# Patient Record
Sex: MALE | Race: WHITE | HISPANIC OR LATINO | Employment: OTHER | URBAN - METROPOLITAN AREA
[De-identification: names, ages, dates, MRNs, and addresses within clinical notes are randomized per-mention and may not be internally consistent; named-entity substitution may affect disease eponyms.]

---

## 2018-10-14 ENCOUNTER — HOSPITAL ENCOUNTER (EMERGENCY)
Facility: HOSPITAL | Age: 53
Discharge: HOME | End: 2018-10-14
Attending: EMERGENCY MEDICINE | Admitting: EMERGENCY MEDICINE

## 2018-10-14 ENCOUNTER — APPOINTMENT (EMERGENCY)
Dept: RADIOLOGY | Facility: HOSPITAL | Age: 53
End: 2018-10-14

## 2018-10-14 VITALS
DIASTOLIC BLOOD PRESSURE: 76 MMHG | OXYGEN SATURATION: 98 % | HEART RATE: 85 BPM | TEMPERATURE: 97.7 F | WEIGHT: 216 LBS | RESPIRATION RATE: 18 BRPM | SYSTOLIC BLOOD PRESSURE: 171 MMHG

## 2018-10-14 DIAGNOSIS — M25.562 LEFT KNEE PAIN: ICD-10-CM

## 2018-10-14 DIAGNOSIS — M25.532 LEFT WRIST PAIN: Primary | ICD-10-CM

## 2018-10-14 PROCEDURE — 99283 EMERGENCY DEPT VISIT LOW MDM: CPT

## 2018-10-14 PROCEDURE — 73110 X-RAY EXAM OF WRIST: CPT

## 2018-10-14 PROCEDURE — 73560 X-RAY EXAM OF KNEE 1 OR 2: CPT

## 2018-10-14 RX ORDER — IBUPROFEN 400 MG/1
400 TABLET ORAL ONCE
Status: COMPLETED | OUTPATIENT
Start: 2018-10-14 | End: 2018-10-14

## 2018-10-14 RX ADMIN — IBUPROFEN 400 MG: 400 TABLET ORAL at 22:59
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
## 2018-10-15 NOTE — ED NOTES
Attending at the bedside speaking with the pt regarding pain medications      Nallely Villalpando RN  10/14/18 6672
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
## 2018-10-19 ENCOUNTER — OFFICE VISIT (OUTPATIENT)
Dept: OBGYN CLINIC | Facility: HOSPITAL | Age: 53
End: 2018-10-19

## 2018-10-19 VITALS
HEART RATE: 65 BPM | DIASTOLIC BLOOD PRESSURE: 85 MMHG | WEIGHT: 225.8 LBS | BODY MASS INDEX: 36.29 KG/M2 | SYSTOLIC BLOOD PRESSURE: 141 MMHG | HEIGHT: 66 IN

## 2018-10-19 DIAGNOSIS — S60.222A CONTUSION OF LEFT HAND, INITIAL ENCOUNTER: Primary | ICD-10-CM

## 2018-10-19 PROCEDURE — 99203 OFFICE O/P NEW LOW 30 MIN: CPT | Performed by: ORTHOPAEDIC SURGERY

## 2018-10-19 RX ORDER — MIRTAZAPINE 15 MG/1
15 TABLET, ORALLY DISINTEGRATING ORAL
COMMUNITY
Start: 2018-10-09 | End: 2018-12-16

## 2018-10-19 RX ORDER — LITHIUM CARBONATE 300 MG/1
300 CAPSULE ORAL
COMMUNITY
Start: 2018-10-09 | End: 2018-12-16

## 2018-10-19 RX ORDER — IBUPROFEN 600 MG/1
600 TABLET ORAL EVERY 8 HOURS PRN
Qty: 40 TABLET | Refills: 1 | Status: SHIPPED | OUTPATIENT
Start: 2018-10-19 | End: 2018-12-17
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
## 2018-10-26 ENCOUNTER — HOSPITAL ENCOUNTER (OUTPATIENT)
Dept: RADIOLOGY | Age: 53
Discharge: HOME | End: 2018-10-26

## 2018-10-26 DIAGNOSIS — S60.222A CONTUSION OF LEFT HAND, INITIAL ENCOUNTER: ICD-10-CM

## 2018-10-26 PROCEDURE — 73221 MRI JOINT UPR EXTREM W/O DYE: CPT

## 2018-12-01 ENCOUNTER — HOSPITAL ENCOUNTER (EMERGENCY)
Facility: HOSPITAL | Age: 53
Discharge: HOME | End: 2018-12-01
Attending: EMERGENCY MEDICINE
Payer: COMMERCIAL

## 2018-12-01 ENCOUNTER — APPOINTMENT (EMERGENCY)
Dept: RADIOLOGY | Facility: HOSPITAL | Age: 53
End: 2018-12-01
Payer: COMMERCIAL

## 2018-12-01 VITALS
HEIGHT: 66 IN | BODY MASS INDEX: 36.96 KG/M2 | RESPIRATION RATE: 16 BRPM | TEMPERATURE: 97 F | OXYGEN SATURATION: 96 % | SYSTOLIC BLOOD PRESSURE: 124 MMHG | HEART RATE: 91 BPM | WEIGHT: 230 LBS | DIASTOLIC BLOOD PRESSURE: 74 MMHG

## 2018-12-01 DIAGNOSIS — F19.10 POLYSUBSTANCE ABUSE (HCC): ICD-10-CM

## 2018-12-01 DIAGNOSIS — F10.10 ALCOHOL ABUSE: ICD-10-CM

## 2018-12-01 DIAGNOSIS — R07.9 CHEST PAIN: Primary | ICD-10-CM

## 2018-12-01 LAB
ALBUMIN SERPL BCP-MCNC: 3.8 G/DL (ref 3.5–5)
ALP SERPL-CCNC: 96 U/L (ref 46–116)
ALT SERPL W P-5'-P-CCNC: 111 U/L (ref 12–78)
AMPHETAMINES SERPL QL SCN: NEGATIVE
ANION GAP SERPL CALCULATED.3IONS-SCNC: 7 MMOL/L (ref 4–13)
AST SERPL W P-5'-P-CCNC: 53 U/L (ref 5–45)
ATRIAL RATE: 93 BPM
BARBITURATES UR QL: NEGATIVE
BASOPHILS # BLD AUTO: 0.03 THOUSANDS/ΜL (ref 0–0.1)
BASOPHILS NFR BLD AUTO: 1 % (ref 0–1)
BENZODIAZ UR QL: NEGATIVE
BILIRUB SERPL-MCNC: 0.49 MG/DL (ref 0.2–1)
BUN SERPL-MCNC: 11 MG/DL (ref 5–25)
CALCIUM SERPL-MCNC: 8.6 MG/DL (ref 8.3–10.1)
CHLORIDE SERPL-SCNC: 103 MMOL/L (ref 100–108)
CO2 SERPL-SCNC: 28 MMOL/L (ref 21–32)
COCAINE UR QL: POSITIVE
CREAT SERPL-MCNC: 0.97 MG/DL (ref 0.6–1.3)
EOSINOPHIL # BLD AUTO: 0.04 THOUSAND/ΜL (ref 0–0.61)
EOSINOPHIL NFR BLD AUTO: 1 % (ref 0–6)
ERYTHROCYTE [DISTWIDTH] IN BLOOD BY AUTOMATED COUNT: 12.6 % (ref 11.6–15.1)
ETHANOL EXG-MCNC: 0.06 MG/DL
GFR SERPL CREATININE-BSD FRML MDRD: 90 ML/MIN/1.73SQ M
GLUCOSE SERPL-MCNC: 111 MG/DL (ref 65–140)
HCT VFR BLD AUTO: 41.5 % (ref 36.5–49.3)
HGB BLD-MCNC: 14.2 G/DL (ref 12–17)
IMM GRANULOCYTES # BLD AUTO: 0.03 THOUSAND/UL (ref 0–0.2)
IMM GRANULOCYTES NFR BLD AUTO: 1 % (ref 0–2)
LYMPHOCYTES # BLD AUTO: 3.16 THOUSANDS/ΜL (ref 0.6–4.47)
LYMPHOCYTES NFR BLD AUTO: 48 % (ref 14–44)
MCH RBC QN AUTO: 30.7 PG (ref 26.8–34.3)
MCHC RBC AUTO-ENTMCNC: 34.2 G/DL (ref 31.4–37.4)
MCV RBC AUTO: 90 FL (ref 82–98)
METHADONE UR QL: NEGATIVE
MONOCYTES # BLD AUTO: 0.35 THOUSAND/ΜL (ref 0.17–1.22)
MONOCYTES NFR BLD AUTO: 5 % (ref 4–12)
NEUTROPHILS # BLD AUTO: 2.83 THOUSANDS/ΜL (ref 1.85–7.62)
NEUTS SEG NFR BLD AUTO: 44 % (ref 43–75)
NRBC BLD AUTO-RTO: 0 /100 WBCS
OPIATES UR QL SCN: NEGATIVE
P AXIS: 22 DEGREES
PCP UR QL: NEGATIVE
PLATELET # BLD AUTO: 205 THOUSANDS/UL (ref 149–390)
PMV BLD AUTO: 10.7 FL (ref 8.9–12.7)
POTASSIUM SERPL-SCNC: 4 MMOL/L (ref 3.5–5.3)
PR INTERVAL: 158 MS
PROT SERPL-MCNC: 8 G/DL (ref 6.4–8.2)
QRS AXIS: 20 DEGREES
QRSD INTERVAL: 84 MS
QT INTERVAL: 348 MS
QTC INTERVAL: 432 MS
RBC # BLD AUTO: 4.62 MILLION/UL (ref 3.88–5.62)
SODIUM SERPL-SCNC: 138 MMOL/L (ref 136–145)
T WAVE AXIS: 32 DEGREES
THC UR QL: POSITIVE
TROPONIN I SERPL-MCNC: <0.02 NG/ML
VENTRICULAR RATE: 93 BPM
WBC # BLD AUTO: 6.44 THOUSAND/UL (ref 4.31–10.16)

## 2018-12-01 PROCEDURE — 93010 ELECTROCARDIOGRAM REPORT: CPT | Performed by: INTERNAL MEDICINE

## 2018-12-01 PROCEDURE — 82075 ASSAY OF BREATH ETHANOL: CPT | Performed by: EMERGENCY MEDICINE

## 2018-12-01 PROCEDURE — 85025 COMPLETE CBC W/AUTO DIFF WBC: CPT | Performed by: EMERGENCY MEDICINE

## 2018-12-01 PROCEDURE — 36415 COLL VENOUS BLD VENIPUNCTURE: CPT | Performed by: EMERGENCY MEDICINE

## 2018-12-01 PROCEDURE — 84484 ASSAY OF TROPONIN QUANT: CPT | Performed by: EMERGENCY MEDICINE

## 2018-12-01 PROCEDURE — 96374 THER/PROPH/DIAG INJ IV PUSH: CPT

## 2018-12-01 PROCEDURE — 71046 X-RAY EXAM CHEST 2 VIEWS: CPT

## 2018-12-01 PROCEDURE — 80053 COMPREHEN METABOLIC PANEL: CPT | Performed by: EMERGENCY MEDICINE

## 2018-12-01 PROCEDURE — 96372 THER/PROPH/DIAG INJ SC/IM: CPT

## 2018-12-01 PROCEDURE — 99284 EMERGENCY DEPT VISIT MOD MDM: CPT

## 2018-12-01 PROCEDURE — 80307 DRUG TEST PRSMV CHEM ANLYZR: CPT | Performed by: EMERGENCY MEDICINE

## 2018-12-01 PROCEDURE — 93005 ELECTROCARDIOGRAM TRACING: CPT

## 2018-12-01 RX ORDER — BACITRACIN, NEOMYCIN, POLYMYXIN B 400; 3.5; 5 [USP'U]/G; MG/G; [USP'U]/G
1 OINTMENT TOPICAL ONCE
Status: DISCONTINUED | OUTPATIENT
Start: 2018-12-01 | End: 2018-12-01

## 2018-12-01 RX ORDER — LORAZEPAM 2 MG/ML
1 INJECTION INTRAMUSCULAR ONCE
Status: DISCONTINUED | OUTPATIENT
Start: 2018-12-01 | End: 2018-12-01

## 2018-12-01 RX ORDER — LORAZEPAM 0.5 MG/1
1 TABLET ORAL ONCE
Status: DISCONTINUED | OUTPATIENT
Start: 2018-12-01 | End: 2018-12-01

## 2018-12-01 RX ORDER — LORAZEPAM 2 MG/ML
2 INJECTION INTRAMUSCULAR ONCE
Status: COMPLETED | OUTPATIENT
Start: 2018-12-01 | End: 2018-12-01

## 2018-12-01 RX ADMIN — LORAZEPAM 2 MG: 2 INJECTION INTRAMUSCULAR; INTRAVENOUS at 01:22

## 2018-12-01 RX ADMIN — LORAZEPAM 2 MG: 2 INJECTION INTRAMUSCULAR; INTRAVENOUS at 07:35

## 2018-12-01 NOTE — ED NOTES
PC placed to [facility], [place], and [facility] who stated they do not have beds available at this time  All other facilities that take [facility] are not accepting night time referals  Bed search exhausted at this time       PC placed to [facility] where a voicemail was left for them to follow up with pt in the AM

## 2018-12-01 NOTE — ED NOTES
CW called and told Ephraim Marcano that pt was being discharged and continues to be undecided about his treatment  She stated that CW to not call [facility] as bed only held for one hour and then it is assumed gone
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
## 2018-12-01 NOTE — ED ATTENDING ATTESTATION
Aubrie Bustamante MD, saw and evaluated the patient  All available labs and X-rays were ordered by me or the resident and have been reviewed by myself  I discussed the patient with the resident / non-physician and agree with the resident's / non-physician practitioner's findings and plan as documented in the resident's / non-physician practicitioner's note, except where noted  At this point, I agree with the current assessment done in the ED  Chief Complaint   Patient presents with    Drug Problem     Pt came in due to addiction to cocaine and alcohol  States he is embarassed to talk about it and he feels unstable to to the drug use  This is a 48year old male presenting for chest pressure as well as alcohol/cocaine abuse  For the past 2-3 days he has been abusing cocaine by smoking it  He has noted he has some chest pressure sensation as well during this time that is fairly continuous  Denies f/ch/ns/n/v  Denies SOB  Denies falls, trauma  He feels embaressed by the alcohol + cocaine abuse and wants help  PMH:  - Anxiety  - Bipolar, PTSD  PSH:  - colostomy  - Shoulder surgery  - Wrist surgery  - hernia repair  No smoking  +alcohol: he had 3 six packs today; he doesn't get withdrawal syndromes  +drugs  PE:  Vitals:    12/01/18 0038 12/01/18 0930   BP: 165/100 124/74   BP Location:  Left arm   Pulse: 95 91   Resp: 18 16   Temp: (!) 97 °F (36 1 °C)    TempSrc: Tympanic    SpO2: 96% 96%   Weight: 104 kg (230 lb)    Height: 5' 6" (1 676 m)    General: VSS, NAD, awake, alert  Well-nourished, well-developed  Appears stated age  Speaking normally in full sentences  Head: Normocephalic, atraumatic, nontender  Eyes: PERRL, EOM-I  No diplopia  No hyphema  No subconjunctival hemorrhages  Symmetrical lids  ENT: Atraumatic external nose and ears  MMM  No malocclusion  No stridor  Normal phonation  No drooling  Normal swallowing  Neck: Symmetric, trachea midline  No JVD    CV: RRR  +S1/S2  No murmurs or gallops  Peripheral pulses +2 throughout  No chest wall tenderness  Lungs:   Unlabored No retractions  CTAB, lungs sounds equal bilateral    No tachypnea  Abd: +BS, soft, NT/ND    MSK:   FROM   Back:   No rashes  Skin: Dry, intact  Neuro: AAOx3, GCS 15, CN II-XII grossly intact  Motor grossly intact  Psychiatric/Behavioral: Appropriate mood and affect   Exam: deferred  A:  - chest pain/pressure after cocaine  - poly-substance abuse  P:  - Labs: cardiac r/o  Single trop given duration of symptoms    - CRISIS  - 13 point ROS was performed and all are normal unless stated in the history above  - Nursing note reviewed  Vitals reviewed  - Orders placed by myself and/or advanced practitioner / resident     - Previous chart was reviewed  - No language barrier    - History obtained from patient  - There are no limitations to the history obtained  - Critical care time: Not applicable for this patient  Final Diagnosis:  1  Chest pain    2  Alcohol abuse    3  Polysubstance abuse (HCC)         Medications   LORazepam (ATIVAN) 2 mg/mL injection 2 mg (2 mg Intramuscular Given 12/1/18 0122)   LORazepam (ATIVAN) 2 mg/mL injection 2 mg (2 mg Intravenous Given 12/1/18 0735)     XR chest 2 views   ED Interpretation   No consolidation appreciated      Final Result      No acute cardiopulmonary disease              Workstation performed: [id number]           Orders Placed This Encounter   Procedures    ED ECG Documentation Only    XR chest 2 views    CBC and differential    Comprehensive metabolic panel    Troponin I    Rapid drug screen, urine    POCT alcohol breath test    ECG 12 lead     Labs Reviewed   CBC AND DIFFERENTIAL - Abnormal        Result Value Ref Range Status    WBC 6 44  4 31 - 10 16 Thousand/uL Final    RBC 4 62  3 88 - 5 62 Million/uL Final    Hemoglobin 14 2  12 0 - 17 0 g/dL Final    Hematocrit 41 5  36 5 - 49 3 % Final    MCV 90  82 - 98 fL Final    MCH 30 7  26 8 - 34 3 pg Final    MCHC 34 2  31 4 - 37 4 g/dL Final    RDW 12 6  11 6 - 15 1 % Final    MPV 10 7  8 9 - 12 7 fL Final    Platelets 027  044 - 390 Thousands/uL Final    nRBC 0  /100 WBCs Final    Neutrophils Relative 44  43 - 75 % Final    Immat GRANS % 1  0 - 2 % Final    Lymphocytes Relative 48 (*) 14 - 44 % Final    Monocytes Relative 5  4 - 12 % Final    Eosinophils Relative 1  0 - 6 % Final    Basophils Relative 1  0 - 1 % Final    Neutrophils Absolute 2 83  1 85 - 7 62 Thousands/µL Final    Immature Grans Absolute 0 03  0 00 - 0 20 Thousand/uL Final    Lymphocytes Absolute 3 16  0 60 - 4 47 Thousands/µL Final    Monocytes Absolute 0 35  0 17 - 1 22 Thousand/µL Final    Eosinophils Absolute 0 04  0 00 - 0 61 Thousand/µL Final    Basophils Absolute 0 03  0 00 - 0 10 Thousands/µL Final   COMPREHENSIVE METABOLIC PANEL - Abnormal     Sodium 138  136 - 145 mmol/L Final    Potassium 4 0  3 5 - 5 3 mmol/L Final    Chloride 103  100 - 108 mmol/L Final    CO2 28  21 - 32 mmol/L Final    ANION GAP 7  4 - 13 mmol/L Final    BUN 11  5 - 25 mg/dL Final    Creatinine 0 97  0 60 - 1 30 mg/dL Final    Comment: Standardized to IDMS reference method    Glucose 111  65 - 140 mg/dL Final    Comment:   If the patient is fasting, the ADA then defines impaired fasting glucose as > 100 mg/dL and diabetes as > or equal to 123 mg/dL  Specimen collection should occur prior to Sulfasalazine administration due to the potential for falsely depressed results  Specimen collection should occur prior to Sulfapyridine administration due to the potential for falsely elevated results  Calcium 8 6  8 3 - 10 1 mg/dL Final    AST 53 (*) 5 - 45 U/L Final    Comment:   Specimen collection should occur prior to Sulfasalazine administration due to the potential for falsely depressed results        (*) 12 - 78 U/L Final    Comment:   Specimen collection should occur prior to Sulfasalazine and/or Sulfapyridine administration due to the potential for falsely depressed results  Alkaline Phosphatase 96  46 - 116 U/L Final    Total Protein 8 0  6 4 - 8 2 g/dL Final    Albumin 3 8  3 5 - 5 0 g/dL Final    Total Bilirubin 0 49  0 20 - 1 00 mg/dL Final    eGFR 90  ml/min/1 73sq m Final    Narrative:     National Kidney Disease Education Program recommendations are as follows:  GFR calculation is accurate only with a steady state creatinine  Chronic Kidney disease less than 60 ml/min/1 73 sq  meters  Kidney failure less than 15 ml/min/1 73 sq  meters  RAPID DRUG SCREEN, URINE - Abnormal     Amph/Meth UR Negative  Negative Final    Barbiturate Ur Negative  Negative Final    Benzodiazepine Urine Negative  Negative Final    Cocaine Urine Positive (*) Negative Final    Methadone Urine Negative  Negative Final    Opiate Urine Negative  Negative Final    PCP Ur Negative  Negative Final    THC Urine Positive (*) Negative Final    Narrative:     Presumptive report  If requested, specimen will be sent to reference lab for confirmation  FOR MEDICAL PURPOSES ONLY  IF CONFIRMATION NEEDED PLEASE CONTACT THE LAB WITHIN 5 DAYS  Drug Screen Cutoff Levels:  AMPHETAMINE/METHAMPHETAMINES  1000 ng/mL  BARBITURATES     200 ng/mL  BENZODIAZEPINES     200 ng/mL  COCAINE      300 ng/mL  METHADONE      300 ng/mL  OPIATES      300 ng/mL  PHENCYCLIDINE     25 ng/mL  THC       50 ng/mL   TROPONIN I - Normal    Troponin I <0 02  <=0 04 ng/mL Final    Comment:   [employer] Chemistry analyzer 99% cutoff is > 0 04 ng/mL in network labs     o cTnI 99% cutoff is useful only when applied to patients in the clinical setting of myocardial ischemia   o cTnI 99% cutoff should be interpreted in the context of clinical history, ECG findings and possibly cardiac imaging to establish correct diagnosis  o cTnI 99% cutoff may be suggestive but clearly not indicative of a coronary event without the clinical setting of myocardial ischemia       POCT ALCOHOL BREATH TEST - Normal    EXTBreath Alcohol 0 061 Final     Time reflects when diagnosis was documented in both MDM as applicable and the Disposition within this note     Time User Action Codes Description Comment    12/1/2018 10:29 AM Cortney Nissa Add [R07 9] Chest pain     12/1/2018 10:29 AM Cortney Willetta Add [F10 10] Alcohol abuse     12/2/2018  2:31 AM Hernando Jeanmarie Add [F19 10] Polysubstance abuse [facility])       ED Disposition     ED Disposition Condition Comment    Discharge  Sade Arambula discharge to home/self care  Condition at discharge: Good        MD Documentation      Most Recent Value   Sending MD  [address]      Follow-up Information     Follow up With Specialties Details Why Contact Info Additional Information    Laury Wayne MD MPH Internal Medicine In 1 week  [place] [address] [address] [facility]  [phone]       [address] [place] [address] Emergency Department Emergency Medicine  As needed [address] [place] [address]  [phone]  ED, [address] [place] [address], [phone]        Discharge Medication List as of 12/1/2018 10:33 AM      CONTINUE these medications which have NOT CHANGED    Details   DOXEPIN HCL PO Take by mouth, Historical Med      ibuprofen (MOTRIN) 600 mg tablet Take 1 tablet (600 mg total) by mouth every 8 (eight) hours as needed for mild pain, Starting Fri 10/19/2018, Normal      lithium carbonate 300 mg capsule Take 300 mg by mouth, Starting Tue 10/9/2018, Until Thu 11/8/2018, Historical Med      mirtazapine (REMERON SOL-TAB) 15 mg disintegrating tablet Take 15 mg by mouth, Starting Tue 10/9/2018, Until Thu 11/8/2018, Historical Med           No discharge procedures on file  Prior to Admission Medications   Prescriptions Last Dose Informant Patient Reported? Taking?    DOXEPIN HCL PO  Self Yes No   Sig: Take by mouth   ibuprofen (MOTRIN) 600 mg tablet   No No   Sig: Take 1 tablet (600 mg total) by mouth every 8 (eight) hours as needed for mild pain   lithium carbonate 300 mg capsule  Self Yes No   Sig: Take 300 mg by mouth   mirtazapine (REMERON SOL-TAB) 15 mg disintegrating tablet  Self Yes No   Sig: Take 15 mg by mouth      Facility-Administered Medications: None       Portions of the record may have been created with voice recognition software  Occasional wrong word or "sound a like" substitutions may have occurred due to the inherent limitations of voice recognition software  Read the chart carefully and recognize, using context, where substitutions have occurred      Electronically signed by:  Kelsey Zaragoza

## 2018-12-01 NOTE — ED NOTES
Tammy from \[facility]\"" explained to Pt and CW that he has a bed on hold from [facility], phone number [phone] for the next hour as Pt stated that he is undecided and needs to make phone calls first   Abdullahi Lowery told Pt he will check with him in an hour to see his decision

## 2018-12-01 NOTE — ED NOTES
Crisis Worker ([id number]) introduced patient (Pt) to Alfa Earl of the [facility] and explained she will be doing the assessment and see how she can help him

## 2018-12-01 NOTE — ED NOTES
CW asked pt is he waned to go to [facility] and Pt stated that he was undecided at this time  CW told Pt that he will be discharged home and to follow up with [facility] when he decides if he would like detox treatment

## 2018-12-01 NOTE — ED PROVIDER NOTES
History  Chief Complaint   Patient presents with    Drug Problem     Pt came in due to addiction to cocaine and alcohol  States he is embarassed to talk about it and he feels unstable to to the drug use  HPI    53-year-old man comes in after a 2 day alcohol and cocaine [place]  Last drug use was cocaine 1 hour prior to arrival   He has been  Drinking both beer and hard liquor for last 2 days  Patient has been smoking crack cocaine  States that he feels chest tightness, feels very jittery  Denies any presyncope syncope  Patient feels very embarrassed and would like to get information drug rehabilitation  Denies any SI or HI at this time  No other symptoms at this time    Prior to Admission Medications   Prescriptions Last Dose Informant Patient Reported? Taking? DOXEPIN HCL PO  Self Yes No   Sig: Take by mouth   ibuprofen (MOTRIN) 600 mg tablet   No No   Sig: Take 1 tablet (600 mg total) by mouth every 8 (eight) hours as needed for mild pain   lithium carbonate 300 mg capsule  Self Yes No   Sig: Take 300 mg by mouth   mirtazapine (REMERON SOL-TAB) 15 mg disintegrating tablet  Self Yes No   Sig: Take 15 mg by mouth      Facility-Administered Medications: None       Past Medical History:   Diagnosis Date    Anxiety     Bipolar disorder ([facility] [address] )     PTSD (post-traumatic stress disorder)        Past Surgical History:   Procedure Laterality Date    COLOSTOMY      HERNIA REPAIR      SHOULDER SURGERY Bilateral     WRIST SURGERY Right 2012       Family History   Problem Relation Age of Onset    Breast cancer Mother     No Known Problems Father      I have reviewed and agree with the history as documented  Social History   Substance Use Topics    Smoking status: Never Smoker    Smokeless tobacco: Never Used    Alcohol use Yes        Review of Systems   Constitutional: Negative  HENT: Negative  Eyes: Negative  Respiratory: Positive for chest tightness      Cardiovascular: Positive for chest pain and palpitations  Gastrointestinal: Negative  Endocrine: Negative  Genitourinary: Negative  Musculoskeletal: Negative  Skin: Negative  Allergic/Immunologic: Negative  Neurological: Negative  Hematological: Negative  Psychiatric/Behavioral: Negative  Physical Exam  ED Triage Vitals   Temperature Pulse Respirations Blood Pressure SpO2   12/01/18 0038 12/01/18 0038 12/01/18 0038 12/01/18 0038 12/01/18 0038   (!) 97 °F (36 1 °C) 95 18 165/100 96 %      Temp Source Heart Rate Source Patient Position - Orthostatic VS BP Location FiO2 (%)   12/01/18 0038 12/01/18 0930 12/01/18 0930 12/01/18 0930 --   Tympanic Monitor Lying Left arm       Pain Score       12/01/18 0038       6           Orthostatic Vital Signs  Vitals:    12/01/18 0038 12/01/18 0930   BP: 165/100 124/74   Pulse: 95 91   Patient Position - Orthostatic VS:  Lying       Physical Exam   Constitutional: He is oriented to person, place, and time  He appears well-developed and well-nourished  No distress  HENT:   Head: Normocephalic and atraumatic  Right Ear: External ear normal    Left Ear: External ear normal    Mouth/Throat: Oropharynx is clear and moist    Eyes: Pupils are equal, round, and reactive to light  Conjunctivae and EOM are normal  Right eye exhibits no discharge  Left eye exhibits no discharge  No scleral icterus  Neck: Normal range of motion  Neck supple  No tracheal deviation present  No thyromegaly present  Cardiovascular: Normal rate, regular rhythm and intact distal pulses  Exam reveals no gallop and no friction rub  No murmur heard  Pulmonary/Chest: Effort normal and breath sounds normal  No stridor  No respiratory distress  He has no wheezes  He has no rales  Abdominal: Soft  Bowel sounds are normal  He exhibits no distension  There is no tenderness  There is no rebound and no guarding  Musculoskeletal: Normal range of motion  He exhibits no edema or deformity     Neurological: He is alert and oriented to person, place, and time  No cranial nerve deficit  Skin: Skin is warm and dry  No rash noted  He is not diaphoretic  No erythema  Psychiatric: He has a normal mood and affect  His behavior is normal  Thought content normal    Nursing note and vitals reviewed  ED Medications  Medications   LORazepam (ATIVAN) 2 mg/mL injection 2 mg (2 mg Intramuscular Given 12/1/18 0122)   LORazepam (ATIVAN) 2 mg/mL injection 2 mg (2 mg Intravenous Given 12/1/18 7235)       Diagnostic Studies  Results Reviewed     Procedure Component Value Units Date/Time    Rapid drug screen, urine [[id number]]  (Abnormal) Collected:  12/01/18 0200    Lab Status:  Final result Specimen:  Urine from Urine, Clean Catch Updated:  12/01/18 0304     Amph/Meth UR Negative     Barbiturate Ur Negative     Benzodiazepine Urine Negative     Cocaine Urine Positive (A)     Methadone Urine Negative     Opiate Urine Negative     PCP Ur Negative     THC Urine Positive (A)    Narrative:         Presumptive report  If requested, specimen will be sent to reference lab for confirmation  FOR MEDICAL PURPOSES ONLY  IF CONFIRMATION NEEDED PLEASE CONTACT THE LAB WITHIN 5 DAYS      Drug Screen Cutoff Levels:  AMPHETAMINE/METHAMPHETAMINES  1000 ng/mL  BARBITURATES     200 ng/mL  BENZODIAZEPINES     200 ng/mL  COCAINE      300 ng/mL  METHADONE      300 ng/mL  OPIATES      300 ng/mL  PHENCYCLIDINE     25 ng/mL  THC       50 ng/mL    Troponin I [[id number]]  (Normal) Collected:  12/01/18 0139    Lab Status:  Final result Specimen:  Blood from Arm, Right Updated:  12/01/18 0220     Troponin I <0 02 ng/mL     Comprehensive metabolic panel [[id number]]  (Abnormal) Collected:  12/01/18 0139    Lab Status:  Final result Specimen:  Blood from Arm, Right Updated:  12/01/18 6684     Sodium 138 mmol/L      Potassium 4 0 mmol/L      Chloride 103 mmol/L      CO2 28 mmol/L      ANION GAP 7 mmol/L      BUN 11 mg/dL      Creatinine 0 97 mg/dL      Glucose 111 mg/dL Calcium 8 6 mg/dL      AST 53 (H) U/L       (H) U/L      Alkaline Phosphatase 96 U/L      Total Protein 8 0 g/dL      Albumin 3 8 g/dL      Total Bilirubin 0 49 mg/dL      eGFR 90 ml/min/1 73sq m     Narrative:         National Kidney Disease Education Program recommendations are as follows:  GFR calculation is accurate only with a steady state creatinine  Chronic Kidney disease less than 60 ml/min/1 73 sq  meters  Kidney failure less than 15 ml/min/1 73 sq  meters  POCT alcohol breath test [[id number]]  (Normal) Resulted:  12/01/18 0154    Lab Status:  Final result Updated:  12/01/18 0154     EXTBreath Alcohol 0 061    CBC and differential [[id number]]  (Abnormal) Collected:  12/01/18 0139    Lab Status:  Final result Specimen:  Blood from Arm, Right Updated:  12/01/18 0154     WBC 6 44 Thousand/uL      RBC 4 62 Million/uL      Hemoglobin 14 2 g/dL      Hematocrit 41 5 %      MCV 90 fL      MCH 30 7 pg      MCHC 34 2 g/dL      RDW 12 6 %      MPV 10 7 fL      Platelets 345 Thousands/uL      nRBC 0 /100 WBCs      Neutrophils Relative 44 %      Immat GRANS % 1 %      Lymphocytes Relative 48 (H) %      Monocytes Relative 5 %      Eosinophils Relative 1 %      Basophils Relative 1 %      Neutrophils Absolute 2 83 Thousands/µL      Immature Grans Absolute 0 03 Thousand/uL      Lymphocytes Absolute 3 16 Thousands/µL      Monocytes Absolute 0 35 Thousand/µL      Eosinophils Absolute 0 04 Thousand/µL      Basophils Absolute 0 03 Thousands/µL                  XR chest 2 views   ED Interpretation by Marjorie Peña MD (12/01 1050)   No consolidation appreciated      Final Result by Pedro Belcher MD (12/01 3222)      No acute cardiopulmonary disease              Workstation performed: [id number]               Procedures  ECG 12 Lead Documentation  Date/Time: 12/1/2018 12:57 AM  Performed by: Juice Jiang  Authorized by: Juice Jiang     Indications / Diagnosis:   CHEST TIGHTNESS  ECG reviewed by me, the ED Provider: yes    Patient location:  ED  Previous ECG:     Previous ECG:  Compared to current    Similarity:  No change  Interpretation:     Interpretation: normal    Rate:     ECG rate:  93    ECG rate assessment: tachycardic    Rhythm:     Rhythm: sinus rhythm    Ectopy:     Ectopy: none    QRS:     QRS axis:  Normal  Conduction:     Conduction: normal    ST segments:     ST segments:  Normal  T waves:     T waves: normal            Phone Consults  ED Phone Contact    ED Course                               MDM  Number of Diagnoses or Management Options  Diagnosis management comments:   53-year-old man presents after cocaine alcohol intoxication  Will do a cardiac workup  Will have him speak with crisis regarding options for rehabilitation  [facility] Time    Disposition  Final diagnoses:   Chest pain   Alcohol abuse     Time reflects when diagnosis was documented in both MDM as applicable and the Disposition within this note     Time User Action Codes Description Comment    12/1/2018 10:29 AM Giovani Whitley [R07 9] Chest pain     12/1/2018 10:29 AM Giovani Whitley [F10 10] Alcohol abuse       ED Disposition     ED Disposition Condition Comment    Discharge  Bhakti High discharge to home/self care      Condition at discharge: Good        MD Documentation      Most Recent Value   Sending MD [facility]      Follow-up Information     Follow up With Specialties Details Why Contact Info Additional Information    Francisco Camacho MD MPH Internal Medicine In 1 week  [facility] [address] [facility]  [phone]       [address] [place] [address] Emergency Department Emergency Medicine  As needed [address] [place] [address], [address] [place] [address], [address]          Discharge Medication List as of 12/1/2018 10:33 AM      CONTINUE these medications which have NOT CHANGED    Details   DOXEPIN HCL PO Take by mouth, Historical Med      ibuprofen (MOTRIN) 600 mg tablet Take 1 tablet (600 mg total) by mouth every 8 (eight) hours as needed for mild pain, Starting Fri 10/19/2018, Normal      lithium carbonate 300 mg capsule Take 300 mg by mouth, Starting Tue 10/9/2018, Until Thu 11/8/2018, Historical Med      mirtazapine (REMERON SOL-TAB) 15 mg disintegrating tablet Take 15 mg by mouth, Starting Tue 10/9/2018, Until Thu 11/8/2018, Historical Med           No discharge procedures on file  ED Provider  Attending physically available and evaluated Lisa Terry I managed the patient along with the ED Attending      Electronically Signed by         Leandra Owens MD  12/01/18 8521

## 2018-12-01 NOTE — ED NOTES
Patient is refusing to change and will not hand over phone nor his clothing   Nurse notified     Natalee Life  12/01/18 2497

## 2018-12-01 NOTE — DISCHARGE INSTRUCTIONS
You were worked up for chest pain and based on your history, our examination, lab results, and EKG there is no acute medical cause requiring emergent intervention  Additionally, you were seen by our crisis worker and the host service  Your offered placement at [facility] mid facility, however as he did not desire to go to a detox facility at this time we are discharging you to self care  Abuse of Alcohol   WHAT YOU SHOULD KNOW:   Alcohol abuse is when you drink large amounts of alcohol often to change your mood or behavior  CARE AGREEMENT:   You have the right to help plan your care  Learn about your health condition and how it may be treated  Discuss treatment options with your caregivers to decide what care you want to receive  You always have the right to refuse treatment  RISKS:   Medicines to treat alcohol abuse may cause vomiting, stress, anxiety, headaches, or dizziness  Alcohol abuse puts you at risk for disease and injury  Alcohol can damage your brain, heart, kidneys, lungs, and liver  The risk of stroke is greater if you have 5 or more drinks each day  You may act out violently when you abuse alcohol  You may harm yourself and others  Risky sexual behavior could lead to sexually transmitted infections  If you are pregnant and drink alcohol, you and your baby are at risk for serious health problems  Alcohol abuse may put you in a coma and may be life-threatening  WHILE YOU ARE HERE:   Informed consent  is a legal document that explains the tests, treatments, or procedures that you may need  Informed consent means you understand what will be done and can make decisions about what you want  You give your permission when you sign the consent form  You can have someone sign this form for you if you are not able to sign it  You have the right to understand your medical care in words you know  Before you sign the consent form, understand the risks and benefits of what will be done   Make sure all your questions are answered  Psychiatric assessment:  Caregivers will ask if you have a history of psychological trauma, such as physical, sexual, or mental abuse  They will ask if you were given the care that you needed  Caregivers will ask you if you have been a victim of a crime or natural disaster, or if you have a serious injury or disease  They will ask you if you have seen other people being harmed, such as in combat  You will be asked if you drink alcohol or use drugs at present or in the past  Caregivers will ask you if you want to hurt or kill yourself or others  How you answer these questions can help caregivers decide on treatment  To help during treatment, caregivers will ask you about such things as how you feel about it and your hobbies and goals  Caregivers will also ask you about the people in your life who support you  Pulse oximeter: A pulse oximeter is a device that measures the amount of oxygen in your blood  A cord with a clip or sticky strip is placed on your finger, ear, or toe  The other end of the cord is hooked to a machine  Never turn the pulse oximeter or alarm off  An alarm will sound if your oxygen level is low or cannot be read  Vital signs:  Caregivers will check your blood pressure, heart rate, breathing rate, and temperature  They will also ask about your pain  These vital signs give caregivers information about your current health  Intake and Output:  Healthcare providers will keep track of the amount of liquid you are getting  They may also need to know how much you are urinating  Ask your healthcare provider how much liquid you should have each day  You may need to increase or decrease the amount of liquid you have each day  Ask healthcare providers if they need to measure or collect your urine before you dispose of it  An IV  is a small tube placed in your vein that is used to give you medicine or liquids  Medicines:   · Sedative:   This medicine is given to help you stay calm and relaxed  · Anticonvulsant medicine: This medicine is given to control seizures  Take this medicine exactly as directed  · Antinausea medicine: This medicine may be given to calm your stomach and prevent vomiting  · Glucose: This medicine may be given to increase the amount of sugar in your blood  · Vitamin supplement:  Alcohol can make it hard for your body to absorb enough vitamin B1  You may be given vitamin B1 if you have low levels  It is also given to prevent alcohol related brain damage  You may also need other vitamin supplements  Tests:   · Blood and urine tests:  Samples of your blood or urine are tested for alcohol  Tests can also show signs of liver, kidney, or heart damage caused by alcohol  You may need to have these tests more than once  · Neurologic exam:  This is also called neuro signs, neuro checks, or neuro status  A neurologic exam can show caregivers how well your brain works after an injury or illness  Caregivers will check how your pupils (black dots in the center of each eye) react to light  They may check your memory and how easily you wake up  Your hand grasp and balance may also be tested  · EKG: This test records the electrical activity of your heart  It will be used to check for damage or problems caused by alcohol  · Chest x-ray: This is a picture of your lungs and heart  Healthcare providers may use the x-ray to look for heart damage, injuries, or signs of infection, such as pneumonia  · CT scan: This test is also called a CAT scan  An x-ray machine uses a computer to take pictures of your brain  The pictures may show damage caused by alcohol abuse  You may be given a dye before the pictures are taken to help healthcare providers see the pictures better  Tell the healthcare provider if you have ever had an allergic reaction to contrast dye    Treatment:   · Brief intervention therapy:  A healthcare provider meets with you to discuss ways to control your risky behaviors, such as drinking and driving  This therapy also helps you set goals to decrease the amount of alcohol you drink  · Breathing support:      ¨ You may need extra oxygen  if your blood oxygen level is lower than it should be  You may get oxygen through a mask placed over your nose and mouth or through small tubes placed in your nostrils  Ask your healthcare provider before you take off the mask or oxygen tubing  ¨ A ventilator  is a machine that gives you oxygen and breathes for you when you cannot breathe well on your own  An endotracheal (ET) tube is put into your mouth or nose and attached to the ventilator  You may need a trach if an ET tube cannot be placed  A trach is a tube put through an incision and into your windpipe  © 2014 [employer] is for End User's use only and may not be sold, redistributed or otherwise used for commercial purposes  All illustrations and images included in CareNotes® are the copyrighted property of [employer]  or [employer] Frag[employer]  The above information is an  only  It is not intended as medical advice for individual conditions or treatments  Talk to your doctor, nurse or pharmacist before following any medical regimen to see if it is safe and effective for you

## 2018-12-01 NOTE — ED CARE HANDOFF
Emergency Department Sign Out Note        Sign out and transfer of care from Dr Lyle Griffith  See Separate Emergency Department note  The patient, Marleen Oconnor, was evaluated by the previous provider for chest pain, drug, alcohol abuse  Workup Completed:  Cardiac workup including CBC, CMP, troponin, chest x-ray, EKG checked and negative  ED Course / Workup Pending (followup): Patient seen and evaluated by crisis worker, at this time pending evaluation by Host service for drug and alcohol abuse  Following this evaluation, patient will be discharged  Patient offered placement in [facility] facility for detox but declined, discharged to self care  Procedures  [facility] Time      Disposition  Final diagnoses:   None     ED Disposition     None      MD Documentation      Most Recent Value   Sending MD SALDAÑA      Follow-up Information    None       Patient's Medications   Discharge Prescriptions    No medications on file     No discharge procedures on file         ED Provider  Electronically Signed by     Cj Diaz MD  12/01/18 7366

## 2018-12-10 ENCOUNTER — HOSPITAL ENCOUNTER (EMERGENCY)
Facility: HOSPITAL | Age: 53
End: 2018-12-10
Attending: EMERGENCY MEDICINE | Admitting: EMERGENCY MEDICINE
Payer: COMMERCIAL

## 2018-12-10 VITALS
OXYGEN SATURATION: 96 % | RESPIRATION RATE: 18 BRPM | SYSTOLIC BLOOD PRESSURE: 175 MMHG | BODY MASS INDEX: 35.51 KG/M2 | WEIGHT: 220 LBS | HEART RATE: 80 BPM | TEMPERATURE: 97.5 F | DIASTOLIC BLOOD PRESSURE: 97 MMHG

## 2018-12-10 DIAGNOSIS — Z71.51 ENCOUNTER FOR DRUG REHABILITATION: Primary | ICD-10-CM

## 2018-12-10 LAB
ALBUMIN SERPL BCP-MCNC: 4.7 G/DL (ref 3.5–5)
ALP SERPL-CCNC: 87 U/L (ref 46–116)
ALT SERPL W P-5'-P-CCNC: 86 U/L (ref 12–78)
AMPHETAMINES SERPL QL SCN: NEGATIVE
ANION GAP SERPL CALCULATED.3IONS-SCNC: 8 MMOL/L (ref 4–13)
AST SERPL W P-5'-P-CCNC: 43 U/L (ref 5–45)
ATRIAL RATE: 76 BPM
BARBITURATES UR QL: NEGATIVE
BASOPHILS # BLD AUTO: 0.04 THOUSANDS/ΜL (ref 0–0.1)
BASOPHILS NFR BLD AUTO: 1 % (ref 0–1)
BENZODIAZ UR QL: NEGATIVE
BILIRUB SERPL-MCNC: 0.63 MG/DL (ref 0.2–1)
BILIRUB UR QL STRIP: NEGATIVE
BUN SERPL-MCNC: 11 MG/DL (ref 5–25)
CALCIUM SERPL-MCNC: 9 MG/DL (ref 8.3–10.1)
CHLORIDE SERPL-SCNC: 105 MMOL/L (ref 100–108)
CLARITY UR: CLEAR
CO2 SERPL-SCNC: 25 MMOL/L (ref 21–32)
COCAINE UR QL: POSITIVE
COLOR UR: YELLOW
CREAT SERPL-MCNC: 0.99 MG/DL (ref 0.6–1.3)
EOSINOPHIL # BLD AUTO: 0.05 THOUSAND/ΜL (ref 0–0.61)
EOSINOPHIL NFR BLD AUTO: 1 % (ref 0–6)
ERYTHROCYTE [DISTWIDTH] IN BLOOD BY AUTOMATED COUNT: 13 % (ref 11.6–15.1)
ETHANOL EXG-MCNC: 0 MG/DL
GFR SERPL CREATININE-BSD FRML MDRD: 88 ML/MIN/1.73SQ M
GLUCOSE SERPL-MCNC: 97 MG/DL (ref 65–140)
GLUCOSE UR STRIP-MCNC: NEGATIVE MG/DL
HCT VFR BLD AUTO: 44 % (ref 36.5–49.3)
HGB BLD-MCNC: 14.7 G/DL (ref 12–17)
HGB UR QL STRIP.AUTO: NEGATIVE
IMM GRANULOCYTES # BLD AUTO: 0.03 THOUSAND/UL (ref 0–0.2)
IMM GRANULOCYTES NFR BLD AUTO: 0 % (ref 0–2)
KETONES UR STRIP-MCNC: NEGATIVE MG/DL
LEUKOCYTE ESTERASE UR QL STRIP: NEGATIVE
LITHIUM SERPL-SCNC: <0.2 MMOL/L (ref 0.5–1)
LYMPHOCYTES # BLD AUTO: 2.42 THOUSANDS/ΜL (ref 0.6–4.47)
LYMPHOCYTES NFR BLD AUTO: 32 % (ref 14–44)
MCH RBC QN AUTO: 30.2 PG (ref 26.8–34.3)
MCHC RBC AUTO-ENTMCNC: 33.4 G/DL (ref 31.4–37.4)
MCV RBC AUTO: 90 FL (ref 82–98)
METHADONE UR QL: NEGATIVE
MONOCYTES # BLD AUTO: 0.57 THOUSAND/ΜL (ref 0.17–1.22)
MONOCYTES NFR BLD AUTO: 8 % (ref 4–12)
NEUTROPHILS # BLD AUTO: 4.52 THOUSANDS/ΜL (ref 1.85–7.62)
NEUTS SEG NFR BLD AUTO: 58 % (ref 43–75)
NITRITE UR QL STRIP: NEGATIVE
NRBC BLD AUTO-RTO: 0 /100 WBCS
OPIATES UR QL SCN: NEGATIVE
P AXIS: 52 DEGREES
PCP UR QL: POSITIVE
PH UR STRIP.AUTO: 5.5 [PH] (ref 4.5–8)
PLATELET # BLD AUTO: 199 THOUSANDS/UL (ref 149–390)
PMV BLD AUTO: 10.6 FL (ref 8.9–12.7)
POTASSIUM SERPL-SCNC: 4 MMOL/L (ref 3.5–5.3)
PR INTERVAL: 154 MS
PROT SERPL-MCNC: 8.2 G/DL (ref 6.4–8.2)
PROT UR STRIP-MCNC: NEGATIVE MG/DL
QRS AXIS: 37 DEGREES
QRSD INTERVAL: 82 MS
QT INTERVAL: 382 MS
QTC INTERVAL: 429 MS
RBC # BLD AUTO: 4.87 MILLION/UL (ref 3.88–5.62)
SODIUM SERPL-SCNC: 138 MMOL/L (ref 136–145)
SP GR UR STRIP.AUTO: 1.02 (ref 1–1.03)
T WAVE AXIS: 58 DEGREES
THC UR QL: POSITIVE
TROPONIN I SERPL-MCNC: 0.03 NG/ML
UROBILINOGEN UR QL STRIP.AUTO: 0.2 E.U./DL
VENTRICULAR RATE: 76 BPM
WBC # BLD AUTO: 7.63 THOUSAND/UL (ref 4.31–10.16)

## 2018-12-10 PROCEDURE — 93010 ELECTROCARDIOGRAM REPORT: CPT | Performed by: INTERNAL MEDICINE

## 2018-12-10 PROCEDURE — 96375 TX/PRO/DX INJ NEW DRUG ADDON: CPT

## 2018-12-10 PROCEDURE — 80178 ASSAY OF LITHIUM: CPT | Performed by: EMERGENCY MEDICINE

## 2018-12-10 PROCEDURE — 93005 ELECTROCARDIOGRAM TRACING: CPT

## 2018-12-10 PROCEDURE — 82075 ASSAY OF BREATH ETHANOL: CPT | Performed by: EMERGENCY MEDICINE

## 2018-12-10 PROCEDURE — 81003 URINALYSIS AUTO W/O SCOPE: CPT

## 2018-12-10 PROCEDURE — 80307 DRUG TEST PRSMV CHEM ANLYZR: CPT

## 2018-12-10 PROCEDURE — 36415 COLL VENOUS BLD VENIPUNCTURE: CPT | Performed by: EMERGENCY MEDICINE

## 2018-12-10 PROCEDURE — 85025 COMPLETE CBC W/AUTO DIFF WBC: CPT | Performed by: EMERGENCY MEDICINE

## 2018-12-10 PROCEDURE — 99284 EMERGENCY DEPT VISIT MOD MDM: CPT

## 2018-12-10 PROCEDURE — 84484 ASSAY OF TROPONIN QUANT: CPT | Performed by: EMERGENCY MEDICINE

## 2018-12-10 PROCEDURE — 96374 THER/PROPH/DIAG INJ IV PUSH: CPT

## 2018-12-10 PROCEDURE — 80053 COMPREHEN METABOLIC PANEL: CPT | Performed by: EMERGENCY MEDICINE

## 2018-12-10 RX ORDER — LORAZEPAM 2 MG/ML
2 INJECTION INTRAMUSCULAR ONCE
Status: COMPLETED | OUTPATIENT
Start: 2018-12-10 | End: 2018-12-10

## 2018-12-10 RX ORDER — HYDROXYZINE HYDROCHLORIDE 25 MG/1
25 TABLET, FILM COATED ORAL ONCE
Status: COMPLETED | OUTPATIENT
Start: 2018-12-10 | End: 2018-12-10

## 2018-12-10 RX ORDER — KETOROLAC TROMETHAMINE 30 MG/ML
15 INJECTION, SOLUTION INTRAMUSCULAR; INTRAVENOUS ONCE
Status: COMPLETED | OUTPATIENT
Start: 2018-12-10 | End: 2018-12-10

## 2018-12-10 RX ADMIN — KETOROLAC TROMETHAMINE 15 MG: 30 INJECTION, SOLUTION INTRAMUSCULAR at 11:59

## 2018-12-10 RX ADMIN — HYDROXYZINE HYDROCHLORIDE 25 MG: 25 TABLET ORAL at 12:00

## 2018-12-10 RX ADMIN — LORAZEPAM 2 MG: 2 INJECTION INTRAMUSCULAR; INTRAVENOUS at 13:05

## 2018-12-10 NOTE — ED NOTES
Pt moaning very loudly, states he has head pain  Pt states "the doctor called my meds into the nurse and I haven't gotten them yet, I'm getting really inpatient " Pt made aware that RN is in another pt's room at this time and will be over as soon as possible  Pt understanding at this time        Saranya Thomas  12/10/18 4819

## 2018-12-10 NOTE — ED NOTES
Patient is accepted at [facility]  Patient is accepted by Dr Jeremy Tellez per Moe Calix       Transportation is arranged with [employer] is scheduled for 2115

## 2018-12-10 NOTE — ED NOTES
Pt provided with cell phone at this time, ok per Dr Root Quarjuan  Order in chart at this time        Sophia Mccauley  12/10/18 4646

## 2018-12-10 NOTE — ED NOTES
Met with patient and completed the crisis intake assessment  Patient presents as depressed and anxious with flat affect  Patient eye contact is fair and speech is low in tone  Patient admits to passive SI no plan  Patient reports disturbances with sleep and appetite as well as lack of concentration and motivation  '" I recently relapsed, yesterday on crack and I feel like a failure  Im all alone for the holidays "     Patient signed a voluntary admission  Bed search and insurance in progress

## 2018-12-10 NOTE — ED ATTENDING ATTESTATION
Ana King MD, saw and evaluated the patient  I have discussed the patient with the resident/non-physician practitioner and agree with the resident's/non-physician practitioner's findings, Plan of Care, and MDM as documented in the resident's/non-physician practitioner's note, except where noted  All available labs and Radiology studies were reviewed  At this point I agree with the current assessment done in the Emergency Department  I have conducted an independent evaluation of this patient a history and physical is as follows:    Here for detox, last cocaine use this morning  No somatic complaints  No SI, HI, or hallucinations    Agree with documentation  Critical Care Time  CritCare Time    Procedures

## 2018-12-10 NOTE — ED NOTES
Pt belongings gathered and placed in [facility] room  Jackets, long sleeve, socks, sneakers, socks, jeans, cell phone, white t shirt        Sophia Mccauley  12/10/18 0017

## 2018-12-10 NOTE — ED NOTES
Pt continually asking for his cell phone stating, "I have to call my son at [address] in school and hes going to worry about me " Explained to pt it is hospital policy he cannot have his cell phone  Pt stating, "I need to text him I need to text him " Explained to pt again it is hospital policy to take the cell phones  Dr Phoenix Mercado made aware        Issa Perales  12/10/18 2171

## 2018-12-10 NOTE — LETTER
[address]  [address] [facility] [address]  [place] [facility] [place]    NAME Aracelis Chapman                                         1965                              MRN [id number]    I have been informed of my rights regarding examination, treatment, and transfer   by Dr Esau Sanabria MD    Benefits: Specialized equipment and/or services available at the receiving facility (Include comment)________________________    Risks: Potential for delay in receiving treatment      Transfer Request   I acknowledge that my medical condition has been evaluated and explained to me by the emergency department physician or other qualified medical person and/or my attending physician who has recommended and offered to me further medical examination and treatment  I understand the Hospital's obligation with respect to the treatment and stabilization of my emergency medical condition  I nevertheless request to be transferred  I release the Hospital, the doctor, and any other persons caring for me from all responsibility or liability for any injury or ill effects that may result from my transfer and agree to accept all responsibility for the consequences of my choice to transfer, rather than receive stabilizing treatment at the Hospital  I understand that because the transfer is my request, my insurance may not provide reimbursement for the services  The Hospital will assist and direct me and my family in how to make arrangements for transfer, but the hospital is not liable for any fees charged by the transport service  In spite of this understanding, I refuse to consent to further medical examination and treatment which has been offered to me, and request transfer to  [address] Name, [facility] & State : [facility]    I authorize the performance of emergency medical procedures and treatments upon me in both transit and upon arrival at the receiving facility  Additionally, I authorize the release of any and all medical records to the receiving facility and request they be transported with me, if possible  I authorize the performance of emergency medical procedures and treatments upon me in both transit and upon arrival at the receiving facility  Additionally, I authorize the release of any and all medical records to the receiving facility and request they be transported with me, if possible  I understand that the safest mode of transportation during a medical emergency is an ambulance and that the Hospital advocates the use of this mode of transport  Risks of traveling to the receiving facility by car, including absence of medical control, life sustaining equipment, such as oxygen, and medical personnel has been explained to me and I fully understand them  (JEFF CORRECT BOX BELOW)  [  ]  I consent to the stated transfer and to be transported by ambulance/helicopter  [  ]  I consent to the stated transfer, but refuse transportation by ambulance and accept full responsibility for my transportation by car  I understand the risks of non-ambulance transfers and I exonerate the Hospital and its staff from any deterioration in my condition that results from this refusal     X___________________________________________    DATE  12/10/18  TIME________  Signature of patient or legally responsible individual signing on patient behalf           RELATIONSHIP TO PATIENT_________________________          Provider Certification    NAME Joel Benton                                         1965                              MRN [id number]    A medical screening exam was performed on the above named patient  Based on the examination:    Condition Necessitating Transfer There were no encounter diagnoses      Patient Condition: The patient has been stabilized such that within reasonable medical probability, no material deterioration of the patient condition or the condition of the unborn child(ignacia) is likely to result from the transfer    Reason for Transfer: Level of Care needed not available at this facility    Transfer Requirements: [address]    · [facility] available and qualified personnel available for treatment as acknowledged by Tracey Olvera MA  · Agreed to accept transfer and to provide appropriate medical treatment as acknowledged by       Dr Ben Bynum  · Appropriate medical records of the examination and treatment of the patient are provided at the time of transfer   [address] _______  · Transfer will be performed by qualified personnel from [facility]  and appropriate transfer equipment as required, including the use of necessary and appropriate life support measures  Provider Certification: I have examined the patient and explained the following risks and benefits of being transferred/refusing transfer to the patient/family:  General risk, such as traffic hazards, adverse weather conditions, rough terrain or turbulence, possible failure of equipment (including vehicle or aircraft), or consequences of actions of persons outside the control of the transport personnel      Based on these reasonable risks and benefits to the patient and/or the unborn child(ignacia), and based upon the information available at the time of the patients examination, I certify that the medical benefits reasonably to be expected from the provision of appropriate medical treatments at another medical facility outweigh the increasing risks, if any, to the individuals medical condition, and in the case of labor to the unborn child, from effecting the transfer      X____________________________________________ DATE 12/10/18        TIME_______      ORIGINAL - SEND TO MEDICAL RECORDS   COPY - SEND WITH PATIENT DURING TRANSFER

## 2018-12-10 NOTE — ED PROVIDER NOTES
History  Chief Complaint   Patient presents with    Psychiatric Evaluation     Patient reports he just relapsed last night with crack cocaine  Patient previously had 10 days sober prior to last night  Patient states he would like to check in for pysch evaluation  Patient denies SI, denies SI  Denies auditory/visual hallucinations   Detox Evaluation     48year old male presents for evaluation of detox of crack cocaine and alcohol abuse  Patient reports using crack cocaine this morning and drinking alcohol last night  He reports today because he is seeking detox  He was here previously for similar but states he opted for outpatient but that hasnt been working  He currently denies suicidal ideations but states he has had thoughts of hurting himself the past   He is currently on psychiatric medications including lithium, Remeron  He currently reports right-sided jaw pain and has a history of TMJ  Denies chest pain, shortness of breath, nausea, vomiting, diaphoresis  Patient is asking for Ativan as he feels anxious  Prior to Admission Medications   Prescriptions Last Dose Informant Patient Reported? Taking?    DOXEPIN HCL PO  Self Yes No   Sig: Take by mouth   ibuprofen (MOTRIN) 600 mg tablet   No No   Sig: Take 1 tablet (600 mg total) by mouth every 8 (eight) hours as needed for mild pain   lithium carbonate 300 mg capsule  Self Yes No   Sig: Take 300 mg by mouth   mirtazapine (REMERON SOL-TAB) 15 mg disintegrating tablet  Self Yes No   Sig: Take 15 mg by mouth      Facility-Administered Medications: None       Past Medical History:   Diagnosis Date    Anxiety     Bipolar disorder ([facility] [address] )     PTSD (post-traumatic stress disorder)        Past Surgical History:   Procedure Laterality Date    COLOSTOMY      HERNIA REPAIR      SHOULDER SURGERY Bilateral     WRIST SURGERY Right 2012       Family History   Problem Relation Age of Onset    Breast cancer Mother     No Known Problems Father      I have reviewed and agree with the history as documented  Social History   Substance Use Topics    Smoking status: Never Smoker    Smokeless tobacco: Never Used    Alcohol use Yes        Review of Systems   Constitutional: Negative for chills, diaphoresis and fever  HENT: Negative for congestion and rhinorrhea  Right jaw pain   Eyes: Negative for pain and visual disturbance  Respiratory: Negative for cough, shortness of breath and wheezing  Cardiovascular: Negative for chest pain and leg swelling  Gastrointestinal: Negative for abdominal pain, diarrhea, nausea and vomiting  Genitourinary: Negative for difficulty urinating, dysuria, frequency and urgency  Musculoskeletal: Negative for back pain and neck pain  Skin: Negative for color change and rash  Neurological: Negative for syncope, numbness and headaches  Psychiatric/Behavioral: Positive for dysphoric mood and suicidal ideas  Negative for self-injury  The patient is nervous/anxious  All other systems reviewed and are negative  Physical Exam  ED Triage Vitals [12/10/18 1024]   Temperature Pulse Respirations Blood Pressure SpO2   97 5 °F (36 4 °C) 80 18 (!) 175/97 96 %      Temp Source Heart Rate Source Patient Position - Orthostatic VS BP Location FiO2 (%)   Oral Monitor Sitting Left arm --      Pain Score       6           Orthostatic Vital Signs  Vitals:    12/10/18 1024   BP: (!) 175/97   Pulse: 80   Patient Position - Orthostatic VS: Sitting       Physical Exam   Constitutional: He is oriented to person, place, and time  He appears well-developed and well-nourished  No distress  HENT:   Head: Normocephalic and atraumatic  Tender to palpation on right jaw   Eyes: Conjunctivae and EOM are normal    Neck: Normal range of motion  Neck supple  Cardiovascular: Normal rate and regular rhythm  Pulmonary/Chest: Effort normal and breath sounds normal  No respiratory distress  He has no wheezes  He has no rales     Abdominal: Soft  Bowel sounds are normal  There is no tenderness  There is no guarding  Musculoskeletal: Normal range of motion  He exhibits no edema or tenderness  Neurological: He is alert and oriented to person, place, and time  No cranial nerve deficit  Skin: Skin is warm  He is not diaphoretic  No erythema  Psychiatric:   Anxious appearing   Nursing note and vitals reviewed  ED Medications  Medications   hydrOXYzine HCL (ATARAX) tablet 25 mg (25 mg Oral Given 12/10/18 1200)   ketorolac (TORADOL) injection 15 mg (15 mg Intravenous Given 12/10/18 1159)   LORazepam (ATIVAN) 2 mg/mL injection 2 mg (2 mg Intravenous Given 12/10/18 1305)       Diagnostic Studies  Results Reviewed     Procedure Component Value Units Date/Time    Rapid drug screen, urine [[id number]]  (Abnormal) Collected:  12/10/18 1632    Lab Status:  Final result Specimen:  Urine from Urine, Clean Catch Updated:  12/10/18 1713     Amph/Meth UR Negative     Barbiturate Ur Negative     Benzodiazepine Urine Negative     Cocaine Urine Positive (A)     Methadone Urine Negative     Opiate Urine Negative     PCP Ur Positive (A)     THC Urine Positive (A)    Narrative:         Presumptive report  If requested, specimen will be sent to reference lab for confirmation  FOR MEDICAL PURPOSES ONLY  IF CONFIRMATION NEEDED PLEASE CONTACT THE LAB WITHIN 5 DAYS      Drug Screen Cutoff Levels:  AMPHETAMINE/METHAMPHETAMINES  1000 ng/mL  BARBITURATES     200 ng/mL  BENZODIAZEPINES     200 ng/mL  COCAINE      300 ng/mL  METHADONE      300 ng/mL  OPIATES      300 ng/mL  PHENCYCLIDINE     25 ng/mL  THC       50 ng/mL    Lithium level [[id number]]  (Abnormal) Collected:  12/10/18 1158    Lab Status:  Final result Specimen:  Blood from Arm, Right Updated:  12/10/18 1306     Lithium Lvl <0 2 (L) mmol/L     Comprehensive metabolic panel [[id number]]  (Abnormal) Collected:  12/10/18 1158    Lab Status:  Final result Specimen:  Blood from Arm, Right Updated:  12/10/18 1236 Sodium 138 mmol/L      Potassium 4 0 mmol/L      Chloride 105 mmol/L      CO2 25 mmol/L      ANION GAP 8 mmol/L      BUN 11 mg/dL      Creatinine 0 99 mg/dL      Glucose 97 mg/dL      Calcium 9 0 mg/dL      AST 43 U/L      ALT 86 (H) U/L      Alkaline Phosphatase 87 U/L      Total Protein 8 2 g/dL      Albumin 4 7 g/dL      Total Bilirubin 0 63 mg/dL      eGFR 88 ml/min/1 73sq m     Narrative:         National Kidney Disease Education Program recommendations are as follows:  GFR calculation is accurate only with a steady state creatinine  Chronic Kidney disease less than 60 ml/min/1 73 sq  meters  Kidney failure less than 15 ml/min/1 73 sq  meters      Troponin I [[id number]]  (Normal) Collected:  12/10/18 1158    Lab Status:  Final result Specimen:  Blood from Arm, Right Updated:  12/10/18 1235     Troponin I 0 03 ng/mL     CBC and differential [[id number]] Collected:  12/10/18 1158    Lab Status:  Final result Specimen:  Blood from Arm, Right Updated:  12/10/18 1218     WBC 7 63 Thousand/uL      RBC 4 87 Million/uL      Hemoglobin 14 7 g/dL      Hematocrit 44 0 %      MCV 90 fL      MCH 30 2 pg      MCHC 33 4 g/dL      RDW 13 0 %      MPV 10 6 fL      Platelets 824 Thousands/uL      nRBC 0 /100 WBCs      Neutrophils Relative 58 %      Immat GRANS % 0 %      Lymphocytes Relative 32 %      Monocytes Relative 8 %      Eosinophils Relative 1 %      Basophils Relative 1 %      Neutrophils Absolute 4 52 Thousands/µL      Immature Grans Absolute 0 03 Thousand/uL      Lymphocytes Absolute 2 42 Thousands/µL      Monocytes Absolute 0 57 Thousand/µL      Eosinophils Absolute 0 05 Thousand/µL      Basophils Absolute 0 04 Thousands/µL     ED Urine Macroscopic [[id number]] Collected:  12/10/18 1148    Lab Status:  Final result Specimen:  Urine Updated:  12/10/18 1148     Color, UA Yellow     Clarity, UA Clear     pH, UA 5 5     Leukocytes, UA Negative     Nitrite, UA Negative     Protein, UA Negative mg/dl      Glucose, UA Negative mg/dl      Ketones, UA Negative mg/dl      Urobilinogen, UA 0 2 E U /dl      Bilirubin, UA Negative     Blood, UA Negative     Specific Gravity, UA 1 025    Narrative:       CLINITEK RESULT    POCT alcohol breath test [[id number]]  (Normal) Resulted:  12/10/18 1139    Lab Status:  Final result Updated:  12/10/18 1139     EXTBreath Alcohol 0 000                 No orders to display         Procedures  Procedures      Phone Consults  ED Phone Contact    ED Course  ED Course as of Dec 11 1123   Mon Dec 10, 2018   1124 Procedure Note: EKG  Date/Time: 12/10/18 11:24 AM   Performed by: Alicia Nix by: Jessica Gold  ECG interpreted by me, the ED Provider: yes   The EKG demonstrates:  Rate 76  Rhythm NSR  QTc 429  No ST elevations/depressions      1256 Signed [address] [facility] Patient continues to ask for Ativan  He is stating that he will not go to dual diagnosis rehab and as he was only seeking rehab for drug abuse  He is adamant stating that he is not suicidal   Patient has never had suicidal ideations while he has been ear  However, he told our crisis team that he has passive thoughts of it over his entire life  I do not believe patient is an immediate threat to himself or others  Patient is stable for discharge  MDM  Number of Diagnoses or Management Options  Diagnosis management comments: 32-year-old male seeking detox for drug and alcohol abuse  Intermittent suicidal ideations  Consult crisis  Will obtain cardiac evaluation including EKG, labs  Administer 1 dose of Ativan for anxiety      CritCare Time    Disposition  Final diagnoses:   Encounter for drug rehabilitation     Time reflects when diagnosis was documented in both MDM as applicable and the Disposition within this note     Time User Action Codes Description Comment    12/10/2018  7:22 PM Jessica Gold Add [Z71 51] Encounter for drug rehabilitation       ED Disposition     ED Disposition Condition Comment Discharge  Joel Benton discharge to home/self care      Condition at discharge: Stable        MD Documentation      Most Recent Value   Patient Condition  The patient has been stabilized such that within reasonable medical probability, no material deterioration of the patient condition or the condition of the unborn child(ignacia) is likely to result from the transfer   Reason for Transfer  Level of Care needed not available at this facility   Benefits of Transfer  Specialized equipment and/or services available at the receiving facility (Include comment)________________________   Risks of Transfer  Potential for delay in receiving treatment   Accepting Physician  Dr Ganga Lackey NameMike     (Name & Tel number)  Azalea Caldera MA   Transported by (Company and Unit #)  [facility]   Sending MD  Dr Pal العلي   Provider Certification  General risk, such as traffic hazards, adverse weather conditions, rough terrain or turbulence, possible failure of equipment (including vehicle or aircraft), or consequences of actions of persons outside the control of the transport personnel      RN Documentation      [address] [place], Mike Brown     (Name & Tel number)  Azalea Caldera MA   Transport Mode  Ambulance   Transported by Rosa Wiley and Unit #)  [facility]   Level of Care  Basic life support      Follow-up Information     Follow up With Specialties Details Why Contact Info Additional Information    Amy Walker MD MPH Internal Medicine  As needed [address] [facility]  [phone]       [address] [place] [address] Emergency Department Emergency Medicine  If symptoms worsen [address] [place] [address], [address], [address]          Discharge Medication List as of 12/10/2018  7:22 PM      CONTINUE these medications which have NOT CHANGED Details   DOXEPIN HCL PO Take by mouth, Historical Med      ibuprofen (MOTRIN) 600 mg tablet Take 1 tablet (600 mg total) by mouth every 8 (eight) hours as needed for mild pain, Starting Fri 10/19/2018, Normal      lithium carbonate 300 mg capsule Take 300 mg by mouth, Starting Tue 10/9/2018, Until 2018, Historical Med      mirtazapine (REMERON SOL-TAB) 15 mg disintegrating tablet Take 15 mg by mouth, Starting Tue 10/9/2018, Until 2018, Historical Med           No discharge procedures on file  ED Provider  Attending physically available and evaluated Cici Dayanara TORRES managed the patient along with the ED Attending      Electronically Signed by         Tacho Crawford DO  18 1127

## 2018-12-11 NOTE — DISCHARGE INSTRUCTIONS
Polysubstance Abuse   WHAT YOU NEED TO KNOW:   Polysubstance abuse is the abuse of 2 or more drugs that cause impairment or distress  Examples include alcohol, nicotine, marijuana, cocaine, heroin, methamphetamine, hallucinogens such as mushrooms, or inhalants such as paint thinner  Prescribed medicines, such as opioids for pain or benzodiazepines for anxiety, are also commonly abused  DISCHARGE INSTRUCTIONS:   Call 911 for any of the following:   · You feel you might harm yourself or others  Return to the emergency department if:   · You have a seizure  · You have chest pain and your heart is beating faster than usual      · You have new shortness of breath  · You are dizzy and lightheaded  Contact your healthcare provider or therapist if:   · You are using drugs and think you are pregnant  · You have withdrawal symptoms and want to start using drugs again  · You have questions or concerns about your condition or care  Risks of polysubstance abuse:   · Drug dependence  is when you continue to use drugs, even when you know the risks  Polysubstance abuse can damage your heart, brain, lungs, liver, and gastrointestinal tract  You continue even when it causes problems with work, school, or relationships  You may have difficulty finding or keeping a job because of your drug dependence  · Drug tolerance  is when you need to use more drugs, or use them more often, to get the effects you want  You may not be able to stop using the drugs  When you try to stop, you may have withdrawal symptoms and strong cravings for the drugs  · Drug overdose  can occur when you take more drugs than your body can handle  This may be a small amount or a large amount  You can lose consciousness or have a seizure or stroke  Your heart can stop beating, or you can stop breathing  You may die from a drug overdose  Medicines:   · Withdrawal medicines  may be given according to the types of drugs you are abusing  Withdrawal from drugs can cause serious, life-threatening side effects  Certain medicines can help decrease your withdrawal symptoms and your desire for the drug  Ask for more information about the withdrawal medicines you may need  · Mood stabilizers  may be given to help prevent or treat depression or anxiety caused by drug abuse and withdrawal      · Take your medicine as directed  Contact your healthcare provider if you think your medicine is not helping or if you have side effects  Tell him or her if you are allergic to any medicine  Keep a list of the medicines, vitamins, and herbs you take  Include the amounts, and when and why you take them  Bring the list or the pill bottles to follow-up visits  Carry your medicine list with you in case of an emergency  Follow up with your healthcare provider as directed: You may be referred to a specialist to treat health conditions caused by your drug use  This includes mental health, heart, or lung specialists  Write down your questions so you remember to ask them during your visits  Therapy:  You may need therapy and support to stop using drugs:  · Cognitive and behavioral therapy  helps you change your thinking and behavior  It can help you develop plans to avoid the situations that make you want to use drugs  It also helps you cope with the feelings of wanting to use drugs  You may have individual or group therapy  · Contingency management  helps you set drug-free goals with a therapist  Charles Rhoades will decide ways to celebrate your success when you reach a goal      · Family therapy and support groups  allow you and your family members to talk to and be encouraged by other people affected by drug abuse  You and your family members may attend together or separately  Ask your healthcare provider for information about programs in your area    How polysubstance abuse affects unborn or  babies:   · If you are pregnant or get pregnant while using drugs, you may have a miscarriage or give birth early  Your baby may be born addicted to the drugs  · Do not breastfeed your baby if you use drugs  Drugs pass from your bloodstream into your breast milk and affect your baby's health  Talk with your healthcare provider if you are using drugs and breastfeeding  For support and more information:   · Alcoholics Anonymous  Web Address: http://[employer]/  · NILAM Jolly on Drug and Alcohol Information  Phone: 0- 954 - 0336149  Web Address: [employer]  · [employer] on Alcoholism and Drug Dependence  [place] [address] [address]  Phone: 2- 357 - 697-2395  Phone: 8- 205 - 312-3325  Web Address: [employer] br  org  © 2017 [address] Information is for End User's use only and may not be sold, redistributed or otherwise used for commercial purposes  All illustrations and images included in CareNotes® are the copyrighted property of [employer] , [employer]  or Hayes Fragoso  The above information is an  only  It is not intended as medical advice for individual conditions or treatments  Talk to your doctor, nurse or pharmacist before following any medical regimen to see if it is safe and effective for you

## 2018-12-11 NOTE — ED NOTES
Pt requesting ativan and sandwich at this time  Provided pt with sandwich and something to drink  Pt still demanding ativan and clothing at this time       Lakisha Ferguson RN  12/10/18 2690

## 2018-12-16 ENCOUNTER — HOSPITAL ENCOUNTER (EMERGENCY)
Facility: HOSPITAL | Age: 53
Discharge: HOME | End: 2018-12-16
Attending: EMERGENCY MEDICINE | Admitting: EMERGENCY MEDICINE
Payer: COMMERCIAL

## 2018-12-16 ENCOUNTER — APPOINTMENT (EMERGENCY)
Dept: RADIOLOGY | Facility: HOSPITAL | Age: 53
End: 2018-12-16
Payer: COMMERCIAL

## 2018-12-16 VITALS
HEIGHT: 66 IN | TEMPERATURE: 97 F | HEART RATE: 74 BPM | OXYGEN SATURATION: 96 % | RESPIRATION RATE: 14 BRPM | SYSTOLIC BLOOD PRESSURE: 99 MMHG | DIASTOLIC BLOOD PRESSURE: 54 MMHG | WEIGHT: 220.02 LBS | BODY MASS INDEX: 35.36 KG/M2

## 2018-12-16 DIAGNOSIS — F99 PSYCHIATRIC DISORDER: ICD-10-CM

## 2018-12-16 DIAGNOSIS — F19.10 POLYSUBSTANCE ABUSE (HCC): Primary | ICD-10-CM

## 2018-12-16 LAB
ALBUMIN SERPL BCP-MCNC: 4.1 G/DL (ref 3.5–5)
ALP SERPL-CCNC: 97 U/L (ref 46–116)
ALT SERPL W P-5'-P-CCNC: 88 U/L (ref 12–78)
ANION GAP SERPL CALCULATED.3IONS-SCNC: 7 MMOL/L (ref 4–13)
APAP SERPL-MCNC: <2 UG/ML (ref 10–30)
AST SERPL W P-5'-P-CCNC: 44 U/L (ref 5–45)
ATRIAL RATE: 79 BPM
ATRIAL RATE: 88 BPM
BASOPHILS # BLD AUTO: 0.02 THOUSANDS/ΜL (ref 0–0.1)
BASOPHILS NFR BLD AUTO: 0 % (ref 0–1)
BILIRUB SERPL-MCNC: 1.09 MG/DL (ref 0.2–1)
BUN SERPL-MCNC: 14 MG/DL (ref 5–25)
CALCIUM SERPL-MCNC: 8.7 MG/DL (ref 8.3–10.1)
CHLORIDE SERPL-SCNC: 97 MMOL/L (ref 100–108)
CO2 SERPL-SCNC: 27 MMOL/L (ref 21–32)
CREAT SERPL-MCNC: 1.18 MG/DL (ref 0.6–1.3)
EOSINOPHIL # BLD AUTO: 0.03 THOUSAND/ΜL (ref 0–0.61)
EOSINOPHIL NFR BLD AUTO: 0 % (ref 0–6)
ERYTHROCYTE [DISTWIDTH] IN BLOOD BY AUTOMATED COUNT: 12.6 % (ref 11.6–15.1)
ETHANOL SERPL-MCNC: <3 MG/DL (ref 0–3)
GFR SERPL CREATININE-BSD FRML MDRD: 71 ML/MIN/1.73SQ M
GLUCOSE SERPL-MCNC: 116 MG/DL (ref 65–140)
HCT VFR BLD AUTO: 41.2 % (ref 36.5–49.3)
HGB BLD-MCNC: 14.2 G/DL (ref 12–17)
IMM GRANULOCYTES # BLD AUTO: 0.02 THOUSAND/UL (ref 0–0.2)
IMM GRANULOCYTES NFR BLD AUTO: 0 % (ref 0–2)
LITHIUM SERPL-SCNC: <0.2 MMOL/L (ref 0.5–1)
LYMPHOCYTES # BLD AUTO: 1.49 THOUSANDS/ΜL (ref 0.6–4.47)
LYMPHOCYTES NFR BLD AUTO: 22 % (ref 14–44)
MCH RBC QN AUTO: 30.5 PG (ref 26.8–34.3)
MCHC RBC AUTO-ENTMCNC: 34.5 G/DL (ref 31.4–37.4)
MCV RBC AUTO: 88 FL (ref 82–98)
MONOCYTES # BLD AUTO: 0.55 THOUSAND/ΜL (ref 0.17–1.22)
MONOCYTES NFR BLD AUTO: 8 % (ref 4–12)
NEUTROPHILS # BLD AUTO: 4.75 THOUSANDS/ΜL (ref 1.85–7.62)
NEUTS SEG NFR BLD AUTO: 70 % (ref 43–75)
NRBC BLD AUTO-RTO: 0 /100 WBCS
P AXIS: 61 DEGREES
P AXIS: 67 DEGREES
PLATELET # BLD AUTO: 171 THOUSANDS/UL (ref 149–390)
PMV BLD AUTO: 10.5 FL (ref 8.9–12.7)
POTASSIUM SERPL-SCNC: 3.9 MMOL/L (ref 3.5–5.3)
PR INTERVAL: 156 MS
PR INTERVAL: 160 MS
PROT SERPL-MCNC: 8.1 G/DL (ref 6.4–8.2)
QRS AXIS: 53 DEGREES
QRS AXIS: 60 DEGREES
QRSD INTERVAL: 84 MS
QRSD INTERVAL: 88 MS
QT INTERVAL: 350 MS
QT INTERVAL: 378 MS
QTC INTERVAL: 423 MS
QTC INTERVAL: 433 MS
RBC # BLD AUTO: 4.66 MILLION/UL (ref 3.88–5.62)
RPR SER QL: NORMAL
SALICYLATES SERPL-MCNC: <3 MG/DL (ref 3–20)
SODIUM SERPL-SCNC: 131 MMOL/L (ref 136–145)
T WAVE AXIS: 45 DEGREES
T WAVE AXIS: 59 DEGREES
TROPONIN I SERPL-MCNC: 0.02 NG/ML
TROPONIN I SERPL-MCNC: <0.02 NG/ML
TSH SERPL DL<=0.05 MIU/L-ACNC: 0.58 UIU/ML (ref 0.36–3.74)
VENTRICULAR RATE: 79 BPM
VENTRICULAR RATE: 88 BPM
WBC # BLD AUTO: 6.86 THOUSAND/UL (ref 4.31–10.16)

## 2018-12-16 PROCEDURE — 84484 ASSAY OF TROPONIN QUANT: CPT | Performed by: STUDENT IN AN ORGANIZED HEALTH CARE EDUCATION/TRAINING PROGRAM

## 2018-12-16 PROCEDURE — 85025 COMPLETE CBC W/AUTO DIFF WBC: CPT | Performed by: STUDENT IN AN ORGANIZED HEALTH CARE EDUCATION/TRAINING PROGRAM

## 2018-12-16 PROCEDURE — 36415 COLL VENOUS BLD VENIPUNCTURE: CPT | Performed by: STUDENT IN AN ORGANIZED HEALTH CARE EDUCATION/TRAINING PROGRAM

## 2018-12-16 PROCEDURE — 84443 ASSAY THYROID STIM HORMONE: CPT | Performed by: STUDENT IN AN ORGANIZED HEALTH CARE EDUCATION/TRAINING PROGRAM

## 2018-12-16 PROCEDURE — 93005 ELECTROCARDIOGRAM TRACING: CPT

## 2018-12-16 PROCEDURE — 96374 THER/PROPH/DIAG INJ IV PUSH: CPT

## 2018-12-16 PROCEDURE — 86592 SYPHILIS TEST NON-TREP QUAL: CPT | Performed by: STUDENT IN AN ORGANIZED HEALTH CARE EDUCATION/TRAINING PROGRAM

## 2018-12-16 PROCEDURE — 80329 ANALGESICS NON-OPIOID 1 OR 2: CPT | Performed by: STUDENT IN AN ORGANIZED HEALTH CARE EDUCATION/TRAINING PROGRAM

## 2018-12-16 PROCEDURE — 80178 ASSAY OF LITHIUM: CPT | Performed by: STUDENT IN AN ORGANIZED HEALTH CARE EDUCATION/TRAINING PROGRAM

## 2018-12-16 PROCEDURE — 93010 ELECTROCARDIOGRAM REPORT: CPT | Performed by: INTERNAL MEDICINE

## 2018-12-16 PROCEDURE — 96372 THER/PROPH/DIAG INJ SC/IM: CPT

## 2018-12-16 PROCEDURE — 99285 EMERGENCY DEPT VISIT HI MDM: CPT

## 2018-12-16 PROCEDURE — 80053 COMPREHEN METABOLIC PANEL: CPT | Performed by: STUDENT IN AN ORGANIZED HEALTH CARE EDUCATION/TRAINING PROGRAM

## 2018-12-16 PROCEDURE — 80320 DRUG SCREEN QUANTALCOHOLS: CPT | Performed by: STUDENT IN AN ORGANIZED HEALTH CARE EDUCATION/TRAINING PROGRAM

## 2018-12-16 PROCEDURE — 70450 CT HEAD/BRAIN W/O DYE: CPT

## 2018-12-16 RX ORDER — HALOPERIDOL 5 MG/ML
5 INJECTION INTRAMUSCULAR ONCE
Status: COMPLETED | OUTPATIENT
Start: 2018-12-16 | End: 2018-12-16

## 2018-12-16 RX ORDER — MIDAZOLAM HYDROCHLORIDE 1 MG/ML
1 INJECTION INTRAMUSCULAR; INTRAVENOUS ONCE
Status: DISCONTINUED | OUTPATIENT
Start: 2018-12-16 | End: 2018-12-16

## 2018-12-16 RX ORDER — LORAZEPAM 2 MG/ML
1 INJECTION INTRAMUSCULAR ONCE
Status: COMPLETED | OUTPATIENT
Start: 2018-12-16 | End: 2018-12-16

## 2018-12-16 RX ADMIN — LORAZEPAM 1 MG: 2 INJECTION INTRAMUSCULAR; INTRAVENOUS at 02:01

## 2018-12-16 RX ADMIN — HALOPERIDOL LACTATE 5 MG: 5 INJECTION, SOLUTION INTRAMUSCULAR at 02:00

## 2018-12-16 NOTE — ED CARE HANDOFF
Emergency Department Sign Out Note        Sign out and transfer of care from Dr Tala Sanchez  See Separate Emergency Department note  The patient, Devika Monique, was evaluated by the previous provider psychosis    Patient was seen evaluated by crisis  Patient does not meet any 201 or 302 criteria  Patient has appropriate insight on my evaluation  Patient demonstrates understanding  Patient agrees to follow up with outpatient crisis resources  Patient states he would like to be discharged  Patient displays understanding  Alternatives benefits discussed  Patient agrees with alternative plan for outpatient follow-up  Patient does not display any HI SI auditory visual hallucinations at this current time  Patient does not offer complaints at this time  ED Course as of Dec 16 1656   Sun Dec 16, 2018   5578 Crisis evaluated patient, no SI HI  Patient has appropriate insight  Procedures  MDM  CritCare Time      Disposition  Final diagnoses:   Polysubstance abuse ([facility] [address] )   Psychiatric disorder     Time reflects when diagnosis was documented in both MDM as applicable and the Disposition within this note     Time User Action Codes Description Comment    12/16/2018  8:18 AM Tonya Aus Add [F19 10] Polysubstance abuse ([facility] [address] )     12/16/2018  8:18 AM Tonya Aus Add [F29] Psychosis ([address] )     12/16/2018  8:19 AM Mundo Zamudio [F29] Psychosis ([facility] [address] )     12/16/2018  8:19 AM Juan Carlos Zamudio Add [F99] Psychiatric disorder       ED Disposition     ED Disposition Condition Comment    Discharge  Devika Monique discharge to home/self care  Condition at discharge: Good    Return precautions were discussed with patient  Patient understands when to return to  Emergency department  Patient agrees to discharge plan and follow up care             MD Documentation      Most Recent Value   Sending MD  Dr Ketan Noble up With Specialties Details Why Contact Info Additional Information    Katie Douglas MD MPH Internal Medicine Go in 1 day  [address] [facility]  [address] [facility] Emergency Department Emergency Medicine Go to As needed, If symptoms worsen [address] [place] [address]  [phone]  ED, [address] [place], [place], [address]        Crisis resources         Discharge Medication List as of 12/16/2018  8:19 AM      CONTINUE these medications which have NOT CHANGED    Details   DOXEPIN HCL PO Take by mouth, Historical Med      ibuprofen (MOTRIN) 600 mg tablet Take 1 tablet (600 mg total) by mouth every 8 (eight) hours as needed for mild pain, Starting Fri 10/19/2018, Normal           No discharge procedures on file         ED Provider  Electronically Signed by     Juancarlos Arnold DO  12/16/18 0377
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
## 2018-12-16 NOTE — ED NOTES
CW and ED Dr spoke with Pt who stated that he does not want inpatient treatment at this time and that he will be discharged

## 2018-12-16 NOTE — DISCHARGE INSTRUCTIONS
Psychotic Disorder   WHAT YOU NEED TO KNOW:   A psychotic disorder is a medical condition that causes hallucinations and delusions  Hallucinations are seeing, hearing, tasting, or feeling things that are not real  Delusions are beliefs that something is real, true, or right when it is not  These false beliefs do not go away even if there is proof that they are not true  You may believe someone is spying on you, after you, or controlling your mind  You may also believe there is something wrong with how your body works  Schizophrenia and schizoaffective disorder are examples of psychotic disorders  DISCHARGE INSTRUCTIONS:   Call 911 if:   · You feel like you could harm yourself or someone else  Contact your healthcare provider if:   · Your symptoms do not improve  · You cannot make it to your next appointment  · You have new symptoms  · You have questions or concerns about your condition or care  Medicines  may be given to decrease your symptoms  You may need 1 or more medicines  You may need to take your medicine for several weeks before you begin to feel better  Tell your healthcare provider about any side effects or problems you have with your medicines  The type or amount of medicine may need to be changed  Get support: It may be difficult to cope with your illness  You may feel lonely, anxious, or depressed  It may help to join a support group  A support group lets you talk with others who have a mental illness  For information and more support visit:  · [facility]  [address] [facility]  , [address] [place] [address] , [address] [place] [address]  Phone: [phone]  Phone: [phone]  Web Address: http://[employer]/  [employer]  Self-care:   · Do not drink alcohol or use illegal drugs  Alcohol and illegal drugs can make your symptoms worse  Ask your healthcare provider for information if you currently drink alcohol or use illegal drugs and need help to quit  · Do not smoke    Nicotine and other chemicals in cigarettes and cigars can cause lung damage  They can also decrease how well your medicine works  Ask your healthcare provider for information if you currently smoke and need help to quit  E-cigarettes or smokeless tobacco still contain nicotine  Talk to your healthcare provider before you use these products  · Exercise regularly  Exercise can help improve your mood and decrease symptoms  Ask about the best exercise plan for you  · Manage your stress  Stress can make your condition worse  Ask your healthcare provider for more information about practicing mindfulness and deep breathing exercises to help decrease your stress  You may learn other ways to manage stress during therapy  Follow up with your healthcare provider as directed:  Write down your questions so you remember to ask them during your visits  © 2017 [address] [facility] Information is for End User's use only and may not be sold, redistributed or otherwise used for commercial purposes  All illustrations and images included in CareNotes® are the copyrighted property of [employer]  or [employer]  The above information is an  only  It is not intended as medical advice for individual conditions or treatments  Talk to your doctor, nurse or pharmacist before following any medical regimen to see if it is safe and effective for you
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
## 2018-12-17 ENCOUNTER — HOSPITAL ENCOUNTER (EMERGENCY)
Facility: HOSPITAL | Age: 53
Discharge: HOME | End: 2018-12-17
Attending: EMERGENCY MEDICINE | Admitting: EMERGENCY MEDICINE
Payer: COMMERCIAL

## 2018-12-17 VITALS
OXYGEN SATURATION: 95 % | TEMPERATURE: 97.9 F | SYSTOLIC BLOOD PRESSURE: 166 MMHG | RESPIRATION RATE: 18 BRPM | DIASTOLIC BLOOD PRESSURE: 106 MMHG | HEART RATE: 86 BPM

## 2018-12-17 DIAGNOSIS — R20.2 NUMBNESS AND TINGLING: ICD-10-CM

## 2018-12-17 DIAGNOSIS — J02.9 SORE THROAT: ICD-10-CM

## 2018-12-17 DIAGNOSIS — R20.0 NUMBNESS AND TINGLING: ICD-10-CM

## 2018-12-17 DIAGNOSIS — L03.011 CELLULITIS OF RIGHT RING FINGER: Primary | ICD-10-CM

## 2018-12-17 PROCEDURE — 99283 EMERGENCY DEPT VISIT LOW MDM: CPT

## 2018-12-17 RX ORDER — DOXYCYCLINE HYCLATE 100 MG/1
100 CAPSULE ORAL ONCE
Status: COMPLETED | OUTPATIENT
Start: 2018-12-17 | End: 2018-12-17

## 2018-12-17 RX ORDER — DOXYCYCLINE HYCLATE 100 MG/1
100 CAPSULE ORAL 2 TIMES DAILY
Qty: 14 CAPSULE | Refills: 0 | Status: SHIPPED | OUTPATIENT
Start: 2018-12-17 | End: 2018-12-27 | Stop reason: HOSPADM

## 2018-12-17 RX ADMIN — DOXYCYCLINE 100 MG: 100 CAPSULE ORAL at 23:07

## 2018-12-18 NOTE — DISCHARGE INSTRUCTIONS
Cellulitis   WHAT YOU NEED TO KNOW:   Cellulitis is a skin infection caused by bacteria  Cellulitis may go away on its own or you may need treatment  Your healthcare provider may draw a [place] around the outside edges of your cellulitis  If your cellulitis spreads, your healthcare provider will see it outside of the [place]  DISCHARGE INSTRUCTIONS:   Call 911 if:   · You have sudden trouble breathing or chest pain  Return to the emergency department if:   · Your wound gets larger and more painful  · You feel a crackling under your skin when you touch it  · You have purple dots or bumps on your skin, or you see bleeding under your skin  · You have new swelling and pain in your legs  · The red, warm, swollen area gets larger  · You see red streaks coming from the infected area  Contact your healthcare provider if:   · You have a fever  · Your fever or pain does not go away or gets worse  · The area does not get smaller after 2 days of antibiotics  · Your skin is flaking or peeling off  · You have questions or concerns about your condition or care  Medicines:   · Antibiotics  help treat the bacterial infection  · NSAIDs , such as ibuprofen, help decrease swelling, pain, and fever  NSAIDs can cause stomach bleeding or kidney problems in certain people  If you take blood thinner medicine, always ask if NSAIDs are safe for you  Always read the medicine label and follow directions  Do not give these medicines to children under 10months of age without direction from your child's healthcare provider  · Acetaminophen  decreases pain and fever  It is available without a doctor's order  Ask how much to take and how often to take it  Follow directions  Read the labels of all other medicines you are using to see if they also contain acetaminophen, or ask your doctor or pharmacist  Acetaminophen can cause liver damage if not taken correctly   Do not use more than 4 grams (4,000 milligrams) total of acetaminophen in one day  · Take your medicine as directed  Contact your healthcare provider if you think your medicine is not helping or if you have side effects  Tell him or her if you are allergic to any medicine  Keep a list of the medicines, vitamins, and herbs you take  Include the amounts, and when and why you take them  Bring the list or the pill bottles to follow-up visits  Carry your medicine list with you in case of an emergency  Self-care:   · Elevate the area above the level of your heart  as often as you can  This will help decrease swelling and pain  Prop the area on pillows or blankets to keep it elevated comfortably  · Clean the area daily until the wound scabs over  Gently wash the area with soap and water  Pat dry  Use dressings as directed  · Place cool or warm, wet cloths on the area as directed  Use clean cloths and clean water  Leave it on the area until the cloth is room temperature  Pat the area dry with a clean, dry cloth  The cloths may help decrease pain  Prevent cellulitis:   · Do not scratch bug bites or areas of injury  You increase your risk for cellulitis by scratching these areas  · Do not share personal items, such as towels, clothing, and razors  · Clean exercise equipment  with germ-killing  before and after you use it  · Wash your hands often  Use soap and water  Wash your hands after you use the bathroom, change a child's diapers, or sneeze  Wash your hands before you prepare or eat food  Use lotion to prevent dry, cracked skin  · Wear pressure stockings as directed  You may be told to wear the stockings if you have peripheral edema  The stockings improve blood flow and decrease swelling  · Treat athlete's foot  This can help prevent the spread of a bacterial skin infection  Follow up with your healthcare provider within 3 days, or as directed:   Your healthcare provider will check if your cellulitis is getting better  You may need different medicine  Write down your questions so you remember to ask them during your visits  © 2017 [address] [employer] Information is for End User's use only and may not be sold, redistributed or otherwise used for commercial purposes  All illustrations and images included in CareNotes® are the copyrighted property of [employer] Inc  or [employer]  The above information is an  only  It is not intended as medical advice for individual conditions or treatments  Talk to your doctor, nurse or pharmacist before following any medical regimen to see if it is safe and effective for you
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
## 2018-12-18 NOTE — ED PROVIDER NOTES
History  Chief Complaint   Patient presents with    Finger Injury     Pt  c/o of redness of and pain on R hand     HPI    54yo male pmhx bipolar, substance abuse presents for evaluation of finger pain  Patient says he noticed mild pain today of his right finger pain which has gradually worsened  Patient says he noticed redness under this nail as well extending throughout this finger  Pain begins at nail and extends throughout  Denies any trauma  Admits to numbness at finger tip  Denies discharge from finger, weakness or change in range of motion  Patient notes he has had pain coming from both his nostrils, worst in right  Patient notes he normally smokes cocaine on a daily basis but has stayed clean for 2 days  Denies IVDA      None       Past Medical History:   Diagnosis Date    Anxiety     [facility]     PTSD (post-traumatic stress disorder)     Substance abuse ([facility] )        Past Surgical History:   Procedure Laterality Date    COLOSTOMY      HERNIA REPAIR      SHOULDER SURGERY Bilateral     WRIST SURGERY Right 2012       Family History   Problem Relation Age of Onset    Breast cancer Mother     No Known Problems Father      I have reviewed and agree with the history as documented  Social History   Substance Use Topics    Smoking status: Never Smoker    Smokeless tobacco: Never Used    Alcohol use Yes        Review of Systems   Constitutional: Negative for chills, diaphoresis, fatigue and fever  HENT: Negative for congestion, rhinorrhea and sore throat  Eyes: Negative for photophobia and visual disturbance  Respiratory: Negative for cough, chest tightness and shortness of breath  Cardiovascular: Negative for chest pain and palpitations  Gastrointestinal: Negative for abdominal pain, blood in stool, constipation, diarrhea, nausea and vomiting  Genitourinary: Negative for dysuria, frequency and hematuria     Musculoskeletal: Negative for back pain, gait problem, myalgias, neck pain and neck stiffness  Skin: Positive for color change  Negative for pallor and rash  Neurological: Positive for numbness  Negative for weakness, light-headedness and headaches  Hematological: Negative for adenopathy  Does not bruise/bleed easily  All other systems reviewed and are negative  Physical Exam  ED Triage Vitals [12/17/18 2230]   Temperature Pulse Respirations Blood Pressure SpO2   97 9 °F (36 6 °C) 86 18 (!) 166/106 95 %      Temp Source Heart Rate Source Patient Position - Orthostatic VS BP Location FiO2 (%)   Oral Monitor Sitting Right arm --      Pain Score       --           Orthostatic Vital Signs  Vitals:    12/17/18 2230   BP: (!) 166/106   Pulse: 86   Patient Position - Orthostatic VS: Sitting       Physical Exam   Constitutional: He is oriented to person, place, and time  No distress  Patient alert and oriented, appears well and non-toxic, in no acute distress    HENT:   Head: Normocephalic and atraumatic  Right Ear: External ear normal    Left Ear: External ear normal    Mouth/Throat: Oropharynx is clear and moist  No oropharyngeal exudate  Mild discharge and erythema within both nostrils, no obvious lesion or hematoma noted   Eyes: Pupils are equal, round, and reactive to light  Conjunctivae and EOM are normal    Neck: Normal range of motion  Neck supple  Cardiovascular: Normal rate, regular rhythm, normal heart sounds and intact distal pulses  Pulmonary/Chest: Effort normal and breath sounds normal  No respiratory distress  Abdominal: Soft  Bowel sounds are normal  He exhibits no distension  There is no tenderness  Musculoskeletal: Normal range of motion  Lymphadenopathy:     He has no cervical adenopathy  Neurological: He is alert and oriented to person, place, and time     No facial asymmetry noted, CN 2-12 intact, full ROM of upper and lower extremities, muscle strength 5/5 throughout, DTRs normal, sensation intact throughout   Skin: Skin is warm and dry  Capillary refill takes less than 2 seconds  No rash noted  He is not diaphoretic  There is erythema  No pallor  No track marks noted on any extremity   Right ring finger appears to be mildly erythematous around nail with mild tenderness, no obvious skin opening, abscess, or edema noted, full ROM and strength of digit and hand, wrist, sensation intact   Psychiatric: His behavior is normal  Judgment and thought content normal  His mood appears anxious  Nursing note and vitals reviewed  ED Medications  Medications   doxycycline hyclate (VIBRAMYCIN) capsule 100 mg (100 mg Oral Given 12/17/18 4017)       Diagnostic Studies  Results Reviewed     None                 No orders to display         Procedures  Procedures      Phone Consults  ED Phone Contact    ED Course                               MDM  Number of Diagnoses or Management Options  Cellulitis of right ring finger:   Numbness and tingling:   Sore throat:   Diagnosis management comments: Impression: 56yo male presents for evaluation of right ring finger erythema and pain  Ddx: cellulitis  Plan: doxy     CritCare Time    Disposition  Final diagnoses:   Cellulitis of right ring finger   Numbness and tingling   Sore throat     Time reflects when diagnosis was documented in both MDM as applicable and the Disposition within this note     Time User Action Codes Description Comment    12/17/2018 11:09 PM Johnathon Whitley [A24 [id number]] Cellulitis of right ring finger     12/17/2018 11:10 PM Aleah Rea [R20 0,  R20 2] Numbness and tingling     12/17/2018 11:10 PM Johnathon Whitley [J02 9] Sore throat       ED Disposition     ED Disposition Condition Comment    Discharge  Enriqueta Trotter discharge to home/self care      Condition at discharge: Good        Follow-up Information     Follow up With Specialties Details Why Contact Info Additional Information    Delmi Whiting MD MPH Internal Medicine Go in 3 days As needed, If symptoms worsen [address] [place] [address] [facility] [address]  [address]  [phone]       [facility] [address] Emergency Department Emergency Medicine Go to As needed, If symptoms worsen [address]  [phone]  ED, [address] [place] [address], [address]          Discharge Medication List as of 12/17/2018 11:11 PM      START taking these medications    Details   doxycycline hyclate (VIBRAMYCIN) 100 mg capsule Take 1 capsule (100 mg total) by mouth 2 (two) times a day for 7 days, Starting Mon 12/17/2018, Until Mon 12/24/2018, Normal           No discharge procedures on file  ED Provider  Attending physically available and evaluated Anige Erickson I managed the patient along with the ED Attending      Electronically Signed by         Patricia Bowers MD  12/18/18 100 [facility] Federica Cope MD  12/18/18 5297

## 2018-12-20 ENCOUNTER — HOSPITAL ENCOUNTER (INPATIENT)
Facility: HOSPITAL | Age: 53
LOS: 7 days | Discharge: HOME | DRG: 885 | End: 2018-12-27
Attending: PSYCHIATRY & NEUROLOGY | Admitting: PSYCHIATRY & NEUROLOGY
Payer: MEDICARE

## 2018-12-20 ENCOUNTER — HOSPITAL ENCOUNTER (EMERGENCY)
Facility: HOSPITAL | Age: 53
End: 2018-12-20
Attending: EMERGENCY MEDICINE | Admitting: EMERGENCY MEDICINE
Payer: COMMERCIAL

## 2018-12-20 VITALS
OXYGEN SATURATION: 95 % | HEART RATE: 80 BPM | TEMPERATURE: 97.5 F | DIASTOLIC BLOOD PRESSURE: 77 MMHG | RESPIRATION RATE: 20 BRPM | SYSTOLIC BLOOD PRESSURE: 132 MMHG

## 2018-12-20 DIAGNOSIS — F29 PSYCHOSIS (HCC): Primary | ICD-10-CM

## 2018-12-20 DIAGNOSIS — F31.64 SEVERE BIPOLAR I DISORDER, MOST RECENT EPISODE MIXED, WITH PSYCHOTIC FEATURES (HCC): ICD-10-CM

## 2018-12-20 DIAGNOSIS — R93.89 ABNORMAL CT OF THE CHEST: ICD-10-CM

## 2018-12-20 DIAGNOSIS — F25.0 SCHIZOAFFECTIVE DISORDER, BIPOLAR TYPE (HCC): Primary | Chronic | ICD-10-CM

## 2018-12-20 DIAGNOSIS — F43.12 POST-TRAUMATIC STRESS DISORDER, CHRONIC: Chronic | ICD-10-CM

## 2018-12-20 DIAGNOSIS — G47.00 INSOMNIA: ICD-10-CM

## 2018-12-20 DIAGNOSIS — F19.10 SUBSTANCE ABUSE (HCC): ICD-10-CM

## 2018-12-20 DIAGNOSIS — G25.81 RLS (RESTLESS LEGS SYNDROME): ICD-10-CM

## 2018-12-20 PROBLEM — F31.62 BIPOLAR MIXED AFFECTIVE DISORDER, MODERATE (HCC): Status: ACTIVE | Noted: 2018-10-05

## 2018-12-20 PROBLEM — F43.10 PTSD (POST-TRAUMATIC STRESS DISORDER): Status: ACTIVE | Noted: 2018-12-20

## 2018-12-20 LAB
ALBUMIN SERPL BCP-MCNC: 4.1 G/DL (ref 3.5–5)
ALP SERPL-CCNC: 103 U/L (ref 46–116)
ALT SERPL W P-5'-P-CCNC: 97 U/L (ref 12–78)
AMPHETAMINES SERPL QL SCN: NEGATIVE
ANION GAP SERPL CALCULATED.3IONS-SCNC: 6 MMOL/L (ref 4–13)
APAP SERPL-MCNC: 18 UG/ML (ref 10–30)
AST SERPL W P-5'-P-CCNC: 58 U/L (ref 5–45)
ATRIAL RATE: 102 BPM
BACTERIA UR QL AUTO: ABNORMAL /HPF
BARBITURATES UR QL: NEGATIVE
BASOPHILS # BLD AUTO: 0.03 THOUSANDS/ΜL (ref 0–0.1)
BASOPHILS NFR BLD AUTO: 0 % (ref 0–1)
BENZODIAZ UR QL: NEGATIVE
BILIRUB SERPL-MCNC: 0.58 MG/DL (ref 0.2–1)
BILIRUB UR QL STRIP: NEGATIVE
BUN SERPL-MCNC: 7 MG/DL (ref 5–25)
CALCIUM SERPL-MCNC: 8.5 MG/DL (ref 8.3–10.1)
CHLORIDE SERPL-SCNC: 104 MMOL/L (ref 100–108)
CLARITY UR: CLEAR
CO2 SERPL-SCNC: 23 MMOL/L (ref 21–32)
COCAINE UR QL: POSITIVE
COLOR UR: YELLOW
CREAT SERPL-MCNC: 1.12 MG/DL (ref 0.6–1.3)
EOSINOPHIL # BLD AUTO: 0.06 THOUSAND/ΜL (ref 0–0.61)
EOSINOPHIL NFR BLD AUTO: 1 % (ref 0–6)
ERYTHROCYTE [DISTWIDTH] IN BLOOD BY AUTOMATED COUNT: 12.7 % (ref 11.6–15.1)
ETHANOL EXG-MCNC: 0 MG/DL
ETHANOL SERPL-MCNC: <3 MG/DL (ref 0–3)
GFR SERPL CREATININE-BSD FRML MDRD: 76 ML/MIN/1.73SQ M
GLUCOSE SERPL-MCNC: 108 MG/DL (ref 65–140)
GLUCOSE UR STRIP-MCNC: NEGATIVE MG/DL
HCT VFR BLD AUTO: 42.8 % (ref 36.5–49.3)
HGB BLD-MCNC: 14.8 G/DL (ref 12–17)
HGB UR QL STRIP.AUTO: NEGATIVE
HYALINE CASTS #/AREA URNS LPF: ABNORMAL /LPF
IMM GRANULOCYTES # BLD AUTO: 0.02 THOUSAND/UL (ref 0–0.2)
IMM GRANULOCYTES NFR BLD AUTO: 0 % (ref 0–2)
KETONES UR STRIP-MCNC: NEGATIVE MG/DL
LEUKOCYTE ESTERASE UR QL STRIP: NEGATIVE
LYMPHOCYTES # BLD AUTO: 0.94 THOUSANDS/ΜL (ref 0.6–4.47)
LYMPHOCYTES NFR BLD AUTO: 14 % (ref 14–44)
MCH RBC QN AUTO: 31 PG (ref 26.8–34.3)
MCHC RBC AUTO-ENTMCNC: 34.6 G/DL (ref 31.4–37.4)
MCV RBC AUTO: 90 FL (ref 82–98)
METHADONE UR QL: NEGATIVE
MONOCYTES # BLD AUTO: 0.37 THOUSAND/ΜL (ref 0.17–1.22)
MONOCYTES NFR BLD AUTO: 5 % (ref 4–12)
NEUTROPHILS # BLD AUTO: 5.5 THOUSANDS/ΜL (ref 1.85–7.62)
NEUTS SEG NFR BLD AUTO: 80 % (ref 43–75)
NITRITE UR QL STRIP: NEGATIVE
NON-SQ EPI CELLS URNS QL MICRO: ABNORMAL /HPF
NRBC BLD AUTO-RTO: 0 /100 WBCS
OPIATES UR QL SCN: NEGATIVE
P AXIS: 45 DEGREES
PCP UR QL: POSITIVE
PH UR STRIP.AUTO: 5.5 [PH] (ref 4.5–8)
PLATELET # BLD AUTO: 170 THOUSANDS/UL (ref 149–390)
PMV BLD AUTO: 10.9 FL (ref 8.9–12.7)
POTASSIUM SERPL-SCNC: 5.2 MMOL/L (ref 3.5–5.3)
PR INTERVAL: 160 MS
PROT SERPL-MCNC: 8 G/DL (ref 6.4–8.2)
PROT UR STRIP-MCNC: ABNORMAL MG/DL
QRS AXIS: 25 DEGREES
QRSD INTERVAL: 88 MS
QT INTERVAL: 336 MS
QTC INTERVAL: 437 MS
RBC # BLD AUTO: 4.77 MILLION/UL (ref 3.88–5.62)
RBC #/AREA URNS AUTO: ABNORMAL /HPF
SALICYLATES SERPL-MCNC: <3 MG/DL (ref 3–20)
SODIUM SERPL-SCNC: 133 MMOL/L (ref 136–145)
SP GR UR STRIP.AUTO: 1.03 (ref 1–1.03)
T WAVE AXIS: 50 DEGREES
THC UR QL: POSITIVE
TROPONIN I SERPL-MCNC: <0.02 NG/ML
TSH SERPL DL<=0.05 MIU/L-ACNC: 0.61 UIU/ML (ref 0.36–3.74)
UROBILINOGEN UR QL STRIP.AUTO: 0.2 E.U./DL
VENTRICULAR RATE: 102 BPM
WBC # BLD AUTO: 6.92 THOUSAND/UL (ref 4.31–10.16)
WBC #/AREA URNS AUTO: ABNORMAL /HPF

## 2018-12-20 PROCEDURE — 96361 HYDRATE IV INFUSION ADD-ON: CPT

## 2018-12-20 PROCEDURE — 36415 COLL VENOUS BLD VENIPUNCTURE: CPT | Performed by: EMERGENCY MEDICINE

## 2018-12-20 PROCEDURE — HZ2ZZZZ DETOXIFICATION SERVICES FOR SUBSTANCE ABUSE TREATMENT: ICD-10-PCS | Performed by: PSYCHIATRY & NEUROLOGY

## 2018-12-20 PROCEDURE — 96372 THER/PROPH/DIAG INJ SC/IM: CPT

## 2018-12-20 PROCEDURE — 99285 EMERGENCY DEPT VISIT HI MDM: CPT

## 2018-12-20 PROCEDURE — 85025 COMPLETE CBC W/AUTO DIFF WBC: CPT | Performed by: EMERGENCY MEDICINE

## 2018-12-20 PROCEDURE — 96376 TX/PRO/DX INJ SAME DRUG ADON: CPT

## 2018-12-20 PROCEDURE — 80053 COMPREHEN METABOLIC PANEL: CPT | Performed by: EMERGENCY MEDICINE

## 2018-12-20 PROCEDURE — 96374 THER/PROPH/DIAG INJ IV PUSH: CPT

## 2018-12-20 PROCEDURE — 84484 ASSAY OF TROPONIN QUANT: CPT | Performed by: EMERGENCY MEDICINE

## 2018-12-20 PROCEDURE — 80320 DRUG SCREEN QUANTALCOHOLS: CPT | Performed by: EMERGENCY MEDICINE

## 2018-12-20 PROCEDURE — 80329 ANALGESICS NON-OPIOID 1 OR 2: CPT | Performed by: EMERGENCY MEDICINE

## 2018-12-20 PROCEDURE — 80307 DRUG TEST PRSMV CHEM ANLYZR: CPT | Performed by: EMERGENCY MEDICINE

## 2018-12-20 PROCEDURE — 82075 ASSAY OF BREATH ETHANOL: CPT | Performed by: EMERGENCY MEDICINE

## 2018-12-20 PROCEDURE — 93005 ELECTROCARDIOGRAM TRACING: CPT

## 2018-12-20 PROCEDURE — 81001 URINALYSIS AUTO W/SCOPE: CPT | Performed by: EMERGENCY MEDICINE

## 2018-12-20 PROCEDURE — 99253 IP/OBS CNSLTJ NEW/EST LOW 45: CPT | Performed by: HOSPITALIST

## 2018-12-20 PROCEDURE — 84443 ASSAY THYROID STIM HORMONE: CPT | Performed by: EMERGENCY MEDICINE

## 2018-12-20 RX ORDER — RISPERIDONE 1 MG/1
1 TABLET, ORALLY DISINTEGRATING ORAL
Status: CANCELLED | OUTPATIENT
Start: 2018-12-20

## 2018-12-20 RX ORDER — BENZTROPINE MESYLATE 1 MG/ML
1 INJECTION INTRAMUSCULAR; INTRAVENOUS EVERY 6 HOURS PRN
Status: CANCELLED | OUTPATIENT
Start: 2018-12-20

## 2018-12-20 RX ORDER — LORAZEPAM 2 MG/ML
2 INJECTION INTRAMUSCULAR ONCE
Status: COMPLETED | OUTPATIENT
Start: 2018-12-20 | End: 2018-12-20

## 2018-12-20 RX ORDER — IBUPROFEN 600 MG/1
600 TABLET ORAL EVERY 8 HOURS PRN
Status: DISCONTINUED | OUTPATIENT
Start: 2018-12-20 | End: 2018-12-21

## 2018-12-20 RX ORDER — LORAZEPAM 2 MG/ML
1 INJECTION INTRAMUSCULAR EVERY 6 HOURS PRN
Status: DISCONTINUED | OUTPATIENT
Start: 2018-12-20 | End: 2018-12-27 | Stop reason: HOSPADM

## 2018-12-20 RX ORDER — ACETAMINOPHEN 325 MG/1
650 TABLET ORAL EVERY 6 HOURS PRN
Status: DISCONTINUED | OUTPATIENT
Start: 2018-12-20 | End: 2018-12-27 | Stop reason: HOSPADM

## 2018-12-20 RX ORDER — LORAZEPAM 0.5 MG/1
2 TABLET ORAL ONCE
Status: DISCONTINUED | OUTPATIENT
Start: 2018-12-20 | End: 2018-12-20

## 2018-12-20 RX ORDER — HYDROXYZINE HYDROCHLORIDE 25 MG/1
25 TABLET, FILM COATED ORAL EVERY 6 HOURS PRN
Status: CANCELLED | OUTPATIENT
Start: 2018-12-20

## 2018-12-20 RX ORDER — LORAZEPAM 2 MG/ML
1 INJECTION INTRAMUSCULAR EVERY 6 HOURS PRN
Status: CANCELLED | OUTPATIENT
Start: 2018-12-20

## 2018-12-20 RX ORDER — ACETAMINOPHEN 325 MG/1
975 TABLET ORAL EVERY 6 HOURS PRN
Status: DISCONTINUED | OUTPATIENT
Start: 2018-12-20 | End: 2018-12-27 | Stop reason: HOSPADM

## 2018-12-20 RX ORDER — ZIPRASIDONE MESYLATE 20 MG/ML
20 INJECTION, POWDER, LYOPHILIZED, FOR SOLUTION INTRAMUSCULAR EVERY 4 HOURS PRN
Status: DISCONTINUED | OUTPATIENT
Start: 2018-12-20 | End: 2018-12-27 | Stop reason: HOSPADM

## 2018-12-20 RX ORDER — HALOPERIDOL 5 MG/ML
5 INJECTION INTRAMUSCULAR EVERY 6 HOURS PRN
Status: CANCELLED | OUTPATIENT
Start: 2018-12-20

## 2018-12-20 RX ORDER — BENZTROPINE MESYLATE 1 MG/ML
1 INJECTION INTRAMUSCULAR; INTRAVENOUS EVERY 6 HOURS PRN
Status: DISCONTINUED | OUTPATIENT
Start: 2018-12-20 | End: 2018-12-27 | Stop reason: HOSPADM

## 2018-12-20 RX ORDER — IBUPROFEN 600 MG/1
600 TABLET ORAL EVERY 8 HOURS PRN
Status: CANCELLED | OUTPATIENT
Start: 2018-12-20

## 2018-12-20 RX ORDER — LORAZEPAM 1 MG/1
1 TABLET ORAL EVERY 6 HOURS PRN
Status: DISCONTINUED | OUTPATIENT
Start: 2018-12-20 | End: 2018-12-21

## 2018-12-20 RX ORDER — ZIPRASIDONE MESYLATE 20 MG/ML
20 INJECTION, POWDER, LYOPHILIZED, FOR SOLUTION INTRAMUSCULAR EVERY 4 HOURS PRN
Status: CANCELLED | OUTPATIENT
Start: 2018-12-20

## 2018-12-20 RX ORDER — TRAZODONE HYDROCHLORIDE 50 MG/1
50 TABLET ORAL
Status: DISCONTINUED | OUTPATIENT
Start: 2018-12-20 | End: 2018-12-27

## 2018-12-20 RX ORDER — RISPERIDONE 1 MG/1
1 TABLET, FILM COATED ORAL
Status: DISCONTINUED | OUTPATIENT
Start: 2018-12-20 | End: 2018-12-27 | Stop reason: HOSPADM

## 2018-12-20 RX ORDER — HALOPERIDOL 5 MG
5 TABLET ORAL EVERY 6 HOURS PRN
Status: DISCONTINUED | OUTPATIENT
Start: 2018-12-20 | End: 2018-12-27 | Stop reason: HOSPADM

## 2018-12-20 RX ORDER — DOXYCYCLINE HYCLATE 100 MG/1
100 CAPSULE ORAL 2 TIMES DAILY
Status: COMPLETED | OUTPATIENT
Start: 2018-12-20 | End: 2018-12-24

## 2018-12-20 RX ORDER — ACETAMINOPHEN 325 MG/1
650 TABLET ORAL EVERY 6 HOURS PRN
Status: CANCELLED | OUTPATIENT
Start: 2018-12-20

## 2018-12-20 RX ORDER — HALOPERIDOL 5 MG/ML
5 INJECTION INTRAMUSCULAR EVERY 6 HOURS PRN
Status: DISCONTINUED | OUTPATIENT
Start: 2018-12-20 | End: 2018-12-27 | Stop reason: HOSPADM

## 2018-12-20 RX ORDER — BENZTROPINE MESYLATE 1 MG/1
1 TABLET ORAL EVERY 6 HOURS PRN
Status: DISCONTINUED | OUTPATIENT
Start: 2018-12-20 | End: 2018-12-27 | Stop reason: HOSPADM

## 2018-12-20 RX ORDER — HYDROXYZINE HYDROCHLORIDE 25 MG/1
25 TABLET, FILM COATED ORAL EVERY 6 HOURS PRN
Status: DISCONTINUED | OUTPATIENT
Start: 2018-12-20 | End: 2018-12-21

## 2018-12-20 RX ORDER — TRAZODONE HYDROCHLORIDE 50 MG/1
50 TABLET ORAL
Status: CANCELLED | OUTPATIENT
Start: 2018-12-20

## 2018-12-20 RX ORDER — MAGNESIUM HYDROXIDE/ALUMINUM HYDROXICE/SIMETHICONE 120; 1200; 1200 MG/30ML; MG/30ML; MG/30ML
30 SUSPENSION ORAL EVERY 4 HOURS PRN
Status: DISCONTINUED | OUTPATIENT
Start: 2018-12-20 | End: 2018-12-27 | Stop reason: HOSPADM

## 2018-12-20 RX ORDER — MAGNESIUM HYDROXIDE/ALUMINUM HYDROXICE/SIMETHICONE 120; 1200; 1200 MG/30ML; MG/30ML; MG/30ML
30 SUSPENSION ORAL EVERY 4 HOURS PRN
Status: CANCELLED | OUTPATIENT
Start: 2018-12-20

## 2018-12-20 RX ORDER — LORAZEPAM 0.5 MG/1
1 TABLET ORAL ONCE
Status: COMPLETED | OUTPATIENT
Start: 2018-12-20 | End: 2018-12-20

## 2018-12-20 RX ORDER — BENZTROPINE MESYLATE 1 MG/1
1 TABLET ORAL EVERY 6 HOURS PRN
Status: CANCELLED | OUTPATIENT
Start: 2018-12-20

## 2018-12-20 RX ORDER — LORAZEPAM 0.5 MG/1
1 TABLET ORAL EVERY 6 HOURS PRN
Status: CANCELLED | OUTPATIENT
Start: 2018-12-20

## 2018-12-20 RX ORDER — LORAZEPAM 2 MG/ML
1 INJECTION INTRAMUSCULAR ONCE
Status: COMPLETED | OUTPATIENT
Start: 2018-12-20 | End: 2018-12-20

## 2018-12-20 RX ORDER — ACETAMINOPHEN 325 MG/1
975 TABLET ORAL EVERY 6 HOURS PRN
Status: CANCELLED | OUTPATIENT
Start: 2018-12-20

## 2018-12-20 RX ORDER — HALOPERIDOL 5 MG
5 TABLET ORAL EVERY 6 HOURS PRN
Status: CANCELLED | OUTPATIENT
Start: 2018-12-20

## 2018-12-20 RX ADMIN — BENZTROPINE MESYLATE 1 MG: 1 TABLET ORAL at 20:10

## 2018-12-20 RX ADMIN — LORAZEPAM 1 MG: 2 INJECTION, SOLUTION INTRAMUSCULAR; INTRAVENOUS at 09:46

## 2018-12-20 RX ADMIN — SODIUM CHLORIDE 1000 ML: 0.9 INJECTION, SOLUTION INTRAVENOUS at 02:41

## 2018-12-20 RX ADMIN — DOXYCYCLINE 100 MG: 100 CAPSULE ORAL at 22:05

## 2018-12-20 RX ADMIN — LORAZEPAM 1 MG: 0.5 TABLET ORAL at 16:01

## 2018-12-20 RX ADMIN — LORAZEPAM 2 MG: 2 INJECTION, SOLUTION INTRAMUSCULAR; INTRAVENOUS at 15:15

## 2018-12-20 RX ADMIN — LORAZEPAM 2 MG: 2 INJECTION, SOLUTION INTRAMUSCULAR; INTRAVENOUS at 01:57

## 2018-12-20 RX ADMIN — IBUPROFEN 600 MG: 600 TABLET ORAL at 22:04

## 2018-12-20 RX ADMIN — LORAZEPAM 1 MG: 1 TABLET ORAL at 20:10

## 2018-12-20 RX ADMIN — HALOPERIDOL 5 MG: 5 TABLET ORAL at 20:10

## 2018-12-20 RX ADMIN — LORAZEPAM 2 MG: 2 INJECTION, SOLUTION INTRAMUSCULAR; INTRAVENOUS at 02:55

## 2018-12-20 RX ADMIN — ACETAMINOPHEN 975 MG: 325 TABLET ORAL at 21:03

## 2018-12-20 RX ADMIN — TRAZODONE HYDROCHLORIDE 50 MG: 50 TABLET ORAL at 22:04

## 2018-12-20 NOTE — ED NOTES
Pt requesting a "shot of Morphine" for R shoulder pain  Pt able to move shoulder  No swelling/erythema noted  Ice offered, pt declined  Dr Jonny Dietrich aware       Mariah Tran RN  12/20/18 2024

## 2018-12-20 NOTE — ED NOTES
Pt appears guarded at this time  While signing the 12, pt kept looking over his shoulder and at this writer as if he was worried someone was going to do something to him  Pt is requesting ativan at this time   Pt keeps asking if someone is going to kill him
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
## 2018-12-20 NOTE — ED NOTES
Patient is accepted at [place] [address]  Patient is accepted by [facility] per [address] is arranged with [facility] [place] is scheduled for [address]     Nurse report is to be called to [phone] prior to patient transfer

## 2018-12-20 NOTE — ED NOTES
Pt medically cleared as per Dr Devyn Vaughn - pending to be seen by day shift crisis worker     Henrique Cheung RN  12/20/18 9986

## 2018-12-20 NOTE — ED NOTES
Called pts sister Shiv Barfield [phone]   She was in a grocery store and asked for a call back in 10 minutes

## 2018-12-20 NOTE — ED NOTES
Pt changed into paper scrubs and room cleared out per [facility] protocol at this time  Dr Martinez  at bedside        Magali Spicer  12/20/18 6345

## 2018-12-20 NOTE — ED NOTES
Report called to [facility] RN on 3B       Bela Betancourt, RN  12/20/18 Martin Lopez
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
## 2018-12-20 NOTE — ED PROVIDER NOTES
History  Chief Complaint   Patient presents with    Psychiatric Evaluation     Pt states he was at home when he saw a commercial that said "you sold out" and states it was people that were trying to kill him  Pt states "they're going to bump me off "  When asked if he did drugs today, pt states he is 4 days clean  This is a 82-year-old male with previous medical history of crack cocaine usage, ethanol abuse, bipolar  Patient presents emergency department with complaints of someone being out to get him  Patient admits to receiving special messages through the television that are telling him that somebody is going to kill him  Patient asked me to call the FBI so the patient could get protection from the people trying to kill him  Patient does not feel safe  Patient admits smoking crack cocaine approximately 4 days ago last time  Patient also admits to drinking approximately 18 beers a day  Patient is unable to answer most of my questions directly as he often has flight of ideas back to delusions of special messages saying that he is being followed  Patient denies homicidal ideation, suicidal ideation, audio hallucinations, visual hallucinations  Psychiatric Evaluation   Presenting symptoms: agitation and delusional    Presenting symptoms: no suicidal thoughts, no suicidal threats and no suicide attempt    Degree of incapacity (severity):  Severe  Onset quality:  Sudden  Timing:  Constant  Progression:  Worsening  Chronicity:  Recurrent  Context: alcohol use and drug abuse    Context: not recent medication change    Treatment compliance:  Untreated  Relieved by:  Nothing  Worsened by:  Drugs  Associated symptoms: anxiety        Prior to Admission Medications   Prescriptions Last Dose Informant Patient Reported?  Taking?   doxycycline hyclate (VIBRAMYCIN) 100 mg capsule   No No   Sig: Take 1 capsule (100 mg total) by mouth 2 (two) times a day for 7 days      Facility-Administered Medications: None Past Medical History:   Diagnosis Date    [facility] drug [facility] )     Alcohol abuse     Anxiety     Bipolar disorder (HCC)     Chronic pain disorder     PTSD (post-traumatic stress disorder)     [facility]        Past Surgical History:   Procedure Laterality Date    COLOSTOMY      HERNIA REPAIR      SHOULDER SURGERY Bilateral     WRIST SURGERY Right 2012       Family History   Problem Relation Age of Onset    Breast cancer Mother     No Known Problems Father      I have reviewed and agree with the history as documented  Social History   Substance Use Topics    Smoking status: Never Smoker    Smokeless tobacco: Never Used    Alcohol use 10 8 oz/week     18 Cans of beer per week        Review of Systems   Psychiatric/Behavioral: Positive for agitation and sleep disturbance  Negative for suicidal ideas  The patient is nervous/anxious  All other systems reviewed and are negative  Physical Exam  ED Triage Vitals   Temperature Pulse Respirations Blood Pressure SpO2   12/20/18 0114 12/20/18 0113 12/20/18 0113 12/20/18 0113 12/20/18 0113   97 5 °F (36 4 °C) (!) 126 20 (!) 226/131 100 %      Temp src Heart Rate Source Patient Position - Orthostatic VS BP Location FiO2 (%)   -- 12/20/18 0308 12/20/18 0308 12/20/18 0308 --    Monitor Lying Right arm       Pain Score       --                  Orthostatic Vital Signs  Vitals:    12/20/18 0113 12/20/18 0308 12/20/18 0408 12/20/18 0610   BP: (!) 226/131 (!) 200/127 151/79 132/77   Pulse: (!) 126 88 98 80   Patient Position - Orthostatic VS:  Lying Lying Lying       Physical Exam   Constitutional: He appears well-developed and well-nourished  He appears distressed  Patient is diaphoretic, pressured, with flight of ideas and agitated on examination  HENT:   Head: Normocephalic and atraumatic  Right Ear: External ear normal    Left Ear: External ear normal    Eyes: EOM are normal  Right eye exhibits no discharge   Left eye exhibits no discharge  Conjunctiva injected   Neck: Normal range of motion  No tracheal deviation present  Cardiovascular: Intact distal pulses  Exam reveals no friction rub  No murmur heard  Tachycardia   Pulmonary/Chest: Effort normal and breath sounds normal  No respiratory distress  He has no wheezes  Abdominal: Soft  He exhibits no distension  There is no tenderness  Genitourinary:   Genitourinary Comments: Deferred   Musculoskeletal: He exhibits no edema or deformity  Neurological: He exhibits normal muscle tone  Coordination normal    Skin: Skin is warm  Capillary refill takes less than 2 seconds  He is diaphoretic  Psychiatric:   Patient's speech is pressured with flight of ideas, has paranoid delusions of being watched and somebody being out to kill him  Nursing note and vitals reviewed        ED Medications  Medications   LORazepam (ATIVAN) 2 mg/mL injection 2 mg (2 mg Intramuscular Given 12/20/18 0157)   sodium chloride 0 9 % bolus 1,000 mL (0 mL Intravenous Stopped 12/20/18 0543)   LORazepam (ATIVAN) 2 mg/mL injection 2 mg (2 mg Intravenous Given 12/20/18 0255)   LORazepam (ATIVAN) 2 mg/mL injection 1 mg (1 mg Intravenous Given 12/20/18 0946)   LORazepam (ATIVAN) 2 mg/mL injection 2 mg (2 mg Intramuscular Given 12/20/18 1515)   LORazepam (ATIVAN) tablet 1 mg (1 mg Oral Given 12/20/18 1601)       Diagnostic Studies  Results Reviewed     Procedure Component Value Units Date/Time    Urine Microscopic [[id number]]  (Abnormal) Collected:  12/20/18 0258    Lab Status:  Final result Specimen:  Urine from Urine, Clean Catch Updated:  12/20/18 0852     RBC, UA None Seen /hpf      WBC, UA None Seen /hpf      Epithelial Cells None Seen /hpf      Bacteria, UA None Seen /hpf      Hyaline Casts, UA 10-25 (A) /lpf     UA w Reflex to Microscopic w Reflex to Culture [[id number]]  (Abnormal) Collected:  12/20/18 0258    Lab Status:  Final result Specimen:  Urine from Urine, Clean Catch Updated:  12/20/18 2265 Color, UA Yellow     Clarity, UA Clear     Specific Gravity, UA 1 029     pH, UA 5 5     Leukocytes, UA Negative     Nitrite, UA Negative     Protein, UA Trace (A) mg/dl      Glucose, UA Negative mg/dl      Ketones, UA Negative mg/dl      Urobilinogen, UA 0 2 E U /dl      Bilirubin, UA Negative     Blood, UA Negative    TSH [[id number]]  (Normal) Collected:  12/20/18 0236    Lab Status:  Final result Specimen:  Blood from Arm, Left Updated:  12/20/18 0317     TSH 3RD GENERATON 0 614 uIU/mL     Narrative:         Patients undergoing fluorescein dye angiography may retain small amounts of fluorescein in the body for 48-72 hours post procedure  Samples containing fluorescein can produce falsely depressed TSH values  If the patient had this procedure,a specimen should be resubmitted post fluorescein clearance  Comprehensive metabolic panel [[id number]]  (Abnormal) Collected:  12/20/18 0236    Lab Status:  Final result Specimen:  Blood from Arm, Left Updated:  12/20/18 1518     Sodium 133 (L) mmol/L      Potassium 5 2 mmol/L      Chloride 104 mmol/L      CO2 23 mmol/L      ANION GAP 6 mmol/L      BUN 7 mg/dL      Creatinine 1 12 mg/dL      Glucose 108 mg/dL      Calcium 8 5 mg/dL      AST 58 (H) U/L      ALT 97 (H) U/L      Alkaline Phosphatase 103 U/L      Total Protein 8 0 g/dL      Albumin 4 1 g/dL      Total Bilirubin 0 58 mg/dL      eGFR 76 ml/min/1 73sq m     Narrative:         National Kidney Disease Education Program recommendations are as follows:  GFR calculation is accurate only with a steady state creatinine  Chronic Kidney disease less than 60 ml/min/1 73 sq  meters  Kidney failure less than 15 ml/min/1 73 sq  meters      Salicylate level [[id number]]  (Abnormal) Collected:  12/20/18 0236    Lab Status:  Final result Specimen:  Blood from Arm, Left Updated:  77/97/82 4212     Salicylate Lvl <3 (L) mg/dL     Acetaminophen level [[id number]]  (Normal) Collected:  12/20/18 0236    Lab Status:  Final result Specimen:  Blood from Arm, Left Updated:  12/20/18 0317     Acetaminophen Level 18 ug/mL     Troponin I [[id number]]  (Normal) Collected:  12/20/18 0236    Lab Status:  Final result Specimen:  Blood from Arm, Left Updated:  12/20/18 0310     Troponin I <0 02 ng/mL     Ethanol [[id number]]  (Normal) Collected:  12/20/18 0236    Lab Status:  Final result Specimen:  Blood from Arm, Left Updated:  12/20/18 0305     Ethanol Lvl <3 mg/dL     CBC and differential [[id number]]  (Abnormal) Collected:  12/20/18 0236    Lab Status:  Final result Specimen:  Blood from Arm, Left Updated:  12/20/18 0246     WBC 6 92 Thousand/uL      RBC 4 77 Million/uL      Hemoglobin 14 8 g/dL      Hematocrit 42 8 %      MCV 90 fL      MCH 31 0 pg      MCHC 34 6 g/dL      RDW 12 7 %      MPV 10 9 fL      Platelets 415 Thousands/uL      nRBC 0 /100 WBCs      Neutrophils Relative 80 (H) %      Immat GRANS % 0 %      Lymphocytes Relative 14 %      Monocytes Relative 5 %      Eosinophils Relative 1 %      Basophils Relative 0 %      Neutrophils Absolute 5 50 Thousands/µL      Immature Grans Absolute 0 02 Thousand/uL      Lymphocytes Absolute 0 94 Thousands/µL      Monocytes Absolute 0 37 Thousand/µL      Eosinophils Absolute 0 06 Thousand/µL      Basophils Absolute 0 03 Thousands/µL     Rapid drug screen, urine [[id number]]  (Abnormal) Collected:  12/20/18 0157    Lab Status:  Final result Specimen:  Urine from Urine, Clean Catch Updated:  12/20/18 0223     Amph/Meth UR Negative     Barbiturate Ur Negative     Benzodiazepine Urine Negative     Cocaine Urine Positive (A)     Methadone Urine Negative     Opiate Urine Negative     PCP Ur Positive (A)     THC Urine Positive (A)    Narrative:         Presumptive report  If requested, specimen will be sent to reference lab for confirmation  FOR MEDICAL PURPOSES ONLY  IF CONFIRMATION NEEDED PLEASE CONTACT THE LAB WITHIN 5 DAYS      Drug Screen Cutoff Levels:  AMPHETAMINE/METHAMPHETAMINES  1000 ng/mL  BARBITURATES     200 ng/mL  BENZODIAZEPINES     200 ng/mL  COCAINE      300 ng/mL  METHADONE      300 ng/mL  OPIATES      300 ng/mL  PHENCYCLIDINE     25 ng/mL  THC       50 ng/mL    POCT alcohol breath test [[id number]]  (Normal) Resulted:  12/20/18 0153    Lab Status:  Final result Updated:  12/20/18 0153     EXTBreath Alcohol 0 000                 No orders to display         Procedures  ECG 12 Lead Documentation  Date/Time: 12/20/2018 7:19 AM  Performed by: Harpreet Sarah by: Afshan LUDWIG     Indications / Diagnosis:  Tachy/ HTN  ECG reviewed by me, the ED Provider: yes    Patient location:  ED  Previous ECG:     Previous ECG:  Compared to current    Comparison ECG info:  16-dec-2018    Similarity:  No change    Comparison to cardiac monitor: No    Interpretation:     Interpretation: abnormal    Rate:     ECG rate:  102    ECG rate assessment: tachycardic    Rhythm:     Rhythm: sinus tachycardia    Ectopy:     Ectopy: none    QRS:     QRS axis:  Normal    QRS intervals:  Normal  Conduction:     Conduction: normal    ST segments:     ST segments:  Normal  T waves:     T waves: non-specific            Phone Consults  ED Phone Contact    ED Course                               MDM  Number of Diagnoses or Management Options  Psychosis ([facility] [address] ): new and requires workup  Diagnosis management comments: This  52-year-old male in the midst of a psychotic break with delusions  Patient be worked up with a TSH, CBC, CMP, coma panel, urinalysis, urine tox  Patient is an it is initially hypertensive to over 193 systolic and over 928 diastolic  Patient is tachycardic  Patient appears to be on some sort of sympathomimetic  Patient is positive for PCP, cocaine, marijuana  After 4 mg of Ativan patient is no longer tachycardic and is normotensive  Patient is medically cleared to be seen by crisis  Daytime crisis team will see the patient      Crisis evaluated the patient and felt that he would benefit from inpatient psychiatric treatment  Patient transferred to [facility] for inpatient psych treatment  Amount and/or Complexity of Data Reviewed  Clinical lab tests: ordered and reviewed  Decide to obtain previous medical records or to obtain history from someone other than the patient: yes  Review and summarize past medical records: yes    Risk of Complications, Morbidity, and/or Mortality  Presenting problems: moderate  Diagnostic procedures: moderate  Management options: moderate      CritCare Time    Disposition  Final diagnoses:   Psychosis ([place] [address] )     Time reflects when diagnosis was documented in both MDM as applicable and the Disposition within this note     Time User Action Codes Description Comment    12/20/2018  9:02 AM Clive Wilson Add [F22] Delusions ([facility] )     12/20/2018  9:02 AM Clive Wilson Remove [F22] Delusions ([facility] )     12/20/2018  1:26 PM Jaylen Mcclellan Add [F31 64] Severe bipolar I disorder, most recent episode mixed, with psychotic features ([facility] )     12/20/2018  1:26 PM Jaylen Mcclellan Modify [F31 64] Severe bipolar I disorder, most recent episode mixed, with psychotic features ([facility]     12/20/2018  1:26 PM Jaylen Mcclellan Modify [F31 64] Severe bipolar I disorder, most recent episode mixed, with psychotic features ([facility] )     12/20/2018  1:26 PM Jaylen Mcclellan Add [F19 10] Substance abuse ([facility] )     12/20/2018  1:26 PM Gael Dietrich Modify [F19 10] Substance abuse ([facility] )     12/20/2018  2:47 PM Lucy Neigh Add [F29] Psychosis ([facility] )     12/20/2018  2:48 PM Lucy Neigh Modify [F31 64] Severe bipolar I disorder, most recent episode mixed, with psychotic features ([facility] )     12/20/2018  2:48 PM Lucy Neigh Modify [F29] Psychosis [facility])       ED Disposition     ED Disposition Condition Comment    Transfer to Another [address] should be transferred out to [facility], Dr Dany Qiu MD Documentation      Most Recent Value Patient Condition  The patient has been stabilized such that within reasonable medical probability, no material deterioration of the patient condition or the condition of the unborn child(ignacia) is likely to result from the transfer   Reason for Transfer  Level of Care needed not available at this facility   Benefits of Transfer  Specialized equipment and/or services available at the receiving facility (Include comment)________________________ [psychiatric admission]   Risks of Transfer  Potential for delay in receiving treatment, Potential deterioration of medical condition, Increased discomfort during transfer   Accepting Physician  Dr Saumya De Name, [address] [employer] and [address])  [facility]   Sending MD Socorro Tom MD   Provider Certification  General risk, such as traffic hazards, adverse weather conditions, rough terrain or turbulence, possible failure of equipment (including vehicle or aircraft), or consequences of actions of persons outside the control of the transport personnel, Unanticipated needs of medical equipment and personnel during transport, Risk of worsening condition, The possibility of a transport vehicle being unavailable      RN Documentation      Most [facility] Name, [address] [employer] and [address])  [facility]      Follow-up Information    None         Discharge Medication List as of 12/20/2018  7:19 PM      CONTINUE these medications which have NOT CHANGED    Details   doxycycline hyclate (VIBRAMYCIN) 100 mg capsule Take 1 capsule (100 mg total) by mouth 2 (two) times a day for 7 days, Starting Mon 12/17/2018, Until Mon 12/24/2018, Normal           No discharge procedures on file  ED Provider  Attending physically available and evaluated Kinsey Cunningham I managed the patient along with the ED Attending      Electronically Signed by         Elvia Jamil [id number] [facility], DO  12/20/18 6628

## 2018-12-20 NOTE — ED NOTES
As per EVS, pt is not on file   Pt does not have a card in media and states he is unsure if he has insurance coverage

## 2018-12-20 NOTE — EMTALA/ACUTE CARE TRANSFER
[address]  [address] [facility] [address]  Dept: [address] [facility] TRANSFER CONSENT    NAME Angie Erickson                                         1965                              MRN [id number]    I have been informed of my rights regarding examination, treatment, and transfer   by Dr Sarah Beth Hunter MD    Benefits: Specialized equipment and/or services available at the receiving facility (Include comment)________________________ (psychiatric admission)    Risks: Potential for delay in receiving treatment, Potential deterioration of medical condition, Increased discomfort during transfer      Transfer Request   I acknowledge that my medical condition has been evaluated and explained to me by the emergency department physician or other qualified medical person and/or my attending physician who has recommended and offered to me further medical examination and treatment  I understand the Hospital's obligation with respect to the treatment and stabilization of my emergency medical condition  I nevertheless request to be transferred  I release the Hospital, the doctor, and any other persons caring for me from all responsibility or liability for any injury or ill effects that may result from my transfer and agree to accept all responsibility for the consequences of my choice to transfer, rather than receive stabilizing treatment at the Hospital  I understand that because the transfer is my request, my insurance may not provide reimbursement for the services  The Hospital will assist and direct me and my family in how to make arrangements for transfer, but the hospital is not liable for any fees charged by the transport service  In spite of this understanding, I refuse to consent to further medical examination and treatment which has been offered to me, and request transfer to  Joslyn Alfredo Name, [address] : [address]   I authorize the performance of emergency medical procedures and treatments upon me in both transit and upon arrival at the receiving facility  Additionally, I authorize the release of any and all medical records to the receiving facility and request they be transported with me, if possible  I authorize the performance of emergency medical procedures and treatments upon me in both transit and upon arrival at the receiving facility  Additionally, I authorize the release of any and all medical records to the receiving facility and request they be transported with me, if possible  I understand that the safest mode of transportation during a medical emergency is an ambulance and that the Hospital advocates the use of this mode of transport  Risks of traveling to the receiving facility by car, including absence of medical control, life sustaining equipment, such as oxygen, and medical personnel has been explained to me and I fully understand them  (JEFF CORRECT BOX BELOW)  [  ]  I consent to the stated transfer and to be transported by ambulance/helicopter  [  ]  I consent to the stated transfer, but refuse transportation by ambulance and accept full responsibility for my transportation by car  I understand the risks of non-ambulance transfers and I exonerate the Hospital and its staff from any deterioration in my condition that results from this refusal     X___________________________________________    DATE  18  TIME________  Signature of patient or legally responsible individual signing on patient behalf           RELATIONSHIP TO PATIENT_________________________          Provider Certification    NAME Angie Erickson                                         1965                              MRN [id number]    A medical screening exam was performed on the above named patient  Based on the examination:    Condition Necessitating Transfer The primary encounter diagnosis was Psychosis ([facility] )   Diagnoses of Severe bipolar I disorder, most recent episode mixed, with psychotic features ([facility] ) and Substance abuse ([facility] ) were also pertinent to this visit  Patient Condition: The patient has been stabilized such that within reasonable medical probability, no material deterioration of the patient condition or the condition of the unborn child(ignacia) is likely to result from the transfer    Reason for Transfer: Level of Care needed not available at this facility    Transfer Requirements: [address]   · Space available and qualified personnel available for treatment as acknowledged by    · Agreed to accept transfer and to provide appropriate medical treatment as acknowledged by       Dr Vivien Lundborg  · Appropriate medical records of the examination and treatment of the patient are provided at the time of transfer   [facility], [address] _______  · Transfer will be performed by qualified personnel from [facility]  and appropriate transfer equipment as required, including the use of necessary and appropriate life support measures      Provider Certification: I have examined the patient and explained the following risks and benefits of being transferred/refusing transfer to the patient/family:  General risk, such as traffic hazards, adverse weather conditions, rough terrain or turbulence, possible failure of equipment (including vehicle or aircraft), or consequences of actions of persons outside the control of the transport personnel, Unanticipated needs of medical equipment and personnel during transport, Risk of worsening condition, The possibility of a transport vehicle being unavailable      Based on these reasonable risks and benefits to the patient and/or the unborn child(ignacia), and based upon the information available at the time of the patients examination, I certify that the medical benefits reasonably to be expected from the provision of appropriate medical treatments at another medical facility outweigh the increasing risks, if any, to the individuals medical condition, and in the case of labor to the unborn child, from effecting the transfer      X____________________________________________ DATE 12/20/18        TIME_______      ORIGINAL - SEND TO MEDICAL RECORDS   COPY - SEND WITH PATIENT DURING TRANSFER

## 2018-12-21 PROBLEM — F12.10 CANNABIS ABUSE, EPISODIC: Chronic | Status: ACTIVE | Noted: 2018-12-21

## 2018-12-21 PROBLEM — F25.0 SCHIZOAFFECTIVE DISORDER, BIPOLAR TYPE (HCC): Chronic | Status: ACTIVE | Noted: 2018-10-05

## 2018-12-21 PROBLEM — F10.10 ALCOHOL ABUSE, CONTINUOUS: Chronic | Status: ACTIVE | Noted: 2018-12-21

## 2018-12-21 PROBLEM — F14.20 COCAINE DEPENDENCE, CONTINUOUS (HCC): Chronic | Status: ACTIVE | Noted: 2018-12-21
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
## 2018-12-21 PROBLEM — F43.12 POST-TRAUMATIC STRESS DISORDER, CHRONIC: Chronic | Status: ACTIVE | Noted: 2018-12-20

## 2018-12-21 PROBLEM — F31.64 SEVERE BIPOLAR I DISORDER, MOST RECENT EPISODE MIXED, WITH PSYCHOTIC FEATURES (HCC): Chronic | Status: ACTIVE | Noted: 2018-10-05

## 2018-12-21 PROBLEM — F81.9 LEARNING DISABILITY: Chronic | Status: ACTIVE | Noted: 2018-12-21

## 2018-12-21 LAB
ALBUMIN SERPL BCP-MCNC: 3.7 G/DL (ref 3–5.2)
ALP SERPL-CCNC: 79 U/L (ref 43–122)
ALT SERPL W P-5'-P-CCNC: 77 U/L (ref 9–52)
ANION GAP SERPL CALCULATED.3IONS-SCNC: 6 MMOL/L (ref 5–14)
AST SERPL W P-5'-P-CCNC: 40 U/L (ref 17–59)
BASOPHILS # BLD AUTO: 0 THOUSANDS/ΜL (ref 0–0.1)
BASOPHILS NFR BLD AUTO: 0 % (ref 0–1)
BILIRUB DIRECT SERPL-MCNC: 0.1 MG/DL
BILIRUB SERPL-MCNC: 0.3 MG/DL
BUN SERPL-MCNC: 7 MG/DL (ref 5–25)
CALCIUM SERPL-MCNC: 8.9 MG/DL (ref 8.4–10.2)
CHLORIDE SERPL-SCNC: 105 MMOL/L (ref 97–108)
CHOLEST SERPL-MCNC: 169 MG/DL
CO2 SERPL-SCNC: 27 MMOL/L (ref 22–30)
CREAT SERPL-MCNC: 0.89 MG/DL (ref 0.7–1.5)
EOSINOPHIL # BLD AUTO: 0.1 THOUSAND/ΜL (ref 0–0.4)
EOSINOPHIL NFR BLD AUTO: 2 % (ref 0–6)
ERYTHROCYTE [DISTWIDTH] IN BLOOD BY AUTOMATED COUNT: 13.8 %
EST. AVERAGE GLUCOSE BLD GHB EST-MCNC: 123 MG/DL
GFR SERPL CREATININE-BSD FRML MDRD: 99 ML/MIN/1.73SQ M
GLUCOSE P FAST SERPL-MCNC: 99 MG/DL (ref 70–99)
GLUCOSE SERPL-MCNC: 99 MG/DL (ref 70–99)
HBA1C MFR BLD: 5.9 % (ref 4.2–6.3)
HCT VFR BLD AUTO: 42.6 % (ref 41–53)
HDLC SERPL-MCNC: 28 MG/DL (ref 40–59)
HGB BLD-MCNC: 13.7 G/DL (ref 13.5–17.5)
LDLC SERPL CALC-MCNC: 118 MG/DL
LYMPHOCYTES # BLD AUTO: 1.9 THOUSANDS/ΜL (ref 0.5–4)
LYMPHOCYTES NFR BLD AUTO: 32 % (ref 25–45)
MCH RBC QN AUTO: 29.9 PG (ref 26–34)
MCHC RBC AUTO-ENTMCNC: 32.1 G/DL (ref 31–36)
MCV RBC AUTO: 93 FL (ref 80–100)
MONOCYTES # BLD AUTO: 0.5 THOUSAND/ΜL (ref 0.2–0.9)
MONOCYTES NFR BLD AUTO: 8 % (ref 1–10)
NEUTROPHILS # BLD AUTO: 3.3 THOUSANDS/ΜL (ref 1.8–7.8)
NEUTS SEG NFR BLD AUTO: 58 % (ref 45–65)
NONHDLC SERPL-MCNC: 141 MG/DL
PLATELET # BLD AUTO: 158 THOUSANDS/UL (ref 150–450)
PMV BLD AUTO: 9.2 FL (ref 8.9–12.7)
POTASSIUM SERPL-SCNC: 4.1 MMOL/L (ref 3.6–5)
PROT SERPL-MCNC: 7 G/DL (ref 5.9–8.4)
RBC # BLD AUTO: 4.57 MILLION/UL (ref 4.5–5.9)
SODIUM SERPL-SCNC: 138 MMOL/L (ref 137–147)
TRIGL SERPL-MCNC: 114 MG/DL
WBC # BLD AUTO: 5.7 THOUSAND/UL (ref 4.5–11)

## 2018-12-21 PROCEDURE — 80061 LIPID PANEL: CPT | Performed by: NURSE PRACTITIONER

## 2018-12-21 PROCEDURE — 82248 BILIRUBIN DIRECT: CPT | Performed by: NURSE PRACTITIONER

## 2018-12-21 PROCEDURE — 83036 HEMOGLOBIN GLYCOSYLATED A1C: CPT | Performed by: NURSE PRACTITIONER

## 2018-12-21 PROCEDURE — 80053 COMPREHEN METABOLIC PANEL: CPT | Performed by: NURSE PRACTITIONER

## 2018-12-21 PROCEDURE — 99223 1ST HOSP IP/OBS HIGH 75: CPT | Performed by: PSYCHIATRY & NEUROLOGY

## 2018-12-21 PROCEDURE — 85025 COMPLETE CBC W/AUTO DIFF WBC: CPT | Performed by: NURSE PRACTITIONER

## 2018-12-21 RX ORDER — LORAZEPAM 1 MG/1
1 TABLET ORAL 3 TIMES DAILY
Status: COMPLETED | OUTPATIENT
Start: 2018-12-21 | End: 2018-12-22

## 2018-12-21 RX ORDER — LANOLIN ALCOHOL/MO/W.PET/CERES
3 CREAM (GRAM) TOPICAL
Status: DISCONTINUED | OUTPATIENT
Start: 2018-12-21 | End: 2018-12-26

## 2018-12-21 RX ORDER — TRIAZOLAM 0.12 MG/1
0.12 TABLET ORAL
COMMUNITY
End: 2018-12-27 | Stop reason: HOSPADM

## 2018-12-21 RX ORDER — DOXEPIN HYDROCHLORIDE 150 MG/1
150 CAPSULE ORAL
COMMUNITY
End: 2018-12-27 | Stop reason: HOSPADM

## 2018-12-21 RX ORDER — HYDROXYZINE 50 MG/1
50 TABLET, FILM COATED ORAL EVERY 6 HOURS PRN
Status: DISCONTINUED | OUTPATIENT
Start: 2018-12-21 | End: 2018-12-27 | Stop reason: HOSPADM

## 2018-12-21 RX ORDER — ACETAMINOPHEN 325 MG/1
650 TABLET ORAL EVERY 4 HOURS PRN
Status: DISCONTINUED | OUTPATIENT
Start: 2018-12-21 | End: 2018-12-27 | Stop reason: HOSPADM

## 2018-12-21 RX ORDER — DORZOLAMIDE HYDROCHLORIDE AND TIMOLOL MALEATE 20; 5 MG/ML; MG/ML
1 SOLUTION/ DROPS OPHTHALMIC DAILY
COMMUNITY
End: 2019-08-12 | Stop reason: SDUPTHER

## 2018-12-21 RX ORDER — FOLIC ACID 1 MG/1
1 TABLET ORAL DAILY
Status: DISCONTINUED | OUTPATIENT
Start: 2018-12-21 | End: 2018-12-27 | Stop reason: HOSPADM

## 2018-12-21 RX ORDER — RISPERIDONE 1 MG/1
1 TABLET, FILM COATED ORAL 2 TIMES DAILY
Status: DISCONTINUED | OUTPATIENT
Start: 2018-12-21 | End: 2018-12-24

## 2018-12-21 RX ORDER — DORZOLAMIDE HYDROCHLORIDE AND TIMOLOL MALEATE 20; 5 MG/ML; MG/ML
1 SOLUTION/ DROPS OPHTHALMIC 2 TIMES DAILY
Status: DISCONTINUED | OUTPATIENT
Start: 2018-12-21 | End: 2018-12-21

## 2018-12-21 RX ORDER — LATANOPROST 50 UG/ML
1 SOLUTION/ DROPS OPHTHALMIC DAILY
COMMUNITY
End: 2019-08-12 | Stop reason: SDUPTHER

## 2018-12-21 RX ORDER — ROPINIROLE 2 MG/1
2 TABLET, FILM COATED ORAL
COMMUNITY
End: 2018-12-27 | Stop reason: HOSPADM

## 2018-12-21 RX ORDER — LITHIUM CARBONATE 300 MG/1
300 CAPSULE ORAL 2 TIMES DAILY
Status: DISCONTINUED | OUTPATIENT
Start: 2018-12-21 | End: 2018-12-24

## 2018-12-21 RX ORDER — TIMOLOL MALEATE 5 MG/ML
1 SOLUTION/ DROPS OPHTHALMIC DAILY
Status: DISCONTINUED | OUTPATIENT
Start: 2018-12-22 | End: 2018-12-27 | Stop reason: HOSPADM

## 2018-12-21 RX ORDER — THIAMINE MONONITRATE (VIT B1) 100 MG
100 TABLET ORAL DAILY
Status: DISCONTINUED | OUTPATIENT
Start: 2018-12-21 | End: 2018-12-27 | Stop reason: HOSPADM

## 2018-12-21 RX ORDER — LORAZEPAM 1 MG/1
1 TABLET ORAL 2 TIMES DAILY
Status: COMPLETED | OUTPATIENT
Start: 2018-12-23 | End: 2018-12-25

## 2018-12-21 RX ORDER — TRAZODONE HYDROCHLORIDE 100 MG/1
100 TABLET ORAL
COMMUNITY
End: 2018-12-27 | Stop reason: HOSPADM

## 2018-12-21 RX ORDER — LATANOPROST 50 UG/ML
1 SOLUTION/ DROPS OPHTHALMIC
Status: DISCONTINUED | OUTPATIENT
Start: 2018-12-21 | End: 2018-12-27 | Stop reason: HOSPADM

## 2018-12-21 RX ORDER — ROPINIROLE 1 MG/1
2 TABLET, FILM COATED ORAL
Status: DISCONTINUED | OUTPATIENT
Start: 2018-12-21 | End: 2018-12-27 | Stop reason: HOSPADM

## 2018-12-21 RX ORDER — DORZOLAMIDE HCL 20 MG/ML
1 SOLUTION/ DROPS OPHTHALMIC DAILY
Status: DISCONTINUED | OUTPATIENT
Start: 2018-12-22 | End: 2018-12-27 | Stop reason: HOSPADM

## 2018-12-21 RX ADMIN — ACETAMINOPHEN 975 MG: 325 TABLET ORAL at 09:41

## 2018-12-21 RX ADMIN — THIAMINE HCL TAB 100 MG 100 MG: 100 TAB at 13:25

## 2018-12-21 RX ADMIN — ALUMINUM HYDROXIDE, MAGNESIUM HYDROXIDE, AND SIMETHICONE 30 ML: 200; 200; 20 SUSPENSION ORAL at 09:41

## 2018-12-21 RX ADMIN — LORAZEPAM 1 MG: 1 TABLET ORAL at 09:40

## 2018-12-21 RX ADMIN — BENZTROPINE MESYLATE 1 MG: 1 TABLET ORAL at 09:41

## 2018-12-21 RX ADMIN — LORAZEPAM 1 MG: 1 TABLET ORAL at 13:24

## 2018-12-21 RX ADMIN — FOLIC ACID 1 MG: 1 TABLET ORAL at 13:24

## 2018-12-21 RX ADMIN — HALOPERIDOL 5 MG: 5 TABLET ORAL at 09:41

## 2018-12-21 RX ADMIN — LORAZEPAM 1 MG: 1 TABLET ORAL at 21:32

## 2018-12-21 RX ADMIN — LITHIUM CARBONATE 300 MG: 300 CAPSULE, GELATIN COATED ORAL at 13:25

## 2018-12-21 RX ADMIN — LORAZEPAM 1 MG: 1 TABLET ORAL at 02:30

## 2018-12-21 RX ADMIN — LATANOPROST 1 DROP: 50 SOLUTION/ DROPS OPHTHALMIC at 21:32

## 2018-12-21 RX ADMIN — DOXYCYCLINE 100 MG: 100 CAPSULE ORAL at 08:30

## 2018-12-21 RX ADMIN — ROPINIROLE HYDROCHLORIDE 2 MG: 1 TABLET, FILM COATED ORAL at 21:32

## 2018-12-21 RX ADMIN — RISPERIDONE 1 MG: 1 TABLET ORAL at 17:03

## 2018-12-21 RX ADMIN — RISPERIDONE 1 MG: 1 TABLET ORAL at 13:24

## 2018-12-21 RX ADMIN — BENZTROPINE MESYLATE 1 MG: 1 TABLET ORAL at 22:50

## 2018-12-21 RX ADMIN — RISPERIDONE 1 MG: 1 TABLET ORAL at 02:30

## 2018-12-21 RX ADMIN — HALOPERIDOL 5 MG: 5 TABLET ORAL at 22:50

## 2018-12-21 RX ADMIN — ACETAMINOPHEN 975 MG: 325 TABLET ORAL at 22:19

## 2018-12-21 RX ADMIN — TRAZODONE HYDROCHLORIDE 50 MG: 50 TABLET ORAL at 21:47

## 2018-12-21 RX ADMIN — MELATONIN TAB 3 MG 3 MG: 3 TAB at 21:31

## 2018-12-21 RX ADMIN — LORAZEPAM 1 MG: 1 TABLET ORAL at 16:32

## 2018-12-21 RX ADMIN — LITHIUM CARBONATE 300 MG: 300 CAPSULE, GELATIN COATED ORAL at 17:03

## 2018-12-21 RX ADMIN — DOXYCYCLINE 100 MG: 100 CAPSULE ORAL at 17:03
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
## 2018-12-21 NOTE — PROGRESS NOTES
This writer spoke to ALIZA Constantino, about how pt was stating he couldn't breath early and hi c/o paimn  Pt is currently not c/o pain or SOB  If pain or SOB would re-occur, after vitals obtained, lungs ausculted, call CRNP and give update

## 2018-12-21 NOTE — ED ATTENDING ATTESTATION
I, [address], DO, saw and evaluated the patient  I have discussed the patient with the resident/non-physician practitioner and agree with the resident's/non-physician practitioner's findings, Plan of Care, and MDM as documented in the resident's/non-physician practitioner's note, except where noted  All available labs and Radiology studies were reviewed  At this point I agree with the current assessment done in the Emergency Department  I have conducted an independent evaluation of this patient a history and physical is as follows:    70-year-old male presents for psychiatric evaluation  Patient has history of using crack cocaine, ethanol and also history of being bipolar  Patient paranoid that there is some another trying to get him  Reports that he receives special messages the television telling him it is always going to kill him  Requesting us to call the FBI, does not feel safe  Patient denies suicidal or homicidal ideations  Patient very poor historian  On exam-no acute distress, agitated, heart tachycardic, no respiratory distress, no sign of trauma    Plan-Medicaid and observe until more common sober, check labs once medically clear will need crisis evaluation    Critical Care Time  CritCare Time    Procedures

## 2018-12-21 NOTE — CONSULTS
VTE Prophylaxis: Reason for no pharmacologic prophylaxis Low risk  / reason for no mechanical VTE prophylaxis Psychiatry floor     Recommendations for Discharge:  · Follow up with PCP and Psychiatry    Counseling / Coordination of Care Time: 45 minutes  Greater than 50% of total time spent on patient counseling and coordination of care  Collaboration of Care: Were Recommendations Directly Discussed with Primary Treatment Team? - Yes     History of Present Illness:    Devika Monqiue is a 48 y o  male who is originally admitted to the psychiatry service due to paranoid  We are consulted for medical clearance  Patient already has been cleared by ER physician for psych admission  Upon my assessment patient reports being too tight and want to get rest as  he did not sleep since admission  Labs and vital signs reviewed    Review of Systems:    Review of Systems   Constitutional: Positive for activity change         Past Medical and Surgical History:     Past Medical History:   Diagnosis Date    Anxiety     Bipolar disorder ([facility] [address] )     PTSD (post-traumatic stress disorder)     Substance abuse ([facility] )        Past Surgical History:   Procedure Laterality Date    COLOSTOMY      HERNIA REPAIR      SHOULDER SURGERY Bilateral     WRIST SURGERY Right 2012       Meds/Allergies:    all medications and allergies reviewed    Allergies: No Known Allergies    Social History:     Marital Status: Single    Substance Use History:   History   Alcohol Use    Yes     History   Smoking Status    Never Smoker   Smokeless Tobacco    Never Used     History   Drug Use    Types: Cocaine, Marijuana     Comment: crack       Family History:    non-contributory    Physical Exam:     Vitals:   Blood Pressure: (!) 177/97 (12/20/18 1954)  Pulse: 92 (12/20/18 1954)  Temperature: 98 2 °F (36 8 °C) (12/20/18 1954)  Temp Source: Temporal (12/20/18 1954)  Respirations: 18 (12/20/18 1954)  Height: 5' 6" (167 6 cm) (12/20/18 1954)  Weight - Scale: 98 5 kg (217 lb 3 2 oz) (12/20/18 1954)  SpO2: 96 % (12/20/18 1954)    Physical Exam   Constitutional: No distress  HENT:   Head: Normocephalic  Musculoskeletal: He exhibits no edema  Neurological: He is alert  Skin: Skin is warm  Psychiatric: He has a normal mood and affect  Physical exam limited as patient is too sleepy and refused        Additional Data:     Lab Results: I have personally reviewed pertinent reports  Results from last 7 days  Lab Units 12/20/18  0236   WBC Thousand/uL 6 92   HEMOGLOBIN g/dL 14 8   HEMATOCRIT % 42 8   PLATELETS Thousands/uL 170   NEUTROS PCT % 80*   LYMPHS PCT % 14   MONOS PCT % 5   EOS PCT % 1       Results from last 7 days  Lab Units 12/20/18  0236   SODIUM mmol/L 133*   POTASSIUM mmol/L 5 2   CHLORIDE mmol/L 104   CO2 mmol/L 23   BUN mg/dL 7   CREATININE mg/dL 1 12   ANION GAP mmol/L 6   CALCIUM mg/dL 8 5   ALBUMIN g/dL 4 1   TOTAL BILIRUBIN mg/dL 0 58   ALK PHOS U/L 103   ALT U/L 97*   AST U/L 58*   GLUCOSE RANDOM mg/dL 108           Results from last 7 days  Lab Units 12/20/18  0236 12/16/18  0515 12/16/18  0202   TROPONIN I ng/mL <0 02 0 02 <0 02     No results found for: HGBA1C            Imaging: I have personally reviewed pertinent reports  No orders to display     ·     ** Please Note: This note has been constructed using a voice recognition system   **

## 2018-12-21 NOTE — PROGRESS NOTES
Clinical Pharmacy Note: Medication Education Group     Intervention:  Open Forum    Length of Group: 45 min     Methods/resources used: verbal discussion     Topics Discussed: Medication adherence, side effect management, nonpharmacologic strategies, medication effectiveness and indications      Time spent in group: Left and returned      Patient Specific concerns: Yuliet Murrieta presented to group today dressed in street clothing and appeared well-groomed and alert and oriented to person, place and time  Yuliet Murrieta seemed dysphoric  Patient's affect was mainly pleasant and quiet and he actively participated in group today  Patient was respectful of others throughout and interacted appropriately with peers  Yuliet Murrieta did have specific concerns  When writer first announced the topic of group 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

## 2018-12-21 NOTE — PLAN OF CARE
Problem: Alteration in Thoughts and Perception  Goal: Treatment Goal: Gain control of psychotic behaviors/thinking, reduce/eliminate presenting symptoms and demonstrate improved reality functioning upon discharge  Outcome: Not Progressing      Problem: SUBSTANCE USE/ABUSE  Goal: By discharge, will develop insight into their chemical dependency and sustain motivation to continue in recovery  INTERVENTIONS:  - Attends all daily group sessions and scheduled AA groups  - Actively practices coping skills through participation in the therapeutic community and adherence to program rules  - Reviews and completes assignments from individual treatment plan  - Assist patient development of understanding of their personal cycle of addiction and relapse triggers   Outcome: Not Progressing

## 2018-12-21 NOTE — PROGRESS NOTES
Pt received PRN trazadone for complaints of sleeplessness, pt now snoring loudly  Medicatoin was effective

## 2018-12-21 NOTE — PROGRESS NOTES
Patient came  To the nurses station c/o pain and SOB  Patient was requesting tylenol with codeine  Gave patient scheduled medication and patients complaints subsided within 5 minutes  Patient is sitting watching TV in no apparent distress
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
## 2018-12-21 NOTE — PLAN OF CARE
Problem: DISCHARGE PLANNING  Goal: Discharge to home or other facility with appropriate resources  INTERVENTIONS:  - Identify barriers to discharge w/patient and caregiver  - Arrange for needed discharge resources and transportation as appropriate  - Identify discharge learning needs (meds, wound care, etc )  - Arrange for interpretive services to assist at discharge as needed  - Refer to Case Management Department for coordinating discharge planning if the patient needs post-hospital services based on physician/advanced practitioner order or complex needs related to functional status, cognitive ability, or social support system   Outcome: Not Progressing  CM met with pt to discuss case management services and complete  Intake and CM Assessment  Pt was cooperative and expressive  Pt demonstrated some paranoid delusions during conversation  Pt expressed he wants help but when offered suggestions for ongoing treatment and referrals, pt declined  Pt declined D&A referrals  Pt does not want [facility] services  When educated on options due to having only Medicare Part A pt was still resistant  CM will continue to monitor for opportunities for case management services as pt's status improves

## 2018-12-21 NOTE — PROGRESS NOTES
Pt received Haldol, ativan, cogentin for increasing anxiety and agitation    Medication appears to be effective, pt is calm, no signs of agitation

## 2018-12-21 NOTE — PROGRESS NOTES
Clinical Pharmacy Note: Medications Prior to Admission    Pharmacy: [id number] [address]    Phone: [phone]    Michelle Vincent a 48year old male was taking the following list of medications, confirmed by his pharmacy and PDMP  Doxycycline hyclate 100 mg BID for 7 days -filled 12/17/18  Dorzolamide-Timolol 2/0 5% 1 drop in each eye daily  Latanoprost 0 005% 1 drop in each eye daily at bedtime  Doxepin 150 mg daily at bedtime  Trazodone 100 mg daily at bedtime  Ropinirole 2 mg 1 hour before bedtime   *Triazolam 0 125 mg daily at bedtime, QTY 14, [facility] Supply 14, filled 12/3/18  *Triazolam 0 25 mg daily at bedtime, QTY 30, [facility] 30, filled 11/15/2018    *Confirmed by PDMP, Prescribed by Thao Sanders MD paid by common insurance     Please contact pharmacist/pharmacy with questions/clarifications  Thank you        Nichole Rogers, Pharmacist
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
## 2018-12-21 NOTE — CASE MANAGEMENT
CM received a call back from lAexsander Foote (sister) [phone]  She reports pt's paranoia has been chronic; she states it is exacerbated when he uses crack cocaine, but believes he has clinical issues still without drug use  She states she has gotten him 3 apartments recently because of the paranoia and refuses to help any more with housing  She refuses to allow him to return to her home  She states she cannot handle his behaviors  She reports he is very labile  She states "he knows how to play the system very well "  She states he gets two paychecks a month, and when he knows the next paycheck is coming, he checks himself out of the hospital, and blows all of his money on drugs  She states he is frequently in and out of the hospital until his next check comes  She does not want to be manipulated any further  Kishor Dennison reports she can be reached in the future for any further questions

## 2018-12-21 NOTE — PLAN OF CARE
Problem: SUBSTANCE USE/ABUSE  Goal: By discharge, patient will have ongoing treatment plan addressing chemical dependency  INTERVENTIONS:  - Assist patient with resources and/or appointments for ongoing recovery based living  Outcome: Progressing

## 2018-12-21 NOTE — PROGRESS NOTES
Pt is more relaxed and anxiety is less  Pt states that the pain has decreased-see pain charting  No more c/o indigestion

## 2018-12-21 NOTE — PROGRESS NOTES
Pt was sleeping at the start of 11-7 shift  He awoke and complained of anxiety and agitation and was given Risperdal and Ativan  He is now back asleep

## 2018-12-21 NOTE — PROGRESS NOTES
Originally called by nurse for c/o generalized pain and anxiety  Pt with hx of drug abuse and refusing tylenol  Asking for tylenol with codeine  Pain reportedly has multiple somatic complaints, but RN states he does not appear to be in any physical distress  Area of pain is generalized  RN states his issue is more anxiety  Will not order any further pain medications at this time, as pt's complaints are vague and he appears drug seeking  RN told to call with any persistent medical problems, but per RN it does not appear this pt needs to be seen urgently
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
## 2018-12-22 ENCOUNTER — APPOINTMENT (INPATIENT)
Dept: CT IMAGING | Facility: HOSPITAL | Age: 53
DRG: 885 | End: 2018-12-22
Payer: MEDICARE

## 2018-12-22 LAB
ALBUMIN SERPL BCP-MCNC: 4.3 G/DL (ref 3–5.2)
ALP SERPL-CCNC: 84 U/L (ref 43–122)
ALT SERPL W P-5'-P-CCNC: 79 U/L (ref 9–52)
AMYLASE SERPL-CCNC: 72 IU/L (ref 30–110)
ANION GAP SERPL CALCULATED.3IONS-SCNC: 10 MMOL/L (ref 5–14)
AST SERPL W P-5'-P-CCNC: 40 U/L (ref 17–59)
BASOPHILS # BLD AUTO: 0 THOUSANDS/ΜL (ref 0–0.1)
BASOPHILS NFR BLD AUTO: 0 % (ref 0–1)
BILIRUB SERPL-MCNC: 0.3 MG/DL
BUN SERPL-MCNC: 10 MG/DL (ref 5–25)
CALCIUM SERPL-MCNC: 10 MG/DL (ref 8.4–10.2)
CHLORIDE SERPL-SCNC: 102 MMOL/L (ref 97–108)
CO2 SERPL-SCNC: 26 MMOL/L (ref 22–30)
CREAT SERPL-MCNC: 0.97 MG/DL (ref 0.7–1.5)
EOSINOPHIL # BLD AUTO: 0.1 THOUSAND/ΜL (ref 0–0.4)
EOSINOPHIL NFR BLD AUTO: 2 % (ref 0–6)
ERYTHROCYTE [DISTWIDTH] IN BLOOD BY AUTOMATED COUNT: 13.8 %
GFR SERPL CREATININE-BSD FRML MDRD: 90 ML/MIN/1.73SQ M
GLUCOSE SERPL-MCNC: 149 MG/DL (ref 70–99)
HCT VFR BLD AUTO: 42.4 % (ref 41–53)
HGB BLD-MCNC: 14.2 G/DL (ref 13.5–17.5)
LIPASE SERPL-CCNC: 99 U/L (ref 23–300)
LYMPHOCYTES # BLD AUTO: 1.6 THOUSANDS/ΜL (ref 0.5–4)
LYMPHOCYTES NFR BLD AUTO: 25 % (ref 25–45)
MCH RBC QN AUTO: 30.8 PG (ref 26–34)
MCHC RBC AUTO-ENTMCNC: 33.5 G/DL (ref 31–36)
MCV RBC AUTO: 92 FL (ref 80–100)
MONOCYTES # BLD AUTO: 0.5 THOUSAND/ΜL (ref 0.2–0.9)
MONOCYTES NFR BLD AUTO: 8 % (ref 1–10)
NEUTROPHILS # BLD AUTO: 4.2 THOUSANDS/ΜL (ref 1.8–7.8)
NEUTS SEG NFR BLD AUTO: 65 % (ref 45–65)
PLATELET # BLD AUTO: 183 THOUSANDS/UL (ref 150–450)
PMV BLD AUTO: 8.8 FL (ref 8.9–12.7)
POTASSIUM SERPL-SCNC: 4.4 MMOL/L (ref 3.6–5)
PROT SERPL-MCNC: 8 G/DL (ref 5.9–8.4)
RBC # BLD AUTO: 4.61 MILLION/UL (ref 4.5–5.9)
SODIUM SERPL-SCNC: 138 MMOL/L (ref 137–147)
TROPONIN I SERPL-MCNC: <0.01 NG/ML (ref 0–0.03)
TROPONIN I SERPL-MCNC: <0.01 NG/ML (ref 0–0.03)
WBC # BLD AUTO: 6.5 THOUSAND/UL (ref 4.5–11)

## 2018-12-22 PROCEDURE — 99231 SBSQ HOSP IP/OBS SF/LOW 25: CPT | Performed by: PSYCHIATRY & NEUROLOGY

## 2018-12-22 PROCEDURE — 82150 ASSAY OF AMYLASE: CPT | Performed by: INTERNAL MEDICINE

## 2018-12-22 PROCEDURE — 71250 CT THORAX DX C-: CPT

## 2018-12-22 PROCEDURE — 84484 ASSAY OF TROPONIN QUANT: CPT | Performed by: INTERNAL MEDICINE

## 2018-12-22 PROCEDURE — 83690 ASSAY OF LIPASE: CPT | Performed by: INTERNAL MEDICINE

## 2018-12-22 PROCEDURE — 85025 COMPLETE CBC W/AUTO DIFF WBC: CPT | Performed by: INTERNAL MEDICINE

## 2018-12-22 PROCEDURE — 93005 ELECTROCARDIOGRAM TRACING: CPT

## 2018-12-22 PROCEDURE — 80053 COMPREHEN METABOLIC PANEL: CPT | Performed by: INTERNAL MEDICINE

## 2018-12-22 RX ORDER — OXYCODONE HYDROCHLORIDE AND ACETAMINOPHEN 5; 325 MG/1; MG/1
1 TABLET ORAL ONCE
Status: COMPLETED | OUTPATIENT
Start: 2018-12-22 | End: 2018-12-22

## 2018-12-22 RX ADMIN — HYDROXYZINE HYDROCHLORIDE 50 MG: 50 TABLET, FILM COATED ORAL at 17:10

## 2018-12-22 RX ADMIN — THIAMINE HCL TAB 100 MG 100 MG: 100 TAB at 08:56

## 2018-12-22 RX ADMIN — TIMOLOL MALEATE 1 DROP: 5 SOLUTION/ DROPS OPHTHALMIC at 10:20

## 2018-12-22 RX ADMIN — HYDROXYZINE HYDROCHLORIDE 50 MG: 50 TABLET, FILM COATED ORAL at 23:32

## 2018-12-22 RX ADMIN — DORZOLAMIDE HYDROCHLORIDE 1 DROP: 20 SOLUTION OPHTHALMIC at 10:20

## 2018-12-22 RX ADMIN — RISPERIDONE 1 MG: 1 TABLET ORAL at 17:10

## 2018-12-22 RX ADMIN — ACETAMINOPHEN 975 MG: 325 TABLET ORAL at 14:40

## 2018-12-22 RX ADMIN — LATANOPROST 1 DROP: 50 SOLUTION/ DROPS OPHTHALMIC at 22:03

## 2018-12-22 RX ADMIN — DOXYCYCLINE 100 MG: 100 CAPSULE ORAL at 08:57

## 2018-12-22 RX ADMIN — TRAZODONE HYDROCHLORIDE 50 MG: 50 TABLET ORAL at 23:32

## 2018-12-22 RX ADMIN — LORAZEPAM 1 MG: 1 TABLET ORAL at 23:31

## 2018-12-22 RX ADMIN — DOXYCYCLINE 100 MG: 100 CAPSULE ORAL at 18:13

## 2018-12-22 RX ADMIN — HYDROXYZINE HYDROCHLORIDE 50 MG: 50 TABLET, FILM COATED ORAL at 01:02

## 2018-12-22 RX ADMIN — ALUMINUM HYDROXIDE, MAGNESIUM HYDROXIDE, AND SIMETHICONE 30 ML: 200; 200; 20 SUSPENSION ORAL at 17:10

## 2018-12-22 RX ADMIN — OXYCODONE HYDROCHLORIDE AND ACETAMINOPHEN 1 TABLET: 5; 325 TABLET ORAL at 18:18

## 2018-12-22 RX ADMIN — LORAZEPAM 1 MG: 1 TABLET ORAL at 08:56

## 2018-12-22 RX ADMIN — ROPINIROLE HYDROCHLORIDE 2 MG: 1 TABLET, FILM COATED ORAL at 23:31

## 2018-12-22 RX ADMIN — RISPERIDONE 1 MG: 1 TABLET ORAL at 08:56

## 2018-12-22 RX ADMIN — LITHIUM CARBONATE 300 MG: 300 CAPSULE, GELATIN COATED ORAL at 08:56

## 2018-12-22 RX ADMIN — LORAZEPAM 1 MG: 1 TABLET ORAL at 15:22

## 2018-12-22 RX ADMIN — FOLIC ACID 1 MG: 1 TABLET ORAL at 08:56

## 2018-12-22 RX ADMIN — MELATONIN TAB 3 MG 3 MG: 3 TAB at 23:32

## 2018-12-22 RX ADMIN — LITHIUM CARBONATE 300 MG: 300 CAPSULE, GELATIN COATED ORAL at 17:10

## 2018-12-22 RX ADMIN — MAGNESIUM HYDROXIDE 30 ML: 400 SUSPENSION ORAL at 01:04

## 2018-12-22 NOTE — PROGRESS NOTES
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

## 2018-12-22 NOTE — PROGRESS NOTES
Pt has been in and out of his room several times, becoming more agitated related to the pain he is having  Pt attempting to get narcotic pain medications, specifically asking for them  Pt given PRN Haldol and Cogentin at 2250  At 2350 pt, passed the nurses station stating, "I'm going to lay down, if I have to come out of my room again, there is going to be trouble  I want to get out of here if I don't get some good pain medicine " Pt is currently in his room, will continue to monitor

## 2018-12-22 NOTE — PROGRESS NOTES
Pt is up and at the nurses station again  Pt states, "I just can't sleep, I need some other type of medication  Everything you've given me hasn't worked and it's upsetting me " Pt is currently calm at this time  Pt did sign a 72 hour notice, he states, "I just don't feel comfortable here  I can't sleep, I need to go home where I can relax " Will continue to monitor

## 2018-12-22 NOTE — TREATMENT PLAN
TREATMENT PLAN REVIEW - [place] Connie 48 y o  1965 male MRN: [id number]    [address] [address] Room / Bed: [address] [place] [address]/[facility] 341-01 Encounter: [id number]        Admit Date/Time:  12/20/2018  7:47 PM    Treatment Team: Attending Provider: Katina Plummer MD; Consulting Physician: Krystina Bernal MD; : Jean Paul Cordova; Respiratory Therapist: Max Ramirez; Patient Care Technician: Jomar Calix; Registered Nurse: Teddy Loyola, RN; Patient Care Technician: Germania Vasquez;  Respiratory Therapist: Rock Dangelo RT; Registered Nurse: Esperanza Nielsen, ZARINA; Patient Care Assistant: Lilian Esposito    Diagnosis: Principal Problem:    Schizoaffective disorder, bipolar type ([facility] )  Active Problems:    Post-traumatic stress disorder, chronic    Cocaine dependence, continuous ([facility] )    Alcohol abuse, continuous    Cannabis abuse, episodic    Learning disability    Patient Strengths/Assets: negotiates basic needs, patient is on a voluntary commitment, supportive family      Patient Barriers/Limitations: immature, poor past treatment response, poor reasoning ability, poor self-care, substance abuse    Short Term Goals: decrease in manic symptoms, decrease in psychotic symptoms, control of withdrawal symptoms, mood stabilization    Long Term Goals: resolution of manic symptoms, stabilization of mood, free of homicidal thoughts, resolution of psychotic symptoms, improved reality testing, improved reasoning ability    Progress Towards Goals: restarting psychiatric medications as prescribed, starting alcohol detoxification protocol    Recommended Treatment: medication management, patient medication education, group therapy, milieu therapy, continued Behavioral Health psychiatric evaluation/assessment process     Treatment Frequency: daily medication monitoring, group and milieu therapy daily, monitoring through interdisciplinary rounds, monitoring through weekly patient care conferences    Expected Discharge Date: 10 days - 12/31/2018    Discharge Plan: placement in alternative living arrangement, referral for outpatient medication management with a psychiatrist, referral for outpatient psychotherapy    Treatment Plan Created/Updated By: Alex Saldana MD

## 2018-12-22 NOTE — PROGRESS NOTES
Pt approached the nurses station, this time pt is much more calm and pleasant  Pt states, "the pain is a lot better, it's not gone, but it's a lot better  Now I just can't go to sleep  I'm just laying there with my eyes wide open  Pt given PRN atarax for anxiety then pt states, "do you have any benadryl or milk of magnesia?" Pt was informed that he would have to talk to a doctor to have benadryl ordered but he could have [address] [place] [address] if he is constipated  Pt states he is constipated and was given the Milk of Mag at 0100  Will continue to monitor

## 2018-12-22 NOTE — PROGRESS NOTES
Progress Note - Behavioral Health     Angie Erickson 48 y o  male MRN: [id number]   Unit/Bed#: [facility] 341-01 Encounter: [id number]    Behavior over the last 24 hours: bebeto Norwood appears still easily distracted and has manic symptoms, is cooperative with milieu, cooperative with staff, hypertalkative, preoccupied and preoccupied with getting discharged, remains feeling better, more organized since being back on medications and off of drugs today  He states he wishes to get discharged soon as his son is coming in for [place] and wants to be out of here by then  Signed 72 hour notice today at 2am  Participates appropriately in milieu  Participates appropriately in group therapy  Attends groups  Socializes with peers      Sleep: normal  Appetite: normal  Medication side effects: No   ROS: no complaints    Mental Status Evaluation:    Appearance:  age appropriate, casually dressed, dressed appropriately   Behavior:  partially agitated   Speech:  normal volume, increased rate, pressured   Mood:  euthymic   Affect:  increased in intensity   Thought Process:  increased rate of thoughts, racing of thoughts, flight of ideas   Associations: circumstantial associations   Thought Content:  normal, no overt delusions   Perceptual Disturbances: no auditory hallucinations   Risk Potential: Suicidal ideation - None  Homicidal ideation - None  Potential for aggression - No   Sensorium:  oriented to person, place, time/date and situation   Memory:  recent and remote memory grossly intact   Consciousness:  alert and awake   Attention: attention span and concentration appear shorter than expected for age   Insight:  impaired   Judgment: impaired   Gait/Station: normal gait/station and normal balance   Motor Activity: no abnormal movements     Vital signs in last 24 hours:    Temp:  [97 5 °F (36 4 °C)-98 °F (36 7 °C)] 98 °F (36 7 °C)  HR:  [77-88] 77  Resp:  [16] 16  BP: (114-153)/(76-88) 153/88    Laboratory results:  I have personally reviewed all pertinent laboratory/tests results  Progress Toward Goals: improving gradually    Assessment/Plan   Principal Problem:    Schizoaffective disorder, bipolar type (HCC)  Active Problems:    Post-traumatic stress disorder, chronic    Cocaine dependence, continuous (HCC)    Alcohol abuse, continuous    Cannabis abuse, episodic    Learning disability    Recommended Treatment:     Planned medication and treatment changes: All current active medications have been reviewed  Encourage group therapy, milieu therapy and occupational therapy  Behavioral Health checks every 7 minutes  Continue current psychiatric medications as stated below   Requesting Sinequan again, told will not be re-started    Current Facility-Administered Medications:  acetaminophen 650 mg Oral Q6H PRN Ruthine English, CRNP   acetaminophen 650 mg Oral Q4H PRN Debbie Calix MD   acetaminophen 975 mg Oral Q6H PRN Ruthine English, CRNP   aluminum-magnesium hydroxide-simethicone 30 mL Oral Q4H PRN Ruthine English, CRNP   benztropine 1 mg Intramuscular Q6H PRN Ruthine [place], CRNP   benztropine 1 mg Oral Q6H PRN Ruthine [place], CRNP   dorzolamide 1 drop Both Eyes Daily Debbie Calix MD   And       timolol 1 drop Both Eyes Daily Debbie Calix MD   doxycycline hyclate 100 mg Oral BID Nereyda Modi MD   folic acid 1 mg Oral Daily Debbie Calix MD   haloperidol 5 mg Oral Q6H PRN Ruthine English, CRNP   haloperidol lactate 5 mg Intramuscular Q6H PRN Ruthine [place], CRNP   hydrOXYzine HCL 50 mg Oral Q6H PRN Debbie Calix MD   latanoprost 1 drop Both Eyes HS Debbie Calix MD   lithium carbonate 300 mg Oral BID Debbie Calix MD   LORazepam 1 mg Intramuscular Q6H PRN Ruthine English, CRNP   LORazepam 1 mg Oral TID MD Gaby Simon ON 12/23/2018] LORazepam 1 mg Oral BID Debbie Calix MD   magnesium hydroxide 30 mL Oral Daily PRN Ruthine English, CRNP   melatonin 3 mg Oral HS Janeth English MD   risperiDONE 1 mg Oral Q3H PRN Chica Collar, CRNP   risperiDONE 1 mg Oral BID Janeth English MD   rOPINIRole 2 mg Oral HS Janeth English MD   thiamine 100 mg Oral Daily Janeth English MD   traZODone 50 mg Oral HS PRN Chica Collar, CRNP   ziprasidone 20 mg Intramuscular Q4H PRN Chica Collar, CRNP       Risks / Benefits of Treatment:    Risks, benefits, and possible side effects of medications explained to patient and patient verbalizes understanding and agreement for treatment  Counseling / Coordination of Care:    Patient's progress reviewed with nursing staff  Medications, treatment progress and treatment plan reviewed with patient      Brandy Gayle MD 12/22/18

## 2018-12-22 NOTE — PROGRESS NOTES
Pt out all of the late evening watching TV  Pt denying all S/S and appears comfortable  He was given PRN trazodone for sleep with his HS medications  Pt just approached the nurses station and stated, "I was laying in bed and all of a sudden my right side started to hurt, from my neck all the way down my back to my stomach " Pt keeps requesting tylenol with codeine  Pt given Tylenol 975 for 10/10 pain  Will continue to monitor

## 2018-12-22 NOTE — TREATMENT PLAN
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

## 2018-12-22 NOTE — PLAN OF CARE
Alteration in Thoughts and Perception     Treatment Goal: Gain control of psychotic behaviors/thinking, reduce/eliminate presenting symptoms and demonstrate improved reality functioning upon discharge Not Progressing        Anxiety     Anxiety is at manageable level Not Progressing        DISCHARGE PLANNING     Discharge to home or other facility with appropriate resources Not Progressing        SUBSTANCE USE/ABUSE     By discharge, will develop insight into their chemical dependency and sustain motivation to continue in recovery Not Progressing          Ineffective Coping     Participates in unit activities Progressing        SUBSTANCE USE/ABUSE     By discharge, patient will have ongoing treatment plan addressing chemical dependency Progressing

## 2018-12-22 NOTE — PROGRESS NOTES
Patient out on unit and social with peers  Patient is fixated on a "scab in my nose  Iv had it for 7 months "  Patient has been medication compliant and cooperative  Patient denies SI/HI

## 2018-12-23 LAB
ATRIAL RATE: 78 BPM
P AXIS: 38 DEGREES
PR INTERVAL: 156 MS
QRS AXIS: 49 DEGREES
QRSD INTERVAL: 82 MS
QT INTERVAL: 358 MS
QTC INTERVAL: 408 MS
T WAVE AXIS: 41 DEGREES
VENTRICULAR RATE: 78 BPM

## 2018-12-23 PROCEDURE — 93010 ELECTROCARDIOGRAM REPORT: CPT | Performed by: INTERNAL MEDICINE

## 2018-12-23 PROCEDURE — 99231 SBSQ HOSP IP/OBS SF/LOW 25: CPT | Performed by: PSYCHIATRY & NEUROLOGY

## 2018-12-23 RX ORDER — TRAZODONE HYDROCHLORIDE 100 MG/1
100 TABLET ORAL
Status: CANCELLED | OUTPATIENT
Start: 2018-12-23

## 2018-12-23 RX ORDER — TRAZODONE HYDROCHLORIDE 100 MG/1
100 TABLET ORAL
Status: DISCONTINUED | OUTPATIENT
Start: 2018-12-23 | End: 2018-12-27 | Stop reason: HOSPADM

## 2018-12-23 RX ADMIN — LORAZEPAM 1 MG: 1 TABLET ORAL at 08:49

## 2018-12-23 RX ADMIN — ACETAMINOPHEN 975 MG: 325 TABLET ORAL at 02:29

## 2018-12-23 RX ADMIN — LITHIUM CARBONATE 300 MG: 300 CAPSULE, GELATIN COATED ORAL at 17:30

## 2018-12-23 RX ADMIN — ALUMINUM HYDROXIDE, MAGNESIUM HYDROXIDE, AND SIMETHICONE 30 ML: 200; 200; 20 SUSPENSION ORAL at 04:55

## 2018-12-23 RX ADMIN — LORAZEPAM 1 MG: 1 TABLET ORAL at 17:30

## 2018-12-23 RX ADMIN — DOXYCYCLINE 100 MG: 100 CAPSULE ORAL at 17:31

## 2018-12-23 RX ADMIN — THIAMINE HCL TAB 100 MG 100 MG: 100 TAB at 08:25

## 2018-12-23 RX ADMIN — DORZOLAMIDE HYDROCHLORIDE 1 DROP: 20 SOLUTION OPHTHALMIC at 08:28

## 2018-12-23 RX ADMIN — DOXYCYCLINE 100 MG: 100 CAPSULE ORAL at 08:27

## 2018-12-23 RX ADMIN — ROPINIROLE HYDROCHLORIDE 2 MG: 1 TABLET, FILM COATED ORAL at 21:00

## 2018-12-23 RX ADMIN — RISPERIDONE 1 MG: 1 TABLET ORAL at 17:30

## 2018-12-23 RX ADMIN — TRAZODONE HYDROCHLORIDE 100 MG: 100 TABLET ORAL at 01:42

## 2018-12-23 RX ADMIN — TIMOLOL MALEATE 1 DROP: 5 SOLUTION/ DROPS OPHTHALMIC at 08:47

## 2018-12-23 RX ADMIN — LITHIUM CARBONATE 300 MG: 300 CAPSULE, GELATIN COATED ORAL at 08:25

## 2018-12-23 RX ADMIN — BENZTROPINE MESYLATE 1 MG: 1 TABLET ORAL at 01:29

## 2018-12-23 RX ADMIN — RISPERIDONE 1 MG: 1 TABLET ORAL at 08:25

## 2018-12-23 RX ADMIN — ACETAMINOPHEN 975 MG: 325 TABLET ORAL at 21:00

## 2018-12-23 RX ADMIN — FOLIC ACID 1 MG: 1 TABLET ORAL at 08:25

## 2018-12-23 RX ADMIN — MELATONIN TAB 3 MG 3 MG: 3 TAB at 21:00

## 2018-12-23 RX ADMIN — RISPERIDONE 1 MG: 1 TABLET ORAL at 01:30

## 2018-12-23 NOTE — PROGRESS NOTES
Pt has been out in the milieu during the late evening  Pt was taken for CT scan around 2015, results still pending  Pt is comfortable and has had no complaints of pain  Pt continues to be very med seeking asking for more percocet or "something strong to make me sleep " Will continue to monitor

## 2018-12-23 NOTE — PROGRESS NOTES
Pt is awake again for the second night in a row  Pt was given PRN's of atarax and trazodone with HS medications, which is ineffective  Pt is at the nurses station very upset that he cannot sleep  Pt is requesting doxepin which the psychiatrist has told him she won't be starting  Pt started to become loud, expressing how upset he is that he is not getting medicine that had been prescribed to him previous to coming to the hospital  Pt states, "I messed up my brain doing all the drugs that I've done, I can't sleep without certain medications  Just write a note in my chart that I'm not sleeping  I want the doctor to know I can't sleep " Pt was assured that staff charts when a patient is not sleeping  Pt has gone back to his room at this time, will continue to monitor

## 2018-12-23 NOTE — PROGRESS NOTES
[facility] until he was allowed to go and sit in the day room since he was unable to lay down in his room  At that time he was noted to push chairs together to try and sleep in the day room  At one point in time he was seen laying on his stomach with his head on one of the chairs and his body stretched across two ottomans  He then came back to the nursing station complaining of pain stating that earlier he got a percocet and that he should get another one of those to help with his pain  He was then informed that the percocet was a one time does, and that he had received a PRN of Tylenol at 0229 and nursing had to wait 6 hrs prior to giving him another does of pain medication  He then started complaining about being hungry and was reminded that he was given an extra snack at 0130 because he stated that the medication he was given was hurting his stomach  He then started to complain about having gas problems and stated that his kidneys, stomach, appendix, leg, chest, and hip hurt all while ambulating in front of the nursing station stating, "What am I going to do about all this pain, the doc needs to get me something so I am able to deal with all of this pain "  Patient then noticed that it was almost 5 am and stated that soon he will be able to go in and watch tv since the day room opens at 5 am   He was informed that yes, at 5 am he will be allowed to watch tv  He then asked for Mylanta due to his stomach hurting, and received that PRN at 0455  Shortly after that he began to ask for something else to eat and was informed that he should let the Mylanta work prior to trying to eating something else  He agreed with that, and stated that he would give the Mylanta a chance to work  Then, as soon as the clock hit 5 am he asked for the remote to watch tv    He was given the remote and went to watch tv, and was joking, smiling and laughing with another staff member about getting him something to eat  During the interactions during and after receiving the Mylanta there was no complaint of pain  The current time is 0511, and nursing will note if there is any further complaint of pain

## 2018-12-23 NOTE — PROGRESS NOTES
Pt was given his PRN of Risperidone and cogentin around 0130, and as of 0320 PRN is ineffective  Pt is still complaining of not being able to sleep, and is asking for percocets to help him sleep  Pt has laid in bed, but has sat in bed and talked to himself stating how upset he is for not getting the medication he wants  Currently (1942) patient is sitting in front of the nursing station complaining that he is unable to sleep and that he needs a pain medication when he was given a PRN of Tylenol at 0229, and the only sign of the patient being in pain is him stating that he is in pain  Patient is able to ambulate without issue and appears to be half a sleep while sitting in front of the nursing station

## 2018-12-23 NOTE — PROGRESS NOTES
Pt continued to come to the nurses station getting more agitated because he cannot sleep  Pt given PRN risperdal and cogentin at 0130  Then [facility] paged for a higher dose of trazodone, which pt is requesting  Dr Trey Vergara of [facility] ordered an additional 100mg of Trazodone which pt received at [address]  Pt has returned to bed and is attempting to sleep

## 2018-12-23 NOTE — PROGRESS NOTES
Pt had two troponin's drawn both WNL at <0 01  Per Aubrie MCCOY from [facility], the third troponin is D/C'd due to the previous two being WNL

## 2018-12-23 NOTE — PROGRESS NOTES
Progress Note - Behavioral Health     Taz Morris 48 y o  male MRN: [id number]   Unit/Bed#: [facility] 341-01 Encounter: [id number]    Behavior over the last 24 hours: unchanged  Kerri Parisi appears more agitated at times, remains demanding, states he feels  he wants to go home tomorrow today  Per staff patient was very med seeking yesterday,  Received p r n  Of Risperdal, trazodone, Atarax, Ativan and made multiple somatic complaints regarding pain in his abdomen and chest   Medical workup by hospitalist on call  Demonstrated no   Urgent medical issues, CT scan and troponins were negative  Throughout the night complained of not being able to sleep because he was not getting the meds he needed for sleep which include triazolam and Sinequan, both of which were not continued by the primary psychiatric team  Today patient is seen sleeping in his bed this morning  He states that I did not get to sleep last night so I am sleeping now  He states he wants to go home tomorrow and continues to refuse the possibility of rescinding his 72 hr notice  He is adamant that he wishes to go home for [place]  He denies any SI or HI, does not appear depressed and does not endorse any depressed mood at this time  Seclusive to the room  Limited participation in milieu      Sleep: insomnia  Appetite: increased  Medication side effects: No   ROS: reports   Poor sleep    Mental Status Evaluation:    Appearance:  casually dressed, dressed appropriately   Behavior:  cooperative, calm   Speech:  normal rate, normal volume, normal pitch   Mood:  euthymic   Affect:  labile   Thought Process:  organized, coherent, goal directed   Associations: intact associations   Thought Content:  no overt delusions   Perceptual Disturbances: no auditory hallucinations, no visual hallucinations   Risk Potential: Suicidal ideation - None  Homicidal ideation - None  Potential for aggression - No   Sensorium:  oriented to person, place, time/date and situation Memory:  recent and remote memory: unable to assess due to lack of cooperation   Consciousness:   sleepy   Attention: attention span and concentration appear shorter than expected for age   Insight:   limited due to manic symptoms   Judgment: limited   Gait/Station: normal gait/station and normal balance   Motor Activity: no abnormal movements     Vital signs in last 24 hours:    Temp:  [97 3 °F (36 3 °C)-97 5 °F (36 4 °C)] 97 5 °F (36 4 °C)  HR:  [74-89] 74  Resp:  [16] 16  BP: (129-154)/(59-88) 132/59    Laboratory results:  I have personally reviewed all pertinent laboratory/tests results  Progress Toward Goals: progressing    Assessment/Plan   Principal Problem:    Schizoaffective disorder, bipolar type (HCC)  Active Problems:    Post-traumatic stress disorder, chronic    Cocaine dependence, continuous (HCC)    Alcohol abuse, continuous    Cannabis abuse, episodic    Learning disability    Recommended Treatment:     Planned medication and treatment changes: All current active medications have been reviewed    Encourage group therapy, milieu therapy and occupational therapy  Behavioral Health checks every 7 minutes  Continue  Continue current psychiatric medications as noted below    Current Facility-Administered Medications:  acetaminophen 650 mg Oral Q6H PRN Alejandra Kale, CRNP   acetaminophen 650 mg Oral Q4H PRN Zach Villalobos MD   acetaminophen 975 mg Oral Q6H PRN Alejandra Kale, CRNP   aluminum-magnesium hydroxide-simethicone 30 mL Oral Q4H PRN Alejandra Kale, CRNP   benztropine 1 mg Intramuscular Q6H PRN Alejandra Kale, CRNP   benztropine 1 mg Oral Q6H PRN Alejandra Kale, CRNP   dorzolamide 1 drop Both Eyes Daily Zach Villalobos MD   And       timolol 1 drop Both Eyes Daily Zach Villalobos MD   doxycycline hyclate 100 mg Oral BID Elly Monson MD   folic acid 1 mg Oral Daily Zach Villalobos MD   haloperidol 5 mg Oral Q6H PRN Alejandra Kale, JACQUI   haloperidol lactate 5 mg Intramuscular Q6H PRN Kathe Helton, JACQUI   hydrOXYzine HCL 50 mg Oral Q6H PRN Katina Plummer MD   latanoprost 1 drop Both Eyes HS Katina Plummer MD   lithium carbonate 300 mg Oral BID Katina Plummer MD   LORazepam 1 mg Intramuscular Q6H PRN Kathe Helton, JACQUI   LORazepam 1 mg Oral BID Katina Plummer MD   magnesium hydroxide 30 mL Oral Daily PRN Kathe Helton, JACQUI   melatonin 3 mg Oral HS Katina Plummer MD   risperiDONE 1 mg Oral Q3H PRN Kathe Helton, CHIPNP   risperiDONE 1 mg Oral BID Katina Plummer MD   rOPINIRole 2 mg Oral HS Katina Plummer MD   thiamine 100 mg Oral Daily Katina Plummer MD   traZODone 100 mg Oral HS PRN Cecilia Perez MD   traZODone 50 mg Oral HS PRN Kathe Helton, JACQUI   ziprasidone 20 mg Intramuscular Q4H PRN Kathe Helton, JACQUI       Risks / Benefits of Treatment:    Risks, benefits, and possible side effects of medications explained to patient and patient verbalizes understanding and agreement for treatment  Counseling / Coordination of Care: Total floor / unit time spent today 15 minutes  Greater than 50% of total time was spent with the patient and / or family counseling and / or coordination of care  A description of counseling / coordination of care:  Patient's progress reviewed with nursing staff  Medications, treatment progress and treatment plan reviewed with patient      Stuart Velasquez MD 12/23/18

## 2018-12-23 NOTE — PROGRESS NOTES
Patient has been in bed majority of shift  Patient remains irritable  Patient denies all signs and symptoms  Patient has not complained about pain in chest, kidneys, liver, back, or SOB

## 2018-12-24 LAB
ANION GAP SERPL CALCULATED.3IONS-SCNC: 8 MMOL/L (ref 5–14)
BUN SERPL-MCNC: 14 MG/DL (ref 5–25)
CALCIUM SERPL-MCNC: 9.9 MG/DL (ref 8.4–10.2)
CHLORIDE SERPL-SCNC: 101 MMOL/L (ref 97–108)
CO2 SERPL-SCNC: 29 MMOL/L (ref 22–30)
CREAT SERPL-MCNC: 1.08 MG/DL (ref 0.7–1.5)
GFR SERPL CREATININE-BSD FRML MDRD: 79 ML/MIN/1.73SQ M
GLUCOSE P FAST SERPL-MCNC: 98 MG/DL (ref 70–99)
GLUCOSE SERPL-MCNC: 98 MG/DL (ref 70–99)
LITHIUM SERPL-SCNC: 0.4 MMOL/L (ref 0.6–1.2)
POTASSIUM SERPL-SCNC: 4.6 MMOL/L (ref 3.6–5)
SODIUM SERPL-SCNC: 138 MMOL/L (ref 137–147)

## 2018-12-24 PROCEDURE — 80048 BASIC METABOLIC PNL TOTAL CA: CPT | Performed by: PSYCHIATRY & NEUROLOGY

## 2018-12-24 PROCEDURE — 99233 SBSQ HOSP IP/OBS HIGH 50: CPT | Performed by: INTERNAL MEDICINE

## 2018-12-24 PROCEDURE — 99232 SBSQ HOSP IP/OBS MODERATE 35: CPT | Performed by: PSYCHIATRY & NEUROLOGY

## 2018-12-24 PROCEDURE — 80178 ASSAY OF LITHIUM: CPT | Performed by: PSYCHIATRY & NEUROLOGY

## 2018-12-24 RX ORDER — RISPERIDONE 2 MG/1
2 TABLET, FILM COATED ORAL EVERY EVENING
Status: DISCONTINUED | OUTPATIENT
Start: 2018-12-24 | End: 2018-12-27 | Stop reason: HOSPADM

## 2018-12-24 RX ORDER — RISPERIDONE 1 MG/1
1 TABLET, FILM COATED ORAL DAILY
Status: DISCONTINUED | OUTPATIENT
Start: 2018-12-25 | End: 2018-12-26

## 2018-12-24 RX ORDER — LITHIUM CARBONATE 300 MG/1
300 CAPSULE ORAL
Status: DISCONTINUED | OUTPATIENT
Start: 2018-12-24 | End: 2018-12-27 | Stop reason: HOSPADM

## 2018-12-24 RX ADMIN — RISPERIDONE 2 MG: 2 TABLET ORAL at 17:15

## 2018-12-24 RX ADMIN — MELATONIN TAB 3 MG 3 MG: 3 TAB at 22:18

## 2018-12-24 RX ADMIN — TIMOLOL MALEATE 1 DROP: 5 SOLUTION/ DROPS OPHTHALMIC at 10:17

## 2018-12-24 RX ADMIN — DORZOLAMIDE HYDROCHLORIDE 1 DROP: 20 SOLUTION OPHTHALMIC at 10:18

## 2018-12-24 RX ADMIN — DOXYCYCLINE 100 MG: 100 CAPSULE ORAL at 08:40

## 2018-12-24 RX ADMIN — ACETAMINOPHEN 975 MG: 325 TABLET ORAL at 03:17

## 2018-12-24 RX ADMIN — ROPINIROLE HYDROCHLORIDE 2 MG: 1 TABLET, FILM COATED ORAL at 22:18

## 2018-12-24 RX ADMIN — THIAMINE HCL TAB 100 MG 100 MG: 100 TAB at 08:41

## 2018-12-24 RX ADMIN — LITHIUM CARBONATE 300 MG: 300 CAPSULE, GELATIN COATED ORAL at 08:41

## 2018-12-24 RX ADMIN — ACETAMINOPHEN 975 MG: 325 TABLET ORAL at 11:07

## 2018-12-24 RX ADMIN — LATANOPROST 1 DROP: 50 SOLUTION/ DROPS OPHTHALMIC at 22:12

## 2018-12-24 RX ADMIN — RISPERIDONE 1 MG: 1 TABLET ORAL at 08:40

## 2018-12-24 RX ADMIN — FOLIC ACID 1 MG: 1 TABLET ORAL at 08:41

## 2018-12-24 RX ADMIN — LITHIUM CARBONATE 300 MG: 300 CAPSULE, GELATIN COATED ORAL at 17:15

## 2018-12-24 RX ADMIN — LORAZEPAM 1 MG: 1 TABLET ORAL at 08:51

## 2018-12-24 RX ADMIN — TRAZODONE HYDROCHLORIDE 50 MG: 50 TABLET ORAL at 22:19

## 2018-12-24 RX ADMIN — TRAZODONE HYDROCHLORIDE 100 MG: 100 TABLET ORAL at 22:18

## 2018-12-24 RX ADMIN — LORAZEPAM 1 MG: 1 TABLET ORAL at 17:15

## 2018-12-24 RX ADMIN — ACETAMINOPHEN 975 MG: 325 TABLET ORAL at 17:16

## 2018-12-24 RX ADMIN — LITHIUM CARBONATE 300 MG: 300 CAPSULE, GELATIN COATED ORAL at 12:12

## 2018-12-24 NOTE — PROGRESS NOTES
Progress Note - Behavioral Health     Sade Arambula 48 y o  male MRN: [id number]   Unit/Bed#: [facility] 341-01 Encounter: [id number]    Behavior over the last 24 hours: unchanged  Jessica Mckeon remains disorganized, irritable and labile  He still has pressured speech with flight of ideas  He still has auditory hallucinations with derogatory comments and persecutory delusions - continues to believe that people in his apartment building were trying to harm him and were after him  Signed 72 hour notice over the weekend - rescinded notice today when informed that he would not be allowed to leave due to his ongoing symptoms  Limited participation in milieu  Has been taking medications      Sleep: insomnia  Appetite: normal  Medication side effects: No   ROS: reports nasal congestion, denies any shortness of breath or chest pain    Mental Status Evaluation:    Appearance:  disheveled   Behavior:  partially cooperative, psychomotor agitation   Speech:  pressured, tangential   Mood:  labile, irritable   Affect:  labile   Thought Process:  disorganized, illogical   Associations: tangential associations, flight of ideas   Thought Content:  persecutory delusions   Perceptual Disturbances: denies auditory or visual hallucinations when asked, but appears responding to internal stimuli   Risk Potential: Suicidal ideation - None  Homicidal ideation - None at present  Potential for aggression - Yes, due to poor impulse control   Sensorium:  oriented to person, place and time/date   Memory:  recent and remote memory grossly intact   Consciousness:  alert and awake   Attention: poor concentration and poor attention span   Insight:  poor   Judgment: poor   Gait/Station: normal gait/station and normal balance   Motor Activity: no abnormal movements     Vital signs in last 24 hours:    HR:  [73-74] 73  Resp:  [16] 16  BP: (132-141)/(59-70) 141/70    Laboratory results:  I have personally reviewed all pertinent laboratory/tests results  Lithium:   Lab Results   Component Value Date    LITHIUM 0 4 (L) 12/24/2018       Recent Imaging Studies:     Procedure: Ct Chest Wo Contrast; Result Date: 12/23/2018; Narrative: CT CHEST WITHOUT IV CONTRAST INDICATION:   Chest wall pain  COMPARISON:  None  Impression: A 4 1 x 2 3 cm pleuroparenchymal density at the right lung base may represent infiltrate or May be neoplastic  Management options include a short interval follow-up CT in 4 weeks after appropriate treatment for infective consolidation  Alternatively a PET scan can be considered A small punctate 2 mm nodule right upper lobe in image 49 can also be evaluated with follow-up CT  I personally discussed this study with Matty Fields on 12/23/2018 at 2:26 PM       Progress Toward Goals: no significant progress, still disorganized, still irritable, remains labile, poor insight, still has psychotic symptoms    Assessment/Plan   Principal Problem:    Schizoaffective disorder, bipolar type (HCC)  Active Problems:    Post-traumatic stress disorder, chronic    Cocaine dependence, continuous (HCC)    Alcohol abuse, continuous    Cannabis abuse, episodic    Learning disability    Recommended Treatment:     Planned medication and treatment changes: All current active medications have been reviewed  Encourage group therapy, milieu therapy and occupational therapy  Behavioral Health checks every 7 minutes   Reconsult medical service to evaluate abnormal CT Chest  Increase Risperdal to 1 mg daily and 2 mg in the evening  Taper off scheduled Ativan  Increase Lithium to 300 mg tid for mood stabilization    Recheck Lithium level in 3 days     Continue all other medications    Current Facility-Administered Medications:  acetaminophen 650 mg Oral Q6H PRN Chica Collar, CRNP   acetaminophen 650 mg Oral Q4H PRN Janeth English MD   acetaminophen 975 mg Oral Q6H PRN Chica Collar, CRNP   aluminum-magnesium hydroxide-simethicone 30 mL Oral Q4H PRN Laurelydanny Fast LORY Mcclellan, JACQUI   benztropine 1 mg Intramuscular Q6H PRN Ruthine , CRNP   benztropine 1 mg Oral Q6H PRN Ruthine , CRNP   dorzolamide 1 drop Both Eyes Daily Debbie aClix MD   And       timolol 1 drop Both Eyes Daily Debbie Calix MD   folic acid 1 mg Oral Daily Debbie Calix MD   haloperidol 5 mg Oral Q6H PRN Ruthinicole Ely, CRAPURVA   haloperidol lactate 5 mg Intramuscular Q6H PRN Ruthine , CRNP   hydrOXYzine HCL 50 mg Oral Q6H PRN Debbie Calix MD   latanoprost 1 drop Both Eyes HS Debbie Calix MD   lithium carbonate 300 mg Oral TID With Meals Debbie Calix MD   LORazepam 1 mg Intramuscular Q6H PRN Bryn Ely, JACQUI   LORazepam 1 mg Oral BID Debbie Calix MD   magnesium hydroxide 30 mL Oral Daily PRN Bryn Ely, JACQUI   melatonin 3 mg Oral HS Debbie Calix MD   risperiDONE 1 mg Oral Q3H PRN JACQUI Álvarez   [START ON 12/25/2018] risperiDONE 1 mg Oral Daily Debbie Calix MD   risperiDONE 2 mg Oral QPM Debbie Calix MD   rOPINIRole 2 mg Oral HS Debbie Calix MD   thiamine 100 mg Oral Daily Debbie Calix MD   traZODone 100 mg Oral HS PRN Joao Fay MD   traZODone 50 mg Oral HS PRN JACQUI Álvarez   ziprasidone 20 mg Intramuscular Q4H PRN JACQUI Álvarez       Risks / Benefits of Treatment:    Risks, benefits, and possible side effects of medications explained to patient  Patient has limited understanding of risks and benefits of treatment at this time, but agrees to take medications as prescribed  Counseling / Coordination of Care:    Patient's progress reviewed with nursing staff  Medications, treatment progress and treatment plan reviewed with patient  Medication changes discussed with patient      Akiko Menjivar MD 12/24/18

## 2018-12-24 NOTE — PROGRESS NOTES
Patient was loud and visibly upset after speaking with the doctor this morning and learning he would not be discharged today  Patient calmed down after speaking with the RN and rescinded his 72-hour notice  Sonja Bush he was here because his landlord was sending him messages through his cable line and that he's "not crazy " Denied ever having SI, HI, AH, or VH  Admitted recent relapse on crack cocaine but said he had been clean prior to that and attending NA  Patient has been cooperative with medications and preoccupied with wanting stronger pain medication--specifically Tylenol with Codeine--for shoulder and back pain  Spoke with hospitalist about pt's CT results  He said that he recommends outpatient follow-up and that he would write a note to that effect

## 2018-12-24 NOTE — PROGRESS NOTES
Patient said he had a prescription for Tylenol with Codeine from [facility] in [place], [place], but the pharmacy reported that he's never received that medication from them

## 2018-12-24 NOTE — PROGRESS NOTES
Pt OOB briefly and used phone  Declined snack and meal stating too much pain  Returned to bed  Awakened from sound sleep for med admin including PRN tylenol  Pt asking for codeine stating 9/10 pain in right side  Moaning and dramatic  Will monitor

## 2018-12-24 NOTE — PROGRESS NOTES
[facility] Internal [facility] Progress Note  Patient: Devika Monique 48 y o  male   MRN: [id number]  PCP: Jon Patterson MD MPH  Unit/Bed#: [facility] 341-01 Encounter: [id number]  Date Of Visit: 12/24/18    Assessment:    Principal Problem:    Schizoaffective disorder, bipolar type ([facility] )  Active Problems:    Post-traumatic stress disorder, chronic    Cocaine dependence, continuous ([facility] )    Alcohol abuse, continuous    Cannabis abuse, episodic    Learning disability      Plan:  Bipolar disorder/schizoaffective disorder  Continue all current management  2  Alcohol abuse  Last alcohol drink at least 2 weeks ago  No signs symptoms of withdrawal noted  Appropriate consultation provided to the patient  3  Abnormal CT chest  4 cm nodule noted  No previous records  Patient is relatively asymptomatic at this time  He is extremely noncompliant in reports  misses appointments all the time  For that reason will obtain pulmonology consultation inpatient  For further evaluation treatment whether that would be PET scan on outpatient basis or  A biopsy versus bronchoscopy while inpatient  VTE Pharmacologic Prophylaxis:   Pharmacologic: None needed  Mechanical VTE Prophylaxis in Place: No    Patient Centered Rounds: I have performed bedside rounds with nursing staff today  Discussions with Specialists or Other Care Team Provider:  Yes    Education and Discussions with Family / Patient:  No    Time Spent for Care: 20 minutes  More than 50% of total time spent on counseling and coordination of care as described above      Current Length of Stay: 4 day(s)    Current Patient Status: Inpatient Psych   Certification Statement: The patient will continue to require additional inpatient hospital stay due to Inpatient psychiatry treatment    Discharge Plan / Estimated Discharge Date:  As per primary team    Code Status: Level 1 - Full Code      Subjective:   No complaints today    Objective:     Vitals:   Temp (24hrs), Av 7 °F (35 9 °C), Min:96 7 °F (35 9 °C), Max:96 7 °F (35 9 °C)    Temp:  [96 7 °F (35 9 °C)] 96 7 °F (35 9 °C)  HR:  [73-75] 75  Resp:  [16] 16  BP: (138-141)/(70-95) 138/95  Body mass index is 35 06 kg/m²  Input and Output Summary (last 24 hours):     No intake or output data in the 24 hours ending 18 1529    Physical Exam:     Physical Exam    Constitutional:  Well developed, well nourished, no acute distress, non-toxic appearance   Eyes:  PERRL, conjunctiva normal   HENT:  Atraumatic, external ears normal, nose normal, oropharynx moist, no pharyngeal exudates  Neck- normal range of motion, no tenderness, supple   Respiratory:  No respiratory distress, normal breath sounds, no rales, no wheezing   Cardiovascular:  Normal rate, normal rhythm, no murmurs, no gallops, no rubs   GI:  Soft, nondistended, normal bowel sounds, nontender, no organomegaly, no mass, no rebound, no guarding   :  No costovertebral angle tenderness   Musculoskeletal:  No edema, no tenderness, no deformities  Back- no tenderness  Integument:  Well hydrated, no rash   Lymphatic:  No lymphadenopathy noted   Neurologic:  Alert & oriented x 3, CN 2-12 normal, normal motor function, normal sensory function, no focal deficits noted   Psychiatric:  Speech and behavior appropriate           Additional Data:     Labs:      Results from last 7 days  Lab Units 18  1759   WBC Thousand/uL 6 50   HEMOGLOBIN g/dL 14 2   HEMATOCRIT % 42 4   PLATELETS Thousands/uL 183   NEUTROS PCT % 65   LYMPHS PCT % 25   MONOS PCT % 8   EOS PCT % 2       Results from last 7 days  Lab Units 18  0632 18  1759   POTASSIUM mmol/L 4 6 4 4   CHLORIDE mmol/L 101 102   CO2 mmol/L 29 26   BUN mg/dL 14 10   CREATININE mg/dL 1 08 0 97   CALCIUM mg/dL 9 9 10 0   ALK PHOS U/L  --  84   ALT U/L  --  79*   AST U/L  --  40           * I Have Reviewed All Lab Data Listed Above  * Additional Pertinent Lab Tests Reviewed:  Shruth[facility] Admission Reviewed    Imaging:    Imaging Reports Reviewed Today Include:  S  Imaging Personally Reviewed by Myself Includes:  S    Recent Cultures (last 7 days):           Last 24 Hours Medication List:     Current Facility-Administered Medications:  acetaminophen 650 mg Oral Q6H PRN Alexandra Schleicher, CRNP   acetaminophen 650 mg Oral Q4H PRN Maryam Wood MD   acetaminophen 975 mg Oral Q6H PRN Alexandra Schleicher, CRNP   aluminum-magnesium hydroxide-simethicone 30 mL Oral Q4H PRN Alexandra Schleicher, CRNP   benztropine 1 mg Intramuscular Q6H PRN Alexandra Schleicher, CRNP   benztropine 1 mg Oral Q6H PRN Alexandra Schleicher, CRNP   dorzolamide 1 drop Both Eyes Daily Maryam Wood MD   And       timolol 1 drop Both Eyes Daily Maryam Wood MD   folic acid 1 mg Oral Daily Maryam Wood MD   haloperidol 5 mg Oral Q6H PRN Alexandra Schleicher, CRNP   haloperidol lactate 5 mg Intramuscular Q6H PRN Alexandra Schleicher, CRNP   hydrOXYzine HCL 50 mg Oral Q6H PRN Maryam Wood MD   latanoprost 1 drop Both Eyes HS Maryam Wood MD   lithium carbonate 300 mg Oral TID With Meals Maryam Wood MD   LORazepam 1 mg Intramuscular Q6H PRN Alexandra Schleicher, CRNP   LORazepam 1 mg Oral BID Maryam Wood MD   magnesium hydroxide 30 mL Oral Daily PRN Alexandra Schleicher, CRNP   melatonin 3 mg Oral HS Maryam Wood MD   risperiDONE 1 mg Oral Q3H PRN Alexandra Schleicher, CRNP   [START ON 12/25/2018] risperiDONE 1 mg Oral Daily Maryam Wood MD   risperiDONE 2 mg Oral QPM Mrayam Wood MD   rOPINIRole 2 mg Oral HS Maryam Wood MD   thiamine 100 mg Oral Daily Maryam Wood MD   traZODone 100 mg Oral HS PRN Ari Myrick MD   traZODone 50 mg Oral HS PRN Alexandra Schleicher, CRNP   ziprasidone 20 mg Intramuscular Q4H PRN Alexandra Schleicher, CRNP        Today, Patient Was Seen By: Zeke Young    ** Please Note: This note has been constructed using a voice recognition system   **

## 2018-12-24 NOTE — CASE MANAGEMENT
met briefly with patient and reviewed the treatment plan, Lamonte Richter did have an irritable edge but did sign the treatment plan

## 2018-12-24 NOTE — PLAN OF CARE
Alteration in Thoughts and Perception     Treatment Goal: Gain control of psychotic behaviors/thinking, reduce/eliminate presenting symptoms and demonstrate improved reality functioning upon discharge Not Progressing          Anxiety     Anxiety is at manageable level Progressing        SUBSTANCE USE/ABUSE     By discharge, will develop insight into their chemical dependency and sustain motivation to continue in recovery Progressing

## 2018-12-24 NOTE — PROGRESS NOTES
Pt awoke around 0220 and requested a PRN of pain medication because of the right side of his back hurting  He was informed that he was not currently due for pain medication until 0300  Patient stated that he would go to his room and return when he was able to get pain medication  Pt returned to the nursing station around 0300 and requested his pain medication  He was given his PRN of Tylenol around 0317 to assist with his back pain  As of 0400 PRN appears to be effective for patient is lying in bed and appears to be sleeping

## 2018-12-25 PROCEDURE — 99232 SBSQ HOSP IP/OBS MODERATE 35: CPT | Performed by: STUDENT IN AN ORGANIZED HEALTH CARE EDUCATION/TRAINING PROGRAM

## 2018-12-25 RX ORDER — HYDROCODONE BITARTRATE AND ACETAMINOPHEN 5; 325 MG/1; MG/1
1 TABLET ORAL EVERY 6 HOURS PRN
Status: COMPLETED | OUTPATIENT
Start: 2018-12-25 | End: 2018-12-26

## 2018-12-25 RX ADMIN — RISPERIDONE 2 MG: 2 TABLET ORAL at 17:17

## 2018-12-25 RX ADMIN — RISPERIDONE 1 MG: 1 TABLET ORAL at 09:11

## 2018-12-25 RX ADMIN — TRAZODONE HYDROCHLORIDE 50 MG: 50 TABLET ORAL at 21:37

## 2018-12-25 RX ADMIN — MELATONIN TAB 3 MG 3 MG: 3 TAB at 21:11

## 2018-12-25 RX ADMIN — ROPINIROLE HYDROCHLORIDE 2 MG: 1 TABLET, FILM COATED ORAL at 21:11

## 2018-12-25 RX ADMIN — LITHIUM CARBONATE 300 MG: 300 CAPSULE, GELATIN COATED ORAL at 17:17

## 2018-12-25 RX ADMIN — LITHIUM CARBONATE 300 MG: 300 CAPSULE, GELATIN COATED ORAL at 07:58

## 2018-12-25 RX ADMIN — ACETAMINOPHEN 975 MG: 325 TABLET ORAL at 07:58

## 2018-12-25 RX ADMIN — FOLIC ACID 1 MG: 1 TABLET ORAL at 08:04

## 2018-12-25 RX ADMIN — TRAZODONE HYDROCHLORIDE 100 MG: 100 TABLET ORAL at 21:11

## 2018-12-25 RX ADMIN — THIAMINE HCL TAB 100 MG 100 MG: 100 TAB at 09:11

## 2018-12-25 RX ADMIN — HYDROCODONE BITARTRATE AND ACETAMINOPHEN 1 TABLET: 5; 325 TABLET ORAL at 22:49

## 2018-12-25 RX ADMIN — LITHIUM CARBONATE 300 MG: 300 CAPSULE, GELATIN COATED ORAL at 13:42

## 2018-12-25 RX ADMIN — LORAZEPAM 1 MG: 1 TABLET ORAL at 08:04

## 2018-12-25 RX ADMIN — DORZOLAMIDE HYDROCHLORIDE 1 DROP: 20 SOLUTION OPHTHALMIC at 09:11

## 2018-12-25 NOTE — PLAN OF CARE
Alteration in Thoughts and Perception     Treatment Goal: Gain control of psychotic behaviors/thinking, reduce/eliminate presenting symptoms and demonstrate improved reality functioning upon discharge Not Progressing        SUBSTANCE USE/ABUSE     By discharge, will develop insight into their chemical dependency and sustain motivation to continue in recovery Not Progressing          Anxiety     Anxiety is at manageable level Progressing        DISCHARGE PLANNING     Discharge to home or other facility with appropriate resources Progressing        Ineffective Coping     Participates in unit activities Progressing        SUBSTANCE USE/ABUSE     By discharge, patient will have ongoing treatment plan addressing chemical dependency Progressing

## 2018-12-25 NOTE — PROGRESS NOTES
Pt has been out in the milieu all evening watching tv  Pt is visibly upset, he appears very depressed  Pt has no insight into why he is here, pt states, "I messed up  I'm here and have no one to blame but myself  I should have never come to the emergency room, I'd be home right now for [place] " Pt continues to believe that messages were being sent through his TV set but denies all symptoms currently  Pt has been calm and has asked only once for something "stronger for pain " Pt accepting when told he can be given only tylenol but states, "that stuff doesn't work, but I'll take it before bed, hopefully it will help a little, man I've been in pain my whole life " Will continue to monitor

## 2018-12-25 NOTE — PROGRESS NOTES
Patient has been in bed majority of the morning  Patient complains of headache and has been resting

## 2018-12-25 NOTE — PROGRESS NOTES
Progress Note - Behavioral Health   Latanya Hood 48 y o  male MRN: [id number]  Unit/Bed#: [facility] 341-01 Encounter: [id number]    The patient was seen for continuing care and reviewed with treatment team  Patient is focused on getting Codeine and guarded, evasive, and irritable during evaluation  Patient reports good sleep and good appetite  Patient is focused on discharge  Patient is paranoid, stated that people at his apartment were shooting messages to him from the TV  Patient denies auditory or visual hallucination  Patient denies side effects of medications  Mental Status Evaluation:  Appearance:  Adequate hygiene and grooming   Behavior:  evasive and guarded   Mood:  euthymic   Thought Content:  Paranoid and mistrustful   Perceptual Disturbances: Appears to be responding to internal stimuli   Risk Potential: No suicidal or homicidal ideation   Orientation:   Oriented to place and person     Vitals:    12/25/18 0751   BP: 124/81   Pulse: 81   Resp: 18   Temp: (!) 97 °F (36 1 °C)   SpO2:        Assessment/Plan    Principal Problem:    Schizoaffective disorder, bipolar type ([facility])  Active Problems:    Post-traumatic stress disorder, chronic    Cocaine dependence, continuous ([facility])    Alcohol abuse, continuous    Cannabis abuse, episodic    Learning disability      Recommended Treatment: Continue present medications  Continue with pharmacotherapy, group therapy, milieu therapy and occupational therapy    The patient will be maintained on the following medications:    Current Facility-Administered Medications:  acetaminophen 650 mg Oral Q6H PRN Cheng Farmerak, CRNP   acetaminophen 650 mg Oral Q4H PRN Tracy Mike MD   acetaminophen 975 mg Oral Q6H PRN Cheng Farmerak, CRNP   aluminum-magnesium hydroxide-simethicone 30 mL Oral Q4H PRN Cheng Croak, CRNP   benztropine 1 mg Intramuscular Q6H PRN Jordanenejeremy Croak, CRNP   benztropine 1 mg Oral Q6H PRN Cheng Croak, CRNP   dorzolamide 1 drop Both Eyes Daily Demetrice Stacy MD   And       timolol 1 drop Both Eyes Daily Demetrice Stacy, MD   folic acid 1 mg Oral Daily Demetrice Stacy, MD   haloperidol 5 mg Oral Q6H PRN JACQUI Simpson   haloperidol lactate 5 mg Intramuscular Q6H PRN JACQUI Simpson   hydrOXYzine HCL 50 mg Oral Q6H PRN Demetrice Stacy, MD   latanoprost 1 drop Both Eyes HS Demetrice Stacy, MD   lithium carbonate 300 mg Oral TID With Meals Demetrice Stacy, MD   LORazepam 1 mg Intramuscular Q6H PRN JACQUI Simpson   magnesium hydroxide 30 mL Oral Daily PRN JACQUI Simpson   melatonin 3 mg Oral HS Demetrice Stacy, MD   risperiDONE 1 mg Oral Q3H PRN JACQUI Simpson   risperiDONE 1 mg Oral Daily Demetrice Stacy, MD   risperiDONE 2 mg Oral QPM Demetrice Stacy, MD   rOPINIRole 2 mg Oral HS Demetrice Stacy, MD   thiamine 100 mg Oral Daily Demetrice Stacy, MD   traZODone 100 mg Oral HS PRN Rafaela Deshpande MD   traZODone 50 mg Oral HS PRN JACQUI Simpson   ziprasidone 20 mg Intramuscular Q4H PRN JACQUI Simpson

## 2018-12-25 NOTE — PROGRESS NOTES
Patient feels the doctor is "olding me hostage "  Patient also states "I should have been home with my kid "  Patient is upset and depressed because he was unable to be with his family on [place]

## 2018-12-26 ENCOUNTER — APPOINTMENT (INPATIENT)
Dept: NON INVASIVE DIAGNOSTICS | Facility: HOSPITAL | Age: 53
DRG: 885 | End: 2018-12-26
Payer: MEDICARE

## 2018-12-26 ENCOUNTER — APPOINTMENT (INPATIENT)
Dept: RADIOLOGY | Facility: HOSPITAL | Age: 53
DRG: 885 | End: 2018-12-26
Payer: MEDICARE

## 2018-12-26 PROCEDURE — A9539 TC99M PENTETATE: HCPCS

## 2018-12-26 PROCEDURE — 99233 SBSQ HOSP IP/OBS HIGH 50: CPT | Performed by: PSYCHIATRY & NEUROLOGY

## 2018-12-26 PROCEDURE — 99255 IP/OBS CONSLTJ NEW/EST HI 80: CPT | Performed by: INTERNAL MEDICINE

## 2018-12-26 PROCEDURE — A9540 TC99M MAA: HCPCS

## 2018-12-26 PROCEDURE — 78582 LUNG VENTILAT&PERFUS IMAGING: CPT

## 2018-12-26 PROCEDURE — 93970 EXTREMITY STUDY: CPT

## 2018-12-26 PROCEDURE — 71046 X-RAY EXAM CHEST 2 VIEWS: CPT

## 2018-12-26 RX ORDER — RISPERIDONE 1 MG/1
1 TABLET, FILM COATED ORAL ONCE
Status: COMPLETED | OUTPATIENT
Start: 2018-12-26 | End: 2018-12-26

## 2018-12-26 RX ORDER — LANOLIN ALCOHOL/MO/W.PET/CERES
6 CREAM (GRAM) TOPICAL
Status: DISCONTINUED | OUTPATIENT
Start: 2018-12-26 | End: 2018-12-27

## 2018-12-26 RX ORDER — RISPERIDONE 2 MG/1
2 TABLET, FILM COATED ORAL DAILY
Status: DISCONTINUED | OUTPATIENT
Start: 2018-12-27 | End: 2018-12-27 | Stop reason: HOSPADM

## 2018-12-26 RX ADMIN — RISPERIDONE 1 MG: 1 TABLET ORAL at 08:29

## 2018-12-26 RX ADMIN — DORZOLAMIDE HYDROCHLORIDE 1 DROP: 20 SOLUTION OPHTHALMIC at 08:29

## 2018-12-26 RX ADMIN — TRAZODONE HYDROCHLORIDE 50 MG: 50 TABLET ORAL at 21:30

## 2018-12-26 RX ADMIN — ACETAMINOPHEN 975 MG: 325 TABLET ORAL at 12:16

## 2018-12-26 RX ADMIN — LITHIUM CARBONATE 300 MG: 300 CAPSULE, GELATIN COATED ORAL at 17:24

## 2018-12-26 RX ADMIN — FOLIC ACID 1 MG: 1 TABLET ORAL at 08:29

## 2018-12-26 RX ADMIN — HYDROXYZINE HYDROCHLORIDE 50 MG: 50 TABLET, FILM COATED ORAL at 21:55

## 2018-12-26 RX ADMIN — LATANOPROST 1 DROP: 50 SOLUTION/ DROPS OPHTHALMIC at 21:31

## 2018-12-26 RX ADMIN — LITHIUM CARBONATE 300 MG: 300 CAPSULE, GELATIN COATED ORAL at 08:29

## 2018-12-26 RX ADMIN — THIAMINE HCL TAB 100 MG 100 MG: 100 TAB at 08:29

## 2018-12-26 RX ADMIN — RISPERIDONE 1 MG: 1 TABLET ORAL at 12:11

## 2018-12-26 RX ADMIN — RISPERIDONE 2 MG: 2 TABLET ORAL at 17:24

## 2018-12-26 RX ADMIN — MELATONIN TAB 3 MG 6 MG: 3 TAB at 21:30

## 2018-12-26 RX ADMIN — TIMOLOL MALEATE 1 DROP: 5 SOLUTION/ DROPS OPHTHALMIC at 08:29

## 2018-12-26 RX ADMIN — TRAZODONE HYDROCHLORIDE 100 MG: 100 TABLET ORAL at 21:30

## 2018-12-26 RX ADMIN — HYDROCODONE BITARTRATE AND ACETAMINOPHEN 1 TABLET: 5; 325 TABLET ORAL at 08:34

## 2018-12-26 RX ADMIN — LITHIUM CARBONATE 300 MG: 300 CAPSULE, GELATIN COATED ORAL at 12:11

## 2018-12-26 RX ADMIN — ROPINIROLE HYDROCHLORIDE 2 MG: 1 TABLET, FILM COATED ORAL at 21:30

## 2018-12-26 RX ADMIN — ACETAMINOPHEN 975 MG: 325 TABLET ORAL at 18:46

## 2018-12-26 NOTE — PROGRESS NOTES
Pt appears depressed, rates depression 6/10, states his anxiety is 1/4 and "under control "  He denies all other S/S

## 2018-12-26 NOTE — PROGRESS NOTES
Pt reported that he slept most of evening shift and was not sleepy tonight  He was awake frequently

## 2018-12-26 NOTE — PROGRESS NOTES
Pt denies all symptoms  He states he feels less depressed  He has an irritable edge but has been calm  Pt is cooperative with medications  He tends to be somatic at times but has been in behavioral control  Pt complained of coughing up "something red"; not observed by RN  Pulmonology to be made aware

## 2018-12-26 NOTE — PROGRESS NOTES
Progress Note - Behavioral Health     Destiny Reyes 48 y o  male MRN: [id number]   Unit/Bed#: [facility] 341-01 Encounter: [id number]    Behavior over the last 24 hours: slowly improving  Ying Sanket is doing slightly better today  He feels Lithium is helping with his mood and is less irritable and less labile "I feel more calm, I have more clarity"  Glad that pulmonologist spent some time with him explaining the need for additional work-up for pulmonary lesion "It was a wake-up call"  He seems slightly less paranoid as well today and now believes he "overreacted" when he thought that he was monitored on TV by cameras in his apartment  Compliant with medications  Attends group therapy      Sleep: decreased, slept off and on  Appetite: normal  Medication side effects: No   ROS: no complaints, denies any headache, shortness of breath or chest pain    Mental Status Evaluation:    Appearance:  casually dressed   Behavior:  cooperative, no longer agitated   Speech:  less disorganized , less pressured   Mood:  less labile, less irritable   Affect:  less labile   Thought Process:  more logical, less disorganized   Associations: concrete associations   Thought Content:  less paranoid   Perceptual Disturbances: no auditory hallucinations, no visual hallucinations   Risk Potential: Suicidal ideation - None at present  Homicidal ideation - None at present  Potential for aggression - Not at present   Sensorium:  oriented to person, place and time/date   Memory:  recent and remote memory grossly intact   Consciousness:  alert and awake   Attention: decreased concentration and decreased attention span   Insight:  improving and partial   Judgment: improving and partial   Gait/Station: normal gait/station and normal balance   Motor Activity: no abnormal movements     Vital signs in last 24 hours:    Temp:  [97 6 °F (36 4 °C)] 97 6 °F (36 4 °C)  HR:  [64-79] 64  Resp:  [16] 16  BP: (126-142)/(78-87) 126/78    Laboratory results:  I have personally reviewed all pertinent laboratory/tests results  Progress Toward Goals: progressing slowly, mood is stabilizing, no longer agitated, less labile, less paranoid, working on coping skills    Assessment/Plan   Principal Problem:    Schizoaffective disorder, bipolar type ([facility])  Active Problems:    Post-traumatic stress disorder, chronic    Cocaine dependence, continuous ([facility])    Alcohol abuse, continuous    Cannabis abuse, episodic    Learning disability    Recommended Treatment:     Planned medication and treatment changes: All current active medications have been reviewed    Encourage group therapy, milieu therapy and occupational therapy  [facility] checks every 7 minutes  Consider [facility] referral fro additional support after discharge  Increase Melatonin to 6 mg at bedtime to help with sleep  Increase Risperdal to 2 mg bid to help with mood and psychotic symptoms  Recheck Lithium level and BMP in the morning     Continue all other medications:    Current Facility-Administered Medications:  acetaminophen 650 mg Oral Q6H PRN CHIP EstradaNP   acetaminophen 650 mg Oral Q4H PRN Snati Maradiaga MD   acetaminophen 975 mg Oral Q6H PRN JACQUI Estrada   aluminum-magnesium hydroxide-simethicone 30 mL Oral Q4H PRN JACQUI Estrada   benztropine 1 mg Intramuscular Q6H PRN Danny Montes, JACQUI   benztropine 1 mg Oral Q6H PRN JACQUI Estrada   dorzolamide 1 drop Both Eyes Daily Santi Maradiaga MD   And       timolol 1 drop Both Eyes Daily Santi Maradiaga MD   folic acid 1 mg Oral Daily Santi Maradiaga MD   haloperidol 5 mg Oral Q6H PRN Danny Montes, JACQUI   haloperidol lactate 5 mg Intramuscular Q6H PRN JACQUI Estrada   hydrOXYzine HCL 50 mg Oral Q6H PRN Santi Maradiaga MD   latanoprost 1 drop Both Eyes HS Santi Maradiaga MD   lithium carbonate 300 mg Oral TID With Meals Santi Maradiaga MD   LORazepam 1 mg Intramuscular Q6H PRN JACQUI Estrada magnesium hydroxide 30 mL Oral Daily PRN JACQUI Fields   melatonin 6 mg Oral HS Katina Plummer MD   risperiDONE 1 mg Oral Q3H PRN JACQUI Fields   risperiDONE 1 mg Oral Once Katina Plummer MD   risperiDONE 2 mg Oral QPM Katina Plummer MD   [START ON 12/27/2018] risperiDONE 2 mg Oral Daily Katina Plummer MD   rOPINIRole 2 mg Oral HS Katina Plummer MD   thiamine 100 mg Oral Daily Katina Plummer MD   traZODone 100 mg Oral HS PRN Cecilia Perez MD   traZODone 50 mg Oral HS PRN JACQUI Fields   ziprasidone 20 mg Intramuscular Q4H PRN JACQUI Fields       Risks / Benefits of Treatment:    Risks, benefits, and possible side effects of medications explained to patient and patient verbalizes understanding and agreement for treatment  Counseling / Coordination of Care: Total floor / unit time spent today 35 minutes  Greater than 50% of total time was spent with the patient and / or family counseling and / or coordination of care  A description of counseling / coordination of care:    Patient's progress discussed with staff in treatment team meeting  Medications, treatment progress and treatment plan reviewed with patient  Medication changes discussed with patient  Medication education provided to patient  Coping with chronic mental illness reviewed with patient  Coping strategies including acquiring relapse prevention skills, compliance with medications and reaching to other people for help if needed reviewed with patient  Importance of medication and treatment compliance reviewed with patient  Discussed with patient acceptance of mental illness diagnosis and need for ongoing treatment after discharge  Reassurance and supportive therapy provided      Mikayla Hoyos MD 12/26/18

## 2018-12-26 NOTE — PROGRESS NOTES
Pt states he has been "spitting up blood "   Pt showed writer sputum, appears absent of blood  Writer contacted [facility], Dr Pearle Primrose states to monitor for increased cough

## 2018-12-26 NOTE — PROGRESS NOTES
Pt c/o worsening pain and SOB upon laying down  Rates pain 9/10 right rib cage and shoulder  O2 sat 98%  [facility] called and orders received and PRN given  Pt appreciative  Aware he has pulmonology consult tomorrow
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
## 2018-12-26 NOTE — PLAN OF CARE
Problem: Alteration in Thoughts and Perception  Goal: Treatment Goal: Gain control of psychotic behaviors/thinking, reduce/eliminate presenting symptoms and demonstrate improved reality functioning upon discharge  Outcome: Progressing      Problem: Anxiety  Goal: Anxiety is at manageable level  Interventions:  - Assess and monitor patient's anxiety level  - Monitor for signs and symptoms of anxiety both physical and emotional (heart palpitations, chest pain, shortness of breath, headaches, nausea, feeling jumpy, restlessness, irritable, apprehensive)  - Collaborate with interdisciplinary team and initiate plan and interventions as ordered    - [place] patient to unit/surroundings  - Explain treatment plan  - Encourage participation in care  - Encourage verbalization of concerns/fears  - Identify coping mechanisms  - Assist in developing anxiety-reducing skills  - Administer/offer alternative therapies  - Limit or eliminate stimulants   Outcome: Progressing      Problem: SUBSTANCE USE/ABUSE  Goal: By discharge, will develop insight into their chemical dependency and sustain motivation to continue in recovery  INTERVENTIONS:  - Attends all daily group sessions and scheduled AA groups  - Actively practices coping skills through participation in the therapeutic community and adherence to program rules  - Reviews and completes assignments from individual treatment plan  - Assist patient development of understanding of their personal cycle of addiction and relapse triggers   Outcome: Progressing      Problem: DISCHARGE PLANNING  Goal: Discharge to home or other facility with appropriate resources  INTERVENTIONS:  - Identify barriers to discharge w/patient and caregiver  - Arrange for needed discharge resources and transportation as appropriate  - Identify discharge learning needs (meds, wound care, etc )  - Arrange for interpretive services to assist at discharge as needed  - Refer to Case Management Department for coordinating discharge planning if the patient needs post-hospital services based on physician/advanced practitioner order or complex needs related to functional status, cognitive ability, or social support system   Outcome: Progressing      Problem: SUBSTANCE USE/ABUSE  Goal: By discharge, patient will have ongoing treatment plan addressing chemical dependency  INTERVENTIONS:  - Assist patient with resources and/or appointments for ongoing recovery based living   Outcome: Progressing      Problem: Ineffective Coping  Goal: Participates in unit activities  Interventions:  - Provide therapeutic environment   - Provide required programming   - Redirect inappropriate behaviors    Outcome: Progressing

## 2018-12-27 VITALS
BODY MASS INDEX: 34.91 KG/M2 | HEART RATE: 88 BPM | WEIGHT: 217.2 LBS | RESPIRATION RATE: 16 BRPM | DIASTOLIC BLOOD PRESSURE: 76 MMHG | TEMPERATURE: 97.6 F | HEIGHT: 66 IN | OXYGEN SATURATION: 98 % | SYSTOLIC BLOOD PRESSURE: 131 MMHG

## 2018-12-27 LAB
ANION GAP SERPL CALCULATED.3IONS-SCNC: 10 MMOL/L (ref 5–14)
BUN SERPL-MCNC: 12 MG/DL (ref 5–25)
CALCIUM SERPL-MCNC: 9.5 MG/DL (ref 8.4–10.2)
CHLORIDE SERPL-SCNC: 103 MMOL/L (ref 97–108)
CO2 SERPL-SCNC: 27 MMOL/L (ref 22–30)
CREAT SERPL-MCNC: 0.91 MG/DL (ref 0.7–1.5)
GFR SERPL CREATININE-BSD FRML MDRD: 97 ML/MIN/1.73SQ M
GLUCOSE P FAST SERPL-MCNC: 92 MG/DL (ref 70–99)
GLUCOSE SERPL-MCNC: 92 MG/DL (ref 70–99)
LITHIUM SERPL-SCNC: 0.6 MMOL/L (ref 0.6–1.2)
POTASSIUM SERPL-SCNC: 4.7 MMOL/L (ref 3.6–5)
SODIUM SERPL-SCNC: 140 MMOL/L (ref 137–147)

## 2018-12-27 PROCEDURE — 99239 HOSP IP/OBS DSCHRG MGMT >30: CPT | Performed by: PSYCHIATRY & NEUROLOGY

## 2018-12-27 PROCEDURE — 93970 EXTREMITY STUDY: CPT | Performed by: SURGERY

## 2018-12-27 PROCEDURE — 80048 BASIC METABOLIC PNL TOTAL CA: CPT | Performed by: PSYCHIATRY & NEUROLOGY

## 2018-12-27 PROCEDURE — 99232 SBSQ HOSP IP/OBS MODERATE 35: CPT | Performed by: INTERNAL MEDICINE

## 2018-12-27 PROCEDURE — 80178 ASSAY OF LITHIUM: CPT | Performed by: PSYCHIATRY & NEUROLOGY

## 2018-12-27 RX ORDER — ROPINIROLE 2 MG/1
2 TABLET, FILM COATED ORAL
Qty: 30 TABLET | Refills: 0 | Status: SHIPPED | OUTPATIENT
Start: 2018-12-27 | End: 2019-08-12

## 2018-12-27 RX ORDER — TRAZODONE HYDROCHLORIDE 100 MG/1
100 TABLET ORAL
Qty: 30 TABLET | Refills: 1 | Status: SHIPPED | OUTPATIENT
Start: 2018-12-27 | End: 2019-08-12

## 2018-12-27 RX ORDER — LANOLIN ALCOHOL/MO/W.PET/CERES
6 CREAM (GRAM) TOPICAL
Status: DISCONTINUED | OUTPATIENT
Start: 2018-12-27 | End: 2018-12-27 | Stop reason: HOSPADM

## 2018-12-27 RX ORDER — LANOLIN ALCOHOL/MO/W.PET/CERES
9 CREAM (GRAM) TOPICAL
Status: DISCONTINUED | OUTPATIENT
Start: 2018-12-27 | End: 2018-12-27

## 2018-12-27 RX ORDER — RISPERIDONE 2 MG/1
2 TABLET, FILM COATED ORAL 2 TIMES DAILY
Qty: 60 TABLET | Refills: 1 | Status: SHIPPED | OUTPATIENT
Start: 2018-12-27 | End: 2019-08-12

## 2018-12-27 RX ORDER — LANOLIN ALCOHOL/MO/W.PET/CERES
6 CREAM (GRAM) TOPICAL
Qty: 60 TABLET | Refills: 1 | Status: SHIPPED | OUTPATIENT
Start: 2018-12-27 | End: 2019-02-25

## 2018-12-27 RX ORDER — LITHIUM CARBONATE 300 MG/1
300 CAPSULE ORAL
Qty: 90 CAPSULE | Refills: 1 | Status: SHIPPED | OUTPATIENT
Start: 2018-12-27 | End: 2019-08-12

## 2018-12-27 RX ORDER — HYDROXYZINE 50 MG/1
50 TABLET, FILM COATED ORAL 2 TIMES DAILY PRN
Qty: 60 TABLET | Refills: 1 | Status: SHIPPED | OUTPATIENT
Start: 2018-12-27 | End: 2019-08-12

## 2018-12-27 RX ADMIN — HALOPERIDOL 5 MG: 5 TABLET ORAL at 10:50

## 2018-12-27 RX ADMIN — ACETAMINOPHEN 975 MG: 325 TABLET ORAL at 11:01

## 2018-12-27 RX ADMIN — TIMOLOL MALEATE 1 DROP: 5 SOLUTION/ DROPS OPHTHALMIC at 08:41

## 2018-12-27 RX ADMIN — THIAMINE HCL TAB 100 MG 100 MG: 100 TAB at 08:40

## 2018-12-27 RX ADMIN — BENZTROPINE MESYLATE 1 MG: 1 TABLET ORAL at 10:50

## 2018-12-27 RX ADMIN — RISPERIDONE 2 MG: 2 TABLET ORAL at 08:40

## 2018-12-27 RX ADMIN — HYDROXYZINE HYDROCHLORIDE 50 MG: 50 TABLET, FILM COATED ORAL at 10:50

## 2018-12-27 RX ADMIN — FOLIC ACID 1 MG: 1 TABLET ORAL at 08:40

## 2018-12-27 RX ADMIN — LITHIUM CARBONATE 300 MG: 300 CAPSULE, GELATIN COATED ORAL at 11:59

## 2018-12-27 RX ADMIN — LITHIUM CARBONATE 300 MG: 300 CAPSULE, GELATIN COATED ORAL at 08:40

## 2018-12-27 RX ADMIN — DORZOLAMIDE HYDROCHLORIDE 1 DROP: 20 SOLUTION OPHTHALMIC at 08:41

## 2018-12-27 NOTE — DISCHARGE INSTR - APPOINTMENTS
HOW TO GET SUBSTANCE ABUSE HELP:  If you or someone you know has a drug or alcohol problem, there is help:  [place] [address]: [address]: [phone]  An assessment is the first step  In addition to those listed there are other programs available in the area but assessment is best to determine an appropriate level of care  If you DO NOT have Medical Assistance ([place]) or [employer], an assessment can be scheduled at one of these providers:  [facility] [address], [address]  [phone]   101 [facility] [address] , [employer], [address]  3314 [facility] [address]  [facility] 7[address] [facility] [employer], [address]   Step by 8012 [facility] [address] , [employer], 98 [facility]  [phone]   Treatment Trends  [place]  13[address] [facility] , [employer], 98 [facility]  2000 [facility],[address] , [address], [employer], [address] Segundo [place] [phone]     If you [address], an assessment can be scheduled at one of these providers:  [place] [facility]  [address] , [employer], 98 [facility]  [address]  Krist[address], [address]  [address]  D&A Intake Unit  620 [facility] [address] , 1st Floor, [place], [address]  [address], 300 [facility],6Th Floor, [facility] [place], 4420 [facility] [place] [address] [facility] [address] , [employer], [address]  3314 [facility] [address]  [place], 122 [facility]  57 [facility], [place], [address] 75 [facility] 721 [facility] [employer], [address]   Step by 8012 [facility] [address] , [employer], [address] [place] [address] Segundo Naik [phone]   Treatment Trends  [place]  1320 [facility] , [place], 98 [facility]  2000 [facility],[address] [facility] , [address], [place], [address] [facility] [phone]     If you [facility], an assessment can be scheduled at one of these providers  Please contact these Providers to determine if they are in your network plan:  [facility] D&A Intake Unit  620 [facility] [address] , 1st Floor, [place], [address]  [facility] [address] , [place], [address]  [address]  [place], 122 [facility]  [address], [place], [address] 1316 [facility] [address] , [address], [place], [address]  67  [address] [place] [phone]  Financial Help Provided: SNAP, [address], Welfare Office, [facility]  Please visit this office to complete your application for [place] [employer]
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
## 2018-12-27 NOTE — PLAN OF CARE
Problem: Alteration in Thoughts and Perception  Goal: Treatment Goal: Gain control of psychotic behaviors/thinking, reduce/eliminate presenting symptoms and demonstrate improved reality functioning upon discharge  Outcome: Progressing      Problem: Anxiety  Goal: Anxiety is at manageable level  Interventions:  - Assess and monitor patient's anxiety level  - Monitor for signs and symptoms of anxiety both physical and emotional (heart palpitations, chest pain, shortness of breath, headaches, nausea, feeling jumpy, restlessness, irritable, apprehensive)  - Collaborate with interdisciplinary team and initiate plan and interventions as ordered    - [place] patient to unit/surroundings  - Explain treatment plan  - Encourage participation in care  - Encourage verbalization of concerns/fears  - Identify coping mechanisms  - Assist in developing anxiety-reducing skills  - Administer/offer alternative therapies  - Limit or eliminate stimulants   Outcome: Progressing      Problem: SUBSTANCE USE/ABUSE  Goal: By discharge, will develop insight into their chemical dependency and sustain motivation to continue in recovery  INTERVENTIONS:  - Attends all daily group sessions and scheduled AA groups  - Actively practices coping skills through participation in the therapeutic community and adherence to program rules  - Reviews and completes assignments from individual treatment plan  - Assist patient development of understanding of their personal cycle of addiction and relapse triggers   Outcome: Progressing      Problem: DISCHARGE PLANNING  Goal: Discharge to home or other facility with appropriate resources  INTERVENTIONS:  - Identify barriers to discharge w/patient and caregiver  - Arrange for needed discharge resources and transportation as appropriate  - Identify discharge learning needs (meds, wound care, etc )  - Arrange for interpretive services to assist at discharge as needed  - Refer to Case Management Department for coordinating discharge planning if the patient needs post-hospital services based on physician/advanced practitioner order or complex needs related to functional status, cognitive ability, or social support system   Outcome: Progressing      Problem: SUBSTANCE USE/ABUSE  Goal: By discharge, patient will have ongoing treatment plan addressing chemical dependency  INTERVENTIONS:  - Assist patient with resources and/or appointments for ongoing recovery based living   Outcome: Progressing      Problem: Ineffective Coping  Goal: Participates in unit activities  Interventions:  - Provide therapeutic environment   - Provide required programming   - Redirect inappropriate behaviors    Outcome: Progressing

## 2018-12-27 NOTE — PLAN OF CARE

## 2018-12-27 NOTE — PROGRESS NOTES
PRN Atarax/Haldol/Cogentin effective for patient's anxiety and agitation  Patient has been calmer and has been  from other patient and resting calmly in his room  Will continue to monitor

## 2018-12-27 NOTE — PROGRESS NOTES
Pt remains focused on pain medications, asking for vicodin  Pt made aware it was discontinued, writer offered Tylenol  Pt accepted but states "it doesn't work because when I raise my arm like this it hurts" while flexing and abducting right arm

## 2018-12-27 NOTE — DISCHARGE INSTRUCTIONS
Abuse of Alcohol   WHAT YOU NEED TO KNOW:   · Alcohol abuse is unhealthy drinking behavior  You may drink too much at one time once a week, or continue to drink too much daily  You continue to drink even though it causes problems  The problems can be alcohol related legal problems, or problems with work or relationships with family  · If you drink too much at one time, you are binge drinking  Binge drinking is when you have a large amount of alcohol in a short time  Your blood alcohol concentrations (ALISHA) goes above 0 08 g/dLlevel during binge drinking  For men, this usually happens with more than 4 drinks in 2 hours  For women, it is more than 3 drinks in 2 hours  A drink is 12 ounces of beer, 4 ounces of wine, or 1½ ounces of liquor  DISCHARGE INSTRUCTIONS:   Call 911 for the following:   · You have sudden chest pain or trouble breathing  · You have a seizure or have shaking or trembling  · You were in an accident because of alcohol  Seek care immediately if:   · You want to harm yourself or others  · You have hallucinations (you see or hear things that are not real)  · You cannot stop vomiting or you vomit blood  Contact your healthcare provider if:   · You need help to stop drinking alcohol  · You have questions or concerns about your condition or care  Medicines:   · Vitamin supplements  may be given to treat low vitamin levels  Alcohol can make it hard for your body to absorb enough vitamins such as B1  Vitamin supplements may also be given to prevent alcohol related brain damage  · Take your medicine as directed  Contact your healthcare provider if you think your medicine is not helping or if you have side effects  Tell him or her if you are allergic to any medicine  Keep a list of the medicines, vitamins, and herbs you take  Include the amounts, and when and why you take them  Bring the list or the pill bottles to follow-up visits   Carry your medicine list with you in case of an emergency  Treatments or therapies you may need:   · Detoxification (detox) and withdrawal  is a program that helps you to safely get alcohol out of your body  Detox can also help get rid of the physical need to drink  Healthcare providers monitor the physical symptoms of withdrawal  They may give you medicines to help decrease nausea, dehydration, and seizures  Healthcare providers will also monitor your blood pressure, heart and breathing rates, and your temperature  Symptoms of anxiety, depression, and suicidal thoughts are also monitored and managed during detox  Healthcare providers may give you medicines for these symptoms and therapy sessions will be available to you  Detox is usually done at a detox center or in a hospital  Healthcare providers do not recommend that you try to detox at home or by yourself  Withdrawal symptoms may become life-threatening  The center can help you find 12 step programs or an individual therapist to help with emotional support after detox  · Inpatient and outpatient treatment  focus on your personal needs to help you stop drinking  Treatment helps you understand the reasons you abuse alcohol  Counselors and therapists provide you with support and help you find ways to cope instead of drinking  You may need inpatient treatment to provide a controlled environment  You may need outpatient treatment after your inpatient treatment is complete  · Alcohol aversion therapy  takes away the desire to drink by causing a negative reaction when you drink  Healthcare providers may give you medicines that cause nausea and vomiting when you drink alcohol  They may instead give you a medicine that decreases your urge to drink alcohol  These medicines are used to help you stop drinking or reduce the amount you drink  They can also help you avoid relapse  Follow up with your healthcare provider as directed:  Write down your questions so you remember to ask them during your visits    Avoid alcohol:  You should stop drinking entirely  Alcohol can damage your brain, heart, and liver  It also increases your risk for injury, high blood pressure, and certain types of cancer  Alcohol is dangerous when you combine it with certain medicines  Do not drive if you drink alcohol:  Make sure someone who has not been drinking can help you get home  Get support:  Most people need support to stop drinking alcohol  Mental health providers, support groups, rehabilitation centers, and your healthcare provider can provide support  For more information:   · Alcoholics Anonymous  Web Address: http://[employer]/  · Substance Abuse and [address] [place]  Web Address: https://[employer]/  © 2017 2600   Information is for End User's use only and may not be sold, redistributed or otherwise used for commercial purposes  All illustrations and images included in CareNotes® are the copyrighted property of A D A M , Inc  or Hayes Fragoso  The above information is an  only  It is not intended as medical advice for individual conditions or treatments  Talk to your doctor, nurse or pharmacist before following any medical regimen to see if it is safe and effective for you      Scheduled Meds:  Current Facility-Administered Medications:  acetaminophen 650 mg Oral Q6H PRN Shaniqua , CRNP   acetaminophen 650 mg Oral Q4H PRN Darin Goldberg MD   acetaminophen 975 mg Oral Q6H PRN Shaniqua , CRNP   aluminum-magnesium hydroxide-simethicone 30 mL Oral Q4H PRN Shaniqua , CRNP   benztropine 1 mg Intramuscular Q6H PRN Shaniqua , CRNP   benztropine 1 mg Oral Q6H PRN Shaniqua , CRNP   dorzolamide 1 drop Both Eyes Daily Darin Goldberg MD   And       timolol 1 drop Both Eyes Daily Darin Goldberg MD   folic acid 1 mg Oral Daily Darin Goldberg MD   haloperidol 5 mg Oral Q6H PRN Shaniqua , CRNP   haloperidol lactate 5 mg Intramuscular Q6H PRN JACQUI Fields   hydrOXYzine HCL 50 mg Oral Q6H PRN Katina Plummer MD   latanoprost 1 drop Both Eyes HS Katina Plummer MD   lithium carbonate 300 mg Oral TID With Meals Katina Plummer MD   LORazepam 1 mg Intramuscular Q6H PRN JACQUI Fields   LORazepam 1 mg Oral BID Katina Plummer MD   magnesium hydroxide 30 mL Oral Daily PRN JACQUI Fields   melatonin 3 mg Oral HS Katina Plummer MD   risperiDONE 1 mg Oral Q3H PRN JACQUI Fields   [START ON 12/25/2018] risperiDONE 1 mg Oral Daily Katina Plummer MD   risperiDONE 2 mg Oral QPM Katina Plummer MD   rOPINIRole 2 mg Oral HS Katina Plummer MD   thiamine 100 mg Oral Daily Katina Plummer MD   traZODone 100 mg Oral HS PRN Cecilia Perez MD   traZODone 50 mg Oral HS PRN JACQUI Fields   ziprasidone 20 mg Intramuscular Q4H PRN JACQUI Fields     Continuous Infusions:   PRN Meds:  acetaminophen    acetaminophen    acetaminophen    aluminum-magnesium hydroxide-simethicone    benztropine    benztropine    haloperidol    haloperidol lactate    hydrOXYzine HCL    LORazepam    magnesium hydroxide    risperiDONE    traZODone    traZODone    ziprasidone
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
## 2018-12-27 NOTE — PROGRESS NOTES
Pulmonology paged to make them aware patient is being discharged; case management made aware of patient's need for follow up for CT scan of chest and PCP appointments

## 2018-12-27 NOTE — PROGRESS NOTES
Pt c/o anxiety, received atarax PRN  Pt asleep, no further complaints  Medication appears to be effective
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
## 2018-12-28 DIAGNOSIS — R91.8 RIGHT LOWER LOBE PULMONARY INFILTRATE: Primary | ICD-10-CM

## 2019-01-06 ENCOUNTER — HOSPITAL ENCOUNTER (EMERGENCY)
Facility: HOSPITAL | Age: 54
Discharge: HOME | End: 2019-01-06
Attending: EMERGENCY MEDICINE
Payer: COMMERCIAL

## 2019-01-06 VITALS
SYSTOLIC BLOOD PRESSURE: 200 MMHG | HEART RATE: 89 BPM | TEMPERATURE: 97 F | OXYGEN SATURATION: 95 % | HEIGHT: 66 IN | RESPIRATION RATE: 18 BRPM | DIASTOLIC BLOOD PRESSURE: 89 MMHG | WEIGHT: 225 LBS | BODY MASS INDEX: 36.16 KG/M2

## 2019-01-06 DIAGNOSIS — F14.90 CRACK COCAINE USE: Primary | ICD-10-CM

## 2019-01-06 LAB
AMPHETAMINES SERPL QL SCN: NEGATIVE
BARBITURATES UR QL: NEGATIVE
BENZODIAZ UR QL: NEGATIVE
COCAINE UR QL: POSITIVE
ETHANOL EXG-MCNC: 0 MG/DL
METHADONE UR QL: NEGATIVE
OPIATES UR QL SCN: NEGATIVE
PCP UR QL: NEGATIVE
THC UR QL: NEGATIVE

## 2019-01-06 PROCEDURE — 82075 ASSAY OF BREATH ETHANOL: CPT | Performed by: EMERGENCY MEDICINE

## 2019-01-06 PROCEDURE — 80307 DRUG TEST PRSMV CHEM ANLYZR: CPT | Performed by: EMERGENCY MEDICINE

## 2019-01-06 PROCEDURE — 99283 EMERGENCY DEPT VISIT LOW MDM: CPT

## 2019-01-06 RX ORDER — LORAZEPAM 0.5 MG/1
2 TABLET ORAL ONCE
Status: COMPLETED | OUTPATIENT
Start: 2019-01-06 | End: 2019-01-06

## 2019-01-06 RX ADMIN — MAGNESIUM HYDROXIDE 30 ML: 400 SUSPENSION ORAL at 03:28

## 2019-01-06 RX ADMIN — LORAZEPAM 2 MG: 0.5 TABLET ORAL at 03:22
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
## 2019-01-06 NOTE — DISCHARGE INSTRUCTIONS
Cocaine Abuse, Ambulatory Care   GENERAL INFORMATION:   Cocaine abuse  develops when you need more cocaine to get the same feelings of happiness you got from lower amounts  You may have cocaine withdrawal if you have used cocaine for a long time and you suddenly use less or stop using it  Common symptoms include the following:   · Use of more cocaine than you first wanted to use     · No ability to decrease or control your use of cocaine    · Spending much of your time using cocaine, or dealing with a hangover after you use cocaine    · Less time spent around others, at work, or doing activities that you enjoy    · Continued cocaine use, even when it causes physical or mental problems  Signs and symptoms of cocaine withdrawal:   · Seizure    · Sweating, shaking, or a fast heartbeat    · Seeing, hearing, or feeling things that are not really there    · Unpleasant dreams that seem real    · Severe sadness or fatigue    · Restlessness, nervousness, or anxiety    · Trouble sleeping or difficulty waking up    · Nausea or vomiting  Seek immediate care for the following symptoms:   · Chest pain, sweating, or shortness of breath    · Weakness on one side of your body    · Seizure    · Severe headache, confusion, or feeling very nervous    · Fever over 101°F (38 3°C) after you use cocaine    · Coughing or spitting up blood    · Feeling like hurting yourself or someone else    · Severe abdominal pain  Follow up with your healthcare provider as directed:  Write down your questions so you remember to ask them during your visits  CARE AGREEMENT:   You have the right to help plan your care  Learn about your health condition and how it may be treated  Discuss treatment options with your caregivers to decide what care you want to receive  You always have the right to refuse treatment  The above information is an  only  It is not intended as medical advice for individual conditions or treatments   Talk to your doctor, nurse or pharmacist before following any medical regimen to see if it is safe and effective for you  © 2014 [address] is for End User's use only and may not be sold, redistributed or otherwise used for commercial purposes  All illustrations and images included in CareNotes® are the copyrighted property of A D A M , [employer]  or [employer]
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
## 2019-01-06 NOTE — ED NOTES
Pt upset with staff because he got PO ativan and not IM ativan  Pt states he wants to leave and smoke cigarette          Parminder Simpson RN  01/06/19 0784

## 2019-01-15 ENCOUNTER — APPOINTMENT (EMERGENCY)
Dept: RADIOLOGY | Facility: HOSPITAL | Age: 54
End: 2019-01-15
Payer: COMMERCIAL

## 2019-01-15 ENCOUNTER — HOSPITAL ENCOUNTER (EMERGENCY)
Facility: HOSPITAL | Age: 54
Discharge: HOME | End: 2019-01-15
Attending: EMERGENCY MEDICINE
Payer: COMMERCIAL

## 2019-01-15 VITALS
WEIGHT: 230 LBS | DIASTOLIC BLOOD PRESSURE: 68 MMHG | BODY MASS INDEX: 36.96 KG/M2 | RESPIRATION RATE: 18 BRPM | OXYGEN SATURATION: 98 % | SYSTOLIC BLOOD PRESSURE: 124 MMHG | TEMPERATURE: 98 F | HEART RATE: 72 BPM | HEIGHT: 66 IN

## 2019-01-15 DIAGNOSIS — F14.929 COCAINE INTOXICATION (HCC): Primary | ICD-10-CM

## 2019-01-15 DIAGNOSIS — F22 PARANOIA (HCC): ICD-10-CM

## 2019-01-15 LAB
ALBUMIN SERPL BCP-MCNC: 3.9 G/DL (ref 3.5–5)
ALP SERPL-CCNC: 93 U/L (ref 46–116)
ALT SERPL W P-5'-P-CCNC: 78 U/L (ref 12–78)
AMPHETAMINES SERPL QL SCN: NEGATIVE
ANION GAP SERPL CALCULATED.3IONS-SCNC: 9 MMOL/L (ref 4–13)
AST SERPL W P-5'-P-CCNC: 45 U/L (ref 5–45)
BARBITURATES UR QL: NEGATIVE
BASOPHILS # BLD AUTO: 0.02 THOUSANDS/ΜL (ref 0–0.1)
BASOPHILS NFR BLD AUTO: 0 % (ref 0–1)
BENZODIAZ UR QL: NEGATIVE
BILIRUB SERPL-MCNC: 0.64 MG/DL (ref 0.2–1)
BUN SERPL-MCNC: 11 MG/DL (ref 5–25)
CALCIUM SERPL-MCNC: 9 MG/DL (ref 8.3–10.1)
CHLORIDE SERPL-SCNC: 100 MMOL/L (ref 100–108)
CO2 SERPL-SCNC: 22 MMOL/L (ref 21–32)
COCAINE UR QL: POSITIVE
CREAT SERPL-MCNC: 1.1 MG/DL (ref 0.6–1.3)
EOSINOPHIL # BLD AUTO: 0.05 THOUSAND/ΜL (ref 0–0.61)
EOSINOPHIL NFR BLD AUTO: 1 % (ref 0–6)
ERYTHROCYTE [DISTWIDTH] IN BLOOD BY AUTOMATED COUNT: 12.7 % (ref 11.6–15.1)
ETHANOL EXG-MCNC: 0 MG/DL
GFR SERPL CREATININE-BSD FRML MDRD: 77 ML/MIN/1.73SQ M
GLUCOSE SERPL-MCNC: 90 MG/DL (ref 65–140)
HCT VFR BLD AUTO: 38.8 % (ref 36.5–49.3)
HGB BLD-MCNC: 13.4 G/DL (ref 12–17)
IMM GRANULOCYTES # BLD AUTO: 0.02 THOUSAND/UL (ref 0–0.2)
IMM GRANULOCYTES NFR BLD AUTO: 0 % (ref 0–2)
LYMPHOCYTES # BLD AUTO: 2.62 THOUSANDS/ΜL (ref 0.6–4.47)
LYMPHOCYTES NFR BLD AUTO: 42 % (ref 14–44)
MCH RBC QN AUTO: 30.5 PG (ref 26.8–34.3)
MCHC RBC AUTO-ENTMCNC: 34.5 G/DL (ref 31.4–37.4)
MCV RBC AUTO: 88 FL (ref 82–98)
METHADONE UR QL: NEGATIVE
MONOCYTES # BLD AUTO: 0.45 THOUSAND/ΜL (ref 0.17–1.22)
MONOCYTES NFR BLD AUTO: 7 % (ref 4–12)
NEUTROPHILS # BLD AUTO: 3.04 THOUSANDS/ΜL (ref 1.85–7.62)
NEUTS SEG NFR BLD AUTO: 50 % (ref 43–75)
NRBC BLD AUTO-RTO: 0 /100 WBCS
OPIATES UR QL SCN: NEGATIVE
PCP UR QL: NEGATIVE
PLATELET # BLD AUTO: 178 THOUSANDS/UL (ref 149–390)
PMV BLD AUTO: 10.6 FL (ref 8.9–12.7)
POTASSIUM SERPL-SCNC: 3.7 MMOL/L (ref 3.5–5.3)
PROT SERPL-MCNC: 8 G/DL (ref 6.4–8.2)
RBC # BLD AUTO: 4.39 MILLION/UL (ref 3.88–5.62)
SODIUM SERPL-SCNC: 131 MMOL/L (ref 136–145)
THC UR QL: NEGATIVE
TROPONIN I SERPL-MCNC: <0.02 NG/ML
TROPONIN I SERPL-MCNC: <0.02 NG/ML
WBC # BLD AUTO: 6.2 THOUSAND/UL (ref 4.31–10.16)

## 2019-01-15 PROCEDURE — 80307 DRUG TEST PRSMV CHEM ANLYZR: CPT | Performed by: EMERGENCY MEDICINE

## 2019-01-15 PROCEDURE — 36415 COLL VENOUS BLD VENIPUNCTURE: CPT | Performed by: EMERGENCY MEDICINE

## 2019-01-15 PROCEDURE — 96376 TX/PRO/DX INJ SAME DRUG ADON: CPT

## 2019-01-15 PROCEDURE — 96374 THER/PROPH/DIAG INJ IV PUSH: CPT

## 2019-01-15 PROCEDURE — 71046 X-RAY EXAM CHEST 2 VIEWS: CPT

## 2019-01-15 PROCEDURE — 99285 EMERGENCY DEPT VISIT HI MDM: CPT

## 2019-01-15 PROCEDURE — 93005 ELECTROCARDIOGRAM TRACING: CPT

## 2019-01-15 PROCEDURE — 84484 ASSAY OF TROPONIN QUANT: CPT | Performed by: EMERGENCY MEDICINE

## 2019-01-15 PROCEDURE — 82075 ASSAY OF BREATH ETHANOL: CPT | Performed by: EMERGENCY MEDICINE

## 2019-01-15 PROCEDURE — 80053 COMPREHEN METABOLIC PANEL: CPT | Performed by: EMERGENCY MEDICINE

## 2019-01-15 PROCEDURE — 85025 COMPLETE CBC W/AUTO DIFF WBC: CPT | Performed by: EMERGENCY MEDICINE

## 2019-01-15 PROCEDURE — 96375 TX/PRO/DX INJ NEW DRUG ADDON: CPT

## 2019-01-15 RX ORDER — ASPIRIN 81 MG/1
324 TABLET, CHEWABLE ORAL ONCE
Status: COMPLETED | OUTPATIENT
Start: 2019-01-15 | End: 2019-01-15

## 2019-01-15 RX ORDER — LORAZEPAM 2 MG/ML
1 INJECTION INTRAMUSCULAR ONCE
Status: COMPLETED | OUTPATIENT
Start: 2019-01-15 | End: 2019-01-15

## 2019-01-15 RX ORDER — ONDANSETRON 2 MG/ML
4 INJECTION INTRAMUSCULAR; INTRAVENOUS ONCE
Status: COMPLETED | OUTPATIENT
Start: 2019-01-15 | End: 2019-01-15

## 2019-01-15 RX ADMIN — LORAZEPAM 1 MG: 2 INJECTION, SOLUTION INTRAMUSCULAR; INTRAVENOUS at 10:21

## 2019-01-15 RX ADMIN — LORAZEPAM 1 MG: 2 INJECTION, SOLUTION INTRAMUSCULAR; INTRAVENOUS at 11:19

## 2019-01-15 RX ADMIN — ASPIRIN 81 MG 324 MG: 81 TABLET ORAL at 11:18

## 2019-01-15 RX ADMIN — ONDANSETRON 4 MG: 2 INJECTION INTRAMUSCULAR; INTRAVENOUS at 11:19

## 2019-01-15 RX ADMIN — LORAZEPAM 1 MG: 2 INJECTION, SOLUTION INTRAMUSCULAR; INTRAVENOUS at 16:29

## 2019-01-15 NOTE — DISCHARGE INSTRUCTIONS
Cocaine Abuse, Ambulatory Care   GENERAL INFORMATION:   Cocaine abuse  develops when you need more cocaine to get the same feelings of happiness you got from lower amounts  You may have cocaine withdrawal if you have used cocaine for a long time and you suddenly use less or stop using it  Common symptoms include the following:   · Use of more cocaine than you first wanted to use     · No ability to decrease or control your use of cocaine    · Spending much of your time using cocaine, or dealing with a hangover after you use cocaine    · Less time spent around others, at work, or doing activities that you enjoy    · Continued cocaine use, even when it causes physical or mental problems  Signs and symptoms of cocaine withdrawal:   · Seizure    · Sweating, shaking, or a fast heartbeat    · Seeing, hearing, or feeling things that are not really there    · Unpleasant dreams that seem real    · Severe sadness or fatigue    · Restlessness, nervousness, or anxiety    · Trouble sleeping or difficulty waking up    · Nausea or vomiting  Seek immediate care for the following symptoms:   · Chest pain, sweating, or shortness of breath    · Weakness on one side of your body    · Seizure    · Severe headache, confusion, or feeling very nervous    · Fever over 101°F (38 3°C) after you use cocaine    · Coughing or spitting up blood    · Feeling like hurting yourself or someone else    · Severe abdominal pain  Follow up with your healthcare provider as directed:  Write down your questions so you remember to ask them during your visits  CARE AGREEMENT:   You have the right to help plan your care  Learn about your health condition and how it may be treated  Discuss treatment options with your caregivers to decide what care you want to receive  You always have the right to refuse treatment  The above information is an  only  It is not intended as medical advice for individual conditions or treatments   Talk to your doctor, nurse or pharmacist before following any medical regimen to see if it is safe and effective for you  © 2014 [address] is for End User's use only and may not be sold, redistributed or otherwise used for commercial purposes  All illustrations and images included in CareNotes® are the copyrighted property of A D A M , Inc  or [employer]

## 2019-01-15 NOTE — ED NOTES
Pt expressing that he has fear of going home and feels very axious at times     Geraldo Welch, ZARINA  01/15/19 4436
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
## 2019-01-15 NOTE — ED ATTENDING ATTESTATION
Dalila Ramsay MD, saw and evaluated the patient  All available labs and X-rays were ordered by me or the resident and have been reviewed by myself  I discussed the patient with the resident / non-physician and agree with the resident's / non-physician practitioner's findings and plan as documented in the resident's / non-physician practicitioner's note, except where noted  At this point, I agree with the current assessment done in the ED  Chief Complaint   Patient presents with    Psychiatric Evaluation     "I was 9 days clean from smoking crack and I used again 2 hours ago, my chest hurts, my head is pounding, and my lungs hurt  I dont feel safe at home and I feel like hurting myself"  Pt states he drank a 6 pack and a pint of vodka in addition to smoking crack  This is a 61-year-old male well known to the emergency room presenting for evaluation of crack cocaine abuse, anxiety  The patient states that he was using crack until maybe 9 days ago and stopped using until 2 hours ago  Since using it he feels his chest hurts, he feels his heart pounding  He feels like he is going to hurt himself  He also drank a lot of alcohol before coming in  He denies any fevers chills, feels nauseous without vomiting  Positive chest pain as discussed, feeling no shortness of breath with it though  He just feels that his heart is pounding when he is taking a deep breath  Denies any falls or injuries  He is not compliant with all of his medications  PMH:  - PTSD / Bipolar / Substance abuse / Chronic pain  - Glaucoma  PSH:  - colostomy  - shoulder surgery  - wrist surgery  - knee surgery  - ankle surgery  No smoking  +alcoholic  +drug use as per chart     PE:  Vitals:    01/15/19 1111 01/15/19 1300 01/15/19 1530 01/15/19 1720   BP: 134/64 130/70 122/78 124/68   BP Location:       Pulse: 87 80 78 72   Resp: 20 18 16 18   Temp:       TempSrc:       SpO2: 97% 97% 98% 98%   Weight:       Height:       General: VSS, NAD, awake, alert  Well-nourished, well-developed  Appears stated age  Morbidly obese  Not diaphoretic  Speaking normally in full sentences  Head: Normocephalic, atraumatic, nontender  Eyes: PERRL, EOM-I  No diplopia  No hyphema  No subconjunctival hemorrhages  Symmetrical lids  ENT: Atraumatic external nose and ears  MMM  No malocclusion  No stridor  Normal phonation  No drooling  Normal swallowing  Neck: Symmetric, trachea midline  No JVD  CV: RRR  +S1/S2  No murmurs or gallops  Peripheral pulses +2 throughout  No chest wall tenderness  Lungs:   Unlabored No retractions  CTAB, lungs sounds equal bilateral    No tachypnea  Abd: +BS, soft, NT/ND    MSK:   FROM   Back:   No rashes  Skin: Dry, intact  Neuro: AAOx3, GCS 15, CN II-XII grossly intact  Motor grossly intact  Psychiatric/Behavioral: Appropriate mood and affect   Exam: deferred  A:  - CP  - Crack abuse  P:  - cardiac workup  Will do Ativan for his borderline tachycardia, chest pain  Will re-evaluate  Labetalol if actually needed for blood pressure  Will do stroke, delta troponin  Monitor times 4 hours verses admit for observation  Will need crisis evaluation or Psychiatry evaluation depending on admission status  - 13 point ROS was performed and all are normal unless stated in the history above  - Nursing note reviewed  Vitals reviewed  - Orders placed by myself and/or advanced practitioner / resident     - Previous chart was reviewed  - No language barrier    - History obtained from patient  - There are no limitations to the history obtained  - Critical care time: Not applicable for this patient  Final Diagnosis:  1  Cocaine intoxication ([facility] [address] )    2  [facility])        ED Course as of Jan 16 0822   Tue Mark 15, 2019   0959 ST depression on EKG  Will treat with ativan first for cocaine  Will do delta + trop in a few hours before medically cleared          Medications   LORazepam (ATIVAN) 2 mg/mL injection 1 mg (1 mg Intravenous Given 1/15/19 1021)   ondansetron (ZOFRAN) injection 4 mg (4 mg Intravenous Given 1/15/19 1119)   aspirin chewable tablet 324 mg (324 mg Oral Given 1/15/19 1118)   LORazepam (ATIVAN) 2 mg/mL injection 1 mg (1 mg Intravenous Given 1/15/19 1119)   LORazepam (ATIVAN) 2 mg/mL injection 1 mg (1 mg Intravenous Given 1/15/19 1629)     XR chest 2 views   ED Interpretation   No pneumomediastinum      Final Result   No acute cardiopulmonary disease  Workstation performed: [id number]           Orders Placed This Encounter   Procedures    ED ECG Documentation Only    ED ECG Documentation Only    XR chest 2 views    Rapid drug screen, urine    CBC and differential    Comprehensive metabolic panel    Troponin I    Troponin I    POCT alcohol breath test    ECG 12 lead    ECG 12 lead    ECG 12 lead     Labs Reviewed   RAPID DRUG SCREEN, URINE - Abnormal        Result Value Ref Range Status    Amph/Meth UR Negative  Negative Final    Barbiturate Ur Negative  Negative Final    Benzodiazepine Urine Negative  Negative Final    Cocaine Urine Positive (*) Negative Final    Methadone Urine Negative  Negative Final    Opiate Urine Negative  Negative Final    PCP Ur Negative  Negative Final    THC Urine Negative  Negative Final    Narrative:     Presumptive report  If requested, specimen will be sent to reference lab for confirmation  FOR MEDICAL PURPOSES ONLY  IF CONFIRMATION NEEDED PLEASE CONTACT THE LAB WITHIN 5 DAYS      Drug Screen Cutoff Levels:  AMPHETAMINE/METHAMPHETAMINES  1000 ng/mL  BARBITURATES     200 ng/mL  BENZODIAZEPINES     200 ng/mL  COCAINE      300 ng/mL  METHADONE      300 ng/mL  OPIATES      300 ng/mL  PHENCYCLIDINE     25 ng/mL  THC       50 ng/mL   COMPREHENSIVE METABOLIC PANEL - Abnormal     Sodium 131 (*) 136 - 145 mmol/L Final    Potassium 3 7  3 5 - 5 3 mmol/L Final    Chloride 100  100 - 108 mmol/L Final    CO2 22  21 - 32 mmol/L Final    ANION GAP 9  4 - 13 mmol/L Final    BUN 11  5 - 25 mg/dL Final    Creatinine 1 10  0 60 - 1 30 mg/dL Final    Comment: Standardized to IDMS reference method    Glucose 90  65 - 140 mg/dL Final    Comment:   If the patient is fasting, the ADA then defines impaired fasting glucose as > 100 mg/dL and diabetes as > or equal to 123 mg/dL  Specimen collection should occur prior to Sulfasalazine administration due to the potential for falsely depressed results  Specimen collection should occur prior to Sulfapyridine administration due to the potential for falsely elevated results  Calcium 9 0  8 3 - 10 1 mg/dL Final    AST 45  5 - 45 U/L Final    Comment: Slightly Hemolyzed; Results May be Affected&XA&Slightly Hemolyzed; Results May be Affected&XA&Slightly Hemolyzed; Results May be Affected&XA&Slightly Hemolyzed; Results May be Affected&XA&Slightly Hemolyzed; Results May be Affected  Specimen collection should occur prior to Sulfasalazine administration due to the potential for falsely depressed results  ALT 78  12 - 78 U/L Final    Comment:   Specimen collection should occur prior to Sulfasalazine and/or Sulfapyridine administration due to the potential for falsely depressed results  Alkaline Phosphatase 93  46 - 116 U/L Final    Total Protein 8 0  6 4 - 8 2 g/dL Final    Albumin 3 9  3 5 - 5 0 g/dL Final    Total Bilirubin 0 64  0 20 - 1 00 mg/dL Final    eGFR 77  ml/min/1 73sq m Final    Narrative:     National Kidney Disease Education Program recommendations are as follows:  GFR calculation is accurate only with a steady state creatinine  Chronic Kidney disease less than 60 ml/min/1 73 sq  meters  Kidney failure less than 15 ml/min/1 73 sq  meters     TROPONIN I - Normal    Troponin I <0 02  <=0 04 ng/mL Final    Comment:   [employer] Chemistry analyzer 99% cutoff is > 0 04 ng/mL in network labs     o cTnI 99% cutoff is useful only when applied to patients in the clinical setting of myocardial ischemia   o cTnI 99% cutoff should be interpreted in the context of clinical history, ECG findings and possibly cardiac imaging to establish correct diagnosis  o cTnI 99% cutoff may be suggestive but clearly not indicative of a coronary event without the clinical setting of myocardial ischemia  TROPONIN I - Normal    Troponin I <0 02  <=0 04 ng/mL Final    Comment:   [employer] Chemistry analyzer 99% cutoff is > 0 04 ng/mL in network labs     o cTnI 99% cutoff is useful only when applied to patients in the clinical setting of myocardial ischemia   o cTnI 99% cutoff should be interpreted in the context of clinical history, ECG findings and possibly cardiac imaging to establish correct diagnosis  o cTnI 99% cutoff may be suggestive but clearly not indicative of a coronary event without the clinical setting of myocardial ischemia  POCT ALCOHOL BREATH TEST - Normal    EXTBreath Alcohol 0 000   Final   CBC AND DIFFERENTIAL    WBC 6 20  4  31 - 10 16 Thousand/uL Final    RBC 4 39  3 88 - 5 62 Million/uL Final    Hemoglobin 13 4  12 0 - 17 0 g/dL Final    Hematocrit 38 8  36 5 - 49 3 % Final    MCV 88  82 - 98 fL Final    MCH 30 5  26 8 - 34 3 pg Final    MCHC 34 5  31 4 - 37 4 g/dL Final    RDW 12 7  11 6 - 15 1 % Final    MPV 10 6  8 9 - 12 7 fL Final    Platelets 220  042 - 390 Thousands/uL Final    nRBC 0  /100 WBCs Final    Neutrophils Relative 50  43 - 75 % Final    Immat GRANS % 0  0 - 2 % Final    Lymphocytes Relative 42  14 - 44 % Final    Monocytes Relative 7  4 - 12 % Final    Eosinophils Relative 1  0 - 6 % Final    Basophils Relative 0  0 - 1 % Final    Neutrophils Absolute 3 04  1 85 - 7 62 Thousands/µL Final    Immature Grans Absolute 0 02  0 00 - 0 20 Thousand/uL Final    Lymphocytes Absolute 2 62  0 60 - 4 47 Thousands/µL Final    Monocytes Absolute 0 45  0 17 - 1 22 Thousand/µL Final    Eosinophils Absolute 0 05  0 00 - 0 61 Thousand/µL Final    Basophils Absolute 0 02  0 00 - 0 10 Thousands/µL Final     Time reflects when diagnosis was documented in both MDM as applicable and the Disposition within this note     Time User Action Codes Description Comment    1/15/2019  4:59 PM Giselle Hernandez Add [H15 409] Cocaine intoxication ([address] )     1/15/2019  5:00 PM Giselle Hernandez Add [F22] [facility])       ED Disposition     ED Disposition Condition Comment    Discharge  Nirmala Simms discharge to home/self care      Condition at discharge: Stable        Follow-up Information     Follow up With Specialties Details Why [address] [facility] Emergency Department Emergency Medicine Go to As needed, If symptoms worsen [address] [place] [address], [address], [address], MD MPH Internal Medicine Schedule an appointment as soon as possible for a visit in 3 days If symptoms worsen [facility] [address] [facility]  [phone]           Discharge Medication List as of 1/15/2019  5:19 PM      CONTINUE these medications which have NOT CHANGED    Details   dorzolamide-timolol (COSOPT) 22 3-6 8 MG/ML ophthalmic solution Administer 1 drop to both eyes daily  , Historical Med      hydrOXYzine HCL (ATARAX) 50 mg tablet Take 1 tablet (50 mg total) by mouth 2 (two) times a day as needed (mild anxiety) for up to 60 days, Starting Thu 12/27/2018, Until Mon 2/25/2019, Print      latanoprost (XALATAN) 0 005 % ophthalmic solution Administer 1 drop to both eyes daily, Historical Med      lithium carbonate 300 mg capsule Take 1 capsule (300 mg total) by mouth 3 (three) times a day with meals for 60 days, Starting Thu 12/27/2018, Until Mon 2/25/2019, Print      melatonin 3 mg Take 2 tablets (6 mg total) by mouth daily at bedtime for 60 days, Starting Thu 12/27/2018, Until Mon 2/25/2019, Print      risperiDONE (RisperDAL) 2 mg tablet Take 1 tablet (2 mg total) by mouth 2 (two) times a day for 60 days, Starting Thu 12/27/2018, Until Mon 2/25/2019, Print      rOPINIRole (REQUIP) 2 mg tablet Take 1 tablet (2 mg total) by mouth daily at bedtime for 30 days, Starting u 12/27/2018, Until Sat 1/26/2019, Print      traZODone (DESYREL) 100 mg tablet Take 1 tablet (100 mg total) by mouth daily at bedtime as needed for sleep for up to 60 days, Starting u 12/27/2018, Until Mon 2/25/2019, Print           No discharge procedures on file  Prior to Admission Medications   Prescriptions Last Dose Informant Patient Reported? Taking?   dorzolamide-timolol (COSOPT) 22 3-6 8 MG/ML ophthalmic solution Past Month at Unknown time Pharmacy (Specify) Yes Yes   Sig: Administer 1 drop to both eyes daily     hydrOXYzine HCL (ATARAX) 50 mg tablet Past Month at Unknown time  No Yes   Sig: Take 1 tablet (50 mg total) by mouth 2 (two) times a day as needed (mild anxiety) for up to 60 days   latanoprost (XALATAN) 0 005 % ophthalmic solution Past Month at Unknown time Pharmacy (Specify) Yes Yes   Sig: Administer 1 drop to both eyes daily   lithium carbonate 300 mg capsule Past Month at Unknown time  No Yes   Sig: Take 1 capsule (300 mg total) by mouth 3 (three) times a day with meals for 60 days   melatonin 3 mg Past Month at Unknown time  No Yes   Sig: Take 2 tablets (6 mg total) by mouth daily at bedtime for 60 days   rOPINIRole (REQUIP) 2 mg tablet Past Month at Unknown time  No Yes   Sig: Take 1 tablet (2 mg total) by mouth daily at bedtime for 30 days   risperiDONE (RisperDAL) 2 mg tablet Past Month at Unknown time  No Yes   Sig: Take 1 tablet (2 mg total) by mouth 2 (two) times a day for 60 days   traZODone (DESYREL) 100 mg tablet Past Month at Unknown time  No Yes   Sig: Take 1 tablet (100 mg total) by mouth daily at bedtime as needed for sleep for up to 60 days      Facility-Administered Medications: None       Portions of the record may have been created with voice recognition software   Occasional wrong word or "sound a like" substitutions may have occurred due to the inherent limitations of voice recognition software  Read the chart carefully and recognize, using context, where substitutions have occurred      Electronically signed by:  Edith Moran

## 2019-01-16 LAB
ATRIAL RATE: 74 BPM
ATRIAL RATE: 94 BPM
P AXIS: 41 DEGREES
P AXIS: 43 DEGREES
PR INTERVAL: 164 MS
PR INTERVAL: 168 MS
QRS AXIS: 45 DEGREES
QRS AXIS: 68 DEGREES
QRSD INTERVAL: 86 MS
QRSD INTERVAL: 88 MS
QT INTERVAL: 336 MS
QT INTERVAL: 386 MS
QTC INTERVAL: 420 MS
QTC INTERVAL: 428 MS
T WAVE AXIS: 10 DEGREES
T WAVE AXIS: 4 DEGREES
VENTRICULAR RATE: 74 BPM
VENTRICULAR RATE: 94 BPM

## 2019-01-16 PROCEDURE — 93010 ELECTROCARDIOGRAM REPORT: CPT | Performed by: INTERNAL MEDICINE

## 2019-02-11 ENCOUNTER — HOSPITAL ENCOUNTER (EMERGENCY)
Facility: HOSPITAL | Age: 54
Discharge: HOME | End: 2019-02-11
Attending: EMERGENCY MEDICINE | Admitting: EMERGENCY MEDICINE
Payer: COMMERCIAL

## 2019-02-11 ENCOUNTER — TRANSCRIBE ORDERS (OUTPATIENT)
Dept: RADIOLOGY | Facility: HOSPITAL | Age: 54
End: 2019-02-11

## 2019-02-11 ENCOUNTER — APPOINTMENT (EMERGENCY)
Dept: RADIOLOGY | Facility: HOSPITAL | Age: 54
End: 2019-02-11
Payer: COMMERCIAL

## 2019-02-11 ENCOUNTER — HOSPITAL ENCOUNTER (OUTPATIENT)
Dept: RADIOLOGY | Facility: HOSPITAL | Age: 54
Discharge: HOME | End: 2019-02-11
Attending: INTERNAL MEDICINE
Payer: COMMERCIAL

## 2019-02-11 VITALS
WEIGHT: 220 LBS | HEART RATE: 88 BPM | BODY MASS INDEX: 35.36 KG/M2 | TEMPERATURE: 98.5 F | DIASTOLIC BLOOD PRESSURE: 95 MMHG | RESPIRATION RATE: 16 BRPM | OXYGEN SATURATION: 97 % | HEIGHT: 66 IN | SYSTOLIC BLOOD PRESSURE: 151 MMHG

## 2019-02-11 DIAGNOSIS — R07.9 CHEST PAIN: Primary | ICD-10-CM

## 2019-02-11 DIAGNOSIS — F14.90 CRACK COCAINE USE: ICD-10-CM

## 2019-02-11 DIAGNOSIS — R91.8 RIGHT LOWER LOBE PULMONARY INFILTRATE: ICD-10-CM

## 2019-02-11 LAB
ALBUMIN SERPL BCP-MCNC: 4 G/DL (ref 3.5–5)
ALP SERPL-CCNC: 96 U/L (ref 46–116)
ALT SERPL W P-5'-P-CCNC: 70 U/L (ref 12–78)
ANION GAP SERPL CALCULATED.3IONS-SCNC: 7 MMOL/L (ref 4–13)
AST SERPL W P-5'-P-CCNC: 26 U/L (ref 5–45)
ATRIAL RATE: 75 BPM
BASOPHILS # BLD AUTO: 0.03 THOUSANDS/ΜL (ref 0–0.1)
BASOPHILS NFR BLD AUTO: 0 % (ref 0–1)
BILIRUB SERPL-MCNC: 0.34 MG/DL (ref 0.2–1)
BUN SERPL-MCNC: 16 MG/DL (ref 5–25)
CALCIUM SERPL-MCNC: 9.1 MG/DL (ref 8.3–10.1)
CHLORIDE SERPL-SCNC: 103 MMOL/L (ref 100–108)
CO2 SERPL-SCNC: 27 MMOL/L (ref 21–32)
CREAT SERPL-MCNC: 1.21 MG/DL (ref 0.6–1.3)
EOSINOPHIL # BLD AUTO: 0.12 THOUSAND/ΜL (ref 0–0.61)
EOSINOPHIL NFR BLD AUTO: 2 % (ref 0–6)
ERYTHROCYTE [DISTWIDTH] IN BLOOD BY AUTOMATED COUNT: 13.2 % (ref 11.6–15.1)
GFR SERPL CREATININE-BSD FRML MDRD: 69 ML/MIN/1.73SQ M
GLUCOSE SERPL-MCNC: 115 MG/DL (ref 65–140)
HCT VFR BLD AUTO: 44.5 % (ref 36.5–49.3)
HGB BLD-MCNC: 14.9 G/DL (ref 12–17)
IMM GRANULOCYTES # BLD AUTO: 0.02 THOUSAND/UL (ref 0–0.2)
IMM GRANULOCYTES NFR BLD AUTO: 0 % (ref 0–2)
LITHIUM SERPL-SCNC: <0.2 MMOL/L (ref 0.5–1)
LYMPHOCYTES # BLD AUTO: 2.87 THOUSANDS/ΜL (ref 0.6–4.47)
LYMPHOCYTES NFR BLD AUTO: 40 % (ref 14–44)
MCH RBC QN AUTO: 30.5 PG (ref 26.8–34.3)
MCHC RBC AUTO-ENTMCNC: 33.5 G/DL (ref 31.4–37.4)
MCV RBC AUTO: 91 FL (ref 82–98)
MONOCYTES # BLD AUTO: 0.46 THOUSAND/ΜL (ref 0.17–1.22)
MONOCYTES NFR BLD AUTO: 6 % (ref 4–12)
NEUTROPHILS # BLD AUTO: 3.67 THOUSANDS/ΜL (ref 1.85–7.62)
NEUTS SEG NFR BLD AUTO: 52 % (ref 43–75)
NRBC BLD AUTO-RTO: 0 /100 WBCS
P AXIS: 65 DEGREES
PLATELET # BLD AUTO: 182 THOUSANDS/UL (ref 149–390)
PMV BLD AUTO: 11.1 FL (ref 8.9–12.7)
POTASSIUM SERPL-SCNC: 4 MMOL/L (ref 3.5–5.3)
PR INTERVAL: 154 MS
PROT SERPL-MCNC: 7.9 G/DL (ref 6.4–8.2)
QRS AXIS: 42 DEGREES
QRSD INTERVAL: 82 MS
QT INTERVAL: 368 MS
QTC INTERVAL: 405 MS
RBC # BLD AUTO: 4.89 MILLION/UL (ref 3.88–5.62)
SODIUM SERPL-SCNC: 137 MMOL/L (ref 136–145)
T WAVE AXIS: -6 DEGREES
TROPONIN I SERPL-MCNC: <0.02 NG/ML
VENTRICULAR RATE: 73 BPM
WBC # BLD AUTO: 7.17 THOUSAND/UL (ref 4.31–10.16)

## 2019-02-11 PROCEDURE — 36415 COLL VENOUS BLD VENIPUNCTURE: CPT | Performed by: EMERGENCY MEDICINE

## 2019-02-11 PROCEDURE — 84484 ASSAY OF TROPONIN QUANT: CPT | Performed by: EMERGENCY MEDICINE

## 2019-02-11 PROCEDURE — 99284 EMERGENCY DEPT VISIT MOD MDM: CPT

## 2019-02-11 PROCEDURE — 93010 ELECTROCARDIOGRAM REPORT: CPT | Performed by: INTERNAL MEDICINE

## 2019-02-11 PROCEDURE — 85025 COMPLETE CBC W/AUTO DIFF WBC: CPT | Performed by: EMERGENCY MEDICINE

## 2019-02-11 PROCEDURE — 80053 COMPREHEN METABOLIC PANEL: CPT | Performed by: EMERGENCY MEDICINE

## 2019-02-11 PROCEDURE — 71250 CT THORAX DX C-: CPT

## 2019-02-11 PROCEDURE — 80178 ASSAY OF LITHIUM: CPT | Performed by: EMERGENCY MEDICINE

## 2019-02-11 PROCEDURE — 71046 X-RAY EXAM CHEST 2 VIEWS: CPT

## 2019-02-11 PROCEDURE — 93005 ELECTROCARDIOGRAM TRACING: CPT

## 2019-02-11 RX ORDER — LORAZEPAM 0.5 MG/1
1 TABLET ORAL ONCE
Status: COMPLETED | OUTPATIENT
Start: 2019-02-11 | End: 2019-02-11

## 2019-02-11 RX ORDER — ROPINIROLE 2 MG/1
2 TABLET, FILM COATED ORAL ONCE
Status: COMPLETED | OUTPATIENT
Start: 2019-02-11 | End: 2019-02-11

## 2019-02-11 RX ADMIN — LORAZEPAM 1 MG: 0.5 TABLET ORAL at 02:51

## 2019-02-11 RX ADMIN — ROPINIROLE 2 MG: 2 TABLET, FILM COATED ORAL at 03:23
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
## 2019-02-11 NOTE — ED ATTENDING ATTESTATION
Jessica Pastor MD, saw and evaluated the patient  I have discussed the patient with the resident/non-physician practitioner and agree with the resident's/non-physician practitioner's findings, Plan of Care, and MDM as documented in the resident's/non-physician practitioner's note, except where noted  All available labs and Radiology studies were reviewed  At this point I agree with the current assessment done in the Emergency Department  I have conducted an independent evaluation of this patient a history and physical is as follows:    77-year-old man with history of cocaine abuse presents stating he is feeling restless after recent cocaine use, also complaining of some dyspnea while he was walking to the hospital which has since resolved  No chest pain or back pain  Patient states his main concern is that he has not had several of his medications over the past week and that he needs a dose of ropinirole to help with his restless legs  On exam patient is restless but alert and oriented  Nonfocal neuro  Heart regular rate and rhythm, no murmurs rubs or gallops  Lungs clear to auscultation bilaterally  EKG, chest x-ray, labs with no significant acute findings  Patient had improvement of symptoms after treatment        Critical Care Time  Procedures

## 2019-02-11 NOTE — ED NOTES
ECG completed at 0254  Viewed and signed by Dr Nu Coates, copy on chart       Surinder Delgadillo  02/11/19 0308

## 2019-02-13 ENCOUNTER — TELEPHONE (OUTPATIENT)
Dept: PULMONOLOGY | Facility: CLINIC | Age: 54
End: 2019-02-13

## 2019-02-14 ENCOUNTER — TELEPHONE (OUTPATIENT)
Dept: PULMONOLOGY | Facility: CLINIC | Age: 54
End: 2019-02-14

## 2019-02-14 NOTE — TELEPHONE ENCOUNTER
I called patient and went over the results of his CT  He was made aware that he is to have a repeat CT in 6 months  We will schedule this at his follow up appointment which is scheduled for tomorrow 2/15/19

## 2019-02-15 ENCOUNTER — OFFICE VISIT (OUTPATIENT)
Dept: PULMONOLOGY | Facility: CLINIC | Age: 54
End: 2019-02-15
Payer: COMMERCIAL

## 2019-02-15 VITALS
HEART RATE: 80 BPM | RESPIRATION RATE: 16 BRPM | WEIGHT: 226 LBS | TEMPERATURE: 98 F | BODY MASS INDEX: 36.48 KG/M2 | OXYGEN SATURATION: 94 %

## 2019-02-15 DIAGNOSIS — R93.89 ABNORMAL CT OF THE CHEST: Primary | ICD-10-CM

## 2019-02-15 PROCEDURE — 99214 OFFICE O/P EST MOD 30 MIN: CPT | Performed by: PHYSICIAN ASSISTANT

## 2019-02-15 NOTE — PROGRESS NOTES
Assessment:    1  Abnormal CT of the chest             02/11/2019 CT chest reviewed  IMPRESSION:     Scarring is noted at the area of prior Right base opacity  Favor benign process, however recommend additional six-month follow-up for stability  Plan:    Mr Brooke Matias will have repeat CT of chest in 6 months to hopefully demonstrate stability or improvement of right basilar opacity/scarring  02/11/2019 repeat CT chest showed improvement of right basilar opacity with  probable scarring  Patient states he will continue to stop smoking cannabis and crack cocaine  Smoking cessation discussed and encouraged  Patient has no pulmonary complaints at this time  Patient scheduled to follow-up with us in 6 months to review CT scan results  He was instructed to call us with any questions/concerns  Subjective:     Patient ID: Lisa Terry is a 48 y o  male  Chief Complaint:  HPI    61-year-old male here for hospital follow-up visit  We are following patient for right lower lobe infiltrate/opacity  Patient with history of drug abuse and smoking marijuana and crack cocaine  Patient also had pleuritic chest pain at that time which has resolved  Patient denies any shortness of breath, chest pain, cough or pulmonary symptoms at this time  See full ROS below    Review of Systems   Constitutional: Negative for activity change, appetite change, chills, diaphoresis, fatigue and fever  HENT: Negative  Negative for congestion, postnasal drip, rhinorrhea, sinus pressure, sinus pain, sneezing and sore throat  Respiratory: Negative for apnea, cough, choking, chest tightness, shortness of breath, wheezing and stridor  Cardiovascular: Negative  Negative for chest pain, palpitations and leg swelling  Gastrointestinal: Negative  Negative for abdominal pain  Genitourinary: Negative  Musculoskeletal: Negative for myalgias, neck pain and neck stiffness  Skin: Negative for rash     Neurological: Negative for dizziness and headaches  Psychiatric/Behavioral: Negative for agitation and suicidal ideas  The patient is hyperactive  Objective:  Pulse 80   Temp 98 °F (36 7 °C)   Resp 16   Wt 103 kg (226 lb)   SpO2 94%   BMI 36 48 kg/m²    room-air  Physical Exam   Constitutional: He appears well-developed and well-nourished  No distress  HENT:   Head: Normocephalic and atraumatic  Nose: Nose normal    Mouth/Throat: Oropharynx is clear and moist    Eyes: Pupils are equal, round, and reactive to light  Conjunctivae are normal  No scleral icterus  Neck: Normal range of motion  Neck supple  Cardiovascular: Normal rate, regular rhythm and intact distal pulses  Exam reveals no gallop and no friction rub  No murmur heard  Pulmonary/Chest: Effort normal and breath sounds normal  No respiratory distress  He has no wheezes  He has no rales  He exhibits no tenderness  Abdominal: Soft  Bowel sounds are normal  There is no tenderness  Musculoskeletal: He exhibits no edema or tenderness  Lymphadenopathy:     He has no cervical adenopathy  Neurological: He is alert  Skin: No rash noted  He is not diaphoretic  Psychiatric:    Rapid speech with flight of ideas  Nursing note and vitals reviewed

## 2019-03-10 ENCOUNTER — APPOINTMENT (EMERGENCY)
Dept: RADIOLOGY | Facility: HOSPITAL | Age: 54
End: 2019-03-10
Payer: COMMERCIAL

## 2019-03-10 ENCOUNTER — HOSPITAL ENCOUNTER (EMERGENCY)
Facility: HOSPITAL | Age: 54
Discharge: HOME | End: 2019-03-10
Attending: EMERGENCY MEDICINE
Payer: COMMERCIAL

## 2019-03-10 VITALS
OXYGEN SATURATION: 96 % | TEMPERATURE: 97.8 F | BODY MASS INDEX: 35.51 KG/M2 | SYSTOLIC BLOOD PRESSURE: 151 MMHG | HEART RATE: 90 BPM | DIASTOLIC BLOOD PRESSURE: 98 MMHG | WEIGHT: 220 LBS | RESPIRATION RATE: 16 BRPM

## 2019-03-10 DIAGNOSIS — R53.1 WEAKNESS: Primary | ICD-10-CM

## 2019-03-10 LAB
ALBUMIN SERPL BCP-MCNC: 3.6 G/DL (ref 3.5–5)
ALP SERPL-CCNC: 79 U/L (ref 46–116)
ALT SERPL W P-5'-P-CCNC: 63 U/L (ref 12–78)
ANION GAP SERPL CALCULATED.3IONS-SCNC: 6 MMOL/L (ref 4–13)
AST SERPL W P-5'-P-CCNC: 47 U/L (ref 5–45)
ATRIAL RATE: 92 BPM
BASOPHILS # BLD AUTO: 0.03 THOUSANDS/ΜL (ref 0–0.1)
BASOPHILS NFR BLD AUTO: 1 % (ref 0–1)
BILIRUB SERPL-MCNC: 0.31 MG/DL (ref 0.2–1)
BUN SERPL-MCNC: 14 MG/DL (ref 5–25)
CALCIUM SERPL-MCNC: 8.3 MG/DL (ref 8.3–10.1)
CHLORIDE SERPL-SCNC: 107 MMOL/L (ref 100–108)
CO2 SERPL-SCNC: 27 MMOL/L (ref 21–32)
CREAT SERPL-MCNC: 1.54 MG/DL (ref 0.6–1.3)
EOSINOPHIL # BLD AUTO: 0.08 THOUSAND/ΜL (ref 0–0.61)
EOSINOPHIL NFR BLD AUTO: 2 % (ref 0–6)
ERYTHROCYTE [DISTWIDTH] IN BLOOD BY AUTOMATED COUNT: 12.8 % (ref 11.6–15.1)
GFR SERPL CREATININE-BSD FRML MDRD: 51 ML/MIN/1.73SQ M
GLUCOSE SERPL-MCNC: 132 MG/DL (ref 65–140)
HCT VFR BLD AUTO: 39.9 % (ref 36.5–49.3)
HGB BLD-MCNC: 13.3 G/DL (ref 12–17)
IMM GRANULOCYTES # BLD AUTO: 0.05 THOUSAND/UL (ref 0–0.2)
IMM GRANULOCYTES NFR BLD AUTO: 1 % (ref 0–2)
LYMPHOCYTES # BLD AUTO: 2.42 THOUSANDS/ΜL (ref 0.6–4.47)
LYMPHOCYTES NFR BLD AUTO: 44 % (ref 14–44)
MCH RBC QN AUTO: 30.4 PG (ref 26.8–34.3)
MCHC RBC AUTO-ENTMCNC: 33.3 G/DL (ref 31.4–37.4)
MCV RBC AUTO: 91 FL (ref 82–98)
MONOCYTES # BLD AUTO: 0.43 THOUSAND/ΜL (ref 0.17–1.22)
MONOCYTES NFR BLD AUTO: 8 % (ref 4–12)
NEUTROPHILS # BLD AUTO: 2.32 THOUSANDS/ΜL (ref 1.85–7.62)
NEUTS SEG NFR BLD AUTO: 44 % (ref 43–75)
NRBC BLD AUTO-RTO: 0 /100 WBCS
P AXIS: 66 DEGREES
PLATELET # BLD AUTO: 153 THOUSANDS/UL (ref 149–390)
PMV BLD AUTO: 10.9 FL (ref 8.9–12.7)
POTASSIUM SERPL-SCNC: 4.7 MMOL/L (ref 3.5–5.3)
PR INTERVAL: 166 MS
PROT SERPL-MCNC: 7.2 G/DL (ref 6.4–8.2)
QRS AXIS: 59 DEGREES
QRSD INTERVAL: 84 MS
QT INTERVAL: 350 MS
QTC INTERVAL: 432 MS
RBC # BLD AUTO: 4.38 MILLION/UL (ref 3.88–5.62)
SODIUM SERPL-SCNC: 140 MMOL/L (ref 136–145)
T WAVE AXIS: 7 DEGREES
TROPONIN I SERPL-MCNC: <0.02 NG/ML
VENTRICULAR RATE: 92 BPM
WBC # BLD AUTO: 5.33 THOUSAND/UL (ref 4.31–10.16)

## 2019-03-10 PROCEDURE — 93010 ELECTROCARDIOGRAM REPORT: CPT | Performed by: INTERNAL MEDICINE

## 2019-03-10 PROCEDURE — 99285 EMERGENCY DEPT VISIT HI MDM: CPT

## 2019-03-10 PROCEDURE — 93005 ELECTROCARDIOGRAM TRACING: CPT

## 2019-03-10 PROCEDURE — 71046 X-RAY EXAM CHEST 2 VIEWS: CPT

## 2019-03-10 PROCEDURE — 36415 COLL VENOUS BLD VENIPUNCTURE: CPT | Performed by: EMERGENCY MEDICINE

## 2019-03-10 PROCEDURE — 80053 COMPREHEN METABOLIC PANEL: CPT | Performed by: EMERGENCY MEDICINE

## 2019-03-10 PROCEDURE — 85025 COMPLETE CBC W/AUTO DIFF WBC: CPT | Performed by: EMERGENCY MEDICINE

## 2019-03-10 PROCEDURE — 84484 ASSAY OF TROPONIN QUANT: CPT | Performed by: EMERGENCY MEDICINE

## 2019-03-10 NOTE — ED ATTENDING ATTESTATION
Sabino Gomez MD, saw and evaluated the patient  All available labs and X-rays were ordered by me or the resident and have been reviewed by myself  I discussed the patient with the resident / non-physician and agree with the resident's / non-physician practitioner's findings and plan as documented in the resident's / non-physician practicitioner's note, except where noted  At this point, I agree with the current assessment done in the ED  I was present during key portions of all procedures performed unless otherwise stated  Chief Complaint   Patient presents with    Weakness - Generalized     Pt states he feels weak and tired  Is taking a medication prescribed by psychiatry  Also states he has not smoked crack in 32 days     This is a 47 y/o M presenting for weakness / tiredness  For the past 32 days he has been clean, only using quualoudes + doxypin (TCA) + requip (ropinirole)  He takes them together and did so tonight  He hasn't used anythign else  He just feels very tired and sleepy  Hard time doing things  Denies f/ch/n/v/cp/sob  No diaphoresis  No falls, trauma  Denies any urinary tract infection symptoms (burning, itching, pain, blood, frequency)  Denies any upper respiratory tract infection symptoms (cough, congestion, rhinorrhea, sore throat)  His speech is difficult to understand mostly because he is slurring speech which he said is because of how tired he feels due to the medications  PMH:  - PTSD / Bipolar / Substance abuse / Chronic pain  - Glaucoma  PSH:  - colostomy  - shoulder surgery  - wrist surgery  - knee surgery  - ankle surgery  No smoking  +alcoholic  +drug use as per chart  PE:  Vitals:    03/10/19 0018 03/10/19 0055   BP: 151/98    Pulse: 90    Resp: 16    Temp: (!) 96 5 °F (35 8 °C) 97 8 °F (36 6 °C)   TempSrc:  Oral   SpO2: 96%    Weight: 99 8 kg (220 lb)    General: VSS, NAD, awake, alert  Well-nourished, well-developed  Appears stated age     Speaking normally in full sentences  Head: Normocephalic, atraumatic, nontender  Eyes: PERRL, EOM-I  No diplopia  No hyphema  No subconjunctival hemorrhages  Symmetrical lids  ENT: Atraumatic external nose and ears  Dry MM  No malocclusion  No stridor  Slurred phonation  No drooling  Normal swallowing  Neck: Symmetric, trachea midline  No JVD  CV: RRR  +S1/S2  No murmurs or gallops  Peripheral pulses +2 throughout  No chest wall tenderness  Lungs:   Unlabored No retractions  CTAB, lungs sounds equal bilateral    No tachypnea  Abd: +BS, soft, NT/ND    MSK:   FROM   Back:   No rashes  Skin: Dry, intact  Neuro: AAOx3, GCS 15, CN II-XII grossly intact  Motor grossly intact  No facial droop  No focal deficits  Sensation intact throughoug  No hyperrelfexia  No clonus  No diaphoresis  Psychiatric/Behavioral: Appropriate mood and affect   Exam: deferred  A:  - Weakness  P:  - Weakness workup  - Likely DC  - 13 point ROS was performed and all are normal unless stated in the history above  - Nursing note reviewed  Vitals reviewed  - Orders placed by myself and/or advanced practitioner / resident     - Previous chart was reviewed  - No language barrier    - History obtained from patient  - There are no limitations to the history obtained  - Critical care time: Not applicable for this patient  Final Diagnosis:  1  Weakness         Medications - No data to display  XR chest 2 views   ED Interpretation   No active pulmonary disease      Final Result      No acute cardiopulmonary disease              Workstation performed: [id number]           Orders Placed This Encounter   Procedures    XR chest 2 views    CBC and differential    Comprehensive metabolic panel    Troponin I    EKG RESULTS    ECG 12 lead    ECG 12 lead     Labs Reviewed   COMPREHENSIVE METABOLIC PANEL - Abnormal       Result Value Ref Range Status    Sodium 140  136 - 145 mmol/L Final    Potassium 4 7  3 5 - 5 3 mmol/L Final    Comment: Slightly Hemolyzed; Results May be Affected    Chloride 107  100 - 108 mmol/L Final    CO2 27  21 - 32 mmol/L Final    ANION GAP 6  4 - 13 mmol/L Final    BUN 14  5 - 25 mg/dL Final    Creatinine 1 54 (*) 0 60 - 1 30 mg/dL Final    Comment: Standardized to IDMS reference method    Glucose 132  65 - 140 mg/dL Final    Comment:   If the patient is fasting, the ADA then defines impaired fasting glucose as > 100 mg/dL and diabetes as > or equal to 123 mg/dL  Specimen collection should occur prior to Sulfasalazine administration due to the potential for falsely depressed results  Specimen collection should occur prior to Sulfapyridine administration due to the potential for falsely elevated results  Calcium 8 3  8 3 - 10 1 mg/dL Final    AST 47 (*) 5 - 45 U/L Final    Comment: Slightly Hemolyzed; Results May be Affected  Specimen collection should occur prior to Sulfasalazine administration due to the potential for falsely depressed results  ALT 63  12 - 78 U/L Final    Comment:   Specimen collection should occur prior to Sulfasalazine and/or Sulfapyridine administration due to the potential for falsely depressed results  Alkaline Phosphatase 79  46 - 116 U/L Final    Total Protein 7 2  6 4 - 8 2 g/dL Final    Albumin 3 6  3 5 - 5 0 g/dL Final    Total Bilirubin 0 31  0 20 - 1 00 mg/dL Final    eGFR 51  ml/min/1 73sq m Final    Narrative:     National Kidney Disease Education Program recommendations are as follows:  GFR calculation is accurate only with a steady state creatinine  Chronic Kidney disease less than 60 ml/min/1 73 sq  meters  Kidney failure less than 15 ml/min/1 73 sq  meters     TROPONIN I - Normal    Troponin I <0 02  <=0 04 ng/mL Final    Comment:   [employer] Chemistry analyzer 99% cutoff is > 0 04 ng/mL in network labs     o cTnI 99% cutoff is useful only when applied to patients in the clinical setting of myocardial ischemia   o cTnI 99% cutoff should be interpreted in the context of clinical history, ECG findings and possibly cardiac imaging to establish correct diagnosis  o cTnI 99% cutoff may be suggestive but clearly not indicative of a coronary event without the clinical setting of myocardial ischemia  CBC AND DIFFERENTIAL    WBC 5 33  4 31 - 10 16 Thousand/uL Final    RBC 4 38  3 88 - 5 62 Million/uL Final    Hemoglobin 13 3  12 0 - 17 0 g/dL Final    Hematocrit 39 9  36 5 - 49 3 % Final    MCV 91  82 - 98 fL Final    MCH 30 4  26 8 - 34 3 pg Final    MCHC 33 3  31 4 - 37 4 g/dL Final    RDW 12 8  11 6 - 15 1 % Final    MPV 10 9  8 9 - 12 7 fL Final    Platelets 361  686 - 390 Thousands/uL Final    nRBC 0  /100 WBCs Final    Neutrophils Relative 44  43 - 75 % Final    Immat GRANS % 1  0 - 2 % Final    Lymphocytes Relative 44  14 - 44 % Final    Monocytes Relative 8  4 - 12 % Final    Eosinophils Relative 2  0 - 6 % Final    Basophils Relative 1  0 - 1 % Final    Neutrophils Absolute 2 32  1 85 - 7 62 Thousands/µL Final    Immature Grans Absolute 0 05  0 00 - 0 20 Thousand/uL Final    Lymphocytes Absolute 2 42  0 60 - 4 47 Thousands/µL Final    Monocytes Absolute 0 43  0 17 - 1 22 Thousand/µL Final    Eosinophils Absolute 0 08  0 00 - 0 61 Thousand/µL Final    Basophils Absolute 0 03  0 00 - 0 10 Thousands/µL Final     Time reflects when diagnosis was documented in both MDM as applicable and the Disposition within this note     Time User Action Codes Description Comment    3/10/2019  1:31 AM Tien Garner Add [R53 1] Weakness       ED Disposition     ED Disposition Condition Date/Time Comment    Discharge Stable Sun Mar 10, 2019  1:31 AM Marleen Oconnor discharge to home/self care              Follow-up Information     Follow up With Specialties Details Why Contact Info    Lucila Muniz MD MPH Internal Medicine   [place] [address] [address] [facility]  [phone]          Discharge Medication List as of 3/10/2019  1:31 AM      CONTINUE these medications which have NOT CHANGED    Details dorzolamide-timolol (COSOPT) 22 3-6 8 MG/ML ophthalmic solution Administer 1 drop to both eyes daily  , Historical Med      hydrOXYzine HCL (ATARAX) 50 mg tablet Take 1 tablet (50 mg total) by mouth 2 (two) times a day as needed (mild anxiety) for up to 60 days, Starting Thu 12/27/2018, Until Mon 2/25/2019, Print      latanoprost (XALATAN) 0 005 % ophthalmic solution Administer 1 drop to both eyes daily, Historical Med      lithium carbonate 300 mg capsule Take 1 capsule (300 mg total) by mouth 3 (three) times a day with meals for 60 days, Starting Thu 12/27/2018, Until Mon 2/25/2019, Print      risperiDONE (RisperDAL) 2 mg tablet Take 1 tablet (2 mg total) by mouth 2 (two) times a day for 60 days, Starting Thu 12/27/2018, Until Mon 2/25/2019, Print      rOPINIRole (REQUIP) 2 mg tablet Take 1 tablet (2 mg total) by mouth daily at bedtime for 30 days, Starting Thu 12/27/2018, Until Sat 1/26/2019, Print      traZODone (DESYREL) 100 mg tablet Take 1 tablet (100 mg total) by mouth daily at bedtime as needed for sleep for up to 60 days, Starting Thu 12/27/2018, Until Mon 2/25/2019, Print           No discharge procedures on file  Prior to Admission Medications   Prescriptions Last Dose Informant Patient Reported?  Taking?   dorzolamide-timolol (COSOPT) 22 3-6 8 MG/ML ophthalmic solution  Pharmacy (Specify) Yes No   Sig: Administer 1 drop to both eyes daily     hydrOXYzine HCL (ATARAX) 50 mg tablet   No No   Sig: Take 1 tablet (50 mg total) by mouth 2 (two) times a day as needed (mild anxiety) for up to 60 days   latanoprost (XALATAN) 0 005 % ophthalmic solution  Pharmacy (Specify) Yes No   Sig: Administer 1 drop to both eyes daily   lithium carbonate 300 mg capsule   No No   Sig: Take 1 capsule (300 mg total) by mouth 3 (three) times a day with meals for 60 days   rOPINIRole (REQUIP) 2 mg tablet   No No   Sig: Take 1 tablet (2 mg total) by mouth daily at bedtime for 30 days   risperiDONE (RisperDAL) 2 mg tablet   No No Sig: Take 1 tablet (2 mg total) by mouth 2 (two) times a day for 60 days   traZODone (DESYREL) 100 mg tablet   No No   Sig: Take 1 tablet (100 mg total) by mouth daily at bedtime as needed for sleep for up to 60 days      Facility-Administered Medications: None       Portions of the record may have been created with voice recognition software  Occasional wrong word or "sound a like" substitutions may have occurred due to the inherent limitations of voice recognition software  Read the chart carefully and recognize, using context, where substitutions have occurred      Electronically signed by:  Max Olivares

## 2019-03-10 NOTE — ED NOTES
Patient screaming demanding medications prior to discharge  MD explained to patient medical reason why medication is not indicated a this time  Pt demanding a quiet place to sleep and stating "you dont know whats going on inside my body"  IV d/c by RN  Security at bedside at this time to enforce patient to get dressed and leave the ED at this time because pt is discharged   Will monitor patient until he has left the ED     Adenike Crawford RN  03/10/19 0140
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
## 2019-04-04 DIAGNOSIS — Z71.89 COMPLEX CARE COORDINATION: Primary | ICD-10-CM

## 2019-04-08 ENCOUNTER — PATIENT OUTREACH (OUTPATIENT)
Dept: INTERNAL MEDICINE CLINIC | Facility: CLINIC | Age: 54
End: 2019-04-08

## 2019-04-10 ENCOUNTER — TELEPHONE (OUTPATIENT)
Dept: INTERNAL MEDICINE CLINIC | Facility: CLINIC | Age: 54
End: 2019-04-10

## 2019-05-06 ENCOUNTER — APPOINTMENT (EMERGENCY)
Dept: RADIOLOGY | Facility: HOSPITAL | Age: 54
End: 2019-05-06
Payer: COMMERCIAL

## 2019-05-06 ENCOUNTER — HOSPITAL ENCOUNTER (EMERGENCY)
Facility: HOSPITAL | Age: 54
Discharge: HOME | End: 2019-05-07
Attending: EMERGENCY MEDICINE | Admitting: EMERGENCY MEDICINE
Payer: COMMERCIAL

## 2019-05-06 VITALS
HEART RATE: 82 BPM | BODY MASS INDEX: 37.77 KG/M2 | SYSTOLIC BLOOD PRESSURE: 182 MMHG | TEMPERATURE: 97.9 F | WEIGHT: 235.01 LBS | HEIGHT: 66 IN | OXYGEN SATURATION: 96 % | RESPIRATION RATE: 18 BRPM | DIASTOLIC BLOOD PRESSURE: 116 MMHG

## 2019-05-06 DIAGNOSIS — M25.511 RIGHT SHOULDER PAIN: ICD-10-CM

## 2019-05-06 DIAGNOSIS — M54.9 BACK PAIN: Primary | ICD-10-CM

## 2019-05-06 PROCEDURE — 99284 EMERGENCY DEPT VISIT MOD MDM: CPT | Performed by: EMERGENCY MEDICINE

## 2019-05-06 PROCEDURE — 99283 EMERGENCY DEPT VISIT LOW MDM: CPT

## 2019-05-06 PROCEDURE — 72100 X-RAY EXAM L-S SPINE 2/3 VWS: CPT

## 2019-05-06 PROCEDURE — 73030 X-RAY EXAM OF SHOULDER: CPT

## 2019-05-06 PROCEDURE — 96372 THER/PROPH/DIAG INJ SC/IM: CPT

## 2019-05-06 RX ORDER — KETOROLAC TROMETHAMINE 30 MG/ML
15 INJECTION, SOLUTION INTRAMUSCULAR; INTRAVENOUS ONCE
Status: COMPLETED | OUTPATIENT
Start: 2019-05-06 | End: 2019-05-06

## 2019-05-06 RX ORDER — ACETAMINOPHEN 325 MG/1
975 TABLET ORAL ONCE
Status: COMPLETED | OUTPATIENT
Start: 2019-05-07 | End: 2019-05-07

## 2019-05-06 RX ORDER — LIDOCAINE 50 MG/G
1 PATCH TOPICAL ONCE
Status: DISCONTINUED | OUTPATIENT
Start: 2019-05-07 | End: 2019-05-07 | Stop reason: HOSPADM

## 2019-05-06 RX ADMIN — KETOROLAC TROMETHAMINE 15 MG: 30 INJECTION, SOLUTION INTRAMUSCULAR at 23:01

## 2019-05-07 RX ORDER — MAGNESIUM HYDROXIDE/ALUMINUM HYDROXICE/SIMETHICONE 120; 1200; 1200 MG/30ML; MG/30ML; MG/30ML
30 SUSPENSION ORAL ONCE
Status: COMPLETED | OUTPATIENT
Start: 2019-05-07 | End: 2019-05-07

## 2019-05-07 RX ADMIN — LIDOCAINE 1 PATCH: 50 PATCH TOPICAL at 00:06

## 2019-05-07 RX ADMIN — ACETAMINOPHEN 975 MG: 325 TABLET, FILM COATED ORAL at 00:05

## 2019-05-07 RX ADMIN — ALUMINUM HYDROXIDE, MAGNESIUM HYDROXIDE, AND SIMETHICONE 30 ML: 200; 200; 20 SUSPENSION ORAL at 00:35

## 2019-08-08 ENCOUNTER — APPOINTMENT (EMERGENCY)
Dept: RADIOLOGY | Facility: HOSPITAL | Age: 54
End: 2019-08-08
Payer: MEDICARE

## 2019-08-08 ENCOUNTER — HOSPITAL ENCOUNTER (EMERGENCY)
Facility: HOSPITAL | Age: 54
Discharge: HOME | End: 2019-08-09
Attending: EMERGENCY MEDICINE
Payer: MEDICARE

## 2019-08-08 VITALS
HEART RATE: 86 BPM | DIASTOLIC BLOOD PRESSURE: 108 MMHG | OXYGEN SATURATION: 98 % | SYSTOLIC BLOOD PRESSURE: 208 MMHG | RESPIRATION RATE: 18 BRPM | WEIGHT: 235.89 LBS | BODY MASS INDEX: 38.07 KG/M2 | TEMPERATURE: 98.2 F

## 2019-08-08 DIAGNOSIS — M54.2 NECK PAIN: ICD-10-CM

## 2019-08-08 DIAGNOSIS — T81.30XA ABDOMINAL WOUND DEHISCENCE: ICD-10-CM

## 2019-08-08 DIAGNOSIS — R51.9 HEADACHE: ICD-10-CM

## 2019-08-08 DIAGNOSIS — S09.90XA INJURY OF HEAD, INITIAL ENCOUNTER: Primary | ICD-10-CM

## 2019-08-08 PROCEDURE — 96372 THER/PROPH/DIAG INJ SC/IM: CPT

## 2019-08-08 PROCEDURE — 99284 EMERGENCY DEPT VISIT MOD MDM: CPT

## 2019-08-08 PROCEDURE — 99284 EMERGENCY DEPT VISIT MOD MDM: CPT | Performed by: EMERGENCY MEDICINE

## 2019-08-08 PROCEDURE — 70450 CT HEAD/BRAIN W/O DYE: CPT

## 2019-08-08 RX ORDER — ACETAMINOPHEN 325 MG/1
975 TABLET ORAL ONCE
Status: DISCONTINUED | OUTPATIENT
Start: 2019-08-08 | End: 2019-08-09 | Stop reason: HOSPADM

## 2019-08-08 RX ORDER — DIAZEPAM 5 MG/ML
5 INJECTION, SOLUTION INTRAMUSCULAR; INTRAVENOUS ONCE
Status: COMPLETED | OUTPATIENT
Start: 2019-08-09 | End: 2019-08-08

## 2019-08-08 RX ADMIN — DIAZEPAM 5 MG: 10 INJECTION, SOLUTION INTRAMUSCULAR; INTRAVENOUS at 23:59
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
## 2019-08-09 NOTE — ED ATTENDING ATTESTATION
Hans Saha MD, saw and evaluated the patient  All available labs and X-rays were ordered by me or the resident and have been reviewed by myself  I discussed the patient with the resident / non-physician and agree with the resident's / non-physician practitioner's findings and plan as documented in the resident's / non-physician practicitioner's note, except where noted  At this point, I agree with the current assessment done in the ED  I was present during key portions of all procedures performed unless otherwise stated  Chief Complaint   Patient presents with    Head Injury     Pt states a piece of sheet rock fell from his ceiling and hit his head  Pt denies LOC  Pt denies thinners  this is a 77-year-old male presenting for evaluation of head injury  The patient states that lately he has been switching out crack for express so  Today he was at home and it look like a piece of sheet rock from the roof fell and hit him on top of his head  No loss of consciousness, remembers the events  He did tried a Eletha Karly out of the way but 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 so he took the impact on the top of his head  No vomiting  He does have a persistent headache and wanted that evaluated  PE:  Vitals:    08/08/19 2106 08/08/19 2107   BP: (!) 208/108    Pulse: 86    Resp: 18    Temp:  98 2 °F (36 8 °C)   SpO2: 98%    Weight: 107 kg (235 lb 14 3 oz)    General: VSS, NAD, awake, alert  Well-nourished, well-developed  Appears stated age  Speaking normally in full sentences  Head: Normocephalic, atraumatic, nontender  Eyes: PERRL, EOM-I  No diplopia  No hyphema  No subconjunctival hemorrhages  Symmetrical lids  ENT: Atraumatic external nose and ears  MMM  No malocclusion  No stridor  Normal phonation  No drooling  Normal swallowing  Neck: Symmetric, trachea midline  No JVD  CV: RRR  +S1/S2  No murmurs or gallops  Peripheral pulses +2 throughout  No chest wall tenderness     Lungs: Unlabored No retractions  CTAB, lungs sounds equal bilateral    No tachypnea  Abd: +BS, soft, NT/ND    MSK:   FROM   Back:   No rashes  Skin: Dry, intact  Neuro: AAOx3, GCS 15, CN II-XII grossly intact  Motor grossly intact  Psychiatric/Behavioral: Appropriate mood and affect   Exam: deferred  A:  - head trauma  P:  - Fails [facility] due to persistent headache  Imaging  - Likely DC  - The patient has none of the followin  Focal neurologic deficit  2  Midline spinal tenderness  3  AMS  4  Intoxication  5  Distracting injury  The C-Spine can be cleared clinically by these criteria; imaging is not required  - 13 point ROS was performed and all are normal unless stated in the history above  - Nursing note reviewed  Vitals reviewed  - Orders placed by myself and/or advanced practitioner / resident     - Previous chart was reviewed  - No language barrier    - History obtained from patient  - There are no limitations to the history obtained  - Critical care time: Not applicable for this patient  Final Diagnosis:  1  Injury of head, initial encounter    2  Abdominal wound dehiscence    3  Headache    4  Neck pain           Medications   diazepam (VALIUM) injection 5 mg (5 mg Intramuscular Given 19 6766)     CT head without contrast   Final Result      No intracranial hemorrhage or calvarial fracture                    Workstation performed: [id number]           Orders Placed This Encounter   Procedures    CT head without contrast     Labs Reviewed - No data to display  Time reflects when diagnosis was documented in both MDM as applicable and the Disposition within this note     Time User Action Codes Description Comment    2019 12:49 AM Shanti Asher Add [S09 90XA] Injury of head, initial encounter     2019 12:52 AM Christiano Asher Add [T81 30XA] Abdominal wound dehiscence     2019 12:52 AM Christiano Asher Modify [T81 30XA] Abdominal wound dehiscence dehiscience of old surgical scar    8/9/2019 12:52 AM Christiano Asher Add [R51] Headache     8/9/2019 12:52 AM Ishmael Asher Add [M54 2] Neck pain       ED Disposition     ED Disposition Condition Date/Time Comment    Discharge Stable Fri Aug 9, 2019 12:49 AM Alessandro Ferguson discharge to home/self care              Follow-up Information     Follow up With Specialties Details Why Contact Info Additional Information    Infolink   As needed - call to make appointment with primary care provider [phone]       [facility] General Surgery Schedule an appointment as soon as possible for a visit  For chronic abdominal wound [facility] [id number] [place] [address] [phone]  [facility], [address] [place] [address], [phone]        Discharge Medication List as of 8/9/2019 12:53 AM      CONTINUE these medications which have NOT CHANGED    Details   dorzolamide-timolol (COSOPT) 22 3-6 8 MG/ML ophthalmic solution Administer 1 drop to both eyes daily  , Historical Med      hydrOXYzine HCL (ATARAX) 50 mg tablet Take 1 tablet (50 mg total) by mouth 2 (two) times a day as needed (mild anxiety) for up to 60 days, Starting Thu 12/27/2018, Until Mon 2/25/2019, Print      latanoprost (XALATAN) 0 005 % ophthalmic solution Administer 1 drop to both eyes daily, Historical Med      lithium carbonate 300 mg capsule Take 1 capsule (300 mg total) by mouth 3 (three) times a day with meals for 60 days, Starting Thu 12/27/2018, Until Mon 2/25/2019, Print      risperiDONE (RisperDAL) 2 mg tablet Take 1 tablet (2 mg total) by mouth 2 (two) times a day for 60 days, Starting Thu 12/27/2018, Until Mon 2/25/2019, Print      rOPINIRole (REQUIP) 2 mg tablet Take 1 tablet (2 mg total) by mouth daily at bedtime for 30 days, Starting Thu 12/27/2018, Until Sat 1/26/2019, Print      traZODone (DESYREL) 100 mg tablet Take 1 tablet (100 mg total) by mouth daily at bedtime as needed for sleep for up to 60 days, Starting Thu 12/27/2018, Until Mon 2/25/2019, Print           No discharge procedures on file  Prior to Admission Medications   Prescriptions Last Dose Informant Patient Reported? Taking?   dorzolamide-timolol (COSOPT) 22 3-6 8 MG/ML ophthalmic solution  Pharmacy (Specify) Yes No   Sig: Administer 1 drop to both eyes daily     hydrOXYzine HCL (ATARAX) 50 mg tablet   No No   Sig: Take 1 tablet (50 mg total) by mouth 2 (two) times a day as needed (mild anxiety) for up to 60 days   latanoprost (XALATAN) 0 005 % ophthalmic solution  Pharmacy (Specify) Yes No   Sig: Administer 1 drop to both eyes daily   lithium carbonate 300 mg capsule   No No   Sig: Take 1 capsule (300 mg total) by mouth 3 (three) times a day with meals for 60 days   rOPINIRole (REQUIP) 2 mg tablet   No No   Sig: Take 1 tablet (2 mg total) by mouth daily at bedtime for 30 days   risperiDONE (RisperDAL) 2 mg tablet   No No   Sig: Take 1 tablet (2 mg total) by mouth 2 (two) times a day for 60 days   traZODone (DESYREL) 100 mg tablet   No No   Sig: Take 1 tablet (100 mg total) by mouth daily at bedtime as needed for sleep for up to 60 days      Facility-Administered Medications: None       Portions of the record may have been created with voice recognition software  Occasional wrong word or "sound a like" substitutions may have occurred due to the inherent limitations of voice recognition software  Read the chart carefully and recognize, using context, where substitutions have occurred      Electronically signed by:  Jaylyn Martin

## 2019-08-12 ENCOUNTER — OFFICE VISIT (OUTPATIENT)
Dept: INTERNAL MEDICINE CLINIC | Facility: CLINIC | Age: 54
End: 2019-08-12
Payer: MEDICARE

## 2019-08-12 VITALS
RESPIRATION RATE: 16 BRPM | BODY MASS INDEX: 37.61 KG/M2 | HEART RATE: 77 BPM | DIASTOLIC BLOOD PRESSURE: 86 MMHG | WEIGHT: 234 LBS | SYSTOLIC BLOOD PRESSURE: 140 MMHG | HEIGHT: 66 IN | OXYGEN SATURATION: 96 %

## 2019-08-12 DIAGNOSIS — R97.20 ELEVATED PROSTATE SPECIFIC ANTIGEN (PSA): ICD-10-CM

## 2019-08-12 DIAGNOSIS — Z12.11 SCREENING FOR COLON CANCER: ICD-10-CM

## 2019-08-12 DIAGNOSIS — E07.89 OTHER SPECIFIED DISORDERS OF THYROID: ICD-10-CM

## 2019-08-12 DIAGNOSIS — Z13.220 SCREENING FOR HYPERLIPIDEMIA: ICD-10-CM

## 2019-08-12 DIAGNOSIS — N18.9 CHRONIC KIDNEY DISEASE, UNSPECIFIED CKD STAGE: ICD-10-CM

## 2019-08-12 DIAGNOSIS — Z12.5 SCREENING FOR PROSTATE CANCER: ICD-10-CM

## 2019-08-12 DIAGNOSIS — M75.101 ROTATOR CUFF TEAR ARTHROPATHY OF RIGHT SHOULDER: ICD-10-CM

## 2019-08-12 DIAGNOSIS — S19.9XXD NECK INJURY, SUBSEQUENT ENCOUNTER: Primary | ICD-10-CM

## 2019-08-12 DIAGNOSIS — F25.0 SCHIZOAFFECTIVE DISORDER, BIPOLAR TYPE (HCC): ICD-10-CM

## 2019-08-12 DIAGNOSIS — M12.811 ROTATOR CUFF TEAR ARTHROPATHY OF RIGHT SHOULDER: ICD-10-CM

## 2019-08-12 DIAGNOSIS — Z13.29 SCREENING FOR THYROID DISORDER: ICD-10-CM

## 2019-08-12 DIAGNOSIS — H40.9 GLAUCOMA OF BOTH EYES, UNSPECIFIED GLAUCOMA TYPE: ICD-10-CM

## 2019-08-12 DIAGNOSIS — E78.01 FAMILIAL HYPERCHOLESTEROLEMIA: ICD-10-CM

## 2019-08-12 DIAGNOSIS — R79.9 ABNORMAL FINDING OF BLOOD CHEMISTRY: ICD-10-CM

## 2019-08-12 DIAGNOSIS — Z13.1 SCREENING FOR DIABETES MELLITUS: ICD-10-CM

## 2019-08-12 DIAGNOSIS — N18.30 STAGE 3 CHRONIC KIDNEY DISEASE (HCC): ICD-10-CM

## 2019-08-12 DIAGNOSIS — M54.6 ACUTE MIDLINE THORACIC BACK PAIN: ICD-10-CM

## 2019-08-12 PROCEDURE — 99203 OFFICE O/P NEW LOW 30 MIN: CPT | Performed by: INTERNAL MEDICINE

## 2019-08-12 RX ORDER — LATANOPROST 50 UG/ML
1 SOLUTION/ DROPS OPHTHALMIC DAILY
Qty: 7.5 ML | Refills: 3 | Status: SHIPPED | OUTPATIENT
Start: 2019-08-12 | End: 2019-11-18 | Stop reason: SDUPTHER

## 2019-08-12 RX ORDER — ACETAMINOPHEN 500 MG
500 TABLET ORAL EVERY 6 HOURS PRN
Qty: 90 TABLET | Refills: 2 | Status: SHIPPED | OUTPATIENT
Start: 2019-08-12 | End: 2020-02-19

## 2019-08-12 RX ORDER — DORZOLAMIDE HYDROCHLORIDE AND TIMOLOL MALEATE 20; 5 MG/ML; MG/ML
1 SOLUTION/ DROPS OPHTHALMIC DAILY
Qty: 10 ML | Refills: 3 | Status: SHIPPED | OUTPATIENT
Start: 2019-08-12 | End: 2019-11-18 | Stop reason: SDUPTHER

## 2019-08-12 RX ORDER — DOXEPIN HYDROCHLORIDE 150 MG/1
150 CAPSULE ORAL
COMMUNITY

## 2019-08-12 RX ORDER — METHYLPREDNISOLONE 4 MG/1
TABLET ORAL
Qty: 21 EACH | Refills: 0 | Status: SHIPPED | OUTPATIENT
Start: 2019-08-12 | End: 2019-11-18
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
## 2019-08-15 ENCOUNTER — TRANSCRIBE ORDERS (OUTPATIENT)
Dept: RADIOLOGY | Facility: HOSPITAL | Age: 54
End: 2019-08-15

## 2019-08-15 ENCOUNTER — APPOINTMENT (OUTPATIENT)
Dept: LAB | Facility: HOSPITAL | Age: 54
End: 2019-08-15
Payer: MEDICARE

## 2019-08-15 ENCOUNTER — HOSPITAL ENCOUNTER (OUTPATIENT)
Dept: RADIOLOGY | Facility: HOSPITAL | Age: 54
Discharge: HOME | End: 2019-08-15
Attending: INTERNAL MEDICINE
Payer: MEDICARE

## 2019-08-15 DIAGNOSIS — Z12.11 SCREENING FOR COLON CANCER: ICD-10-CM

## 2019-08-15 DIAGNOSIS — Z13.1 SCREENING FOR DIABETES MELLITUS: ICD-10-CM

## 2019-08-15 DIAGNOSIS — Z13.29 SCREENING FOR THYROID DISORDER: ICD-10-CM

## 2019-08-15 DIAGNOSIS — M75.101 ROTATOR CUFF TEAR ARTHROPATHY OF RIGHT SHOULDER: ICD-10-CM

## 2019-08-15 DIAGNOSIS — Z12.5 SCREENING FOR PROSTATE CANCER: ICD-10-CM

## 2019-08-15 DIAGNOSIS — N18.9 CHRONIC KIDNEY DISEASE, UNSPECIFIED CKD STAGE: ICD-10-CM

## 2019-08-15 DIAGNOSIS — F25.0 SCHIZOAFFECTIVE DISORDER, BIPOLAR TYPE (HCC): ICD-10-CM

## 2019-08-15 DIAGNOSIS — R97.20 ELEVATED PROSTATE SPECIFIC ANTIGEN (PSA): ICD-10-CM

## 2019-08-15 DIAGNOSIS — E07.89 OTHER SPECIFIED DISORDERS OF THYROID: ICD-10-CM

## 2019-08-15 DIAGNOSIS — R79.9 ABNORMAL FINDING OF BLOOD CHEMISTRY: ICD-10-CM

## 2019-08-15 DIAGNOSIS — S19.9XXD NECK INJURY, SUBSEQUENT ENCOUNTER: ICD-10-CM

## 2019-08-15 DIAGNOSIS — M54.6 ACUTE MIDLINE THORACIC BACK PAIN: ICD-10-CM

## 2019-08-15 DIAGNOSIS — M12.811 ROTATOR CUFF TEAR ARTHROPATHY OF RIGHT SHOULDER: ICD-10-CM

## 2019-08-15 DIAGNOSIS — R93.89 ABNORMAL CT OF THE CHEST: ICD-10-CM

## 2019-08-15 DIAGNOSIS — Z13.220 SCREENING FOR HYPERLIPIDEMIA: ICD-10-CM

## 2019-08-15 LAB
ALBUMIN SERPL BCP-MCNC: 4.1 G/DL (ref 3.5–5)
ALP SERPL-CCNC: 97 U/L (ref 46–116)
ALT SERPL W P-5'-P-CCNC: 74 U/L (ref 12–78)
ANION GAP SERPL CALCULATED.3IONS-SCNC: 6 MMOL/L (ref 4–13)
AST SERPL W P-5'-P-CCNC: 33 U/L (ref 5–45)
BASOPHILS # BLD AUTO: 0.02 THOUSANDS/ΜL (ref 0–0.1)
BASOPHILS NFR BLD AUTO: 0 % (ref 0–1)
BILIRUB SERPL-MCNC: 0.71 MG/DL (ref 0.2–1)
BUN SERPL-MCNC: 13 MG/DL (ref 5–25)
CALCIUM SERPL-MCNC: 9.1 MG/DL (ref 8.3–10.1)
CHLORIDE SERPL-SCNC: 105 MMOL/L (ref 100–108)
CHOLEST SERPL-MCNC: 204 MG/DL (ref 50–200)
CO2 SERPL-SCNC: 27 MMOL/L (ref 21–32)
CREAT SERPL-MCNC: 1.12 MG/DL (ref 0.6–1.3)
EOSINOPHIL # BLD AUTO: 0.06 THOUSAND/ΜL (ref 0–0.61)
EOSINOPHIL NFR BLD AUTO: 1 % (ref 0–6)
ERYTHROCYTE [DISTWIDTH] IN BLOOD BY AUTOMATED COUNT: 12.8 % (ref 11.6–15.1)
EST. AVERAGE GLUCOSE BLD GHB EST-MCNC: 111 MG/DL
GFR SERPL CREATININE-BSD FRML MDRD: 75 ML/MIN/1.73SQ M
GLUCOSE P FAST SERPL-MCNC: 98 MG/DL (ref 65–99)
HBA1C MFR BLD: 5.5 % (ref 4.2–6.3)
HCT VFR BLD AUTO: 44.9 % (ref 36.5–49.3)
HDLC SERPL-MCNC: 27 MG/DL (ref 40–60)
HGB BLD-MCNC: 14.7 G/DL (ref 12–17)
IMM GRANULOCYTES # BLD AUTO: 0.01 THOUSAND/UL (ref 0–0.2)
IMM GRANULOCYTES NFR BLD AUTO: 0 % (ref 0–2)
LDLC SERPL CALC-MCNC: 148 MG/DL (ref 0–100)
LYMPHOCYTES # BLD AUTO: 2.1 THOUSANDS/ΜL (ref 0.6–4.47)
LYMPHOCYTES NFR BLD AUTO: 47 % (ref 14–44)
MCH RBC QN AUTO: 29.6 PG (ref 26.8–34.3)
MCHC RBC AUTO-ENTMCNC: 32.7 G/DL (ref 31.4–37.4)
MCV RBC AUTO: 90 FL (ref 82–98)
MONOCYTES # BLD AUTO: 0.3 THOUSAND/ΜL (ref 0.17–1.22)
MONOCYTES NFR BLD AUTO: 7 % (ref 4–12)
NEUTROPHILS # BLD AUTO: 1.99 THOUSANDS/ΜL (ref 1.85–7.62)
NEUTS SEG NFR BLD AUTO: 45 % (ref 43–75)
NRBC BLD AUTO-RTO: 0 /100 WBCS
PLATELET # BLD AUTO: 159 THOUSANDS/UL (ref 149–390)
PMV BLD AUTO: 11.1 FL (ref 8.9–12.7)
POTASSIUM SERPL-SCNC: 4.2 MMOL/L (ref 3.5–5.3)
PROT SERPL-MCNC: 8.1 G/DL (ref 6.4–8.2)
RBC # BLD AUTO: 4.97 MILLION/UL (ref 3.88–5.62)
SODIUM SERPL-SCNC: 138 MMOL/L (ref 136–145)
TRIGL SERPL-MCNC: 147 MG/DL
TSH SERPL DL<=0.05 MIU/L-ACNC: 1.54 UIU/ML (ref 0.36–3.74)
WBC # BLD AUTO: 4.48 THOUSAND/UL (ref 4.31–10.16)

## 2019-08-15 PROCEDURE — 36415 COLL VENOUS BLD VENIPUNCTURE: CPT

## 2019-08-15 PROCEDURE — 71250 CT THORAX DX C-: CPT

## 2019-08-15 PROCEDURE — 83036 HEMOGLOBIN GLYCOSYLATED A1C: CPT

## 2019-08-15 PROCEDURE — 84154 ASSAY OF PSA FREE: CPT

## 2019-08-15 PROCEDURE — 80053 COMPREHEN METABOLIC PANEL: CPT

## 2019-08-15 PROCEDURE — 85025 COMPLETE CBC W/AUTO DIFF WBC: CPT

## 2019-08-15 PROCEDURE — 84153 ASSAY OF PSA TOTAL: CPT

## 2019-08-15 PROCEDURE — 84443 ASSAY THYROID STIM HORMONE: CPT

## 2019-08-15 PROCEDURE — 80061 LIPID PANEL: CPT

## 2019-08-16 LAB
PSA FREE MFR SERPL: 25 %
PSA FREE SERPL-MCNC: 0.1 NG/ML
PSA SERPL-MCNC: 0.4 NG/ML (ref 0–4)

## 2019-08-16 RX ORDER — ROSUVASTATIN CALCIUM 10 MG/1
10 TABLET, COATED ORAL DAILY
Qty: 90 TABLET | Refills: 3 | Status: SHIPPED | OUTPATIENT
Start: 2019-08-16 | End: 2019-11-18 | Stop reason: SDUPTHER

## 2019-08-22 ENCOUNTER — HOSPITAL ENCOUNTER (OUTPATIENT)
Dept: RADIOLOGY | Facility: HOSPITAL | Age: 54
Discharge: HOME | End: 2019-08-22
Payer: MEDICARE

## 2019-08-22 DIAGNOSIS — M12.811 ROTATOR CUFF TEAR ARTHROPATHY OF RIGHT SHOULDER: ICD-10-CM

## 2019-08-22 DIAGNOSIS — M75.101 ROTATOR CUFF TEAR ARTHROPATHY OF RIGHT SHOULDER: ICD-10-CM

## 2019-08-22 PROCEDURE — 73221 MRI JOINT UPR EXTREM W/O DYE: CPT

## 2019-08-26 ENCOUNTER — HOSPITAL ENCOUNTER (OUTPATIENT)
Dept: RADIOLOGY | Facility: HOSPITAL | Age: 54
Discharge: HOME | End: 2019-08-26
Payer: MEDICARE

## 2019-08-26 ENCOUNTER — TELEPHONE (OUTPATIENT)
Dept: CCU | Facility: HOSPITAL | Age: 54
End: 2019-08-26

## 2019-08-26 ENCOUNTER — TELEPHONE (OUTPATIENT)
Dept: OTHER | Facility: OTHER | Age: 54
End: 2019-08-26

## 2019-08-26 DIAGNOSIS — M54.6 ACUTE MIDLINE THORACIC BACK PAIN: ICD-10-CM

## 2019-08-26 DIAGNOSIS — S19.9XXD NECK INJURY, SUBSEQUENT ENCOUNTER: ICD-10-CM

## 2019-08-26 PROCEDURE — 72141 MRI NECK SPINE W/O DYE: CPT

## 2019-08-26 PROCEDURE — 72146 MRI CHEST SPINE W/O DYE: CPT

## 2019-08-26 NOTE — TELEPHONE ENCOUNTER
Left message with results of 08/21/2019 CT chest results  Stable scarring and stable nodule  Patient instructed to call office with any questions

## 2019-08-26 NOTE — TELEPHONE ENCOUNTER
Pt wanting directions for colonscopy prep    Instructed pt to contact colorectal surgeon office at [phone]

## 2019-08-28 ENCOUNTER — TELEPHONE (OUTPATIENT)
Dept: FAMILY MEDICINE CLINIC | Facility: CLINIC | Age: 54
End: 2019-08-28

## 2019-08-28 PROBLEM — K59.00 CONSTIPATION: Status: ACTIVE | Noted: 2019-08-28

## 2019-08-28 PROBLEM — K27.9 PEPTIC ULCER: Status: ACTIVE | Noted: 2019-08-28

## 2019-08-28 NOTE — TELEPHONE ENCOUNTER
----- Message from Butch Turk DO sent at 8/28/2019  8:47 AM EDT -----  Please let Johnnanette Zoie know that his cervical MRI shows arthritis in his neck and his thoracic MRI shows a small disc herniation at T7-8 and T12-L1  If he is still having pain, please refer him to pain management

## 2019-08-28 NOTE — TELEPHONE ENCOUNTER
Called patient , gave him results    He wants the ref , and he prefers someone near [facility] for appts       Please advise

## 2019-09-05 ENCOUNTER — TRANSCRIBE ORDERS (OUTPATIENT)
Dept: RADIOLOGY | Facility: HOSPITAL | Age: 54
End: 2019-09-05

## 2019-09-05 ENCOUNTER — HOSPITAL ENCOUNTER (OUTPATIENT)
Dept: RADIOLOGY | Facility: HOSPITAL | Age: 54
Discharge: HOME | End: 2019-09-05
Attending: ANESTHESIOLOGY
Payer: MEDICARE

## 2019-09-05 ENCOUNTER — CLINICAL SUPPORT (OUTPATIENT)
Dept: PAIN MEDICINE | Facility: CLINIC | Age: 54
End: 2019-09-05
Payer: MEDICARE

## 2019-09-05 VITALS
BODY MASS INDEX: 36.96 KG/M2 | HEART RATE: 77 BPM | SYSTOLIC BLOOD PRESSURE: 122 MMHG | DIASTOLIC BLOOD PRESSURE: 88 MMHG | HEIGHT: 66 IN | WEIGHT: 230 LBS

## 2019-09-05 DIAGNOSIS — M54.16 LUMBAR RADICULOPATHY: Primary | ICD-10-CM

## 2019-09-05 DIAGNOSIS — M54.40 LOW BACK PAIN WITH SCIATICA, SCIATICA LATERALITY UNSPECIFIED, UNSPECIFIED BACK PAIN LATERALITY, UNSPECIFIED CHRONICITY: ICD-10-CM

## 2019-09-05 DIAGNOSIS — M25.511 CHRONIC PAIN OF BOTH SHOULDERS: ICD-10-CM

## 2019-09-05 DIAGNOSIS — M25.512 CHRONIC PAIN OF BOTH SHOULDERS: ICD-10-CM

## 2019-09-05 DIAGNOSIS — M79.18 MYOFASCIAL PAIN: ICD-10-CM

## 2019-09-05 DIAGNOSIS — M50.30 DDD (DEGENERATIVE DISC DISEASE), CERVICAL: ICD-10-CM

## 2019-09-05 DIAGNOSIS — G89.29 CHRONIC PAIN OF BOTH SHOULDERS: ICD-10-CM

## 2019-09-05 DIAGNOSIS — M54.6 ACUTE MIDLINE THORACIC BACK PAIN: ICD-10-CM

## 2019-09-05 DIAGNOSIS — S19.9XXD NECK INJURY, SUBSEQUENT ENCOUNTER: ICD-10-CM

## 2019-09-05 DIAGNOSIS — M51.34 DDD (DEGENERATIVE DISC DISEASE), THORACIC: ICD-10-CM

## 2019-09-05 PROBLEM — M54.50 CHRONIC LOW BACK PAIN WITHOUT SCIATICA: Status: ACTIVE | Noted: 2019-09-05

## 2019-09-05 PROCEDURE — 72100 X-RAY EXAM L-S SPINE 2/3 VWS: CPT

## 2019-09-05 PROCEDURE — 99204 OFFICE O/P NEW MOD 45 MIN: CPT | Performed by: ANESTHESIOLOGY

## 2019-09-05 RX ORDER — TIZANIDINE 2 MG/1
2 TABLET ORAL EVERY 8 HOURS PRN
Qty: 90 TABLET | Refills: 2 | Status: SHIPPED | OUTPATIENT
Start: 2019-09-05 | End: 2019-11-18 | Stop reason: SDUPTHER
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
## 2019-09-09 ENCOUNTER — CONSULT (OUTPATIENT)
Dept: OBGYN CLINIC | Facility: HOSPITAL | Age: 54
End: 2019-09-09
Payer: MEDICARE

## 2019-09-09 VITALS
DIASTOLIC BLOOD PRESSURE: 88 MMHG | HEART RATE: 80 BPM | SYSTOLIC BLOOD PRESSURE: 132 MMHG | BODY MASS INDEX: 37.61 KG/M2 | WEIGHT: 234 LBS | HEIGHT: 66 IN

## 2019-09-09 DIAGNOSIS — M19.011 PRIMARY OSTEOARTHRITIS OF RIGHT SHOULDER: Primary | ICD-10-CM

## 2019-09-09 PROCEDURE — 20610 DRAIN/INJ JOINT/BURSA W/O US: CPT | Performed by: ORTHOPAEDIC SURGERY

## 2019-09-09 PROCEDURE — 99214 OFFICE O/P EST MOD 30 MIN: CPT | Performed by: ORTHOPAEDIC SURGERY

## 2019-09-09 RX ORDER — BETAMETHASONE SODIUM PHOSPHATE AND BETAMETHASONE ACETATE 3; 3 MG/ML; MG/ML
6 INJECTION, SUSPENSION INTRA-ARTICULAR; INTRALESIONAL; INTRAMUSCULAR; SOFT TISSUE
Status: COMPLETED | OUTPATIENT
Start: 2019-09-09 | End: 2019-09-09

## 2019-09-09 RX ORDER — BUPIVACAINE HYDROCHLORIDE 2.5 MG/ML
2 INJECTION, SOLUTION INFILTRATION; PERINEURAL
Status: COMPLETED | OUTPATIENT
Start: 2019-09-09 | End: 2019-09-09

## 2019-09-09 RX ADMIN — BUPIVACAINE HYDROCHLORIDE 2 ML: 2.5 INJECTION, SOLUTION INFILTRATION; PERINEURAL at 10:23

## 2019-09-09 RX ADMIN — BETAMETHASONE SODIUM PHOSPHATE AND BETAMETHASONE ACETATE 6 MG: 3; 3 INJECTION, SUSPENSION INTRA-ARTICULAR; INTRALESIONAL; INTRAMUSCULAR; SOFT TISSUE at 10:23

## 2019-09-09 NOTE — PROGRESS NOTES
Assessment  Diagnoses and all orders for this visit:    Primary osteoarthritis of right shoulder        Discussion and Plan:    · Explained to the patient that his symptoms and diagnostic studies today are consistent with a flare up of his severe osteoarthritic changes about his right glenohumeral joint  He was offered and received a cortisone injection today into his right glenohumeral joint, this procedure is documented appropriately below  He was instructed to ice injection site tonight  · Also briefly discussed with the patient that the ultimate treatment for osteoarthritis is a form of shoulder replacement surgery but this is a last resort option and he must try and fail all conservative treatments before this is considered  He understood and all questions were answered to the best my ability  · May perform activities as tolerated  What painful maneuvers  · May take OTC anti-inflammatories p r n  for pain relief  · He will follow up on an as-needed basis  Subjective:   Patient ID: Marychuy Lovett is a 48 y o  male      The patient presents today for an Orthopedic consultation visit from his PCP (Dr Zia Leal) on 8/12/19 with a chief complaint of right shoulder pain  The pain began 1 month(s) ago and is associated with an acute injury  Patient states that about 1 month ago a piece of drywall fell on him and he got scared and ran and subsequently fell onto his right shoulder  The patient describes the pain as aching and sharp in intensity,  intermittent in timing, and localizes the pain to the  right glenohumeral joint  The pain is worse with overhead work, overuse and raising arm over head and relieved by rest, ice, avoiding the painful activities  The pain is not associated with numbness and tingling  The pain is not associated with constitutional symptoms  The patient is not awoken at night by the pain      The patient has had previous treatment of prior rotator cuff repair, several years ago in [facility]              The following portions of the patient's history were reviewed and updated as appropriate: allergies, current medications, past family history, past medical history, past social history, past surgical history and problem list     Review of Systems   Constitutional: Negative for chills, fatigue, fever and unexpected weight change  HENT: Negative for hearing loss, nosebleeds and sore throat  Eyes: Negative for pain, redness and visual disturbance  Respiratory: Negative for cough, shortness of breath and wheezing  Cardiovascular: Negative for chest pain, palpitations and leg swelling  Gastrointestinal: Negative for abdominal pain, nausea and vomiting  Endocrine: Negative for polydipsia and polyuria  Genitourinary: Negative for frequency and urgency  Skin: Negative for color change, rash and wound  Neurological: Negative for dizziness, weakness, numbness and headaches  Psychiatric/Behavioral: Negative for behavioral problems, self-injury and suicidal ideas  Objective:  Right Shoulder Exam     Range of Motion   External rotation: 60   Forward flexion: 150   Internal rotation 0 degrees: Lumbar     Muscle Strength   Abduction: 4/5   External rotation: 4/5     Tests   Bobo test: negative  Drop arm: negative    Other   Erythema: absent  Sensation: normal  Pulse: present              Physical Exam   Constitutional: He is oriented to person, place, and time  He appears well-developed and well-nourished  No distress  Eyes: Pupils are equal, round, and reactive to light  Conjunctivae are normal    Neck: Normal range of motion  Neck supple  Cardiovascular: Normal rate, regular rhythm and intact distal pulses  Pulmonary/Chest: Effort normal and breath sounds normal    Abdominal: Soft  Bowel sounds are normal    Neurological: He is alert and oriented to person, place, and time  He has normal reflexes  Skin: Skin is warm and dry  No rash noted  No erythema     Psychiatric: He has a normal mood and affect  His behavior is normal      Large joint arthrocentesis: R glenohumeral  Date/Time: 9/9/2019 10:23 AM  Consent given by: patient  Site marked: site marked  Timeout: Immediately prior to procedure a time out was called to verify the correct patient, procedure, equipment, support staff and site/side marked as required   Supporting Documentation  Indications: pain and diagnostic evaluation   Procedure Details  Location: shoulder - R glenohumeral  Preparation: Patient was prepped and draped in the usual sterile fashion  Needle size: 22 G  Ultrasound guidance: no  Approach: posterior  Medications administered: 2 mL bupivacaine 0 25 %; 6 mg betamethasone acetate-betamethasone sodium phosphate 6 (3-3) mg/mL    Patient tolerance: patient tolerated the procedure well with no immediate complications  Dressing:  Sterile dressing applied          I have personally reviewed pertinent films in PACS and my interpretation is as follows  MRI right shoulder performed on 08/22/2019:  Advanced glenohumeral joint osteoarthritis  No gross evidence of a rotator cuff retear but insertions are largely obscured by old anchor screw/artifact  Moderate acromioclavicular arthrosis without subacromial narrowing      Scribe Attestation    I,:   Beata Rodriguez am acting as a scribe while in the presence of the attending physician :        I,:   Hina Barahona MD personally performed the services described in this documentation    as scribed in my presence :

## 2019-09-09 NOTE — PATIENT INSTRUCTIONS

## 2019-09-12 ENCOUNTER — EVALUATION (OUTPATIENT)
Dept: PHYSICAL THERAPY | Facility: REHABILITATION | Age: 54
End: 2019-09-12
Payer: MEDICARE

## 2019-09-12 DIAGNOSIS — G89.29 CHRONIC LEFT SHOULDER PAIN: ICD-10-CM

## 2019-09-12 DIAGNOSIS — M25.512 CHRONIC LEFT SHOULDER PAIN: ICD-10-CM

## 2019-09-12 DIAGNOSIS — M25.511 ACUTE PAIN OF RIGHT SHOULDER: Primary | ICD-10-CM

## 2019-09-12 PROCEDURE — 97161 PT EVAL LOW COMPLEX 20 MIN: CPT | Performed by: PHYSICAL THERAPIST

## 2019-09-12 PROCEDURE — 97110 THERAPEUTIC EXERCISES: CPT | Performed by: PHYSICAL THERAPIST

## 2019-09-12 NOTE — PROGRESS NOTES
PT Evaluation     Today's date: 2019  Patient name: Rajiv Clarke  : 1965  MRN: [id number]  Referring provider: No ref  provider found  Dx:   Encounter Diagnosis     ICD-10-CM    1  Acute pain of right shoulder M25 511    2  Chronic left shoulder pain M25 512     G89 29                   Assessment  Assessment details: Patient is a 48 y o  male referred to PT by Dr Ivanna Christian with a dx of R > L shoulder pain  Patient has a long standing history of B shoulder pain, however, he had a fall on 2019 that caused an acute flare up of his R shoulder pain  He did receive an injection that helped slightly  The pain complaints are aggravated by shoulder elevation and lifting  Clinical examination is consistent with B shoulder hypomobility likely related to both OA and GH joint restriction  No other referral appears necessary at this time  Impairments: abnormal or restricted ROM, activity intolerance, impaired physical strength, lacks appropriate home exercise program, pain with function, scapular dyskinesis and poor posture   Functional limitations: IADLs; Recreational activities  Symptom irritability: lowUnderstanding of Dx/Px/POC: good   Prognosis: good    Goals  Short Term goals - 4 weeks  1  Patient will be independent and compliant with a HEP  2   Patient will report a 50% decrease in pain complaints  Long Term goals - 8 weeks  1  Patient will report a decrease in pain complaints to a 2/10 level  2   Patient will return to all IADLs without restriction  3   Patient will return to all recreational activities with minimal restriction        Plan  Patient would benefit from: skilled physical therapy  Planned modality interventions: cryotherapy  Planned therapy interventions: joint mobilization, manual therapy, patient education, postural training, neuromuscular re-education, therapeutic exercise and home exercise program  Frequency: 2x week  Duration in visits: 8  Duration in weeks: 4  Treatment plan discussed with: patient        Subjective Evaluation    History of Present Illness  Date of onset: 2019  Mechanism of injury: trauma  Mechanism of injury: Patient reports he dove to avoid falling drywall from ceiling and injured R shoulder - reports B shoulder pain  Recurrent probem    Quality of life: good    Pain  Current pain ratin  At best pain ratin  At worst pain ratin  Location: Diffuse B shlds  Quality: discomfort, dull ache and sharp  Relieving factors: rest  Aggravating factors: overhead activity and lifting  Progression: improved      Diagnostic Tests  X-ray: abnormal  Treatments  Previous treatment: injection treatment  Current treatment: physical therapy  Patient Goals  Patient goals for therapy: increased strength, decreased pain, increased motion, return to sport/leisure activities and independence with ADLs/IADLs          Objective     Active Range of Motion   Left Shoulder   Flexion: 125 degrees   Abduction: 138 degrees   Internal rotation BTB: sacrum     Right Shoulder   Flexion: 110 degrees   Abduction: 120 degrees   Internal rotation BTB: L5     Passive Range of Motion   Left Shoulder   Flexion: 142 degrees with pain  Abduction: 128 degrees with pain  External rotation 45°: 40 degrees with pain  Internal rotation 90°: 25 degrees with pain    Right Shoulder   Flexion: 138 degrees with pain  Abduction: 135 degrees with pain  External rotation 45°: 55 degrees with pain  Internal rotation 90°: 30 degrees     Joint Play   Left Shoulder  Hypomobile in the posterior capsule and inferior capsule  Right Shoulder  Hypomobile in the posterior capsule       Strength/Myotome Testing     Left Shoulder     Planes of Motion   Flexion: 4-   Abduction: 3+   External rotation at 0°: 3+   Internal rotation at 0°: 4+     Right Shoulder     Planes of Motion   Flexion: 4-   Abduction: 3+   External rotation at 0°: 3+   Internal rotation at 0°: 4+       Flowsheet Rows Most Recent Value   PT/OT G-Codes   Current Score  45   Projected Score  60             Precautions: None      Manual              B shoulder PROM             Posterior jnt mobs                                                        Exercise Diary              UBE             Supine cane flexion             t-band rows/ext             B Shld ER             Supine serratus             S/L flexion/abd                                                                                                                                                                                                       Modalities

## 2019-09-17 ENCOUNTER — APPOINTMENT (OUTPATIENT)
Dept: PHYSICAL THERAPY | Facility: REHABILITATION | Age: 54
End: 2019-09-17
Payer: MEDICARE

## 2019-09-18 ENCOUNTER — HOSPITAL ENCOUNTER (OUTPATIENT)
Dept: RADIOLOGY | Facility: HOSPITAL | Age: 54
Discharge: HOME | End: 2019-09-18
Attending: ANESTHESIOLOGY
Payer: MEDICARE

## 2019-09-18 DIAGNOSIS — M54.16 LUMBAR RADICULOPATHY: ICD-10-CM

## 2019-09-18 PROCEDURE — 72148 MRI LUMBAR SPINE W/O DYE: CPT

## 2019-09-20 ENCOUNTER — APPOINTMENT (OUTPATIENT)
Dept: PHYSICAL THERAPY | Facility: REHABILITATION | Age: 54
End: 2019-09-20
Payer: MEDICARE

## 2019-09-24 ENCOUNTER — APPOINTMENT (OUTPATIENT)
Dept: PHYSICAL THERAPY | Facility: REHABILITATION | Age: 54
End: 2019-09-24
Payer: MEDICARE

## 2019-09-27 ENCOUNTER — APPOINTMENT (OUTPATIENT)
Dept: PHYSICAL THERAPY | Facility: REHABILITATION | Age: 54
End: 2019-09-27
Payer: MEDICARE

## 2019-10-10 ENCOUNTER — TELEPHONE (OUTPATIENT)
Dept: INTERNAL MEDICINE CLINIC | Facility: CLINIC | Age: 54
End: 2019-10-10

## 2019-10-10 ENCOUNTER — HOSPITAL ENCOUNTER (EMERGENCY)
Facility: HOSPITAL | Age: 54
Discharge: HOME | End: 2019-10-10
Attending: EMERGENCY MEDICINE | Admitting: EMERGENCY MEDICINE
Payer: MEDICARE

## 2019-10-10 VITALS
OXYGEN SATURATION: 98 % | HEART RATE: 97 BPM | TEMPERATURE: 98 F | SYSTOLIC BLOOD PRESSURE: 160 MMHG | HEIGHT: 66 IN | RESPIRATION RATE: 18 BRPM | DIASTOLIC BLOOD PRESSURE: 94 MMHG | WEIGHT: 220 LBS | BODY MASS INDEX: 35.36 KG/M2

## 2019-10-10 DIAGNOSIS — F19.10 DRUG ABUSE (HCC): Primary | ICD-10-CM

## 2019-10-10 LAB
ALBUMIN SERPL BCP-MCNC: 4.3 G/DL (ref 3.5–5)
ALP SERPL-CCNC: 86 U/L (ref 46–116)
ALT SERPL W P-5'-P-CCNC: 90 U/L (ref 12–78)
ANION GAP SERPL CALCULATED.3IONS-SCNC: 9 MMOL/L (ref 4–13)
AST SERPL W P-5'-P-CCNC: 40 U/L (ref 5–45)
ATRIAL RATE: 81 BPM
BASOPHILS # BLD AUTO: 0.02 THOUSANDS/ΜL (ref 0–0.1)
BASOPHILS NFR BLD AUTO: 0 % (ref 0–1)
BILIRUB SERPL-MCNC: 0.49 MG/DL (ref 0.2–1)
BUN SERPL-MCNC: 10 MG/DL (ref 5–25)
CALCIUM SERPL-MCNC: 9.3 MG/DL (ref 8.3–10.1)
CHLORIDE SERPL-SCNC: 103 MMOL/L (ref 100–108)
CO2 SERPL-SCNC: 23 MMOL/L (ref 21–32)
CREAT SERPL-MCNC: 1.02 MG/DL (ref 0.6–1.3)
EOSINOPHIL # BLD AUTO: 0.06 THOUSAND/ΜL (ref 0–0.61)
EOSINOPHIL NFR BLD AUTO: 1 % (ref 0–6)
ERYTHROCYTE [DISTWIDTH] IN BLOOD BY AUTOMATED COUNT: 13 % (ref 11.6–15.1)
GFR SERPL CREATININE-BSD FRML MDRD: 84 ML/MIN/1.73SQ M
GLUCOSE SERPL-MCNC: 93 MG/DL (ref 65–140)
HCT VFR BLD AUTO: 44 % (ref 36.5–49.3)
HGB BLD-MCNC: 15.1 G/DL (ref 12–17)
IMM GRANULOCYTES # BLD AUTO: 0.01 THOUSAND/UL (ref 0–0.2)
IMM GRANULOCYTES NFR BLD AUTO: 0 % (ref 0–2)
LIPASE SERPL-CCNC: 129 U/L (ref 73–393)
LYMPHOCYTES # BLD AUTO: 2.56 THOUSANDS/ΜL (ref 0.6–4.47)
LYMPHOCYTES NFR BLD AUTO: 45 % (ref 14–44)
MCH RBC QN AUTO: 30.3 PG (ref 26.8–34.3)
MCHC RBC AUTO-ENTMCNC: 34.3 G/DL (ref 31.4–37.4)
MCV RBC AUTO: 88 FL (ref 82–98)
MONOCYTES # BLD AUTO: 0.38 THOUSAND/ΜL (ref 0.17–1.22)
MONOCYTES NFR BLD AUTO: 7 % (ref 4–12)
NEUTROPHILS # BLD AUTO: 2.7 THOUSANDS/ΜL (ref 1.85–7.62)
NEUTS SEG NFR BLD AUTO: 47 % (ref 43–75)
NRBC BLD AUTO-RTO: 0 /100 WBCS
P AXIS: 57 DEGREES
PLATELET # BLD AUTO: 192 THOUSANDS/UL (ref 149–390)
PMV BLD AUTO: 10.6 FL (ref 8.9–12.7)
POTASSIUM SERPL-SCNC: 3.5 MMOL/L (ref 3.5–5.3)
PR INTERVAL: 152 MS
PROT SERPL-MCNC: 8 G/DL (ref 6.4–8.2)
QRS AXIS: 43 DEGREES
QRSD INTERVAL: 80 MS
QT INTERVAL: 364 MS
QTC INTERVAL: 422 MS
RBC # BLD AUTO: 4.98 MILLION/UL (ref 3.88–5.62)
SODIUM SERPL-SCNC: 135 MMOL/L (ref 136–145)
T WAVE AXIS: -15 DEGREES
VENTRICULAR RATE: 81 BPM
WBC # BLD AUTO: 5.73 THOUSAND/UL (ref 4.31–10.16)

## 2019-10-10 PROCEDURE — 83690 ASSAY OF LIPASE: CPT | Performed by: EMERGENCY MEDICINE

## 2019-10-10 PROCEDURE — 85025 COMPLETE CBC W/AUTO DIFF WBC: CPT | Performed by: EMERGENCY MEDICINE

## 2019-10-10 PROCEDURE — 99285 EMERGENCY DEPT VISIT HI MDM: CPT

## 2019-10-10 PROCEDURE — 80053 COMPREHEN METABOLIC PANEL: CPT | Performed by: EMERGENCY MEDICINE

## 2019-10-10 PROCEDURE — 93005 ELECTROCARDIOGRAM TRACING: CPT

## 2019-10-10 PROCEDURE — 93010 ELECTROCARDIOGRAM REPORT: CPT | Performed by: INTERNAL MEDICINE

## 2019-10-10 PROCEDURE — 99283 EMERGENCY DEPT VISIT LOW MDM: CPT | Performed by: EMERGENCY MEDICINE

## 2019-10-10 PROCEDURE — 36415 COLL VENOUS BLD VENIPUNCTURE: CPT | Performed by: EMERGENCY MEDICINE

## 2019-10-10 RX ORDER — LORAZEPAM 0.5 MG/1
1 TABLET ORAL ONCE
Status: COMPLETED | OUTPATIENT
Start: 2019-10-10 | End: 2019-10-10

## 2019-10-10 RX ORDER — LORAZEPAM 0.5 MG/1
0.5 TABLET ORAL ONCE
Status: DISCONTINUED | OUTPATIENT
Start: 2019-10-10 | End: 2019-10-10

## 2019-10-10 RX ADMIN — LORAZEPAM 1 MG: 0.5 TABLET ORAL at 03:45
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
## 2019-10-14 NOTE — TELEPHONE ENCOUNTER
SPOKE WITH PT, HE STATED HE DOESN'T NEED TO BE SEEN RIGHT NOW BUT WILL CALL BACK TO SCHEDULE A F/U APPT

## 2019-10-15 RX ORDER — SODIUM CHLORIDE 9 MG/ML
100 INJECTION, SOLUTION INTRAVENOUS CONTINUOUS
Status: CANCELLED | OUTPATIENT
Start: 2019-10-15

## 2019-10-16 ENCOUNTER — HOSPITAL ENCOUNTER (OUTPATIENT)
Dept: GASTROENTEROLOGY | Facility: HOSPITAL | Age: 54
Setting detail: OUTPATIENT SURGERY
Discharge: HOME | End: 2019-10-16
Attending: COLON & RECTAL SURGERY
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
## 2019-11-06 ENCOUNTER — HOSPITAL ENCOUNTER (EMERGENCY)
Facility: HOSPITAL | Age: 54
Discharge: HOME | End: 2019-11-06
Attending: EMERGENCY MEDICINE
Payer: MEDICARE

## 2019-11-06 VITALS
OXYGEN SATURATION: 97 % | HEART RATE: 84 BPM | RESPIRATION RATE: 16 BRPM | SYSTOLIC BLOOD PRESSURE: 165 MMHG | TEMPERATURE: 98.7 F | DIASTOLIC BLOOD PRESSURE: 112 MMHG

## 2019-11-06 DIAGNOSIS — F19.10 DRUG ABUSE (HCC): Primary | ICD-10-CM

## 2019-11-06 PROCEDURE — NC001 PR NO CHARGE: Performed by: EMERGENCY MEDICINE

## 2019-11-06 PROCEDURE — 99283 EMERGENCY DEPT VISIT LOW MDM: CPT

## 2019-11-06 PROCEDURE — 99284 EMERGENCY DEPT VISIT MOD MDM: CPT | Performed by: EMERGENCY MEDICINE

## 2019-11-06 NOTE — ED NOTES
Pt at nurses station stating "I can't stay you guys aren't giving me anything  I have to go"  RN advised pt it is in his best interest to remain in ED at this time  Pt eloped from ED at this time  Dr Mely Flaherty and Dr Olga Shearer advised        Libertad Serrato, RN  11/06/19 2606

## 2019-11-06 NOTE — ED ATTENDING ATTESTATION
11/6/2019  I, Darrian Cardenas MD, saw and evaluated the patient  I have discussed the patient with the resident/non-physician practitioner and agree with the resident's/non-physician practitioner's findings, Plan of Care, and MDM as documented in the resident's/non-physician practitioner's note, except where noted  All available labs and Radiology studies were reviewed  I was present for key portions of any procedure(s) performed by the resident/non-physician practitioner and I was immediately available to provide assistance  At this point I agree with the current assessment done in the Emergency Department  I have conducted an independent evaluation of this patient a history and physical is as follows:    ED Course         Critical Care Time  Procedures    47 yo male admits to using crack today and requesting anti anxiety medicine  Pt started using crack again 3 weeks ago  Pt also c/o left knee pain after spraining knee  No cp, no sob, no abdominal pain, no n/v/d  Pt left before my exam from er

## 2019-11-06 NOTE — ED NOTES
Pt agitated demanding ativan stating "I need something to calm me down  All I want is 1 mg of ativan " RN advised pt medications are per MD orders, per Dr Milda Bumpers no ativan at this time        Akbar Favorite, RN  11/06/19 7730
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
## 2019-11-07 ENCOUNTER — HOSPITAL ENCOUNTER (EMERGENCY)
Facility: HOSPITAL | Age: 54
Discharge: HOME | End: 2019-11-07
Attending: EMERGENCY MEDICINE | Admitting: EMERGENCY MEDICINE
Payer: MEDICARE

## 2019-11-07 ENCOUNTER — HOSPITAL ENCOUNTER (EMERGENCY)
Facility: HOSPITAL | Age: 54
Discharge: HOME | End: 2019-11-07
Attending: EMERGENCY MEDICINE
Payer: MEDICARE

## 2019-11-07 VITALS
HEART RATE: 107 BPM | DIASTOLIC BLOOD PRESSURE: 93 MMHG | OXYGEN SATURATION: 97 % | SYSTOLIC BLOOD PRESSURE: 170 MMHG | BODY MASS INDEX: 35.23 KG/M2 | WEIGHT: 218.26 LBS | TEMPERATURE: 98.4 F | RESPIRATION RATE: 16 BRPM

## 2019-11-07 VITALS
HEIGHT: 66 IN | DIASTOLIC BLOOD PRESSURE: 54 MMHG | TEMPERATURE: 97.9 F | SYSTOLIC BLOOD PRESSURE: 132 MMHG | OXYGEN SATURATION: 97 % | RESPIRATION RATE: 16 BRPM | WEIGHT: 218.26 LBS | BODY MASS INDEX: 35.08 KG/M2 | HEART RATE: 63 BPM

## 2019-11-07 DIAGNOSIS — F19.982 DRUG-INDUCED INSOMNIA (HCC): Primary | ICD-10-CM

## 2019-11-07 DIAGNOSIS — M25.562 LEFT KNEE PAIN, UNSPECIFIED CHRONICITY: ICD-10-CM

## 2019-11-07 DIAGNOSIS — G25.81 RESTLESS LEGS: Primary | ICD-10-CM

## 2019-11-07 PROCEDURE — NC001 PR NO CHARGE: Performed by: EMERGENCY MEDICINE

## 2019-11-07 PROCEDURE — 99283 EMERGENCY DEPT VISIT LOW MDM: CPT

## 2019-11-07 RX ORDER — ZOLPIDEM TARTRATE 5 MG/1
10 TABLET ORAL
Status: DISCONTINUED | OUTPATIENT
Start: 2019-11-07 | End: 2019-11-07 | Stop reason: HOSPADM

## 2019-11-07 RX ORDER — IBUPROFEN 400 MG/1
800 TABLET ORAL ONCE
Status: COMPLETED | OUTPATIENT
Start: 2019-11-07 | End: 2019-11-07

## 2019-11-07 RX ORDER — DIPHENHYDRAMINE HCL 25 MG
25 TABLET ORAL ONCE
Status: COMPLETED | OUTPATIENT
Start: 2019-11-07 | End: 2019-11-07

## 2019-11-07 RX ADMIN — ZOLPIDEM TARTRATE 10 MG: 5 TABLET, FILM COATED ORAL at 02:43

## 2019-11-07 RX ADMIN — DIPHENHYDRAMINE HCL 25 MG: 25 TABLET, COATED ORAL at 02:43

## 2019-11-07 RX ADMIN — IBUPROFEN 800 MG: 400 TABLET ORAL at 02:53
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
## 2019-11-07 NOTE — DISCHARGE INSTRUCTIONS
You may take benadryl and melatonin for your insomnia (lack of sleep)  Please make sure you eat right and get plenty of exercise  Follow up with your PCP for your medications and you may call orthopedics for your knee pain  Please return for any or otherwise concerning symptoms

## 2019-11-07 NOTE — ED ATTENDING ATTESTATION
11/7/2019  I, Megan Dempsey MD, saw and evaluated the patient  I have discussed the patient with the resident/non-physician practitioner and agree with the resident's/non-physician practitioner's findings, Plan of Care, and MDM as documented in the resident's/non-physician practitioner's note, except where noted  All available labs and Radiology studies were reviewed  I was present for key portions of any procedure(s) performed by the resident/non-physician practitioner and I was immediately available to provide assistance  At this point I agree with the current assessment done in the Emergency Department  I have conducted an independent evaluation of this patient a history and physical is as follows:    ED Course         Critical Care Time  Procedures    47 yo male here for 2nd night in a row, seen in ed yesterday for anxiety and left before my exam, here today for trouble sleeping and hunger  Pt noncompliant with meds and uses money to buy crack  Pt with no physical complaints  No cp, no sob, no abdominal pain  No n/v/d  No fever  Vss, afebrile, lungs cta, rrr, abdomen soft nontender, no neuro deficits  Benadryl, melatonin
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
## 2019-11-08 NOTE — ED ATTENDING ATTESTATION
11/7/2019  IMarycruz DO, saw and evaluated the patient  I have discussed the patient with the resident/non-physician practitioner and agree with the resident's/non-physician practitioner's findings, Plan of Care, and MDM as documented in the resident's/non-physician practitioner's note, except where noted  All available labs and Radiology studies were reviewed  I was present for key portions of any procedure(s) performed by the resident/non-physician practitioner and I was immediately available to provide assistance  At this point I agree with the current assessment done in the Emergency Department  I have conducted an independent evaluation of this patient a history and physical is as follows:    Patient is a 61-year-old male patient with a history, per his report of restless leg syndrome and insomnia  He says he has been having a difficult time sleeping over the past several days, especially after smoking cocaine 2 days ago  Also complains some occasional knee pain  He was seen last evening in the emergency department, given a dose of Ambien and Benadryl and recommended to follow up with primary care physician and Orthopedics  He says he called his psychiatrist to prescribe some of his medications and that person was reportedly away at a conference, did not attempt to contact anyone that he had been recommended to follow up with  He presents tonight because he feels like his restless leg syndrome is bothering him is having a hard time sleeping  Denies chest pain, denies palpitations, denies numbness or tingling, denies trauma      General:  Patient is well-appearing  Head:  Atraumatic  Eyes:  Conjunctiva pink  ENT:  Mucous membranes are moist  Neck:  Supple  Cardiac:  S1-S2, without murmurs  Lungs:  Clear to auscultation bilaterally  Abdomen:  Soft, nontender, normal bowel sounds, no CVA tenderness, no tympany, no rigidity, no guarding  Extremities:  Normal range of motion, left leg is atraumatic, nontender, no ligamentous laxity, no warmth, no redness or effusion  Neurologic:  Awake, fluent speech, normal comprehension  AAOx3  No deficit on finger to nose testing, no pronator drift, cranial nerves II through XII are intact, no facial droop, no slurred speech, normal sensation, strength 5/5 in b/l upper & lower extremities  Skin:  Pink warm and dry  Psychiatric:  Alert, agitated that he is in the emergency department does not feel like he is able to get help            ED Course     I attempted to explain to the patient that we do not prescribe medications for restless leg syndrome or insomnia in the emergency department and that he needed to follow up with the providers that he had been referred to earlier  He indicated he was unhappy with that and walked out of the room and emergency department at that point  I did observe that when he was walking, there was no gait abnormalities      Critical Care Time  Procedures

## 2019-11-08 NOTE — ED NOTES
Upon completion of pt's triage he asked for water, he was informed that the Dr  Must see him first, in case something is seriously wrong  Pt stated "there's nothing seriously wrong with me   I need a drink"     Marlee Thompson, ZARINA  11/07/19 4970

## 2019-11-11 ENCOUNTER — TRANSITIONAL CARE MANAGEMENT (OUTPATIENT)
Dept: INTERNAL MEDICINE CLINIC | Facility: CLINIC | Age: 54
End: 2019-11-11

## 2019-11-18 ENCOUNTER — OFFICE VISIT (OUTPATIENT)
Dept: INTERNAL MEDICINE CLINIC | Facility: CLINIC | Age: 54
End: 2019-11-18
Payer: MEDICARE

## 2019-11-18 ENCOUNTER — TELEPHONE (OUTPATIENT)
Dept: INTERNAL MEDICINE CLINIC | Facility: CLINIC | Age: 54
End: 2019-11-18

## 2019-11-18 VITALS
OXYGEN SATURATION: 96 % | HEIGHT: 66 IN | RESPIRATION RATE: 15 BRPM | BODY MASS INDEX: 36.45 KG/M2 | HEART RATE: 70 BPM | DIASTOLIC BLOOD PRESSURE: 80 MMHG | WEIGHT: 226.8 LBS | SYSTOLIC BLOOD PRESSURE: 124 MMHG

## 2019-11-18 DIAGNOSIS — S83.512D RUPTURE OF ANTERIOR CRUCIATE LIGAMENT OF LEFT KNEE, SUBSEQUENT ENCOUNTER: ICD-10-CM

## 2019-11-18 DIAGNOSIS — F31.9 BIPOLAR 1 DISORDER (HCC): ICD-10-CM

## 2019-11-18 DIAGNOSIS — E78.01 FAMILIAL HYPERCHOLESTEROLEMIA: ICD-10-CM

## 2019-11-18 DIAGNOSIS — F14.10 COCAINE ABUSE (HCC): ICD-10-CM

## 2019-11-18 DIAGNOSIS — H40.9 GLAUCOMA OF BOTH EYES, UNSPECIFIED GLAUCOMA TYPE: ICD-10-CM

## 2019-11-18 DIAGNOSIS — M70.52 POPLITEAL BURSITIS OF LEFT KNEE: Primary | ICD-10-CM

## 2019-11-18 DIAGNOSIS — M79.18 MYOFASCIAL PAIN: ICD-10-CM

## 2019-11-18 PROCEDURE — 99496 TRANSJ CARE MGMT HIGH F2F 7D: CPT | Performed by: INTERNAL MEDICINE

## 2019-11-18 RX ORDER — TIZANIDINE 2 MG/1
2 TABLET ORAL EVERY 8 HOURS PRN
Qty: 90 TABLET | Refills: 2 | Status: SHIPPED | OUTPATIENT
Start: 2019-11-18 | End: 2020-06-29

## 2019-11-18 RX ORDER — ROSUVASTATIN CALCIUM 10 MG/1
10 TABLET, COATED ORAL DAILY
Qty: 90 TABLET | Refills: 3 | Status: SHIPPED | OUTPATIENT
Start: 2019-11-18 | End: 2021-02-17 | Stop reason: SDUPTHER

## 2019-11-18 RX ORDER — DORZOLAMIDE HYDROCHLORIDE AND TIMOLOL MALEATE 20; 5 MG/ML; MG/ML
1 SOLUTION/ DROPS OPHTHALMIC DAILY
Qty: 10 ML | Refills: 3 | Status: SHIPPED | OUTPATIENT
Start: 2019-11-18 | End: 2019-11-18

## 2019-11-18 RX ORDER — LATANOPROST 50 UG/ML
1 SOLUTION/ DROPS OPHTHALMIC DAILY
Qty: 7.5 ML | Refills: 3 | Status: SHIPPED | OUTPATIENT
Start: 2019-11-18 | End: 2020-06-29 | Stop reason: SDUPTHER

## 2019-11-18 RX ORDER — LATANOPROST 50 UG/ML
1 SOLUTION/ DROPS OPHTHALMIC DAILY
Qty: 7.5 ML | Refills: 3 | Status: SHIPPED | OUTPATIENT
Start: 2019-11-18 | End: 2019-11-18

## 2019-11-18 RX ORDER — DORZOLAMIDE HYDROCHLORIDE AND TIMOLOL MALEATE 20; 5 MG/ML; MG/ML
1 SOLUTION/ DROPS OPHTHALMIC DAILY
Qty: 10 ML | Refills: 3 | Status: SHIPPED | OUTPATIENT
Start: 2019-11-18 | End: 2020-06-29 | Stop reason: SDUPTHER
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
## 2019-12-15 ENCOUNTER — HOSPITAL ENCOUNTER (OUTPATIENT)
Dept: RADIOLOGY | Facility: HOSPITAL | Age: 54
Discharge: HOME | End: 2019-12-15
Payer: MEDICARE

## 2019-12-15 DIAGNOSIS — M70.52 POPLITEAL BURSITIS OF LEFT KNEE: ICD-10-CM

## 2019-12-15 PROCEDURE — 73721 MRI JNT OF LWR EXTRE W/O DYE: CPT

## 2019-12-16 ENCOUNTER — TELEPHONE (OUTPATIENT)
Dept: INTERNAL MEDICINE CLINIC | Facility: CLINIC | Age: 54
End: 2019-12-16

## 2019-12-16 NOTE — TELEPHONE ENCOUNTER
LEFT MESSAGE FOR PT, MADE APPT FOR ORTHO TOMMOROW WITH DR REED @ 3:15PM @ [address] [facility] [address]

## 2019-12-17 ENCOUNTER — HOSPITAL ENCOUNTER (OUTPATIENT)
Dept: RADIOLOGY | Facility: HOSPITAL | Age: 54
Discharge: HOME | End: 2019-12-17
Attending: ORTHOPAEDIC SURGERY
Payer: MEDICARE

## 2019-12-17 ENCOUNTER — OFFICE VISIT (OUTPATIENT)
Dept: OBGYN CLINIC | Facility: HOSPITAL | Age: 54
End: 2019-12-17
Payer: MEDICARE

## 2019-12-17 VITALS
HEART RATE: 80 BPM | HEIGHT: 66 IN | WEIGHT: 226 LBS | BODY MASS INDEX: 36.32 KG/M2 | DIASTOLIC BLOOD PRESSURE: 89 MMHG | SYSTOLIC BLOOD PRESSURE: 139 MMHG

## 2019-12-17 DIAGNOSIS — M25.562 LEFT KNEE PAIN, UNSPECIFIED CHRONICITY: ICD-10-CM

## 2019-12-17 DIAGNOSIS — S83.512D RUPTURE OF ANTERIOR CRUCIATE LIGAMENT OF LEFT KNEE, SUBSEQUENT ENCOUNTER: ICD-10-CM

## 2019-12-17 DIAGNOSIS — M25.562 LEFT KNEE PAIN, UNSPECIFIED CHRONICITY: Primary | ICD-10-CM

## 2019-12-17 DIAGNOSIS — S83.242A ACUTE MEDIAL MENISCAL TEAR, LEFT, INITIAL ENCOUNTER: ICD-10-CM

## 2019-12-17 DIAGNOSIS — M17.12 PRIMARY OSTEOARTHRITIS OF LEFT KNEE: ICD-10-CM

## 2019-12-17 PROCEDURE — 20610 DRAIN/INJ JOINT/BURSA W/O US: CPT | Performed by: ORTHOPAEDIC SURGERY

## 2019-12-17 PROCEDURE — 73562 X-RAY EXAM OF KNEE 3: CPT

## 2019-12-17 PROCEDURE — 99213 OFFICE O/P EST LOW 20 MIN: CPT | Performed by: ORTHOPAEDIC SURGERY

## 2019-12-17 RX ORDER — TRIAZOLAM 0.12 MG/1
0.12 TABLET ORAL
Refills: 1 | COMMUNITY
Start: 2019-11-15 | End: 2021-05-13

## 2019-12-17 RX ORDER — LIDOCAINE HYDROCHLORIDE 10 MG/ML
2 INJECTION, SOLUTION INFILTRATION; PERINEURAL
Status: COMPLETED | OUTPATIENT
Start: 2019-12-17 | End: 2019-12-17

## 2019-12-17 RX ORDER — BETAMETHASONE SODIUM PHOSPHATE AND BETAMETHASONE ACETATE 3; 3 MG/ML; MG/ML
12 INJECTION, SUSPENSION INTRA-ARTICULAR; INTRALESIONAL; INTRAMUSCULAR; SOFT TISSUE
Status: COMPLETED | OUTPATIENT
Start: 2019-12-17 | End: 2019-12-17

## 2019-12-17 RX ORDER — BUPIVACAINE HYDROCHLORIDE 2.5 MG/ML
2 INJECTION, SOLUTION EPIDURAL; INFILTRATION; INTRACAUDAL
Status: COMPLETED | OUTPATIENT
Start: 2019-12-17 | End: 2019-12-17

## 2019-12-17 RX ADMIN — BETAMETHASONE SODIUM PHOSPHATE AND BETAMETHASONE ACETATE 12 MG: 3; 3 INJECTION, SUSPENSION INTRA-ARTICULAR; INTRALESIONAL; INTRAMUSCULAR; SOFT TISSUE at 16:16

## 2019-12-17 RX ADMIN — LIDOCAINE HYDROCHLORIDE 2 ML: 10 INJECTION, SOLUTION INFILTRATION; PERINEURAL at 16:16

## 2019-12-17 RX ADMIN — BUPIVACAINE HYDROCHLORIDE 2 ML: 2.5 INJECTION, SOLUTION EPIDURAL; INFILTRATION; INTRACAUDAL at 16:16
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
## 2020-01-27 ENCOUNTER — TELEPHONE (OUTPATIENT)
Dept: PSYCHIATRY | Facility: CLINIC | Age: 55
End: 2020-01-27

## 2020-02-05 ENCOUNTER — TELEPHONE (OUTPATIENT)
Dept: PSYCHIATRY | Facility: CLINIC | Age: 55
End: 2020-02-05

## 2020-02-06 ENCOUNTER — OFFICE VISIT (OUTPATIENT)
Dept: OBGYN CLINIC | Facility: HOSPITAL | Age: 55
End: 2020-02-06
Payer: MEDICARE

## 2020-02-06 VITALS
WEIGHT: 235.89 LBS | SYSTOLIC BLOOD PRESSURE: 145 MMHG | DIASTOLIC BLOOD PRESSURE: 90 MMHG | BODY MASS INDEX: 37.91 KG/M2 | HEIGHT: 66 IN | HEART RATE: 88 BPM

## 2020-02-06 DIAGNOSIS — F43.12 POST-TRAUMATIC STRESS DISORDER, CHRONIC: Chronic | ICD-10-CM

## 2020-02-06 DIAGNOSIS — F14.20 COCAINE DEPENDENCE, CONTINUOUS (HCC): Chronic | ICD-10-CM

## 2020-02-06 DIAGNOSIS — M79.18 MYOFASCIAL PAIN: ICD-10-CM

## 2020-02-06 DIAGNOSIS — F12.10 CANNABIS ABUSE, EPISODIC: Chronic | ICD-10-CM

## 2020-02-06 DIAGNOSIS — F10.10 ALCOHOL ABUSE, CONTINUOUS: Chronic | ICD-10-CM

## 2020-02-06 DIAGNOSIS — F25.0 SCHIZOAFFECTIVE DISORDER, BIPOLAR TYPE (HCC): Chronic | ICD-10-CM

## 2020-02-06 DIAGNOSIS — S83.242D ACUTE MEDIAL MENISCUS TEAR OF LEFT KNEE, SUBSEQUENT ENCOUNTER: ICD-10-CM

## 2020-02-06 DIAGNOSIS — M25.562 LEFT KNEE PAIN, UNSPECIFIED CHRONICITY: Primary | ICD-10-CM

## 2020-02-06 PROBLEM — S83.242A ACUTE MEDIAL MENISCUS TEAR OF LEFT KNEE: Status: ACTIVE | Noted: 2020-02-06

## 2020-02-06 PROCEDURE — 3008F BODY MASS INDEX DOCD: CPT | Performed by: ORTHOPAEDIC SURGERY

## 2020-02-06 PROCEDURE — 1036F TOBACCO NON-USER: CPT | Performed by: ORTHOPAEDIC SURGERY

## 2020-02-06 PROCEDURE — 99214 OFFICE O/P EST MOD 30 MIN: CPT | Performed by: ORTHOPAEDIC SURGERY

## 2020-02-06 RX ORDER — RISPERIDONE 2 MG/1
4 TABLET, FILM COATED ORAL
COMMUNITY
Start: 2020-01-24
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
## 2020-02-18 ENCOUNTER — ANESTHESIA EVENT (OUTPATIENT)
Dept: PERIOP | Facility: HOSPITAL | Age: 55
End: 2020-02-18
Payer: MEDICARE

## 2020-02-18 NOTE — ANESTHESIA PREPROCEDURE EVALUATION
Review of Systems/Medical History  Patient summary reviewed  Chart reviewed  No history of anesthetic complications     Cardiovascular   Pulmonary  Negative pulmonary ROS Sleep apnea ,        GI/Hepatic    PUD,        Negative  ROS        Endo/Other  Negative endo/other ROS      GYN       Hematology  Negative hematology ROS      Musculoskeletal    Arthritis     Neurology  Negative neurology ROS      Psychology   Psychiatric history, Anxiety, Depression ,     Comment: Polysubstance abuse    Schizoaffective disorder         Physical Exam    Airway    Mallampati score: II  TM Distance: >3 FB  Neck ROM: full     Dental       Cardiovascular      Pulmonary      Other Findings        Anesthesia Plan  ASA Score- 3     Anesthesia Type- general with ASA Monitors  Additional Monitors:   Airway Plan: LMA  Comment: Polypharmacy abuse: ETOH (18 drinks beer/week), crack/cocaine, opioids, PCP  Uncertain when he last took recreational drugs  Referred to psychiatry for anxiety, hx of bipolar dz on meds  Outpatient procedure    Plan Factors-    Induction- intravenous  Postoperative Plan-     Informed Consent- Anesthetic plan and risks discussed with patient  I personally reviewed this patient with the CRNA  Discussed and agreed on the Anesthesia Plan with the CRNA  Momo Apple

## 2020-02-19 NOTE — PRE-PROCEDURE INSTRUCTIONS
Pre-Surgery Instructions:   Medication Instructions    dorzolamide-timolol (COSOPT) 22 3-6 8 MG/ML ophthalmic solution Instructed patient per Anesthesia Guidelines   doxepin (SINEquan) 150 MG capsule Instructed patient per Anesthesia Guidelines   latanoprost (XALATAN) 0 005 % ophthalmic solution Instructed patient per Anesthesia Guidelines  Review with patient via phone medications and showering instructions  He says he is not taking some of the medications on the list because they ran out and he doesn't know if he needs more  Advice don't take NSAID'S week prior day of surgery  Advice ASC call and he doesn't want me to give [facility] phone number  Alpha-2 adrenergic agonist Med Class   Continue to take this medication on your normal schedule  If this is an oral medication and you take it in the morning, then you may take this medicine with a sip of water  Antidepressant Med Class   Continue to take this medication on your normal schedule  If this is an oral medication and you take it in the morning, then you may take this medicine with a sip of water  Antipsychotic Med Class   Continue to take this medication on your normal schedule  If this is an oral medication and you take it in the morning, then you may take this medicine with a sip of water  Benzodiazepine antagonist Med Class   If this medication is needed please continue to take on your normal schedule  If you take it in the morning, then you may take this medicine with a sip of water  Statin Med Class   Continue to take this medication on your normal schedule    If this is an oral medication and you take it in the morning, then you may take this medicine with a sip of water

## 2020-02-24 ENCOUNTER — TELEPHONE (OUTPATIENT)
Dept: INTERNAL MEDICINE CLINIC | Facility: CLINIC | Age: 55
End: 2020-02-24

## 2020-02-24 ENCOUNTER — OFFICE VISIT (OUTPATIENT)
Dept: INTERNAL MEDICINE CLINIC | Facility: CLINIC | Age: 55
End: 2020-02-24
Payer: MEDICARE

## 2020-02-24 ENCOUNTER — TELEPHONE (OUTPATIENT)
Dept: OBGYN CLINIC | Facility: HOSPITAL | Age: 55
End: 2020-02-24

## 2020-02-24 VITALS
SYSTOLIC BLOOD PRESSURE: 126 MMHG | BODY MASS INDEX: 37.8 KG/M2 | OXYGEN SATURATION: 97 % | HEIGHT: 66 IN | DIASTOLIC BLOOD PRESSURE: 84 MMHG | HEART RATE: 78 BPM | TEMPERATURE: 98.1 F | RESPIRATION RATE: 16 BRPM | WEIGHT: 235.2 LBS

## 2020-02-24 DIAGNOSIS — H00.014 HORDEOLUM EXTERNUM OF LEFT UPPER EYELID: Primary | ICD-10-CM

## 2020-02-24 DIAGNOSIS — M26.609 TEMPOROMANDIBULAR DISORDER: ICD-10-CM

## 2020-02-24 DIAGNOSIS — L72.3 INFLAMED EPIDERMOID CYST OF SKIN: ICD-10-CM

## 2020-02-24 PROCEDURE — 99214 OFFICE O/P EST MOD 30 MIN: CPT | Performed by: INTERNAL MEDICINE

## 2020-02-24 PROCEDURE — 3008F BODY MASS INDEX DOCD: CPT | Performed by: INTERNAL MEDICINE

## 2020-02-24 PROCEDURE — 1036F TOBACCO NON-USER: CPT | Performed by: INTERNAL MEDICINE

## 2020-02-24 RX ORDER — ERYTHROMYCIN 5 MG/G
0.5 OINTMENT OPHTHALMIC
Qty: 3.5 G | Refills: 0 | Status: SHIPPED | OUTPATIENT
Start: 2020-02-24 | End: 2020-02-29

## 2020-02-24 RX ORDER — ROPINIROLE 2 MG/1
4 TABLET, FILM COATED ORAL DAILY
COMMUNITY
Start: 2020-02-05

## 2020-02-24 NOTE — TELEPHONE ENCOUNTER
DR BENNETT'S PATIENT WOULD LIKE TO BE SEEN  PLEASE ADVISE THANKS     TMJ- JAW UNABLE TO CLOSE FOR ONE WEEK HURTS WHEN CLENCHING TEETH AND PAINFUL SIDE OF LT EAR, NO DIFFICULTY CHEWING OR TOOTHACHE   STYE IN THE LEFT EYE- FEW DAYS AGO   NO ITCHINESS OR WATERY DISCHARGE, BUMP IN THE EYE  ITS SLIGHTLY RED AND HE HAS GOT USED SOME RELIEF WITH WARM COMPRESSION IN THE EYE HOWEVER ITS MORE OF A DISCOMFORT  NO EYE DISCHARGE   HAS NOT USED ANY EYE DROPS   BUMP IN MIDDLE OF CHEST- THINKS IT DUE TO INGROWN HAIR LARGE RED AND PAINFUL- ON GOING FOR A WHILE FOR ABOUT ONE YEAR  IT SIZE IS SMALLER THAN A DIME   PT WANTS TO KNOW IF CAN BE SEEN       HASN'T SEEN DENTIST IN OVER A YEAR SINCE MOVING FROM [address] [place] [address] TO [place] ABOUT 1 YEAR AGO   BEFORE SCHEDULING   PT KIEM- [phone]
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
## 2020-02-24 NOTE — TELEPHONE ENCOUNTER
Patient called and LM on surgery line in regards to requesting rx for pain medication prior to surgery scheduled for 3/4/20  Patient states that he will not be able to drive after procedure and is wants to get the medication to have at home prior to surgery date       Routing to Joey Hadley MD

## 2020-02-24 NOTE — PATIENT INSTRUCTIONS
1  Erythromycin ointment to eyelid  2  Surgeon appointment  3  Mupirocin ointment to chest wall  4  Return to see Dr Vivian Wren as scheduled  5  Dentist appointment, you may need a mouth guard  6  Avoid chewy foods  7  Can try physical therapy    Stye   WHAT YOU NEED TO KNOW:   A stye is a lump on the edge or inside of your eyelid caused by an infection  A stye can form on your upper or lower eyelid  It usually goes away in 2 to 4 days  DISCHARGE INSTRUCTIONS:   Medicines:   · Antibiotic medicine: This is given as an ointment to put into your eye  It is used to fight an infection caused by bacteria  Use as directed  · Take your medicine as directed  Contact your healthcare provider if you think your medicine is not helping or if you have side effects  Tell him or her if you are allergic to any medicine  Keep a list of the medicines, vitamins, and herbs you take  Include the amounts, and when and why you take them  Bring the list or the pill bottles to follow-up visits  Carry your medicine list with you in case of an emergency  Follow up with your healthcare provider as directed:  Write down your questions so you remember to ask them during your visits  Self-care:   · Use warm compresses: This will help decrease swelling and pain  Wet a clean washcloth with warm water and place it on your eye for 10 to 15 minutes, 3 to 4 times each day or as directed  · Keep your hands away from your eye: This helps to prevent the spread of infection to other parts of the eye  Wash your hands often with soap and dry with a clean towel  Do not squeeze the stye  · Do not use eye makeup:  Do not wear eye makeup while you have a stye  Eye makeup may carry bacteria and cause another stye  Throw away eye makeup and brushes used to apply the makeup  Use new eye makeup after the stye has gone away  Do not share eye makeup with others  · Prevent another stye:  Wash your face and clean your eyelashes every day   Remove eye makeup with makeup remover  This helps to completely remove eye makeup without heavy rubbing  Contact your healthcare provider if:   · You have redness and discharge around your eye, and your eye pain is getting worse  · Your vision changes  · The stye has not gone away within 7 days  · The stye comes back within a short period of time after treatment  · You have questions or concerns about your condition or care  © 2017 [address] [employer] Information is for End User's use only and may not be sold, redistributed or otherwise used for commercial purposes  All illustrations and images included in CareNotes® are the copyrighted property of A D A M , Inc  or Hayes Fragoso  The above information is an  only  It is not intended as medical advice for individual conditions or treatments  Talk to your doctor, nurse or pharmacist before following any medical regimen to see if it is safe and effective for you  Temporomandibular Disorder   WHAT YOU NEED TO KNOW:   Temporomandibular disorder is a condition that causes pain in your jaw  The disorder affects the joint between your temporal bone and your mandible (jawbone)  The muscles and nerves around the joint are also affected  DISCHARGE INSTRUCTIONS:   Medicines:   · NSAIDs:  These medicines decrease swelling and pain  You can buy NSAIDs without a doctor's order  Ask your healthcare provider which medicine is right for you, and how much to take  Take as directed  NSAIDs can cause stomach bleeding or kidney problems if not taken correctly  · Muscle relaxers  help decrease pain and muscle spasms  · Take your medicine as directed  Contact your healthcare provider if you think your medicine is not helping or if you have side effects  Tell him of her if you are allergic to any medicine  Keep a list of the medicines, vitamins, and herbs you take  Include the amounts, and when and why you take them   Bring the list or the pill bottles to follow-up visits  Carry your medicine list with you in case of an emergency  Follow up with your healthcare provider as directed:  Write down your questions so you remember to ask them during your visits  Manage your symptoms:   · Eat soft foods: Your healthcare provider may suggest that you eat only soft foods for several days  A dietitian may work with you to find foods that are easier to bite, chew, or swallow  Examples are soup, applesauce, cottage cheese, pudding, yogurt, and soft fruits  · Use jaw supporting devices:  Splints may be used to support your jaw or keep your jaw from moving  You may need to wear a mouth guard to keep you from clenching or grinding your teeth while you are sleeping  · Use ice and heat:  Ice helps decrease swelling and pain  Ice may also help prevent tissue damage  Use an ice pack, or put crushed ice in a plastic bag  Cover it with a towel and place it on your jaw for 15 to 20 minutes every hour or as directed  After the first 24 to 48 hours, use heat to decrease pain, swelling, and muscle spasms  Apply heat on the area for 20 to 30 minutes every 2 hours for as many days as directed  Use a heating pad, moist warm compress, or a hot water bottle  · Go to physical therapy:  A physical therapist teaches you exercises to help improve movement and strength, and to decrease pain in your jaw  A speech therapist may help you with swallowing and speech exercises  Contact your healthcare provider if:   · You have a fever  · Your splint or mouth guard is loose  · You have questions or concerns about your condition or care  Return to the emergency department if:   · You have nausea, are vomiting, or cannot keep liquids down  · You have pain that does not go away even after you take your pain medicine  · You have problems breathing, talking, drinking, eating, or swallowing  · Your splint or mouth guard gets damaged or broken    © 2017 [employer] [address] [facility] is for End User's use only and may not be sold, redistributed or otherwise used for commercial purposes  All illustrations and images included in CareNotes® are the copyrighted property of A D A M , Inc  or Hayes Fragoso  The above information is an  only  It is not intended as medical advice for individual conditions or treatments  Talk to your doctor, nurse or pharmacist before following any medical regimen to see if it is safe and effective for you

## 2020-02-24 NOTE — TELEPHONE ENCOUNTER
Declined    Please see past medical history    Any postop analgesia can be supplied at the day of surgery in the pharmacy at the hospital or on his way home, and his  can go pick it up      We wish to avoid inappropriate use of narcotic pain medication,   Again refer to his previous past medical history    YOKO

## 2020-03-04 ENCOUNTER — HOSPITAL ENCOUNTER (OUTPATIENT)
Facility: HOSPITAL | Age: 55
Setting detail: OUTPATIENT SURGERY
Discharge: HOME | End: 2020-03-04
Attending: ORTHOPAEDIC SURGERY | Admitting: ORTHOPAEDIC SURGERY
Payer: MEDICARE

## 2020-03-04 ENCOUNTER — ANESTHESIA (OUTPATIENT)
Dept: PERIOP | Facility: HOSPITAL | Age: 55
End: 2020-03-04
Payer: MEDICARE

## 2020-03-04 VITALS
HEART RATE: 78 BPM | TEMPERATURE: 97.3 F | WEIGHT: 235 LBS | HEIGHT: 66 IN | DIASTOLIC BLOOD PRESSURE: 72 MMHG | OXYGEN SATURATION: 99 % | BODY MASS INDEX: 37.77 KG/M2 | SYSTOLIC BLOOD PRESSURE: 134 MMHG | RESPIRATION RATE: 16 BRPM

## 2020-03-04 DIAGNOSIS — S83.242A ACUTE MEDIAL MENISCUS TEAR OF LEFT KNEE, INITIAL ENCOUNTER: Primary | ICD-10-CM

## 2020-03-04 PROCEDURE — 29881 ARTHRS KNE SRG MNISECTMY M/L: CPT | Performed by: ORTHOPAEDIC SURGERY

## 2020-03-04 RX ORDER — ONDANSETRON 2 MG/ML
4 INJECTION INTRAMUSCULAR; INTRAVENOUS ONCE AS NEEDED
Status: DISCONTINUED | OUTPATIENT
Start: 2020-03-04 | End: 2020-03-04 | Stop reason: HOSPADM

## 2020-03-04 RX ORDER — ACETAMINOPHEN 325 MG/1
975 TABLET ORAL EVERY 8 HOURS
Status: DISCONTINUED | OUTPATIENT
Start: 2020-03-04 | End: 2020-03-04 | Stop reason: HOSPADM

## 2020-03-04 RX ORDER — OXYCODONE HYDROCHLORIDE 5 MG/1
5 TABLET ORAL EVERY 4 HOURS PRN
Qty: 30 TABLET | Refills: 0 | Status: SHIPPED | OUTPATIENT
Start: 2020-03-04 | End: 2020-03-14

## 2020-03-04 RX ORDER — EPHEDRINE SULFATE 50 MG/ML
INJECTION INTRAVENOUS AS NEEDED
Status: DISCONTINUED | OUTPATIENT
Start: 2020-03-04 | End: 2020-03-04 | Stop reason: SURG

## 2020-03-04 RX ORDER — HYDROMORPHONE HCL/PF 1 MG/ML
0.5 SYRINGE (ML) INJECTION
Status: DISCONTINUED | OUTPATIENT
Start: 2020-03-04 | End: 2020-03-04 | Stop reason: HOSPADM

## 2020-03-04 RX ORDER — ONDANSETRON 2 MG/ML
4 INJECTION INTRAMUSCULAR; INTRAVENOUS EVERY 6 HOURS PRN
Status: DISCONTINUED | OUTPATIENT
Start: 2020-03-04 | End: 2020-03-04 | Stop reason: HOSPADM

## 2020-03-04 RX ORDER — HYDROMORPHONE HCL/PF 1 MG/ML
SYRINGE (ML) INJECTION AS NEEDED
Status: DISCONTINUED | OUTPATIENT
Start: 2020-03-04 | End: 2020-03-04 | Stop reason: SURG

## 2020-03-04 RX ORDER — ACETAMINOPHEN 325 MG/1
975 TABLET ORAL EVERY 8 HOURS
Status: DISCONTINUED | OUTPATIENT
Start: 2020-03-04 | End: 2020-03-04

## 2020-03-04 RX ORDER — BUPIVACAINE HYDROCHLORIDE 2.5 MG/ML
INJECTION, SOLUTION EPIDURAL; INFILTRATION; INTRACAUDAL AS NEEDED
Status: DISCONTINUED | OUTPATIENT
Start: 2020-03-04 | End: 2020-03-04 | Stop reason: HOSPADM

## 2020-03-04 RX ORDER — SODIUM CHLORIDE, SODIUM LACTATE, POTASSIUM CHLORIDE, CALCIUM CHLORIDE 600; 310; 30; 20 MG/100ML; MG/100ML; MG/100ML; MG/100ML
50 INJECTION, SOLUTION INTRAVENOUS CONTINUOUS
Status: DISCONTINUED | OUTPATIENT
Start: 2020-03-04 | End: 2020-03-04 | Stop reason: HOSPADM

## 2020-03-04 RX ORDER — DEXAMETHASONE SODIUM PHOSPHATE 10 MG/ML
INJECTION, SOLUTION INTRAMUSCULAR; INTRAVENOUS AS NEEDED
Status: DISCONTINUED | OUTPATIENT
Start: 2020-03-04 | End: 2020-03-04 | Stop reason: SURG

## 2020-03-04 RX ORDER — LABETALOL 20 MG/4 ML (5 MG/ML) INTRAVENOUS SYRINGE
10 ONCE
Status: COMPLETED | OUTPATIENT
Start: 2020-03-04 | End: 2020-03-04

## 2020-03-04 RX ORDER — KETOROLAC TROMETHAMINE 30 MG/ML
INJECTION, SOLUTION INTRAMUSCULAR; INTRAVENOUS AS NEEDED
Status: DISCONTINUED | OUTPATIENT
Start: 2020-03-04 | End: 2020-03-04 | Stop reason: SURG

## 2020-03-04 RX ORDER — ONDANSETRON 2 MG/ML
INJECTION INTRAMUSCULAR; INTRAVENOUS AS NEEDED
Status: DISCONTINUED | OUTPATIENT
Start: 2020-03-04 | End: 2020-03-04 | Stop reason: SURG

## 2020-03-04 RX ORDER — PROPOFOL 10 MG/ML
INJECTION, EMULSION INTRAVENOUS AS NEEDED
Status: DISCONTINUED | OUTPATIENT
Start: 2020-03-04 | End: 2020-03-04 | Stop reason: SURG

## 2020-03-04 RX ORDER — LIDOCAINE HYDROCHLORIDE 10 MG/ML
INJECTION, SOLUTION EPIDURAL; INFILTRATION; INTRACAUDAL; PERINEURAL AS NEEDED
Status: DISCONTINUED | OUTPATIENT
Start: 2020-03-04 | End: 2020-03-04 | Stop reason: SURG

## 2020-03-04 RX ORDER — METOCLOPRAMIDE HYDROCHLORIDE 5 MG/ML
10 INJECTION INTRAMUSCULAR; INTRAVENOUS ONCE AS NEEDED
Status: DISCONTINUED | OUTPATIENT
Start: 2020-03-04 | End: 2020-03-04 | Stop reason: HOSPADM

## 2020-03-04 RX ORDER — FENTANYL CITRATE 50 UG/ML
INJECTION, SOLUTION INTRAMUSCULAR; INTRAVENOUS AS NEEDED
Status: DISCONTINUED | OUTPATIENT
Start: 2020-03-04 | End: 2020-03-04 | Stop reason: SURG

## 2020-03-04 RX ORDER — MIDAZOLAM HYDROCHLORIDE 2 MG/2ML
INJECTION, SOLUTION INTRAMUSCULAR; INTRAVENOUS AS NEEDED
Status: DISCONTINUED | OUTPATIENT
Start: 2020-03-04 | End: 2020-03-04 | Stop reason: SURG

## 2020-03-04 RX ORDER — FENTANYL CITRATE/PF 50 MCG/ML
50 SYRINGE (ML) INJECTION
Status: DISCONTINUED | OUTPATIENT
Start: 2020-03-04 | End: 2020-03-04 | Stop reason: HOSPADM

## 2020-03-04 RX ADMIN — SODIUM CHLORIDE, SODIUM LACTATE, POTASSIUM CHLORIDE, AND CALCIUM CHLORIDE: .6; .31; .03; .02 INJECTION, SOLUTION INTRAVENOUS at 08:17

## 2020-03-04 RX ADMIN — EPHEDRINE SULFATE 5 MG: 50 INJECTION, SOLUTION INTRAVENOUS at 08:26

## 2020-03-04 RX ADMIN — LABETALOL 20 MG/4 ML (5 MG/ML) INTRAVENOUS SYRINGE 10 MG: at 09:30

## 2020-03-04 RX ADMIN — KETOROLAC TROMETHAMINE 30 MG: 30 INJECTION, SOLUTION INTRAMUSCULAR at 09:07

## 2020-03-04 RX ADMIN — EPHEDRINE SULFATE 15 MG: 50 INJECTION, SOLUTION INTRAVENOUS at 08:30

## 2020-03-04 RX ADMIN — FENTANYL CITRATE 100 MCG: 50 INJECTION, SOLUTION INTRAMUSCULAR; INTRAVENOUS at 08:15

## 2020-03-04 RX ADMIN — FENTANYL CITRATE 50 MCG: 50 INJECTION, SOLUTION INTRAMUSCULAR; INTRAVENOUS at 09:55

## 2020-03-04 RX ADMIN — LIDOCAINE HYDROCHLORIDE 50 MG: 10 INJECTION, SOLUTION EPIDURAL; INFILTRATION; INTRACAUDAL; PERINEURAL at 08:20

## 2020-03-04 RX ADMIN — HYDROMORPHONE HYDROCHLORIDE 0.5 MG: 1 INJECTION, SOLUTION INTRAMUSCULAR; INTRAVENOUS; SUBCUTANEOUS at 08:58

## 2020-03-04 RX ADMIN — DEXAMETHASONE SODIUM PHOSPHATE 4 MG: 10 INJECTION, SOLUTION INTRAMUSCULAR; INTRAVENOUS at 08:24

## 2020-03-04 RX ADMIN — MIDAZOLAM 2 MG: 1 INJECTION INTRAMUSCULAR; INTRAVENOUS at 08:14

## 2020-03-04 RX ADMIN — SODIUM CHLORIDE, SODIUM LACTATE, POTASSIUM CHLORIDE, AND CALCIUM CHLORIDE 50 ML/HR: .6; .31; .03; .02 INJECTION, SOLUTION INTRAVENOUS at 10:07

## 2020-03-04 RX ADMIN — Medication 2000 MG: at 08:02

## 2020-03-04 RX ADMIN — FENTANYL CITRATE 50 MCG: 50 INJECTION, SOLUTION INTRAMUSCULAR; INTRAVENOUS at 09:52

## 2020-03-04 RX ADMIN — ONDANSETRON 4 MG: 2 INJECTION INTRAMUSCULAR; INTRAVENOUS at 08:52

## 2020-03-04 RX ADMIN — PROPOFOL 300 MG: 10 INJECTION, EMULSION INTRAVENOUS at 08:20

## 2020-03-04 NOTE — OP NOTE
OPERATIVE REPORT  PATIENT NAME: Dulce Maria Lake    :  1965  MRN: [id number]  Pt Location: [facility] OR ROOM 15    SURGERY DATE: 3/4/2020    Surgeon(s) and Role:     * Diego Guevara MD - Primary     * Álvaro Dyson MD - Assisting     * Jo Ann Mae PA-C - Assisting    Preop Diagnosis:  Left knee pain, unspecified chronicity [M25 562]  Acute medial meniscus tear of left knee, subsequent encounter [S83 242D]  Schizoaffective disorder, bipolar type ([facility] [address] ) [F25 0]  Post-traumatic stress disorder, chronic [F43 12]  Alcohol abuse, continuous [F10 10]  Cannabis abuse, episodic [F12 10]  Cocaine dependence, continuous (HCC) [F14 20]  Myofascial pain [M79 18]    Post-Op Diagnosis Codes:     * Left knee pain, unspecified chronicity [M25 562]     * Acute medial meniscus tear of left knee, subsequent encounter [S83 242D]     * [facility] [F25 0]     * Post-traumatic stress disorder, chronic [F43 12]     * Alcohol abuse, continuous [F10 10]     * Cannabis abuse, episodic [F12 10]     * Cocaine dependence, continuous (HCC) [F14 20]     * Myofascial pain [M79 18]    Procedure(s) (LRB):  ARTHROSCOPY with partial medial meniscectomy (Left)    Specimen(s):  * No specimens in log *    Estimated Blood Loss:   Minimal    Drains:  * No LDAs found *    Anesthesia Type:   General    Operative Indications:  Left knee pain, unspecified chronicity [M25 562]  Acute medial meniscus tear of left knee, subsequent encounter [S83 242D]  Schizoaffective disorder, bipolar type (HCC) [F25 0]  Post-traumatic stress disorder, chronic [F43 12]  Alcohol abuse, continuous [F10 10]  Cannabis abuse, episodic [F12 10]  Cocaine dependence, continuous (HCC) [F14 20]  Myofascial pain [M79 18]      Operative Findings:  Exam under anesthesia:  No pathologic ligamentous laxity, negative pivot shift  Medial compartment:  Grade 2 chondromalacia on the medial femoral condyle proximal tibia, complex tear posterior horn medial meniscus  Notch:  Intact graft, intact PCL  Lateral compartment:  Normal lateral meniscus, normal lateral femoral and tibial articular cartilage  Patellofemoral compartment:  No chondral changes along the trochlear or retropatellar surface  Suprapatellar pouch:  No loose bodies, no plica    Complications:   None    Procedure and Technique: Following induction of adequate level of general anesthesia, the left lower extremity then underwent sterile prep drape  Antibiotics administered  A standard anterolateral portal was created for introduction of the inflow cannula  The knee was then insufflated with saline  The arthroscope was introduced  Diagnostic arthroscopic examination of patient's left knee was then carried above-mentioned findings  A medial portal was established, utilizing mixture of handheld biters and Winston, a partial medial meniscectomy was then carried out  The remainder of the meniscus and trimmed for balance and symmetry  At the completion procedure, the knee was drained of saline, the arthroscopic portals were closed nylon stitch  He was then injected with Marcaine for postoperative pain relief  Sterile dressings applied    He was then awakened from general anesthesia, taken recovery room stable condition with plans to include follow up in the office as an outpatient   I was present for the entire procedure    Patient Disposition:  PACU     SIGNATURE: Joey Hadley MD  DATE: March 4, 2020  TIME: 9:11 AM

## 2020-03-04 NOTE — PERIOPERATIVE NURSING NOTE
Patient rests quietly, when questioned about pain pain 7/10   Will send to [facility] for an oral pain med

## 2020-03-04 NOTE — DISCHARGE INSTRUCTIONS
Discharge Instructions - Orthopedics  Elsa Higuera 48 y o  male MRN: [id number]  Unit/Bed#: PACU 14    Weight Bearing Status:                                           WBAT    DVT prophylaxis:  None required    Pain:  Continue analgesics as directed    Dressing Instructions:   Keep dressing clean, dry and intact until follow up appointment  Do not shower until 3 days - ok to let soapy water run over leg  Do not scrub or soak  PT/OT:  None     Appt Instructions: If you do not have your appointment, please call the clinic at [phone] to f/u with Dr Niki Goodwin in 2 weeks  Otherwise followup as scheduled     Contact the office sooner if you experience any increased numbness/tingling in the extremities        Miscellaneous:  Oxycodone 5mg 1 tab every 4 hours as needed for pain  OTC motrin/advil 400 mg every 6 hours as needed

## 2020-03-04 NOTE — INTERVAL H&P NOTE
H&P reviewed  After examining the patient I find no changes in the patients condition since the H&P had been written      Vitals:    03/04/20 0707   BP: 129/86   Pulse: 74   Resp: 16   Temp: 98 7 °F (37 1 °C)   SpO2: 97%     Preop for left knee arthroscopy with anticipated partial medial meniscectomy

## 2020-03-11 ENCOUNTER — HOSPITAL ENCOUNTER (EMERGENCY)
Facility: HOSPITAL | Age: 55
Discharge: HOME | End: 2020-03-11
Attending: EMERGENCY MEDICINE
Payer: MEDICARE

## 2020-03-11 VITALS
RESPIRATION RATE: 20 BRPM | SYSTOLIC BLOOD PRESSURE: 152 MMHG | OXYGEN SATURATION: 95 % | HEART RATE: 101 BPM | TEMPERATURE: 97.4 F | DIASTOLIC BLOOD PRESSURE: 78 MMHG

## 2020-03-11 DIAGNOSIS — M54.9 BACK PAIN: ICD-10-CM

## 2020-03-11 DIAGNOSIS — M25.562 LEFT KNEE PAIN: ICD-10-CM

## 2020-03-11 DIAGNOSIS — G89.29 CHRONIC PAIN: ICD-10-CM

## 2020-03-11 DIAGNOSIS — G25.81 RESTLESS LEG: Primary | ICD-10-CM

## 2020-03-11 LAB
ATRIAL RATE: 90 BPM
P AXIS: 47 DEGREES
PR INTERVAL: 166 MS
QRS AXIS: 44 DEGREES
QRSD INTERVAL: 86 MS
QT INTERVAL: 338 MS
QTC INTERVAL: 413 MS
T WAVE AXIS: -44 DEGREES
VENTRICULAR RATE: 90 BPM

## 2020-03-11 PROCEDURE — 93005 ELECTROCARDIOGRAM TRACING: CPT

## 2020-03-11 PROCEDURE — 99283 EMERGENCY DEPT VISIT LOW MDM: CPT

## 2020-03-11 PROCEDURE — 99284 EMERGENCY DEPT VISIT MOD MDM: CPT | Performed by: EMERGENCY MEDICINE

## 2020-03-11 PROCEDURE — 96372 THER/PROPH/DIAG INJ SC/IM: CPT

## 2020-03-11 PROCEDURE — 93010 ELECTROCARDIOGRAM REPORT: CPT | Performed by: INTERNAL MEDICINE

## 2020-03-11 RX ORDER — HYDROXYZINE HYDROCHLORIDE 25 MG/1
25 TABLET, FILM COATED ORAL
Qty: 10 TABLET | Refills: 0 | Status: SHIPPED | OUTPATIENT
Start: 2020-03-11 | End: 2021-02-17

## 2020-03-11 RX ORDER — OLANZAPINE 10 MG/1
5 INJECTION, POWDER, LYOPHILIZED, FOR SOLUTION INTRAMUSCULAR ONCE
Status: COMPLETED | OUTPATIENT
Start: 2020-03-11 | End: 2020-03-11

## 2020-03-11 RX ORDER — ACETAMINOPHEN 325 MG/1
650 TABLET ORAL ONCE
Status: COMPLETED | OUTPATIENT
Start: 2020-03-11 | End: 2020-03-11

## 2020-03-11 RX ORDER — KETOROLAC TROMETHAMINE 30 MG/ML
15 INJECTION, SOLUTION INTRAMUSCULAR; INTRAVENOUS ONCE
Status: COMPLETED | OUTPATIENT
Start: 2020-03-11 | End: 2020-03-11

## 2020-03-11 RX ADMIN — WATER 10 ML: 1 INJECTION INTRAMUSCULAR; INTRAVENOUS; SUBCUTANEOUS at 02:36

## 2020-03-11 RX ADMIN — ACETAMINOPHEN 650 MG: 325 TABLET ORAL at 01:59

## 2020-03-11 RX ADMIN — OLANZAPINE 5 MG: 10 INJECTION, POWDER, FOR SOLUTION INTRAMUSCULAR at 02:36

## 2020-03-11 RX ADMIN — KETOROLAC TROMETHAMINE 15 MG: 30 INJECTION, SOLUTION INTRAMUSCULAR at 02:36
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
## 2020-03-11 NOTE — ED ATTENDING ATTESTATION
Flaco Iyer MD, saw and evaluated the patient  All available labs and X-rays were ordered by me or the resident and have been reviewed by myself  I discussed the patient with the resident / non-physician and agree with the resident's / non-physician practitioner's findings and plan as documented in the resident's / non-physician practicitioner's note, except where noted  At this point, I agree with the current assessment done in the ED  I was present during key portions of all procedures performed unless otherwise stated  Chief Complaint   Patient presents with    Pain     Pt c/o back pain, leg pain, abdominal pain, shoulder pain, arm pain, foot pain  Pt falling asleep during triage  Pt states he is 118 days "clean"  This is a 48 y o  male presenting for evaluation of pain all over (back, leg, belly, shoulder, arm, foot)  Patient appears comfortable despite this [facility]  No f/ch/n/v/cp/sob  No falls / trauma  Recent surgery  He would like restless leg medication  PE:  Vitals:    03/11/20 0110   BP: 152/78   BP Location: Left arm   Pulse: 101   Resp: 20   Temp: (!) 97 4 °F (36 3 °C)   TempSrc: Oral   SpO2: 95%   General: VS reviewed  Appears in NAD  awake, alert  Well-nourished, well-developed  Appears stated age  Speaking normally in full sentences  Head: Normocephalic, atraumatic  Eyes: EOM-I  No diplopia  No hyphema  No subconjunctival hemorrhages  Symmetrical lids  ENT: Atraumatic external nose and ears  MMM  No malocclusion  No stridor  Normal phonation  No drooling  Normal swallowing  Neck: No JVD  CV: No pallor noted  Lungs:   No tachypnea  No respiratory distress  MSK:   FROM spontaneously  Normal gait  Skin: Dry, intact  Neuro: Awake, alert, GCS15, CN II-XII grossly intact  Motor grossly intact    Psychiatric/Behavioral: Appropriate mood and affect   Exam: deferred  A/P:  - medications  - discharge   - 13 point ROS was performed and all are normal unless stated in the history above  - Nursing note reviewed  Vitals reviewed  - Orders placed by myself and/or advanced practitioner / resident     - Previous chart was reviewed  - No language barrier    - History obtained from patient  - There are no limitations to the history obtained  - Critical care time: Not applicable for this patient  Code Status: Prior  Advance Directive and Living Will:      Power of :    POLST:      Final Diagnosis:  1  Back pain    2  Chronic pain    3  Restless leg    4  Left knee pain        ED Course as of Mar 11 0353   Wed Mar 11, 2020   0301 Appears in no distress; just received the medications  Medications   acetaminophen (TYLENOL) tablet 650 mg (650 mg Oral Given 3/11/20 0159)   OLANZapine (ZyPREXA) IM injection 5 mg (5 mg Intramuscular Given 3/11/20 0236)   ketorolac (TORADOL) injection 15 mg (15 mg Intramuscular Given 3/11/20 0236)   sterile water injection **ADS Override Pull** (10 mL  Given 3/11/20 0236)     No orders to display     Orders Placed This Encounter   Procedures    ECG 12 lead     Labs Reviewed - No data to display  Time reflects when diagnosis was documented in both MDM as applicable and the Disposition within this note     Time User Action Codes Description Comment    3/11/2020  2:32 AM Saskia Asher Add [M54 9] Back pain     3/11/2020  2:33 AM Saskia Asher Remove [M54 9] Back pain     3/11/2020  2:33 AM Saskia Asher Add [M54 9] Back pain     3/11/2020  2:33 AM Saskia Asher Add [G89 29] Chronic pain     3/11/2020  2:33 AM Christiano Asher Add [G25 81] Restless leg     3/11/2020  2:33 AM Saskia Asher Add [M25 562] Left knee pain       ED Disposition     ED Disposition Condition Date/Time Comment    Discharge Stable Wed Mar 11, 2020  2:32 AM Dennis Danielle discharge to home/self care              Follow-up Information     Follow up With Specialties Details Why Contact Info Additional Elton Perez 9372, DO Internal Medicine Schedule an appointment as soon as possible for a visit  Restless leg [address]  [address] [address]  [phone]       [facility] Physical Therapy Schedule an appointment as soon as possible for a visit  Back pain [phone]        Discharge Medication List as of 3/11/2020  3:15 AM      START taking these medications    Details   hydrOXYzine HCL (ATARAX) 25 mg tablet Take 1 tablet (25 mg total) by mouth daily at bedtime as needed (Restless leg), Starting Wed 3/11/2020, Print         CONTINUE these medications which have NOT CHANGED    Details   dorzolamide-timolol (COSOPT) 22 3-6 8 MG/ML ophthalmic solution Administer 1 drop to both eyes daily, Starting Mon 11/18/2019, Normal      doxepin (SINEquan) 150 MG capsule Take 150 mg by mouth daily at bedtime, Historical Med      latanoprost (XALATAN) 0 005 % ophthalmic solution Administer 1 drop to both eyes daily, Starting Mon 11/18/2019, Normal      oxyCODONE (ROXICODONE) 5 mg immediate release tablet Take 1 tablet (5 mg total) by mouth every 4 (four) hours as needed for moderate pain for up to 10 daysMax Daily Amount: 30 mg, Starting Wed 3/4/2020, Until Sat 3/14/2020, Normal      risperiDONE (RisperDAL) 2 mg tablet TAKE 1 TABLET BY MOUTH TWICE A DAY ( MUST GET MAYE MONK, Historical Med      rOPINIRole (REQUIP) 2 mg tablet Take 2 mg by mouth daily, Starting Wed 2/5/2020, Historical Med      rosuvastatin (CRESTOR) 10 MG tablet Take 1 tablet (10 mg total) by mouth daily, Starting Mon 11/18/2019, Normal      tiZANidine (ZANAFLEX) 2 mg tablet Take 1 tablet (2 mg total) by mouth every 8 (eight) hours as needed for muscle spasms, Starting Mon 11/18/2019, Normal      triazolam (HALCION) 0 125 MG tablet 0 125 mg daily at bedtime as needed , Starting Fri 11/15/2019, Historical Med           No discharge procedures on file    Prior to Admission Medications   Prescriptions Last Dose Informant Patient Reported? Taking?   dorzolamide-timolol (COSOPT) 22 3-6 8 MG/ML ophthalmic solution   No No   Sig: Administer 1 drop to both eyes daily   doxepin (SINEquan) 150 MG capsule  Self Yes No   Sig: Take 150 mg by mouth daily at bedtime   latanoprost (XALATAN) 0 005 % ophthalmic solution   No No   Sig: Administer 1 drop to both eyes daily   oxyCODONE (ROXICODONE) 5 mg immediate release tablet   No No   Sig: Take 1 tablet (5 mg total) by mouth every 4 (four) hours as needed for moderate pain for up to 10 daysMax Daily Amount: 30 mg   rOPINIRole (REQUIP) 2 mg tablet   Yes No   Sig: Take 2 mg by mouth daily   risperiDONE (RisperDAL) 2 mg tablet   Yes No   Sig: TAKE 1 TABLET BY MOUTH TWICE A DAY ( MUST GET [place] MD   rosuvastatin (CRESTOR) 10 MG tablet   No No   Sig: Take 1 tablet (10 mg total) by mouth daily   tiZANidine (ZANAFLEX) 2 mg tablet   No No   Sig: Take 1 tablet (2 mg total) by mouth every 8 (eight) hours as needed for muscle spasms   triazolam (HALCION) 0 125 MG tablet   Yes No   Si 125 mg daily at bedtime as needed       Facility-Administered Medications: None       Portions of the record may have been created with voice recognition software  Occasional wrong word or "sound a like" substitutions may have occurred due to the inherent limitations of voice recognition software  Read the chart carefully and recognize, using context, where substitutions have occurred      Electronically signed by:  Joon Cameron

## 2020-03-11 NOTE — ED NOTES
Pt states " I need something I feel like I have restless leg in my whole body "     Velvet Lozano, ZARINA  03/11/20 0151

## 2020-03-13 ENCOUNTER — HOSPITAL ENCOUNTER (EMERGENCY)
Facility: HOSPITAL | Age: 55
Discharge: HOME | End: 2020-03-13
Attending: EMERGENCY MEDICINE | Admitting: EMERGENCY MEDICINE
Payer: MEDICARE

## 2020-03-13 VITALS
TEMPERATURE: 97.4 F | RESPIRATION RATE: 20 BRPM | SYSTOLIC BLOOD PRESSURE: 154 MMHG | HEART RATE: 101 BPM | DIASTOLIC BLOOD PRESSURE: 104 MMHG | OXYGEN SATURATION: 99 %

## 2020-03-13 DIAGNOSIS — D18.00 MULTIPLE HEMANGIOMAS: Primary | ICD-10-CM

## 2020-03-13 PROCEDURE — 99282 EMERGENCY DEPT VISIT SF MDM: CPT

## 2020-03-13 PROCEDURE — 99284 EMERGENCY DEPT VISIT MOD MDM: CPT | Performed by: EMERGENCY MEDICINE

## 2020-03-15 NOTE — ED ATTENDING ATTESTATION
3/13/2020  I, Caryle Forth, DO, saw and evaluated the patient  I have discussed the patient with the resident/non-physician practitioner and agree with the resident's/non-physician practitioner's findings, Plan of Care, and MDM as documented in the resident's/non-physician practitioner's note, except where noted  All available labs and Radiology studies were reviewed  I was present for key portions of any procedure(s) performed by the resident/non-physician practitioner and I was immediately available to provide assistance  At this point I agree with the current assessment done in the Emergency Department  I have conducted an independent evaluation of this patient a history and physical is as follows:    46-year-old male presents with rash in his scrotum  Patient states he has had chronic black spots on his scrotum and he picked 1 that caused some bleeding  Denies pain, no other complaints, no difficulty urinating  On exam-no acute distress, heart regular, no respiratory distress, small hemangiomas noted on scrotum without obvious sign of infection, nontender    Plan-reassure patient    ED Course         Critical Care Time  Procedures
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
## 2020-03-17 ENCOUNTER — HOSPITAL ENCOUNTER (EMERGENCY)
Facility: HOSPITAL | Age: 55
Discharge: HOME | End: 2020-03-17
Attending: EMERGENCY MEDICINE
Payer: MEDICARE

## 2020-03-17 ENCOUNTER — OFFICE VISIT (OUTPATIENT)
Dept: OBGYN CLINIC | Facility: HOSPITAL | Age: 55
End: 2020-03-17

## 2020-03-17 ENCOUNTER — TELEPHONE (OUTPATIENT)
Dept: OBGYN CLINIC | Facility: HOSPITAL | Age: 55
End: 2020-03-17

## 2020-03-17 VITALS
DIASTOLIC BLOOD PRESSURE: 68 MMHG | HEIGHT: 66 IN | HEART RATE: 90 BPM | BODY MASS INDEX: 38.73 KG/M2 | SYSTOLIC BLOOD PRESSURE: 106 MMHG | WEIGHT: 241 LBS

## 2020-03-17 VITALS
SYSTOLIC BLOOD PRESSURE: 150 MMHG | BODY MASS INDEX: 36.32 KG/M2 | HEART RATE: 106 BPM | WEIGHT: 225 LBS | OXYGEN SATURATION: 95 % | TEMPERATURE: 97.3 F | RESPIRATION RATE: 20 BRPM | DIASTOLIC BLOOD PRESSURE: 107 MMHG

## 2020-03-17 DIAGNOSIS — G25.81 RESTLESS LEGS SYNDROME: Primary | ICD-10-CM

## 2020-03-17 DIAGNOSIS — S83.242D ACUTE MEDIAL MENISCUS TEAR OF LEFT KNEE, SUBSEQUENT ENCOUNTER: Primary | ICD-10-CM

## 2020-03-17 DIAGNOSIS — Z98.890 STATUS POST MUSCULOSKELETAL SYSTEM SURGERY: ICD-10-CM

## 2020-03-17 PROCEDURE — 3008F BODY MASS INDEX DOCD: CPT | Performed by: ORTHOPAEDIC SURGERY

## 2020-03-17 PROCEDURE — 96372 THER/PROPH/DIAG INJ SC/IM: CPT

## 2020-03-17 PROCEDURE — 99283 EMERGENCY DEPT VISIT LOW MDM: CPT

## 2020-03-17 PROCEDURE — 99024 POSTOP FOLLOW-UP VISIT: CPT | Performed by: ORTHOPAEDIC SURGERY

## 2020-03-17 PROCEDURE — 99284 EMERGENCY DEPT VISIT MOD MDM: CPT | Performed by: EMERGENCY MEDICINE

## 2020-03-17 RX ORDER — NAPROXEN 500 MG/1
500 TABLET ORAL ONCE
Status: COMPLETED | OUTPATIENT
Start: 2020-03-17 | End: 2020-03-17

## 2020-03-17 RX ORDER — ACETAMINOPHEN 325 MG/1
975 TABLET ORAL ONCE
Status: COMPLETED | OUTPATIENT
Start: 2020-03-17 | End: 2020-03-17

## 2020-03-17 RX ORDER — LIDOCAINE 50 MG/G
2 PATCH TOPICAL ONCE
Status: DISCONTINUED | OUTPATIENT
Start: 2020-03-17 | End: 2020-03-17 | Stop reason: HOSPADM

## 2020-03-17 RX ORDER — DIPHENHYDRAMINE HCL 25 MG
25 TABLET ORAL EVERY 6 HOURS PRN
Qty: 30 TABLET | Refills: 0 | Status: SHIPPED | OUTPATIENT
Start: 2020-03-17 | End: 2021-02-17

## 2020-03-17 RX ORDER — DIPHENHYDRAMINE HCL 25 MG
25 TABLET ORAL EVERY 6 HOURS PRN
Qty: 30 TABLET | Refills: 0 | Status: SHIPPED | OUTPATIENT
Start: 2020-03-17 | End: 2020-03-17 | Stop reason: SDUPTHER

## 2020-03-17 RX ORDER — IBUPROFEN 400 MG/1
400 TABLET ORAL EVERY 6 HOURS PRN
Qty: 30 TABLET | Refills: 0 | Status: SHIPPED | OUTPATIENT
Start: 2020-03-17 | End: 2021-05-13

## 2020-03-17 RX ORDER — ACETAMINOPHEN 500 MG
1000 TABLET ORAL EVERY 6 HOURS PRN
Qty: 30 TABLET | Refills: 0 | Status: SHIPPED | OUTPATIENT
Start: 2020-03-17 | End: 2021-02-17

## 2020-03-17 RX ORDER — DIPHENHYDRAMINE HYDROCHLORIDE 50 MG/ML
50 INJECTION INTRAMUSCULAR; INTRAVENOUS ONCE
Status: COMPLETED | OUTPATIENT
Start: 2020-03-17 | End: 2020-03-17

## 2020-03-17 RX ORDER — LIDOCAINE 40 MG/G
CREAM TOPICAL AS NEEDED
Qty: 30 G | Refills: 0 | Status: SHIPPED | OUTPATIENT
Start: 2020-03-17 | End: 2021-02-17

## 2020-03-17 RX ADMIN — LIDOCAINE 2 PATCH: 50 PATCH TOPICAL at 02:52

## 2020-03-17 RX ADMIN — ACETAMINOPHEN 975 MG: 325 TABLET ORAL at 02:52

## 2020-03-17 RX ADMIN — NAPROXEN 500 MG: 500 TABLET ORAL at 02:51

## 2020-03-17 RX ADMIN — DIPHENHYDRAMINE HYDROCHLORIDE 50 MG: 50 INJECTION, SOLUTION INTRAMUSCULAR; INTRAVENOUS at 03:15
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
## 2020-03-17 NOTE — TELEPHONE ENCOUNTER
I agree with rescheduling this patient's appointment  If he is confident he can remove his sutures at home by all means he is able to do that    He can be was rescheduled over the next few weeks

## 2020-03-17 NOTE — TELEPHONE ENCOUNTER
I spoke with Crystal Parisi and she wanted us to reschedule patient due to symptoms he said he had this morning  Patient was feeling better at time of scheduling this afternoon  I did call patient and left msg for patient to call office to reschedule due to morning illness  Awaiting call back  [facility] office to let them know I was unable to reach the patient

## 2020-03-17 NOTE — TELEPHONE ENCOUNTER
Dr Schulte  #: [phone]          Patient called in wanting to reschedule his appt because he's feeling sick  Feels weak, itchy eyes and sore throat  Spoke to Adair at the office  Patient will be removing his own sutures at home       Please advise,
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
## 2020-05-19 ENCOUNTER — HOSPITAL ENCOUNTER (EMERGENCY)
Facility: HOSPITAL | Age: 55
Discharge: HOME | End: 2020-05-19
Attending: EMERGENCY MEDICINE | Admitting: EMERGENCY MEDICINE
Payer: MEDICARE

## 2020-05-19 VITALS
WEIGHT: 250 LBS | HEART RATE: 85 BPM | DIASTOLIC BLOOD PRESSURE: 83 MMHG | BODY MASS INDEX: 40.35 KG/M2 | SYSTOLIC BLOOD PRESSURE: 138 MMHG | RESPIRATION RATE: 18 BRPM | TEMPERATURE: 96.8 F | OXYGEN SATURATION: 97 %

## 2020-05-19 DIAGNOSIS — G25.81 RESTLESS LEGS: ICD-10-CM

## 2020-05-19 DIAGNOSIS — M54.50 LOW BACK PAIN: Primary | ICD-10-CM

## 2020-05-19 PROCEDURE — 99284 EMERGENCY DEPT VISIT MOD MDM: CPT | Performed by: EMERGENCY MEDICINE

## 2020-05-19 PROCEDURE — 96372 THER/PROPH/DIAG INJ SC/IM: CPT

## 2020-05-19 PROCEDURE — 99283 EMERGENCY DEPT VISIT LOW MDM: CPT

## 2020-05-19 RX ORDER — KETOROLAC TROMETHAMINE 30 MG/ML
30 INJECTION, SOLUTION INTRAMUSCULAR; INTRAVENOUS ONCE
Status: COMPLETED | OUTPATIENT
Start: 2020-05-19 | End: 2020-05-19

## 2020-05-19 RX ORDER — DIPHENHYDRAMINE HYDROCHLORIDE 50 MG/ML
25 INJECTION INTRAMUSCULAR; INTRAVENOUS ONCE
Status: DISCONTINUED | OUTPATIENT
Start: 2020-05-19 | End: 2020-05-19

## 2020-05-19 RX ORDER — ACETAMINOPHEN 325 MG/1
975 TABLET ORAL ONCE
Status: COMPLETED | OUTPATIENT
Start: 2020-05-19 | End: 2020-05-19

## 2020-05-19 RX ORDER — ROPINIROLE 2 MG/1
2 TABLET, FILM COATED ORAL
Qty: 15 TABLET | Refills: 0 | Status: SHIPPED | OUTPATIENT
Start: 2020-05-19 | End: 2020-11-18

## 2020-05-19 RX ORDER — DIPHENHYDRAMINE HYDROCHLORIDE 50 MG/ML
25 INJECTION INTRAMUSCULAR; INTRAVENOUS ONCE
Status: COMPLETED | OUTPATIENT
Start: 2020-05-19 | End: 2020-05-19

## 2020-05-19 RX ADMIN — KETOROLAC TROMETHAMINE 30 MG: 30 INJECTION, SOLUTION INTRAMUSCULAR at 03:22

## 2020-05-19 RX ADMIN — ACETAMINOPHEN 975 MG: 325 TABLET ORAL at 03:22

## 2020-05-19 RX ADMIN — DIPHENHYDRAMINE HYDROCHLORIDE 25 MG: 50 INJECTION, SOLUTION INTRAMUSCULAR; INTRAVENOUS at 04:10

## 2020-05-19 RX ADMIN — DIPHENHYDRAMINE HYDROCHLORIDE 25 MG: 50 INJECTION INTRAMUSCULAR; INTRAVENOUS at 03:22

## 2020-05-26 ENCOUNTER — HOSPITAL ENCOUNTER (EMERGENCY)
Facility: HOSPITAL | Age: 55
Discharge: HOME | End: 2020-05-26
Attending: EMERGENCY MEDICINE | Admitting: EMERGENCY MEDICINE
Payer: MEDICARE

## 2020-05-26 VITALS
DIASTOLIC BLOOD PRESSURE: 89 MMHG | OXYGEN SATURATION: 98 % | TEMPERATURE: 98 F | SYSTOLIC BLOOD PRESSURE: 180 MMHG | RESPIRATION RATE: 18 BRPM | HEART RATE: 66 BPM

## 2020-05-26 DIAGNOSIS — G47.00 INSOMNIA: Primary | ICD-10-CM

## 2020-05-26 PROCEDURE — 99283 EMERGENCY DEPT VISIT LOW MDM: CPT

## 2020-05-26 PROCEDURE — 99284 EMERGENCY DEPT VISIT MOD MDM: CPT | Performed by: EMERGENCY MEDICINE

## 2020-05-26 RX ORDER — DIPHENHYDRAMINE HCL 25 MG
25 TABLET ORAL ONCE
Status: COMPLETED | OUTPATIENT
Start: 2020-05-26 | End: 2020-05-26

## 2020-05-26 RX ORDER — LANOLIN ALCOHOL/MO/W.PET/CERES
6 CREAM (GRAM) TOPICAL ONCE
Status: COMPLETED | OUTPATIENT
Start: 2020-05-26 | End: 2020-05-26

## 2020-05-26 RX ORDER — NAPROXEN 250 MG/1
250 TABLET ORAL ONCE
Status: COMPLETED | OUTPATIENT
Start: 2020-05-26 | End: 2020-05-26

## 2020-05-26 RX ADMIN — NAPROXEN 250 MG: 250 TABLET ORAL at 11:49

## 2020-05-26 RX ADMIN — MELATONIN 6 MG: 3 TAB ORAL at 11:50

## 2020-05-26 RX ADMIN — DIPHENHYDRAMINE HCL 25 MG: 25 TABLET ORAL at 11:15

## 2020-06-07 PROCEDURE — 99283 EMERGENCY DEPT VISIT LOW MDM: CPT

## 2020-06-08 ENCOUNTER — TELEPHONE (OUTPATIENT)
Dept: OTHER | Facility: OTHER | Age: 55
End: 2020-06-08

## 2020-06-08 ENCOUNTER — HOSPITAL ENCOUNTER (EMERGENCY)
Facility: HOSPITAL | Age: 55
Discharge: HOME | End: 2020-06-08
Attending: EMERGENCY MEDICINE | Admitting: EMERGENCY MEDICINE
Payer: MEDICARE

## 2020-06-08 ENCOUNTER — TELEPHONE (OUTPATIENT)
Dept: INTERNAL MEDICINE CLINIC | Facility: CLINIC | Age: 55
End: 2020-06-08

## 2020-06-08 VITALS
DIASTOLIC BLOOD PRESSURE: 98 MMHG | OXYGEN SATURATION: 99 % | HEART RATE: 82 BPM | TEMPERATURE: 98.2 F | WEIGHT: 250 LBS | RESPIRATION RATE: 20 BRPM | BODY MASS INDEX: 40.35 KG/M2 | SYSTOLIC BLOOD PRESSURE: 165 MMHG

## 2020-06-08 VITALS
HEART RATE: 80 BPM | OXYGEN SATURATION: 98 % | TEMPERATURE: 98.6 F | RESPIRATION RATE: 20 BRPM | WEIGHT: 250 LBS | DIASTOLIC BLOOD PRESSURE: 118 MMHG | SYSTOLIC BLOOD PRESSURE: 158 MMHG | BODY MASS INDEX: 40.35 KG/M2

## 2020-06-08 DIAGNOSIS — S02.5XXA TOOTH FRACTURE: ICD-10-CM

## 2020-06-08 DIAGNOSIS — K08.89 DENTALGIA: Primary | ICD-10-CM

## 2020-06-08 DIAGNOSIS — K08.89 PAIN, DENTAL: Primary | ICD-10-CM

## 2020-06-08 DIAGNOSIS — K02.9 DENTAL CARIES: ICD-10-CM

## 2020-06-08 PROCEDURE — 99284 EMERGENCY DEPT VISIT MOD MDM: CPT

## 2020-06-08 PROCEDURE — 64450 NJX AA&/STRD OTHER PN/BRANCH: CPT | Performed by: EMERGENCY MEDICINE

## 2020-06-08 PROCEDURE — 99285 EMERGENCY DEPT VISIT HI MDM: CPT | Performed by: EMERGENCY MEDICINE

## 2020-06-08 PROCEDURE — 99284 EMERGENCY DEPT VISIT MOD MDM: CPT | Performed by: EMERGENCY MEDICINE

## 2020-06-08 RX ORDER — TRAMADOL HYDROCHLORIDE 50 MG/1
50 TABLET ORAL EVERY 8 HOURS PRN
Qty: 10 TABLET | Refills: 0 | Status: SHIPPED | OUTPATIENT
Start: 2020-06-08 | End: 2020-06-29

## 2020-06-08 RX ORDER — MORPHINE SULFATE 15 MG/1
7.5 TABLET ORAL ONCE
Status: COMPLETED | OUTPATIENT
Start: 2020-06-08 | End: 2020-06-08

## 2020-06-08 RX ORDER — AMOXICILLIN 500 MG/1
500 TABLET, FILM COATED ORAL 2 TIMES DAILY
Qty: 10 TABLET | Refills: 0 | Status: SHIPPED | OUTPATIENT
Start: 2020-06-08 | End: 2020-06-13

## 2020-06-08 RX ORDER — BUPIVACAINE HYDROCHLORIDE 5 MG/ML
10 INJECTION, SOLUTION EPIDURAL; INTRACAUDAL ONCE
Status: COMPLETED | OUTPATIENT
Start: 2020-06-08 | End: 2020-06-08

## 2020-06-08 RX ORDER — IBUPROFEN 600 MG/1
600 TABLET ORAL ONCE
Status: COMPLETED | OUTPATIENT
Start: 2020-06-08 | End: 2020-06-08

## 2020-06-08 RX ORDER — IBUPROFEN 400 MG/1
400 TABLET ORAL ONCE
Status: COMPLETED | OUTPATIENT
Start: 2020-06-08 | End: 2020-06-08

## 2020-06-08 RX ORDER — OXYCODONE HYDROCHLORIDE AND ACETAMINOPHEN 5; 325 MG/1; MG/1
1 TABLET ORAL ONCE
Status: COMPLETED | OUTPATIENT
Start: 2020-06-08 | End: 2020-06-08

## 2020-06-08 RX ORDER — BUPIVACAINE HYDROCHLORIDE 5 MG/ML
5 INJECTION, SOLUTION EPIDURAL; INTRACAUDAL ONCE
Status: COMPLETED | OUTPATIENT
Start: 2020-06-08 | End: 2020-06-08

## 2020-06-08 RX ORDER — IBUPROFEN 400 MG/1
400 TABLET ORAL ONCE
Status: DISCONTINUED | OUTPATIENT
Start: 2020-06-08 | End: 2020-06-08

## 2020-06-08 RX ADMIN — OXYCODONE AND ACETAMINOPHEN 1 TABLET: 5; 325 TABLET ORAL at 22:25

## 2020-06-08 RX ADMIN — BUPIVACAINE HYDROCHLORIDE 10 ML: 5 INJECTION, SOLUTION EPIDURAL; INTRACAUDAL at 00:34

## 2020-06-08 RX ADMIN — IBUPROFEN 600 MG: 600 TABLET ORAL at 22:25

## 2020-06-08 RX ADMIN — BUPIVACAINE HYDROCHLORIDE 5 ML: 5 INJECTION, SOLUTION EPIDURAL; INTRACAUDAL at 20:52

## 2020-06-08 RX ADMIN — MORPHINE SULFATE 7.5 MG: 15 TABLET ORAL at 01:04

## 2020-06-08 RX ADMIN — IBUPROFEN 400 MG: 400 TABLET ORAL at 01:09

## 2020-06-09 ENCOUNTER — HOSPITAL ENCOUNTER (EMERGENCY)
Facility: HOSPITAL | Age: 55
Discharge: HOME | End: 2020-06-09
Attending: EMERGENCY MEDICINE | Admitting: EMERGENCY MEDICINE
Payer: MEDICARE

## 2020-06-09 VITALS — TEMPERATURE: 98 F | RESPIRATION RATE: 18 BRPM | HEART RATE: 78 BPM | OXYGEN SATURATION: 97 %

## 2020-06-09 DIAGNOSIS — K08.89 DENTALGIA: Primary | ICD-10-CM

## 2020-06-09 DIAGNOSIS — Z76.5 DRUG-SEEKING BEHAVIOR: ICD-10-CM

## 2020-06-09 PROCEDURE — 99283 EMERGENCY DEPT VISIT LOW MDM: CPT

## 2020-06-09 PROCEDURE — 96372 THER/PROPH/DIAG INJ SC/IM: CPT

## 2020-06-09 PROCEDURE — 64450 NJX AA&/STRD OTHER PN/BRANCH: CPT | Performed by: EMERGENCY MEDICINE

## 2020-06-09 PROCEDURE — 99284 EMERGENCY DEPT VISIT MOD MDM: CPT | Performed by: EMERGENCY MEDICINE

## 2020-06-09 RX ORDER — NAPROXEN 500 MG/1
500 TABLET ORAL 2 TIMES DAILY WITH MEALS
Qty: 10 TABLET | Refills: 0 | Status: SHIPPED | OUTPATIENT
Start: 2020-06-09 | End: 2021-02-17

## 2020-06-09 RX ORDER — KETOROLAC TROMETHAMINE 30 MG/ML
15 INJECTION, SOLUTION INTRAMUSCULAR; INTRAVENOUS ONCE
Status: COMPLETED | OUTPATIENT
Start: 2020-06-09 | End: 2020-06-09

## 2020-06-09 RX ORDER — CHLORHEXIDINE GLUCONATE 0.12 MG/ML
15 RINSE ORAL ONCE
Status: DISCONTINUED | OUTPATIENT
Start: 2020-06-09 | End: 2020-06-09 | Stop reason: HOSPADM

## 2020-06-09 RX ORDER — BUPIVACAINE HYDROCHLORIDE 5 MG/ML
5 INJECTION, SOLUTION EPIDURAL; INTRACAUDAL ONCE
Status: COMPLETED | OUTPATIENT
Start: 2020-06-09 | End: 2020-06-09

## 2020-06-09 RX ORDER — ACETAMINOPHEN 325 MG/1
975 TABLET ORAL ONCE
Status: COMPLETED | OUTPATIENT
Start: 2020-06-09 | End: 2020-06-09

## 2020-06-09 RX ORDER — CHLORHEXIDINE GLUCONATE 0.12 MG/ML
15 RINSE ORAL 2 TIMES DAILY
Qty: 120 ML | Refills: 0 | Status: SHIPPED | OUTPATIENT
Start: 2020-06-09 | End: 2021-02-17

## 2020-06-09 RX ADMIN — BUPIVACAINE HYDROCHLORIDE 5 ML: 5 INJECTION, SOLUTION EPIDURAL; INTRACAUDAL at 07:23

## 2020-06-09 RX ADMIN — ACETAMINOPHEN 975 MG: 325 TABLET ORAL at 07:15

## 2020-06-09 RX ADMIN — KETOROLAC TROMETHAMINE 15 MG: 30 INJECTION, SOLUTION INTRAMUSCULAR at 07:15

## 2020-06-10 ENCOUNTER — TELEPHONE (OUTPATIENT)
Dept: INTERNAL MEDICINE CLINIC | Facility: CLINIC | Age: 55
End: 2020-06-10

## 2020-06-12 ENCOUNTER — HOSPITAL ENCOUNTER (EMERGENCY)
Facility: HOSPITAL | Age: 55
Discharge: HOME | End: 2020-06-12
Attending: EMERGENCY MEDICINE | Admitting: EMERGENCY MEDICINE
Payer: MEDICARE

## 2020-06-12 VITALS
TEMPERATURE: 97.9 F | RESPIRATION RATE: 18 BRPM | OXYGEN SATURATION: 98 % | BODY MASS INDEX: 40.04 KG/M2 | HEIGHT: 66 IN | DIASTOLIC BLOOD PRESSURE: 109 MMHG | HEART RATE: 86 BPM | SYSTOLIC BLOOD PRESSURE: 168 MMHG | WEIGHT: 249.12 LBS

## 2020-06-12 DIAGNOSIS — K08.89 PAIN, DENTAL: Primary | ICD-10-CM

## 2020-06-12 PROCEDURE — 99282 EMERGENCY DEPT VISIT SF MDM: CPT

## 2020-06-12 PROCEDURE — 99284 EMERGENCY DEPT VISIT MOD MDM: CPT | Performed by: EMERGENCY MEDICINE

## 2020-06-12 PROCEDURE — 64450 NJX AA&/STRD OTHER PN/BRANCH: CPT | Performed by: EMERGENCY MEDICINE

## 2020-06-12 RX ORDER — KETOROLAC TROMETHAMINE 30 MG/ML
15 INJECTION, SOLUTION INTRAMUSCULAR; INTRAVENOUS ONCE
Status: COMPLETED | OUTPATIENT
Start: 2020-06-12 | End: 2020-06-12

## 2020-06-12 RX ORDER — ACETAMINOPHEN 325 MG/1
975 TABLET ORAL ONCE
Status: COMPLETED | OUTPATIENT
Start: 2020-06-12 | End: 2020-06-12

## 2020-06-12 RX ORDER — ACETAMINOPHEN 500 MG
1000 TABLET ORAL EVERY 6 HOURS PRN
Qty: 30 TABLET | Refills: 0 | Status: SHIPPED | OUTPATIENT
Start: 2020-06-12 | End: 2021-02-17

## 2020-06-12 RX ORDER — BUPIVACAINE HYDROCHLORIDE 5 MG/ML
10 INJECTION, SOLUTION EPIDURAL; INTRACAUDAL ONCE
Status: COMPLETED | OUTPATIENT
Start: 2020-06-12 | End: 2020-06-12

## 2020-06-12 RX ORDER — IBUPROFEN 400 MG/1
400 TABLET ORAL EVERY 6 HOURS PRN
Qty: 30 TABLET | Refills: 0 | Status: SHIPPED | OUTPATIENT
Start: 2020-06-12 | End: 2020-11-18

## 2020-06-12 RX ADMIN — BUPIVACAINE HYDROCHLORIDE 10 ML: 5 INJECTION, SOLUTION EPIDURAL; INTRACAUDAL at 02:33

## 2020-06-12 RX ADMIN — KETOROLAC TROMETHAMINE 15 MG: 30 INJECTION, SOLUTION INTRAMUSCULAR at 02:33

## 2020-06-12 RX ADMIN — ACETAMINOPHEN 975 MG: 325 TABLET ORAL at 02:32

## 2020-06-17 ENCOUNTER — TRANSCRIBE ORDERS (OUTPATIENT)
Dept: LAB | Facility: HOSPITAL | Age: 55
End: 2020-06-17

## 2020-06-17 ENCOUNTER — APPOINTMENT (OUTPATIENT)
Dept: LAB | Facility: HOSPITAL | Age: 55
End: 2020-06-17
Payer: MEDICARE

## 2020-06-17 DIAGNOSIS — E78.01 FAMILIAL HYPERCHOLESTEROLEMIA: ICD-10-CM

## 2020-06-17 DIAGNOSIS — Z79.899 ENCOUNTER FOR LONG-TERM (CURRENT) USE OF OTHER MEDICATIONS: ICD-10-CM

## 2020-06-17 DIAGNOSIS — Z79.899 ENCOUNTER FOR LONG-TERM (CURRENT) USE OF OTHER MEDICATIONS: Primary | ICD-10-CM

## 2020-06-17 LAB
ALBUMIN SERPL BCP-MCNC: 3.8 G/DL (ref 3.5–5)
ALP SERPL-CCNC: 81 U/L (ref 46–116)
ALT SERPL W P-5'-P-CCNC: 61 U/L (ref 12–78)
AMMONIA PLAS-SCNC: 17 UMOL/L (ref 11–35)
ANION GAP SERPL CALCULATED.3IONS-SCNC: 4 MMOL/L (ref 4–13)
AST SERPL W P-5'-P-CCNC: 24 U/L (ref 5–45)
BASOPHILS # BLD AUTO: 0.02 THOUSANDS/ΜL (ref 0–0.1)
BASOPHILS NFR BLD AUTO: 0 % (ref 0–1)
BILIRUB SERPL-MCNC: 0.36 MG/DL (ref 0.2–1)
BUN SERPL-MCNC: 23 MG/DL (ref 5–25)
CALCIUM SERPL-MCNC: 8.8 MG/DL (ref 8.3–10.1)
CHLORIDE SERPL-SCNC: 107 MMOL/L (ref 100–108)
CHOLEST SERPL-MCNC: 184 MG/DL (ref 50–200)
CO2 SERPL-SCNC: 26 MMOL/L (ref 21–32)
CREAT SERPL-MCNC: 1.1 MG/DL (ref 0.6–1.3)
EOSINOPHIL # BLD AUTO: 0.08 THOUSAND/ΜL (ref 0–0.61)
EOSINOPHIL NFR BLD AUTO: 2 % (ref 0–6)
ERYTHROCYTE [DISTWIDTH] IN BLOOD BY AUTOMATED COUNT: 13 % (ref 11.6–15.1)
GFR SERPL CREATININE-BSD FRML MDRD: 77 ML/MIN/1.73SQ M
GLUCOSE P FAST SERPL-MCNC: 91 MG/DL (ref 65–99)
HCT VFR BLD AUTO: 43 % (ref 36.5–49.3)
HDLC SERPL-MCNC: 24 MG/DL
HGB BLD-MCNC: 14.3 G/DL (ref 12–17)
IMM GRANULOCYTES # BLD AUTO: 0 THOUSAND/UL (ref 0–0.2)
IMM GRANULOCYTES NFR BLD AUTO: 0 % (ref 0–2)
LDLC SERPL CALC-MCNC: 120 MG/DL (ref 0–100)
LYMPHOCYTES # BLD AUTO: 2.29 THOUSANDS/ΜL (ref 0.6–4.47)
LYMPHOCYTES NFR BLD AUTO: 51 % (ref 14–44)
MCH RBC QN AUTO: 30.5 PG (ref 26.8–34.3)
MCHC RBC AUTO-ENTMCNC: 33.3 G/DL (ref 31.4–37.4)
MCV RBC AUTO: 92 FL (ref 82–98)
MONOCYTES # BLD AUTO: 0.36 THOUSAND/ΜL (ref 0.17–1.22)
MONOCYTES NFR BLD AUTO: 8 % (ref 4–12)
NEUTROPHILS # BLD AUTO: 1.78 THOUSANDS/ΜL (ref 1.85–7.62)
NEUTS SEG NFR BLD AUTO: 39 % (ref 43–75)
NRBC BLD AUTO-RTO: 0 /100 WBCS
PLATELET # BLD AUTO: 175 THOUSANDS/UL (ref 149–390)
PMV BLD AUTO: 11.4 FL (ref 8.9–12.7)
POTASSIUM SERPL-SCNC: 4.3 MMOL/L (ref 3.5–5.3)
PROLACTIN SERPL-MCNC: 5.6 NG/ML (ref 2.5–17.4)
PROT SERPL-MCNC: 7.6 G/DL (ref 6.4–8.2)
RBC # BLD AUTO: 4.69 MILLION/UL (ref 3.88–5.62)
SODIUM SERPL-SCNC: 137 MMOL/L (ref 136–145)
T4 FREE SERPL-MCNC: 0.91 NG/DL (ref 0.76–1.46)
TRIGL SERPL-MCNC: 202 MG/DL
TSH SERPL DL<=0.05 MIU/L-ACNC: 2.78 UIU/ML (ref 0.36–3.74)
VALPROATE SERPL-MCNC: <3 UG/ML (ref 50–100)
WBC # BLD AUTO: 4.53 THOUSAND/UL (ref 4.31–10.16)

## 2020-06-17 PROCEDURE — 80307 DRUG TEST PRSMV CHEM ANLYZR: CPT

## 2020-06-17 PROCEDURE — 82140 ASSAY OF AMMONIA: CPT

## 2020-06-17 PROCEDURE — 85025 COMPLETE CBC W/AUTO DIFF WBC: CPT

## 2020-06-17 PROCEDURE — 84439 ASSAY OF FREE THYROXINE: CPT

## 2020-06-17 PROCEDURE — 84443 ASSAY THYROID STIM HORMONE: CPT

## 2020-06-17 PROCEDURE — 36415 COLL VENOUS BLD VENIPUNCTURE: CPT

## 2020-06-17 PROCEDURE — 84146 ASSAY OF PROLACTIN: CPT

## 2020-06-17 PROCEDURE — 80164 ASSAY DIPROPYLACETIC ACD TOT: CPT

## 2020-06-17 PROCEDURE — 80061 LIPID PANEL: CPT

## 2020-06-17 PROCEDURE — 80053 COMPREHEN METABOLIC PANEL: CPT

## 2020-06-19 LAB
AMPHETAMINES UR QL SCN: NEGATIVE NG/ML
BARBITURATES UR QL SCN: NEGATIVE NG/ML
BENZODIAZ UR QL: NEGATIVE NG/ML
BZE UR QL: NEGATIVE NG/ML
CANNABINOIDS UR QL SCN: NEGATIVE NG/ML
METHADONE UR QL SCN: NEGATIVE NG/ML
OPIATES UR QL: NEGATIVE NG/ML
PCP UR QL: NEGATIVE NG/ML
PROPOXYPH UR QL SCN: NEGATIVE NG/ML

## 2020-06-22 ENCOUNTER — TELEPHONE (OUTPATIENT)
Dept: INTERNAL MEDICINE CLINIC | Facility: CLINIC | Age: 55
End: 2020-06-22

## 2020-06-28 PROCEDURE — 99283 EMERGENCY DEPT VISIT LOW MDM: CPT

## 2020-06-29 ENCOUNTER — HOSPITAL ENCOUNTER (EMERGENCY)
Facility: HOSPITAL | Age: 55
Discharge: HOME | End: 2020-06-29
Attending: EMERGENCY MEDICINE | Admitting: EMERGENCY MEDICINE
Payer: MEDICARE

## 2020-06-29 ENCOUNTER — OFFICE VISIT (OUTPATIENT)
Dept: INTERNAL MEDICINE CLINIC | Facility: CLINIC | Age: 55
End: 2020-06-29
Payer: MEDICARE

## 2020-06-29 VITALS
HEIGHT: 66 IN | BODY MASS INDEX: 39.05 KG/M2 | HEART RATE: 93 BPM | SYSTOLIC BLOOD PRESSURE: 131 MMHG | WEIGHT: 243 LBS | TEMPERATURE: 97.5 F | RESPIRATION RATE: 20 BRPM | OXYGEN SATURATION: 98 % | DIASTOLIC BLOOD PRESSURE: 76 MMHG

## 2020-06-29 VITALS
DIASTOLIC BLOOD PRESSURE: 88 MMHG | OXYGEN SATURATION: 98 % | BODY MASS INDEX: 37.28 KG/M2 | HEART RATE: 104 BPM | HEIGHT: 66 IN | WEIGHT: 232 LBS | SYSTOLIC BLOOD PRESSURE: 132 MMHG | RESPIRATION RATE: 17 BRPM

## 2020-06-29 DIAGNOSIS — L20.84 INTRINSIC ECZEMA: ICD-10-CM

## 2020-06-29 DIAGNOSIS — E78.01 FAMILIAL HYPERCHOLESTEROLEMIA: ICD-10-CM

## 2020-06-29 DIAGNOSIS — Z00.00 MEDICARE ANNUAL WELLNESS VISIT, INITIAL: ICD-10-CM

## 2020-06-29 DIAGNOSIS — R45.1 RESTLESSNESS: Primary | ICD-10-CM

## 2020-06-29 DIAGNOSIS — K08.89 PAIN, DENTAL: Primary | ICD-10-CM

## 2020-06-29 DIAGNOSIS — H60.543 ECZEMA OF EXTERNAL EAR, BILATERAL: ICD-10-CM

## 2020-06-29 DIAGNOSIS — Z12.11 SCREENING FOR COLON CANCER: ICD-10-CM

## 2020-06-29 DIAGNOSIS — Z12.5 SCREENING FOR PROSTATE CANCER: ICD-10-CM

## 2020-06-29 DIAGNOSIS — T50.905A ADVERSE EFFECT OF DRUG, INITIAL ENCOUNTER: ICD-10-CM

## 2020-06-29 DIAGNOSIS — H40.9 GLAUCOMA OF BOTH EYES, UNSPECIFIED GLAUCOMA TYPE: ICD-10-CM

## 2020-06-29 DIAGNOSIS — F31.9 BIPOLAR 1 DISORDER (HCC): ICD-10-CM

## 2020-06-29 DIAGNOSIS — Z13.29 SCREENING FOR THYROID DISORDER: ICD-10-CM

## 2020-06-29 DIAGNOSIS — N40.0 BENIGN PROSTATIC HYPERPLASIA WITHOUT LOWER URINARY TRACT SYMPTOMS: ICD-10-CM

## 2020-06-29 PROCEDURE — G0438 PPPS, INITIAL VISIT: HCPCS | Performed by: INTERNAL MEDICINE

## 2020-06-29 PROCEDURE — 99214 OFFICE O/P EST MOD 30 MIN: CPT | Performed by: INTERNAL MEDICINE

## 2020-06-29 PROCEDURE — 96372 THER/PROPH/DIAG INJ SC/IM: CPT

## 2020-06-29 PROCEDURE — 3008F BODY MASS INDEX DOCD: CPT | Performed by: INTERNAL MEDICINE

## 2020-06-29 PROCEDURE — 1036F TOBACCO NON-USER: CPT | Performed by: INTERNAL MEDICINE

## 2020-06-29 PROCEDURE — 99285 EMERGENCY DEPT VISIT HI MDM: CPT | Performed by: EMERGENCY MEDICINE

## 2020-06-29 RX ORDER — HYDROCORTISONE AND ACETIC ACID 20.75; 10.375 MG/ML; MG/ML
3 SOLUTION AURICULAR (OTIC) EVERY 6 HOURS SCHEDULED
Qty: 10 ML | Refills: 0 | Status: SHIPPED | OUTPATIENT
Start: 2020-06-29 | End: 2021-02-17

## 2020-06-29 RX ORDER — BETAMETHASONE DIPROPIONATE 0.5 MG/G
CREAM TOPICAL 2 TIMES DAILY PRN
Qty: 45 G | Refills: 0 | Status: SHIPPED | OUTPATIENT
Start: 2020-06-29 | End: 2021-02-17

## 2020-06-29 RX ORDER — DORZOLAMIDE HYDROCHLORIDE AND TIMOLOL MALEATE 20; 5 MG/ML; MG/ML
1 SOLUTION/ DROPS OPHTHALMIC DAILY
Qty: 10 ML | Refills: 3 | Status: SHIPPED | OUTPATIENT
Start: 2020-06-29 | End: 2020-08-04 | Stop reason: SDUPTHER

## 2020-06-29 RX ORDER — LATANOPROST 50 UG/ML
1 SOLUTION/ DROPS OPHTHALMIC DAILY
Qty: 7.5 ML | Refills: 3 | Status: SHIPPED | OUTPATIENT
Start: 2020-06-29 | End: 2020-12-09 | Stop reason: SDUPTHER

## 2020-06-29 RX ORDER — DIAZEPAM 5 MG/1
5 TABLET ORAL ONCE
Status: COMPLETED | OUTPATIENT
Start: 2020-06-29 | End: 2020-06-29

## 2020-06-29 RX ORDER — DIPHENHYDRAMINE HYDROCHLORIDE 50 MG/ML
50 INJECTION INTRAMUSCULAR; INTRAVENOUS ONCE
Status: COMPLETED | OUTPATIENT
Start: 2020-06-29 | End: 2020-06-29

## 2020-06-29 RX ADMIN — DIPHENHYDRAMINE HYDROCHLORIDE 50 MG: 50 INJECTION INTRAMUSCULAR; INTRAVENOUS at 01:04

## 2020-06-29 RX ADMIN — DIAZEPAM 5 MG: 5 TABLET ORAL at 02:29

## 2020-06-29 NOTE — DISCHARGE INSTRUCTIONS
You came to the ED with restlessness  Likely response to taking all of your medications at the same time  Please take some of your medications in the morning and some at night like you were doing previously  Please follow-up with primary care provider to discuss medication dosing changes  Please return for significantly worsening symptoms, chest pain, shortness of breath, fainting, severe palpitations, or any other concerning signs or symptoms  Please refer to the following documents for additional instructions and return precautions
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
## 2020-06-29 NOTE — ED NOTES
PT just woke up after sleeping very soundly for about an hour and a half or so and exclaimed "[address] [place]! I actually slept! I'm so happy!  Now I can go home and relax - I don't know why I thought everything was so terrible before"     [employer]  06/29/20 05

## 2020-06-29 NOTE — ED ATTENDING ATTESTATION
6/28/2020  IKelly MD, saw and evaluated the patient  I have discussed the patient with the resident/non-physician practitioner and agree with the resident's/non-physician practitioner's findings, Plan of Care, and MDM as documented in the resident's/non-physician practitioner's note, except where noted  All available labs and Radiology studies were reviewed  I was present for key portions of any procedure(s) performed by the resident/non-physician practitioner and I was immediately available to provide assistance  At this point I agree with the current assessment done in the Emergency Department  I have conducted an independent evaluation of this patient a history and physical is as follows:    ED Course     Patient presents for evaluation after developing a restless feeling tonight  Patient states that he had been out of his valproate and his Risperdal due to money issues  Patient additionally takes Requip and doxepin at bedtime  Tonight, he was able to refill his medications and took them at 8 and then his other 2 medications at 10  Symptoms started shortly afterwards  Patient states that previously he would take his antipsychotics in the morning and his sleep medications at night, but his doctor told him to take them all at night  No additional complaints  Exam: AAOx3, appears restless, RRR, CTA, S/NT/ND  A/P:  Active seizure  Will treat with Benadryl and have patient call psychiatrist in the morning to adjust his medications      Critical Care Time  Procedures

## 2020-06-30 ENCOUNTER — HOSPITAL ENCOUNTER (EMERGENCY)
Facility: HOSPITAL | Age: 55
Discharge: HOME | End: 2020-06-30
Attending: EMERGENCY MEDICINE | Admitting: EMERGENCY MEDICINE
Payer: MEDICARE

## 2020-06-30 VITALS
DIASTOLIC BLOOD PRESSURE: 90 MMHG | RESPIRATION RATE: 24 BRPM | OXYGEN SATURATION: 98 % | HEART RATE: 82 BPM | TEMPERATURE: 97.7 F | SYSTOLIC BLOOD PRESSURE: 133 MMHG | BODY MASS INDEX: 37.28 KG/M2 | WEIGHT: 231 LBS

## 2020-06-30 DIAGNOSIS — T88.7XXA MEDICATION SIDE EFFECT: Primary | ICD-10-CM

## 2020-06-30 LAB
ANION GAP SERPL CALCULATED.3IONS-SCNC: 6 MMOL/L (ref 4–13)
BUN SERPL-MCNC: 23 MG/DL (ref 5–25)
CALCIUM SERPL-MCNC: 8.7 MG/DL (ref 8.3–10.1)
CHLORIDE SERPL-SCNC: 106 MMOL/L (ref 100–108)
CO2 SERPL-SCNC: 26 MMOL/L (ref 21–32)
CREAT SERPL-MCNC: 1.41 MG/DL (ref 0.6–1.3)
GFR SERPL CREATININE-BSD FRML MDRD: 57 ML/MIN/1.73SQ M
GLUCOSE SERPL-MCNC: 113 MG/DL (ref 65–140)
POTASSIUM SERPL-SCNC: 3.5 MMOL/L (ref 3.5–5.3)
SODIUM SERPL-SCNC: 138 MMOL/L (ref 136–145)
TSH SERPL DL<=0.05 MIU/L-ACNC: 1.46 UIU/ML (ref 0.36–3.74)

## 2020-06-30 PROCEDURE — 80048 BASIC METABOLIC PNL TOTAL CA: CPT | Performed by: EMERGENCY MEDICINE

## 2020-06-30 PROCEDURE — 99283 EMERGENCY DEPT VISIT LOW MDM: CPT

## 2020-06-30 PROCEDURE — 99284 EMERGENCY DEPT VISIT MOD MDM: CPT | Performed by: EMERGENCY MEDICINE

## 2020-06-30 PROCEDURE — 36415 COLL VENOUS BLD VENIPUNCTURE: CPT | Performed by: EMERGENCY MEDICINE

## 2020-06-30 PROCEDURE — 84443 ASSAY THYROID STIM HORMONE: CPT | Performed by: EMERGENCY MEDICINE

## 2020-06-30 RX ORDER — DIAZEPAM 5 MG/1
5 TABLET ORAL ONCE
Status: COMPLETED | OUTPATIENT
Start: 2020-06-30 | End: 2020-06-30

## 2020-06-30 RX ADMIN — DIAZEPAM 5 MG: 5 TABLET ORAL at 01:33

## 2020-06-30 NOTE — ED NOTES
Dr Jennifer Cool at bedside for pt evaluation       [address] [place] [address], RN  06/30/20 1313

## 2020-06-30 NOTE — ED ATTENDING ATTESTATION
6/30/2020  I, Author DO El, saw and evaluated the patient  I have discussed the patient with the resident/non-physician practitioner and agree with the resident's/non-physician practitioner's findings, Plan of Care, and MDM as documented in the resident's/non-physician practitioner's note, except where noted  All available labs and Radiology studies were reviewed  I was present for key portions of any procedure(s) performed by the resident/non-physician practitioner and I was immediately available to provide assistance  At this point I agree with the current assessment done in the Emergency Department  I have conducted an independent evaluation of this patient a history and physical is as follows:    49yo male presents with feeling restless  Was seen here last night for same  Was recently started on new meds that he thinks may be causing these symptoms  Planning to f/u with psych this week  On exam - nad, heart reg, no resp distress    Plan - valium and reassess    ED Course         Critical Care Time  Procedures

## 2020-07-01 ENCOUNTER — HOSPITAL ENCOUNTER (EMERGENCY)
Facility: HOSPITAL | Age: 55
Discharge: HOME | End: 2020-07-01
Attending: EMERGENCY MEDICINE
Payer: MEDICARE

## 2020-07-01 VITALS
SYSTOLIC BLOOD PRESSURE: 121 MMHG | WEIGHT: 230 LBS | RESPIRATION RATE: 18 BRPM | HEART RATE: 80 BPM | BODY MASS INDEX: 37.12 KG/M2 | TEMPERATURE: 97.8 F | DIASTOLIC BLOOD PRESSURE: 66 MMHG | OXYGEN SATURATION: 99 %

## 2020-07-01 DIAGNOSIS — T88.7XXA MEDICATION SIDE EFFECT: Primary | ICD-10-CM

## 2020-07-01 PROCEDURE — 99281 EMR DPT VST MAYX REQ PHY/QHP: CPT | Performed by: EMERGENCY MEDICINE

## 2020-07-01 PROCEDURE — 99283 EMERGENCY DEPT VISIT LOW MDM: CPT

## 2020-07-01 NOTE — ED ATTENDING ATTESTATION
7/1/2020  IReagan MD, saw and evaluated the patient  I have discussed the patient with the resident/non-physician practitioner and agree with the resident's/non-physician practitioner's findings, Plan of Care, and MDM as documented in the resident's/non-physician practitioner's note, except where noted  All available labs and Radiology studies were reviewed  I was present for key portions of any procedure(s) performed by the resident/non-physician practitioner and I was immediately available to provide assistance  At this point I agree with the current assessment done in the Emergency Department  I have conducted an independent evaluation of this patient a history and physical is as follows:      Patient is a 21-year-old male who presents with chief complaint of restless legs difficulty falling asleep  Patient has been seen previously for same treated to likely a medication side effect  Has not followed up with his PCP or psychiatrist regarding same as of yet  Vital signs reviewed  Patient no acute distress  Impression:  Restless leg syndrome    Discharge to home follow-up with PCP as outpatient    ED Course         Critical Care Time  Procedures

## 2020-07-01 NOTE — ED NOTES
Pt stated "yesterday and the day before I got pills to calm me down, I want that " Pt informed that he needs to follow up with his PCP for this recurring issue and proceeded to leave the ED without discharge paperwork       [address], RN  07/01/20 2004
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
## 2020-07-14 ENCOUNTER — HOSPITAL ENCOUNTER (EMERGENCY)
Facility: HOSPITAL | Age: 55
Discharge: HOME | End: 2020-07-14
Attending: EMERGENCY MEDICINE | Admitting: EMERGENCY MEDICINE
Payer: MEDICARE

## 2020-07-14 VITALS
TEMPERATURE: 97.5 F | HEIGHT: 66 IN | OXYGEN SATURATION: 98 % | DIASTOLIC BLOOD PRESSURE: 77 MMHG | HEART RATE: 89 BPM | WEIGHT: 230 LBS | SYSTOLIC BLOOD PRESSURE: 117 MMHG | BODY MASS INDEX: 36.96 KG/M2 | RESPIRATION RATE: 20 BRPM

## 2020-07-14 DIAGNOSIS — Z76.0 MEDICATION REFILL: ICD-10-CM

## 2020-07-14 DIAGNOSIS — G25.81 RESTLESS LEG SYNDROME: Primary | ICD-10-CM

## 2020-07-14 PROCEDURE — 99283 EMERGENCY DEPT VISIT LOW MDM: CPT

## 2020-07-14 PROCEDURE — 99284 EMERGENCY DEPT VISIT MOD MDM: CPT | Performed by: EMERGENCY MEDICINE

## 2020-07-14 RX ORDER — GABAPENTIN 100 MG/1
100 CAPSULE ORAL 3 TIMES DAILY
Qty: 21 CAPSULE | Refills: 0 | Status: SHIPPED | OUTPATIENT
Start: 2020-07-14 | End: 2021-02-17

## 2020-07-14 RX ORDER — DIPHENHYDRAMINE HCL 25 MG
50 TABLET ORAL ONCE
Status: COMPLETED | OUTPATIENT
Start: 2020-07-14 | End: 2020-07-14

## 2020-07-14 RX ORDER — GABAPENTIN 100 MG/1
100 CAPSULE ORAL ONCE
Status: COMPLETED | OUTPATIENT
Start: 2020-07-14 | End: 2020-07-14

## 2020-07-14 RX ADMIN — GABAPENTIN 100 MG: 100 CAPSULE ORAL at 02:13

## 2020-07-14 RX ADMIN — DIPHENHYDRAMINE HCL 50 MG: 25 TABLET ORAL at 02:49
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
## 2020-07-16 NOTE — ED ATTENDING ATTESTATION
7/14/2020  IReagan MD, saw and evaluated the patient  I have discussed the patient with the resident/non-physician practitioner and agree with the resident's/non-physician practitioner's findings, Plan of Care, and MDM as documented in the resident's/non-physician practitioner's note, except where noted  All available labs and Radiology studies were reviewed  I was present for key portions of any procedure(s) performed by the resident/non-physician practitioner and I was immediately available to provide assistance  At this point I agree with the current assessment done in the Emergency Department  I have conducted an independent evaluation of this patient a history and physical is as follows:    Patient is a 33-year-old male with history of restless leg syndrome presenting with worsening of his restless leg syndrome  He has been seen previously and given Valium for same  Patient has no other complaints at this time period    Impression:  RLS  Will start on Neurontin q h s   Follow-up with PCP    ED Course         Critical Care Time  Procedures

## 2020-07-19 ENCOUNTER — HOSPITAL ENCOUNTER (EMERGENCY)
Facility: HOSPITAL | Age: 55
Discharge: HOME | End: 2020-07-20
Attending: EMERGENCY MEDICINE | Admitting: EMERGENCY MEDICINE
Payer: MEDICARE

## 2020-07-19 ENCOUNTER — APPOINTMENT (EMERGENCY)
Dept: RADIOLOGY | Facility: HOSPITAL | Age: 55
End: 2020-07-19
Payer: MEDICARE

## 2020-07-19 DIAGNOSIS — R10.9 ABDOMINAL PAIN: ICD-10-CM

## 2020-07-19 DIAGNOSIS — N17.9 AKI (ACUTE KIDNEY INJURY) (HCC): Primary | ICD-10-CM

## 2020-07-19 DIAGNOSIS — G25.81 RESTLESS LEG SYNDROME: ICD-10-CM

## 2020-07-19 LAB
ALBUMIN SERPL BCP-MCNC: 4.1 G/DL (ref 3.5–5)
ALP SERPL-CCNC: 72 U/L (ref 46–116)
ALT SERPL W P-5'-P-CCNC: 46 U/L (ref 12–78)
ANION GAP SERPL CALCULATED.3IONS-SCNC: 4 MMOL/L (ref 4–13)
AST SERPL W P-5'-P-CCNC: 26 U/L (ref 5–45)
BASOPHILS # BLD AUTO: 0.03 THOUSANDS/ΜL (ref 0–0.1)
BASOPHILS NFR BLD AUTO: 1 % (ref 0–1)
BILIRUB SERPL-MCNC: 0.45 MG/DL (ref 0.2–1)
BUN SERPL-MCNC: 28 MG/DL (ref 5–25)
CALCIUM SERPL-MCNC: 9.1 MG/DL (ref 8.3–10.1)
CHLORIDE SERPL-SCNC: 106 MMOL/L (ref 100–108)
CO2 SERPL-SCNC: 28 MMOL/L (ref 21–32)
CREAT SERPL-MCNC: 1.96 MG/DL (ref 0.6–1.3)
EOSINOPHIL # BLD AUTO: 0.07 THOUSAND/ΜL (ref 0–0.61)
EOSINOPHIL NFR BLD AUTO: 1 % (ref 0–6)
ERYTHROCYTE [DISTWIDTH] IN BLOOD BY AUTOMATED COUNT: 12.5 % (ref 11.6–15.1)
GFR SERPL CREATININE-BSD FRML MDRD: 38 ML/MIN/1.73SQ M
GLUCOSE SERPL-MCNC: 90 MG/DL (ref 65–140)
HCT VFR BLD AUTO: 40.9 % (ref 36.5–49.3)
HGB BLD-MCNC: 13.7 G/DL (ref 12–17)
IMM GRANULOCYTES # BLD AUTO: 0.01 THOUSAND/UL (ref 0–0.2)
IMM GRANULOCYTES NFR BLD AUTO: 0 % (ref 0–2)
LIPASE SERPL-CCNC: 175 U/L (ref 73–393)
LYMPHOCYTES # BLD AUTO: 3.04 THOUSANDS/ΜL (ref 0.6–4.47)
LYMPHOCYTES NFR BLD AUTO: 47 % (ref 14–44)
MCH RBC QN AUTO: 30.2 PG (ref 26.8–34.3)
MCHC RBC AUTO-ENTMCNC: 33.5 G/DL (ref 31.4–37.4)
MCV RBC AUTO: 90 FL (ref 82–98)
MONOCYTES # BLD AUTO: 0.48 THOUSAND/ΜL (ref 0.17–1.22)
MONOCYTES NFR BLD AUTO: 8 % (ref 4–12)
NEUTROPHILS # BLD AUTO: 2.76 THOUSANDS/ΜL (ref 1.85–7.62)
NEUTS SEG NFR BLD AUTO: 43 % (ref 43–75)
NRBC BLD AUTO-RTO: 0 /100 WBCS
PLATELET # BLD AUTO: 179 THOUSANDS/UL (ref 149–390)
PMV BLD AUTO: 11.1 FL (ref 8.9–12.7)
POTASSIUM SERPL-SCNC: 3.8 MMOL/L (ref 3.5–5.3)
PROT SERPL-MCNC: 7.7 G/DL (ref 6.4–8.2)
RBC # BLD AUTO: 4.53 MILLION/UL (ref 3.88–5.62)
SODIUM SERPL-SCNC: 138 MMOL/L (ref 136–145)
WBC # BLD AUTO: 6.39 THOUSAND/UL (ref 4.31–10.16)

## 2020-07-19 PROCEDURE — 80053 COMPREHEN METABOLIC PANEL: CPT | Performed by: EMERGENCY MEDICINE

## 2020-07-19 PROCEDURE — 83690 ASSAY OF LIPASE: CPT | Performed by: EMERGENCY MEDICINE

## 2020-07-19 PROCEDURE — 99284 EMERGENCY DEPT VISIT MOD MDM: CPT | Performed by: EMERGENCY MEDICINE

## 2020-07-19 PROCEDURE — 36415 COLL VENOUS BLD VENIPUNCTURE: CPT | Performed by: EMERGENCY MEDICINE

## 2020-07-19 PROCEDURE — 99284 EMERGENCY DEPT VISIT MOD MDM: CPT

## 2020-07-19 PROCEDURE — 85025 COMPLETE CBC W/AUTO DIFF WBC: CPT | Performed by: EMERGENCY MEDICINE

## 2020-07-19 PROCEDURE — 74177 CT ABD & PELVIS W/CONTRAST: CPT

## 2020-07-19 RX ORDER — METHOCARBAMOL 500 MG/1
500 TABLET, FILM COATED ORAL ONCE
Status: COMPLETED | OUTPATIENT
Start: 2020-07-19 | End: 2020-07-19

## 2020-07-19 RX ORDER — DIPHENHYDRAMINE HCL 25 MG
25 TABLET ORAL ONCE
Status: COMPLETED | OUTPATIENT
Start: 2020-07-19 | End: 2020-07-19

## 2020-07-19 RX ADMIN — DIPHENHYDRAMINE HCL 25 MG: 25 TABLET ORAL at 23:00

## 2020-07-19 RX ADMIN — METHOCARBAMOL TABLETS 500 MG: 500 TABLET, COATED ORAL at 23:21

## 2020-07-20 VITALS
TEMPERATURE: 99 F | DIASTOLIC BLOOD PRESSURE: 102 MMHG | RESPIRATION RATE: 18 BRPM | HEART RATE: 76 BPM | SYSTOLIC BLOOD PRESSURE: 171 MMHG | OXYGEN SATURATION: 97 %

## 2020-07-20 PROCEDURE — 96361 HYDRATE IV INFUSION ADD-ON: CPT

## 2020-07-20 PROCEDURE — 96374 THER/PROPH/DIAG INJ IV PUSH: CPT

## 2020-07-20 RX ORDER — DIAZEPAM 5 MG/1
5 TABLET ORAL ONCE
Status: COMPLETED | OUTPATIENT
Start: 2020-07-20 | End: 2020-07-20

## 2020-07-20 RX ORDER — ONDANSETRON 2 MG/ML
4 INJECTION INTRAMUSCULAR; INTRAVENOUS ONCE
Status: COMPLETED | OUTPATIENT
Start: 2020-07-20 | End: 2020-07-20

## 2020-07-20 RX ADMIN — ONDANSETRON 4 MG: 2 INJECTION INTRAMUSCULAR; INTRAVENOUS at 00:41

## 2020-07-20 RX ADMIN — SODIUM CHLORIDE 1000 ML: 0.9 INJECTION, SOLUTION INTRAVENOUS at 00:40

## 2020-07-20 RX ADMIN — MAGNESIUM HYDROXIDE 30 ML: 400 SUSPENSION ORAL at 00:40

## 2020-07-20 RX ADMIN — DIAZEPAM 5 MG: 5 TABLET ORAL at 00:41

## 2020-07-20 RX ADMIN — IOHEXOL 100 ML: 350 INJECTION, SOLUTION INTRAVENOUS at 00:15
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
## 2020-07-30 ENCOUNTER — HOSPITAL ENCOUNTER (EMERGENCY)
Facility: HOSPITAL | Age: 55
Discharge: HOME | End: 2020-07-30
Attending: EMERGENCY MEDICINE | Admitting: EMERGENCY MEDICINE
Payer: MEDICARE

## 2020-07-30 ENCOUNTER — HOSPITAL ENCOUNTER (OUTPATIENT)
Dept: NON INVASIVE DIAGNOSTICS | Facility: HOSPITAL | Age: 55
Discharge: HOME | End: 2020-07-30
Payer: MEDICARE

## 2020-07-30 ENCOUNTER — TELEPHONE (OUTPATIENT)
Dept: NON INVASIVE DIAGNOSTICS | Facility: HOSPITAL | Age: 55
End: 2020-07-30

## 2020-07-30 VITALS
BODY MASS INDEX: 36.65 KG/M2 | SYSTOLIC BLOOD PRESSURE: 107 MMHG | HEART RATE: 66 BPM | DIASTOLIC BLOOD PRESSURE: 85 MMHG | RESPIRATION RATE: 22 BRPM | OXYGEN SATURATION: 98 % | WEIGHT: 227.07 LBS | TEMPERATURE: 97.4 F

## 2020-07-30 DIAGNOSIS — H66.90 OTITIS MEDIA: Primary | ICD-10-CM

## 2020-07-30 DIAGNOSIS — G25.81 RESTLESS LEG: ICD-10-CM

## 2020-07-30 DIAGNOSIS — R07.9 CHEST PAIN: ICD-10-CM

## 2020-07-30 DIAGNOSIS — R94.31 ABNORMAL ECG DURING EXERCISE STRESS TEST: ICD-10-CM

## 2020-07-30 DIAGNOSIS — E78.01 FAMILIAL HYPERCHOLESTEROLEMIA: ICD-10-CM

## 2020-07-30 DIAGNOSIS — K59.00 CONSTIPATION: ICD-10-CM

## 2020-07-30 DIAGNOSIS — R10.9 ABDOMINAL PAIN: ICD-10-CM

## 2020-07-30 DIAGNOSIS — R94.31 ABNORMAL ECG DURING EXERCISE STRESS TEST: Primary | ICD-10-CM

## 2020-07-30 LAB
CHEST PAIN STATEMENT: NORMAL
CHEST PAIN STATEMENT: NORMAL
MAX DIASTOLIC BP: 74 MMHG
MAX DIASTOLIC BP: 82 MMHG
MAX HEART RATE: 142 BPM
MAX HEART RATE: 144 BPM
MAX PREDICTED HEART RATE: 167 BPM
MAX PREDICTED HEART RATE: 167 BPM
MAX. SYSTOLIC BP: 166 MMHG
MAX. SYSTOLIC BP: 190 MMHG
PROTOCOL NAME: NORMAL
PROTOCOL NAME: NORMAL
REASON FOR TERMINATION: NORMAL
REASON FOR TERMINATION: NORMAL
TARGET HR FORMULA: NORMAL
TARGET HR FORMULA: NORMAL
TEST INDICATION: NORMAL
TEST INDICATION: NORMAL
TIME IN EXERCISE PHASE: NORMAL
TIME IN EXERCISE PHASE: NORMAL

## 2020-07-30 PROCEDURE — 99283 EMERGENCY DEPT VISIT LOW MDM: CPT

## 2020-07-30 PROCEDURE — NC001 PR NO CHARGE: Performed by: INTERNAL MEDICINE

## 2020-07-30 PROCEDURE — 93351 STRESS TTE COMPLETE: CPT | Performed by: INTERNAL MEDICINE

## 2020-07-30 PROCEDURE — 99284 EMERGENCY DEPT VISIT MOD MDM: CPT | Performed by: EMERGENCY MEDICINE

## 2020-07-30 PROCEDURE — 93017 CV STRESS TEST TRACING ONLY: CPT

## 2020-07-30 PROCEDURE — 93350 STRESS TTE ONLY: CPT

## 2020-07-30 RX ORDER — MAGNESIUM CARB/ALUMINUM HYDROX 105-160MG
296 TABLET,CHEWABLE ORAL ONCE
Status: COMPLETED | OUTPATIENT
Start: 2020-07-30 | End: 2020-07-30

## 2020-07-30 RX ORDER — AMOXICILLIN 500 MG/1
1000 CAPSULE ORAL 2 TIMES DAILY
Qty: 28 CAPSULE | Refills: 0 | Status: SHIPPED | OUTPATIENT
Start: 2020-07-30 | End: 2020-08-06

## 2020-07-30 RX ORDER — AMOXICILLIN 250 MG/1
1000 CAPSULE ORAL ONCE
Status: COMPLETED | OUTPATIENT
Start: 2020-07-30 | End: 2020-07-30

## 2020-07-30 RX ADMIN — MAGNESIUM CITRATE 296 ML: 1.75 LIQUID ORAL at 02:33

## 2020-07-30 RX ADMIN — AMOXICILLIN 1000 MG: 250 CAPSULE ORAL at 02:32
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
## 2020-07-30 NOTE — ED ATTENDING ATTESTATION
7/30/2020  I, Jailyn Pineda MD, saw and evaluated the patient  I have discussed the patient with the resident/non-physician practitioner and agree with the resident's/non-physician practitioner's findings, Plan of Care, and MDM as documented in the resident's/non-physician practitioner's note, except where noted  All available labs and Radiology studies were reviewed  I was present for key portions of any procedure(s) performed by the resident/non-physician practitioner and I was immediately available to provide assistance  At this point I agree with the current assessment done in the Emergency Department  I have conducted an independent evaluation of this patient a history and physical is as follows: This is a 50 y o  old male who presents to the ED for evaluation of ear pain, gradually worsening over a few days  No fevers  No cough congestion  Also with abd pain, seen previously for same, still no BM, asking for mag citrate  VS and nursing notes reviewed  General: Appears in NAD, awake, alert, speaking normally in full sentences  Well-nourished, well-developed  Appears stated age  Head: Normocephalic, atraumatic  Eyes: EOMI  Vision grossly normal  No subconjunctival hemorrhages or occular discharge noted  Symmetrical lids  ENT: Atraumatic external nose and ears  No stridor  Normal phonation  No drooling  Normal swallowing  R TM is opacified, puss behind, bulging  L TM ok  Neck: No JVD  FROM  No goiter  CV: No pallor  Normal rate  Lungs: No tachypnea  No respiratory distress  MSK: Moving all extremities equally, no peripheral edema  Skin: Dry, intact  No cyanosis  Neuro: Awake, alert, GCS15  CN II-XII grossly intact  Grossly normal gait  Psychiatric/Behavioral: Appropriate mood and affect  A/P: This is a 73 y o  female who presents to the ED for evaluation of ear pain  Will treat AOM  Asking for mag citrate - will give dose now  Place on amoxicillin 1g BID      ED Course Critical Care Time  Procedures

## 2020-08-04 DIAGNOSIS — H40.9 GLAUCOMA OF BOTH EYES, UNSPECIFIED GLAUCOMA TYPE: ICD-10-CM

## 2020-08-04 RX ORDER — DORZOLAMIDE HYDROCHLORIDE AND TIMOLOL MALEATE 20; 5 MG/ML; MG/ML
1 SOLUTION/ DROPS OPHTHALMIC DAILY
Qty: 10 ML | Refills: 3 | Status: SHIPPED | OUTPATIENT
Start: 2020-08-04 | End: 2020-10-01 | Stop reason: SDUPTHER

## 2020-08-11 ENCOUNTER — HOSPITAL ENCOUNTER (EMERGENCY)
Facility: HOSPITAL | Age: 55
Discharge: HOME | End: 2020-08-11
Attending: EMERGENCY MEDICINE | Admitting: EMERGENCY MEDICINE
Payer: MEDICARE

## 2020-08-11 VITALS
TEMPERATURE: 97.6 F | RESPIRATION RATE: 18 BRPM | DIASTOLIC BLOOD PRESSURE: 85 MMHG | OXYGEN SATURATION: 98 % | HEART RATE: 68 BPM | SYSTOLIC BLOOD PRESSURE: 141 MMHG

## 2020-08-11 DIAGNOSIS — Z76.0 MEDICATION REFILL: Primary | ICD-10-CM

## 2020-08-11 PROCEDURE — 99282 EMERGENCY DEPT VISIT SF MDM: CPT | Performed by: EMERGENCY MEDICINE

## 2020-08-11 PROCEDURE — 99283 EMERGENCY DEPT VISIT LOW MDM: CPT
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
## 2020-08-11 NOTE — ED ATTENDING ATTESTATION
8/11/2020  I, Crista Bryant DO, saw and evaluated the patient  I have discussed the patient with the resident/non-physician practitioner and agree with the resident's/non-physician practitioner's findings, Plan of Care, and MDM as documented in the resident's/non-physician practitioner's note, except where noted  All available labs and Radiology studies were reviewed  I was present for key portions of any procedure(s) performed by the resident/non-physician practitioner and I was immediately available to provide assistance  At this point I agree with the current assessment done in the Emergency Department  I have conducted an independent evaluation of this patient a history and physical is as follows:    81-year-old male patient, says he has chronic restless leg syndrome, he believes secondary to his bipolar psychiatric medications  He is taking Requip which is not incredibly helpful at times  Sometimes does work  He presents tonight, because he said he was seen in July, given a prescription for medication, lost the prescription and would like to get that prescription back  Records show he was seen July 14th in the ED, prescribed Neurontin for his restless leg and discomfort, and advised to follow-up with his PCP the following day  He has not followed up the PCP, presents nicely because of concerns regarding the restless leg syndrome  There is no trauma, no fever, no chills, no thoughts about harming himself  He is just agitated about his legs moving around somewhat  He has no pain  No chest pain, shortness of breath      General:  Patient is well-appearing  Head:  Atraumatic  Eyes:  Conjunctiva pink  ENT:  Mucous membranes are moist  Neck:  Supple  Cardiac:  S1-S2, without murmurs  Lungs:  Clear to auscultation bilaterally  Abdomen:  Soft, nontender, normal bowel sounds, no CVA tenderness, no tympany, no rigidity, no guarding  Extremities:  Normal range of motion, legs are unremarkable, no warmth or redness, no asymmetry, no signs of trauma  Neurologic:  Awake, fluent speech, normal comprehension  AAOx3  Able to ambulate without ataxia, strength is 5/5 in the bilateral arms and legs, sensation is normal throughout the whole body  Skin:  Pink warm and dry  Psychiatric:  Alert, cooperative, upset about his legs            ED Course     I do not believe that supine patient with medication for restless leg syndrome is appropriate, given her lack of expertise in this area  He has no indication for acute emergency  When informed the patient would not be receiving medications for restless leg, he got up and left the ED prior to being able to be right with discharge instructions    He was instructed verbally before he left that he did need to follow-up with his primary care physician    Critical Care Time  Procedures

## 2020-08-12 NOTE — TELEPHONE ENCOUNTER
Patient underwent exercise stress ECG and demonstrated good exercise capacity  His test was terminated due to completion with some dyspnea but no reproduction of his presenting symptoms of chest pain  His ECG tracings revealed changes concerning for ischemia is LV had a baseline abnormalities in the same leads  His PCP Osman Thompson DO was notified and we proceeded with an exercise stress echocardiogram for further assessment  No

## 2020-08-20 ENCOUNTER — TELEMEDICINE (OUTPATIENT)
Dept: INTERNAL MEDICINE CLINIC | Facility: CLINIC | Age: 55
End: 2020-08-20
Payer: MEDICARE

## 2020-08-20 DIAGNOSIS — M54.2 CERVICAL PAIN (NECK): Primary | ICD-10-CM

## 2020-08-20 PROCEDURE — 99442 PR PHYS/QHP TELEPHONE EVALUATION 11-20 MIN: CPT | Performed by: INTERNAL MEDICINE

## 2020-08-20 RX ORDER — BACLOFEN 10 MG/1
10 TABLET ORAL 3 TIMES DAILY PRN
Qty: 90 TABLET | Refills: 0 | Status: SHIPPED | OUTPATIENT
Start: 2020-08-20 | End: 2021-02-17

## 2020-08-20 RX ORDER — NAPROXEN 500 MG/1
500 TABLET ORAL 2 TIMES DAILY PRN
Qty: 60 TABLET | Refills: 0 | Status: SHIPPED | OUTPATIENT
Start: 2020-08-20 | End: 2020-09-15
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
## 2020-08-24 ENCOUNTER — HOSPITAL ENCOUNTER (EMERGENCY)
Facility: HOSPITAL | Age: 55
Discharge: HOME | End: 2020-08-24
Attending: EMERGENCY MEDICINE | Admitting: EMERGENCY MEDICINE
Payer: MEDICARE

## 2020-08-24 VITALS
SYSTOLIC BLOOD PRESSURE: 169 MMHG | BODY MASS INDEX: 37.12 KG/M2 | OXYGEN SATURATION: 95 % | HEART RATE: 85 BPM | RESPIRATION RATE: 16 BRPM | TEMPERATURE: 97.8 F | DIASTOLIC BLOOD PRESSURE: 79 MMHG | WEIGHT: 230 LBS

## 2020-08-24 DIAGNOSIS — G25.81 RESTLESS LEGS SYNDROME: ICD-10-CM

## 2020-08-24 DIAGNOSIS — R06.02 SOB (SHORTNESS OF BREATH): ICD-10-CM

## 2020-08-24 DIAGNOSIS — F41.9 ANXIETY: Primary | ICD-10-CM

## 2020-08-24 PROCEDURE — 96372 THER/PROPH/DIAG INJ SC/IM: CPT

## 2020-08-24 PROCEDURE — 99284 EMERGENCY DEPT VISIT MOD MDM: CPT

## 2020-08-24 PROCEDURE — 99284 EMERGENCY DEPT VISIT MOD MDM: CPT | Performed by: EMERGENCY MEDICINE

## 2020-08-24 PROCEDURE — 93005 ELECTROCARDIOGRAM TRACING: CPT

## 2020-08-24 RX ORDER — DIPHENHYDRAMINE HYDROCHLORIDE 50 MG/ML
25 INJECTION INTRAMUSCULAR; INTRAVENOUS ONCE
Status: DISCONTINUED | OUTPATIENT
Start: 2020-08-24 | End: 2020-08-24

## 2020-08-24 RX ORDER — DIPHENHYDRAMINE HYDROCHLORIDE 50 MG/ML
25 INJECTION INTRAMUSCULAR; INTRAVENOUS ONCE
Status: COMPLETED | OUTPATIENT
Start: 2020-08-24 | End: 2020-08-24

## 2020-08-24 RX ORDER — OLANZAPINE 10 MG/1
5 INJECTION, POWDER, LYOPHILIZED, FOR SOLUTION INTRAMUSCULAR ONCE
Status: COMPLETED | OUTPATIENT
Start: 2020-08-24 | End: 2020-08-24

## 2020-08-24 RX ADMIN — OLANZAPINE 5 MG: 10 INJECTION, POWDER, FOR SOLUTION INTRAMUSCULAR at 22:43

## 2020-08-24 RX ADMIN — DIPHENHYDRAMINE HYDROCHLORIDE 25 MG: 50 INJECTION, SOLUTION INTRAMUSCULAR; INTRAVENOUS at 23:24

## 2020-08-24 RX ADMIN — WATER 1.2 ML: 1 INJECTION INTRAMUSCULAR; INTRAVENOUS; SUBCUTANEOUS at 22:43

## 2020-08-25 ENCOUNTER — HOSPITAL ENCOUNTER (EMERGENCY)
Facility: HOSPITAL | Age: 55
Discharge: LEFT BEFORE DISCHARGE | End: 2020-08-25
Attending: EMERGENCY MEDICINE | Admitting: EMERGENCY MEDICINE
Payer: MEDICARE

## 2020-08-25 VITALS
SYSTOLIC BLOOD PRESSURE: 138 MMHG | WEIGHT: 235.89 LBS | TEMPERATURE: 97.9 F | OXYGEN SATURATION: 97 % | BODY MASS INDEX: 38.07 KG/M2 | DIASTOLIC BLOOD PRESSURE: 89 MMHG | HEART RATE: 92 BPM | RESPIRATION RATE: 18 BRPM

## 2020-08-25 DIAGNOSIS — M79.604 BILATERAL LEG PAIN: Primary | ICD-10-CM

## 2020-08-25 DIAGNOSIS — M79.605 BILATERAL LEG PAIN: Primary | ICD-10-CM

## 2020-08-25 LAB
ATRIAL RATE: 84 BPM
P AXIS: 56 DEGREES
PR INTERVAL: 170 MS
QRS AXIS: 59 DEGREES
QRSD INTERVAL: 86 MS
QT INTERVAL: 358 MS
QTC INTERVAL: 423 MS
T WAVE AXIS: -11 DEGREES
VENTRICULAR RATE: 84 BPM

## 2020-08-25 PROCEDURE — 93010 ELECTROCARDIOGRAM REPORT: CPT | Performed by: INTERNAL MEDICINE

## 2020-08-25 PROCEDURE — 99283 EMERGENCY DEPT VISIT LOW MDM: CPT

## 2020-08-25 PROCEDURE — NC001 PR NO CHARGE: Performed by: EMERGENCY MEDICINE
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
## 2020-08-25 NOTE — ED NOTES
Pt walked out of ER after he requested dilaudid and the physician told him we could not give him that at this time     Santiago Milian RN  08/25/20 0062

## 2020-08-25 NOTE — DISCHARGE INSTRUCTIONS
Return to the emergency department if you experience worsening symptoms including difficulty breathing, severe chest pain, if you feel like your heart is racing      Follow up with psychiatry as scheduled on Wednesday

## 2020-08-25 NOTE — ED ATTENDING ATTESTATION
8/24/2020  IHenrry MD, saw and evaluated the patient  I have discussed the patient with the resident/non-physician practitioner and agree with the resident's/non-physician practitioner's findings, Plan of Care, and MDM as documented in the resident's/non-physician practitioner's note, except where noted  All available labs and Radiology studies were reviewed  I was present for key portions of any procedure(s) performed by the resident/non-physician practitioner and I was immediately available to provide assistance  At this point I agree with the current assessment done in the Emergency Department  I have conducted an independent evaluation of this patient a history and physical is as follows:    ED Course         Critical Care Time  Procedures     49 yo male with restless leg and anxiety, no cp, no sob, no abdominal pain  Pt here in ed for same multiple visits  Vss, afebrile, lungs cta, rrr, abdomen soft nontender, anxious appearing  Zyprexa, ekg
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
## 2020-08-27 NOTE — ED ATTENDING ATTESTATION
8/25/2020  I, Maria Conde MD, saw and evaluated the patient  I have discussed the patient with the resident/non-physician practitioner and agree with the resident's/non-physician practitioner's findings, Plan of Care, and MDM as documented in the resident's/non-physician practitioner's note, except where noted  All available labs and Radiology studies were reviewed  I was present for key portions of any procedure(s) performed by the resident/non-physician practitioner and I was immediately available to provide assistance  At this point I agree with the current assessment done in the Emergency Department  I have conducted an independent evaluation of this patient a history and physical is as follows:      Patient eloped from the emergency department prior to my assessment    ED Course         Critical Care Time  Procedures

## 2020-09-15 DIAGNOSIS — M54.2 CERVICAL PAIN (NECK): ICD-10-CM

## 2020-09-15 RX ORDER — NAPROXEN 500 MG/1
500 TABLET ORAL 2 TIMES DAILY PRN
Qty: 60 TABLET | Refills: 0 | Status: SHIPPED | OUTPATIENT
Start: 2020-09-15 | End: 2021-02-17

## 2020-10-01 ENCOUNTER — HOSPITAL ENCOUNTER (EMERGENCY)
Facility: HOSPITAL | Age: 55
Discharge: HOME | End: 2020-10-01
Attending: EMERGENCY MEDICINE | Admitting: EMERGENCY MEDICINE
Payer: MEDICARE

## 2020-10-01 VITALS
SYSTOLIC BLOOD PRESSURE: 136 MMHG | OXYGEN SATURATION: 98 % | DIASTOLIC BLOOD PRESSURE: 85 MMHG | TEMPERATURE: 97.5 F | BODY MASS INDEX: 38.97 KG/M2 | WEIGHT: 242.51 LBS | RESPIRATION RATE: 18 BRPM | HEIGHT: 66 IN | HEART RATE: 82 BPM

## 2020-10-01 DIAGNOSIS — H40.9 GLAUCOMA OF BOTH EYES, UNSPECIFIED GLAUCOMA TYPE: ICD-10-CM

## 2020-10-01 DIAGNOSIS — G25.81 RESTLESS LEG SYNDROME: ICD-10-CM

## 2020-10-01 DIAGNOSIS — H43.12 VITREOUS HEMORRHAGE OF LEFT EYE (HCC): Primary | ICD-10-CM

## 2020-10-01 PROCEDURE — 76512 OPH US DX B-SCAN: CPT | Performed by: EMERGENCY MEDICINE

## 2020-10-01 PROCEDURE — 99283 EMERGENCY DEPT VISIT LOW MDM: CPT

## 2020-10-01 PROCEDURE — 99284 EMERGENCY DEPT VISIT MOD MDM: CPT | Performed by: EMERGENCY MEDICINE

## 2020-10-01 RX ORDER — DORZOLAMIDE HYDROCHLORIDE AND TIMOLOL MALEATE 20; 5 MG/ML; MG/ML
1 SOLUTION/ DROPS OPHTHALMIC DAILY
Qty: 10 ML | Refills: 0 | Status: SHIPPED | OUTPATIENT
Start: 2020-10-01 | End: 2020-12-09 | Stop reason: SDUPTHER

## 2020-10-01 RX ORDER — DIAZEPAM 5 MG/1
5 TABLET ORAL ONCE
Status: COMPLETED | OUTPATIENT
Start: 2020-10-01 | End: 2020-10-01

## 2020-10-01 RX ORDER — ACETAMINOPHEN 325 MG/1
975 TABLET ORAL ONCE
Status: COMPLETED | OUTPATIENT
Start: 2020-10-01 | End: 2020-10-01

## 2020-10-01 RX ORDER — TETRACAINE HYDROCHLORIDE 5 MG/ML
2 SOLUTION OPHTHALMIC ONCE
Status: COMPLETED | OUTPATIENT
Start: 2020-10-01 | End: 2020-10-01

## 2020-10-01 RX ADMIN — TETRACAINE HYDROCHLORIDE 2 DROP: 5 SOLUTION OPHTHALMIC at 03:02

## 2020-10-01 RX ADMIN — DIAZEPAM 5 MG: 5 TABLET ORAL at 03:34

## 2020-10-01 RX ADMIN — ACETAMINOPHEN 975 MG: 325 TABLET, FILM COATED ORAL at 04:35

## 2020-10-30 ENCOUNTER — HOSPITAL ENCOUNTER (EMERGENCY)
Facility: HOSPITAL | Age: 55
Discharge: HOME | End: 2020-10-30
Attending: EMERGENCY MEDICINE
Payer: MEDICARE

## 2020-10-30 VITALS
SYSTOLIC BLOOD PRESSURE: 202 MMHG | OXYGEN SATURATION: 100 % | DIASTOLIC BLOOD PRESSURE: 117 MMHG | WEIGHT: 247.58 LBS | BODY MASS INDEX: 39.96 KG/M2 | TEMPERATURE: 97.8 F | HEART RATE: 98 BPM | RESPIRATION RATE: 16 BRPM

## 2020-10-30 DIAGNOSIS — G25.81 RESTLESS LEG: ICD-10-CM

## 2020-10-30 DIAGNOSIS — K59.00 CONSTIPATION: ICD-10-CM

## 2020-10-30 DIAGNOSIS — Z76.5 DRUG-SEEKING BEHAVIOR: Primary | ICD-10-CM

## 2020-10-30 DIAGNOSIS — L29.9 ITCHING: ICD-10-CM

## 2020-10-30 PROCEDURE — 99282 EMERGENCY DEPT VISIT SF MDM: CPT

## 2020-10-30 PROCEDURE — 99284 EMERGENCY DEPT VISIT MOD MDM: CPT | Performed by: EMERGENCY MEDICINE

## 2020-10-30 RX ORDER — DIPHENHYDRAMINE HCL 25 MG
25 TABLET ORAL ONCE
Status: COMPLETED | OUTPATIENT
Start: 2020-10-30 | End: 2020-10-30

## 2020-10-30 RX ORDER — ROPINIROLE 2 MG/1
2 TABLET, FILM COATED ORAL ONCE
Status: COMPLETED | OUTPATIENT
Start: 2020-10-30 | End: 2020-10-30

## 2020-10-30 RX ORDER — FAMOTIDINE 20 MG/1
20 TABLET, FILM COATED ORAL ONCE
Status: COMPLETED | OUTPATIENT
Start: 2020-10-30 | End: 2020-10-30

## 2020-10-30 RX ADMIN — ROPINIROLE HYDROCHLORIDE 2 MG: 2 TABLET, FILM COATED ORAL at 02:43

## 2020-10-30 RX ADMIN — FAMOTIDINE 20 MG: 20 TABLET ORAL at 02:43

## 2020-10-30 RX ADMIN — MAGNESIUM HYDROXIDE 30 ML: 400 SUSPENSION ORAL at 02:43

## 2020-10-30 RX ADMIN — DIPHENHYDRAMINE HCL 25 MG: 25 TABLET, COATED ORAL at 02:43

## 2020-11-18 ENCOUNTER — HOSPITAL ENCOUNTER (EMERGENCY)
Facility: HOSPITAL | Age: 55
Discharge: HOME | End: 2020-11-18
Attending: EMERGENCY MEDICINE
Payer: MEDICARE

## 2020-11-18 VITALS
DIASTOLIC BLOOD PRESSURE: 98 MMHG | RESPIRATION RATE: 18 BRPM | HEART RATE: 96 BPM | HEIGHT: 66 IN | SYSTOLIC BLOOD PRESSURE: 165 MMHG | WEIGHT: 245.59 LBS | BODY MASS INDEX: 39.47 KG/M2 | TEMPERATURE: 98.9 F | OXYGEN SATURATION: 98 %

## 2020-11-18 DIAGNOSIS — K08.89 PAIN, DENTAL: Primary | ICD-10-CM

## 2020-11-18 PROCEDURE — 99283 EMERGENCY DEPT VISIT LOW MDM: CPT

## 2020-11-18 PROCEDURE — 96372 THER/PROPH/DIAG INJ SC/IM: CPT

## 2020-11-18 PROCEDURE — 99284 EMERGENCY DEPT VISIT MOD MDM: CPT | Performed by: EMERGENCY MEDICINE

## 2020-11-18 RX ORDER — KETOROLAC TROMETHAMINE 30 MG/ML
15 INJECTION, SOLUTION INTRAMUSCULAR; INTRAVENOUS ONCE
Status: COMPLETED | OUTPATIENT
Start: 2020-11-18 | End: 2020-11-18

## 2020-11-18 RX ORDER — ACETAMINOPHEN 325 MG/1
975 TABLET ORAL ONCE
Status: COMPLETED | OUTPATIENT
Start: 2020-11-18 | End: 2020-11-18

## 2020-11-18 RX ADMIN — KETOROLAC TROMETHAMINE 15 MG: 30 INJECTION, SOLUTION INTRAMUSCULAR at 18:12

## 2020-11-18 RX ADMIN — ACETAMINOPHEN 975 MG: 325 TABLET ORAL at 18:11

## 2020-11-19 ENCOUNTER — TELEPHONE (OUTPATIENT)
Dept: INTERNAL MEDICINE CLINIC | Facility: CLINIC | Age: 55
End: 2020-11-19

## 2020-11-19 DIAGNOSIS — K08.89 PAIN, DENTAL: Primary | ICD-10-CM

## 2020-11-19 RX ORDER — TRAMADOL HYDROCHLORIDE 50 MG/1
50 TABLET ORAL EVERY 8 HOURS PRN
Qty: 30 TABLET | Refills: 0 | Status: SHIPPED | OUTPATIENT
Start: 2020-11-19 | End: 2020-11-19 | Stop reason: SDUPTHER

## 2020-11-19 RX ORDER — TRAMADOL HYDROCHLORIDE 50 MG/1
50 TABLET ORAL EVERY 8 HOURS PRN
Qty: 30 TABLET | Refills: 0 | Status: SHIPPED | OUTPATIENT
Start: 2020-11-19 | End: 2020-12-18 | Stop reason: SDUPTHER

## 2020-12-09 DIAGNOSIS — H40.9 GLAUCOMA OF BOTH EYES, UNSPECIFIED GLAUCOMA TYPE: ICD-10-CM

## 2020-12-09 RX ORDER — LATANOPROST 50 UG/ML
1 SOLUTION/ DROPS OPHTHALMIC DAILY
Qty: 7.5 ML | Refills: 3 | Status: SHIPPED | OUTPATIENT
Start: 2020-12-09 | End: 2020-12-18 | Stop reason: SDUPTHER

## 2020-12-09 RX ORDER — DORZOLAMIDE HYDROCHLORIDE AND TIMOLOL MALEATE 20; 5 MG/ML; MG/ML
1 SOLUTION/ DROPS OPHTHALMIC DAILY
Qty: 10 ML | Refills: 0 | Status: SHIPPED | OUTPATIENT
Start: 2020-12-09 | End: 2020-12-18 | Stop reason: SDUPTHER

## 2020-12-18 DIAGNOSIS — H40.9 GLAUCOMA OF BOTH EYES, UNSPECIFIED GLAUCOMA TYPE: ICD-10-CM

## 2020-12-18 DIAGNOSIS — K08.89 PAIN, DENTAL: ICD-10-CM

## 2020-12-18 RX ORDER — TRAMADOL HYDROCHLORIDE 50 MG/1
50 TABLET ORAL EVERY 8 HOURS PRN
Qty: 30 TABLET | Refills: 0 | Status: SHIPPED | OUTPATIENT
Start: 2020-12-18 | End: 2021-02-19

## 2020-12-18 RX ORDER — LATANOPROST 50 UG/ML
1 SOLUTION/ DROPS OPHTHALMIC DAILY
Qty: 7.5 ML | Refills: 3 | Status: SHIPPED | OUTPATIENT
Start: 2020-12-18 | End: 2021-02-17 | Stop reason: SDUPTHER

## 2020-12-18 RX ORDER — DORZOLAMIDE HYDROCHLORIDE AND TIMOLOL MALEATE 20; 5 MG/ML; MG/ML
1 SOLUTION/ DROPS OPHTHALMIC DAILY
Qty: 10 ML | Refills: 0 | Status: SHIPPED | OUTPATIENT
Start: 2020-12-18 | End: 2021-02-17 | Stop reason: SDUPTHER

## 2020-12-22 ENCOUNTER — TRANSCRIBE ORDERS (OUTPATIENT)
Dept: LAB | Facility: HOSPITAL | Age: 55
End: 2020-12-22

## 2020-12-22 ENCOUNTER — LAB (OUTPATIENT)
Dept: LAB | Facility: HOSPITAL | Age: 55
End: 2020-12-22
Payer: MEDICARE

## 2020-12-22 DIAGNOSIS — E78.01 FAMILIAL HYPERCHOLESTEROLEMIA: ICD-10-CM

## 2020-12-22 DIAGNOSIS — F31.9 BIPOLAR 1 DISORDER (HCC): ICD-10-CM

## 2020-12-22 DIAGNOSIS — Z12.5 SCREENING FOR PROSTATE CANCER: ICD-10-CM

## 2020-12-22 DIAGNOSIS — Z13.29 SCREENING FOR THYROID DISORDER: ICD-10-CM

## 2020-12-22 DIAGNOSIS — Z79.899 ENCOUNTER FOR LONG-TERM (CURRENT) USE OF OTHER MEDICATIONS: Primary | ICD-10-CM

## 2020-12-22 DIAGNOSIS — Z79.899 ENCOUNTER FOR LONG-TERM (CURRENT) USE OF OTHER MEDICATIONS: ICD-10-CM

## 2020-12-22 DIAGNOSIS — N40.0 BENIGN PROSTATIC HYPERPLASIA WITHOUT LOWER URINARY TRACT SYMPTOMS: ICD-10-CM

## 2020-12-22 LAB
ALBUMIN SERPL BCP-MCNC: 3.8 G/DL (ref 3.5–5)
ALP SERPL-CCNC: 85 U/L (ref 46–116)
ALT SERPL W P-5'-P-CCNC: 61 U/L (ref 12–78)
ANION GAP SERPL CALCULATED.3IONS-SCNC: 6 MMOL/L (ref 4–13)
AST SERPL W P-5'-P-CCNC: 22 U/L (ref 5–45)
BASOPHILS # BLD AUTO: 0.02 THOUSANDS/ΜL (ref 0–0.1)
BASOPHILS NFR BLD AUTO: 0 % (ref 0–1)
BILIRUB SERPL-MCNC: 0.37 MG/DL (ref 0.2–1)
BUN SERPL-MCNC: 17 MG/DL (ref 5–25)
CALCIUM SERPL-MCNC: 9.2 MG/DL (ref 8.3–10.1)
CHLORIDE SERPL-SCNC: 108 MMOL/L (ref 100–108)
CHOLEST SERPL-MCNC: 233 MG/DL (ref 50–200)
CO2 SERPL-SCNC: 26 MMOL/L (ref 21–32)
CREAT SERPL-MCNC: 1.13 MG/DL (ref 0.6–1.3)
EOSINOPHIL # BLD AUTO: 0.05 THOUSAND/ΜL (ref 0–0.61)
EOSINOPHIL NFR BLD AUTO: 1 % (ref 0–6)
ERYTHROCYTE [DISTWIDTH] IN BLOOD BY AUTOMATED COUNT: 12.6 % (ref 11.6–15.1)
GFR SERPL CREATININE-BSD FRML MDRD: 74 ML/MIN/1.73SQ M
GLUCOSE P FAST SERPL-MCNC: 113 MG/DL (ref 65–99)
HCT VFR BLD AUTO: 44.2 % (ref 36.5–49.3)
HDLC SERPL-MCNC: 23 MG/DL
HGB BLD-MCNC: 14.7 G/DL (ref 12–17)
IMM GRANULOCYTES # BLD AUTO: 0.01 THOUSAND/UL (ref 0–0.2)
IMM GRANULOCYTES NFR BLD AUTO: 0 % (ref 0–2)
LDLC SERPL CALC-MCNC: 145 MG/DL (ref 0–100)
LDLC SERPL DIRECT ASSAY-MCNC: 156 MG/DL (ref 0–100)
LYMPHOCYTES # BLD AUTO: 2.5 THOUSANDS/ΜL (ref 0.6–4.47)
LYMPHOCYTES NFR BLD AUTO: 47 % (ref 14–44)
MCH RBC QN AUTO: 30.3 PG (ref 26.8–34.3)
MCHC RBC AUTO-ENTMCNC: 33.3 G/DL (ref 31.4–37.4)
MCV RBC AUTO: 91 FL (ref 82–98)
MONOCYTES # BLD AUTO: 0.35 THOUSAND/ΜL (ref 0.17–1.22)
MONOCYTES NFR BLD AUTO: 7 % (ref 4–12)
NEUTROPHILS # BLD AUTO: 2.39 THOUSANDS/ΜL (ref 1.85–7.62)
NEUTS SEG NFR BLD AUTO: 45 % (ref 43–75)
NONHDLC SERPL-MCNC: 210 MG/DL
NRBC BLD AUTO-RTO: 0 /100 WBCS
PLATELET # BLD AUTO: 168 THOUSANDS/UL (ref 149–390)
PMV BLD AUTO: 10.7 FL (ref 8.9–12.7)
POTASSIUM SERPL-SCNC: 4.1 MMOL/L (ref 3.5–5.3)
PROT SERPL-MCNC: 7.5 G/DL (ref 6.4–8.2)
RBC # BLD AUTO: 4.85 MILLION/UL (ref 3.88–5.62)
SODIUM SERPL-SCNC: 140 MMOL/L (ref 136–145)
T4 FREE SERPL-MCNC: 0.69 NG/DL (ref 0.76–1.46)
TRIGL SERPL-MCNC: 323 MG/DL
TSH SERPL DL<=0.05 MIU/L-ACNC: 1.99 UIU/ML (ref 0.36–3.74)
VALPROATE SERPL-MCNC: 30 UG/ML (ref 50–100)
WBC # BLD AUTO: 5.32 THOUSAND/UL (ref 4.31–10.16)

## 2020-12-22 PROCEDURE — 85025 COMPLETE CBC W/AUTO DIFF WBC: CPT

## 2020-12-22 PROCEDURE — 84153 ASSAY OF PSA TOTAL: CPT

## 2020-12-22 PROCEDURE — 83721 ASSAY OF BLOOD LIPOPROTEIN: CPT

## 2020-12-22 PROCEDURE — 84439 ASSAY OF FREE THYROXINE: CPT

## 2020-12-22 PROCEDURE — 80164 ASSAY DIPROPYLACETIC ACD TOT: CPT

## 2020-12-22 PROCEDURE — 36415 COLL VENOUS BLD VENIPUNCTURE: CPT

## 2020-12-22 PROCEDURE — 80053 COMPREHEN METABOLIC PANEL: CPT

## 2020-12-22 PROCEDURE — 80061 LIPID PANEL: CPT

## 2020-12-22 PROCEDURE — 84443 ASSAY THYROID STIM HORMONE: CPT

## 2020-12-22 PROCEDURE — 84154 ASSAY OF PSA FREE: CPT

## 2020-12-22 PROCEDURE — 80307 DRUG TEST PRSMV CHEM ANLYZR: CPT

## 2020-12-24 LAB
PSA FREE MFR SERPL: 35 %
PSA FREE SERPL-MCNC: 0.07 NG/ML
PSA SERPL-MCNC: 0.2 NG/ML (ref 0–4)

## 2020-12-29 ENCOUNTER — TELEPHONE (OUTPATIENT)
Dept: INTERNAL MEDICINE CLINIC | Facility: CLINIC | Age: 55
End: 2020-12-29

## 2021-01-05 ENCOUNTER — TELEPHONE (OUTPATIENT)
Dept: INTERNAL MEDICINE CLINIC | Facility: CLINIC | Age: 56
End: 2021-01-05

## 2021-01-05 NOTE — TELEPHONE ENCOUNTER
Patient called earlier today asking for lab work and a colonoscopy order  I explained that per the letter dated 11/18/20, he had been dismissed from the practice due to no show's/ cancellations  We provided 30 days of emergent care which ended on 12/18/20  I provided the patient with the phone # for [employer] for assistance to find a new primary care provider

## 2021-01-25 PROCEDURE — 99282 EMERGENCY DEPT VISIT SF MDM: CPT

## 2021-01-26 ENCOUNTER — HOSPITAL ENCOUNTER (EMERGENCY)
Facility: HOSPITAL | Age: 56
Discharge: HOME | End: 2021-01-26
Attending: EMERGENCY MEDICINE | Admitting: EMERGENCY MEDICINE
Payer: MEDICARE

## 2021-01-26 VITALS
HEART RATE: 97 BPM | DIASTOLIC BLOOD PRESSURE: 103 MMHG | RESPIRATION RATE: 20 BRPM | TEMPERATURE: 97.4 F | OXYGEN SATURATION: 96 % | SYSTOLIC BLOOD PRESSURE: 175 MMHG

## 2021-01-26 DIAGNOSIS — G25.81 RESTLESS LEGS SYNDROME: ICD-10-CM

## 2021-01-26 DIAGNOSIS — M54.9 BACK PAIN: Primary | ICD-10-CM

## 2021-01-26 PROCEDURE — 96372 THER/PROPH/DIAG INJ SC/IM: CPT

## 2021-01-26 PROCEDURE — 99284 EMERGENCY DEPT VISIT MOD MDM: CPT | Performed by: EMERGENCY MEDICINE

## 2021-01-26 RX ORDER — KETOROLAC TROMETHAMINE 30 MG/ML
30 INJECTION, SOLUTION INTRAMUSCULAR; INTRAVENOUS ONCE
Status: COMPLETED | OUTPATIENT
Start: 2021-01-26 | End: 2021-01-26

## 2021-01-26 RX ORDER — DIAZEPAM 5 MG/1
5 TABLET ORAL ONCE
Status: COMPLETED | OUTPATIENT
Start: 2021-01-26 | End: 2021-01-26

## 2021-01-26 RX ORDER — HYDROXYZINE HYDROCHLORIDE 25 MG/1
25 TABLET, FILM COATED ORAL ONCE
Status: DISCONTINUED | OUTPATIENT
Start: 2021-01-26 | End: 2021-01-26

## 2021-01-26 RX ORDER — ACETAMINOPHEN 325 MG/1
975 TABLET ORAL ONCE
Status: COMPLETED | OUTPATIENT
Start: 2021-01-26 | End: 2021-01-26

## 2021-01-26 RX ADMIN — DIAZEPAM 5 MG: 5 TABLET ORAL at 00:56

## 2021-01-26 RX ADMIN — KETOROLAC TROMETHAMINE 30 MG: 30 INJECTION, SOLUTION INTRAMUSCULAR; INTRAVENOUS at 00:55

## 2021-01-26 RX ADMIN — ACETAMINOPHEN 975 MG: 325 TABLET, FILM COATED ORAL at 00:56

## 2021-01-26 NOTE — ED ATTENDING ATTESTATION
1/25/2021  IJade MD, saw and evaluated the patient  I have discussed the patient with the resident/non-physician practitioner and agree with the resident's/non-physician practitioner's findings, Plan of Care, and MDM as documented in the resident's/non-physician practitioner's note, except where noted  All available labs and Radiology studies were reviewed  I was present for key portions of any procedure(s) performed by the resident/non-physician practitioner and I was immediately available to provide assistance  At this point I agree with the current assessment done in the Emergency Department  I have conducted an independent evaluation of this patient a history and physical is as follows:    ED Course     Patient presents for evaluation due to acute on chronic flare of his restless leg syndrome  Patient states that his symptoms seem to be worse when he takes psych medicines at night  Patient states that when he is like this, he wakes from sleep anxious and with his restless leg acting up  Patient states that when it is like this, a dose of Valium typically will help him  Patient was previously on benzos for sleep for 3 years but he states that he took himself off of them because he did not want to be on a schedule substance continuously  No additional complaints  Exam: AAOx3, anxious  A/P:  Insomnia, restless leg    Will give dose of Valium and have patient follow-up with his psychiatrist     Critical Care Time  Procedures
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
## 2021-02-17 ENCOUNTER — OFFICE VISIT (OUTPATIENT)
Dept: INTERNAL MEDICINE CLINIC | Facility: CLINIC | Age: 56
End: 2021-02-17

## 2021-02-17 VITALS
HEART RATE: 77 BPM | SYSTOLIC BLOOD PRESSURE: 135 MMHG | TEMPERATURE: 97.8 F | DIASTOLIC BLOOD PRESSURE: 83 MMHG | WEIGHT: 262.6 LBS | BODY MASS INDEX: 41.21 KG/M2 | HEIGHT: 67 IN | OXYGEN SATURATION: 98 %

## 2021-02-17 DIAGNOSIS — R73.01 ELEVATED FASTING BLOOD SUGAR: ICD-10-CM

## 2021-02-17 DIAGNOSIS — Z12.11 SCREENING FOR MALIGNANT NEOPLASM OF COLON: ICD-10-CM

## 2021-02-17 DIAGNOSIS — F25.0 SCHIZOAFFECTIVE DISORDER, BIPOLAR TYPE (HCC): Primary | ICD-10-CM

## 2021-02-17 DIAGNOSIS — R35.1 NOCTURIA: ICD-10-CM

## 2021-02-17 DIAGNOSIS — E78.01 FAMILIAL HYPERCHOLESTEROLEMIA: ICD-10-CM

## 2021-02-17 DIAGNOSIS — Z12.5 SCREENING PSA (PROSTATE SPECIFIC ANTIGEN): ICD-10-CM

## 2021-02-17 DIAGNOSIS — H40.9 GLAUCOMA OF BOTH EYES, UNSPECIFIED GLAUCOMA TYPE: ICD-10-CM

## 2021-02-17 DIAGNOSIS — F19.11 HISTORY OF SUBSTANCE ABUSE (HCC): ICD-10-CM

## 2021-02-17 DIAGNOSIS — R79.89 ABNORMAL SERUM THYROXINE (T4) LEVEL: ICD-10-CM

## 2021-02-17 DIAGNOSIS — E66.01 MORBID OBESITY (HCC): ICD-10-CM

## 2021-02-17 DIAGNOSIS — H43.12 VITREOUS HEMORRHAGE OF LEFT EYE (HCC): ICD-10-CM

## 2021-02-17 DIAGNOSIS — L20.84 INTRINSIC ECZEMA: ICD-10-CM

## 2021-02-17 DIAGNOSIS — Z11.4 SCREENING FOR HIV (HUMAN IMMUNODEFICIENCY VIRUS): ICD-10-CM

## 2021-02-17 PROCEDURE — 99203 OFFICE O/P NEW LOW 30 MIN: CPT | Performed by: PHYSICIAN ASSISTANT

## 2021-02-17 RX ORDER — DORZOLAMIDE HYDROCHLORIDE AND TIMOLOL MALEATE 20; 5 MG/ML; MG/ML
1 SOLUTION/ DROPS OPHTHALMIC DAILY
Qty: 10 ML | Refills: 5 | Status: SHIPPED | OUTPATIENT
Start: 2021-02-17 | End: 2021-12-23 | Stop reason: SDUPTHER

## 2021-02-17 RX ORDER — LATANOPROST 50 UG/ML
1 SOLUTION/ DROPS OPHTHALMIC DAILY
Qty: 7.5 ML | Refills: 3 | Status: SHIPPED | OUTPATIENT
Start: 2021-02-17 | End: 2021-12-23 | Stop reason: SDUPTHER

## 2021-02-17 RX ORDER — ROSUVASTATIN CALCIUM 20 MG/1
20 TABLET, COATED ORAL DAILY
Qty: 90 TABLET | Refills: 1 | Status: SHIPPED | OUTPATIENT
Start: 2021-02-17 | End: 2021-05-06 | Stop reason: SDUPTHER
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
## 2021-02-19 PROBLEM — M54.6 ACUTE MIDLINE THORACIC BACK PAIN: Status: RESOLVED | Noted: 2019-09-05 | Resolved: 2021-02-19

## 2021-02-19 PROBLEM — F12.10 CANNABIS ABUSE, EPISODIC: Chronic | Status: RESOLVED | Noted: 2018-12-21 | Resolved: 2021-02-19

## 2021-02-19 PROBLEM — F10.10 ALCOHOL ABUSE, CONTINUOUS: Chronic | Status: RESOLVED | Noted: 2018-12-21 | Resolved: 2021-02-19
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
## 2021-02-19 PROBLEM — K27.9 PEPTIC ULCER: Status: RESOLVED | Noted: 2019-08-28 | Resolved: 2021-02-19

## 2021-02-19 PROBLEM — S19.9XXD: Status: RESOLVED | Noted: 2019-09-05 | Resolved: 2021-02-19

## 2021-03-02 ENCOUNTER — CONSULT (OUTPATIENT)
Dept: MULTI SPECIALTY CLINIC | Facility: CLINIC | Age: 56
End: 2021-03-02

## 2021-03-02 VITALS
DIASTOLIC BLOOD PRESSURE: 97 MMHG | TEMPERATURE: 97.9 F | SYSTOLIC BLOOD PRESSURE: 163 MMHG | HEART RATE: 69 BPM | BODY MASS INDEX: 40.1 KG/M2 | WEIGHT: 256 LBS

## 2021-03-02 DIAGNOSIS — Z12.5 SCREENING PSA (PROSTATE SPECIFIC ANTIGEN): ICD-10-CM

## 2021-03-02 DIAGNOSIS — R35.1 NOCTURIA: ICD-10-CM

## 2021-03-02 PROCEDURE — 99213 OFFICE O/P EST LOW 20 MIN: CPT | Performed by: UROLOGY

## 2021-03-02 NOTE — PROGRESS NOTES
Progress Note    Assessment:  Screening PSA  Schizoaffective disorder  History of substance abuse  Morbid obesity  Elevated fasting blood sugar      Plan:  PSA 0 2 on 12//22/20  No immediate urological intervention indicated at present  Patient can be follow-up in [address] [place] [address] for yearly PSA and LAMAR exam   Return to clinic p r n   ______________________________________________________________________    Subjective:  53-year-old male in the clinic today for prostate exam   Patient mentions that he has prostate exam few years ago and he was told it was normal   History of multiple issues including morbid obesity, schizoaffective disorder, colon surgery, elevated fasting blood sugar  Patient denies any urinary urgency or frequency, flank pain, hematuria or nocturia  States he urinates normally  " He does complaining of constipation and weight gain  BMI 40 10  No other urological  Issues at present  Last PSA 0 2 in December 2020      Vitals:  /97 (BP Location: Right arm, Patient Position: Sitting, Cuff Size: Large)   Pulse 69   Temp 97 9 °F (36 6 °C) (Temporal)   Wt 116 kg (256 lb)   BMI 40 10 kg/m²       Lab Results and Cultures:   CBC with diff:   Lab Results   Component Value Date    WBC 5 32 12/22/2020    HGB 14 7 12/22/2020    HCT 44 2 12/22/2020    MCV 91 12/22/2020     12/22/2020    MCH 30 3 12/22/2020    MCHC 33 3 12/22/2020    RDW 12 6 12/22/2020    MPV 10 7 12/22/2020    NRBC 0 12/22/2020   ,   BMP/CMP:  Lab Results   Component Value Date    K 4 1 12/22/2020     12/22/2020    CO2 26 12/22/2020    BUN 17 12/22/2020    CREATININE 1 13 12/22/2020    CALCIUM 9 2 12/22/2020    AST 22 12/22/2020    ALT 61 12/22/2020    ALKPHOS 85 12/22/2020    EGFR 74 12/22/2020   ,   Lipid Panel: No results found for: CHOL,   Coags: No results found for: PT, PTT, INR,     Blood Culture: No results found for: BLOODCX,   Urinalysis:   Lab Results   Component Value Date    COLORU Yellow 12/20/2018 CLARITYU Clear 12/20/2018    SPECGRAV 1 029 12/20/2018    PHUR 5 5 12/20/2018    LEUKOCYTESUR Negative 12/20/2018    NITRITE Negative 12/20/2018    GLUCOSEU Negative 12/20/2018    KETONESU Negative 12/20/2018    BILIRUBINUR Negative 12/20/2018    BLOODU Negative 12/20/2018   ,   Urine Culture: No results found for: URINECX,   Wound Culure: No results found for: WOUNDCULT    Medications:  No current facility-administered medications for this visit  Current Outpatient Medications on File Prior to Visit   Medication Sig Dispense Refill    dorzolamide-timolol (COSOPT) 22 3-6 8 MG/ML ophthalmic solution Administer 1 drop to both eyes daily 10 mL 5    doxepin (SINEquan) 150 MG capsule Take 150 mg by mouth daily at bedtime      ibuprofen (MOTRIN) 400 mg tablet Take 1 tablet (400 mg total) by mouth every 6 (six) hours as needed for mild pain 30 tablet 0    latanoprost (XALATAN) 0 005 % ophthalmic solution Administer 1 drop to both eyes daily 7 5 mL 3    risperiDONE (RisperDAL) 2 mg tablet TAKE 1 TABLET BY MOUTH TWICE A DAY ( MUST GET [place] MD      rOPINIRole (REQUIP) 2 mg tablet Take 2 mg by mouth daily      rosuvastatin (CRESTOR) 20 MG tablet Take 1 tablet (20 mg total) by mouth daily 90 tablet 1    triamcinolone (KENALOG) 0 1 % ointment Apply topically 2 (two) times a day 453 6 g 2    triazolam (HALCION) 0 125 MG tablet 0 125 mg daily at bedtime as needed   1     No current facility-administered medications on file prior to visit        Physical Exam:    General:  Alert, oriented to place person and time  CV:  Regular rate and rhythm  Respiratory:  CTA bilaterally  Abdominal:  Obese, positive bowel sounds, healed multiple abdominal scars  Rectal:  Good rectal tone, nontender, prostate about 30 g, no nodules, no discharge  Neurologic:  Grossly intact as tested      Wyatt Triplett PA-C  3/2/2021

## 2021-04-11 ENCOUNTER — HOSPITAL ENCOUNTER (EMERGENCY)
Facility: HOSPITAL | Age: 56
Discharge: HOME | End: 2021-04-11
Attending: EMERGENCY MEDICINE | Admitting: EMERGENCY MEDICINE
Payer: MEDICARE

## 2021-04-11 VITALS
SYSTOLIC BLOOD PRESSURE: 159 MMHG | RESPIRATION RATE: 19 BRPM | BODY MASS INDEX: 41.75 KG/M2 | HEART RATE: 90 BPM | WEIGHT: 266.54 LBS | DIASTOLIC BLOOD PRESSURE: 82 MMHG | TEMPERATURE: 97.3 F | OXYGEN SATURATION: 97 %

## 2021-04-11 DIAGNOSIS — M54.50 ACUTE EXACERBATION OF CHRONIC LOW BACK PAIN: Primary | ICD-10-CM

## 2021-04-11 DIAGNOSIS — G25.81 RESTLESS LEG: ICD-10-CM

## 2021-04-11 DIAGNOSIS — G89.29 ACUTE EXACERBATION OF CHRONIC LOW BACK PAIN: Primary | ICD-10-CM

## 2021-04-11 PROCEDURE — 96372 THER/PROPH/DIAG INJ SC/IM: CPT

## 2021-04-11 PROCEDURE — 99284 EMERGENCY DEPT VISIT MOD MDM: CPT | Performed by: EMERGENCY MEDICINE

## 2021-04-11 PROCEDURE — 99283 EMERGENCY DEPT VISIT LOW MDM: CPT

## 2021-04-11 RX ORDER — ACETAMINOPHEN 325 MG/1
650 TABLET ORAL ONCE
Status: COMPLETED | OUTPATIENT
Start: 2021-04-11 | End: 2021-04-11

## 2021-04-11 RX ORDER — LIDOCAINE 50 MG/G
1 PATCH TOPICAL ONCE
Status: DISCONTINUED | OUTPATIENT
Start: 2021-04-11 | End: 2021-04-11 | Stop reason: HOSPADM

## 2021-04-11 RX ORDER — KETOROLAC TROMETHAMINE 30 MG/ML
15 INJECTION, SOLUTION INTRAMUSCULAR; INTRAVENOUS ONCE
Status: COMPLETED | OUTPATIENT
Start: 2021-04-11 | End: 2021-04-11

## 2021-04-11 RX ORDER — DIAZEPAM 5 MG/1
5 TABLET ORAL ONCE
Status: COMPLETED | OUTPATIENT
Start: 2021-04-11 | End: 2021-04-11

## 2021-04-11 RX ADMIN — KETOROLAC TROMETHAMINE 15 MG: 30 INJECTION, SOLUTION INTRAMUSCULAR at 04:22

## 2021-04-11 RX ADMIN — LIDOCAINE 1 PATCH: 50 PATCH TOPICAL at 04:21

## 2021-04-11 RX ADMIN — ACETAMINOPHEN 650 MG: 325 TABLET, FILM COATED ORAL at 04:21

## 2021-04-11 RX ADMIN — DIAZEPAM 5 MG: 5 TABLET ORAL at 04:21

## 2021-04-11 NOTE — DISCHARGE INSTRUCTIONS
You were seen in the emergency department for acute on chronic back pain and restless legs  You were treated with Tylenol, Toradol, and Valium  Please return to emergency department if she experience any new or worsening symptoms  Please follow up with the primary care provider as soon as possible for further management of her symptoms
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
## 2021-04-11 NOTE — ED ATTENDING ATTESTATION
4/11/2021  Kimberly Rice DO, saw and evaluated the patient  I have discussed the patient with the resident/non-physician practitioner and agree with the resident's/non-physician practitioner's findings, Plan of Care, and MDM as documented in the resident's/non-physician practitioner's note, except where noted  All available labs and Radiology studies were reviewed  I was present for key portions of any procedure(s) performed by the resident/non-physician practitioner and I was immediately available to provide assistance  At this point I agree with the current assessment done in the Emergency Department  I have conducted an independent evaluation of this patient a history and physical is as follows:    49 yo male presents for evaluation of acute on chronic back pain as well as restless legs  He was last seen here on 1/26/21 for same  Symptoms constant, worse today  Moderate severity  No a/e factors  Back pain localized to lumbar region  Non radiating  RLS localized to B legs  States he can't sleep due to RLS symptoms  Last time he was given ketorolac, valium with improvement in symptoms        EHL 5/5 B  Equal sensation over S2 B      Will provide medications, d/c home to f/u w/PMD       ED Course         Critical Care Time  Procedures

## 2021-04-15 ENCOUNTER — TELEPHONE (OUTPATIENT)
Dept: PAIN MEDICINE | Facility: CLINIC | Age: 56
End: 2021-04-15

## 2021-04-15 NOTE — TELEPHONE ENCOUNTER
Called patient due to missed appt today  He apologized - he said his alarm didn't go off   I r/s patients appt and made him aware of our no show policy

## 2021-04-19 ENCOUNTER — HOSPITAL ENCOUNTER (OUTPATIENT)
Facility: HOSPITAL | Age: 56
Setting detail: OBSERVATION
Discharge: HOME | End: 2021-04-20
Attending: EMERGENCY MEDICINE | Admitting: INTERNAL MEDICINE
Payer: MEDICARE

## 2021-04-19 ENCOUNTER — APPOINTMENT (OUTPATIENT)
Dept: NON INVASIVE DIAGNOSTICS | Facility: HOSPITAL | Age: 56
End: 2021-04-19
Payer: MEDICARE

## 2021-04-19 ENCOUNTER — APPOINTMENT (EMERGENCY)
Dept: RADIOLOGY | Facility: HOSPITAL | Age: 56
End: 2021-04-19
Payer: MEDICARE

## 2021-04-19 DIAGNOSIS — R00.2 PALPITATIONS: Primary | ICD-10-CM

## 2021-04-19 DIAGNOSIS — G89.29 CHRONIC LOW BACK PAIN WITHOUT SCIATICA: ICD-10-CM

## 2021-04-19 DIAGNOSIS — M54.50 CHRONIC LOW BACK PAIN WITHOUT SCIATICA: ICD-10-CM

## 2021-04-19 DIAGNOSIS — E87.1 HYPONATREMIA: ICD-10-CM

## 2021-04-19 DIAGNOSIS — F14.90 CRACK COCAINE USE: ICD-10-CM

## 2021-04-19 DIAGNOSIS — R42 LIGHTHEADEDNESS: ICD-10-CM

## 2021-04-19 DIAGNOSIS — R77.8 TROPONIN LEVEL ELEVATED: ICD-10-CM

## 2021-04-19 PROBLEM — R06.02 SOB (SHORTNESS OF BREATH): Status: ACTIVE | Noted: 2021-04-19

## 2021-04-19 LAB
ANION GAP SERPL CALCULATED.3IONS-SCNC: 11 MMOL/L (ref 4–13)
ANION GAP SERPL CALCULATED.3IONS-SCNC: 13 MMOL/L (ref 4–13)
ANION GAP SERPL CALCULATED.3IONS-SCNC: 4 MMOL/L (ref 4–13)
ANION GAP SERPL CALCULATED.3IONS-SCNC: 5 MMOL/L (ref 4–13)
ANION GAP SERPL CALCULATED.3IONS-SCNC: 6 MMOL/L (ref 4–13)
ANION GAP SERPL CALCULATED.3IONS-SCNC: 9 MMOL/L (ref 4–13)
ATRIAL RATE: 93 BPM
BASOPHILS # BLD AUTO: 0.01 THOUSANDS/ΜL (ref 0–0.1)
BASOPHILS NFR BLD AUTO: 0 % (ref 0–1)
BUN SERPL-MCNC: 12 MG/DL (ref 5–25)
BUN SERPL-MCNC: 12 MG/DL (ref 5–25)
BUN SERPL-MCNC: 13 MG/DL (ref 5–25)
BUN SERPL-MCNC: 13 MG/DL (ref 5–25)
BUN SERPL-MCNC: 14 MG/DL (ref 5–25)
BUN SERPL-MCNC: 14 MG/DL (ref 5–25)
CALCIUM SERPL-MCNC: 7.9 MG/DL (ref 8.3–10.1)
CALCIUM SERPL-MCNC: 8.2 MG/DL (ref 8.3–10.1)
CALCIUM SERPL-MCNC: 8.4 MG/DL (ref 8.3–10.1)
CALCIUM SERPL-MCNC: 8.5 MG/DL (ref 8.3–10.1)
CALCIUM SERPL-MCNC: 8.9 MG/DL (ref 8.3–10.1)
CALCIUM SERPL-MCNC: 9 MG/DL (ref 8.3–10.1)
CHLORIDE SERPL-SCNC: 100 MMOL/L (ref 100–108)
CHLORIDE SERPL-SCNC: 102 MMOL/L (ref 100–108)
CHLORIDE SERPL-SCNC: 86 MMOL/L (ref 100–108)
CHLORIDE SERPL-SCNC: 92 MMOL/L (ref 100–108)
CHLORIDE SERPL-SCNC: 94 MMOL/L (ref 100–108)
CHLORIDE SERPL-SCNC: 97 MMOL/L (ref 100–108)
CO2 SERPL-SCNC: 23 MMOL/L (ref 21–32)
CO2 SERPL-SCNC: 24 MMOL/L (ref 21–32)
CO2 SERPL-SCNC: 24 MMOL/L (ref 21–32)
CO2 SERPL-SCNC: 29 MMOL/L (ref 21–32)
CO2 SERPL-SCNC: 30 MMOL/L (ref 21–32)
CO2 SERPL-SCNC: 30 MMOL/L (ref 21–32)
CREAT SERPL-MCNC: 1 MG/DL (ref 0.6–1.3)
CREAT SERPL-MCNC: 1.02 MG/DL (ref 0.6–1.3)
CREAT SERPL-MCNC: 1.05 MG/DL (ref 0.6–1.3)
CREAT SERPL-MCNC: 1.13 MG/DL (ref 0.6–1.3)
CREAT SERPL-MCNC: 1.25 MG/DL (ref 0.6–1.3)
CREAT SERPL-MCNC: 1.35 MG/DL (ref 0.6–1.3)
CREAT UR-MCNC: 16.1 MG/DL
EOSINOPHIL # BLD AUTO: 0.05 THOUSAND/ΜL (ref 0–0.61)
EOSINOPHIL NFR BLD AUTO: 1 % (ref 0–6)
ERYTHROCYTE [DISTWIDTH] IN BLOOD BY AUTOMATED COUNT: 12.6 % (ref 11.6–15.1)
ETHANOL SERPL-MCNC: 45 MG/DL (ref 0–3)
GFR SERPL CREATININE-BSD FRML MDRD: 60 ML/MIN/1.73SQ M
GFR SERPL CREATININE-BSD FRML MDRD: 65 ML/MIN/1.73SQ M
GFR SERPL CREATININE-BSD FRML MDRD: 74 ML/MIN/1.73SQ M
GFR SERPL CREATININE-BSD FRML MDRD: 81 ML/MIN/1.73SQ M
GFR SERPL CREATININE-BSD FRML MDRD: 84 ML/MIN/1.73SQ M
GFR SERPL CREATININE-BSD FRML MDRD: 86 ML/MIN/1.73SQ M
GLUCOSE SERPL-MCNC: 125 MG/DL (ref 65–140)
GLUCOSE SERPL-MCNC: 139 MG/DL (ref 65–140)
GLUCOSE SERPL-MCNC: 156 MG/DL (ref 65–140)
GLUCOSE SERPL-MCNC: 84 MG/DL (ref 65–140)
GLUCOSE SERPL-MCNC: 85 MG/DL (ref 65–140)
GLUCOSE SERPL-MCNC: 90 MG/DL (ref 65–140)
HCT VFR BLD AUTO: 38.2 % (ref 36.5–49.3)
HGB BLD-MCNC: 13.7 G/DL (ref 12–17)
IMM GRANULOCYTES # BLD AUTO: 0.03 THOUSAND/UL (ref 0–0.2)
IMM GRANULOCYTES NFR BLD AUTO: 0 % (ref 0–2)
LYMPHOCYTES # BLD AUTO: 2.54 THOUSANDS/ΜL (ref 0.6–4.47)
LYMPHOCYTES NFR BLD AUTO: 38 % (ref 14–44)
MCH RBC QN AUTO: 30.2 PG (ref 26.8–34.3)
MCHC RBC AUTO-ENTMCNC: 35.9 G/DL (ref 31.4–37.4)
MCV RBC AUTO: 84 FL (ref 82–98)
MONOCYTES # BLD AUTO: 0.53 THOUSAND/ΜL (ref 0.17–1.22)
MONOCYTES NFR BLD AUTO: 8 % (ref 4–12)
NEUTROPHILS # BLD AUTO: 3.58 THOUSANDS/ΜL (ref 1.85–7.62)
NEUTS SEG NFR BLD AUTO: 53 % (ref 43–75)
NRBC BLD AUTO-RTO: 0 /100 WBCS
NT-PROBNP SERPL-MCNC: 39 PG/ML
OSMOLALITY UR/SERPL-RTO: 272 MMOL/KG (ref 282–298)
OSMOLALITY UR: 88 MMOL/KG
P AXIS: 75 DEGREES
PLATELET # BLD AUTO: 145 THOUSANDS/UL (ref 149–390)
PLATELET # BLD AUTO: 165 THOUSANDS/UL (ref 149–390)
PMV BLD AUTO: 10.1 FL (ref 8.9–12.7)
PMV BLD AUTO: 10.2 FL (ref 8.9–12.7)
POTASSIUM SERPL-SCNC: 3.7 MMOL/L (ref 3.5–5.3)
POTASSIUM SERPL-SCNC: 3.9 MMOL/L (ref 3.5–5.3)
POTASSIUM SERPL-SCNC: 3.9 MMOL/L (ref 3.5–5.3)
POTASSIUM SERPL-SCNC: 4.1 MMOL/L (ref 3.5–5.3)
POTASSIUM SERPL-SCNC: 4.2 MMOL/L (ref 3.5–5.3)
POTASSIUM SERPL-SCNC: 4.3 MMOL/L (ref 3.5–5.3)
PR INTERVAL: 166 MS
QRS AXIS: 43 DEGREES
QRSD INTERVAL: 88 MS
QT INTERVAL: 370 MS
QTC INTERVAL: 460 MS
RBC # BLD AUTO: 4.53 MILLION/UL (ref 3.88–5.62)
SODIUM 24H UR-SCNC: 11 MOL/L
SODIUM SERPL-SCNC: 122 MMOL/L (ref 136–145)
SODIUM SERPL-SCNC: 125 MMOL/L (ref 136–145)
SODIUM SERPL-SCNC: 129 MMOL/L (ref 136–145)
SODIUM SERPL-SCNC: 132 MMOL/L (ref 136–145)
SODIUM SERPL-SCNC: 134 MMOL/L (ref 136–145)
SODIUM SERPL-SCNC: 137 MMOL/L (ref 136–145)
T WAVE AXIS: -12 DEGREES
TROPONIN I SERPL-MCNC: 0.06 NG/ML
TROPONIN I SERPL-MCNC: 0.06 NG/ML
TROPONIN I SERPL-MCNC: 0.07 NG/ML
TSH SERPL DL<=0.05 MIU/L-ACNC: 1.31 UIU/ML (ref 0.36–3.74)
VENTRICULAR RATE: 93 BPM
WBC # BLD AUTO: 6.74 THOUSAND/UL (ref 4.31–10.16)

## 2021-04-19 PROCEDURE — 93005 ELECTROCARDIOGRAM TRACING: CPT

## 2021-04-19 PROCEDURE — 36415 COLL VENOUS BLD VENIPUNCTURE: CPT | Performed by: EMERGENCY MEDICINE

## 2021-04-19 PROCEDURE — 80048 BASIC METABOLIC PNL TOTAL CA: CPT | Performed by: INTERNAL MEDICINE

## 2021-04-19 PROCEDURE — 84443 ASSAY THYROID STIM HORMONE: CPT | Performed by: INTERNAL MEDICINE

## 2021-04-19 PROCEDURE — 93306 TTE W/DOPPLER COMPLETE: CPT

## 2021-04-19 PROCEDURE — 96375 TX/PRO/DX INJ NEW DRUG ADDON: CPT

## 2021-04-19 PROCEDURE — 93306 TTE W/DOPPLER COMPLETE: CPT | Performed by: INTERNAL MEDICINE

## 2021-04-19 PROCEDURE — 82077 ASSAY SPEC XCP UR&BREATH IA: CPT | Performed by: EMERGENCY MEDICINE

## 2021-04-19 PROCEDURE — 83930 ASSAY OF BLOOD OSMOLALITY: CPT | Performed by: INTERNAL MEDICINE

## 2021-04-19 PROCEDURE — 99285 EMERGENCY DEPT VISIT HI MDM: CPT | Performed by: EMERGENCY MEDICINE

## 2021-04-19 PROCEDURE — 82570 ASSAY OF URINE CREATININE: CPT | Performed by: INTERNAL MEDICINE

## 2021-04-19 PROCEDURE — 84484 ASSAY OF TROPONIN QUANT: CPT | Performed by: INTERNAL MEDICINE

## 2021-04-19 PROCEDURE — 84484 ASSAY OF TROPONIN QUANT: CPT | Performed by: EMERGENCY MEDICINE

## 2021-04-19 PROCEDURE — 83880 ASSAY OF NATRIURETIC PEPTIDE: CPT | Performed by: INTERNAL MEDICINE

## 2021-04-19 PROCEDURE — 83935 ASSAY OF URINE OSMOLALITY: CPT | Performed by: INTERNAL MEDICINE

## 2021-04-19 PROCEDURE — 93010 ELECTROCARDIOGRAM REPORT: CPT | Performed by: INTERNAL MEDICINE

## 2021-04-19 PROCEDURE — 84300 ASSAY OF URINE SODIUM: CPT | Performed by: INTERNAL MEDICINE

## 2021-04-19 PROCEDURE — 96361 HYDRATE IV INFUSION ADD-ON: CPT

## 2021-04-19 PROCEDURE — 71045 X-RAY EXAM CHEST 1 VIEW: CPT

## 2021-04-19 PROCEDURE — 99285 EMERGENCY DEPT VISIT HI MDM: CPT

## 2021-04-19 PROCEDURE — 96374 THER/PROPH/DIAG INJ IV PUSH: CPT

## 2021-04-19 PROCEDURE — 85049 AUTOMATED PLATELET COUNT: CPT | Performed by: INTERNAL MEDICINE

## 2021-04-19 PROCEDURE — 85025 COMPLETE CBC W/AUTO DIFF WBC: CPT | Performed by: EMERGENCY MEDICINE

## 2021-04-19 RX ORDER — SODIUM CHLORIDE 9 MG/ML
3 INJECTION INTRAVENOUS
Status: DISCONTINUED | OUTPATIENT
Start: 2021-04-19 | End: 2021-04-20 | Stop reason: HOSPADM

## 2021-04-19 RX ORDER — ROPINIROLE 2 MG/1
2 TABLET, FILM COATED ORAL DAILY
Status: DISCONTINUED | OUTPATIENT
Start: 2021-04-19 | End: 2021-04-19

## 2021-04-19 RX ORDER — RISPERIDONE 1 MG/1
2 TABLET, FILM COATED ORAL 2 TIMES DAILY
Status: DISCONTINUED | OUTPATIENT
Start: 2021-04-19 | End: 2021-04-20 | Stop reason: HOSPADM

## 2021-04-19 RX ORDER — LABETALOL 20 MG/4 ML (5 MG/ML) INTRAVENOUS SYRINGE
10 EVERY 4 HOURS PRN
Status: DISCONTINUED | OUTPATIENT
Start: 2021-04-19 | End: 2021-04-19

## 2021-04-19 RX ORDER — ONDANSETRON 2 MG/ML
4 INJECTION INTRAMUSCULAR; INTRAVENOUS ONCE
Status: COMPLETED | OUTPATIENT
Start: 2021-04-19 | End: 2021-04-19

## 2021-04-19 RX ORDER — LORAZEPAM 2 MG/ML
0.5 INJECTION INTRAMUSCULAR ONCE
Status: DISCONTINUED | OUTPATIENT
Start: 2021-04-19 | End: 2021-04-20 | Stop reason: HOSPADM

## 2021-04-19 RX ORDER — LORAZEPAM 2 MG/ML
0.5 INJECTION INTRAMUSCULAR ONCE
Status: COMPLETED | OUTPATIENT
Start: 2021-04-19 | End: 2021-04-19

## 2021-04-19 RX ORDER — ONDANSETRON 2 MG/ML
4 INJECTION INTRAMUSCULAR; INTRAVENOUS EVERY 6 HOURS PRN
Status: DISCONTINUED | OUTPATIENT
Start: 2021-04-19 | End: 2021-04-20 | Stop reason: HOSPADM

## 2021-04-19 RX ORDER — ATORVASTATIN CALCIUM 40 MG/1
40 TABLET, FILM COATED ORAL
Status: DISCONTINUED | OUTPATIENT
Start: 2021-04-19 | End: 2021-04-20 | Stop reason: HOSPADM

## 2021-04-19 RX ORDER — LABETALOL 20 MG/4 ML (5 MG/ML) INTRAVENOUS SYRINGE
10 EVERY 6 HOURS PRN
Status: DISCONTINUED | OUTPATIENT
Start: 2021-04-19 | End: 2021-04-20 | Stop reason: HOSPADM

## 2021-04-19 RX ADMIN — RISPERIDONE 2 MG: 1 TABLET ORAL at 17:32

## 2021-04-19 RX ADMIN — ENOXAPARIN SODIUM 40 MG: 40 INJECTION SUBCUTANEOUS at 10:19

## 2021-04-19 RX ADMIN — ATORVASTATIN CALCIUM 40 MG: 40 TABLET, FILM COATED ORAL at 17:32

## 2021-04-19 RX ADMIN — DEXTROSE AND SODIUM CHLORIDE 500 ML: 5; .45 INJECTION, SOLUTION INTRAVENOUS at 20:02

## 2021-04-19 RX ADMIN — LORAZEPAM 0.5 MG: 2 INJECTION INTRAMUSCULAR; INTRAVENOUS at 03:30

## 2021-04-19 RX ADMIN — ONDANSETRON 4 MG: 2 INJECTION INTRAMUSCULAR; INTRAVENOUS at 03:30

## 2021-04-19 RX ADMIN — SODIUM CHLORIDE 1000 ML: 0.9 INJECTION, SOLUTION INTRAVENOUS at 03:29

## 2021-04-19 RX ADMIN — DOXEPIN HYDROCHLORIDE 150 MG: 100 CAPSULE ORAL at 21:34

## 2021-04-19 NOTE — ASSESSMENT & PLAN NOTE
Euvolemic hypotonic hyponatremia in the setting of primary polydipsia as patient states he is been drinking gal of water per day   Given a 1 L bolus of normal saline solution the ED  Labs: urine osmo 88, serum osmo 272  Recent Labs     04/19/21  1920 04/19/21  2314 04/20/21  0259   SODIUM 134* 137 138     Plan:  · Fluid restriction   · improved to Na 138 today  Pt alert and oriented x3   No focal neuro exam

## 2021-04-19 NOTE — CASE MANAGEMENT
LOS 1 Day  Not a Bundle  Not a Readmission  OBS    CM met with pt to discuss role of CM and d/c planning  Pt lives alone in a basement apartment, no [place]  IPTA  No DME  Denies hx of VNA, [address], IP rehab  Pt is schizoaffective, bipolar type, and also a diagnosis of PTSD  Pt has a hx of IP MH treatment "years ago"  Pt reports also having a hx of IP rehab fro drug abuse "years ago" for crack cocaine  Pt was sober for 14 months prior to this recent relapse and requests his sister not be notified  Pt refuses HOST at this time, feels comfortable with the resources he has  Pt does not drive, is within walking distance of where he needs to go  PCP is [facility]  Pt uses [facility] in [facility]  Primary Contact: Angie Simeon (sister) [phone]  No POA/LW  Pt will walk home, he lives around the corner  CM reviewed d/c planning process including the following: identifying help at home, patient preference for d/c planning needs, Discharge Lounge, [facility] Meds to [facility] program, availability of treatment team to discuss questions or concerns patient and/or family may have regarding understanding medications and recognizing signs and symptoms once discharged  CM also encouraged patient to follow up with all recommended appointments after discharge  Patient advised of importance for patient and family to participate in managing patients medical well being

## 2021-04-19 NOTE — ASSESSMENT & PLAN NOTE
Patient presents with complaints of shortness of breath  States he utilized crack cocaine last evening  EKG with no signs of active ischemia  Troponin I 0 06  Likely type 2 MI (supply-demand mismatch) in the setting of crack cocaine abuse verses new congestive heart failure  Chest x-ray wet read with pulmonary vascular congestion  Ejection fraction 65% and no signs of ischemia on exercise stress test in July of 2020  No JVD or peripheral edema  Likely coronary vasospasm in setting of cocaine use  Plan:  · Trend troponin I x3- 0 06, 0 06, 0 07  · EKgs- no ischemia  · Check 2D echocardiogram- EF 56%, no diastolic dysfunction  · Monitor on telemetry  · Pt educated on staying away from cocaine use

## 2021-04-19 NOTE — PROGRESS NOTES
MED STUDENT NOTE    Chief Complaint: SOB, palpitations     HPI:   Patient is a 50year old male with PMH of hyperlipidemia and schizoaffective disorder bipolar type presented to the ED with SOB and palpitations after using crack cocaine which he relapsed on after 14 months of not using it  Patient this morning is doing better this morning with slight racing of the heart and SOB  Patient has no fever/chills, chest pain, diaphoresis, or leg swelling  Patient has also mentioned recently that he has been drinking gallons and gallons of water a day  Objective    Vitals  Vitals:    04/19/21 0230 04/19/21 0430 04/19/21 0500 04/19/21 0758   BP: 148/83 (!) 165/111 167/70 129/92   BP Location: Right arm Right arm  Left arm   Pulse: 99 98 98 90   Resp: 20 18 15 15   Temp: 98 1 °F (36 7 °C)      TempSrc: Oral      SpO2: 96% 98%     Weight:    116 kg (256 lb 9 6 oz)   Height:    5' 6" (1 676 m)     Physical Exam  Constitutional: Patient was drowsy but oriented, in no acute distress   Neck: No JVD  CV: Tachycardiac, regular rhythm, no murmurs, rubs or gallops  Pulmonary: Lung CTA  Abdominal: Soft, no tenderness, no guarding or rebound  Extremities: No peripheral edema    Labs  Results Reviewed     Procedure Component Value Units Date/Time    Troponin I [[id number]] Collected: 04/19/21 1028    Lab Status: In process Specimen: Blood from Arm, Right Updated: 04/19/21 5137    Basic metabolic panel [[id number]] Collected: 04/19/21 1028    Lab Status:  In process Specimen: Blood from Arm, Right Updated: 04/19/21 7831    Basic metabolic panel [[id number]]     Lab Status: No result Specimen: Blood     TSH, 3rd generation with Free T4 reflex [[id number]]  (Normal) Collected: 04/19/21 0616    Lab Status: Final result Specimen: Blood from Arm, Left Updated: 04/19/21 0748     TSH 3RD GENERATON 1 310 uIU/mL     Narrative:      Patients undergoing fluorescein dye angiography may retain small amounts of fluorescein in the body for 48-72 hours post procedure  Samples containing fluorescein can produce falsely depressed TSH values  If the patient had this procedure,a specimen should be resubmitted post fluorescein clearance        Troponin I [[id number]]  (Abnormal) Collected: 04/19/21 0616    Lab Status: Final result Specimen: Blood from Arm, Left Updated: 04/19/21 0650     Troponin I 0 06 ng/mL     Basic metabolic panel [[id number]]  (Abnormal) Collected: 04/19/21 0616    Lab Status: Final result Specimen: Blood from Arm, Left Updated: 04/19/21 0647     Sodium 125 mmol/L      Potassium 4 1 mmol/L      Chloride 92 mmol/L      CO2 24 mmol/L      ANION GAP 9 mmol/L      BUN 13 mg/dL      Creatinine 1 00 mg/dL      Glucose 85 mg/dL      Calcium 7 9 mg/dL      eGFR 86 ml/min/1 73sq m     Narrative:      [place] guidelines for Chronic Kidney Disease (CKD):     Stage 1 with normal or high GFR (GFR > 90 mL/min/1 73 square meters)    Stage 2 Mild CKD (GFR = 60-89 mL/min/1 73 square meters)    Stage 3A Moderate CKD (GFR = 45-59 mL/min/1 73 square meters)    Stage 3B Moderate CKD (GFR = 30-44 mL/min/1 73 square meters)    Stage 4 Severe CKD (GFR = 15-29 mL/min/1 73 square meters)    Stage 5 End Stage CKD (GFR <15 mL/min/1 73 square meters)  Note: GFR calculation is accurate only with a steady state creatinine    Platelet count [[id number]]  (Abnormal) Collected: 04/19/21 0616    Lab Status: Final result Specimen: Blood from Arm, Left Updated: 04/19/21 0628     Platelets 303 Thousands/uL      MPV 10 2 fL     Osmolality-"If this is regarding a toxic alcohol, please STOP and consult medical  for further guidance " [[id number]]  (Abnormal) Collected: 04/19/21 0329    Lab Status: Final result Specimen: Blood Updated: 04/19/21 0515     Osmolality Serum 272 mmol/KG     NT-BNP PRO [[id number]]  (Normal) Collected: 04/19/21 0329    Lab Status: Final result Specimen: Blood from Arm, Left Updated: 04/19/21 0512     NT-proBNP 39 pg/mL POCT urinalysis dipstick [[id number]]     Lab Status: No result     Sodium, urine, random [[id number]]     Lab Status: No result Specimen: Urine     Creatinine, urine, random [[id number]]     Lab Status: No result Specimen: Urine     Osmolality, urine [[id number]]     Lab Status: No result Specimen: Urine     Troponin I [[id number]]  (Abnormal) Collected: 04/19/21 0329    Lab Status: Final result Specimen: Blood from Arm, Left Updated: 04/19/21 0357     Troponin I 0 06 ng/mL     Basic metabolic panel [[id number]]  (Abnormal) Collected: 04/19/21 0329    Lab Status: Final result Specimen: Blood from Arm, Left Updated: 04/19/21 0353     Sodium 122 mmol/L      Potassium 3 9 mmol/L      Chloride 86 mmol/L      CO2 23 mmol/L      ANION GAP 13 mmol/L      BUN 12 mg/dL      Creatinine 1 05 mg/dL      Glucose 90 mg/dL      Calcium 8 4 mg/dL      eGFR 81 ml/min/1 73sq m     Narrative:      [place] guidelines for Chronic Kidney Disease (CKD):     Stage 1 with normal or high GFR (GFR > 90 mL/min/1 73 square meters)    Stage 2 Mild CKD (GFR = 60-89 mL/min/1 73 square meters)    Stage 3A Moderate CKD (GFR = 45-59 mL/min/1 73 square meters)    Stage 3B Moderate CKD (GFR = 30-44 mL/min/1 73 square meters)    Stage 4 Severe CKD (GFR = 15-29 mL/min/1 73 square meters)    Stage 5 End Stage CKD (GFR <15 mL/min/1 73 square meters)  Note: GFR calculation is accurate only with a steady state creatinine    Ethanol [[id number]]  (Abnormal) Collected: 04/19/21 0329    Lab Status: Final result Specimen: Blood from Arm, Left Updated: 04/19/21 0351     Ethanol Lvl 45 mg/dL     CBC and differential [[id number]] Collected: 04/19/21 0329    Lab Status: Final result Specimen: Blood from Arm, Left Updated: 04/19/21 0339     WBC 6 74 Thousand/uL      RBC 4 53 Million/uL      Hemoglobin 13 7 g/dL      Hematocrit 38 2 %      MCV 84 fL      MCH 30 2 pg      MCHC 35 9 g/dL      RDW 12 6 %      MPV 10 1 fL      Platelets 304 Thousands/uL      nRBC 0 /100 WBCs      Neutrophils Relative 53 %      Immat GRANS % 0 %      Lymphocytes Relative 38 %      Monocytes Relative 8 %      Eosinophils Relative 1 %      Basophils Relative 0 %      Neutrophils Absolute 3 58 Thousands/µL      Immature Grans Absolute 0 03 Thousand/uL      Lymphocytes Absolute 2 54 Thousands/µL      Monocytes Absolute 0 53 Thousand/µL      Eosinophils Absolute 0 05 Thousand/µL      Basophils Absolute 0 01 Thousands/µL         Imaging  CXR - no acute cardiopulmonary disease     Assessment/Plan  1  Shortness of Breath  Likely Type 2 MI secondary to cocaine use   - Continue Lipitor 40 mg   - Trend Troponin   - Monitor telemetry     2   Hyponatremia   Hypotonic Hyponatremia Euvolemic   Primary Polydipsia vs SIADH   - F/U on urine labs   - Fluid Restriction

## 2021-04-19 NOTE — PLAN OF CARE
Problem: Potential for Falls  Goal: Patient will remain free of falls  Description: INTERVENTIONS:  - Assess patient frequently for physical needs  -  Identify cognitive and physical deficits and behaviors that affect risk of falls    -  [place] fall precautions as indicated by assessment   - Educate patient/family on patient safety including physical limitations  - Instruct patient to call for assistance with activity based on assessment  - Modify environment to reduce risk of injury  - Consider OT/PT consult to assist with strengthening/mobility  Outcome: Progressing
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
## 2021-04-20 VITALS
RESPIRATION RATE: 18 BRPM | SYSTOLIC BLOOD PRESSURE: 155 MMHG | TEMPERATURE: 98.3 F | HEART RATE: 78 BPM | HEIGHT: 66 IN | OXYGEN SATURATION: 96 % | BODY MASS INDEX: 41.24 KG/M2 | DIASTOLIC BLOOD PRESSURE: 94 MMHG | WEIGHT: 256.6 LBS

## 2021-04-20 PROBLEM — R00.2 HEART PALPITATIONS: Status: RESOLVED | Noted: 2021-04-19 | Resolved: 2021-04-20

## 2021-04-20 PROBLEM — E87.1 HYPONATREMIA: Status: RESOLVED | Noted: 2021-04-19 | Resolved: 2021-04-20

## 2021-04-20 PROBLEM — F25.0 SCHIZOAFFECTIVE DISORDER, BIPOLAR TYPE (HCC): Chronic | Status: RESOLVED | Noted: 2018-10-05 | Resolved: 2021-04-20

## 2021-04-20 PROBLEM — F14.20 COCAINE DEPENDENCE, CONTINUOUS (HCC): Chronic | Status: RESOLVED | Noted: 2018-12-21 | Resolved: 2021-04-20

## 2021-04-20 PROBLEM — R06.02 SOB (SHORTNESS OF BREATH): Status: RESOLVED | Noted: 2021-04-19 | Resolved: 2021-04-20

## 2021-04-20 LAB
ANION GAP SERPL CALCULATED.3IONS-SCNC: 4 MMOL/L (ref 4–13)
ANION GAP SERPL CALCULATED.3IONS-SCNC: 5 MMOL/L (ref 4–13)
ATRIAL RATE: 94 BPM
BASOPHILS # BLD AUTO: 0.01 THOUSANDS/ΜL (ref 0–0.1)
BASOPHILS NFR BLD AUTO: 0 % (ref 0–1)
BUN SERPL-MCNC: 10 MG/DL (ref 5–25)
BUN SERPL-MCNC: 13 MG/DL (ref 5–25)
CALCIUM SERPL-MCNC: 8.7 MG/DL (ref 8.3–10.1)
CALCIUM SERPL-MCNC: 8.8 MG/DL (ref 8.3–10.1)
CHLORIDE SERPL-SCNC: 104 MMOL/L (ref 100–108)
CHLORIDE SERPL-SCNC: 106 MMOL/L (ref 100–108)
CO2 SERPL-SCNC: 29 MMOL/L (ref 21–32)
CO2 SERPL-SCNC: 30 MMOL/L (ref 21–32)
CREAT SERPL-MCNC: 1.12 MG/DL (ref 0.6–1.3)
CREAT SERPL-MCNC: 1.23 MG/DL (ref 0.6–1.3)
EOSINOPHIL # BLD AUTO: 0.03 THOUSAND/ΜL (ref 0–0.61)
EOSINOPHIL NFR BLD AUTO: 1 % (ref 0–6)
ERYTHROCYTE [DISTWIDTH] IN BLOOD BY AUTOMATED COUNT: 13.1 % (ref 11.6–15.1)
GFR SERPL CREATININE-BSD FRML MDRD: 67 ML/MIN/1.73SQ M
GFR SERPL CREATININE-BSD FRML MDRD: 75 ML/MIN/1.73SQ M
GLUCOSE SERPL-MCNC: 113 MG/DL (ref 65–140)
GLUCOSE SERPL-MCNC: 179 MG/DL (ref 65–140)
HCT VFR BLD AUTO: 40.5 % (ref 36.5–49.3)
HGB BLD-MCNC: 13.6 G/DL (ref 12–17)
IMM GRANULOCYTES # BLD AUTO: 0.02 THOUSAND/UL (ref 0–0.2)
IMM GRANULOCYTES NFR BLD AUTO: 0 % (ref 0–2)
LYMPHOCYTES # BLD AUTO: 1.65 THOUSANDS/ΜL (ref 0.6–4.47)
LYMPHOCYTES NFR BLD AUTO: 35 % (ref 14–44)
MCH RBC QN AUTO: 30.3 PG (ref 26.8–34.3)
MCHC RBC AUTO-ENTMCNC: 33.6 G/DL (ref 31.4–37.4)
MCV RBC AUTO: 90 FL (ref 82–98)
MONOCYTES # BLD AUTO: 0.49 THOUSAND/ΜL (ref 0.17–1.22)
MONOCYTES NFR BLD AUTO: 10 % (ref 4–12)
NEUTROPHILS # BLD AUTO: 2.58 THOUSANDS/ΜL (ref 1.85–7.62)
NEUTS SEG NFR BLD AUTO: 54 % (ref 43–75)
NRBC BLD AUTO-RTO: 0 /100 WBCS
P AXIS: 69 DEGREES
PLATELET # BLD AUTO: 134 THOUSANDS/UL (ref 149–390)
PMV BLD AUTO: 11 FL (ref 8.9–12.7)
POTASSIUM SERPL-SCNC: 3.9 MMOL/L (ref 3.5–5.3)
POTASSIUM SERPL-SCNC: 4 MMOL/L (ref 3.5–5.3)
PR INTERVAL: 168 MS
QRS AXIS: 40 DEGREES
QRSD INTERVAL: 84 MS
QT INTERVAL: 336 MS
QTC INTERVAL: 420 MS
RBC # BLD AUTO: 4.49 MILLION/UL (ref 3.88–5.62)
SODIUM SERPL-SCNC: 138 MMOL/L (ref 136–145)
SODIUM SERPL-SCNC: 140 MMOL/L (ref 136–145)
T WAVE AXIS: 32 DEGREES
VENTRICULAR RATE: 94 BPM
WBC # BLD AUTO: 4.78 THOUSAND/UL (ref 4.31–10.16)

## 2021-04-20 PROCEDURE — 85025 COMPLETE CBC W/AUTO DIFF WBC: CPT | Performed by: INTERNAL MEDICINE

## 2021-04-20 PROCEDURE — 80048 BASIC METABOLIC PNL TOTAL CA: CPT | Performed by: STUDENT IN AN ORGANIZED HEALTH CARE EDUCATION/TRAINING PROGRAM

## 2021-04-20 PROCEDURE — 80048 BASIC METABOLIC PNL TOTAL CA: CPT | Performed by: INTERNAL MEDICINE

## 2021-04-20 PROCEDURE — 93010 ELECTROCARDIOGRAM REPORT: CPT | Performed by: INTERNAL MEDICINE

## 2021-04-20 RX ORDER — LIDOCAINE 50 MG/G
1 PATCH TOPICAL DAILY
Qty: 30 PATCH | Refills: 0 | Status: SHIPPED | OUTPATIENT
Start: 2021-04-21 | End: 2021-05-13

## 2021-04-20 RX ORDER — HYDROMORPHONE HCL IN WATER/PF 6 MG/30 ML
0.1 PATIENT CONTROLLED ANALGESIA SYRINGE INTRAVENOUS ONCE
Status: COMPLETED | OUTPATIENT
Start: 2021-04-20 | End: 2021-04-20

## 2021-04-20 RX ORDER — ROPINIROLE 2 MG/1
2 TABLET, FILM COATED ORAL ONCE
Status: COMPLETED | OUTPATIENT
Start: 2021-04-20 | End: 2021-04-20

## 2021-04-20 RX ORDER — LIDOCAINE 50 MG/G
1 PATCH TOPICAL DAILY
Status: DISCONTINUED | OUTPATIENT
Start: 2021-04-20 | End: 2021-04-20 | Stop reason: HOSPADM

## 2021-04-20 RX ORDER — ACETAMINOPHEN 325 MG/1
975 TABLET ORAL EVERY 8 HOURS SCHEDULED
Qty: 180 TABLET | Refills: 0 | Status: SHIPPED | OUTPATIENT
Start: 2021-04-20 | End: 2021-05-13

## 2021-04-20 RX ORDER — LORAZEPAM 0.5 MG/1
0.5 TABLET ORAL
Status: DISCONTINUED | OUTPATIENT
Start: 2021-04-20 | End: 2021-04-20 | Stop reason: HOSPADM

## 2021-04-20 RX ORDER — ROPINIROLE 2 MG/1
2 TABLET, FILM COATED ORAL
Status: DISCONTINUED | OUTPATIENT
Start: 2021-04-20 | End: 2021-04-20 | Stop reason: HOSPADM

## 2021-04-20 RX ORDER — ACETAMINOPHEN 325 MG/1
975 TABLET ORAL EVERY 8 HOURS SCHEDULED
Status: DISCONTINUED | OUTPATIENT
Start: 2021-04-20 | End: 2021-04-20 | Stop reason: HOSPADM

## 2021-04-20 RX ADMIN — DEXTROSE 500 ML: 5 SOLUTION INTRAVENOUS at 07:19

## 2021-04-20 RX ADMIN — LIDOCAINE 1 PATCH: 50 PATCH TOPICAL at 08:50

## 2021-04-20 RX ADMIN — RISPERIDONE 2 MG: 1 TABLET ORAL at 08:17

## 2021-04-20 RX ADMIN — HYDROMORPHONE HYDROCHLORIDE 0.1 MG: 0.2 INJECTION, SOLUTION INTRAMUSCULAR; INTRAVENOUS; SUBCUTANEOUS at 11:13

## 2021-04-20 RX ADMIN — ACETAMINOPHEN 975 MG: 325 TABLET ORAL at 08:50

## 2021-04-20 RX ADMIN — ENOXAPARIN SODIUM 40 MG: 40 INJECTION SUBCUTANEOUS at 08:17

## 2021-04-20 RX ADMIN — ROPINIROLE HYDROCHLORIDE 2 MG: 2 TABLET, FILM COATED ORAL at 02:41

## 2021-04-20 RX ADMIN — LORAZEPAM 0.5 MG: 0.5 TABLET ORAL at 00:45

## 2021-04-20 RX ADMIN — ROPINIROLE HYDROCHLORIDE 2 MG: 2 TABLET, FILM COATED ORAL at 00:45

## 2021-04-20 NOTE — NURSING NOTE
Pt discharged home  Son picking him up  AVS printed and pt to followup with appointments and continue his sobriety
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
## 2021-04-20 NOTE — DISCHARGE SUMMARY
INTERNAL MEDICINE RESIDENCY DISCHARGE SUMMARY     Yash Grady   50 y o  male  MRN: [id number]  Room/Bed: /MS 80-26     [facility] 5   Encounter: [id number]    Active Problems:    * No active hospital problems  *      * SOB (shortness of breath)-resolved as of 4/20/2021  Assessment & Plan  Patient presents with complaints of shortness of breath  States he utilized crack cocaine last evening  EKG with no signs of active ischemia  Troponin I 0 06  Likely type 2 MI (supply-demand mismatch) in the setting of crack cocaine abuse verses new congestive heart failure  Chest x-ray wet read with pulmonary vascular congestion  Ejection fraction 65% and no signs of ischemia on exercise stress test in July of 2020  No JVD or peripheral edema  Likely coronary vasospasm in setting of cocaine use  Plan:  · Trend troponin I x3- 0 06, 0 06, 0 07  · EKgs- no ischemia  · Check 2D echocardiogram- EF 77%, no diastolic dysfunction  · Monitor on telemetry  · Pt educated on staying away from cocaine use as it is likely cause of his symptoms  · 4/20- patient has been symptoms free since admission  Plan to discharge today with close follow-up with PCP outpatient  Discharge instructions given to the patient        Hyponatremia-resolved as of 4/20/2021  Assessment & Plan  Euvolemic hypotonic hyponatremia in the setting of primary polydipsia as patient states he is been drinking gal of water per day   Given a 1 L bolus of normal saline solution the ED  Labs: urine osmo 88, serum osmo 272  Recent Labs     04/19/21  1920 04/19/21  2314 04/20/21  0259   SODIUM 134* 137 138     Plan:  · Fluid restriction   · improved to Na 140 today  Pt alert and oriented x3  No focal neuro exam      Hyperlipidemia:  Continue rosuvastatin 20 mg daily on discharge      Schizoaffective disorder, bipolar:  Continue doxepin and risperidone    Restless leg syndrome:  Continue home ropinirole 2 mg daily    Chronic back pain:  Was prescribed lidocaine patch and Tylenol on discharge  [address] COURSE     This is 53-year-old male with past medical history for cocaine abuse who presents initially with complaint of shortness of breath and nausea  Patient admits that he has been not using cocaine for last 14 months although he relapsed and had 1 time use on 4/18 evening  His started having shortness of breath with some chest pain which brought him to the ER for further evaluation  In the ED, all vitals were stable except hypertension in 160s which eventually improved to 130  Labs were significant for hyponatremia at 122 , troponin trend 0 06, 0 06, 0 07, serum osmolality 272, a normal TSH  EKG without any ischemic changes  Patient was admitted for ACS rule out and hyponatremia  Patient's hyponatremia resolved with fluid restriction from 122 on admission to 140 on the day of discharge  Patient also received D5 with half-normal saline and also T5 with total of 1 L given that he was correcting sodium on a faster pace  His hyponatremia was thought to be secondary to polydipsia in setting of low serum and urine osmolality  For his chest pain, he was thought to be likely secondary to coronary vasospasm in setting of cocaine use  Patient was chest pain-free during his hospital course and his echo showed EF 70% without any diastolic dysfunction  Patient was also noted to have negative cardiac stress test back in 2020  Given improvement in his hyponatremia and symptoms, patient was discharged on 04/20 with plan to follow-up with PCP  Physical Exam  Constitutional:       General: He is not in acute distress  Appearance: He is well-developed  He is not diaphoretic  HENT:      Head: Normocephalic and atraumatic  Nose: Nose normal       Mouth/Throat:      Pharynx: No oropharyngeal exudate  Eyes:      General: No scleral icterus  Right eye: No discharge           Left eye: No discharge  Neck:      Musculoskeletal: Normal range of motion and neck supple  Cardiovascular:      Rate and Rhythm: Normal rate and regular rhythm  Heart sounds: Normal heart sounds  No murmur  No friction rub  No gallop  Pulmonary:      Effort: Pulmonary effort is normal  No respiratory distress  Breath sounds: No stridor  No wheezing or rales  Abdominal:      General: Bowel sounds are normal  There is no distension  Palpations: Abdomen is soft  Tenderness: There is no abdominal tenderness  There is no guarding  Musculoskeletal: Normal range of motion  Skin:     General: Skin is warm  Findings: No erythema  Neurological:      Mental Status: He is alert and oriented to person, place, and time  Mental status is at baseline  Comments: Patient having some delusional thoughts although alert and oriented x3  No focal deficits  DISCHARGE INFORMATION     PCP at Discharge: APURVA glass    Admitting Provider: Katja Graham MD  Admission Date: 4/19/2021    Discharge Provider: Katja Graham MD  Discharge Date: 4/20/21    Discharge Disposition: Home/Self Care  Discharge Condition: good  Discharge with Lines: no    Discharge Diet: regular diet  Activity Restrictions: none  Test Results Pending at Discharge: none    Discharge Diagnoses:  Principal Problem (Resolved):    SOB (shortness of breath)  Active Problems:    * No active hospital problems  *  Resolved Problems:    Schizoaffective disorder, bipolar type ([address] )    Cocaine dependence, continuous ([address] )    Heart palpitations    Hyponatremia      Consulting Providers:none      Diagnostic & Therapeutic Procedures Performed:  X-ray Chest 1 View Portable    Result Date: 4/19/2021  Impression: No acute cardiopulmonary disease   Workstation performed: [id number]       Code Status: Level 1 - Full Code  Advance Directive & Living Will: <no information>  Power of :    POLST:      Medications:  Current Discharge Medication List        Current Discharge Medication List      START taking these medications    Details   acetaminophen (TYLENOL) 325 mg tablet Take 3 tablets (975 mg total) by mouth every 8 (eight) hours  Qty: 180 tablet, Refills: 0    Associated Diagnoses: Chronic low back pain without sciatica      lidocaine (LIDODERM) 5 % Apply 1 patch topically daily Remove & Discard patch within 12 hours or as directed by MD  Qty: 30 patch, Refills: 0    Associated Diagnoses: Chronic low back pain without sciatica           Current Discharge Medication List      CONTINUE these medications which have NOT CHANGED    Details   dorzolamide-timolol (COSOPT) 22 3-6 8 MG/ML ophthalmic solution Administer 1 drop to both eyes daily  Qty: 10 mL, Refills: 5    Associated Diagnoses: Glaucoma of both eyes, unspecified glaucoma type      doxepin (SINEquan) 150 MG capsule Take 150 mg by mouth daily at bedtime      ibuprofen (MOTRIN) 400 mg tablet Take 1 tablet (400 mg total) by mouth every 6 (six) hours as needed for mild pain  Qty: 30 tablet, Refills: 0    Associated Diagnoses: Restless legs syndrome      latanoprost (XALATAN) 0 005 % ophthalmic solution Administer 1 drop to both eyes daily  Qty: 7 5 mL, Refills: 3    Associated Diagnoses: Glaucoma of both eyes, unspecified glaucoma type      risperiDONE (RisperDAL) 2 mg tablet TAKE 1 TABLET BY MOUTH TWICE A DAY ( MUST GET [place] MD      rOPINIRole (REQUIP) 2 mg tablet Take 2 mg by mouth daily      rosuvastatin (CRESTOR) 20 MG tablet Take 1 tablet (20 mg total) by mouth daily  Qty: 90 tablet, Refills: 1    Associated Diagnoses: Familial hypercholesterolemia      triamcinolone (KENALOG) 0 1 % ointment Apply topically 2 (two) times a day  Qty: 453 6 g, Refills: 2    Associated Diagnoses: Intrinsic eczema      triazolam (HALCION) 0 125 MG tablet 0 125 mg daily at bedtime as needed   Refills: 1             Allergies:   Allergies   Allergen Reactions    Influenza Vaccines History of guillan barre syndrome       FOLLOW-UP     Provider or [address] [place] [address] MeUnlistedExternal Appointment    Name Relationship Specialty Phone Fax Address [facility] [address] [facility]  Internal Medicine [phone] [address] [address] [facility] [address]     Next Steps: Follow up in 1 week(s)           Provider or [facility] MeUnlistedExternal Appointment    Name Relationship Specialty Phone Fax Address [facility] [address] [facility]  Internal Medicine [phone] [facility] [address] [facility] [address]     Next Steps: Follow up in 1 week(s)               Discharge Statement:   I spent 1 hour minutes discharging the patient  This time was spent on the day of discharge  I had direct contact with the patient on the day of discharge  Additional documentation is required if more than 30 minutes were spent on discharge  Portions of the record may have been created with voice recognition software  Occasional wrong word or "sound a like" substitutions may have occurred due to the inherent limitations of voice recognition software    Read the chart carefully and recognize, using context, where substitutions have occurred     ==  Debi Ames, [facility]  Internal Medicine Resident PGY-2

## 2021-04-20 NOTE — PLAN OF CARE
Problem: Potential for Falls  Goal: Patient will remain free of falls  Description: INTERVENTIONS:  - Assess patient frequently for physical needs  -  Identify cognitive and physical deficits and behaviors that affect risk of falls    -  Goodwin fall precautions as indicated by assessment   - Educate patient/family on patient safety including physical limitations  - Instruct patient to call for assistance with activity based on assessment  - Modify environment to reduce risk of injury  - Consider OT/PT consult to assist with strengthening/mobility  4/20/2021 0011 by Anum Salcedo RN  Outcome: Progressing  4/20/2021 0011 by Anum Salcedo RN  Outcome: Progressing

## 2021-04-21 ENCOUNTER — TRANSITIONAL CARE MANAGEMENT (OUTPATIENT)
Dept: INTERNAL MEDICINE CLINIC | Facility: CLINIC | Age: 56
End: 2021-04-21

## 2021-04-23 ENCOUNTER — TELEPHONE (OUTPATIENT)
Dept: PAIN MEDICINE | Facility: CLINIC | Age: 56
End: 2021-04-23

## 2021-04-23 NOTE — TELEPHONE ENCOUNTER
Pt is calling in stating that he doesn't remember making this appt  He said that he has short term memory, he is on a lot of meds that make him sleep a lot  Pt will call back to reschedule another appt later

## 2021-04-23 NOTE — TELEPHONE ENCOUNTER
LMOM advising patient of no show policy, pt has 2 no shows 4/23/21 and 4/15/21  Advised to contact and cancel any future appts to avoid being discharged from practice

## 2021-05-05 ENCOUNTER — OFFICE VISIT (OUTPATIENT)
Dept: GASTROENTEROLOGY | Facility: CLINIC | Age: 56
End: 2021-05-05
Payer: MEDICARE

## 2021-05-05 VITALS
BODY MASS INDEX: 40.88 KG/M2 | DIASTOLIC BLOOD PRESSURE: 80 MMHG | TEMPERATURE: 97.4 F | HEIGHT: 66 IN | HEART RATE: 72 BPM | WEIGHT: 254.4 LBS | SYSTOLIC BLOOD PRESSURE: 152 MMHG

## 2021-05-05 DIAGNOSIS — F19.10 POLYSUBSTANCE ABUSE (HCC): ICD-10-CM

## 2021-05-05 DIAGNOSIS — Z00.00 PREVENTATIVE HEALTH CARE: Primary | ICD-10-CM

## 2021-05-05 DIAGNOSIS — E66.01 CLASS 3 SEVERE OBESITY DUE TO EXCESS CALORIES WITH BODY MASS INDEX (BMI) OF 40.0 TO 44.9 IN ADULT, UNSPECIFIED WHETHER SERIOUS COMORBIDITY PRESENT (HCC): ICD-10-CM

## 2021-05-05 DIAGNOSIS — Z12.11 SCREENING FOR MALIGNANT NEOPLASM OF COLON: ICD-10-CM

## 2021-05-05 PROCEDURE — 99204 OFFICE O/P NEW MOD 45 MIN: CPT | Performed by: INTERNAL MEDICINE

## 2021-05-05 RX ORDER — QUETIAPINE FUMARATE 200 MG/1
TABLET, FILM COATED ORAL
COMMUNITY
Start: 2021-02-21 | End: 2021-05-13

## 2021-05-05 RX ORDER — POLYETHYLENE GLYCOL 3350 17 G/17G
17 POWDER, FOR SOLUTION ORAL DAILY
Qty: 510 G | Refills: 5 | Status: SHIPPED | OUTPATIENT
Start: 2021-05-05 | End: 2022-02-09

## 2021-05-05 RX ORDER — DIVALPROEX SODIUM 500 MG/1
TABLET, DELAYED RELEASE ORAL EVERY 12 HOURS SCHEDULED
COMMUNITY
Start: 2021-02-21

## 2021-05-05 RX ORDER — POLYETHYLENE GLYCOL 3350 17 G/17G
238 POWDER, FOR SOLUTION ORAL ONCE
Qty: 238 G | Refills: 0 | Status: SHIPPED | OUTPATIENT
Start: 2021-05-05 | End: 2021-05-13

## 2021-05-05 NOTE — PATIENT INSTRUCTIONS
Colon on 7/22/21 with Dr Lisha Maguire at [facility]   Miralax/dulcolax prep instructions given by Sherry Carias
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
## 2021-05-06 ENCOUNTER — TELEPHONE (OUTPATIENT)
Dept: INTERNAL MEDICINE CLINIC | Facility: CLINIC | Age: 56
End: 2021-05-06

## 2021-05-06 ENCOUNTER — TELEMEDICINE (OUTPATIENT)
Dept: INTERNAL MEDICINE CLINIC | Facility: CLINIC | Age: 56
End: 2021-05-06

## 2021-05-06 DIAGNOSIS — E87.1 HYPONATREMIA: ICD-10-CM

## 2021-05-06 DIAGNOSIS — F25.0 SCHIZOAFFECTIVE DISORDER, BIPOLAR TYPE (HCC): Chronic | ICD-10-CM

## 2021-05-06 DIAGNOSIS — E78.2 MIXED HYPERLIPIDEMIA: ICD-10-CM

## 2021-05-06 DIAGNOSIS — R94.39 ABNORMAL STRESS TEST: ICD-10-CM

## 2021-05-06 DIAGNOSIS — E66.01 OBESITY, MORBID (HCC): Primary | ICD-10-CM

## 2021-05-06 DIAGNOSIS — F14.10 COCAINE USE DISORDER (HCC): ICD-10-CM

## 2021-05-06 DIAGNOSIS — R03.0 ELEVATED BLOOD-PRESSURE READING WITHOUT DIAGNOSIS OF HYPERTENSION: ICD-10-CM

## 2021-05-06 PROCEDURE — G2025 DIS SITE TELE SVCS RHC/FQHC: HCPCS | Performed by: PHYSICIAN ASSISTANT

## 2021-05-06 RX ORDER — ROSUVASTATIN CALCIUM 40 MG/1
40 TABLET, COATED ORAL DAILY
Qty: 90 TABLET | Refills: 1 | Status: SHIPPED | OUTPATIENT
Start: 2021-05-06 | End: 2021-05-07 | Stop reason: SDUPTHER

## 2021-05-06 RX ORDER — ASPIRIN 81 MG/1
81 TABLET ORAL DAILY
Qty: 90 TABLET | Refills: 1 | Status: SHIPPED | OUTPATIENT
Start: 2021-05-06 | End: 2022-02-07 | Stop reason: SDUPTHER
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
## 2021-05-06 NOTE — TELEPHONE ENCOUNTER
I was able to schedule patient with [facility] 05/10/2021 at 130 with Dr Elvira Bynum will fax office notes to Steven Hopping [phone] alt  email [email or web address]  patient was given appointment information with address  Patient states he doesn't drive looking for something non surgical in [place]  I will reach out to [place] [place] to see if she can speak with patient

## 2021-05-06 NOTE — TELEPHONE ENCOUNTER
Staff, can you please call patient to arrange a close follow-up with me to check blood pressure? He has an appointment with me in June but I would like it scheduled sooner  Within the next week or 2 would be great

## 2021-05-06 NOTE — TELEPHONE ENCOUNTER
Darell Mendenhall,     Can you please assist with scheduling this patient within [facility] cardiology appointment? I know that the last time we spoke they are not scheduling out until June, not sure if Destinee Bearden has anything  Patient recently in emergency room and admitted to the hospital   Patient concerned about high cholesterol levels and did have 1 episode of cocaine use, some slight abnormalities seen on echocardiogram   I reviewed stress echo from last year 7/2020 with stress test and echo showing no evidence of cardiac ischemia however the EKG at that time did show some findings consistent with cardiac ischemia  Also, patient is interested in seeing weight management/nutrition  I know the main weight management for Massiel Carrillo is located [place] [address] and building in [facility] but patient is asking if there is something close so that he can walk to like some more near the hospital   I am not sure if there is just a specific nutritionist around that area that he could see or if there is something in the physician building  Would you be able to look into this? I have both referrals for Cardiology [facility] as well as nutritionist/weight management in the system

## 2021-05-07 PROBLEM — M25.562 LEFT KNEE PAIN: Status: RESOLVED | Noted: 2019-12-17 | Resolved: 2021-05-07

## 2021-05-07 PROBLEM — F14.11 HISTORY OF COCAINE ABUSE (HCC): Status: ACTIVE | Noted: 2021-05-07

## 2021-05-07 PROBLEM — S83.242A ACUTE MEDIAL MENISCAL TEAR, LEFT, INITIAL ENCOUNTER: Status: RESOLVED | Noted: 2019-12-17 | Resolved: 2021-05-07

## 2021-05-07 RX ORDER — ROSUVASTATIN CALCIUM 40 MG/1
40 TABLET, COATED ORAL DAILY
Qty: 90 TABLET | Refills: 1
Start: 2021-05-07 | End: 2021-10-08 | Stop reason: SDUPTHER

## 2021-05-07 NOTE — TELEPHONE ENCOUNTER
That's great for cardio - thank you! I called [facility] at [facility] and they said we have non-weight management related nutritionist program at [place] [address]  [address] Nutrition Therapy:    [phone]  Maybe we can try that?

## 2021-05-13 ENCOUNTER — OFFICE VISIT (OUTPATIENT)
Dept: INTERNAL MEDICINE CLINIC | Facility: CLINIC | Age: 56
End: 2021-05-13

## 2021-05-13 VITALS
SYSTOLIC BLOOD PRESSURE: 140 MMHG | DIASTOLIC BLOOD PRESSURE: 94 MMHG | BODY MASS INDEX: 41.8 KG/M2 | TEMPERATURE: 97.1 F | HEART RATE: 83 BPM | WEIGHT: 259 LBS

## 2021-05-13 DIAGNOSIS — Z86.69 HISTORY OF OBSTRUCTIVE SLEEP APNEA: ICD-10-CM

## 2021-05-13 DIAGNOSIS — M54.16 LUMBAR RADICULOPATHY: ICD-10-CM

## 2021-05-13 DIAGNOSIS — N52.9 ERECTILE DYSFUNCTION, UNSPECIFIED ERECTILE DYSFUNCTION TYPE: ICD-10-CM

## 2021-05-13 DIAGNOSIS — G89.29 CHRONIC LOW BACK PAIN WITH SCIATICA, SCIATICA LATERALITY UNSPECIFIED, UNSPECIFIED BACK PAIN LATERALITY: ICD-10-CM

## 2021-05-13 DIAGNOSIS — F25.0 SCHIZOAFFECTIVE DISORDER, BIPOLAR TYPE (HCC): Chronic | ICD-10-CM

## 2021-05-13 DIAGNOSIS — M54.40 CHRONIC LOW BACK PAIN WITH SCIATICA, SCIATICA LATERALITY UNSPECIFIED, UNSPECIFIED BACK PAIN LATERALITY: ICD-10-CM

## 2021-05-13 DIAGNOSIS — J34.89 NOSTRIL INFECTION: ICD-10-CM

## 2021-05-13 DIAGNOSIS — F14.11 HISTORY OF COCAINE ABUSE (HCC): Primary | ICD-10-CM

## 2021-05-13 DIAGNOSIS — I10 ESSENTIAL HYPERTENSION: ICD-10-CM

## 2021-05-13 DIAGNOSIS — D69.6 PLATELETS DECREASED (HCC): ICD-10-CM

## 2021-05-13 DIAGNOSIS — F43.12 POST-TRAUMATIC STRESS DISORDER, CHRONIC: Chronic | ICD-10-CM

## 2021-05-13 PROCEDURE — 99214 OFFICE O/P EST MOD 30 MIN: CPT | Performed by: PHYSICIAN ASSISTANT

## 2021-05-13 RX ORDER — AMLODIPINE BESYLATE 5 MG/1
5 TABLET ORAL DAILY
Qty: 30 TABLET | Refills: 5 | Status: SHIPPED | OUTPATIENT
Start: 2021-05-13 | End: 2021-08-01

## 2021-05-13 RX ORDER — SILDENAFIL 50 MG/1
50 TABLET, FILM COATED ORAL DAILY PRN
Qty: 6 TABLET | Refills: 1 | Status: SHIPPED | OUTPATIENT
Start: 2021-05-13 | End: 2021-07-11 | Stop reason: HOSPADM

## 2021-05-13 NOTE — TELEPHONE ENCOUNTER
Darell Mendenhall, disregard nutrition referral and scheduling below, I already gave pt number to schedule on his own when he came into office on 5/13/21

## 2021-05-13 NOTE — PROGRESS NOTES
Assessment/Plan:      Diagnoses and all orders for this visit:    History of cocaine abuse ([facility] )    Schizoaffective disorder, bipolar type ([facility] )    Platelets decreased ([facility] )    Essential hypertension  -     amLODIPine (NORVASC) 5 mg tablet; Take 1 tablet (5 mg total) by mouth daily    History of obstructive sleep apnea  -     Ambulatory referral to Sleep Medicine; Future    Erectile dysfunction, unspecified erectile dysfunction type  -     sildenafil (VIAGRA) 50 MG tablet; Take 1 tablet (50 mg total) by mouth daily as needed for erectile dysfunction    Nostril infection  -     mupirocin (BACTROBAN) 2 % nasal ointment; into each nostril 2 (two) times a day    Chronic low back pain with sciatica, sciatica laterality unspecified, unspecified back pain laterality    Lumbar radiculopathy    Post-traumatic stress disorder, chronic       73-year-old male presenting today at my request for 1 week follow-up after lengthy, detailed telemedicine visit for patient's concerns following ED hospital admission for palpitations, chest pain s/p  Relapse use of crack cocaine  At my request BP recheck done today initially 151/95 with repeat 140/94  Patient has had fairly consistent elevated blood pressure, at cardiology office a few days ago through [facility] was 140/88  I did recommend with patient's elevated ASCVD risk score and  Cardiovascular risks we should consider treating his blood pressure  Patient agreeable to treatment and will start him on amlodipine 5 mg once a day  Patient with some thrombocytopenia during his hospitalization in April at 145, recheck 134 before discharge  At next follow-up appointment will discuss repeating CBC however I do expect this was secondary to his acute process  He has not had any previous issues with thrombocytopenia  Today patient very concerned about sleep apnea    When he initially established with me February he did not mention a history of sleep apnea and nothing was in his chart noted  He states this was briefly discussed with cardiologist who recommended he get sleep study to update and he has not been using CPAP machine  Referral provided to Sleep Medicine for consultation and study -   Patient  To schedule an appointment  We did discuss how untreated sleep apnea can affect other conditions as well as put him at higher risk for cardiovascular disease and complications  Patient had other minor complaints today including ED and discussing medication for that as well as what seems to be in nostril infection or dryness in his  Nasal passages  For the dryness in his nose I have prescribed mupirocin ointment that he will apply inside the nostrils twice a day  He can also consider using nasal saline  He states he does pick inside his nose and I have advised against this  Regarding ED we will trial generic Viagra 50 mg once before sexual activity as needed the  However I did discuss with patient several causes to ED which in his case without further workup I am highly suspecting secondary to his morbid obesity, possibly untreated sleep apnea and other pre-existing medical conditions  Also could be secondary to his mental health but also possibly side effects of his mental health medications  Patient expresses understanding of all this  He understands that appropriate treatment for his chronic conditions including possible sleep apnea, weight reduction, exercise and healthy diet can all health with his erectile dysfunction and decreased libido  Patient applauded today as he is remain clean now from cocaine/ drugs for 24 days now  He states he has good support from his 12 step program   He states that his most recent relapse made his son very upset and patient is very hard on himself for this  He states he wants to stay well for himself but also his son and seems very motivated and eager to remain clean    He will also continue to follow with mental health for his schizoaffective /bipolar disorder  Patient mentions ongoing chronic back pain which is a large source of his discontent as well  He states he will be continuing management with pain management,  However I reviewed documentation in his chart and it appears he no showed for 2 appointments   In April and it appears they are allowing him to attempt to schedule another appointment  Patient highly encouraged to schedule an appointment with them for evaluation and make sure he writes down the appointment date and time or sets an alarm for it in his phone  We did briefly discuss again patient's BMI currently 41 80  We discussed the impact that morbid obesity has on patient's quality of life as well as potential associated risks associated with unhealthy weight  He was interested in seeing a nutritionist but states that 1 of his friends can help him with his diet  I did look into availabilities for nutritionist within [facility] near the hospital and I provided him with a phone number for [facility] nutrition therapy that he can call and schedule an appointment  At this time we will have him keep his appointment scheduled with me previously on 06/23 to recheck blood pressure and compliance check  He should call sooner with any concerns  Chief Complaint   Patient presents with    Follow-up     follow up BP check        Subjective:     Patient ID: Marleen Oconnor is a 50 y o  male     50y/o male here today for 1 week f/u to telemed visit for hosp admission for palpitations/CP and crack cocaine use/relapse, recheck BP  He also notes hx of sleep apnea which he forgot to tell me when he initially saw me feb 2021 for his hx, and was told he should have re-evaluation of sleep apnea by cardiology  states son witnessed excessive snoring and him stopping breathing for a second  Regarding his drug use, states he has good support from the 12 steps program  Has been 24 days clean   Remains frustrated with himself that he used again but is eager to remain clean  Saw [facility] cardiology yesterday who suggested repeat nuclear stress testing, scheduled tomorrow  BP with cardiology was 140/88  He does note continued SOB mainly with exertion, stable, discussed with cardiology  He does note working on his nutrition, eating more salads and chicken  He is dancing for exercise  He does note ongoing issues with chronic back pain, which he thinks may also be spiking his BP  He is requesting medication for ED  Pt also notes dryness, soreness and scabbing in his nose for past few weeks  States very dry  Review of Systems   Constitutional: Negative  HENT:        As in HPI   Respiratory:        As in HPI   Cardiovascular: Negative  Gastrointestinal: Positive for constipation (taking miralax prn which hdoes help - has established with GI)  Genitourinary:        ED, decreased libido   Musculoskeletal: Positive for back pain (chronic)  Skin: Negative  Neurological: Negative  Psychiatric/Behavioral:        As in HPI         The following portions of the patient's history were reviewed and updated as appropriate: allergies, current medications, past family history, past medical history, past social history, past surgical history and problem list       Objective:     Physical Exam  Vitals signs reviewed  Constitutional:       General: He is not in acute distress  Appearance: He is obese  He is not ill-appearing or toxic-appearing  Comments: BMI 41 8   HENT:      Head:      Comments: Minimal scabbing noted B/L nasal passages, worse on right than left, minimal blood noted medial aspect, no active uncontrolled bleeding  Eyes:      Conjunctiva/sclera: Conjunctivae normal    Neck:      Musculoskeletal: Neck supple  Vascular: Normal carotid pulses  No carotid bruit  Cardiovascular:      Rate and Rhythm: Normal rate and regular rhythm  Pulses: Normal pulses        Heart sounds: Normal heart sounds  Pulmonary:      Effort: Pulmonary effort is normal       Breath sounds: Normal breath sounds  Abdominal:      General: Bowel sounds are normal       Tenderness: There is no abdominal tenderness  Comments: Severe abdominal obesity   Musculoskeletal:      Right lower leg: No edema  Left lower leg: No edema  Comments: Low back complaints deferred to Pain management   Lymphadenopathy:      Head:      Right side of head: No submandibular or tonsillar adenopathy  Left side of head: No submandibular adenopathy  Neurological:      Mental Status: He is alert and oriented to person, place, and time  Motor: Motor function is intact  Coordination: Coordination is intact  Gait: Gait is intact  Psychiatric:         Mood and Affect: Mood is not anxious or depressed  Speech: Speech is rapid and pressured (at times, this is his baseline)  Behavior: Behavior is hyperactive  Behavior is cooperative  Comments:  At times needing redirection, at baseline         Vitals:    05/13/21 1410 05/13/21 1450   BP: 151/95 140/94   BP Location: Right arm    Patient Position: Sitting    Cuff Size: Large    Pulse: 83    Temp: (!) 97 1 °F (36 2 °C)    TempSrc: Temporal    Weight: 117 kg (259 lb)

## 2021-05-17 ENCOUNTER — OFFICE VISIT (OUTPATIENT)
Dept: PAIN MEDICINE | Facility: CLINIC | Age: 56
End: 2021-05-17
Payer: MEDICARE

## 2021-05-17 VITALS
SYSTOLIC BLOOD PRESSURE: 172 MMHG | HEART RATE: 81 BPM | DIASTOLIC BLOOD PRESSURE: 98 MMHG | WEIGHT: 255 LBS | TEMPERATURE: 98.2 F | HEIGHT: 66 IN | BODY MASS INDEX: 40.98 KG/M2

## 2021-05-17 DIAGNOSIS — M46.1 SACROILIITIS (HCC): Primary | ICD-10-CM

## 2021-05-17 PROCEDURE — 99214 OFFICE O/P EST MOD 30 MIN: CPT | Performed by: NURSE PRACTITIONER

## 2021-05-17 NOTE — PATIENT INSTRUCTIONS
Sacroiliac Joint Injection   WHAT YOU NEED TO KNOW:   What do I need to know about a sacroiliac joint injection? A sacroiliac (SI) joint injection is done to diagnose or treat pain from sacroiliac joint syndrome  The pain caused by this syndrome may be felt in your lower back, buttocks, groin, and your thigh  How do I prepare for an SI injection? Your healthcare provider will ask you to not take any pain medicine the day of the injection  Ask him if there are any other medicines you should not take  You will need to find someone to drive you home after your procedure  What will happen during the SI injection? You will be awake for your injection  You may be given calming medicine if you are anxious  · You will lie on your stomach with a pillow under your abdomen  Your healthcare provider will give you an injection of medicine to numb the area  He may use an x-ray, ultrasound, or CT scan to find the area to inject  You may also be given an injection of contrast material to help your SI joint show up better  Then, your healthcare provider will inject local anesthesia, antiinflammatory medicine, or both into your SI joint  · Healthcare providers will watch you closely for any problems for up to 30 minutes after your injection  Your healthcare provider will check to see if your pain decreases after the injection  What are the risks of an SI injection? You may have some weakness for a short time after your injection  The SI injection can cause bleeding, infection, and pain  It can also cause temporary weakness in your leg and problems urinating  You may have an allergic reaction to the medicine that is injected into your SI joint  Your pain may return and you may need more treatment  CARE AGREEMENT:   You have the right to help plan your care  Learn about your health condition and how it may be treated  Discuss treatment options with your healthcare providers to decide what care you want to receive   You always have the right to refuse treatment  The above information is an  only  It is not intended as medical advice for individual conditions or treatments  Talk to your doctor, nurse or pharmacist before following any medical regimen to see if it is safe and effective for you  © Copyright [address] Information is for End User's use only and may not be sold, redistributed or otherwise used for commercial purposes   All illustrations and images included in CareNotes® are the copyrighted property of A D A M [employer]  or [address] [place] [address]
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
## 2021-05-18 ENCOUNTER — HOSPITAL ENCOUNTER (OUTPATIENT)
Dept: RADIOLOGY | Facility: CLINIC | Age: 56
Discharge: HOME | End: 2021-05-18
Attending: ANESTHESIOLOGY | Admitting: ANESTHESIOLOGY
Payer: MEDICARE

## 2021-05-18 VITALS
RESPIRATION RATE: 20 BRPM | HEART RATE: 77 BPM | SYSTOLIC BLOOD PRESSURE: 147 MMHG | DIASTOLIC BLOOD PRESSURE: 82 MMHG | TEMPERATURE: 97.4 F | OXYGEN SATURATION: 98 %

## 2021-05-18 DIAGNOSIS — M46.1 SACROILIITIS (HCC): ICD-10-CM

## 2021-05-18 PROCEDURE — 27096 INJECT SACROILIAC JOINT: CPT | Performed by: ANESTHESIOLOGY

## 2021-05-18 RX ORDER — METHYLPREDNISOLONE ACETATE 80 MG/ML
80 INJECTION, SUSPENSION INTRA-ARTICULAR; INTRALESIONAL; INTRAMUSCULAR; PARENTERAL; SOFT TISSUE ONCE
Status: COMPLETED | OUTPATIENT
Start: 2021-05-18 | End: 2021-05-18

## 2021-05-18 RX ORDER — LIDOCAINE HYDROCHLORIDE 10 MG/ML
5 INJECTION, SOLUTION EPIDURAL; INFILTRATION; INTRACAUDAL; PERINEURAL ONCE
Status: COMPLETED | OUTPATIENT
Start: 2021-05-18 | End: 2021-05-18

## 2021-05-18 RX ORDER — BUPIVACAINE HCL/PF 2.5 MG/ML
30 VIAL (ML) INJECTION ONCE
Status: COMPLETED | OUTPATIENT
Start: 2021-05-18 | End: 2021-05-18

## 2021-05-18 RX ADMIN — LIDOCAINE HYDROCHLORIDE 4 ML: 10 INJECTION, SOLUTION EPIDURAL; INFILTRATION; INTRACAUDAL; PERINEURAL at 09:08

## 2021-05-18 RX ADMIN — IOHEXOL 1 ML: 300 INJECTION, SOLUTION INTRAVENOUS at 09:08

## 2021-05-18 RX ADMIN — BUPIVACAINE HYDROCHLORIDE 4 ML: 2.5 INJECTION, SOLUTION EPIDURAL; INFILTRATION; INTRACAUDAL at 09:09

## 2021-05-18 RX ADMIN — METHYLPREDNISOLONE ACETATE 80 MG: 80 INJECTION, SUSPENSION INTRA-ARTICULAR; INTRALESIONAL; INTRAMUSCULAR; SOFT TISSUE at 09:09

## 2021-05-18 NOTE — DISCHARGE INSTR - LAB

## 2021-05-25 ENCOUNTER — TELEPHONE (OUTPATIENT)
Dept: PAIN MEDICINE | Facility: CLINIC | Age: 56
End: 2021-05-25

## 2021-05-26 ENCOUNTER — LAB (OUTPATIENT)
Dept: LAB | Facility: HOSPITAL | Age: 56
End: 2021-05-26
Payer: MEDICARE

## 2021-05-26 DIAGNOSIS — Z11.4 SCREENING FOR HIV (HUMAN IMMUNODEFICIENCY VIRUS): ICD-10-CM

## 2021-05-26 DIAGNOSIS — E66.01 MORBID OBESITY (HCC): ICD-10-CM

## 2021-05-26 DIAGNOSIS — F19.10 POLYSUBSTANCE ABUSE (HCC): ICD-10-CM

## 2021-05-26 DIAGNOSIS — R73.01 ELEVATED FASTING BLOOD SUGAR: ICD-10-CM

## 2021-05-26 DIAGNOSIS — Z00.00 PREVENTATIVE HEALTH CARE: ICD-10-CM

## 2021-05-26 DIAGNOSIS — E66.01 CLASS 3 SEVERE OBESITY DUE TO EXCESS CALORIES WITH BODY MASS INDEX (BMI) OF 40.0 TO 44.9 IN ADULT, UNSPECIFIED WHETHER SERIOUS COMORBIDITY PRESENT (HCC): ICD-10-CM

## 2021-05-26 DIAGNOSIS — R35.1 NOCTURIA: ICD-10-CM

## 2021-05-26 DIAGNOSIS — Z12.11 SCREENING FOR MALIGNANT NEOPLASM OF COLON: ICD-10-CM

## 2021-05-26 LAB
ALBUMIN SERPL BCP-MCNC: 4.6 G/DL (ref 3.5–5)
ALP SERPL-CCNC: 95 U/L (ref 46–116)
ALT SERPL W P-5'-P-CCNC: 121 U/L (ref 12–78)
ANION GAP SERPL CALCULATED.3IONS-SCNC: 4 MMOL/L (ref 4–13)
AST SERPL W P-5'-P-CCNC: 43 U/L (ref 5–45)
BILIRUB SERPL-MCNC: 0.43 MG/DL (ref 0.2–1)
BUN SERPL-MCNC: 17 MG/DL (ref 5–25)
CALCIUM SERPL-MCNC: 9.9 MG/DL (ref 8.3–10.1)
CHLORIDE SERPL-SCNC: 102 MMOL/L (ref 100–108)
CHOLEST SERPL-MCNC: 126 MG/DL (ref 50–200)
CO2 SERPL-SCNC: 33 MMOL/L (ref 21–32)
CREAT SERPL-MCNC: 1.09 MG/DL (ref 0.6–1.3)
EST. AVERAGE GLUCOSE BLD GHB EST-MCNC: 117 MG/DL
GFR SERPL CREATININE-BSD FRML MDRD: 77 ML/MIN/1.73SQ M
GLUCOSE P FAST SERPL-MCNC: 96 MG/DL (ref 65–99)
HBA1C MFR BLD: 5.7 %
HCV AB SER QL: NORMAL
HDLC SERPL-MCNC: 31 MG/DL
LDLC SERPL CALC-MCNC: 71 MG/DL (ref 0–100)
NONHDLC SERPL-MCNC: 95 MG/DL
POTASSIUM SERPL-SCNC: 4.1 MMOL/L (ref 3.5–5.3)
PROT SERPL-MCNC: 8.6 G/DL (ref 6.4–8.2)
SODIUM SERPL-SCNC: 139 MMOL/L (ref 136–145)
TRIGL SERPL-MCNC: 118 MG/DL

## 2021-05-26 PROCEDURE — 83036 HEMOGLOBIN GLYCOSYLATED A1C: CPT

## 2021-05-26 PROCEDURE — 80061 LIPID PANEL: CPT

## 2021-05-26 PROCEDURE — 84153 ASSAY OF PSA TOTAL: CPT

## 2021-05-26 PROCEDURE — 84154 ASSAY OF PSA FREE: CPT

## 2021-05-26 PROCEDURE — 80053 COMPREHEN METABOLIC PANEL: CPT

## 2021-05-26 PROCEDURE — 87389 HIV-1 AG W/HIV-1&-2 AB AG IA: CPT

## 2021-05-26 PROCEDURE — 86803 HEPATITIS C AB TEST: CPT

## 2021-05-26 PROCEDURE — 36415 COLL VENOUS BLD VENIPUNCTURE: CPT

## 2021-05-27 LAB
HIV 1+2 AB+HIV1 P24 AG SERPL QL IA: NORMAL
PSA FREE MFR SERPL: 35 %
PSA FREE SERPL-MCNC: 0.07 NG/ML
PSA SERPL-MCNC: 0.2 NG/ML (ref 0–4)

## 2021-05-29 NOTE — RESULT ENCOUNTER NOTE
Dear staff:    Please kindly let patient know that hepatitis-C screening test was negative  This means he never was exposed   And does not have any infection currently as well  Please encourage patient to get colonoscopy done  Thank you

## 2021-06-09 ENCOUNTER — OFFICE VISIT (OUTPATIENT)
Dept: PAIN MEDICINE | Facility: CLINIC | Age: 56
End: 2021-06-09
Payer: MEDICARE

## 2021-06-09 VITALS
TEMPERATURE: 98.5 F | BODY MASS INDEX: 40.82 KG/M2 | DIASTOLIC BLOOD PRESSURE: 89 MMHG | HEIGHT: 66 IN | SYSTOLIC BLOOD PRESSURE: 144 MMHG | HEART RATE: 73 BPM | WEIGHT: 254 LBS

## 2021-06-09 DIAGNOSIS — G89.29 CHRONIC MIDLINE LOW BACK PAIN WITHOUT SCIATICA: ICD-10-CM

## 2021-06-09 DIAGNOSIS — M47.816 LUMBAR SPONDYLOSIS: Primary | ICD-10-CM

## 2021-06-09 DIAGNOSIS — M54.50 CHRONIC MIDLINE LOW BACK PAIN WITHOUT SCIATICA: ICD-10-CM

## 2021-06-09 PROCEDURE — 99214 OFFICE O/P EST MOD 30 MIN: CPT | Performed by: NURSE PRACTITIONER
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
## 2021-06-09 NOTE — TELEPHONE ENCOUNTER
FYI-      S/w pt  Pt states  " I am excruciating pain 15/10 " Per pt he has had no relief from his Gina SIJ injections and is over 3 weeks out  Pt is taking ES Tylenol, Ibuprofen and has tried ice/heat without relief  Pt scheduled for an OV today with [address] at 10:45 arrival time

## 2021-06-09 NOTE — PATIENT INSTRUCTIONS
Lumbar Facet Block   WHAT YOU NEED TO KNOW:   What do I need to know about a lumbar facet block? A lumbar facet block is a procedure used to decrease inflammation in your lower spine  Medicines are injected at facet joints in your lower back  Facet joints are found at the back of each vertebrae  How do I prepare for the procedure? · Your healthcare provider will talk to you about how to prepare for your procedure  He or she may tell you not to eat or drink anything after midnight on the day of your procedure  Arrange to have someone drive you home after the procedure  · Tell your provider about all the medicines you currently take  He or she will tell you if you need to stop any medicine before the procedure, and when to stop  He or she will tell you what medicines to take or not take on the day of your procedure  · Your provider will also talk to you about your regular pain medicines  He or she may want you to wait a certain amount of time after the procedure before you start them again  This will help him or her see if the facet block worked for you  · You may need blood or urine tests before your procedure  You may also need x-rays, a CT scan, or an MRI  Tell your healthcare provider if you have ever had an allergic reaction to contrast liquid  Do not enter the MRI room with anything metal  Metal can cause serious damage  Tell the provider if you have any metal in or on your body  What will happen during the procedure? · You will lie on your stomach, with your body slightly turned to the side  A pillow may be placed under your abdomen, or you may be asked to bend one or both knees  · A needle will be inserted into the facet joint in your lower back  Your surgeon may use contrast liquid with an x-ray or CT to help guide the needle  He or she will inject medicines, such as steroids, to decrease inflammation  What should I expect after the procedure?   You will be taken to a room to rest until you are fully awake  You will be monitored closely for any problems  Do not get out of bed until your healthcare provider says it is okay  Your provider may have you move the area to see if you still have pain  You may then be able to go home  What are the risks of a lumbar facet block? You may bleed more than expected or get an infection  Nerves, blood vessels, or muscles may be damaged  You may have numbness in other areas  You may still have lower back or leg pain  CARE AGREEMENT:   You have the right to help plan your care  Learn about your health condition and how it may be treated  Discuss treatment options with your healthcare providers to decide what care you want to receive  You always have the right to refuse treatment  The above information is an  only  It is not intended as medical advice for individual conditions or treatments  Talk to your doctor, nurse or pharmacist before following any medical regimen to see if it is safe and effective for you  © Copyright [address] Information is for End User's use only and may not be sold, redistributed or otherwise used for commercial purposes   All illustrations and images included in CareNotes® are the copyrighted property of A D A M , Inc  or [address] [place] [address] [facility]

## 2021-06-13 ENCOUNTER — HOSPITAL ENCOUNTER (EMERGENCY)
Facility: HOSPITAL | Age: 56
Discharge: HOME | End: 2021-06-13
Admitting: EMERGENCY MEDICINE
Payer: MEDICARE

## 2021-06-13 PROCEDURE — 99281 EMR DPT VST MAYX REQ PHY/QHP: CPT

## 2021-06-13 RX ORDER — KETOROLAC TROMETHAMINE 30 MG/ML
INJECTION, SOLUTION INTRAMUSCULAR; INTRAVENOUS
Status: DISPENSED
Start: 2021-06-13 | End: 2021-06-13

## 2021-06-13 RX ORDER — DIAZEPAM 5 MG/1
TABLET ORAL
Status: DISPENSED
Start: 2021-06-13 | End: 2021-06-13

## 2021-07-06 NOTE — TELEPHONE ENCOUNTER
RN attempted to contact pt  VMMLOM  Provided with CB# and [place]  Practice Admin SHANA Thomas emailed to flag chart as dismissed from practice  Discharge letter on [facility] desk to be signed

## 2021-07-06 NOTE — TELEPHONE ENCOUNTER
Please advise on scheduling future appointments due to appt  History   Patient no showed for procedure today      No shows on:  4/15/21  4/23/21 and   7/6/21

## 2021-07-08 ENCOUNTER — TELEPHONE (OUTPATIENT)
Dept: PAIN MEDICINE | Facility: CLINIC | Age: 56
End: 2021-07-08

## 2021-07-08 NOTE — TELEPHONE ENCOUNTER
FYI    Rn s/w pt and advised of same  Pt states that he did receive dismissal from practice letter  Pt initially stated that he was in severe #10/10 pain in lower back and requested medication from [address] [place] [address]  Advised pt to try Tylenol  1000 mg every 8 hrs up to 3000 mg in 24/hrs  Advised pt to avoid NSAIDS per JW note d/t renal disease  Pt states that he does not know about that  Advised heat and or ice and pt states heat helps temporarily  RN advised pt to f/u with list of pain providers  Pt states that he did not get that, so offered to re send  Pt states, "don't bother, I can't get there anyway"  and line was disconnected

## 2021-07-10 ENCOUNTER — APPOINTMENT (EMERGENCY)
Dept: RADIOLOGY | Facility: HOSPITAL | Age: 56
End: 2021-07-10
Payer: MEDICARE

## 2021-07-10 ENCOUNTER — HOSPITAL ENCOUNTER (OUTPATIENT)
Facility: HOSPITAL | Age: 56
Setting detail: OBSERVATION
Discharge: HOME | End: 2021-07-11
Attending: EMERGENCY MEDICINE | Admitting: INTERNAL MEDICINE
Payer: MEDICARE

## 2021-07-10 VITALS
SYSTOLIC BLOOD PRESSURE: 113 MMHG | BODY MASS INDEX: 40.21 KG/M2 | HEART RATE: 76 BPM | OXYGEN SATURATION: 95 % | RESPIRATION RATE: 18 BRPM | DIASTOLIC BLOOD PRESSURE: 68 MMHG | TEMPERATURE: 98.1 F | HEIGHT: 66 IN | WEIGHT: 250.2 LBS

## 2021-07-10 DIAGNOSIS — F14.90 COCAINE USE: ICD-10-CM

## 2021-07-10 DIAGNOSIS — R07.9 CHEST PAIN: Primary | ICD-10-CM

## 2021-07-10 DIAGNOSIS — F12.90 MARIJUANA USE: ICD-10-CM

## 2021-07-10 DIAGNOSIS — R42 LIGHTHEADEDNESS: ICD-10-CM

## 2021-07-10 DIAGNOSIS — R94.31 ST SEGMENT DEPRESSION: ICD-10-CM

## 2021-07-10 PROBLEM — F14.10 COCAINE ABUSE (HCC): Status: ACTIVE | Noted: 2021-07-10

## 2021-07-10 PROBLEM — R07.89 CHEST TIGHTNESS: Status: ACTIVE | Noted: 2021-07-10

## 2021-07-10 LAB
ALBUMIN SERPL BCP-MCNC: 3.8 G/DL (ref 3.5–5)
ALP SERPL-CCNC: 84 U/L (ref 46–116)
ALT SERPL W P-5'-P-CCNC: 163 U/L (ref 12–78)
ANION GAP SERPL CALCULATED.3IONS-SCNC: 4 MMOL/L (ref 4–13)
AST SERPL W P-5'-P-CCNC: 78 U/L (ref 5–45)
BASOPHILS # BLD AUTO: 0.03 THOUSANDS/ΜL (ref 0–0.1)
BASOPHILS NFR BLD AUTO: 0 % (ref 0–1)
BILIRUB SERPL-MCNC: 0.81 MG/DL (ref 0.2–1)
BUN SERPL-MCNC: 14 MG/DL (ref 5–25)
CALCIUM SERPL-MCNC: 9.1 MG/DL (ref 8.3–10.1)
CHLORIDE SERPL-SCNC: 98 MMOL/L (ref 100–108)
CO2 SERPL-SCNC: 29 MMOL/L (ref 21–32)
CREAT SERPL-MCNC: 1.1 MG/DL (ref 0.6–1.3)
EOSINOPHIL # BLD AUTO: 0.04 THOUSAND/ΜL (ref 0–0.61)
EOSINOPHIL NFR BLD AUTO: 1 % (ref 0–6)
ERYTHROCYTE [DISTWIDTH] IN BLOOD BY AUTOMATED COUNT: 13.3 % (ref 11.6–15.1)
GFR SERPL CREATININE-BSD FRML MDRD: 76 ML/MIN/1.73SQ M
GLUCOSE SERPL-MCNC: 98 MG/DL (ref 65–140)
HCT VFR BLD AUTO: 41.1 % (ref 36.5–49.3)
HGB BLD-MCNC: 14 G/DL (ref 12–17)
IMM GRANULOCYTES # BLD AUTO: 0.03 THOUSAND/UL (ref 0–0.2)
IMM GRANULOCYTES NFR BLD AUTO: 0 % (ref 0–2)
LYMPHOCYTES # BLD AUTO: 2.49 THOUSANDS/ΜL (ref 0.6–4.47)
LYMPHOCYTES NFR BLD AUTO: 34 % (ref 14–44)
MAGNESIUM SERPL-MCNC: 2.3 MG/DL (ref 1.6–2.6)
MCH RBC QN AUTO: 30.8 PG (ref 26.8–34.3)
MCHC RBC AUTO-ENTMCNC: 34.1 G/DL (ref 31.4–37.4)
MCV RBC AUTO: 91 FL (ref 82–98)
MONOCYTES # BLD AUTO: 0.42 THOUSAND/ΜL (ref 0.17–1.22)
MONOCYTES NFR BLD AUTO: 6 % (ref 4–12)
NEUTROPHILS # BLD AUTO: 4.26 THOUSANDS/ΜL (ref 1.85–7.62)
NEUTS SEG NFR BLD AUTO: 59 % (ref 43–75)
NRBC BLD AUTO-RTO: 0 /100 WBCS
PLATELET # BLD AUTO: 151 THOUSANDS/UL (ref 149–390)
PLATELET # BLD AUTO: 165 THOUSANDS/UL (ref 149–390)
PMV BLD AUTO: 10.3 FL (ref 8.9–12.7)
PMV BLD AUTO: 10.8 FL (ref 8.9–12.7)
POTASSIUM SERPL-SCNC: 4 MMOL/L (ref 3.5–5.3)
PROT SERPL-MCNC: 7.8 G/DL (ref 6.4–8.2)
RBC # BLD AUTO: 4.54 MILLION/UL (ref 3.88–5.62)
SODIUM SERPL-SCNC: 131 MMOL/L (ref 136–145)
TROPONIN I SERPL-MCNC: 0.04 NG/ML
TROPONIN I SERPL-MCNC: 0.06 NG/ML
TROPONIN I SERPL-MCNC: 0.06 NG/ML
WBC # BLD AUTO: 7.27 THOUSAND/UL (ref 4.31–10.16)

## 2021-07-10 PROCEDURE — 99285 EMERGENCY DEPT VISIT HI MDM: CPT | Performed by: EMERGENCY MEDICINE

## 2021-07-10 PROCEDURE — 71046 X-RAY EXAM CHEST 2 VIEWS: CPT

## 2021-07-10 PROCEDURE — 93005 ELECTROCARDIOGRAM TRACING: CPT

## 2021-07-10 PROCEDURE — 36415 COLL VENOUS BLD VENIPUNCTURE: CPT | Performed by: EMERGENCY MEDICINE

## 2021-07-10 PROCEDURE — 84484 ASSAY OF TROPONIN QUANT: CPT | Performed by: STUDENT IN AN ORGANIZED HEALTH CARE EDUCATION/TRAINING PROGRAM

## 2021-07-10 PROCEDURE — 99285 EMERGENCY DEPT VISIT HI MDM: CPT

## 2021-07-10 PROCEDURE — 83735 ASSAY OF MAGNESIUM: CPT | Performed by: STUDENT IN AN ORGANIZED HEALTH CARE EDUCATION/TRAINING PROGRAM

## 2021-07-10 PROCEDURE — 99220 PR INITIAL OBSERVATION CARE/DAY 70 MINUTES: CPT | Performed by: INTERNAL MEDICINE

## 2021-07-10 PROCEDURE — 85049 AUTOMATED PLATELET COUNT: CPT | Performed by: STUDENT IN AN ORGANIZED HEALTH CARE EDUCATION/TRAINING PROGRAM

## 2021-07-10 PROCEDURE — 80053 COMPREHEN METABOLIC PANEL: CPT | Performed by: EMERGENCY MEDICINE

## 2021-07-10 PROCEDURE — 85025 COMPLETE CBC W/AUTO DIFF WBC: CPT | Performed by: EMERGENCY MEDICINE

## 2021-07-10 PROCEDURE — 96374 THER/PROPH/DIAG INJ IV PUSH: CPT

## 2021-07-10 PROCEDURE — 84484 ASSAY OF TROPONIN QUANT: CPT | Performed by: EMERGENCY MEDICINE

## 2021-07-10 RX ORDER — DORZOLAMIDE HYDROCHLORIDE AND TIMOLOL MALEATE 20; 5 MG/ML; MG/ML
1 SOLUTION/ DROPS OPHTHALMIC DAILY
Status: DISCONTINUED | OUTPATIENT
Start: 2021-07-10 | End: 2021-07-11 | Stop reason: HOSPADM

## 2021-07-10 RX ORDER — ROPINIROLE 2 MG/1
4 TABLET, FILM COATED ORAL
Status: DISCONTINUED | OUTPATIENT
Start: 2021-07-10 | End: 2021-07-11 | Stop reason: HOSPADM

## 2021-07-10 RX ORDER — ASPIRIN 81 MG/1
81 TABLET ORAL DAILY
Status: DISCONTINUED | OUTPATIENT
Start: 2021-07-10 | End: 2021-07-11 | Stop reason: HOSPADM

## 2021-07-10 RX ORDER — DIAZEPAM 5 MG/ML
5 INJECTION, SOLUTION INTRAMUSCULAR; INTRAVENOUS ONCE
Status: DISCONTINUED | OUTPATIENT
Start: 2021-07-10 | End: 2021-07-10

## 2021-07-10 RX ORDER — RISPERIDONE 1 MG/1
4 TABLET, FILM COATED ORAL DAILY
Status: DISCONTINUED | OUTPATIENT
Start: 2021-07-10 | End: 2021-07-11 | Stop reason: HOSPADM

## 2021-07-10 RX ORDER — ACETAMINOPHEN 325 MG/1
650 TABLET ORAL EVERY 6 HOURS PRN
Status: DISCONTINUED | OUTPATIENT
Start: 2021-07-10 | End: 2021-07-11 | Stop reason: HOSPADM

## 2021-07-10 RX ORDER — DIVALPROEX SODIUM 500 MG/1
500 TABLET, DELAYED RELEASE ORAL EVERY 12 HOURS SCHEDULED
Status: DISCONTINUED | OUTPATIENT
Start: 2021-07-10 | End: 2021-07-10

## 2021-07-10 RX ORDER — ROPINIROLE 2 MG/1
4 TABLET, FILM COATED ORAL DAILY
Status: DISCONTINUED | OUTPATIENT
Start: 2021-07-10 | End: 2021-07-10

## 2021-07-10 RX ORDER — DIAZEPAM 5 MG/ML
5 INJECTION, SOLUTION INTRAMUSCULAR; INTRAVENOUS ONCE
Status: COMPLETED | OUTPATIENT
Start: 2021-07-10 | End: 2021-07-10

## 2021-07-10 RX ORDER — LATANOPROST 50 UG/ML
1 SOLUTION/ DROPS OPHTHALMIC
Status: DISCONTINUED | OUTPATIENT
Start: 2021-07-10 | End: 2021-07-11 | Stop reason: HOSPADM

## 2021-07-10 RX ORDER — LIDOCAINE 50 MG/G
1 PATCH TOPICAL DAILY
Status: DISCONTINUED | OUTPATIENT
Start: 2021-07-10 | End: 2021-07-11 | Stop reason: HOSPADM

## 2021-07-10 RX ORDER — IBUPROFEN 600 MG/1
600 TABLET ORAL EVERY 6 HOURS PRN
Status: DISCONTINUED | OUTPATIENT
Start: 2021-07-10 | End: 2021-07-11 | Stop reason: HOSPADM

## 2021-07-10 RX ORDER — AMLODIPINE BESYLATE 5 MG/1
5 TABLET ORAL DAILY
Status: DISCONTINUED | OUTPATIENT
Start: 2021-07-10 | End: 2021-07-11 | Stop reason: HOSPADM

## 2021-07-10 RX ORDER — ATORVASTATIN CALCIUM 80 MG/1
80 TABLET, FILM COATED ORAL
Status: DISCONTINUED | OUTPATIENT
Start: 2021-07-10 | End: 2021-07-11 | Stop reason: HOSPADM

## 2021-07-10 RX ORDER — POLYETHYLENE GLYCOL 3350 17 G/17G
17 POWDER, FOR SOLUTION ORAL DAILY
Status: DISCONTINUED | OUTPATIENT
Start: 2021-07-10 | End: 2021-07-11 | Stop reason: HOSPADM

## 2021-07-10 RX ORDER — DIAZEPAM 5 MG/ML
2.5 INJECTION, SOLUTION INTRAMUSCULAR; INTRAVENOUS ONCE
Status: DISCONTINUED | OUTPATIENT
Start: 2021-07-10 | End: 2021-07-10

## 2021-07-10 RX ORDER — DIVALPROEX SODIUM 500 MG/1
500 TABLET, DELAYED RELEASE ORAL EVERY EVENING
Status: DISCONTINUED | OUTPATIENT
Start: 2021-07-10 | End: 2021-07-11 | Stop reason: HOSPADM

## 2021-07-10 RX ORDER — ONDANSETRON 2 MG/ML
4 INJECTION INTRAMUSCULAR; INTRAVENOUS EVERY 6 HOURS PRN
Status: DISCONTINUED | OUTPATIENT
Start: 2021-07-10 | End: 2021-07-11 | Stop reason: HOSPADM

## 2021-07-10 RX ADMIN — DIAZEPAM 2.5 MG: 5 INJECTION, SOLUTION INTRAMUSCULAR; INTRAVENOUS at 03:55

## 2021-07-10 RX ADMIN — ROPINIROLE HYDROCHLORIDE 4 MG: 2 TABLET, FILM COATED ORAL at 21:00

## 2021-07-10 RX ADMIN — AMLODIPINE BESYLATE 5 MG: 5 TABLET ORAL at 14:43

## 2021-07-10 RX ADMIN — ASPIRIN 81 MG: 81 TABLET, COATED ORAL at 14:35

## 2021-07-10 RX ADMIN — ENOXAPARIN SODIUM 40 MG: 40 INJECTION SUBCUTANEOUS at 14:33

## 2021-07-10 RX ADMIN — DIVALPROEX SODIUM 500 MG: 500 TABLET, DELAYED RELEASE ORAL at 14:35

## 2021-07-10 RX ADMIN — ATORVASTATIN CALCIUM 80 MG: 80 TABLET, FILM COATED ORAL at 17:04

## 2021-07-10 RX ADMIN — DORZOLAMIDE HYDROCHLORIDE AND TIMOLOL MALEATE 1 DROP: 20; 5 SOLUTION OPHTHALMIC at 14:36

## 2021-07-10 RX ADMIN — RISPERIDONE 4 MG: 1 TABLET ORAL at 14:35

## 2021-07-10 RX ADMIN — LATANOPROST 1 DROP: 50 SOLUTION OPHTHALMIC at 21:00

## 2021-07-10 RX ADMIN — IBUPROFEN 600 MG: 600 TABLET, FILM COATED ORAL at 16:00

## 2021-07-10 RX ADMIN — SODIUM CHLORIDE 1000 ML: 0.9 INJECTION, SOLUTION INTRAVENOUS at 07:33

## 2021-07-10 RX ADMIN — LIDOCAINE 1 PATCH: 50 PATCH TOPICAL at 14:32

## 2021-07-10 RX ADMIN — DOXEPIN HYDROCHLORIDE 150 MG: 100 CAPSULE ORAL at 21:00

## 2021-07-10 NOTE — ASSESSMENT & PLAN NOTE
Patient presents from home with subjective shortness of breath, chest tightness, and dizziness in the setting of recently having used cocaine  Patient states he has been clean for the last few months but relapsed last night - he states he drank alcohol, smoked marijuana, and smokes cocaine  Vital signs were largely stable on arrival, including oxygen saturations  Labs were largely within normal limits for patient, including a mild hyponatremia  Troponins 0 06 x 2  EKG on arrival showed normal sinus rhythm with inconsistent and nonspecific ST segment changes that are comparable with prior studies  Patient received Valium and a bolus of IV fluids in the ED with some improvement  Patient was recommended for discharge from the ED, however continued to report difficulty breathing, which he describes as Ziyad Cho to take a big breath just to get air in, so he was offered admission for observation  By morning, his dizziness, chest tightness, and shortness of breath had resolved    Etiology likely coronary vasospasm in setting of cocaine use  Plan  · Cocaine and marijuana cessation strongly advised

## 2021-07-10 NOTE — DISCHARGE INSTRUCTIONS
Please follow-up with PCP within the next 7-14 days  Cocaine Abuse   WHAT YOU NEED TO KNOW:   Cocaine abuse is a pattern of use that causes health or other problems  Abuse can include using large amounts of cocaine at one time or using it several times each day or week  You may start needing more cocaine to get the same feelings of happiness you got from lower amounts  You may have withdrawal symptoms if you have used cocaine for a long time and you suddenly use less or stop using it  DISCHARGE INSTRUCTIONS:   Seek care immediately if:   · You feel like hurting yourself or someone else  · You have a seizure  · You have a temperature over 101°F (38 3°C) after you use cocaine  · You cough or spit up blood  · You have severe abdominal pain  · You have a severe headache, confusion, or feel very nervous  · You have weakness on one side of your body  · You have chest pain, sweating, or shortness of breath  Contact your healthcare provider if:   · You feel you cannot cope with your problems  · You have questions or concerns about your condition or care  Signs of cocaine abuse:  Cocaine abuse may lead to problems being around others, doing your job, or new medical problems  You may have the following problems:  · Use of more cocaine than you first wanted to use     · No ability to decrease or control your use of cocaine    · Spending much of your time using cocaine, or dealing with a hangover after you use cocaine    · Less time spent around others, at work, or doing activities that you enjoy    · Continued cocaine use, even when it causes physical or mental problems    How cocaine may affect your baby:   · Cocaine may harm your unborn baby's brain, heart, stomach, and bowels  It also increases your risk of a miscarriage, early delivery, or stillbirth  Cocaine can cause long-term medical problems for your baby  · Your baby may go through withdrawal after he is born   He may have seizures, problems waking, or feeding  He may overreact to sounds or movement by violently jerking or jumping  He may vomit or have diarrhea  He may have learning difficulties or other behavior problems when he gets older  Signs and symptoms of cocaine withdrawal:  Withdrawal happens when you have used cocaine for a long period of time, and you suddenly take less or stop taking it  Symptoms may begin within a few hours after you decrease or stop taking cocaine and may include the following:  · Severe sadness or fatigue    · Restlessness, nervousness, or anxiety    · Nausea or vomiting    · Trouble sleeping or difficulty waking up    · Unpleasant dreams that seem real    · Seeing, hearing, or feeling things that are not really there     · Sweating, shaking, or a fast heartbeat    · Seizure    Follow up with your healthcare provider as directed:  Write down your questions so you remember to ask them during your visits  For support and more information:   · Substance Abuse and [address] , [address] [place]  Web Address: https://[employer]/    · [facility] on Drug Abuse  [address] [place] [id number]  Phone: 9- 130 - 296-1415  Web Address: www uli nih gov  © [address] 2020 Information is for End User's use only and may not be sold, redistributed or otherwise used for commercial purposes  All illustrations and images included in CareNotes® are the copyrighted property of [employer]  or [address] [place] [address]  The above information is an  only  It is not intended as medical advice for individual conditions or treatments  Talk to your doctor, nurse or pharmacist before following any medical regimen to see if it is safe and effective for you  You were seen today in the Emergency Department for chest pain        Please follow up with your Primary Care Provider in the next 1-2 days to recheck your symptoms and to follow up on your visit to the Emergency Department today  Please return to the Emergency Department if you have fevers, chills, chest pain, shortness of breath, are unable to eat or drink, or have any other symptoms that concern you  Please look this over and let your nurse and/or me know if you have any further questions before you leave

## 2021-07-10 NOTE — ASSESSMENT & PLAN NOTE
Present on arrival   Subjective per patient  Maintaining adequate saturations on room air  Currently no acute respiratory distress    Plan  · See plan under chest tightness above
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
## 2021-07-10 NOTE — ASSESSMENT & PLAN NOTE
Patient has a longstanding history of chronic low back pain for which he follows with pain management  He has received epidural steroid injections in the past with some benefit  He states he was recommended for peripheral nerve block but is unwilling to receive this as he is worried this will make him paralyzed  Repeatedly during my exam, patient requested tramadol but also stated that nothing works for his pain including tramadol  He also states that narcotics do not work for him any does not want to become addicted  Plan  · Will avoid narcotic analgesics at this time given history of substance abuse  · Will treat symptoms with p r n   Tylenol for mild and ibuprofen for moderate  · Lidocaine patch for low back  · Consider outpatient referral to PT

## 2021-07-10 NOTE — Clinical Note
Case was discussed with admitting resident and the patient's admission status was agreed to be Admission Status: observation status to the service of Dr Ashish Jang
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
## 2021-07-11 PROBLEM — R07.89 CHEST TIGHTNESS: Status: RESOLVED | Noted: 2021-07-10 | Resolved: 2021-07-11

## 2021-07-11 PROBLEM — R73.03 PREDIABETES: Status: ACTIVE | Noted: 2021-07-11

## 2021-07-11 LAB
ATRIAL RATE: 85 BPM
ATRIAL RATE: 98 BPM
P AXIS: 52 DEGREES
P AXIS: 53 DEGREES
PR INTERVAL: 156 MS
PR INTERVAL: 166 MS
QRS AXIS: 45 DEGREES
QRS AXIS: 50 DEGREES
QRSD INTERVAL: 82 MS
QRSD INTERVAL: 84 MS
QT INTERVAL: 342 MS
QT INTERVAL: 376 MS
QTC INTERVAL: 428 MS
QTC INTERVAL: 436 MS
T WAVE AXIS: 22 DEGREES
T WAVE AXIS: 42 DEGREES
VENTRICULAR RATE: 78 BPM
VENTRICULAR RATE: 98 BPM

## 2021-07-11 PROCEDURE — 93010 ELECTROCARDIOGRAM REPORT: CPT | Performed by: INTERNAL MEDICINE

## 2021-07-11 PROCEDURE — 99217 PR OBSERVATION CARE DISCHARGE MANAGEMENT: CPT | Performed by: INTERNAL MEDICINE

## 2021-07-11 NOTE — PLAN OF CARE
Problem: INFECTION - ADULT  Goal: Absence or prevention of progression during hospitalization  Description: INTERVENTIONS:  - Assess and monitor for signs and symptoms of infection  - Monitor lab/diagnostic results  - Monitor all insertion sites, i e  indwelling lines, tubes, and drains  - Monitor endotracheal if appropriate and nasal secretions for changes in amount and color  - [place] appropriate cooling/warming therapies per order  - Administer medications as ordered  - Instruct and encourage patient and family to use good hand hygiene technique  - Identify and instruct in appropriate isolation precautions for identified infection/condition  Outcome: Progressing

## 2021-07-11 NOTE — NURSING NOTE
Patient informed that discharge order was in  RN came in to take out IV and patient had already taken IV out and placed it on the bedside table  RN confirmed that there were no other IVs on the patient prior to him leaving

## 2021-07-11 NOTE — DISCHARGE SUMMARY
INTERNAL MEDICINE RESIDENCY DISCHARGE SUMMARY     Monica Arrington   49 y o  male  MRN: [id number]  Room/Bed: [place] [address]/[facility] [address] [place] [address]   Encounter: [id number]    Active Problems:    Schizoaffective disorder, bipolar type ([facility] )    Post-traumatic stress disorder, chronic    Chronic low back pain without sciatica    Obesity, morbid (HCC)    SOB (shortness of breath)    Cocaine abuse ([place] [address] )    Dizziness    Prediabetes      * Chest tightness-resolved as of 7/11/2021  Assessment & Plan  Patient presents from home with subjective shortness of breath, chest tightness, and dizziness in the setting of recently having used cocaine  Patient states he has been clean for the last few months but relapsed last night - he states he drank alcohol, smoked marijuana, and smokes cocaine  Vital signs were largely stable on arrival, including oxygen saturations  Labs were largely within normal limits for patient, including a mild hyponatremia  Troponins 0 06 x 2  EKG on arrival showed normal sinus rhythm with inconsistent and nonspecific ST segment changes that are comparable with prior studies  Patient received Valium and a bolus of IV fluids in the ED with some improvement  Patient was recommended for discharge from the ED, however continued to report difficulty breathing, which he describes as [place] to take a big breath just to get air in, so he was offered admission for observation  By morning, his dizziness, chest tightness, and shortness of breath had resolved  Etiology likely coronary vasospasm in setting of cocaine use  Plan  · Cocaine and marijuana cessation strongly advised    Dizziness  Assessment & Plan  Plan  · See plan under chest tightness above    Cocaine abuse ([facility] )  Assessment & Plan  Plan  · See plan under "chest tightness" above    SOB (shortness of breath)  Assessment & Plan  Present on arrival   Subjective per patient    Maintaining adequate saturations on room air  Currently no acute respiratory distress  Plan  · See plan under chest tightness above    Obesity, morbid ([address] )  Assessment & Plan  Plan  · Counseled extensively on the necessary lifestyle modifications    Chronic low back pain without sciatica  Assessment & Plan  Patient has a longstanding history of chronic low back pain for which he follows with pain management  He has received epidural steroid injections in the past with some benefit  He states he was recommended for peripheral nerve block but is unwilling to receive this as he is worried this will make him paralyzed  Repeatedly during my exam, patient requested tramadol but also stated that nothing works for his pain including tramadol  He also states that narcotics do not work for him any does not want to become addicted  Plan  · Will avoid narcotic analgesics at this time given history of substance abuse  · Will treat symptoms with p r n  Tylenol for mild and ibuprofen for moderate  · Lidocaine patch for low back  · Consider outpatient referral to PT    Post-traumatic stress disorder, chronic  Assessment & Plan  Plan  · Continue home meds    Schizoaffective disorder, bipolar type [facility])  Assessment & Plan  Plan  · Continue home meds      [address] Miranda Boss is a 28-year-old male with past medical history significant for substance abuse (primarily cocaine and marijuana), chronic low back pain, schizoaffective disorder, hypertension, DEMETRIA, and hyperlipidemia who presented to the ED for evaluation of chest tightness, shortness of breath, and dizziness  Patient states that he recently relapsed, smoking both cocaine and marijuana last night after being clean for the last few months  States that since then, he has felt uncomfortable, with tightness across his entire chest, feeling like he needs to take a big breath in in order to breathe, and dizziness, particularly when changing positions    Was hemodynamically stable on arrival   Serologies largely noncontributory  Troponins 0 06 x 2  Was provided with IV Valium and a bolus of fluids in the ED with some improvement in symptoms  Was recommended for discharge home with close PCP follow-up, but continued to report chest tightness and dizziness, so admission was recommended for observation  Overnight, he had no acute events and his vitals were stable  His dizziness, shortness of breath, and chest pain completely resolved by morning  His back pain was controlled with ibuprofen  He has been counseled on the importance of drug abuse cessation and is medically stable for discharge this morning  Vitals:    07/10/21 0317 07/10/21 0500 07/10/21 1307 07/10/21 1443   BP: 163/93 142/77  113/68   BP Location: Right arm      Pulse: 101 80  76   Resp: 19 18     Temp: 98 1 °F (36 7 °C)      TempSrc: Oral      SpO2: 96% 95%     Weight: 116 kg (255 lb 1 2 oz)  113 kg (250 lb 3 2 oz)    Height:   5' 6" (1 676 m)       Physical Exam  Vitals and nursing note reviewed  Constitutional:       Appearance: He is well-developed  HENT:      Head: Normocephalic and atraumatic  Nose: Nose normal       Mouth/Throat:      Mouth: Mucous membranes are moist       Pharynx: Oropharynx is clear  Eyes:      Extraocular Movements: Extraocular movements intact  Conjunctiva/sclera: Conjunctivae normal       Pupils: Pupils are equal, round, and reactive to light  Cardiovascular:      Rate and Rhythm: Normal rate and regular rhythm  Pulses: Normal pulses  Heart sounds: Normal heart sounds  No murmur heard  Pulmonary:      Effort: Pulmonary effort is normal  No respiratory distress  Breath sounds: Normal breath sounds  Abdominal:      Palpations: Abdomen is soft  Tenderness: There is no abdominal tenderness  Musculoskeletal:      Cervical back: Neck supple  Skin:     General: Skin is warm and dry        Capillary Refill: Capillary refill takes less than 2 seconds  Neurological:      General: No focal deficit present  Mental Status: He is alert and oriented to person, place, and time  Psychiatric:         Mood and Affect: Mood normal          Behavior: Behavior normal          Thought Content: Thought content normal            DISCHARGE INFORMATION     PCP at Discharge: Gurvinder Azar PA-C  Admitting Provider: Chari Fung MD  Admission Date: 7/10/2021    Discharge Provider: Chari Fugn MD  Discharge Date: 7///2021    Discharge Disposition: Home/Self Care  Discharge Condition: good  Discharge with Lines: no    Discharge Diet: regular diet  Activity Restrictions: none  Test Results Pending at Discharge: none    Discharge Diagnoses:  Principal Problem (Resolved):    Chest tightness  Active Problems:    Schizoaffective disorder, bipolar type (HCC)    Post-traumatic stress disorder, chronic    Chronic low back pain without sciatica    Obesity, morbid (HCC)    SOB (shortness of breath)    Cocaine abuse (HCC)    Dizziness    Prediabetes      Consulting Providers:      Diagnostic & Therapeutic Procedures Performed:  XR chest 2 views    Result Date: 7/11/2021  Impression: No acute cardiopulmonary disease   Workstation performed: [id number]       Code Status: Level 1 - Full Code  Advance Directive & Living Will: <no information>  Power of :    POLST:      Medications:  Current Discharge Medication List      STOP taking these medications       sildenafil (VIAGRA) 50 MG tablet Comments:   Reason for Stopping:             Current Discharge Medication List        Current Discharge Medication List      CONTINUE these medications which have NOT CHANGED    Details   amLODIPine (NORVASC) 5 mg tablet Take 1 tablet (5 mg total) by mouth daily  Qty: 30 tablet, Refills: 5    Associated Diagnoses: Essential hypertension      aspirin (ECOTRIN LOW STRENGTH) 81 mg EC tablet Take 1 tablet (81 mg total) by mouth daily  Qty: 90 tablet, Refills: 1    Associated Diagnoses: Obesity, morbid ([facility] ); Mixed hyperlipidemia; Abnormal stress test; Elevated blood-pressure reading without diagnosis of hypertension      divalproex sodium (DEPAKOTE) 500 mg EC tablet (TAKE 1 TABLET BY MOUTH IN AM AND 2 AT BED TIME )      dorzolamide-timolol (COSOPT) 22 3-6 8 MG/ML ophthalmic solution Administer 1 drop to both eyes daily  Qty: 10 mL, Refills: 5    Associated Diagnoses: Glaucoma of both eyes, unspecified glaucoma type      doxepin (SINEquan) 150 MG capsule Take 150 mg by mouth daily at bedtime      latanoprost (XALATAN) 0 005 % ophthalmic solution Administer 1 drop to both eyes daily  Qty: 7 5 mL, Refills: 3    Associated Diagnoses: Glaucoma of both eyes, unspecified glaucoma type      polyethylene glycol (MIRALAX) 17 g packet Take 17 g by mouth daily  Qty: 510 g, Refills: 5    Associated Diagnoses: Screening for malignant neoplasm of colon; Preventative health care; Polysubstance abuse ([place] [address] ); Class 3 severe obesity due to excess calories with body mass index (BMI) of 40 0 to 44 9 in adult, unspecified whether serious comorbidity present (HCC)      risperiDONE (RisperDAL) 2 mg tablet 4 mg       rOPINIRole (REQUIP) 2 mg tablet Take 4 mg by mouth daily       rosuvastatin (CRESTOR) 40 MG tablet Take 1 tablet (40 mg total) by mouth daily  Qty: 90 tablet, Refills: 1    Associated Diagnoses: Mixed hyperlipidemia      triamcinolone (KENALOG) 0 1 % ointment Apply topically 2 (two) times a day  Qty: 453 6 g, Refills: 2    Associated Diagnoses: Intrinsic eczema      mupirocin (BACTROBAN) 2 % nasal ointment into each nostril 2 (two) times a day  Qty: 1 g, Refills: 1    Associated Diagnoses: Nostril infection             Allergies:   Allergies   Allergen Reactions    Influenza Vaccines      History of guillan barre syndrome       FOLLOW-UP     PCP Outpatient Follow-up:  Yes, follow up with PCP within 7-14 days for post hospital visit    Consulting Providers Follow-up:  None    Active Issues Requiring Follow-up:   Follow up regarding substance abuse cessation  Patient should undergo sleep study for evaluation of DEMETRIA  Can consider outpatient referral to PT for back pain    Discharge Statement:   I spent 15 minutes minutes discharging the patient  This time was spent on the day of discharge  I had direct contact with the patient on the day of discharge  Additional documentation is required if more than 30 minutes were spent on discharge  Portions of the record may have been created with voice recognition software  Occasional wrong word or "sound a like" substitutions may have occurred due to the inherent limitations of voice recognition software    Read the chart carefully and recognize, using context, where substitutions have occurred     ==  Deven Del Real MD  [address]  Internal Medicine Resident PGY-1

## 2021-07-11 NOTE — PROGRESS NOTES
Patient pulled off his telemetry and eliza monitors; stated he "felt closed in and cannot sleep with all the cords and wires "  Patient is becoming increasingly agitated and requested his IV be removed; RN educated patient on importance of monitors and IV; patient agreed to keep IV in  SOD made aware; will continue to monitor

## 2021-07-11 NOTE — PROGRESS NOTES
SOD at bedside; patient agreed to put telemetry and monitors back on after he gets a few hours of sleep; will continue to monitor

## 2021-07-12 ENCOUNTER — TRANSITIONAL CARE MANAGEMENT (OUTPATIENT)
Dept: INTERNAL MEDICINE CLINIC | Facility: CLINIC | Age: 56
End: 2021-07-12
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
## 2021-07-14 ENCOUNTER — OFFICE VISIT (OUTPATIENT)
Dept: INTERNAL MEDICINE CLINIC | Facility: CLINIC | Age: 56
End: 2021-07-14

## 2021-07-14 VITALS
BODY MASS INDEX: 40.18 KG/M2 | DIASTOLIC BLOOD PRESSURE: 90 MMHG | HEART RATE: 89 BPM | SYSTOLIC BLOOD PRESSURE: 137 MMHG | HEIGHT: 66 IN | TEMPERATURE: 97.8 F | OXYGEN SATURATION: 97 % | WEIGHT: 250 LBS

## 2021-07-14 DIAGNOSIS — F25.0 SCHIZOAFFECTIVE DISORDER, BIPOLAR TYPE (HCC): Chronic | ICD-10-CM

## 2021-07-14 DIAGNOSIS — M46.1 SACROILIITIS (HCC): ICD-10-CM

## 2021-07-14 DIAGNOSIS — F43.21 SITUATIONAL DEPRESSION: ICD-10-CM

## 2021-07-14 DIAGNOSIS — M54.16 LUMBAR RADICULOPATHY: ICD-10-CM

## 2021-07-14 DIAGNOSIS — F14.10 COCAINE ABUSE (HCC): Primary | ICD-10-CM

## 2021-07-14 DIAGNOSIS — E66.01 OBESITY, MORBID (HCC): ICD-10-CM

## 2021-07-14 PROCEDURE — 99495 TRANSJ CARE MGMT MOD F2F 14D: CPT | Performed by: PHYSICIAN ASSISTANT

## 2021-07-14 NOTE — PROGRESS NOTES
Assessment/Plan:      Diagnoses and all orders for this visit:    Cocaine abuse ([facility] [address] )    Schizoaffective disorder, bipolar type ([facility] [address] )    Situational depression    Sacroiliitis ([facility] [address] )    Lumbar radiculopathy    [facility]      56y/o male here today for TCM for hospital admission [facility] 7/10, discharged 7/11 for observation of chest tightness, cocaine use  Brief detail of hospitalization at below  Pt denies any reoccurring chest tightnes/pain or return of sxs  He states his first goal and not to use any drugs, has been clean so far for 5 days  He continues in 12 steps program and has good support from family and members of the sobriety program   Triggers are his [address], reactive depression regarding financial situation with his ex-wife as well as being around drugs where he currently lives  Pt optimistic and doesn't intend on using drugs again  He is established with  [facility] and does have psychiatry and therapist he reportedly follows with regularly  Main goals for patient his MH stability and drug/ETOH sobriety  I will see if our  can connect with him and see if she can be of any assistance, as pt's largest concerns today was his financial situation, housing situation and attempting to get legal kimbrough for money back on their house  However, I did make pt aware that he is already on housing list and he already gets SS disability, he is already linked to drug program and , so Im not sure how much more she can help  He wanted to leave in a hurry and could not wait for SW, so he is aware she would be contacting him within 1-2 weeks  Med list reviewed with pt, stressed importance of medication compliance for managing his conditions  Weight loss advised as we briefly talked about his well being and general health  Advised exercise as this would help physically, but also help to relieve stress  I did see he was discharged from PM due to no show policy   Pt upset about that  He has no plan for other follow up but did not want to discuss pain today, will discuss at later time  He is aware taht weight loss would help as his large abdomen, weak core is putting more pressure on his back  Core strengthening exercises in AVS handout today  Pt happy just being able to talk today and get a lot of things off his chest       He will f/u with me in about 2-3 months, sooner if any problems arise  Chief Complaint   Patient presents with    Transition of Care Management     ED 7/10/21         Subjective:     Patient ID: Alessandro Ferguson is a 49 y o  male  TCM Call (since 6/16/2021)     Hospital care reviewed  Records reviewed    Patient was hospitialized at  [facility]        Date of Admission  07/10/21    Date of discharge  07/11/21    Diagnosis  CHEST TIGHTNESS - R07 89    Disposition  Home (Comment)  LIVES ALONE      TCM Call (since 6/16/2021)     Did you obtain your prescribed medications  -- (Comment)  USES [facility] ON   [address]     Do you need help managing your prescriptions or medications  No (Comment)    PT  IS ABLE TO MANAGE HIS OWN MEDS    Is transportation to your appointment needed  No (Comment)  PT  WALKS TO   APPTS    I have advised the patient to call PCP with any new or worsening symptoms    ANTONIO ALATORRE LPN    Living Arrangements  Alone    Comments  PT  HAS HISTORY OF COCAINE USE WITH RELAPSE          56y/o male here today for TCM for hospital admission for chest pain/tightness  ED visit on 7/10 secondary to chest tightness, dizziness, recent cocaine use  Admitted for observation, no significant abnormalities on BW and testing  troponins were 0 06 x 2  Pt reportedly was given IV valium and fluids with improvement, advised PCP f/u per hospital note  Pt states dizziness, chest tightness/pain has since resolved, no reoccurring sxs       Pt today reporting he is having much difficulty with his emotions, states he is extremely depressed, anxious and angry  Blames his drug relapse on his ex-wife  States he has been having a lot off issues related to finances and the home they had, recent verbal altercation   wife in 2018, having trust issues with her  The wife currently lives in the house, states he did not get what was owed to him regarding the house, based on what the house is currenlty worth and what he put into it  He states when he signed divorce agreement and paperwork, he was under the influence, and he does not read, so she took advantage of that  States he has tried calling different  to see if he could get more money from her, but no luck, and this makes him upset  He does have stable housing at this point  Lives in an apartment currently, [facility], but reports  it is unstable for him because a lot of drug use going on  States he currently gets his income from SS disability, and small pension  Rent per month is $900  Difficult to afford  He does find it difficult to get food as well  However he states he is so depressed, stressed he has lost appetite  State he continues to be frustrated with his relapse because he was 14 months clean but is determined to remain clean again  He is still attending 12 steps program for drug and alcohol sobriety  States he is upset because he is on wait list for [employer], and he reports the  [employer] have seniority  States he is going back and forth on the list between 32 to 28, and he is told he is not moving because Veterans have priority  States he has been very depressed  No suicidal thoughts, good suuport from mother and family, also has a son whom he does get along with  He does have bipolar, sometimes forgets to take the medications  Sees Dr Daisy Roth through [facility]  states he is following with psychiatrist and therapist      He was discharged from pain management due to no showing for appt  States he has no plan  For pain management at this time     He did change mattress and bed frame which had helped a little with his back  Review of Systems   Constitutional: Negative  Respiratory: Negative  Cardiovascular: Negative  Gastrointestinal: Negative  Genitourinary: Negative  Musculoskeletal:        Chronic back pain   Neurological: Negative  Psychiatric/Behavioral:        As in HPI         The following portions of the patient's history were reviewed and updated as appropriate: allergies, current medications, past family history, past medical history, past social history, past surgical history and problem list       Objective:     Physical Exam  Vitals reviewed  Constitutional:       General: He is not in acute distress  Appearance: He is obese  He is not ill-appearing or toxic-appearing  HENT:      Head: Normocephalic and atraumatic  Neck:      Vascular: No carotid bruit  Cardiovascular:      Rate and Rhythm: Normal rate and regular rhythm  Pulses: Normal pulses  Heart sounds: Normal heart sounds  Pulmonary:      Effort: Pulmonary effort is normal       Breath sounds: Normal breath sounds  Abdominal:      General: Bowel sounds are normal       Tenderness: There is no abdominal tenderness  Comments: Largely protuberant abdomen   Musculoskeletal:      Cervical back: Neck supple  Right lower leg: No edema  Left lower leg: No edema  Lymphadenopathy:      Head:      Right side of head: No submandibular or tonsillar adenopathy  Left side of head: No submandibular or tonsillar adenopathy  Neurological:      Mental Status: He is alert and oriented to person, place, and time  Psychiatric:         Mood and Affect: Mood is anxious and depressed  Affect is labile, blunt, angry and tearful  Speech: Speech is rapid and pressured  Behavior: Behavior is agitated  Thought Content: Thought content does not include homicidal or suicidal ideation   Thought content does not include homicidal or suicidal plan        Comments: Pt largely upset with ex-wife, however pt is not threatening in any way, no abusive or threatening words used         Vitals:    07/14/21 1120   BP: 137/90   BP Location: Left arm   Patient Position: Sitting   Cuff Size: Large   Pulse: 89   Temp: 97 8 °F (36 6 °C)   TempSrc: Temporal   SpO2: 97%   Weight: 113 kg (250 lb)   Height: 5' 6" (1 676 m)

## 2021-07-14 NOTE — PATIENT INSTRUCTIONS
Core Strengthening Exercises   AMBULATORY CARE:   What you need to know about core strengthening exercises: Your core includes the muscles of your lower back, hip, pelvis, and abdomen  Core strengthening exercises help heal and strengthen these muscles  This helps prevent another injury, and keeps your pelvis, spine, and hips in the correct position  Contact your healthcare provider if:   · You have sharp or worsening pain during exercise or at rest     · You have questions or concerns about your shoulder exercises  Safety tips:  Talk to your healthcare provider before you start an exercise program  A physical therapist can teach you how to do core strengthening exercises safely  · Do the exercises on a mat or firm surface  A firm surface will support your spine and prevent low back pain  Do not do these exercises on a bed  · Move slowly and smoothly  Avoid fast or jerky motions  · Stop if you feel pain  Core exercises should not be painful  Stop if you feel pain  · Breathe normally during core exercises  Do not hold your breath  This may cause an increase in blood pressure and prevent muscle strengthening  Your healthcare provider will tell you when to inhale and exhale during the exercise  · Begin all of your exercises with abdominal bracing  Abdominal bracing helps warm up your core muscles  You can also practice abdominal bracing throughout the day  Lie on your back with your knees bent and feet flat on the floor  Place your arms in a relaxed position beside your body  Tighten your abdominal muscles  Pull your belly button in and up toward your spine  Hold for 5 seconds  Relax your muscles  Repeat 10 times  Core strengthening exercises: Your healthcare provider will tell you how often to do these exercises  The provider will also tell you how many repetitions of each exercise you should do  Hold each exercise for 5 seconds or as directed   As you get stronger, increase your hold to 10 to 15 seconds  You can do some of these exercises on a stability ball, or with a weight  Ask your healthcare provider how to use a stability ball or weight for these exercises:  · Bridging:  Lie on your back with your knees bent and feet flat on the floor  Rest your arms at your side  Tighten your buttocks, and then lift your hips 1 inch off the floor  Hold for 5 seconds  When you can do this exercise without pain for 10 seconds, increase the distance you lift your hips  A good goal is to be able to lift your hips so that your shoulders, hips, and knees are in a straight line  · Dead bug:  Lie on your back with your knees bent and feet flat on the floor  Place your arms in a relaxed position beside your body  Begin with abdominal bracing  Next, raise one leg, keeping your knee bent  Hold for 5 seconds  Repeat with the other leg  When you can do this exercise without pain for 10 to 15 seconds, you may raise one straight leg and hold  Repeat with the other leg  · Quadruped:  Place your hands and knees on the floor  Keep your wrists directly below your shoulders and your knees directly below your hips  Pull your belly button in toward your spine  Do not flatten or arch your back  Tighten your abdominal muscles below your belly button  Hold for 5 seconds  When you can do this exercise without pain for 10 to 15 seconds, you may extend one arm and hold  Repeat on the other side  · Side bridge exercises:      ? Standing side bridge:  Stand next to a wall and extend one arm toward the wall  Place your palm flat on the wall with your fingers pointing upward  Begin with abdominal bracing  Next, without moving your feet, slowly bend your arm to 90 degrees  Hold for 5 seconds  Repeat on the other side  When you can do this exercise without pain for 10 to 15 seconds, you may do the bent leg side bridge on the floor  ?  Bent leg side bridge:  Lie on one side with your legs, hips, and shoulders in a straight line  Prop yourself up onto your forearm so your elbow is directly below your shoulder  Bend your knees back to 90 degrees  Begin with abdominal bracing  Next, lift your hips and balance yourself on your forearm and knees  Hold for 5 seconds  Repeat on the other side  When you can do this exercise without pain for 10 to 15 seconds, you may do the straight leg side bridge on the floor  ? Straight leg side bridge:  Lie on one side with your legs, hips, and shoulders in a straight line  Prop yourself up onto your forearm so your elbow is directly below your shoulder  Begin with abdominal bracing  Lift your hips off the floor and balance yourself on your forearm and the outside of your flexed foot  Do not let your ankle bend sideways  Hold for 5 seconds  Repeat on the other side  When you can do this exercise without pain for 10 to 15 seconds, ask your healthcare provider for more advanced exercises  · Superman:  Lie on your stomach  Extend your arms forward on the floor  Tighten your abdominal muscles and lift your right hand and left leg off the floor  Hold this position  Slowly return to the starting position  Tighten your abdominal muscles and lift your left hand and right leg off the floor  Hold this position  Slowly return to the starting position  · Clam:  Lie on your side with your knees bent  Put your bottom arm under your head to keep your neck in line  Put your top hand on your hip to keep your pelvis from moving  Put your heels together, and keep them together during this exercise  Slowly raise your top knee toward the ceiling  Then lower your leg so your knees are together  Repeat this exercise 10 times  Then switch sides and do the exercise 10 times with the other leg  · Curl up:  Lie on your back with your knees bent and feet flat on the floor  Place your hands, palms down, underneath your lower back   Next, with your elbows on the floor, lift your shoulders and chest 2 to 3 inches off the floor  Keep your head in line with your shoulders  Hold this position  Slowly return to the starting position  · Straight leg raises:  Lie on your back with one leg straight  Bend the other knee and place your foot flat on the floor  Tighten your abdominal muscles  Keep your leg straight and slowly lift it straight up 6 to 12 inches off the floor  Hold this position  Lower your leg slowly  Do as many repetitions as directed on this side  Repeat with the other leg  · Plank:  Lie on your stomach  Bend your elbows and place your forearms flat on the floor  Lift your chest, stomach, and knees off the floor  Make sure your elbows are below your shoulders  Your body should be in a straight line  Do not let your hips or lower back sink to the ground  Squeeze your abdominal muscles together and hold for 15 seconds  To make this exercise harder, hold for 30 seconds or lift 1 leg at a time  · Bicycles:  Lie on your back  Bend both knees and bring them toward your chest  Your calves should be parallel to the floor  Place the palms of your hands on the back of your head  Straighten your right leg and keep it lifted 2 inches off the floor  Raise your head and shoulders off the floor and twist towards your left  Keep your head and shoulders lifted  Bend your right knee while you straighten your left leg  Keep your left leg 2 inches off the floor  Twist your head and chest towards the left leg  Continue to straighten 1 leg at a time and twist        Follow up with your healthcare provider as directed:  Write down your questions so you remember to ask them during your visits  © Copyright [address] Information is for End User's use only and may not be sold, redistributed or otherwise used for commercial purposes   All illustrations and images included in CareNotes® are the copyrighted property of A D A M , Inc  or [address] [place], [place] [address]augustus Doe   The above information is an  only  It is not intended as medical advice for individual conditions or treatments  Talk to your doctor, nurse or pharmacist before following any medical regimen to see if it is safe and effective for you        Pt happy

## 2021-07-15 ENCOUNTER — TELEPHONE (OUTPATIENT)
Dept: GASTROENTEROLOGY | Facility: CLINIC | Age: 56
End: 2021-07-15

## 2021-07-21 ENCOUNTER — TELEPHONE (OUTPATIENT)
Dept: GASTROENTEROLOGY | Facility: HOSPITAL | Age: 56
End: 2021-07-21

## 2021-07-28 ENCOUNTER — PATIENT OUTREACH (OUTPATIENT)
Dept: INTERNAL MEDICINE CLINIC | Facility: CLINIC | Age: 56
End: 2021-07-28

## 2021-08-04 ENCOUNTER — TELEPHONE (OUTPATIENT)
Dept: NUTRITION | Facility: HOSPITAL | Age: 56
End: 2021-08-04

## 2021-08-04 NOTE — TELEPHONE ENCOUNTER
Left voicemail in regards to insurance coverage and nutrition appointment on 8/16/21  Medicare only covers nutrition appointments for dx codes of DM and CKD, therefore self pay amount of $80/1h or $40/30min will be required at time of appointment  Central scheduling number provided if pt chooses to cancel/reschedule appointment- [phone]  Rachelle Sanderson MS, RD, [place], DIANNAN  Clinical Nutrition   [email or web address]

## 2021-08-11 ENCOUNTER — PATIENT OUTREACH (OUTPATIENT)
Dept: INTERNAL MEDICINE CLINIC | Facility: CLINIC | Age: 56
End: 2021-08-11

## 2021-08-12 ENCOUNTER — PATIENT OUTREACH (OUTPATIENT)
Dept: INTERNAL MEDICINE CLINIC | Facility: CLINIC | Age: 56
End: 2021-08-12

## 2021-08-12 DIAGNOSIS — Z78.9 NEEDS ASSISTANCE WITH COMMUNITY RESOURCES: Primary | ICD-10-CM

## 2021-08-12 NOTE — PROGRESS NOTES
NAI has spoken with pt at length this date to see if SW can assist with any financial resources  Pt is a 50 y/o  male , father on a 24 y/o son  who is on SSD due to medical / [address] issues  Pt also admits to literacy issue  He  is receiving [address] [place] and is also in recovery for his substance use   Pt is clean as working hard to remain same  He receives support from his 24 y/o son who he is very proud of and  who visits him on a regular basis  Pt is looking in to legal issues from his past divorce and settlement issue which SW can NOT advise him on  Pt indicates he understands this  Pt is in contact with a  but needs to come up with a retainer  NAI did suggust he can check with  but NAI is unaware if the can assist in these cases  Pt is active with [facility] which he finds helpful  He is active is 2-3 NA meetings daily an has a very helpful sponsor  Praise provided  Pt is on the [address] and he realizes his # is getting closer  He currently has a basement apt for $900/monthly ( He does not pay utilities) and finds he has difficulty keeping up with same  Pt received a small pension and SSD about $ 27,700 annually)  Pt reports he was turned down for SNAP in the past    He would like to try again and also see if he is eligible for any rental assistance  NAI will ask our CHW to review program with pt and help apply if able  Will explore if pt received a stimulus payment  Supportive counseling provided  SW/ CHW to remain avaiviable to assist as indicted

## 2021-08-15 ENCOUNTER — HOSPITAL ENCOUNTER (EMERGENCY)
Facility: HOSPITAL | Age: 56
Discharge: HOME | End: 2021-08-15
Attending: EMERGENCY MEDICINE
Payer: MEDICARE

## 2021-08-15 VITALS
OXYGEN SATURATION: 99 % | BODY MASS INDEX: 40.35 KG/M2 | TEMPERATURE: 98.6 F | RESPIRATION RATE: 18 BRPM | HEART RATE: 96 BPM | SYSTOLIC BLOOD PRESSURE: 149 MMHG | DIASTOLIC BLOOD PRESSURE: 88 MMHG | WEIGHT: 250 LBS

## 2021-08-15 DIAGNOSIS — K08.89 PAIN, DENTAL: Primary | ICD-10-CM

## 2021-08-15 PROCEDURE — 96374 THER/PROPH/DIAG INJ IV PUSH: CPT

## 2021-08-15 PROCEDURE — 96372 THER/PROPH/DIAG INJ SC/IM: CPT

## 2021-08-15 PROCEDURE — 99282 EMERGENCY DEPT VISIT SF MDM: CPT

## 2021-08-15 PROCEDURE — 99284 EMERGENCY DEPT VISIT MOD MDM: CPT | Performed by: EMERGENCY MEDICINE

## 2021-08-15 RX ORDER — PENICILLIN V POTASSIUM 500 MG/1
500 TABLET ORAL 4 TIMES DAILY
Qty: 20 TABLET | Refills: 0 | Status: SHIPPED | OUTPATIENT
Start: 2021-08-15 | End: 2021-08-20

## 2021-08-15 RX ORDER — IBUPROFEN 600 MG/1
600 TABLET ORAL EVERY 6 HOURS PRN
Qty: 30 TABLET | Refills: 0 | Status: SHIPPED | OUTPATIENT
Start: 2021-08-15 | End: 2021-08-30 | Stop reason: HOSPADM

## 2021-08-15 RX ORDER — PENICILLIN V POTASSIUM 250 MG/1
500 TABLET ORAL ONCE
Status: COMPLETED | OUTPATIENT
Start: 2021-08-15 | End: 2021-08-15

## 2021-08-15 RX ORDER — BUPIVACAINE HYDROCHLORIDE AND EPINEPHRINE 2.5; 5 MG/ML; UG/ML
5 INJECTION, SOLUTION EPIDURAL; INFILTRATION; INTRACAUDAL; PERINEURAL ONCE
Status: COMPLETED | OUTPATIENT
Start: 2021-08-15 | End: 2021-08-15

## 2021-08-15 RX ORDER — CHLORHEXIDINE GLUCONATE 0.12 MG/ML
15 RINSE ORAL ONCE
Status: COMPLETED | OUTPATIENT
Start: 2021-08-15 | End: 2021-08-15

## 2021-08-15 RX ORDER — LIDOCAINE HYDROCHLORIDE 20 MG/ML
15 SOLUTION OROPHARYNGEAL ONCE
Status: COMPLETED | OUTPATIENT
Start: 2021-08-15 | End: 2021-08-15

## 2021-08-15 RX ORDER — KETOROLAC TROMETHAMINE 30 MG/ML
15 INJECTION, SOLUTION INTRAMUSCULAR; INTRAVENOUS ONCE
Status: COMPLETED | OUTPATIENT
Start: 2021-08-15 | End: 2021-08-15

## 2021-08-15 RX ORDER — ACETAMINOPHEN 325 MG/1
650 TABLET ORAL EVERY 6 HOURS PRN
Qty: 28 TABLET | Refills: 0 | Status: SHIPPED | OUTPATIENT
Start: 2021-08-15 | End: 2021-08-22

## 2021-08-15 RX ORDER — KETOROLAC TROMETHAMINE 30 MG/ML
15 INJECTION, SOLUTION INTRAMUSCULAR; INTRAVENOUS ONCE
Status: DISCONTINUED | OUTPATIENT
Start: 2021-08-15 | End: 2021-08-15

## 2021-08-15 RX ADMIN — KETOROLAC TROMETHAMINE 15 MG: 30 INJECTION, SOLUTION INTRAMUSCULAR at 21:33

## 2021-08-15 RX ADMIN — BUPIVACAINE HYDROCHLORIDE AND EPINEPHRINE 5 ML: 2.5; 5 INJECTION, SOLUTION EPIDURAL; INFILTRATION; INTRACAUDAL; PERINEURAL at 22:24

## 2021-08-15 RX ADMIN — PENICILLIN V POTASSIUM 500 MG: 250 TABLET, FILM COATED ORAL at 21:19

## 2021-08-15 RX ADMIN — CHLORHEXIDINE GLUCONATE 15 ML: 1.2 SOLUTION ORAL at 22:23

## 2021-08-15 RX ADMIN — LIDOCAINE HYDROCHLORIDE 15 ML: 20 SOLUTION ORAL; TOPICAL at 21:19

## 2021-08-16 NOTE — DISCHARGE INSTRUCTIONS
You have been seen for dental pain  Please complete the course of antibiotics as prescribed  You can take tylenol and ibuprofen over the counter for symptoms  Return to the emergency department if you develop worsening pain, swelling, fevers, neck pain/swelling or any other symptoms of concern  Please follow up with the dental clinic by calling the number provided

## 2021-08-16 NOTE — ED ATTENDING ATTESTATION
8/15/2021  ISameer DO, saw and evaluated the patient  I have discussed the patient with the resident/non-physician practitioner and agree with the resident's/non-physician practitioner's findings, Plan of Care, and MDM as documented in the resident's/non-physician practitioner's note, except where noted  All available labs and Radiology studies were reviewed  I was present for key portions of any procedure(s) performed by the resident/non-physician practitioner and I was immediately available to provide assistance  At this point I agree with the current assessment done in the Emergency Department  I have conducted an independent evaluation of this patient a history and physical is as follows:    49 yom with right upper dental pain  1 week  Aching  No trismus, abcess, fever  Poor dentition   Dental block, refer to dental     ED Course         Critical Care Time  Procedures
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
## 2021-08-17 ENCOUNTER — OFFICE VISIT (OUTPATIENT)
Dept: DENTISTRY | Facility: CLINIC | Age: 56
End: 2021-08-17

## 2021-08-17 DIAGNOSIS — K02.9 DENTAL CARIES INTO PULP: Primary | ICD-10-CM

## 2021-08-17 PROCEDURE — D7140 EXTRACTION, ERUPTED TOOTH OR EXPOSED ROOT (ELEVATION AND/OR FORCEPS REMOVAL): HCPCS | Performed by: DENTIST

## 2021-08-17 NOTE — PROGRESS NOTES
Oral Surgery    Naldo Mercado presents for Ext #3    Pt presents with pain in #3  Pt states that pain is keeping him up at night and lingers when he drinks cold or bites on tooth  Limited PA taken shows mesial caries that extends to mesial canal  No deep pocketing but noted bone loss circumferentially around #3  Pt refused percussion test or cold test stating "we already know the tooth hurts"  Pt had positive symptoms to palpation on buccal     Advised patient that tooth may be savable with RCT + crown with possible crown lengthening and post + coree  Pt stated he has to consider financials  Explained to patient that tooth #3 is not occluding the opposing arch so it is technically nonfunctional at the moment and that a partial denture can be used to replace it  Pt stated that he is planned to have a denture on top  Pt opted to have #3 extracted to be taken out of pain and did not want RCT +crown due to financial, time, and prognosis concerns  [address], patient denies any changes  Obtained a direct and personal consent  Risks and complications were explained  Pt agreed and consented  Consent scanned in doc center  Pre-Op BP WNL  Administered 3 carps of 2 % Lidocaine w/ 1:100,000 epi via infiltration  ? Adequate anesthesia obtained, reflected gingiva, elevated, and extracted #3   Socket irrigated, and curetted  Upon dismissal, patient received POI, ice, gauze, and verbally discussed 400mg Ibuprofen and 500mg Tylenol every 6 hours for first 3 days then PRN  Pt was upset that he was not prescribed Tylenol 3 w/ codeine  Informed pt that is not necessary bc this was a simple extraction that was relatively atraumatic  Pt was unhappy and left upset      NV: Comp exam/ FMX

## 2021-08-18 ENCOUNTER — TELEPHONE (OUTPATIENT)
Dept: INTERNAL MEDICINE CLINIC | Facility: CLINIC | Age: 56
End: 2021-08-18

## 2021-08-18 NOTE — TELEPHONE ENCOUNTER
Late documentation- 8/17/21 at 11:30am      Patient was having dental procedure in building and decided to stop in office and complain of bilateral leg pain and spots on leg  After review of legs, patients legs were not swollen and I did not notice any spots on legs  Patient was stating he was having leg pain  I did advise patient I was not a doctor but if this was something he was concerned about, we could give patient an appointment for 1:30 with provider  Patient declined as patient did not want to wait  I did advise patient if he wanted to go to urgent care and be evaluated he could  Patient declined as well as patient stated he had a lot going on  We offered patient an appointment for 8/18/21 and he accepted  Scheduled patient for 8/18/21  I informed LORY Gonzalez, provider seeing patient and provider agreed with this as apointment for following day was all patient would agree too and would not take any other recommendation  Patient was a no show today 8/18/21 for appointment  This is an fyi as we discusseed this patient verbally

## 2021-08-18 NOTE — TELEPHONE ENCOUNTER
I am aware of incident - discussed with [facility]  Pt made aware she could not make medical decision for him and offered same day appt with a medical provider, though he declined  - pt did not show for appt today  Clerical - do you want to reach out to pt to offer reschedule his no show appt if he is still having concern?

## 2021-08-21 ENCOUNTER — HOSPITAL ENCOUNTER (EMERGENCY)
Facility: HOSPITAL | Age: 56
Discharge: HOME | End: 2021-08-21
Attending: EMERGENCY MEDICINE | Admitting: EMERGENCY MEDICINE
Payer: MEDICARE

## 2021-08-21 VITALS
TEMPERATURE: 98.2 F | OXYGEN SATURATION: 97 % | HEART RATE: 107 BPM | SYSTOLIC BLOOD PRESSURE: 165 MMHG | RESPIRATION RATE: 26 BRPM | DIASTOLIC BLOOD PRESSURE: 98 MMHG

## 2021-08-21 DIAGNOSIS — F15.929: ICD-10-CM

## 2021-08-21 DIAGNOSIS — F41.9 ANXIETY: Primary | ICD-10-CM

## 2021-08-21 PROCEDURE — 96372 THER/PROPH/DIAG INJ SC/IM: CPT

## 2021-08-21 PROCEDURE — 99283 EMERGENCY DEPT VISIT LOW MDM: CPT

## 2021-08-21 PROCEDURE — 99284 EMERGENCY DEPT VISIT MOD MDM: CPT | Performed by: EMERGENCY MEDICINE

## 2021-08-21 RX ORDER — LORAZEPAM 0.5 MG/1
1 TABLET ORAL ONCE
Status: COMPLETED | OUTPATIENT
Start: 2021-08-21 | End: 2021-08-21

## 2021-08-21 RX ORDER — LORAZEPAM 2 MG/ML
0.5 INJECTION INTRAMUSCULAR ONCE
Status: COMPLETED | OUTPATIENT
Start: 2021-08-21 | End: 2021-08-21

## 2021-08-21 RX ADMIN — LORAZEPAM 0.5 MG: 2 INJECTION INTRAMUSCULAR; INTRAVENOUS at 05:29

## 2021-08-21 RX ADMIN — LORAZEPAM 1 MG: 0.5 TABLET ORAL at 04:30

## 2021-08-21 NOTE — ED ATTENDING ATTESTATION
8/21/2021  Michelle Rice DO, saw and evaluated the patient  I have discussed the patient with the resident/non-physician practitioner and agree with the resident's/non-physician practitioner's findings, Plan of Care, and MDM as documented in the resident's/non-physician practitioner's note, except where noted  All available labs and Radiology studies were reviewed  I was present for key portions of any procedure(s) performed by the resident/non-physician practitioner and I was immediately available to provide assistance  At this point I agree with the current assessment done in the Emergency Department  I have conducted an independent evaluation of this patient a history and physical is as follows:    48 yo male c/o anxiety, restless legs after smoking "some bad stuff"  Symptoms constant, moderate severity, generalized in nature  Denies chest pain  No other c/o at this time  Imp: anxiety, may have some sympathomimetic toxidrome driving his symptoms plan: ativan        ED Course         Critical Care Time  Procedures
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
## 2021-08-23 ENCOUNTER — PATIENT OUTREACH (OUTPATIENT)
Dept: CASE MANAGEMENT | Facility: HOSPITAL | Age: 56
End: 2021-08-23

## 2021-08-23 NOTE — PROGRESS NOTES
1st Attempt - CHW lm for pt to return call to discuss referral submitted by NAI Colind[place] CM for assistance with SNAP, rental assistance, and stimulus payment
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
## 2021-08-25 ENCOUNTER — HOSPITAL ENCOUNTER (EMERGENCY)
Facility: HOSPITAL | Age: 56
Discharge: HOME | End: 2021-08-25
Attending: EMERGENCY MEDICINE
Payer: MEDICARE

## 2021-08-25 ENCOUNTER — APPOINTMENT (EMERGENCY)
Dept: RADIOLOGY | Facility: HOSPITAL | Age: 56
End: 2021-08-25
Payer: MEDICARE

## 2021-08-25 VITALS
HEART RATE: 79 BPM | SYSTOLIC BLOOD PRESSURE: 132 MMHG | BODY MASS INDEX: 39.25 KG/M2 | DIASTOLIC BLOOD PRESSURE: 70 MMHG | OXYGEN SATURATION: 95 % | RESPIRATION RATE: 18 BRPM | WEIGHT: 243.17 LBS | TEMPERATURE: 98 F

## 2021-08-25 DIAGNOSIS — R77.8 ELEVATED TROPONIN: ICD-10-CM

## 2021-08-25 DIAGNOSIS — R07.89 ATYPICAL CHEST PAIN: Primary | ICD-10-CM

## 2021-08-25 LAB
ANION GAP SERPL CALCULATED.3IONS-SCNC: 4 MMOL/L (ref 4–13)
ATRIAL RATE: 81 BPM
ATRIAL RATE: 90 BPM
BASOPHILS # BLD AUTO: 0.02 THOUSANDS/ΜL (ref 0–0.1)
BASOPHILS NFR BLD AUTO: 0 % (ref 0–1)
BUN SERPL-MCNC: 10 MG/DL (ref 5–25)
CALCIUM SERPL-MCNC: 9.1 MG/DL (ref 8.3–10.1)
CHLORIDE SERPL-SCNC: 104 MMOL/L (ref 100–108)
CO2 SERPL-SCNC: 26 MMOL/L (ref 21–32)
CREAT SERPL-MCNC: 0.98 MG/DL (ref 0.6–1.3)
EOSINOPHIL # BLD AUTO: 0.07 THOUSAND/ΜL (ref 0–0.61)
EOSINOPHIL NFR BLD AUTO: 1 % (ref 0–6)
ERYTHROCYTE [DISTWIDTH] IN BLOOD BY AUTOMATED COUNT: 12.8 % (ref 11.6–15.1)
GFR SERPL CREATININE-BSD FRML MDRD: 87 ML/MIN/1.73SQ M
GLUCOSE SERPL-MCNC: 84 MG/DL (ref 65–140)
HCT VFR BLD AUTO: 42.6 % (ref 36.5–49.3)
HGB BLD-MCNC: 14.3 G/DL (ref 12–17)
IMM GRANULOCYTES # BLD AUTO: 0.02 THOUSAND/UL (ref 0–0.2)
IMM GRANULOCYTES NFR BLD AUTO: 0 % (ref 0–2)
LYMPHOCYTES # BLD AUTO: 2.67 THOUSANDS/ΜL (ref 0.6–4.47)
LYMPHOCYTES NFR BLD AUTO: 43 % (ref 14–44)
MCH RBC QN AUTO: 30.4 PG (ref 26.8–34.3)
MCHC RBC AUTO-ENTMCNC: 33.6 G/DL (ref 31.4–37.4)
MCV RBC AUTO: 90 FL (ref 82–98)
MONOCYTES # BLD AUTO: 0.41 THOUSAND/ΜL (ref 0.17–1.22)
MONOCYTES NFR BLD AUTO: 7 % (ref 4–12)
NEUTROPHILS # BLD AUTO: 3.04 THOUSANDS/ΜL (ref 1.85–7.62)
NEUTS SEG NFR BLD AUTO: 49 % (ref 43–75)
NRBC BLD AUTO-RTO: 0 /100 WBCS
P AXIS: 54 DEGREES
P AXIS: 65 DEGREES
PLATELET # BLD AUTO: 170 THOUSANDS/UL (ref 149–390)
PMV BLD AUTO: 10.3 FL (ref 8.9–12.7)
POTASSIUM SERPL-SCNC: 4 MMOL/L (ref 3.5–5.3)
PR INTERVAL: 162 MS
PR INTERVAL: 172 MS
QRS AXIS: 52 DEGREES
QRS AXIS: 58 DEGREES
QRSD INTERVAL: 72 MS
QRSD INTERVAL: 84 MS
QT INTERVAL: 344 MS
QT INTERVAL: 348 MS
QTC INTERVAL: 404 MS
QTC INTERVAL: 420 MS
RBC # BLD AUTO: 4.71 MILLION/UL (ref 3.88–5.62)
SODIUM SERPL-SCNC: 134 MMOL/L (ref 136–145)
T WAVE AXIS: 20 DEGREES
T WAVE AXIS: 7 DEGREES
TROPONIN I SERPL-MCNC: 0.04 NG/ML
TROPONIN I SERPL-MCNC: 0.05 NG/ML
VENTRICULAR RATE: 81 BPM
VENTRICULAR RATE: 90 BPM
WBC # BLD AUTO: 6.23 THOUSAND/UL (ref 4.31–10.16)

## 2021-08-25 PROCEDURE — 84484 ASSAY OF TROPONIN QUANT: CPT | Performed by: EMERGENCY MEDICINE

## 2021-08-25 PROCEDURE — 36415 COLL VENOUS BLD VENIPUNCTURE: CPT | Performed by: EMERGENCY MEDICINE

## 2021-08-25 PROCEDURE — 93010 ELECTROCARDIOGRAM REPORT: CPT | Performed by: INTERNAL MEDICINE

## 2021-08-25 PROCEDURE — 99285 EMERGENCY DEPT VISIT HI MDM: CPT

## 2021-08-25 PROCEDURE — 71045 X-RAY EXAM CHEST 1 VIEW: CPT

## 2021-08-25 PROCEDURE — 99285 EMERGENCY DEPT VISIT HI MDM: CPT | Performed by: EMERGENCY MEDICINE

## 2021-08-25 PROCEDURE — 85025 COMPLETE CBC W/AUTO DIFF WBC: CPT | Performed by: EMERGENCY MEDICINE

## 2021-08-25 PROCEDURE — 93005 ELECTROCARDIOGRAM TRACING: CPT

## 2021-08-25 PROCEDURE — 80048 BASIC METABOLIC PNL TOTAL CA: CPT | Performed by: EMERGENCY MEDICINE

## 2021-08-25 PROCEDURE — 96374 THER/PROPH/DIAG INJ IV PUSH: CPT

## 2021-08-25 RX ORDER — CLONIDINE HYDROCHLORIDE 0.2 MG/1
0.2 TABLET ORAL ONCE
Status: COMPLETED | OUTPATIENT
Start: 2021-08-25 | End: 2021-08-25

## 2021-08-25 RX ORDER — ASPIRIN 325 MG
325 TABLET ORAL ONCE
Status: COMPLETED | OUTPATIENT
Start: 2021-08-25 | End: 2021-08-25

## 2021-08-25 RX ORDER — KETOROLAC TROMETHAMINE 30 MG/ML
15 INJECTION, SOLUTION INTRAMUSCULAR; INTRAVENOUS ONCE
Status: COMPLETED | OUTPATIENT
Start: 2021-08-25 | End: 2021-08-25

## 2021-08-25 RX ORDER — ONDANSETRON 4 MG/1
4 TABLET, ORALLY DISINTEGRATING ORAL ONCE
Status: COMPLETED | OUTPATIENT
Start: 2021-08-25 | End: 2021-08-25

## 2021-08-25 RX ORDER — ACETAMINOPHEN 325 MG/1
975 TABLET ORAL ONCE
Status: COMPLETED | OUTPATIENT
Start: 2021-08-25 | End: 2021-08-25

## 2021-08-25 RX ADMIN — ONDANSETRON 4 MG: 4 TABLET, ORALLY DISINTEGRATING ORAL at 06:27

## 2021-08-25 RX ADMIN — ACETAMINOPHEN 975 MG: 325 TABLET, FILM COATED ORAL at 05:54

## 2021-08-25 RX ADMIN — KETOROLAC TROMETHAMINE 15 MG: 30 INJECTION, SOLUTION INTRAMUSCULAR at 05:55

## 2021-08-25 RX ADMIN — ASPIRIN 325 MG ORAL TABLET 325 MG: 325 PILL ORAL at 05:58

## 2021-08-25 RX ADMIN — CLONIDINE HYDROCHLORIDE 0.2 MG: 0.2 TABLET ORAL at 05:55
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
## 2021-08-25 NOTE — ED NOTES
Pt states he is on the wait list for [facility] and does not want any assistance at this time        Donnamaria Krabbe, RN  08/25/21 100
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
## 2021-08-27 ENCOUNTER — HOSPITAL ENCOUNTER (EMERGENCY)
Facility: HOSPITAL | Age: 56
Discharge: HOME | End: 2021-08-27
Attending: EMERGENCY MEDICINE
Payer: MEDICARE

## 2021-08-27 ENCOUNTER — HOSPITAL ENCOUNTER (EMERGENCY)
Facility: HOSPITAL | Age: 56
Discharge: HOME | End: 2021-08-28
Attending: EMERGENCY MEDICINE | Admitting: EMERGENCY MEDICINE
Payer: MEDICARE

## 2021-08-27 VITALS
SYSTOLIC BLOOD PRESSURE: 162 MMHG | RESPIRATION RATE: 18 BRPM | OXYGEN SATURATION: 98 % | HEART RATE: 73 BPM | TEMPERATURE: 97 F | DIASTOLIC BLOOD PRESSURE: 82 MMHG

## 2021-08-27 VITALS
SYSTOLIC BLOOD PRESSURE: 173 MMHG | TEMPERATURE: 97.2 F | DIASTOLIC BLOOD PRESSURE: 113 MMHG | RESPIRATION RATE: 18 BRPM | HEART RATE: 105 BPM | OXYGEN SATURATION: 97 %

## 2021-08-27 DIAGNOSIS — F19.10 SUBSTANCE ABUSE (HCC): Primary | ICD-10-CM

## 2021-08-27 DIAGNOSIS — Z00.8 ENCOUNTER FOR PSYCHOLOGICAL EVALUATION: ICD-10-CM

## 2021-08-27 DIAGNOSIS — F41.9 ANXIETY: Primary | ICD-10-CM

## 2021-08-27 LAB — ETHANOL EXG-MCNC: 0.93 MG/DL

## 2021-08-27 PROCEDURE — 99283 EMERGENCY DEPT VISIT LOW MDM: CPT

## 2021-08-27 PROCEDURE — 99285 EMERGENCY DEPT VISIT HI MDM: CPT | Performed by: EMERGENCY MEDICINE

## 2021-08-27 PROCEDURE — 96372 THER/PROPH/DIAG INJ SC/IM: CPT

## 2021-08-27 PROCEDURE — 82075 ASSAY OF BREATH ETHANOL: CPT | Performed by: EMERGENCY MEDICINE

## 2021-08-27 PROCEDURE — 99284 EMERGENCY DEPT VISIT MOD MDM: CPT

## 2021-08-27 PROCEDURE — 99284 EMERGENCY DEPT VISIT MOD MDM: CPT | Performed by: EMERGENCY MEDICINE

## 2021-08-27 RX ORDER — LORAZEPAM 0.5 MG/1
1 TABLET ORAL ONCE
Status: COMPLETED | OUTPATIENT
Start: 2021-08-27 | End: 2021-08-27

## 2021-08-27 RX ORDER — LORAZEPAM 0.5 MG/1
1 TABLET ORAL ONCE
Status: COMPLETED | OUTPATIENT
Start: 2021-08-27 | End: 2021-08-28

## 2021-08-27 RX ORDER — LORAZEPAM 2 MG/ML
0.5 INJECTION INTRAMUSCULAR ONCE
Status: COMPLETED | OUTPATIENT
Start: 2021-08-27 | End: 2021-08-27

## 2021-08-27 RX ADMIN — LORAZEPAM 0.5 MG: 2 INJECTION INTRAMUSCULAR; INTRAVENOUS at 04:45

## 2021-08-27 RX ADMIN — LORAZEPAM 1 MG: 0.5 TABLET ORAL at 03:40
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
## 2021-08-28 LAB
AMPHETAMINES SERPL QL SCN: NEGATIVE
BARBITURATES UR QL: NEGATIVE
BENZODIAZ UR QL: NEGATIVE
COCAINE UR QL: POSITIVE
METHADONE UR QL: NEGATIVE
OPIATES UR QL SCN: NEGATIVE
OXYCODONE+OXYMORPHONE UR QL SCN: NEGATIVE
PCP UR QL: NEGATIVE
THC UR QL: NEGATIVE

## 2021-08-28 PROCEDURE — 80307 DRUG TEST PRSMV CHEM ANLYZR: CPT | Performed by: EMERGENCY MEDICINE

## 2021-08-28 RX ORDER — LORAZEPAM 0.5 MG/1
1 TABLET ORAL ONCE
Status: COMPLETED | OUTPATIENT
Start: 2021-08-28 | End: 2021-08-28

## 2021-08-28 RX ORDER — IBUPROFEN 600 MG/1
600 TABLET ORAL ONCE
Status: COMPLETED | OUTPATIENT
Start: 2021-08-28 | End: 2021-08-28

## 2021-08-28 RX ADMIN — LORAZEPAM 1 MG: 0.5 TABLET ORAL at 03:04

## 2021-08-28 RX ADMIN — LORAZEPAM 1 MG: 0.5 TABLET ORAL at 00:22

## 2021-08-28 RX ADMIN — IBUPROFEN 600 MG: 600 TABLET ORAL at 08:10

## 2021-08-28 NOTE — DISCHARGE INSTRUCTIONS
Follow up with HOST  They will contact you  You can also follow up with provided outpatient resources as needed  Continue to take home medications  Return to the ED with new or worsening symptoms including thoughts of harming yourself
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
## 2021-08-28 NOTE — ED NOTES
Ate breakfast  Asking for medication for back pain  sleeping with audible snore on and off     [facility]  08/28/21 6992

## 2021-08-28 NOTE — ED NOTES
Phone call was received Sandeep Spears from HOST  She will be calling to soon to access patient over the phone  She requested face sheet and medication list be faxed to [phone]  Paper work faxed

## 2021-08-28 NOTE — ED NOTES
Per nurse Westley Cagle patient meets inpatient criteria for Drug and Alcohol treatment, but has requested outpatient and discharge  Outpatient resources provided to patient

## 2021-08-29 ENCOUNTER — HOSPITAL ENCOUNTER (OUTPATIENT)
Facility: HOSPITAL | Age: 56
Setting detail: OBSERVATION
Discharge: HOME | End: 2021-08-30
Attending: EMERGENCY MEDICINE | Admitting: INTERNAL MEDICINE
Payer: MEDICARE

## 2021-08-29 ENCOUNTER — APPOINTMENT (EMERGENCY)
Dept: RADIOLOGY | Facility: HOSPITAL | Age: 56
End: 2021-08-29
Payer: MEDICARE

## 2021-08-29 DIAGNOSIS — R07.9 CHEST PAIN: ICD-10-CM

## 2021-08-29 DIAGNOSIS — F41.9 ANXIETY: ICD-10-CM

## 2021-08-29 DIAGNOSIS — F19.10 DRUG ABUSE (HCC): Primary | ICD-10-CM

## 2021-08-29 DIAGNOSIS — F19.10 SUBSTANCE ABUSE (HCC): ICD-10-CM

## 2021-08-29 PROBLEM — F19.20 DRUG ABUSE AND DEPENDENCE (HCC): Status: ACTIVE | Noted: 2021-08-29

## 2021-08-29 PROBLEM — F10.10 ALCOHOL ABUSE: Status: ACTIVE | Noted: 2018-12-21

## 2021-08-29 PROBLEM — H40.9 GLAUCOMA: Status: ACTIVE | Noted: 2021-08-29

## 2021-08-29 PROBLEM — G25.81 RESTLESS LEG SYNDROME: Status: ACTIVE | Noted: 2021-08-29

## 2021-08-29 LAB
AMPHETAMINES SERPL QL SCN: NEGATIVE
ANION GAP SERPL CALCULATED.3IONS-SCNC: 3 MMOL/L (ref 4–13)
ATRIAL RATE: 83 BPM
BARBITURATES UR QL: NEGATIVE
BASOPHILS # BLD AUTO: 0.02 THOUSANDS/ΜL (ref 0–0.1)
BASOPHILS NFR BLD AUTO: 0 % (ref 0–1)
BENZODIAZ UR QL: NEGATIVE
BUN SERPL-MCNC: 8 MG/DL (ref 5–25)
CALCIUM SERPL-MCNC: 9.5 MG/DL (ref 8.3–10.1)
CHLORIDE SERPL-SCNC: 105 MMOL/L (ref 100–108)
CO2 SERPL-SCNC: 27 MMOL/L (ref 21–32)
COCAINE UR QL: POSITIVE
CREAT SERPL-MCNC: 1 MG/DL (ref 0.6–1.3)
EOSINOPHIL # BLD AUTO: 0.07 THOUSAND/ΜL (ref 0–0.61)
EOSINOPHIL NFR BLD AUTO: 1 % (ref 0–6)
ERYTHROCYTE [DISTWIDTH] IN BLOOD BY AUTOMATED COUNT: 13 % (ref 11.6–15.1)
ETHANOL EXG-MCNC: 0 MG/DL
GFR SERPL CREATININE-BSD FRML MDRD: 85 ML/MIN/1.73SQ M
GLUCOSE SERPL-MCNC: 104 MG/DL (ref 65–140)
HCT VFR BLD AUTO: 44.1 % (ref 36.5–49.3)
HGB BLD-MCNC: 15 G/DL (ref 12–17)
IMM GRANULOCYTES # BLD AUTO: 0.02 THOUSAND/UL (ref 0–0.2)
IMM GRANULOCYTES NFR BLD AUTO: 0 % (ref 0–2)
LYMPHOCYTES # BLD AUTO: 2.49 THOUSANDS/ΜL (ref 0.6–4.47)
LYMPHOCYTES NFR BLD AUTO: 36 % (ref 14–44)
MCH RBC QN AUTO: 31 PG (ref 26.8–34.3)
MCHC RBC AUTO-ENTMCNC: 34 G/DL (ref 31.4–37.4)
MCV RBC AUTO: 91 FL (ref 82–98)
METHADONE UR QL: NEGATIVE
MONOCYTES # BLD AUTO: 0.44 THOUSAND/ΜL (ref 0.17–1.22)
MONOCYTES NFR BLD AUTO: 6 % (ref 4–12)
NEUTROPHILS # BLD AUTO: 3.93 THOUSANDS/ΜL (ref 1.85–7.62)
NEUTS SEG NFR BLD AUTO: 57 % (ref 43–75)
NRBC BLD AUTO-RTO: 0 /100 WBCS
OPIATES UR QL SCN: NEGATIVE
OXYCODONE+OXYMORPHONE UR QL SCN: NEGATIVE
P AXIS: 53 DEGREES
PCP UR QL: NEGATIVE
PLATELET # BLD AUTO: 198 THOUSANDS/UL (ref 149–390)
PMV BLD AUTO: 10.8 FL (ref 8.9–12.7)
POTASSIUM SERPL-SCNC: 3.9 MMOL/L (ref 3.5–5.3)
PR INTERVAL: 168 MS
QRS AXIS: 39 DEGREES
QRSD INTERVAL: 84 MS
QT INTERVAL: 350 MS
QTC INTERVAL: 411 MS
RBC # BLD AUTO: 4.84 MILLION/UL (ref 3.88–5.62)
SARS-COV-2 RNA RESP QL NAA+PROBE: NEGATIVE
SODIUM SERPL-SCNC: 135 MMOL/L (ref 136–145)
T WAVE AXIS: 71 DEGREES
THC UR QL: NEGATIVE
TROPONIN I SERPL-MCNC: 0.03 NG/ML
TROPONIN I SERPL-MCNC: 0.04 NG/ML
TROPONIN I SERPL-MCNC: 0.05 NG/ML
VENTRICULAR RATE: 83 BPM
WBC # BLD AUTO: 6.97 THOUSAND/UL (ref 4.31–10.16)

## 2021-08-29 PROCEDURE — 93010 ELECTROCARDIOGRAM REPORT: CPT | Performed by: INTERNAL MEDICINE

## 2021-08-29 PROCEDURE — 96366 THER/PROPH/DIAG IV INF ADDON: CPT

## 2021-08-29 PROCEDURE — 96376 TX/PRO/DX INJ SAME DRUG ADON: CPT

## 2021-08-29 PROCEDURE — 36415 COLL VENOUS BLD VENIPUNCTURE: CPT

## 2021-08-29 PROCEDURE — 84484 ASSAY OF TROPONIN QUANT: CPT

## 2021-08-29 PROCEDURE — 96375 TX/PRO/DX INJ NEW DRUG ADDON: CPT

## 2021-08-29 PROCEDURE — 99285 EMERGENCY DEPT VISIT HI MDM: CPT | Performed by: EMERGENCY MEDICINE

## 2021-08-29 PROCEDURE — 80048 BASIC METABOLIC PNL TOTAL CA: CPT

## 2021-08-29 PROCEDURE — U0005 INFEC AGEN DETEC AMPLI PROBE: HCPCS

## 2021-08-29 PROCEDURE — 84484 ASSAY OF TROPONIN QUANT: CPT | Performed by: STUDENT IN AN ORGANIZED HEALTH CARE EDUCATION/TRAINING PROGRAM

## 2021-08-29 PROCEDURE — 85025 COMPLETE CBC W/AUTO DIFF WBC: CPT

## 2021-08-29 PROCEDURE — 80307 DRUG TEST PRSMV CHEM ANLYZR: CPT

## 2021-08-29 PROCEDURE — 99284 EMERGENCY DEPT VISIT MOD MDM: CPT

## 2021-08-29 PROCEDURE — 96365 THER/PROPH/DIAG IV INF INIT: CPT

## 2021-08-29 PROCEDURE — 71045 X-RAY EXAM CHEST 1 VIEW: CPT

## 2021-08-29 PROCEDURE — U0003 INFECTIOUS AGENT DETECTION BY NUCLEIC ACID (DNA OR RNA); SEVERE ACUTE RESPIRATORY SYNDROME CORONAVIRUS 2 (SARS-COV-2) (CORONAVIRUS DISEASE [COVID-19]), AMPLIFIED PROBE TECHNIQUE, MAKING USE OF HIGH THROUGHPUT TECHNOLOGIES AS DESCRIBED BY CMS-2020-01-R: HCPCS

## 2021-08-29 PROCEDURE — NC001 PR NO CHARGE: Performed by: INTERNAL MEDICINE

## 2021-08-29 PROCEDURE — 82075 ASSAY OF BREATH ETHANOL: CPT

## 2021-08-29 PROCEDURE — 93005 ELECTROCARDIOGRAM TRACING: CPT

## 2021-08-29 RX ORDER — ROPINIROLE 1 MG/1
4 TABLET, FILM COATED ORAL
Status: DISCONTINUED | OUTPATIENT
Start: 2021-08-29 | End: 2021-08-30 | Stop reason: HOSPADM

## 2021-08-29 RX ORDER — DIVALPROEX SODIUM 500 MG/1
500 TABLET, DELAYED RELEASE ORAL EVERY 12 HOURS SCHEDULED
Status: DISCONTINUED | OUTPATIENT
Start: 2021-08-29 | End: 2021-08-30 | Stop reason: HOSPADM

## 2021-08-29 RX ORDER — SODIUM CHLORIDE, SODIUM GLUCONATE, SODIUM ACETATE, POTASSIUM CHLORIDE, MAGNESIUM CHLORIDE, SODIUM PHOSPHATE, DIBASIC, AND POTASSIUM PHOSPHATE .53; .5; .37; .037; .03; .012; .00082 G/100ML; G/100ML; G/100ML; G/100ML; G/100ML; G/100ML; G/100ML
1000 INJECTION, SOLUTION INTRAVENOUS ONCE
Status: COMPLETED | OUTPATIENT
Start: 2021-08-29 | End: 2021-08-29

## 2021-08-29 RX ORDER — GABAPENTIN 300 MG/1
300 CAPSULE ORAL ONCE
Status: COMPLETED | OUTPATIENT
Start: 2021-08-29 | End: 2021-08-29

## 2021-08-29 RX ORDER — LATANOPROST 50 UG/ML
1 SOLUTION/ DROPS OPHTHALMIC DAILY
Status: DISCONTINUED | OUTPATIENT
Start: 2021-08-29 | End: 2021-08-30 | Stop reason: HOSPADM

## 2021-08-29 RX ORDER — DIAZEPAM 5 MG/ML
5 INJECTION, SOLUTION INTRAMUSCULAR; INTRAVENOUS ONCE
Status: COMPLETED | OUTPATIENT
Start: 2021-08-29 | End: 2021-08-29

## 2021-08-29 RX ORDER — DIVALPROEX SODIUM 500 MG/1
500 TABLET, DELAYED RELEASE ORAL EVERY 8 HOURS SCHEDULED
Status: DISCONTINUED | OUTPATIENT
Start: 2021-08-29 | End: 2021-08-29

## 2021-08-29 RX ORDER — AMLODIPINE BESYLATE 5 MG/1
5 TABLET ORAL DAILY
Status: DISCONTINUED | OUTPATIENT
Start: 2021-08-29 | End: 2021-08-30 | Stop reason: HOSPADM

## 2021-08-29 RX ORDER — ACETAMINOPHEN 325 MG/1
650 TABLET ORAL EVERY 6 HOURS PRN
Status: DISCONTINUED | OUTPATIENT
Start: 2021-08-29 | End: 2021-08-30 | Stop reason: HOSPADM

## 2021-08-29 RX ORDER — LORAZEPAM 2 MG/ML
1 INJECTION INTRAMUSCULAR ONCE
Status: DISCONTINUED | OUTPATIENT
Start: 2021-08-29 | End: 2021-08-29

## 2021-08-29 RX ORDER — ATORVASTATIN CALCIUM 80 MG/1
80 TABLET, FILM COATED ORAL
Status: DISCONTINUED | OUTPATIENT
Start: 2021-08-29 | End: 2021-08-30 | Stop reason: HOSPADM

## 2021-08-29 RX ORDER — ASPIRIN 81 MG/1
81 TABLET ORAL DAILY
Status: DISCONTINUED | OUTPATIENT
Start: 2021-08-29 | End: 2021-08-30 | Stop reason: HOSPADM

## 2021-08-29 RX ORDER — HEPARIN SODIUM 5000 [USP'U]/ML
5000 INJECTION, SOLUTION INTRAVENOUS; SUBCUTANEOUS EVERY 8 HOURS SCHEDULED
Status: DISCONTINUED | OUTPATIENT
Start: 2021-08-29 | End: 2021-08-30 | Stop reason: HOSPADM

## 2021-08-29 RX ORDER — RISPERIDONE 1 MG/1
2 TABLET, FILM COATED ORAL
Status: DISCONTINUED | OUTPATIENT
Start: 2021-08-29 | End: 2021-08-30

## 2021-08-29 RX ORDER — DORZOLAMIDE HYDROCHLORIDE AND TIMOLOL MALEATE 20; 5 MG/ML; MG/ML
1 SOLUTION/ DROPS OPHTHALMIC DAILY
Status: DISCONTINUED | OUTPATIENT
Start: 2021-08-29 | End: 2021-08-30 | Stop reason: HOSPADM

## 2021-08-29 RX ORDER — ROPINIROLE 1 MG/1
2 TABLET, FILM COATED ORAL
Status: DISCONTINUED | OUTPATIENT
Start: 2021-08-29 | End: 2021-08-29

## 2021-08-29 RX ADMIN — RISPERIDONE 2 MG: 1 TABLET ORAL at 22:42

## 2021-08-29 RX ADMIN — DICLOFENAC SODIUM 2 G: 10 GEL TOPICAL at 19:13

## 2021-08-29 RX ADMIN — DICLOFENAC SODIUM 2 G: 10 GEL TOPICAL at 22:51

## 2021-08-29 RX ADMIN — DIAZEPAM 5 MG: 10 INJECTION, SOLUTION INTRAMUSCULAR; INTRAVENOUS at 16:40

## 2021-08-29 RX ADMIN — AMLODIPINE BESYLATE 5 MG: 5 TABLET ORAL at 08:49

## 2021-08-29 RX ADMIN — DORZOLAMIDE HYDROCHLORIDE AND TIMOLOL MALEATE 1 DROP: 20; 5 SOLUTION OPHTHALMIC at 08:47

## 2021-08-29 RX ADMIN — DIAZEPAM 5 MG: 5 INJECTION, SOLUTION INTRAMUSCULAR; INTRAVENOUS at 05:07

## 2021-08-29 RX ADMIN — GABAPENTIN 300 MG: 300 CAPSULE ORAL at 16:55

## 2021-08-29 RX ADMIN — LATANOPROST 1 DROP: 50 SOLUTION OPHTHALMIC at 08:47

## 2021-08-29 RX ADMIN — HEPARIN SODIUM 5000 UNITS: 5000 INJECTION INTRAVENOUS; SUBCUTANEOUS at 22:49

## 2021-08-29 RX ADMIN — SODIUM CHLORIDE, SODIUM GLUCONATE, SODIUM ACETATE, POTASSIUM CHLORIDE, MAGNESIUM CHLORIDE, SODIUM PHOSPHATE, DIBASIC, AND POTASSIUM PHOSPHATE 1000 ML: .53; .5; .37; .037; .03; .012; .00082 INJECTION, SOLUTION INTRAVENOUS at 05:06

## 2021-08-29 RX ADMIN — DOXEPIN HYDROCHLORIDE 150 MG: 100 CAPSULE ORAL at 22:42

## 2021-08-29 RX ADMIN — ROPINIROLE HYDROCHLORIDE 4 MG: 1 TABLET, FILM COATED ORAL at 22:42

## 2021-08-29 RX ADMIN — DICLOFENAC SODIUM 2 G: 10 GEL TOPICAL at 11:46

## 2021-08-29 RX ADMIN — DIAZEPAM 5 MG: 10 INJECTION, SOLUTION INTRAMUSCULAR; INTRAVENOUS at 07:04

## 2021-08-29 RX ADMIN — DIVALPROEX SODIUM 500 MG: 500 TABLET, DELAYED RELEASE ORAL at 08:49

## 2021-08-29 RX ADMIN — ASPIRIN 81 MG: 81 TABLET, COATED ORAL at 08:49

## 2021-08-29 RX ADMIN — ATORVASTATIN CALCIUM 80 MG: 80 TABLET, FILM COATED ORAL at 16:55

## 2021-08-29 RX ADMIN — ACETAMINOPHEN 650 MG: 325 TABLET, FILM COATED ORAL at 11:46

## 2021-08-29 RX ADMIN — DIVALPROEX SODIUM 500 MG: 500 TABLET, DELAYED RELEASE ORAL at 22:42

## 2021-08-29 RX ADMIN — HEPARIN SODIUM 5000 UNITS: 5000 INJECTION INTRAVENOUS; SUBCUTANEOUS at 15:21

## 2021-08-29 NOTE — ASSESSMENT & PLAN NOTE
Positive chest pain in emergency room, now has resolved, most likely etiology in the setting of recent crack cocaine use  EKG unremarkable, trend troponins x3, monitor for symptoms  Given age, comorbidities, will benefit from cardiac risk stratification outpatient    Continue aspirin and high-intensity statin therapy

## 2021-08-29 NOTE — ASSESSMENT & PLAN NOTE
Continue home meds, would benefit from regular scheduled psychiatric outpatient  Continue risperidone 2 mg q h s  which is his outpatient dosage  Doxepin 150 mg q h s    Depakote 500 mg q 8h
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
## 2021-08-29 NOTE — ASSESSMENT & PLAN NOTE
UDS positive for cocaine, patient admits to recent crack cocaine abuse  Would benefit from rehab, patient accepts  Case management for potential rehab placement but rehab placement might be difficulty in the context of positive UDS hx
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
## 2021-08-29 NOTE — ASSESSMENT & PLAN NOTE
Diagnosed but is not compliant with CPAP  Nasal cannula oxygen as needed for SpO2 greater than 89%  As of 08/30, patient appears comfortable breathing on room air

## 2021-08-29 NOTE — ASSESSMENT & PLAN NOTE
Continue outpatient eyedrop regimen  Patient would benefit from ophthalmology follow-up to confirm glaucoma diagnosis

## 2021-08-29 NOTE — ED ATTENDING ATTESTATION
8/29/2021  I, Floresita Cedeno MD, saw and evaluated the patient  I have discussed the patient with the resident/non-physician practitioner and agree with the resident's/non-physician practitioner's findings, Plan of Care, and MDM as documented in the resident's/non-physician practitioner's note, except where noted  All available labs and Radiology studies were reviewed  I was present for key portions of any procedure(s) performed by the resident/non-physician practitioner and I was immediately available to provide assistance  At this point I agree with the current assessment done in the Emergency Department  I have conducted an independent evaluation of this patient a history and physical is as follows:    ED Course         Critical Care Time  Procedures    Patient is a 49 yom, history of cocaine addiction  Smoked over $100 of crack  Notes anxiety and wants to be placed into rehab  Notes some chest pain and sob  No abdominal pain or headaches  No abdominal pain  Patient walked here  Some hypertension  No hypoxia  MDM cocaine chest pain, will discuss plan with patient, will offer admission for chest pain obs and rehab

## 2021-08-29 NOTE — PLAN OF CARE
Problem: PAIN - ADULT  Goal: Verbalizes/displays adequate comfort level or baseline comfort level  Description: Interventions:  - Encourage patient to monitor pain and request assistance  - Assess pain using appropriate pain scale  - Administer analgesics based on type and severity of pain and evaluate response  - Implement non-pharmacological measures as appropriate and evaluate response  - Consider cultural and social influences on pain and pain management  - Notify physician/advanced practitioner if interventions unsuccessful or patient reports new pain  Outcome: Progressing     Problem: INFECTION - ADULT  Goal: Absence or prevention of progression during hospitalization  Description: INTERVENTIONS:  - Assess and monitor for signs and symptoms of infection  - Monitor lab/diagnostic results  - Monitor all insertion sites, i e  indwelling lines, tubes, and drains  - Monitor endotracheal if appropriate and nasal secretions for changes in amount and color  - [place] appropriate cooling/warming therapies per order  - Administer medications as ordered  - Instruct and encourage patient and family to use good hand hygiene technique  - Identify and instruct in appropriate isolation precautions for identified infection/condition  Outcome: Progressing  Goal: Absence of fever/infection during neutropenic period  Description: INTERVENTIONS:  - Monitor WBC    Outcome: Progressing     Problem: SAFETY ADULT  Goal: Patient will remain free of falls  Description: INTERVENTIONS:  - Educate patient/family on patient safety including physical limitations  - Instruct patient to call for assistance with activity   - Consult OT/PT to assist with strengthening/mobility   - Keep Call bell within reach  - Keep bed low and locked with side rails adjusted as appropriate  - Keep care items and personal belongings within reach  - Initiate and maintain comfort rounds  - Make Fall Risk Sign visible to staff  - Offer Toileting every 2 Hours, in advance of need  - Initiate/Maintain bed alarm  - Obtain necessary fall risk management equipment: bed/chair alarm  - Apply yellow socks and bracelet for high fall risk patients  - Consider moving patient to room near nurses station  Outcome: Progressing  Goal: Maintain or return to baseline ADL function  Description: INTERVENTIONS:  -  Assess patient's ability to carry out ADLs; assess patient's baseline for ADL function and identify physical deficits which impact ability to perform ADLs (bathing, care of mouth/teeth, toileting, grooming, dressing, etc )  - Assess/evaluate cause of self-care deficits   - Assess range of motion  - Assess patient's mobility; develop plan if impaired  - Assess patient's need for assistive devices and provide as appropriate  - Encourage maximum independence but intervene and supervise when necessary  - Involve family in performance of ADLs  - Assess for home care needs following discharge   - Consider OT consult to assist with ADL evaluation and planning for discharge  - Provide patient education as appropriate  Outcome: Progressing  Goal: Maintains/Returns to pre admission functional level  Description: INTERVENTIONS:  - Perform BMAT or MOVE assessment daily    - Set and communicate daily mobility goal to care team and patient/family/caregiver  - Collaborate with rehabilitation services on mobility goals if consulted  - Perform Range of Motion 6 times a day  - Reposition patient every 2 hours    - Dangle patient 6 times a day  - Stand patient 6 times a day  - Ambulate patient 6 times a day  - Out of bed to chair 6 times a day   - Out of bed for meals 3 times a day  - Out of bed for toileting  - Record patient progress and toleration of activity level   Outcome: Progressing     Problem: DISCHARGE PLANNING  Goal: Discharge to home or other facility with appropriate resources  Description: INTERVENTIONS:  - Identify barriers to discharge w/patient and caregiver  - Arrange for needed discharge resources and transportation as appropriate  - Identify discharge learning needs (meds, wound care, etc )  - Arrange for interpretive services to assist at discharge as needed  - Refer to Case Management Department for coordinating discharge planning if the patient needs post-hospital services based on physician/advanced practitioner order or complex needs related to functional status, cognitive ability, or social support system  Outcome: Progressing     Problem: Knowledge Deficit  Goal: Patient/family/caregiver demonstrates understanding of disease process, treatment plan, medications, and discharge instructions  Description: Complete learning assessment and assess knowledge base    Interventions:  - Provide teaching at level of understanding  - Provide teaching via preferred learning methods  Outcome: Progressing

## 2021-08-29 NOTE — CASE MANAGEMENT
LOS TD NO bundle No readmit    Pt lives alone in a basement level apartment  He has a revoked license for multiple DUI's  Pt spoke to HOST program in ED  Host requested H&P, Medlist and face sheet be faxed to them  CM met with patient he signed consent for release of medical records  Faxed requested information to [facility]  Pt told Cm about his 2018 divorce and how that is a trigger for his drug usage  Pt was independent PtA  He is asking to go to Psych but [facility]  Uses Cocaine--Patient requesting out patient rehab only--says he ows [facility] over $300 and they won't help him until he pays  Hx of Bipolar disease and PtSD NO IP Psych in last 2 years        CM reviewed d/c planning process including the following: identifying help at home, patient preference for d/c planning needs, Discharge Lounge, [facility] Meds to Bed program, availability of treatment team to discuss questions or concerns patient and/or family may have regarding understanding medications and recognizing signs and symptoms once discharged  CM also encouraged patient to follow up with all recommended appointments after discharge  Patient advised of importance for patient and family to participate in managing patients medical well being

## 2021-08-30 VITALS
SYSTOLIC BLOOD PRESSURE: 117 MMHG | OXYGEN SATURATION: 95 % | TEMPERATURE: 97.6 F | WEIGHT: 245.59 LBS | RESPIRATION RATE: 16 BRPM | HEART RATE: 80 BPM | DIASTOLIC BLOOD PRESSURE: 74 MMHG | HEIGHT: 66 IN | BODY MASS INDEX: 39.47 KG/M2

## 2021-08-30 PROBLEM — R07.9 CHEST PAIN: Status: RESOLVED | Noted: 2021-08-29 | Resolved: 2021-08-30

## 2021-08-30 PROCEDURE — NC001 PR NO CHARGE: Performed by: INTERNAL MEDICINE

## 2021-08-30 PROCEDURE — 99217 PR OBSERVATION CARE DISCHARGE MANAGEMENT: CPT | Performed by: INTERNAL MEDICINE

## 2021-08-30 RX ORDER — ROPINIROLE 1 MG/1
2 TABLET, FILM COATED ORAL ONCE
Status: COMPLETED | OUTPATIENT
Start: 2021-08-30 | End: 2021-08-30

## 2021-08-30 RX ORDER — RISPERIDONE 1 MG/1
4 TABLET, FILM COATED ORAL
Status: DISCONTINUED | OUTPATIENT
Start: 2021-08-30 | End: 2021-08-30 | Stop reason: HOSPADM

## 2021-08-30 RX ORDER — DIAZEPAM 5 MG/ML
5 INJECTION, SOLUTION INTRAMUSCULAR; INTRAVENOUS ONCE
Status: COMPLETED | OUTPATIENT
Start: 2021-08-30 | End: 2021-08-30

## 2021-08-30 RX ORDER — MUSCLE RUB CREAM 100; 150 MG/G; MG/G
CREAM TOPICAL 4 TIMES DAILY PRN
Status: DISCONTINUED | OUTPATIENT
Start: 2021-08-30 | End: 2021-08-30 | Stop reason: HOSPADM

## 2021-08-30 RX ORDER — GABAPENTIN 300 MG/1
300 CAPSULE ORAL ONCE
Status: COMPLETED | OUTPATIENT
Start: 2021-08-30 | End: 2021-08-30

## 2021-08-30 RX ADMIN — ROPINIROLE HYDROCHLORIDE 2 MG: 1 TABLET, FILM COATED ORAL at 02:41

## 2021-08-30 RX ADMIN — DORZOLAMIDE HYDROCHLORIDE AND TIMOLOL MALEATE 1 DROP: 20; 5 SOLUTION OPHTHALMIC at 10:16

## 2021-08-30 RX ADMIN — GABAPENTIN 300 MG: 300 CAPSULE ORAL at 02:41

## 2021-08-30 RX ADMIN — AMLODIPINE BESYLATE 5 MG: 5 TABLET ORAL at 08:47

## 2021-08-30 RX ADMIN — ACETAMINOPHEN 650 MG: 325 TABLET, FILM COATED ORAL at 13:54

## 2021-08-30 RX ADMIN — DIAZEPAM 5 MG: 10 INJECTION, SOLUTION INTRAMUSCULAR; INTRAVENOUS at 15:33

## 2021-08-30 RX ADMIN — HEPARIN SODIUM 5000 UNITS: 5000 INJECTION INTRAVENOUS; SUBCUTANEOUS at 06:30

## 2021-08-30 RX ADMIN — DIVALPROEX SODIUM 500 MG: 500 TABLET, DELAYED RELEASE ORAL at 08:47

## 2021-08-30 RX ADMIN — ACETAMINOPHEN 650 MG: 325 TABLET, FILM COATED ORAL at 06:30

## 2021-08-30 RX ADMIN — MENTHOL, UNSPECIFIED FORM AND METHYL SALICYLATE: 10; 150 CREAM TOPICAL at 13:53

## 2021-08-30 RX ADMIN — ASPIRIN 81 MG: 81 TABLET, COATED ORAL at 08:47

## 2021-08-30 RX ADMIN — HEPARIN SODIUM 5000 UNITS: 5000 INJECTION INTRAVENOUS; SUBCUTANEOUS at 13:53

## 2021-08-30 NOTE — PLAN OF CARE
Problem: PAIN - ADULT  Goal: Verbalizes/displays adequate comfort level or baseline comfort level  Description: Interventions:  - Encourage patient to monitor pain and request assistance  - Assess pain using appropriate pain scale  - Administer analgesics based on type and severity of pain and evaluate response  - Implement non-pharmacological measures as appropriate and evaluate response  - Consider cultural and social influences on pain and pain management  - Notify physician/advanced practitioner if interventions unsuccessful or patient reports new pain  Outcome: Progressing     Problem: INFECTION - ADULT  Goal: Absence or prevention of progression during hospitalization  Description: INTERVENTIONS:  - Assess and monitor for signs and symptoms of infection  - Monitor lab/diagnostic results  - Monitor all insertion sites, i e  indwelling lines, tubes, and drains  - Monitor endotracheal if appropriate and nasal secretions for changes in amount and color  - [place] appropriate cooling/warming therapies per order  - Administer medications as ordered  - Instruct and encourage patient and family to use good hand hygiene technique  - Identify and instruct in appropriate isolation precautions for identified infection/condition  Outcome: Progressing  Goal: Absence of fever/infection during neutropenic period  Description: INTERVENTIONS:  - Monitor WBC    Outcome: Progressing     Problem: SAFETY ADULT  Goal: Patient will remain free of falls  Description: INTERVENTIONS:  - Educate patient/family on patient safety including physical limitations  - Instruct patient to call for assistance with activity   - Consult OT/PT to assist with strengthening/mobility   - Keep Call bell within reach  - Keep bed low and locked with side rails adjusted as appropriate  - Keep care items and personal belongings within reach  - Initiate and maintain comfort rounds  - Make Fall Risk Sign visible to staff  - Offer Toileting every 2 Hours, in advance of need  - Initiate/Maintain bed alarm  - Obtain necessary fall risk management equipment: bed/chair alarm  - Apply yellow socks and bracelet for high fall risk patients  - Consider moving patient to room near nurses station  Outcome: Progressing  Goal: Maintain or return to baseline ADL function  Description: INTERVENTIONS:  -  Assess patient's ability to carry out ADLs; assess patient's baseline for ADL function and identify physical deficits which impact ability to perform ADLs (bathing, care of mouth/teeth, toileting, grooming, dressing, etc )  - Assess/evaluate cause of self-care deficits   - Assess range of motion  - Assess patient's mobility; develop plan if impaired  - Assess patient's need for assistive devices and provide as appropriate  - Encourage maximum independence but intervene and supervise when necessary  - Involve family in performance of ADLs  - Assess for home care needs following discharge   - Consider OT consult to assist with ADL evaluation and planning for discharge  - Provide patient education as appropriate  Outcome: Progressing  Goal: Maintains/Returns to pre admission functional level  Description: INTERVENTIONS:  - Perform BMAT or MOVE assessment daily    - Set and communicate daily mobility goal to care team and patient/family/caregiver  - Collaborate with rehabilitation services on mobility goals if consulted  - Perform Range of Motion 6 times a day  - Reposition patient every 2 hours    - Dangle patient 6 times a day  - Stand patient 6 times a day  - Ambulate patient 6 times a day  - Out of bed to chair 6 times a day   - Out of bed for meals 3 times a day  - Out of bed for toileting  - Record patient progress and toleration of activity level   Outcome: Progressing     Problem: DISCHARGE PLANNING  Goal: Discharge to home or other facility with appropriate resources  Description: INTERVENTIONS:  - Identify barriers to discharge w/patient and caregiver  - Arrange for needed discharge resources and transportation as appropriate  - Identify discharge learning needs (meds, wound care, etc )  - Arrange for interpretive services to assist at discharge as needed  - Refer to Case Management Department for coordinating discharge planning if the patient needs post-hospital services based on physician/advanced practitioner order or complex needs related to functional status, cognitive ability, or social support system  Outcome: Progressing     Problem: Knowledge Deficit  Goal: Patient/family/caregiver demonstrates understanding of disease process, treatment plan, medications, and discharge instructions  Description: Complete learning assessment and assess knowledge base    Interventions:  - Provide teaching at level of understanding  - Provide teaching via preferred learning methods  Outcome: Progressing
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
## 2021-08-30 NOTE — DISCHARGE SUMMARY
[facility] CENTRAL Discharge Summary - Medical Jarad Whitaker 49 y o  male MRN: [id number]    [address] [facility] Room / Bed: [facility] 805/[facility] 805-01 Encounter: [id number]    BRIEF OVERVIEW    Admitting Provider: Kelvin Mix MD  Discharge Provider: Maxi Aleman MD  Primary Care Physician at Discharge: TAI Farias  [address]  Admission Date: 8/29/2021     Discharge Date: No discharge date for patient encounter  Hospital Course  Patient is a 49year old male admitted to hospital for chest pain after smoking over 100 dollars worth of crack cocaine  His other relevant medical history includes schizoaffective disorder, bipolar disorder, chronic lower back pain with sciatica, hypertension, drug abuse, alcohol abuse  As per the patient, he has significant stressors at home including family members  He reports of using alcohol and crack cocaine recently to deal with these stressors  Patient did express interest in drug rehab program to improve his life  Due to the nature of his chest pain, he was worked up from cardiac standpoint with serial troponins and EKG unremarkable  At the time of discahrge, this patient was medically stable and managed on his home dose of psychotropic medications  Patient was informed about his options about inpatient and outpatient rehab and patient wanted to do outpatient rehab at this time  Patient is strongly urged to follow up with outpatient psychiatry after discharge  This was discussed with the patient as well at time of discharge        Presenting Problem/History of Present Illness  Principal Problem (Resolved):    Chest pain  Active Problems:    Schizoaffective disorder, bipolar type (HCC)    Alcohol abuse    Chronic bilateral low back pain without sciatica    History of obstructive sleep apnea    Essential hypertension    Drug abuse and dependence ([facility] )    Glaucoma    Restless leg syndrome        Diagnostic Procedures Performed  Imaging Studies:    Pertinent Labs:          Test Results Pending at Discharge: None     Medications     Medication List to be Continued at Discharge  Current Discharge Medication List      CONTINUE these medications which have NOT CHANGED    Details   amLODIPine (NORVASC) 5 mg tablet TAKE 1 TABLET BY MOUTH EVERY DAY  Qty: 90 tablet, Refills: 1    Associated Diagnoses: Essential hypertension      aspirin (ECOTRIN LOW STRENGTH) 81 mg EC tablet Take 1 tablet (81 mg total) by mouth daily  Qty: 90 tablet, Refills: 1    Associated Diagnoses: Obesity, morbid ([place] [address] ); Mixed hyperlipidemia; Abnormal stress test; Elevated blood-pressure reading without diagnosis of hypertension      divalproex sodium (DEPAKOTE) 500 mg EC tablet every 12 (twelve) hours 2 in the am & 2  In the bedtime      dorzolamide-timolol (COSOPT) 22 3-6 8 MG/ML ophthalmic solution Administer 1 drop to both eyes daily  Qty: 10 mL, Refills: 5    Associated Diagnoses: Glaucoma of both eyes, unspecified glaucoma type      doxepin (SINEquan) 150 MG capsule Take 150 mg by mouth daily at bedtime      latanoprost (XALATAN) 0 005 % ophthalmic solution Administer 1 drop to both eyes daily  Qty: 7 5 mL, Refills: 3    Associated Diagnoses: Glaucoma of both eyes, unspecified glaucoma type      polyethylene glycol (MIRALAX) 17 g packet Take 17 g by mouth daily  Qty: 510 g, Refills: 5    Associated Diagnoses: Screening for malignant neoplasm of colon; Preventative health care; Polysubstance abuse ([place] [address] );  Class 3 severe obesity due to excess calories with body mass index (BMI) of 40 0 to 44 9 in adult, unspecified whether serious comorbidity present (HCC)      risperiDONE (RisperDAL) 2 mg tablet 4 mg       rOPINIRole (REQUIP) 2 mg tablet Take 4 mg by mouth daily       rosuvastatin (CRESTOR) 40 MG tablet Take 1 tablet (40 mg total) by mouth daily  Qty: 90 tablet, Refills: 1    Associated Diagnoses: Mixed hyperlipidemia      triamcinolone (KENALOG) 0 1 % ointment Apply topically 2 (two) times a day  Qty: 453 6 g, Refills: 2    Associated Diagnoses: Intrinsic eczema           Current Discharge Medication List        Current Discharge Medication List      STOP taking these medications       ibuprofen (MOTRIN) 600 mg tablet Comments:   Reason for Stopping:               Allergies  Allergies   Allergen Reactions    Influenza Vaccines      History of guillan barre syndrome     Discharge Diet: regular diet  Activity restrictions: none  Discharge Condition: stable  Discharged With Lines: no    Discharge Disposition: [facility] [address] / Family Member Name: N/A      Outpatient Follow-Up  Patient strongly urged to follow up with outpatient psychiatry  Code Status: Level 1 - Full Code  Advance Directive and Living Will: <no information>  Power of :    POLST:      Discharge  Statement   I spent 45 minutes minutes discharging the patient  This time was spent on the day of discharge  I had direct contact with the patient on the day of discharge  Additional documentation is required if more than 30 minutes were spent on discharge

## 2021-08-30 NOTE — CASE MANAGEMENT
CM confirmed with HOST program that Pt would like OP  HOST will be following Pt post DC for D&A issues  Pt has issues with Cocaine use  Pt will be DC today with OP HOST services  CM will continue to follow

## 2021-08-31 ENCOUNTER — TRANSITIONAL CARE MANAGEMENT (OUTPATIENT)
Dept: INTERNAL MEDICINE CLINIC | Facility: CLINIC | Age: 56
End: 2021-08-31

## 2021-09-02 ENCOUNTER — PATIENT OUTREACH (OUTPATIENT)
Dept: CASE MANAGEMENT | Facility: HOSPITAL | Age: 56
End: 2021-09-02

## 2021-09-02 NOTE — PROGRESS NOTES
CHW unable to reach pt to confirm today's appt  LM for pt to return call to reschedule appt to next week if pt still interested in meeting

## 2021-09-04 ENCOUNTER — HOSPITAL ENCOUNTER (EMERGENCY)
Facility: HOSPITAL | Age: 56
Discharge: HOME | End: 2021-09-04
Attending: EMERGENCY MEDICINE | Admitting: EMERGENCY MEDICINE
Payer: MEDICARE

## 2021-09-04 VITALS
SYSTOLIC BLOOD PRESSURE: 147 MMHG | DIASTOLIC BLOOD PRESSURE: 76 MMHG | TEMPERATURE: 97.7 F | OXYGEN SATURATION: 98 % | RESPIRATION RATE: 18 BRPM | HEART RATE: 77 BPM

## 2021-09-04 DIAGNOSIS — F41.9 ANXIETY: ICD-10-CM

## 2021-09-04 DIAGNOSIS — L29.9 PRURITUS: Primary | ICD-10-CM

## 2021-09-04 PROCEDURE — 99284 EMERGENCY DEPT VISIT MOD MDM: CPT | Performed by: EMERGENCY MEDICINE

## 2021-09-04 PROCEDURE — 96372 THER/PROPH/DIAG INJ SC/IM: CPT

## 2021-09-04 PROCEDURE — 99283 EMERGENCY DEPT VISIT LOW MDM: CPT

## 2021-09-04 RX ORDER — DIPHENHYDRAMINE HYDROCHLORIDE 50 MG/ML
50 INJECTION INTRAMUSCULAR; INTRAVENOUS ONCE
Status: COMPLETED | OUTPATIENT
Start: 2021-09-04 | End: 2021-09-04

## 2021-09-04 RX ORDER — DIPHENHYDRAMINE HCL 25 MG
50 TABLET ORAL EVERY 6 HOURS PRN
Qty: 8 TABLET | Refills: 0 | Status: SHIPPED | OUTPATIENT
Start: 2021-09-04 | End: 2022-02-15

## 2021-09-04 RX ORDER — KETOROLAC TROMETHAMINE 30 MG/ML
30 INJECTION, SOLUTION INTRAMUSCULAR; INTRAVENOUS ONCE
Status: COMPLETED | OUTPATIENT
Start: 2021-09-04 | End: 2021-09-04

## 2021-09-04 RX ORDER — DIAZEPAM 5 MG/1
5 TABLET ORAL ONCE
Status: COMPLETED | OUTPATIENT
Start: 2021-09-04 | End: 2021-09-04

## 2021-09-04 RX ADMIN — KETOROLAC TROMETHAMINE 30 MG: 30 INJECTION, SOLUTION INTRAMUSCULAR; INTRAVENOUS at 05:21

## 2021-09-04 RX ADMIN — DIAZEPAM 5 MG: 5 TABLET ORAL at 04:13

## 2021-09-04 RX ADMIN — DIPHENHYDRAMINE HYDROCHLORIDE 50 MG: 50 INJECTION, SOLUTION INTRAMUSCULAR; INTRAVENOUS at 04:12

## 2021-09-04 RX ADMIN — DIAZEPAM 5 MG: 5 TABLET ORAL at 07:16

## 2021-09-04 NOTE — DISCHARGE INSTRUCTIONS
Recommend Benadryl as needed for itching    Take other medications as prescribed    Follow-up with primary care physician

## 2021-09-04 NOTE — ED ATTENDING ATTESTATION
9/4/2021  IPernell, DO, saw and evaluated the patient  I have discussed the patient with the resident/non-physician practitioner and agree with the resident's/non-physician practitioner's findings, Plan of Care, and MDM as documented in the resident's/non-physician practitioner's note, except where noted  All available labs and Radiology studies were reviewed  I was present for key portions of any procedure(s) performed by the resident/non-physician practitioner and I was immediately available to provide assistance  At this point I agree with the current assessment done in the Emergency Department  I have conducted an independent evaluation of this patient a history and physical is as follows:    82-year-old male presents with diffuse itching  Patient states he ate seafood paella and then a  little while a later started with itching  No difficulty breathing  On exam-no acute distress, heart regular, appears anxious, no respiratory distress, no tachypnea    Plan-Benadryl    ED Course         Critical Care Time  Procedures
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
## 2021-09-06 PROCEDURE — 99285 EMERGENCY DEPT VISIT HI MDM: CPT

## 2021-09-07 ENCOUNTER — APPOINTMENT (EMERGENCY)
Dept: RADIOLOGY | Facility: HOSPITAL | Age: 56
End: 2021-09-07
Payer: MEDICARE

## 2021-09-07 ENCOUNTER — HOSPITAL ENCOUNTER (EMERGENCY)
Facility: HOSPITAL | Age: 56
Discharge: HOME | End: 2021-09-07
Attending: EMERGENCY MEDICINE
Payer: MEDICARE

## 2021-09-07 ENCOUNTER — PATIENT OUTREACH (OUTPATIENT)
Dept: CASE MANAGEMENT | Facility: HOSPITAL | Age: 56
End: 2021-09-07

## 2021-09-07 VITALS
BODY MASS INDEX: 39.33 KG/M2 | DIASTOLIC BLOOD PRESSURE: 111 MMHG | HEART RATE: 82 BPM | HEIGHT: 66 IN | RESPIRATION RATE: 20 BRPM | TEMPERATURE: 97.3 F | SYSTOLIC BLOOD PRESSURE: 156 MMHG | OXYGEN SATURATION: 97 % | WEIGHT: 244.71 LBS

## 2021-09-07 DIAGNOSIS — R07.9 CHEST PAIN, UNSPECIFIED TYPE: Primary | ICD-10-CM

## 2021-09-07 LAB
ATRIAL RATE: 77 BPM
ATRIAL RATE: 78 BPM
P AXIS: 54 DEGREES
P AXIS: 79 DEGREES
PR INTERVAL: 158 MS
PR INTERVAL: 160 MS
QRS AXIS: 50 DEGREES
QRS AXIS: 51 DEGREES
QRSD INTERVAL: 78 MS
QRSD INTERVAL: 80 MS
QT INTERVAL: 354 MS
QT INTERVAL: 358 MS
QTC INTERVAL: 403 MS
QTC INTERVAL: 405 MS
T WAVE AXIS: 54 DEGREES
T WAVE AXIS: 66 DEGREES
VENTRICULAR RATE: 77 BPM
VENTRICULAR RATE: 78 BPM

## 2021-09-07 PROCEDURE — 93010 ELECTROCARDIOGRAM REPORT: CPT | Performed by: INTERNAL MEDICINE

## 2021-09-07 PROCEDURE — 99285 EMERGENCY DEPT VISIT HI MDM: CPT | Performed by: EMERGENCY MEDICINE

## 2021-09-07 PROCEDURE — 71046 X-RAY EXAM CHEST 2 VIEWS: CPT

## 2021-09-07 PROCEDURE — 96372 THER/PROPH/DIAG INJ SC/IM: CPT

## 2021-09-07 PROCEDURE — 93005 ELECTROCARDIOGRAM TRACING: CPT

## 2021-09-07 RX ORDER — OLANZAPINE 10 MG/1
10 TABLET, ORALLY DISINTEGRATING ORAL ONCE
Status: COMPLETED | OUTPATIENT
Start: 2021-09-07 | End: 2021-09-07

## 2021-09-07 RX ORDER — DIAZEPAM 5 MG/1
5 TABLET ORAL ONCE
Status: COMPLETED | OUTPATIENT
Start: 2021-09-07 | End: 2021-09-07

## 2021-09-07 RX ORDER — KETOROLAC TROMETHAMINE 30 MG/ML
15 INJECTION, SOLUTION INTRAMUSCULAR; INTRAVENOUS ONCE
Status: COMPLETED | OUTPATIENT
Start: 2021-09-07 | End: 2021-09-07

## 2021-09-07 RX ADMIN — DIAZEPAM 5 MG: 5 TABLET ORAL at 02:49

## 2021-09-07 RX ADMIN — KETOROLAC TROMETHAMINE 15 MG: 30 INJECTION, SOLUTION INTRAMUSCULAR; INTRAVENOUS at 04:25

## 2021-09-07 RX ADMIN — OLANZAPINE 10 MG: 10 TABLET, ORALLY DISINTEGRATING ORAL at 01:39
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
## 2021-09-07 NOTE — ED ATTENDING ATTESTATION
10/10/2019  I, Jack Araya DO, saw and evaluated the patient  I have discussed the patient with the resident/non-physician practitioner and agree with the resident's/non-physician practitioner's findings, Plan of Care, and MDM as documented in the resident's/non-physician practitioner's note, except where noted  All available labs and Radiology studies were reviewed  I was present for key portions of any procedure(s) performed by the resident/non-physician practitioner and I was immediately available to provide assistance  At this point I agree with the current assessment done in the Emergency Department  I have conducted an independent evaluation of this patient a history and physical is as follows:     61-year-old male presents after smoking crack  Patient states he has been clean for 9 months and started smoking yesterday  States he thinks he smoked too much and now does not feel well  Currently denies abdominal pain  On exam-no acute distress, heart regular, lungs clear, abdomen soft nontender    Plan-check abdominal labs, ativan, observe    ED Course         Critical Care Time  Procedures Plastic Surgeon Procedure Text (A): After obtaining clear surgical margins the patient was sent to plastics for surgical repair.  The patient understands they will receive post-surgical care and follow-up from the referring physician's office.

## 2021-09-07 NOTE — PROGRESS NOTES
LM for pt to return call  If no return call by pt within the next few days, [facility] will move forward with closing case due to lack of communication
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
## 2021-09-07 NOTE — DISCHARGE INSTRUCTIONS
You were seen today in the Emergency Department  Your workup included an EKG and a chest x-ray, which were normal       Please follow up with your Primary Care Provider in the next 1-2 days to recheck your symptoms and to obtain refills of your psychiatric medications  Please also discuss the pulmonary nodules on your chest x-ray as soon as possible as this may require follow up imaging for surveillance  Please return to the Emergency Department if you have thoughts of wanting to harm yourself or others, fevers, chills, chest pain, shortness of breath, are unable to eat or drink, or have any other symptoms that concern you  Please look this over and let your nurse and/or me know if you have any further questions before you leave

## 2021-09-10 ENCOUNTER — PATIENT OUTREACH (OUTPATIENT)
Dept: CASE MANAGEMENT | Facility: HOSPITAL | Age: 56
End: 2021-09-10

## 2021-09-10 NOTE — PROGRESS NOTES
Pt was no show for scheduled meeting with [facility] on 9/2/21  Several voice mails left for pt with no return call  At this time [facility] will close out case, but remain available for future needs

## 2021-09-11 NOTE — ED ATTENDING ATTESTATION
8/27/2021  IChandni DO, saw and evaluated the patient  I have discussed the patient with the resident/non-physician practitioner and agree with the resident's/non-physician practitioner's findings, Plan of Care, and MDM as documented in the resident's/non-physician practitioner's note, except where noted  All available labs and Radiology studies were reviewed  I was present for key portions of any procedure(s) performed by the resident/non-physician practitioner and I was immediately available to provide assistance  At this point I agree with the current assessment done in the Emergency Department  I have conducted an independent evaluation of this patient a history and physical is as follows:    49 yom, relapsed on crack today  Wants rehab  No c/o  htn on exam but looks well and otherwise normal  Plan to consult host/crisis, if not available dc  No si/hi   No cp/sob, f/c      ED Course         Critical Care Time  Procedures

## 2021-09-12 PROCEDURE — 99283 EMERGENCY DEPT VISIT LOW MDM: CPT

## 2021-09-13 ENCOUNTER — HOSPITAL ENCOUNTER (EMERGENCY)
Facility: HOSPITAL | Age: 56
Discharge: HOME | End: 2021-09-13
Attending: EMERGENCY MEDICINE | Admitting: EMERGENCY MEDICINE
Payer: MEDICARE

## 2021-09-13 VITALS
OXYGEN SATURATION: 97 % | HEART RATE: 73 BPM | DIASTOLIC BLOOD PRESSURE: 81 MMHG | SYSTOLIC BLOOD PRESSURE: 146 MMHG | RESPIRATION RATE: 18 BRPM

## 2021-09-13 DIAGNOSIS — Z76.5 DRUG-SEEKING BEHAVIOR: ICD-10-CM

## 2021-09-13 DIAGNOSIS — F41.9 ANXIETY: Primary | ICD-10-CM

## 2021-09-13 PROCEDURE — 99284 EMERGENCY DEPT VISIT MOD MDM: CPT | Performed by: EMERGENCY MEDICINE

## 2021-09-13 RX ORDER — ACETAMINOPHEN 325 MG/1
650 TABLET ORAL ONCE
Status: COMPLETED | OUTPATIENT
Start: 2021-09-13 | End: 2021-09-13

## 2021-09-13 RX ORDER — DIAZEPAM 5 MG/1
5 TABLET ORAL ONCE
Status: COMPLETED | OUTPATIENT
Start: 2021-09-13 | End: 2021-09-13

## 2021-09-13 RX ADMIN — DIAZEPAM 5 MG: 5 TABLET ORAL at 04:22

## 2021-09-13 RX ADMIN — ACETAMINOPHEN 650 MG: 325 TABLET, FILM COATED ORAL at 05:46

## 2021-09-13 NOTE — ED ATTENDING ATTESTATION
9/12/2021  Taco Rice DO saw and evaluated the patient  I have discussed the patient with the resident/non-physician practitioner and agree with the resident's/non-physician practitioner's findings, Plan of Care, and MDM as documented in the resident's/non-physician practitioner's note, except where noted  All available labs and Radiology studies were reviewed  I was present for key portions of any procedure(s) performed by the resident/non-physician practitioner and I was immediately available to provide assistance  At this point I agree with the current assessment done in the Emergency Department  I have conducted an independent evaluation of this patient a history and physical is as follows:    48 yo male presents for evaluation of anxiety  This is a recurring issue  Symptoms moderate severity, constant, no a/e factors  Claims to have been sober for 2 weeks following his last discharge  He is doing outpatient rehab  No other c/o at this time      Imp: anxiety plan: anxiolytics, d/c       ED Course         Critical Care Time  Procedures
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
## 2021-09-13 NOTE — DISCHARGE INSTRUCTIONS
Thank you for coming to the ER today  Please follow up with your primary care doctor in 1-2 days to be re-evaluated  He also gave you a referral to Orthopedics please schedule an appointment for evaluation of your knee pain  If at any point you experience any new or worsening symptoms do not hesitate to come back to the hospital to be evaluated  Thank you and hope you have a great rest of your day

## 2021-09-16 ENCOUNTER — HOSPITAL ENCOUNTER (EMERGENCY)
Facility: HOSPITAL | Age: 56
Discharge: HOME | End: 2021-09-16
Attending: EMERGENCY MEDICINE | Admitting: EMERGENCY MEDICINE
Payer: MEDICARE

## 2021-09-16 ENCOUNTER — APPOINTMENT (EMERGENCY)
Dept: RADIOLOGY | Facility: HOSPITAL | Age: 56
End: 2021-09-16
Payer: MEDICARE

## 2021-09-16 VITALS
DIASTOLIC BLOOD PRESSURE: 98 MMHG | OXYGEN SATURATION: 98 % | HEART RATE: 86 BPM | TEMPERATURE: 98.3 F | WEIGHT: 244 LBS | SYSTOLIC BLOOD PRESSURE: 164 MMHG | BODY MASS INDEX: 39.38 KG/M2 | RESPIRATION RATE: 32 BRPM

## 2021-09-16 DIAGNOSIS — R10.9 ABDOMINAL PAIN: ICD-10-CM

## 2021-09-16 DIAGNOSIS — F14.90 CRACK COCAINE USE: Primary | ICD-10-CM

## 2021-09-16 DIAGNOSIS — R07.9 CHEST PAIN: ICD-10-CM

## 2021-09-16 LAB
ALBUMIN SERPL BCP-MCNC: 3.8 G/DL (ref 3.5–5)
ALP SERPL-CCNC: 91 U/L (ref 46–116)
ALT SERPL W P-5'-P-CCNC: 173 U/L (ref 12–78)
ANION GAP SERPL CALCULATED.3IONS-SCNC: 5 MMOL/L (ref 4–13)
AST SERPL W P-5'-P-CCNC: 119 U/L (ref 5–45)
BASOPHILS # BLD AUTO: 0.02 THOUSANDS/ΜL (ref 0–0.1)
BASOPHILS NFR BLD AUTO: 0 % (ref 0–1)
BILIRUB SERPL-MCNC: 0.75 MG/DL (ref 0.2–1)
BUN SERPL-MCNC: 12 MG/DL (ref 5–25)
CALCIUM SERPL-MCNC: 8.8 MG/DL (ref 8.3–10.1)
CHLORIDE SERPL-SCNC: 100 MMOL/L (ref 100–108)
CO2 SERPL-SCNC: 22 MMOL/L (ref 21–32)
CREAT SERPL-MCNC: 0.88 MG/DL (ref 0.6–1.3)
EOSINOPHIL # BLD AUTO: 0.05 THOUSAND/ΜL (ref 0–0.61)
EOSINOPHIL NFR BLD AUTO: 1 % (ref 0–6)
ERYTHROCYTE [DISTWIDTH] IN BLOOD BY AUTOMATED COUNT: 12.9 % (ref 11.6–15.1)
GFR SERPL CREATININE-BSD FRML MDRD: 97 ML/MIN/1.73SQ M
GLUCOSE SERPL-MCNC: 98 MG/DL (ref 65–140)
HCT VFR BLD AUTO: 42.2 % (ref 36.5–49.3)
HGB BLD-MCNC: 14.6 G/DL (ref 12–17)
IMM GRANULOCYTES # BLD AUTO: 0.02 THOUSAND/UL (ref 0–0.2)
IMM GRANULOCYTES NFR BLD AUTO: 0 % (ref 0–2)
LIPASE SERPL-CCNC: 117 U/L (ref 73–393)
LYMPHOCYTES # BLD AUTO: 2.89 THOUSANDS/ΜL (ref 0.6–4.47)
LYMPHOCYTES NFR BLD AUTO: 43 % (ref 14–44)
MCH RBC QN AUTO: 31.1 PG (ref 26.8–34.3)
MCHC RBC AUTO-ENTMCNC: 34.6 G/DL (ref 31.4–37.4)
MCV RBC AUTO: 90 FL (ref 82–98)
MONOCYTES # BLD AUTO: 0.36 THOUSAND/ΜL (ref 0.17–1.22)
MONOCYTES NFR BLD AUTO: 5 % (ref 4–12)
NEUTROPHILS # BLD AUTO: 3.44 THOUSANDS/ΜL (ref 1.85–7.62)
NEUTS SEG NFR BLD AUTO: 51 % (ref 43–75)
NRBC BLD AUTO-RTO: 0 /100 WBCS
PLATELET # BLD AUTO: 170 THOUSANDS/UL (ref 149–390)
PMV BLD AUTO: 10.4 FL (ref 8.9–12.7)
POTASSIUM SERPL-SCNC: 4.7 MMOL/L (ref 3.5–5.3)
PROT SERPL-MCNC: 8.3 G/DL (ref 6.4–8.2)
RBC # BLD AUTO: 4.7 MILLION/UL (ref 3.88–5.62)
SODIUM SERPL-SCNC: 127 MMOL/L (ref 136–145)
TROPONIN I SERPL-MCNC: 0.02 NG/ML
WBC # BLD AUTO: 6.78 THOUSAND/UL (ref 4.31–10.16)

## 2021-09-16 PROCEDURE — 99284 EMERGENCY DEPT VISIT MOD MDM: CPT

## 2021-09-16 PROCEDURE — 85025 COMPLETE CBC W/AUTO DIFF WBC: CPT

## 2021-09-16 PROCEDURE — 84484 ASSAY OF TROPONIN QUANT: CPT

## 2021-09-16 PROCEDURE — 36415 COLL VENOUS BLD VENIPUNCTURE: CPT

## 2021-09-16 PROCEDURE — 71046 X-RAY EXAM CHEST 2 VIEWS: CPT

## 2021-09-16 PROCEDURE — 83690 ASSAY OF LIPASE: CPT

## 2021-09-16 PROCEDURE — 93005 ELECTROCARDIOGRAM TRACING: CPT

## 2021-09-16 PROCEDURE — 80053 COMPREHEN METABOLIC PANEL: CPT

## 2021-09-16 PROCEDURE — 96374 THER/PROPH/DIAG INJ IV PUSH: CPT

## 2021-09-16 PROCEDURE — 99285 EMERGENCY DEPT VISIT HI MDM: CPT | Performed by: EMERGENCY MEDICINE

## 2021-09-16 RX ORDER — ACETAMINOPHEN 325 MG/1
650 TABLET ORAL ONCE
Status: COMPLETED | OUTPATIENT
Start: 2021-09-16 | End: 2021-09-16

## 2021-09-16 RX ORDER — HALOPERIDOL 5 MG/ML
5 INJECTION INTRAMUSCULAR ONCE
Status: COMPLETED | OUTPATIENT
Start: 2021-09-16 | End: 2021-09-16

## 2021-09-16 RX ADMIN — ACETAMINOPHEN 650 MG: 325 TABLET, FILM COATED ORAL at 07:28

## 2021-09-16 RX ADMIN — HALOPERIDOL LACTATE 5 MG: 5 INJECTION, SOLUTION INTRAMUSCULAR at 07:29

## 2021-09-16 NOTE — ED NOTES
Pt requested food, Gave pt  Some cookies, crackers, pretzels, gingerale, and apple juice  The dr Yolanda Garcia the room soon after        Donn Hunt  09/16/21 0879
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
## 2021-09-16 NOTE — DISCHARGE INSTRUCTIONS
You were evaluated in the emergency department after using crack cocaine  Your EKG was normal and your chest pain is unlikely from your heart  You should follow-up with your primary care doctor within 1 week  Return to the emergency department if symptoms worsen, worsened chest pain, difficulty breathing, worsened or changed abdominal pain, or any other symptoms that concern you
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
## 2021-09-17 LAB
ATRIAL RATE: 83 BPM
P AXIS: 42 DEGREES
PR INTERVAL: 160 MS
QRS AXIS: 18 DEGREES
QRSD INTERVAL: 82 MS
QT INTERVAL: 346 MS
QTC INTERVAL: 406 MS
T WAVE AXIS: 38 DEGREES
VENTRICULAR RATE: 83 BPM

## 2021-09-17 PROCEDURE — 93010 ELECTROCARDIOGRAM REPORT: CPT | Performed by: INTERNAL MEDICINE

## 2021-09-19 ENCOUNTER — APPOINTMENT (EMERGENCY)
Dept: RADIOLOGY | Facility: HOSPITAL | Age: 56
End: 2021-09-19
Payer: MEDICARE

## 2021-09-19 ENCOUNTER — HOSPITAL ENCOUNTER (EMERGENCY)
Facility: HOSPITAL | Age: 56
Discharge: HOME | End: 2021-09-19
Attending: EMERGENCY MEDICINE
Payer: MEDICARE

## 2021-09-19 VITALS
SYSTOLIC BLOOD PRESSURE: 152 MMHG | OXYGEN SATURATION: 96 % | TEMPERATURE: 99.2 F | RESPIRATION RATE: 18 BRPM | HEART RATE: 78 BPM | DIASTOLIC BLOOD PRESSURE: 98 MMHG

## 2021-09-19 DIAGNOSIS — F19.10 DRUG ABUSE (HCC): Primary | ICD-10-CM

## 2021-09-19 DIAGNOSIS — R07.89 ATYPICAL CHEST PAIN: ICD-10-CM

## 2021-09-19 LAB
ANION GAP SERPL CALCULATED.3IONS-SCNC: 5 MMOL/L (ref 4–13)
ATRIAL RATE: 76 BPM
BASOPHILS # BLD AUTO: 0.03 THOUSANDS/ΜL (ref 0–0.1)
BASOPHILS NFR BLD AUTO: 0 % (ref 0–1)
BUN SERPL-MCNC: 11 MG/DL (ref 5–25)
CALCIUM SERPL-MCNC: 9 MG/DL (ref 8.3–10.1)
CHLORIDE SERPL-SCNC: 101 MMOL/L (ref 100–108)
CO2 SERPL-SCNC: 27 MMOL/L (ref 21–32)
CREAT SERPL-MCNC: 0.98 MG/DL (ref 0.6–1.3)
EOSINOPHIL # BLD AUTO: 0.12 THOUSAND/ΜL (ref 0–0.61)
EOSINOPHIL NFR BLD AUTO: 2 % (ref 0–6)
ERYTHROCYTE [DISTWIDTH] IN BLOOD BY AUTOMATED COUNT: 13.1 % (ref 11.6–15.1)
GFR SERPL CREATININE-BSD FRML MDRD: 87 ML/MIN/1.73SQ M
GLUCOSE SERPL-MCNC: 132 MG/DL (ref 65–140)
HCT VFR BLD AUTO: 43.4 % (ref 36.5–49.3)
HGB BLD-MCNC: 14.9 G/DL (ref 12–17)
IMM GRANULOCYTES # BLD AUTO: 0.03 THOUSAND/UL (ref 0–0.2)
IMM GRANULOCYTES NFR BLD AUTO: 0 % (ref 0–2)
LYMPHOCYTES # BLD AUTO: 3.48 THOUSANDS/ΜL (ref 0.6–4.47)
LYMPHOCYTES NFR BLD AUTO: 50 % (ref 14–44)
MCH RBC QN AUTO: 31.4 PG (ref 26.8–34.3)
MCHC RBC AUTO-ENTMCNC: 34.3 G/DL (ref 31.4–37.4)
MCV RBC AUTO: 91 FL (ref 82–98)
MONOCYTES # BLD AUTO: 0.36 THOUSAND/ΜL (ref 0.17–1.22)
MONOCYTES NFR BLD AUTO: 5 % (ref 4–12)
NEUTROPHILS # BLD AUTO: 3.07 THOUSANDS/ΜL (ref 1.85–7.62)
NEUTS SEG NFR BLD AUTO: 43 % (ref 43–75)
NRBC BLD AUTO-RTO: 0 /100 WBCS
P AXIS: 53 DEGREES
PLATELET # BLD AUTO: 193 THOUSANDS/UL (ref 149–390)
PMV BLD AUTO: 11.1 FL (ref 8.9–12.7)
POTASSIUM SERPL-SCNC: 4.6 MMOL/L (ref 3.5–5.3)
PR INTERVAL: 160 MS
QRS AXIS: 52 DEGREES
QRSD INTERVAL: 80 MS
QT INTERVAL: 382 MS
QTC INTERVAL: 429 MS
RBC # BLD AUTO: 4.75 MILLION/UL (ref 3.88–5.62)
SODIUM SERPL-SCNC: 133 MMOL/L (ref 136–145)
T WAVE AXIS: 40 DEGREES
TROPONIN I SERPL-MCNC: 0.02 NG/ML
VENTRICULAR RATE: 76 BPM
WBC # BLD AUTO: 7.09 THOUSAND/UL (ref 4.31–10.16)

## 2021-09-19 PROCEDURE — 36415 COLL VENOUS BLD VENIPUNCTURE: CPT | Performed by: EMERGENCY MEDICINE

## 2021-09-19 PROCEDURE — 85025 COMPLETE CBC W/AUTO DIFF WBC: CPT | Performed by: EMERGENCY MEDICINE

## 2021-09-19 PROCEDURE — 96374 THER/PROPH/DIAG INJ IV PUSH: CPT

## 2021-09-19 PROCEDURE — 80048 BASIC METABOLIC PNL TOTAL CA: CPT | Performed by: EMERGENCY MEDICINE

## 2021-09-19 PROCEDURE — 99284 EMERGENCY DEPT VISIT MOD MDM: CPT

## 2021-09-19 PROCEDURE — 71045 X-RAY EXAM CHEST 1 VIEW: CPT

## 2021-09-19 PROCEDURE — 84484 ASSAY OF TROPONIN QUANT: CPT | Performed by: EMERGENCY MEDICINE

## 2021-09-19 PROCEDURE — 93005 ELECTROCARDIOGRAM TRACING: CPT

## 2021-09-19 PROCEDURE — 93010 ELECTROCARDIOGRAM REPORT: CPT | Performed by: INTERNAL MEDICINE

## 2021-09-19 PROCEDURE — 99285 EMERGENCY DEPT VISIT HI MDM: CPT | Performed by: EMERGENCY MEDICINE

## 2021-09-19 RX ORDER — HALOPERIDOL 5 MG/ML
5 INJECTION INTRAMUSCULAR ONCE
Status: COMPLETED | OUTPATIENT
Start: 2021-09-19 | End: 2021-09-19

## 2021-09-19 RX ADMIN — HALOPERIDOL LACTATE 5 MG: 5 INJECTION, SOLUTION INTRAMUSCULAR at 06:39

## 2021-09-19 NOTE — ED NOTES
Pt mad that he hasnt been placed yet and he wants more water and his IV taken out, pt yelling at and calling this nurse names  IV was taken out a 4th water and ice pop given to pt, pt refused ice pop  Pt a few minutes later stated he is gonna wait a few more minutes before he leaves and wants a new ice pop  Ice pop given along with juice, this is the pts 7th juice given by this nurse since 0700  Pt now sitting in bed         Maykel Moss RN  09/19/21 9160
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
## 2021-09-19 NOTE — ED NOTES
Patient reports feeling dizzy    EKG completed and provided with juice prior to physician eval     Magno Garibay RN  09/19/21 5050

## 2021-09-19 NOTE — ED ATTENDING ATTESTATION
9/19/2021  I, Gill Carroll MD, saw and evaluated the patient  I have discussed the patient with the resident/non-physician practitioner and agree with the resident's/non-physician practitioner's findings, Plan of Care, and MDM as documented in the resident's/non-physician practitioner's note, except where noted  All available labs and Radiology studies were reviewed  I was present for key portions of any procedure(s) performed by the resident/non-physician practitioner and I was immediately available to provide assistance  At this point I agree with the current assessment done in the Emergency Department  I have conducted an independent evaluation of this patient a history and physical is as follows:    ED Course         Critical Care Time  Procedures    48 yo male with hx of substance abuse, took cocaine at 1:30 and then developed sob, abdominal pain, anxious feeling  Pt is requesting meds to help relax him  Pt with hx of use of crack/cocaine  Vss, afebrile, lungs cta, rrr, abdomen soft nontender, no neuro deficits    Cardiac wokrup, haldol, host

## 2021-09-19 NOTE — ED NOTES
Crisis Worker ([id number]) spoke with patient (Pt) who admits to relapse and cocaine use and stated he will be accepting of treatment  CW told Pt that he will call HOST and they will do an assessment with him over the phone  CW called HOST and spoke to [facility] and gave him information on Pt  He stated that he will call ED and speak to Pt in a little while  He asked CW to fax chart and facesheet to him at [phone]  CW faxed intake and facesheet to number provided

## 2021-09-19 NOTE — ED NOTES
Ashish from HOST called and stated he attempted to call ED and speak to Pt, however Pt told Rn that he wanted to leave  Emmanuel Santizo stated to [employer] that Rn stated to him that assessment was done in past if consumer was discharged  Emmanuel Santizo stated that he contacted his manager who re-enforced that is policy and initial assessment with Pt must be done with Pt in ED  Emmanuel Santizo stated he did try to reach Pt on cell and no answer

## 2021-09-19 NOTE — ED NOTES
As pt was leaving HOST called, told pt that HOST was on the phone for him, he stated ok they can call me, they got my number, im leaving  I asked if I could verify his number so we could give to HOST he stated nope they got it  Pt exited the department        Opal Maddox RN  09/19/21 5359

## 2021-09-22 NOTE — ED ATTENDING ATTESTATION
8/27/2021  ISameer DO, saw and evaluated the patient  I have discussed the patient with the resident/non-physician practitioner and agree with the resident's/non-physician practitioner's findings, Plan of Care, and MDM as documented in the resident's/non-physician practitioner's note, except where noted  All available labs and Radiology studies were reviewed  I was present for key portions of any procedure(s) performed by the resident/non-physician practitioner and I was immediately available to provide assistance  At this point I agree with the current assessment done in the Emergency Department  I have conducted an independent evaluation of this patient a history and physical is as follows:    49 yom here incessantly for multiple problems, smoked cracked, now has restless legs  Wants help  He was just here  He has a normal exam that is unchanged from his baseline   Wants to get clean, refer carl risis  ED Course         Critical Care Time  Procedures

## 2021-10-05 ENCOUNTER — HOSPITAL ENCOUNTER (EMERGENCY)
Facility: HOSPITAL | Age: 56
Discharge: HOME | End: 2021-10-05
Attending: EMERGENCY MEDICINE | Admitting: EMERGENCY MEDICINE
Payer: MEDICARE

## 2021-10-05 VITALS
HEART RATE: 99 BPM | SYSTOLIC BLOOD PRESSURE: 168 MMHG | DIASTOLIC BLOOD PRESSURE: 88 MMHG | BODY MASS INDEX: 38.79 KG/M2 | RESPIRATION RATE: 20 BRPM | OXYGEN SATURATION: 99 % | WEIGHT: 240.3 LBS

## 2021-10-05 DIAGNOSIS — F41.9 ANXIETY: ICD-10-CM

## 2021-10-05 DIAGNOSIS — F14.90 CRACK COCAINE USE: Primary | ICD-10-CM

## 2021-10-05 DIAGNOSIS — R45.89 INABILITY TO COPE: ICD-10-CM

## 2021-10-05 LAB
ATRIAL RATE: 95 BPM
P AXIS: 67 DEGREES
PR INTERVAL: 150 MS
QRS AXIS: 50 DEGREES
QRSD INTERVAL: 74 MS
QT INTERVAL: 342 MS
QTC INTERVAL: 429 MS
T WAVE AXIS: 62 DEGREES
VENTRICULAR RATE: 95 BPM

## 2021-10-05 PROCEDURE — 99284 EMERGENCY DEPT VISIT MOD MDM: CPT

## 2021-10-05 PROCEDURE — 99285 EMERGENCY DEPT VISIT HI MDM: CPT | Performed by: EMERGENCY MEDICINE

## 2021-10-05 PROCEDURE — 93005 ELECTROCARDIOGRAM TRACING: CPT

## 2021-10-05 PROCEDURE — 93010 ELECTROCARDIOGRAM REPORT: CPT | Performed by: INTERNAL MEDICINE

## 2021-10-06 ENCOUNTER — OFFICE VISIT (OUTPATIENT)
Dept: OBGYN CLINIC | Facility: HOSPITAL | Age: 56
End: 2021-10-06
Payer: MEDICARE

## 2021-10-06 ENCOUNTER — HOSPITAL ENCOUNTER (OUTPATIENT)
Dept: RADIOLOGY | Facility: HOSPITAL | Age: 56
Discharge: HOME | End: 2021-10-06
Attending: ORTHOPAEDIC SURGERY
Payer: MEDICARE

## 2021-10-06 VITALS
WEIGHT: 241 LBS | HEART RATE: 73 BPM | HEIGHT: 66 IN | DIASTOLIC BLOOD PRESSURE: 86 MMHG | SYSTOLIC BLOOD PRESSURE: 141 MMHG | BODY MASS INDEX: 38.73 KG/M2

## 2021-10-06 DIAGNOSIS — M25.562 CHRONIC PAIN OF LEFT KNEE: ICD-10-CM

## 2021-10-06 DIAGNOSIS — G89.29 CHRONIC PAIN OF LEFT KNEE: ICD-10-CM

## 2021-10-06 DIAGNOSIS — G89.29 CHRONIC PAIN OF RIGHT KNEE: Primary | ICD-10-CM

## 2021-10-06 DIAGNOSIS — G89.29 CHRONIC PAIN OF RIGHT KNEE: ICD-10-CM

## 2021-10-06 DIAGNOSIS — M25.561 CHRONIC PAIN OF RIGHT KNEE: Primary | ICD-10-CM

## 2021-10-06 DIAGNOSIS — M17.12 PRIMARY OSTEOARTHRITIS OF LEFT KNEE: ICD-10-CM

## 2021-10-06 DIAGNOSIS — M17.11 PRIMARY OSTEOARTHRITIS OF RIGHT KNEE: ICD-10-CM

## 2021-10-06 DIAGNOSIS — S80.01XA CONTUSION OF RIGHT KNEE, INITIAL ENCOUNTER: ICD-10-CM

## 2021-10-06 DIAGNOSIS — M25.561 CHRONIC PAIN OF RIGHT KNEE: ICD-10-CM

## 2021-10-06 DIAGNOSIS — Z98.890 HX OF ARTHROSCOPY OF LEFT KNEE: ICD-10-CM

## 2021-10-06 PROCEDURE — 73562 X-RAY EXAM OF KNEE 3: CPT

## 2021-10-06 PROCEDURE — 99213 OFFICE O/P EST LOW 20 MIN: CPT | Performed by: ORTHOPAEDIC SURGERY

## 2021-10-06 PROCEDURE — 20610 DRAIN/INJ JOINT/BURSA W/O US: CPT | Performed by: ORTHOPAEDIC SURGERY

## 2021-10-06 RX ADMIN — BUPIVACAINE HYDROCHLORIDE 2 ML: 2.5 INJECTION, SOLUTION INFILTRATION; PERINEURAL at 10:18

## 2021-10-06 RX ADMIN — METHYLPREDNISOLONE ACETATE 2 ML: 40 INJECTION, SUSPENSION INTRA-ARTICULAR; INTRALESIONAL; INTRAMUSCULAR; SOFT TISSUE at 10:18

## 2021-10-07 PROBLEM — M25.561 CHRONIC PAIN OF RIGHT KNEE: Status: ACTIVE | Noted: 2021-10-07

## 2021-10-07 PROBLEM — G89.29 CHRONIC PAIN OF RIGHT KNEE: Status: ACTIVE | Noted: 2021-10-07

## 2021-10-07 RX ORDER — METHYLPREDNISOLONE ACETATE 40 MG/ML
2 INJECTION, SUSPENSION INTRA-ARTICULAR; INTRALESIONAL; INTRAMUSCULAR; SOFT TISSUE
Status: COMPLETED | OUTPATIENT
Start: 2021-10-06 | End: 2021-10-06

## 2021-10-07 RX ORDER — BUPIVACAINE HYDROCHLORIDE 2.5 MG/ML
2 INJECTION, SOLUTION INFILTRATION; PERINEURAL
Status: COMPLETED | OUTPATIENT
Start: 2021-10-06 | End: 2021-10-06

## 2021-10-08 ENCOUNTER — TELEMEDICINE (OUTPATIENT)
Dept: INTERNAL MEDICINE CLINIC | Facility: CLINIC | Age: 56
End: 2021-10-08

## 2021-10-08 DIAGNOSIS — F25.0 SCHIZOAFFECTIVE DISORDER, BIPOLAR TYPE (HCC): Chronic | ICD-10-CM

## 2021-10-08 DIAGNOSIS — I10 ESSENTIAL HYPERTENSION: ICD-10-CM

## 2021-10-08 DIAGNOSIS — F19.20 DRUG ABUSE AND DEPENDENCE (HCC): Primary | ICD-10-CM

## 2021-10-08 DIAGNOSIS — E78.2 MIXED HYPERLIPIDEMIA: ICD-10-CM

## 2021-10-08 DIAGNOSIS — F14.10 COCAINE ABUSE (HCC): ICD-10-CM

## 2021-10-08 PROCEDURE — 99442 PR PHYS/QHP TELEPHONE EVALUATION 11-20 MIN: CPT | Performed by: PHYSICIAN ASSISTANT

## 2021-10-08 RX ORDER — ROSUVASTATIN CALCIUM 40 MG/1
40 TABLET, COATED ORAL DAILY
Qty: 90 TABLET | Refills: 1
Start: 2021-10-08 | End: 2022-03-07

## 2021-11-15 ENCOUNTER — APPOINTMENT (OUTPATIENT)
Dept: LAB | Facility: HOSPITAL | Age: 56
End: 2021-11-15
Attending: PSYCHIATRY & NEUROLOGY
Payer: MEDICARE

## 2021-11-15 DIAGNOSIS — Z79.899 ENCOUNTER FOR LONG-TERM (CURRENT) USE OF OTHER MEDICATIONS: ICD-10-CM

## 2021-11-15 LAB
25(OH)D3 SERPL-MCNC: 17.9 NG/ML (ref 30–100)
ALBUMIN SERPL BCP-MCNC: 3.5 G/DL (ref 3.5–5)
ALP SERPL-CCNC: 79 U/L (ref 46–116)
ALT SERPL W P-5'-P-CCNC: 93 U/L (ref 12–78)
ANION GAP SERPL CALCULATED.3IONS-SCNC: 6 MMOL/L (ref 4–13)
AST SERPL W P-5'-P-CCNC: 36 U/L (ref 5–45)
ATRIAL RATE: 67 BPM
BASOPHILS # BLD AUTO: 0.03 THOUSANDS/ΜL (ref 0–0.1)
BASOPHILS NFR BLD AUTO: 1 % (ref 0–1)
BILIRUB SERPL-MCNC: 0.38 MG/DL (ref 0.2–1)
BUN SERPL-MCNC: 12 MG/DL (ref 5–25)
CALCIUM SERPL-MCNC: 9.4 MG/DL (ref 8.3–10.1)
CHLORIDE SERPL-SCNC: 106 MMOL/L (ref 100–108)
CHOLEST SERPL-MCNC: 195 MG/DL (ref 50–200)
CO2 SERPL-SCNC: 24 MMOL/L (ref 21–32)
CREAT SERPL-MCNC: 1.03 MG/DL (ref 0.6–1.3)
EOSINOPHIL # BLD AUTO: 0.07 THOUSAND/ΜL (ref 0–0.61)
EOSINOPHIL NFR BLD AUTO: 1 % (ref 0–6)
ERYTHROCYTE [DISTWIDTH] IN BLOOD BY AUTOMATED COUNT: 13 % (ref 11.6–15.1)
GFR SERPL CREATININE-BSD FRML MDRD: 82 ML/MIN/1.73SQ M
GLUCOSE P FAST SERPL-MCNC: 106 MG/DL (ref 65–99)
HCT VFR BLD AUTO: 46 % (ref 36.5–49.3)
HDLC SERPL-MCNC: 30 MG/DL
HGB BLD-MCNC: 15.3 G/DL (ref 12–17)
IMM GRANULOCYTES # BLD AUTO: 0.04 THOUSAND/UL (ref 0–0.2)
IMM GRANULOCYTES NFR BLD AUTO: 1 % (ref 0–2)
LDLC SERPL CALC-MCNC: 129 MG/DL (ref 0–100)
LYMPHOCYTES # BLD AUTO: 2.59 THOUSANDS/ΜL (ref 0.6–4.47)
LYMPHOCYTES NFR BLD AUTO: 44 % (ref 14–44)
MCH RBC QN AUTO: 31.1 PG (ref 26.8–34.3)
MCHC RBC AUTO-ENTMCNC: 33.3 G/DL (ref 31.4–37.4)
MCV RBC AUTO: 94 FL (ref 82–98)
MONOCYTES # BLD AUTO: 0.4 THOUSAND/ΜL (ref 0.17–1.22)
MONOCYTES NFR BLD AUTO: 7 % (ref 4–12)
NEUTROPHILS # BLD AUTO: 2.83 THOUSANDS/ΜL (ref 1.85–7.62)
NEUTS SEG NFR BLD AUTO: 46 % (ref 43–75)
NONHDLC SERPL-MCNC: 165 MG/DL
NRBC BLD AUTO-RTO: 0 /100 WBCS
P AXIS: 34 DEGREES
PLATELET # BLD AUTO: 147 THOUSANDS/UL (ref 149–390)
PMV BLD AUTO: 10.7 FL (ref 8.9–12.7)
POTASSIUM SERPL-SCNC: 4.3 MMOL/L (ref 3.5–5.3)
PR INTERVAL: 166 MS
PROT SERPL-MCNC: 7.4 G/DL (ref 6.4–8.2)
QRS AXIS: 23 DEGREES
QRSD INTERVAL: 82 MS
QT INTERVAL: 362 MS
QTC INTERVAL: 382 MS
RBC # BLD AUTO: 4.92 MILLION/UL (ref 3.88–5.62)
SODIUM SERPL-SCNC: 136 MMOL/L (ref 136–145)
T WAVE AXIS: 61 DEGREES
T4 FREE SERPL-MCNC: 0.7 NG/DL (ref 0.76–1.46)
TRIGL SERPL-MCNC: 179 MG/DL
TSH SERPL DL<=0.05 MIU/L-ACNC: 3.47 UIU/ML (ref 0.36–3.74)
VALPROATE SERPL-MCNC: 35 UG/ML (ref 50–100)
VENTRICULAR RATE: 67 BPM
WBC # BLD AUTO: 5.96 THOUSAND/UL (ref 4.31–10.16)

## 2021-11-15 PROCEDURE — 80307 DRUG TEST PRSMV CHEM ANLYZR: CPT

## 2021-11-15 PROCEDURE — 80053 COMPREHEN METABOLIC PANEL: CPT

## 2021-11-15 PROCEDURE — 85025 COMPLETE CBC W/AUTO DIFF WBC: CPT

## 2021-11-15 PROCEDURE — 84443 ASSAY THYROID STIM HORMONE: CPT

## 2021-11-15 PROCEDURE — 80164 ASSAY DIPROPYLACETIC ACD TOT: CPT

## 2021-11-15 PROCEDURE — 80061 LIPID PANEL: CPT

## 2021-11-15 PROCEDURE — 93010 ELECTROCARDIOGRAM REPORT: CPT | Performed by: INTERNAL MEDICINE

## 2021-11-15 PROCEDURE — 82306 VITAMIN D 25 HYDROXY: CPT

## 2021-11-15 PROCEDURE — 36415 COLL VENOUS BLD VENIPUNCTURE: CPT

## 2021-11-15 PROCEDURE — 84439 ASSAY OF FREE THYROXINE: CPT

## 2021-11-15 PROCEDURE — 93005 ELECTROCARDIOGRAM TRACING: CPT

## 2021-11-22 LAB — MISCELLANEOUS LAB TEST RESULT: NORMAL

## 2021-11-24 ENCOUNTER — OFFICE VISIT (OUTPATIENT)
Dept: OBGYN CLINIC | Facility: HOSPITAL | Age: 56
End: 2021-11-24
Payer: MEDICARE

## 2021-11-24 VITALS — WEIGHT: 237.4 LBS | BODY MASS INDEX: 38.15 KG/M2 | HEIGHT: 66 IN

## 2021-11-24 DIAGNOSIS — G89.29 CHRONIC PAIN OF RIGHT KNEE: ICD-10-CM

## 2021-11-24 DIAGNOSIS — M17.12 PRIMARY OSTEOARTHRITIS OF LEFT KNEE: ICD-10-CM

## 2021-11-24 DIAGNOSIS — G89.29 CHRONIC PAIN OF LEFT KNEE: ICD-10-CM

## 2021-11-24 DIAGNOSIS — M25.561 CHRONIC PAIN OF RIGHT KNEE: ICD-10-CM

## 2021-11-24 DIAGNOSIS — S83.206A POSITIVE MCMURRAY TEST OF RIGHT KNEE, INITIAL ENCOUNTER: ICD-10-CM

## 2021-11-24 DIAGNOSIS — M23.91 KNEE INTERNAL DERANGEMENT, RIGHT: Primary | ICD-10-CM

## 2021-11-24 DIAGNOSIS — M25.562 CHRONIC PAIN OF LEFT KNEE: ICD-10-CM

## 2021-11-24 PROCEDURE — 99213 OFFICE O/P EST LOW 20 MIN: CPT | Performed by: ORTHOPAEDIC SURGERY

## 2021-11-27 PROBLEM — M23.91 KNEE INTERNAL DERANGEMENT, RIGHT: Status: ACTIVE | Noted: 2021-11-27

## 2021-12-01 NOTE — PLAN OF CARE
Alteration in Thoughts and Perception     Treatment Goal: Gain control of psychotic behaviors/thinking, reduce/eliminate presenting symptoms and demonstrate improved reality functioning upon discharge Not Progressing        Anxiety     Anxiety is at manageable level Not Progressing        SUBSTANCE USE/ABUSE     By discharge, will develop insight into their chemical dependency and sustain motivation to continue in recovery Not Progressing 4

## 2021-12-14 ENCOUNTER — HOSPITAL ENCOUNTER (EMERGENCY)
Facility: HOSPITAL | Age: 56
Discharge: HOME | End: 2021-12-14
Attending: EMERGENCY MEDICINE
Payer: MEDICARE

## 2021-12-14 VITALS
BODY MASS INDEX: 38.32 KG/M2 | RESPIRATION RATE: 18 BRPM | HEIGHT: 66 IN | OXYGEN SATURATION: 98 % | TEMPERATURE: 96.2 F | DIASTOLIC BLOOD PRESSURE: 100 MMHG | SYSTOLIC BLOOD PRESSURE: 159 MMHG | HEART RATE: 94 BPM

## 2021-12-14 DIAGNOSIS — G25.81 RESTLESS LEG SYNDROME: ICD-10-CM

## 2021-12-14 DIAGNOSIS — M54.9 BACK PAIN: Primary | ICD-10-CM

## 2021-12-14 PROCEDURE — 99283 EMERGENCY DEPT VISIT LOW MDM: CPT

## 2021-12-14 PROCEDURE — 99284 EMERGENCY DEPT VISIT MOD MDM: CPT | Performed by: EMERGENCY MEDICINE

## 2021-12-14 PROCEDURE — 96372 THER/PROPH/DIAG INJ SC/IM: CPT

## 2021-12-14 RX ORDER — KETOROLAC TROMETHAMINE 30 MG/ML
15 INJECTION, SOLUTION INTRAMUSCULAR; INTRAVENOUS ONCE
Status: COMPLETED | OUTPATIENT
Start: 2021-12-14 | End: 2021-12-14

## 2021-12-14 RX ORDER — LIDOCAINE 50 MG/G
1 PATCH TOPICAL ONCE
Status: DISCONTINUED | OUTPATIENT
Start: 2021-12-14 | End: 2021-12-14 | Stop reason: HOSPADM

## 2021-12-14 RX ADMIN — LIDOCAINE 5% 1 PATCH: 700 PATCH TOPICAL at 02:56

## 2021-12-14 RX ADMIN — KETOROLAC TROMETHAMINE 15 MG: 30 INJECTION, SOLUTION INTRAMUSCULAR at 02:56

## 2021-12-17 ENCOUNTER — RA CDI HCC (OUTPATIENT)
Dept: OTHER | Facility: HOSPITAL | Age: 56
End: 2021-12-17

## 2021-12-22 ENCOUNTER — HOSPITAL ENCOUNTER (OUTPATIENT)
Dept: RADIOLOGY | Facility: HOSPITAL | Age: 56
Discharge: HOME | End: 2021-12-22
Attending: ORTHOPAEDIC SURGERY
Payer: MEDICARE

## 2021-12-22 DIAGNOSIS — S83.206A POSITIVE MCMURRAY TEST OF RIGHT KNEE, INITIAL ENCOUNTER: ICD-10-CM

## 2021-12-22 DIAGNOSIS — G89.29 CHRONIC PAIN OF RIGHT KNEE: ICD-10-CM

## 2021-12-22 DIAGNOSIS — M25.561 CHRONIC PAIN OF RIGHT KNEE: ICD-10-CM

## 2021-12-22 DIAGNOSIS — M23.91 KNEE INTERNAL DERANGEMENT, RIGHT: ICD-10-CM

## 2021-12-22 PROCEDURE — 73721 MRI JNT OF LWR EXTRE W/O DYE: CPT

## 2021-12-22 PROCEDURE — G1004 CDSM NDSC: HCPCS

## 2021-12-23 ENCOUNTER — TELEMEDICINE (OUTPATIENT)
Dept: INTERNAL MEDICINE CLINIC | Facility: CLINIC | Age: 56
End: 2021-12-23

## 2021-12-23 DIAGNOSIS — E78.2 MIXED HYPERLIPIDEMIA: ICD-10-CM

## 2021-12-23 DIAGNOSIS — D69.6 PLATELETS DECREASED (HCC): ICD-10-CM

## 2021-12-23 DIAGNOSIS — Z12.5 SCREENING FOR MALIGNANT NEOPLASM OF PROSTATE: ICD-10-CM

## 2021-12-23 DIAGNOSIS — L30.9 ECZEMA, UNSPECIFIED TYPE: ICD-10-CM

## 2021-12-23 DIAGNOSIS — F14.11 HISTORY OF COCAINE ABUSE (HCC): ICD-10-CM

## 2021-12-23 DIAGNOSIS — H40.9 GLAUCOMA OF BOTH EYES, UNSPECIFIED GLAUCOMA TYPE: ICD-10-CM

## 2021-12-23 DIAGNOSIS — I10 ESSENTIAL HYPERTENSION: ICD-10-CM

## 2021-12-23 DIAGNOSIS — F25.0 SCHIZOAFFECTIVE DISORDER, BIPOLAR TYPE (HCC): Primary | Chronic | ICD-10-CM

## 2021-12-23 DIAGNOSIS — R74.8 ELEVATED LIVER ENZYMES: ICD-10-CM

## 2021-12-23 PROCEDURE — G0071 COMM SVCS BY RHC/FQHC 5 MIN: HCPCS | Performed by: PHYSICIAN ASSISTANT

## 2021-12-23 RX ORDER — DORZOLAMIDE HYDROCHLORIDE AND TIMOLOL MALEATE 20; 5 MG/ML; MG/ML
1 SOLUTION/ DROPS OPHTHALMIC DAILY
Qty: 10 ML | Refills: 5 | Status: SHIPPED | OUTPATIENT
Start: 2021-12-23 | End: 2022-07-19

## 2021-12-23 RX ORDER — LATANOPROST 50 UG/ML
1 SOLUTION/ DROPS OPHTHALMIC DAILY
Qty: 7.5 ML | Refills: 3 | Status: SHIPPED | OUTPATIENT
Start: 2021-12-23

## 2021-12-23 RX ORDER — HYDROXYZINE 50 MG/1
50 TABLET, FILM COATED ORAL
COMMUNITY
Start: 2021-12-09

## 2021-12-24 PROBLEM — F14.10 COCAINE ABUSE (HCC): Status: RESOLVED | Noted: 2021-07-10 | Resolved: 2021-12-24

## 2021-12-24 PROBLEM — E78.2 MIXED HYPERLIPIDEMIA: Status: ACTIVE | Noted: 2021-12-24

## 2021-12-30 ENCOUNTER — OFFICE VISIT (OUTPATIENT)
Dept: OBGYN CLINIC | Facility: HOSPITAL | Age: 56
End: 2021-12-30
Payer: MEDICARE

## 2021-12-30 VITALS
HEART RATE: 80 BPM | BODY MASS INDEX: 39.53 KG/M2 | DIASTOLIC BLOOD PRESSURE: 82 MMHG | WEIGHT: 246 LBS | HEIGHT: 66 IN | SYSTOLIC BLOOD PRESSURE: 143 MMHG

## 2021-12-30 DIAGNOSIS — M22.41 PATELLOFEMORAL CHONDROSIS OF RIGHT KNEE: Primary | ICD-10-CM

## 2021-12-30 PROCEDURE — 99213 OFFICE O/P EST LOW 20 MIN: CPT | Performed by: ORTHOPAEDIC SURGERY

## 2021-12-30 NOTE — PATIENT INSTRUCTIONS
- Discussed conservative treatment with patient at length  - Begin weight bearing as tolerated right lower extremity   - Patient given a hinged knee brace  Maintain as directed      - Script  Placed for viscosupplementation  - Over the counter analgesics as needed / directed   - Ice / heat as directed   - Follow-up for Euflexxa #1  Right knee

## 2022-01-01 PROBLEM — M22.41 PATELLOFEMORAL CHONDROSIS OF RIGHT KNEE: Status: ACTIVE | Noted: 2022-01-01

## 2022-01-13 ENCOUNTER — HOSPITAL ENCOUNTER (OUTPATIENT)
Dept: RADIOLOGY | Facility: HOSPITAL | Age: 57
Discharge: HOME | End: 2022-01-13
Payer: MEDICARE

## 2022-01-13 ENCOUNTER — APPOINTMENT (OUTPATIENT)
Dept: LAB | Facility: HOSPITAL | Age: 57
End: 2022-01-13
Payer: MEDICARE

## 2022-01-13 DIAGNOSIS — D69.6 PLATELETS DECREASED (HCC): ICD-10-CM

## 2022-01-13 DIAGNOSIS — R74.8 ELEVATED LIVER ENZYMES: ICD-10-CM

## 2022-01-13 LAB
ALBUMIN SERPL BCP-MCNC: 4.2 G/DL (ref 3.5–5)
ALP SERPL-CCNC: 78 U/L (ref 46–116)
ALT SERPL W P-5'-P-CCNC: 79 U/L (ref 12–78)
AST SERPL W P-5'-P-CCNC: 55 U/L (ref 5–45)
BASOPHILS # BLD AUTO: 0.02 THOUSANDS/ΜL (ref 0–0.1)
BASOPHILS NFR BLD AUTO: 0 % (ref 0–1)
BILIRUB DIRECT SERPL-MCNC: 0.09 MG/DL (ref 0–0.2)
BILIRUB SERPL-MCNC: 0.55 MG/DL (ref 0.2–1)
EOSINOPHIL # BLD AUTO: 0.08 THOUSAND/ΜL (ref 0–0.61)
EOSINOPHIL NFR BLD AUTO: 1 % (ref 0–6)
ERYTHROCYTE [DISTWIDTH] IN BLOOD BY AUTOMATED COUNT: 12.9 % (ref 11.6–15.1)
HCT VFR BLD AUTO: 46.8 % (ref 36.5–49.3)
HGB BLD-MCNC: 15.3 G/DL (ref 12–17)
IMM GRANULOCYTES # BLD AUTO: 0.03 THOUSAND/UL (ref 0–0.2)
IMM GRANULOCYTES NFR BLD AUTO: 1 % (ref 0–2)
LYMPHOCYTES # BLD AUTO: 2.98 THOUSANDS/ΜL (ref 0.6–4.47)
LYMPHOCYTES NFR BLD AUTO: 49 % (ref 14–44)
MCH RBC QN AUTO: 30.8 PG (ref 26.8–34.3)
MCHC RBC AUTO-ENTMCNC: 32.7 G/DL (ref 31.4–37.4)
MCV RBC AUTO: 94 FL (ref 82–98)
MONOCYTES # BLD AUTO: 0.44 THOUSAND/ΜL (ref 0.17–1.22)
MONOCYTES NFR BLD AUTO: 7 % (ref 4–12)
NEUTROPHILS # BLD AUTO: 2.58 THOUSANDS/ΜL (ref 1.85–7.62)
NEUTS SEG NFR BLD AUTO: 42 % (ref 43–75)
NRBC BLD AUTO-RTO: 0 /100 WBCS
PLATELET # BLD AUTO: 187 THOUSANDS/UL (ref 149–390)
PMV BLD AUTO: 11 FL (ref 8.9–12.7)
PROT SERPL-MCNC: 8.6 G/DL (ref 6.4–8.2)
RBC # BLD AUTO: 4.97 MILLION/UL (ref 3.88–5.62)
WBC # BLD AUTO: 6.13 THOUSAND/UL (ref 4.31–10.16)

## 2022-01-13 PROCEDURE — 76705 ECHO EXAM OF ABDOMEN: CPT

## 2022-01-13 PROCEDURE — 36415 COLL VENOUS BLD VENIPUNCTURE: CPT

## 2022-01-13 PROCEDURE — 85025 COMPLETE CBC W/AUTO DIFF WBC: CPT

## 2022-01-13 PROCEDURE — 80076 HEPATIC FUNCTION PANEL: CPT

## 2022-01-17 ENCOUNTER — PATIENT OUTREACH (OUTPATIENT)
Dept: INTERNAL MEDICINE CLINIC | Facility: CLINIC | Age: 57
End: 2022-01-17

## 2022-01-17 NOTE — PROGRESS NOTES
SW has had no further contact from pt but PC has noted pt had been active with [facility] and [facility]  He had been drug free  Please re-consult SW if needed
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
## 2022-02-04 ENCOUNTER — OFFICE VISIT (OUTPATIENT)
Dept: UROLOGY | Facility: AMBULATORY SURGERY CENTER | Age: 57
End: 2022-02-04
Payer: MEDICARE

## 2022-02-04 VITALS
HEIGHT: 66 IN | DIASTOLIC BLOOD PRESSURE: 88 MMHG | WEIGHT: 254 LBS | HEART RATE: 80 BPM | SYSTOLIC BLOOD PRESSURE: 138 MMHG | BODY MASS INDEX: 40.82 KG/M2

## 2022-02-04 DIAGNOSIS — Z12.5 SCREENING FOR PROSTATE CANCER: Primary | ICD-10-CM

## 2022-02-04 LAB
SL AMB  POCT GLUCOSE, UA: NORMAL
SL AMB LEUKOCYTE ESTERASE,UA: NORMAL
SL AMB POCT BILIRUBIN,UA: NORMAL
SL AMB POCT BLOOD,UA: NORMAL
SL AMB POCT CLARITY,UA: CLEAR
SL AMB POCT COLOR,UA: YELLOW
SL AMB POCT KETONES,UA: NORMAL
SL AMB POCT NITRITE,UA: NORMAL
SL AMB POCT PH,UA: 7
SL AMB POCT SPECIFIC GRAVITY,UA: 1
SL AMB POCT URINE PROTEIN: NORMAL
SL AMB POCT UROBILINOGEN: 0.2

## 2022-02-04 PROCEDURE — 99204 OFFICE O/P NEW MOD 45 MIN: CPT | Performed by: NURSE PRACTITIONER

## 2022-02-04 PROCEDURE — 81002 URINALYSIS NONAUTO W/O SCOPE: CPT | Performed by: NURSE PRACTITIONER

## 2022-02-04 RX ORDER — ROPINIROLE 4 MG/1
4 TABLET, FILM COATED ORAL
COMMUNITY
Start: 2022-01-12

## 2022-02-07 ENCOUNTER — OFFICE VISIT (OUTPATIENT)
Dept: INTERNAL MEDICINE CLINIC | Facility: CLINIC | Age: 57
End: 2022-02-07

## 2022-02-07 VITALS
TEMPERATURE: 97.7 F | WEIGHT: 253 LBS | DIASTOLIC BLOOD PRESSURE: 81 MMHG | BODY MASS INDEX: 40.84 KG/M2 | OXYGEN SATURATION: 97 % | HEART RATE: 78 BPM | SYSTOLIC BLOOD PRESSURE: 120 MMHG

## 2022-02-07 DIAGNOSIS — D69.6 PLATELETS DECREASED (HCC): ICD-10-CM

## 2022-02-07 DIAGNOSIS — F19.20 DRUG ABUSE AND DEPENDENCE (HCC): ICD-10-CM

## 2022-02-07 DIAGNOSIS — K59.00 CONSTIPATION, UNSPECIFIED CONSTIPATION TYPE: ICD-10-CM

## 2022-02-07 DIAGNOSIS — N18.2 CKD (CHRONIC KIDNEY DISEASE) STAGE 2, GFR 60-89 ML/MIN: ICD-10-CM

## 2022-02-07 DIAGNOSIS — E78.2 MIXED HYPERLIPIDEMIA: ICD-10-CM

## 2022-02-07 DIAGNOSIS — L90.5 SCAR OF ABDOMINAL WALL: ICD-10-CM

## 2022-02-07 DIAGNOSIS — L90.5 SCAR TISSUE: ICD-10-CM

## 2022-02-07 DIAGNOSIS — F25.0 SCHIZOAFFECTIVE DISORDER, BIPOLAR TYPE (HCC): Primary | ICD-10-CM

## 2022-02-07 DIAGNOSIS — Z12.11 SCREEN FOR COLON CANCER: ICD-10-CM

## 2022-02-07 DIAGNOSIS — R22.1 LOCALIZED SWELLING, MASS AND LUMP, NECK: ICD-10-CM

## 2022-02-07 DIAGNOSIS — K76.0 HEPATIC STEATOSIS: ICD-10-CM

## 2022-02-07 DIAGNOSIS — M54.2 NECK PAIN: ICD-10-CM

## 2022-02-07 DIAGNOSIS — E66.01 OBESITY, MORBID (HCC): ICD-10-CM

## 2022-02-07 DIAGNOSIS — F14.11 HISTORY OF COCAINE ABUSE (HCC): ICD-10-CM

## 2022-02-07 PROBLEM — N18.30 STAGE 3 CHRONIC KIDNEY DISEASE, UNSPECIFIED WHETHER STAGE 3A OR 3B CKD (HCC): Status: ACTIVE | Noted: 2022-02-07

## 2022-02-07 PROCEDURE — 99214 OFFICE O/P EST MOD 30 MIN: CPT | Performed by: INTERNAL MEDICINE

## 2022-02-07 RX ORDER — ASPIRIN 81 MG/1
81 TABLET ORAL DAILY
Qty: 90 TABLET | Refills: 1 | Status: SHIPPED | OUTPATIENT
Start: 2022-02-07

## 2022-02-07 NOTE — PROGRESS NOTES
Assessment/Plan:      Diagnoses and all orders for this visit:    Schizoaffective disorder, bipolar type ([facility] [address] )    Obesity, morbid (HCC)  -     aspirin (ECOTRIN LOW STRENGTH) 81 mg EC tablet; Take 1 tablet (81 mg total) by mouth daily    CKD (chronic kidney disease) stage 2, GFR 60-89 ml/min    Platelets decreased (HCC)  -     CBC and differential; Future    History of cocaine abuse (HCC)    Drug abuse and dependence (HCC)    Mixed hyperlipidemia  -     aspirin (ECOTRIN LOW STRENGTH) 81 mg EC tablet; Take 1 tablet (81 mg total) by mouth daily  -     Comprehensive metabolic panel; Future  -     Lipid panel; Future    Hepatic steatosis  -     Comprehensive metabolic panel; Future    Neck pain  -     US head neck soft tissue; Future    Localized swelling, mass and lump, neck  -     US head neck soft tissue; Future    Scar tissue  -     Ambulatory Referral to General Surgery; Future    Scar of abdominal wall  -     Ambulatory Referral to General Surgery; Future    Screen for colon cancer  -     Ambulatory Referral to Gastroenterology; Future    Constipation, unspecified constipation type  -     Ambulatory Referral to Gastroenterology; Future      58y/o male presenting today for f/u  Pt has concerns today, one of which is neck pain on right and feeling a ball  I cannot distinctly feel swelling or mass in right neck though pt exquisitely tender to light touch in right lateral neck, some discomfort with movement  Will assess further with US soft tissue neck, may need CT depending  Also pt concerned about scar of abdomen and tenderness, was told scar tissue in past, lump  Will refer to general surg  Pt has been 3 months clean from drugs and ETOH, continued support from [employer] and his [address] provider  Pt reports compliance on depakote, doxepin, requip, risperidone for bipolar, schizoaffective d/o  BP excellent today 120/81, continue 5mg amlodipine  Pt also to continue rosuvastatin 40mg and aspirin  Continued efforts for weight loss with diet/exercise encouraged, especially considering his fatty liver on abd US  Pt given order for FBW CBC to recheck platelets, cmp and lipids as well  Pt does note intermittent constipation  Also due for colonoscopy - referral provided  Pt agreeable for short term f/u in 2 months  Chief Complaint   Patient presents with    Review Labs     US liver/pancreas       Subjective:     Patient ID: Arina Morse is a 49 y o  male     56y/o male here today for f/u to his chronic conditions including HTN, hyperlipidemia, MH, susbtance abuse  States he remains on list for [employer] housing and has moved up, believes he may have something soon which certainly has helped his MH     states he is back on track for his health  He is 3 months clean from drugs  States he got the abd US as I recommended, getting the BW and remaining clean  US liver showing moderate hepatic steatosis  Pt concerned today because he has right neck piercing sharp pain just below his right ear and goes on outside of neck to under his jaw  He states he has had for 6 weeks  No pain in ear or pain with swallowing  States pain can be with movement or with at rest, states "feels like a boom boom boom "  States he feels  A ball in the area  He does not know if he has fever though at times cold  States his son is extremely worried about it and also making pt anxious  Also pt noting scarring on lower abdomen and feeling tender and painful in the area  Would like referral      Pt continues aspirin, amlodipine 5mg and statin for HTN and hyperlipidemia  Review of Systems   Constitutional: Negative  Respiratory: Negative  Cardiovascular: Negative  Gastrointestinal:        As in HPI   Genitourinary: Negative  Musculoskeletal:        As in HPI   Skin:        As in HPI   Neurological: Negative      Psychiatric/Behavioral:        As in HPI         The following portions of the patient's history were reviewed and updated as appropriate: allergies, current medications, past family history, past medical history, past social history, past surgical history and problem list       Objective:     Physical Exam  Vitals reviewed  Constitutional:       General: He is not in acute distress  Appearance: He is obese  He is not ill-appearing or toxic-appearing  Comments: BMI 40   HENT:      Head: Normocephalic and atraumatic  Right Ear: Hearing, tympanic membrane, ear canal and external ear normal       Left Ear: Hearing, tympanic membrane, ear canal and external ear normal       Mouth/Throat:      Pharynx: Oropharynx is clear  Neck:      Vascular: No carotid bruit  Comments: No palpable neck swelling, mass or LN palpated despite reported TTP on exam    Cardiovascular:      Rate and Rhythm: Normal rate and regular rhythm  Pulses: Normal pulses  Heart sounds: Normal heart sounds  Pulmonary:      Effort: Pulmonary effort is normal       Breath sounds: Normal breath sounds  Abdominal:      General: Abdomen is protuberant  Bowel sounds are normal  There is no distension  Tenderness: There is abdominal tenderness (mild, over scarring)  Musculoskeletal:      Cervical back: Neck supple  Pain with movement (right) and muscular tenderness (right lateral region, even to light touch ) present  Right lower leg: No edema  Left lower leg: No edema  Neurological:      Mental Status: He is alert and oriented to person, place, and time  Psychiatric:         Mood and Affect: Mood is anxious (at baseline)  Speech: Speech is rapid and pressured  Behavior: Behavior normal  Behavior is cooperative  Thought Content:  Thought content normal          Cognition and Memory: Cognition normal          Judgment: Judgment normal            Vitals:    02/07/22 1143   BP: 120/81   BP Location: Left arm   Patient Position: Sitting   Cuff Size: Large Pulse: 78   Temp: 97 7 °F (36 5 °C)   TempSrc: Temporal   SpO2: 97%   Weight: 115 kg (253 lb)

## 2022-02-09 ENCOUNTER — OFFICE VISIT (OUTPATIENT)
Dept: GASTROENTEROLOGY | Facility: CLINIC | Age: 57
End: 2022-02-09
Payer: MEDICARE

## 2022-02-09 ENCOUNTER — PROCEDURE VISIT (OUTPATIENT)
Dept: OBGYN CLINIC | Facility: HOSPITAL | Age: 57
End: 2022-02-09
Payer: MEDICARE

## 2022-02-09 VITALS
SYSTOLIC BLOOD PRESSURE: 118 MMHG | DIASTOLIC BLOOD PRESSURE: 76 MMHG | BODY MASS INDEX: 39.86 KG/M2 | TEMPERATURE: 98.9 F | WEIGHT: 248 LBS | HEIGHT: 66 IN

## 2022-02-09 VITALS — HEIGHT: 66 IN | WEIGHT: 253 LBS | BODY MASS INDEX: 40.66 KG/M2

## 2022-02-09 DIAGNOSIS — K59.00 CONSTIPATION, UNSPECIFIED CONSTIPATION TYPE: ICD-10-CM

## 2022-02-09 DIAGNOSIS — Z12.11 SCREEN FOR COLON CANCER: ICD-10-CM

## 2022-02-09 DIAGNOSIS — M17.11 PRIMARY OSTEOARTHRITIS OF RIGHT KNEE: Primary | ICD-10-CM

## 2022-02-09 DIAGNOSIS — R74.8 ELEVATED LIVER ENZYMES: Primary | ICD-10-CM

## 2022-02-09 PROCEDURE — 99214 OFFICE O/P EST MOD 30 MIN: CPT | Performed by: INTERNAL MEDICINE

## 2022-02-09 PROCEDURE — 20610 DRAIN/INJ JOINT/BURSA W/O US: CPT | Performed by: PHYSICIAN ASSISTANT

## 2022-02-09 RX ORDER — POLYETHYLENE GLYCOL 3350 17 G/17G
17 POWDER, FOR SOLUTION ORAL DAILY
Qty: 30 EACH | Refills: 3 | Status: SHIPPED | OUTPATIENT
Start: 2022-02-09

## 2022-02-09 RX ORDER — HYALURONATE SODIUM 10 MG/ML
20 SYRINGE (ML) INTRAARTICULAR
Status: COMPLETED | OUTPATIENT
Start: 2022-02-09 | End: 2022-02-09

## 2022-02-09 RX ADMIN — Medication 20 MG: at 11:06
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
## 2022-02-09 NOTE — PATIENT INSTRUCTIONS
Scheduled date of colonoscopy (as of today):  4/28/22  Physician performing colonoscopy:  Dr Josh Crowley  Location of colonoscopy:  [facility]  Bowel prep reviewed with patient:  Mary/Dulcolax  Instructions reviewed with patient by:  Estrella Rivas  Clearances: n/a
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
## 2022-02-11 ENCOUNTER — HOSPITAL ENCOUNTER (OUTPATIENT)
Dept: RADIOLOGY | Facility: HOSPITAL | Age: 57
Discharge: HOME | End: 2022-02-11
Payer: MEDICARE

## 2022-02-11 DIAGNOSIS — M54.2 NECK PAIN: ICD-10-CM

## 2022-02-11 DIAGNOSIS — R22.1 LOCALIZED SWELLING, MASS AND LUMP, NECK: ICD-10-CM

## 2022-02-11 PROCEDURE — 76536 US EXAM OF HEAD AND NECK: CPT

## 2022-02-16 ENCOUNTER — TELEPHONE (OUTPATIENT)
Dept: OBGYN CLINIC | Facility: HOSPITAL | Age: 57
End: 2022-02-16

## 2022-02-16 PROBLEM — K76.0 HEPATIC STEATOSIS: Status: ACTIVE | Noted: 2022-02-16

## 2022-02-16 PROBLEM — N18.30 STAGE 3 CHRONIC KIDNEY DISEASE, UNSPECIFIED WHETHER STAGE 3A OR 3B CKD (HCC): Status: RESOLVED | Noted: 2022-02-07 | Resolved: 2022-02-16

## 2022-02-16 NOTE — TELEPHONE ENCOUNTER
Patient called he missed his #2 visco injection  Is it ok for him to wait until naxt week to get his #2 injection or try to get him scheduled this week      Callback # [phone]

## 2022-02-17 NOTE — TELEPHONE ENCOUNTER
Called and spoke to patient  He will come in on 2/24 for 2nd visco injection, 3rd injection appt will be scheduled then  Pt verbalized understanding

## 2022-02-23 ENCOUNTER — PROCEDURE VISIT (OUTPATIENT)
Dept: OBGYN CLINIC | Facility: HOSPITAL | Age: 57
End: 2022-02-23
Payer: MEDICARE

## 2022-02-23 VITALS — BODY MASS INDEX: 39.86 KG/M2 | HEIGHT: 66 IN | WEIGHT: 248 LBS

## 2022-02-23 DIAGNOSIS — M17.11 PRIMARY OSTEOARTHRITIS OF RIGHT KNEE: Primary | ICD-10-CM

## 2022-02-23 PROCEDURE — 20610 DRAIN/INJ JOINT/BURSA W/O US: CPT | Performed by: PHYSICIAN ASSISTANT

## 2022-02-23 RX ORDER — HYALURONATE SODIUM 10 MG/ML
20 SYRINGE (ML) INTRAARTICULAR
Status: COMPLETED | OUTPATIENT
Start: 2022-02-23 | End: 2022-02-23

## 2022-02-23 RX ADMIN — Medication 20 MG: at 10:20
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
## 2022-03-01 ENCOUNTER — OFFICE VISIT (OUTPATIENT)
Dept: OBGYN CLINIC | Facility: HOSPITAL | Age: 57
End: 2022-03-01
Payer: MEDICARE

## 2022-03-01 ENCOUNTER — HOSPITAL ENCOUNTER (OUTPATIENT)
Dept: RADIOLOGY | Facility: HOSPITAL | Age: 57
Discharge: HOME | End: 2022-03-01
Payer: MEDICARE

## 2022-03-01 VITALS — BODY MASS INDEX: 40.18 KG/M2 | HEIGHT: 66 IN | WEIGHT: 250 LBS

## 2022-03-01 DIAGNOSIS — R52 PAIN: ICD-10-CM

## 2022-03-01 DIAGNOSIS — M50.30 DDD (DEGENERATIVE DISC DISEASE), CERVICAL: Primary | ICD-10-CM

## 2022-03-01 PROCEDURE — 99213 OFFICE O/P EST LOW 20 MIN: CPT | Performed by: PHYSICIAN ASSISTANT

## 2022-03-01 PROCEDURE — 72050 X-RAY EXAM NECK SPINE 4/5VWS: CPT

## 2022-03-01 RX ORDER — METHYLPREDNISOLONE 4 MG/1
TABLET ORAL
Qty: 21 TABLET | Refills: 0 | Status: SHIPPED | OUTPATIENT
Start: 2022-03-01

## 2022-03-01 NOTE — PROGRESS NOTES
Assessment:    Acute neck  / trapezius pain following window falling onto neck  Chronic cervical degenerative disc disease       Plan:    Rx Medrol dose pack  Check CT cervical spine without contrast to check for any bony abnormalities related to recent event  Follow-up with pain management for review and further treatment options moving forward          Problem List Items Addressed This Visit        Musculoskeletal and Integument    DDD (degenerative disc disease), cervical - Primary                   Subjective:     Patient ID:  Salena Apgar is a 49 y o  male    HPI    24-year-old male presenting for evaluation of his cervical spine  According to the patient, about two weeks ago he was moving into a new housing complex when he was inside looking for better from phone reception he went to the window to try to accomplish this  He states next thing he knew the window came down onto his neck region he felt immediate pain localized to the left side of his neck with radiation to the side of the head  He states he did not lose consciousness or fall  However it did shake him up pretty good  He denies any radiation to the upper extremities  He did not present to the ER at that time  He denies any bowel or bladder incontinence, no saddle anesthesia  He denies any acute upper extremity weakness since this event  He has multiple chronic medical conditions  He states he has occasional chronic neck pain in the past   Over the last two weeks, he feels slightly better  He has not taken any medication or done any formal treatments for this problem      The following portions of the patient's history were reviewed and updated as appropriate: allergies, current medications, past family history, past medical history, past social history, past surgical history and problem list     Review of Systems     Objective:    Imaging:  Cervical spine x-rays demonstrate multilevel degenerative discs disease, no acute findings      Physical Exam     Orthopedic Examination:  Cervical spine     Inspection:  No open wounds or erythema  No ecchymosis  No swelling      Palpation:  Left trapezius muscles mildly tender to palpation    Range-of-motion:  Deferred due to recent acute trauma    Strength:  5/5 C5-T1 bilaterally    Sensation:  Intact C5-T1 bilaterally    Special Tests:    Negative Judd's  Upper extremities are warm and well perfused   Palpable radial pulse

## 2022-03-02 ENCOUNTER — PROCEDURE VISIT (OUTPATIENT)
Dept: OBGYN CLINIC | Facility: HOSPITAL | Age: 57
End: 2022-03-02
Payer: MEDICARE

## 2022-03-02 ENCOUNTER — TELEPHONE (OUTPATIENT)
Dept: OBGYN CLINIC | Facility: MEDICAL CENTER | Age: 57
End: 2022-03-02

## 2022-03-02 VITALS
BODY MASS INDEX: 40.18 KG/M2 | HEART RATE: 75 BPM | DIASTOLIC BLOOD PRESSURE: 75 MMHG | HEIGHT: 66 IN | SYSTOLIC BLOOD PRESSURE: 122 MMHG | WEIGHT: 250 LBS

## 2022-03-02 DIAGNOSIS — M17.11 PRIMARY OSTEOARTHRITIS OF RIGHT KNEE: Primary | ICD-10-CM

## 2022-03-02 PROCEDURE — 99212 OFFICE O/P EST SF 10 MIN: CPT | Performed by: PHYSICIAN ASSISTANT

## 2022-03-02 NOTE — PROGRESS NOTES
Assessment:    Right knee osteoarthritis      Plan:    Euflexxa #3 provided today to the right knee joint and the patient tolerated well  Ice to the area  OTC analgesics as needed  F/U DR Malik Adame as needed              Subjective:     Patient ID:  Elizabeth Benitez is a 49 y o  male    HPI    37-year-old male presenting for his injection 3/3 Euflexxa today  No new issues since last visit tolerated the injections well has no right knee complaints today    The following portions of the patient's history were reviewed and updated as appropriate: allergies, current medications, past family history, past medical history, past social history, past surgical history and problem list     Review of Systems     Objective:    Imaging:  None       Vitals:    03/02/22 1512   BP: 122/75   Pulse: 75           Physical Exam     Orthopedic Examination:  Right knee     Inspection: no open wounds or erythema  No effusion  Palpation:  No warmth    Medial and lateral joint lines, patella nontender to palpation    Range-of-motion: 0 to 130, no extensor lag

## 2022-03-02 NOTE — TELEPHONE ENCOUNTER
Patient sees Dr Carlito Ryan  Patient will come to see Lydia Gonzalez today at 2:45 pm, thank you for changing the time

## 2022-03-09 ENCOUNTER — CONSULT (OUTPATIENT)
Dept: MULTI SPECIALTY CLINIC | Facility: CLINIC | Age: 57
End: 2022-03-09

## 2022-03-09 VITALS — TEMPERATURE: 98 F | BODY MASS INDEX: 40.02 KG/M2 | WEIGHT: 249 LBS | HEIGHT: 66 IN

## 2022-03-09 DIAGNOSIS — L81.2 EPHELIDES: ICD-10-CM

## 2022-03-09 DIAGNOSIS — D48.9 NEOPLASM OF UNCERTAIN BEHAVIOR: Primary | ICD-10-CM

## 2022-03-09 DIAGNOSIS — L82.1 SEBORRHEIC KERATOSES: ICD-10-CM

## 2022-03-09 PROCEDURE — 88305 TISSUE EXAM BY PATHOLOGIST: CPT | Performed by: STUDENT IN AN ORGANIZED HEALTH CARE EDUCATION/TRAINING PROGRAM

## 2022-03-09 PROCEDURE — 11102 TANGNTL BX SKIN SINGLE LES: CPT | Performed by: DERMATOLOGY

## 2022-03-09 PROCEDURE — 99214 OFFICE O/P EST MOD 30 MIN: CPT | Performed by: DERMATOLOGY

## 2022-03-09 NOTE — PROGRESS NOTES
[facility] Note     Patient Name: Yash Grady  Encounter Date: 3/9/2022     Have you been cared for by a [facility] Dermatologist in the last 3 years and, if so, which one? No    · Have you traveled outside of the [address] [place] [address] in the past 3 months or outside of the [facility] area in the last 2 weeks? No     May we call your Preferred Phone number to discuss your specific medical information? Yes     May we leave a detailed message that includes your specific medical information? Yes      Today's Chief Concerns:   Concern #1:  Brown spots on legs, arms, scalp       Past Medical History:  Have you personally ever had or currently have any of the following? · Skin cancer (such as Melanoma, Basal Cell Carcinoma, Squamous Cell Carcinoma? (If Yes, please provide more detail)- No  · Eczema: No  · Psoriasis: No  · HIV/AIDS: No  · Hepatitis B or C: No  · Tuberculosis: No  · Systemic Immunosuppression such as Diabetes, Biologic or Immunotherapy, Chemotherapy, Organ Transplantation, Bone Marrow Transplantation (If YES, please provide more detail): No  · Radiation Treatment (If YES, please provide more detail): No  · Any other major medical conditions/concerns? (If Yes, which types)- Yes, History of PTSD, Bipolar, chronic pain disorder, Head injury, sleep apnea, history of cocaine abuse  Social History:     What is/was your primary occupation? Retired      What are your hobbies/past-times? Art, reading books     Family History:  Have any of your "first degree relatives" (parent, brother, sister, or child) had any of the following       · Skin cancer such as Melanoma or Merkel Cell Carcinoma or Pancreatic Cancer? No  · Eczema, Asthma, Hay Fever or Seasonal Allergies: No  · Psoriasis or Psoriatic Arthritis: No  · Do any other medical conditions seem to run in your family? If Yes, what condition and which relatives?   No    Current Medications:         Current Outpatient Medications:     amLODIPine (NORVASC) 5 mg tablet, TAKE 1 TABLET BY MOUTH EVERY DAY, Disp: 90 tablet, Rfl: 1    aspirin (ECOTRIN LOW STRENGTH) 81 mg EC tablet, Take 1 tablet (81 mg total) by mouth daily, Disp: 90 tablet, Rfl: 1    divalproex sodium (DEPAKOTE) 500 mg EC tablet, every 12 (twelve) hours 2 in the am & 2  In the bedtime, Disp: , Rfl:     dorzolamide-timolol (COSOPT) 22 3-6 8 MG/ML ophthalmic solution, Administer 1 drop to both eyes daily, Disp: 10 mL, Rfl: 5    doxepin (SINEquan) 150 MG capsule, Take 150 mg by mouth daily at bedtime, Disp: , Rfl:     hydrOXYzine HCL (ATARAX) 50 mg tablet, , Disp: , Rfl:     latanoprost (XALATAN) 0 005 % ophthalmic solution, Administer 1 drop to both eyes daily, Disp: 7 5 mL, Rfl: 3    risperiDONE (RisperDAL) 2 mg tablet, 4 mg , Disp: , Rfl:     rOPINIRole (REQUIP) 2 mg tablet, Take 4 mg by mouth daily  , Disp: , Rfl:     rOPINIRole (REQUIP) 4 mg tablet, Take 4 mg by mouth daily at bedtime, Disp: , Rfl:     rosuvastatin (CRESTOR) 40 MG tablet, TAKE 1 TABLET BY MOUTH EVERY DAY, Disp: 90 tablet, Rfl: 3    triamcinolone (KENALOG) 0 1 % ointment, Apply topically 2 (two) times a day, Disp: 453 6 g, Rfl: 2    methylPREDNISolone 4 MG tablet therapy pack, 24mg PO on day 1, then decrease by 4mg/day x 5 days per dose pack instructions  (Patient not taking: Reported on 3/9/2022 ), Disp: 21 tablet, Rfl: 0    polyethylene glycol (MIRALAX) 17 g packet, Take 17 g by mouth daily (Patient not taking: Reported on 3/9/2022 ), Disp: 30 each, Rfl: 3    psyllium (METAMUCIL SMOOTH TEXTURE) 28 % packet, Take 1 packet by mouth daily (Patient not taking: Reported on 3/9/2022 ), Disp: 30 packet, Rfl: 3      Review of Systems:  Have you recently had or currently have any of the following? If YES, what are you doing for the problem?     · Fever, chills or unintended weight loss: No  · Sudden loss or change in your vision: No  · Nausea, vomiting or blood in your stool: No  · Painful or swollen joints: No  · Wheezing or cough: No  · Changing mole or non-healing wound: No  · Nosebleeds: No  · Excessive sweating: No  · Easy or prolonged bleeding? No  · Over the last 2 weeks, how often have you been bothered by the following problems? · Taking little interest or pleasure in doing things: 1 - Not at All  · Feeling down, depressed, or hopeless: 1 - Not at All  · Rapid heartbeat with epinephrine:  No    · FEMALES ONLY:    · Are you pregnant or planning to become pregnant? N/A  · Are you currently or planning to be nursing or breast feeding? N/A    · Any known allergies? Allergies   Allergen Reactions    Influenza Vaccines      History of guillan barre syndrome         Physical Exam:     Was a chaperone (Derm Clinical Assistant) present throughout the entire Physical Exam? Yes     Did the Dermatology Team specifically  the patient on the importance of a Full Skin Exam to be sure that nothing is missed clinically?  Yes}  o Did the patient ultimately request or accept a Full Skin Exam?  NO      CONSTITUTIONAL:   Vitals:    03/09/22 1122   Temp: 98 °F (36 7 °C)   TempSrc: Temporal   Weight: 113 kg (249 lb)   Height: 5' 6" (1 676 m)       PSYCH: Normal mood and affect  EYES: Normal conjunctiva  ENT: Normal lips and oral mucosa  CARDIOVASCULAR: No edema  RESPIRATORY: Normal respirations  HEME/LYMPH/IMMUNO:  No regional lymphadenopathy except as noted below in "ASSESSMENT AND PLAN BY DIAGNOSIS"    SKIN:  FULL ORGAN SYSTEM EXAM   Hair, Scalp, Ears, Face Normal except as noted below in Assessment   Neck,  Normal except as noted below in Assessment   Right Arm/Hand/Fingers Normal except as noted below in Assessment   Left Arm/Hand/Fingers Normal except as noted below in Assessment   Groin/Genitalia/Buttocks NOT EXAMINED   Right Leg, Foot, Toes Normal except as noted below in Assessment   Left Leg, Foot, Toes Normal except as noted below in Assessment        Assessment and Plan by Diagnosis:    History of Present Condition:     Duration:  How long has this been an issue for you?    o  Several years    Location Affected:  Where on the body is this affecting you?    o  Legs, arms, trunk   Quality:  Is there any bleeding, pain, itch, burning/irritation, or redness associated with the skin lesion?    o  Denies    Severity:  Describe any bleeding, pain, itch, burning/irritation, or redness on a scale of 1 to 10 (with 10 being the worst)  o  N/A   Timing:  Does this condition seem to be there pretty constantly or do you notice it more at specific times throughout the day?    o  Intermittent    Context:  Have you ever noticed that this condition seems to be associated with specific activities you do?    o  Unknown    Modifying Factors:    o Anything that seems to make the condition worse?    -  Unknown   o What have you tried to do to make the condition better?    -  He has been using Triamcinolone 0 1% ointment - records indicate pcp ordered it on 2/17/21   Associated Signs and Symptoms:  Does this skin lesion seem to be associated with any of the following:  o  Denies     SEBORRHEIC KERATOSIS; NON-INFLAMED    Physical Exam:   Anatomic Location Affected:  Upper extremities   Morphological Description:   raised, waxy, warty textured, brown, "stuck-on" appearing papules   Pertinent Positives:   Pertinent Negatives: Additional History of Present Condition:  Patient reports new bumps on the skin  Denies itch, burn, pain, bleeding or ulceration  Present constantly; nothing seems to make it worse or better  No prior treatment        Assessment and Plan:  Based on a thorough discussion of this condition and the management approach to it (including a comprehensive discussion of the known risks, side effects and potential benefits of treatment), the patient (family) agrees to implement the following specific plan:   Reassured benign    EPHELIS ("FRECKLE")    Physical Exam:   Anatomic Location Affected:  Scalp, shoulders, extremities   Morphological Description:  Tan brown macules   Pertinent Positives:   Pertinent Negatives: Additional History of Present Condition:      Assessment and Plan:  Based on a thorough discussion of this condition and the management approach to it (including a comprehensive discussion of the known risks, side effects and potential benefits of treatment), the patient (family) agrees to implement the following specific plan:   Recommend yearly skin exam   Use a moisturizer + sunscreen "combo" product such as Neutrogena Daily Defense SPF 50+ or CeraVe AM at least three times a day   Discussed reducing sun exposure and considering spray tanning for cosmetic appearance       NEOPLASM OF UNCERTAIN BEHAVIOR    Physical Exam:    Anatomic Location Affected:  Vertex scalp   Morphological Description:  Pink scaly papule    Additional History of Present Condition:      Assessment and Plan:  Based on a thorough discussion of this condition and the management approach to it (including a comprehensive discussion of the known risks, side effects and potential benefits of treatment), the patient (family) agrees to implement the following specific plan:    PROCEDURE SHAVE BIOPSY NOTE:    Performing Physician: Dr Mai  Anatomic Location; Clinical Description with size (cm); Pre-Op Diagnosis:   A: vertex scalp, skin, shave; 49 y o  M with 3 mm pink scaly papule; DDx: actinic keratosis  Post-op diagnosis: Same     Local anesthesia: 1% xylocaine with epi      Topical anesthesia: None    Hemostasis: Aluminum chloride       After obtaining informed consent  at which time there was a discussion about the purpose of biopsy  and low risks of infection and bleeding  The area was prepped and draped in the usual fashion  Anesthesia was obtained with 1% lidocaine with epinephrine   A shave biopsy to an appropriate sampling depth was obtained with a sterile blade (such as a 15-blade or Richard)  The resulting wound was covered with surgical ointment and bandaged appropriately  The patient tolerated the procedure well without complications and was without signs of functional compromise  Specimen has been sent for review by Dermatopathology  Standard post-procedure care has been explained and has been included in written form within the patient's copy of Informed Consent  INFORMED CONSENT DISCUSSION AND POST-OPERATIVE INSTRUCTIONS FOR PATIENT    I   RATIONALE FOR PROCEDURE  I understand that a skin biopsy allows the Dermatologist to test a lesion or rash under the microscope to obtain a diagnosis  It usually involves numbing the area with numbing medication and removing a small piece of skin; sometimes the area will be closed with sutures  In this specific procedure, sutures are not usually needed  If any sutures are placed, then they are usually need to be removed in 2 weeks or less  I understand that my Dermatologist recommends that a skin "shave" biopsy be performed today  A local anesthetic, similar to the kind that a dentist uses when filling a cavity, will be injected with a very small needle into the skin area to be sampled  The injected skin and tissue underneath "will go to sleep and become numb so no pain should be felt afterwards  An instrument shaped like a tiny "razor blade" (shave biopsy instrument) will be used to cut a small piece of tissue and skin from the area so that a sample of tissue can be taken and examined more closely under the microscope  A slight amount of bleeding will occur, but it will be stopped with direct pressure and a pressure bandage and any other appropriate methods  I understands that a scar will form where the wound was created  Surgical ointment will be applied to help protect the wound  Sutures are not usually needed      II   RISKS AND POTENTIAL COMPLICATIONS   I understand the risks and potential complications of a skin biopsy include but are not limited to the following:  Bleeding  Infection  Pain  Scar/keloid  Skin discoloration  Incomplete Removal  Recurrence  Nerve Damage/Numbness/Loss of Function  Allergic Reaction to Anesthesia  Biopsies are diagnostic procedures and based on findings additional treatment or evaluation may be required  Loss or destruction of specimen resulting in no additional findings    My Dermatologist has explained to me the nature of the condition, the nature of the procedure, and the benefits to be reasonably expected compared with alternative approaches  My Dermatologist has discussed the likelihood of major risks or complications of this procedure including the specific risks listed above, such as bleeding, infection, and scarring/keloid  I understand that a scar is expected after this procedure  I understand that my physician cannot predict if the scar will form a "keloid," which extends beyond the borders of the wound that is created  A keloid is a thick, painful, and bumpy scar  A keloid can be difficult to treat, as it does not always respond well to therapy, which includes injecting cortisone directly into the keloid every few weeks  While this usually reduces the pain and size of the scar, it does not eliminate it  I understand that photographs may be taken before and after the procedure  These will be maintained as part of the medical providers confidential records and may not be made available to me  I further authorize the medical provider to use the photographs for teaching purposes or to illustrate scientific papers, books, or lectures if in his/her judgment, medical research, education, or science may benefit from its use  I have had an opportunity to fully inquire about the risks and benefits of this procedure and its alternatives  I have been given ample time and opportunity to ask questions and to seek a second opinion if I wished to do so    I acknowledge that there have specifically been no guarantees as to the cosmetic results from the procedure  I am aware that with any procedure there is always the possibility of an unexpected complication  III  POST-PROCEDURAL CARE (WHAT YOU WILL NEED TO DO "AFTER THE BIOPSY" TO OPTIMIZE HEALING)    Keep the area clean and dry  Try NOT to remove the bandage or get it wet for the first 24 hours  Gently clean the area and apply surgical ointment (such as Vaseline petrolatum ointment, which is available "over the counter" and not a prescription) to the biopsy site for up to 2 weeks straight  This acts to protect the wound from the outside world  Sutures are not usually placed in this procedure  If any sutures were placed, return for suture removal as instructed (generally 1 week for the face, 2 weeks for the body)  Take Acetaminophen (Tylenol) for discomfort, if no contraindications  Ibuprofen or aspirin could make bleeding worse  Call our office immediately for signs of infection: fever, chills, increased redness, warmth, tenderness, discomfort/pain, or pus or foul smell coming from the wound  WHAT TO DO IF THERE IS ANY BLEEDING? If a small amount of bleeding is noticed, place a clean cloth over the area and apply firm pressure for ten minutes  Check the wound after 10 minutes of direct pressure  If bleeding persists, try one more time for an additional 10 minutes of direct pressure on the area  If the bleeding becomes heavier or does not stop after the second attempt, or if you have any other questions about this procedure, then please call your [facility]'s Dermatologist by calling [phone] (SKIN)  I hereby acknowledge that I have reviewed and verified the site with my Dermatologist and have requested and authorized my Dermatologist to proceed with the procedure

## 2022-03-09 NOTE — PATIENT INSTRUCTIONS
SEBORRHEIC KERATOSIS; NON-INFLAMED        Assessment and Plan:  Based on a thorough discussion of this condition and the management approach to it (including a comprehensive discussion of the known risks, side effects and potential benefits of treatment), the patient (family) agrees to implement the following specific plan:   Reassured benign    Seborrheic Keratosis  A seborrheic keratosis is a harmless warty spot that appears during adult life as a common sign of skin aging  Seborrheic keratoses can arise on any area of skin, covered or uncovered, with the usual exception of the palms and soles  They do not arise from mucous membranes  Seborrheic keratoses can have highly variable appearance  Seborrheic keratoses are extremely common  It has been estimated that over 90% of adults over the age of 61 years have one or more of them  They occur in males and females of all races, typically beginning to erupt in the 35s or 45s  They are uncommon under the age of 21 years  The precise cause of seborrhoeic keratoses is not known  Seborrhoeic keratoses are considered degenerative in nature  As time goes by, seborrheic keratoses tend to become more numerous  Some people inherit a tendency to develop a very large number of them; some people may have hundreds of them  The name "seborrheic keratosis" is misleading, because these lesions are not limited to a seborrhoeic distribution (scalp, mid-face, chest, upper back), nor are they formed from sebaceous glands, nor are they associated with sebum -- which is greasy    Seborrheic keratosis may also be called "SK," "Seb K," "basal cell papilloma," "senile wart," or "barnacle "      Researchers have noted:   Eruptive seborrhoeic keratoses can follow sunburn or dermatitis   Skin friction may be the reason they appear in body folds   Viral cause (e g , human papillomavirus) seems unlikely   Stable and clonal mutations or activation of FRFR3, PIK3CA, JORDAN, AKT1 and EGFR genes are found in seborrhoeic keratoses   Seborrhoeic keratosis can arise from solar lentigo   FRFR3 mutations also arise in solar lentigines  These mutations are associated with increased age and location on the head and neck, suggesting a role of ultraviolet radiation in these lesions   Seborrheic keratoses do not harbour tumour suppressor gene mutations   Epidermal growth factor receptor inhibitors, which are used to treat some cancers, often result in an increase in verrucal (warty) keratoses  There is no easy way to remove multiple lesions on a single occasion  Unless a specific lesion is "inflamed" and is causing pain or stinging/burning or is bleeding, most insurance companies do not authorize treatment  EPHELIS ("FRECKLE")      Assessment and Plan:  Based on a thorough discussion of this condition and the management approach to it (including a comprehensive discussion of the known risks, side effects and potential benefits of treatment), the patient (family) agrees to implement the following specific plan:   Recommend yearly skin exam   Use a moisturizer + sunscreen "combo" product such as Neutrogena Daily Defense SPF 50+ or CeraVe AM at least three times a day   Discussed reducing sun exposure and considering spray tanning for cosmetic appearance    

## 2022-03-16 NOTE — RESULT ENCOUNTER NOTE
DERMATOPATHOLOGY RESULT NOTE    Results reviewed by ordering physician  Called patient to personally discuss results  Discussed results with patient  Instructions for Clinical Derm Team:   (remember to route Result Note to appropriate staff):    None    Result & Plan by Specimen:    Specimen A: precancerous  Plan: removed during biopsy    Status: Final result    Visible to patient: No (inaccessible in [address])    Dx: Neoplasm of uncertain behavior    0 Result Notes    Component   Case Report  Surgical Pathology Report                         Case: [id number]                                   Authorizing Provider: Brigido Moon MD          Collected:           03/09/2022 1233              Ordering Location:     [place] [address]      Received:            03/09/2022 1234                                     [facility]                                                          Pathologist:           Arnaud Woodward MD                                                           Specimen:    Skin, Other, A: vertex scalp,                                                            Final Diagnosis  A  Skin, vertex scalp, shave biopsy:     PROLIFERATIVE ACTINIC KERATOSIS with adnexal extension      Electronically signed by Arnaud Woodward MD on 3/14/2022 at  9:52 AM  Additional Information   All reported additional testing was performed with appropriately reactive controls   These tests were developed and their performance characteristics determined by [facility] or appropriate performing facility, though some tests may be performed on tissues which have not been validated for performance characteristics (such as staining performed on alcohol exposed cell blocks and decalcified tissues)   Results should be interpreted with caution and in the context of the patients' clinical condition  These tests may not be cleared or approved by the [place]   Food and Drug Administration, though the FDA has determined that such clearance or approval is not necessary  These tests are used for clinical purposes and they should not be regarded as investigational or for research  This laboratory has been approved by CLIA 88, designated as a high-complexity laboratory and is qualified to perform these tests  Lynne Martinez Description     A  The specimen is received in formalin, labeled with the patient's name and hospital number, and is designated "vertex scalp "  The specimen consists of a 0 5 x 0 5 cm shave of tan skin excised to depth of 0 1 cm  The epithelial surface is tan-brown, keratotic and scaly  The skin surface is inked red and the margin of resection is inked green  The specimen is bisected  Entirely submitted  One cassette  Between sponges     Note: The estimated total formalin fixation time based upon information provided by the submitting clinician and the standard processing schedule is under 72 hours    [facility]       Clinical Information   A: vertex scalp, skin, shave; 49 y o  M with 3 mm pink scaly papule; DDx: actinic keratosis     ATTN DERMPATH  Resulting Agency [id number]          Specimen Collected: 03/09/22 12:33 PM Last Resulted: 03/14/22  9:52 AM

## 2022-03-17 ENCOUNTER — TELEPHONE (OUTPATIENT)
Dept: OTHER | Facility: OTHER | Age: 57
End: 2022-03-17

## 2022-03-22 ENCOUNTER — TELEPHONE (OUTPATIENT)
Dept: OBGYN CLINIC | Facility: CLINIC | Age: 57
End: 2022-03-22

## 2022-03-25 ENCOUNTER — RA CDI HCC (OUTPATIENT)
Dept: OTHER | Facility: HOSPITAL | Age: 57
End: 2022-03-25

## 2022-03-25 NOTE — PROGRESS NOTES
[address]  coding opportunities       Chart reviewed, no opportunity found: CHART REVIEWED, NO OPPORTUNITY FOUND        Patients Insurance     Medicare Insurance: Medicare

## 2022-04-07 ENCOUNTER — HOSPITAL ENCOUNTER (EMERGENCY)
Facility: HOSPITAL | Age: 57
Discharge: HOME | End: 2022-04-07
Attending: EMERGENCY MEDICINE | Admitting: EMERGENCY MEDICINE
Payer: MEDICARE

## 2022-04-07 VITALS
RESPIRATION RATE: 16 BRPM | SYSTOLIC BLOOD PRESSURE: 161 MMHG | HEART RATE: 86 BPM | DIASTOLIC BLOOD PRESSURE: 108 MMHG | OXYGEN SATURATION: 99 % | TEMPERATURE: 98.4 F

## 2022-04-07 DIAGNOSIS — T65.91XA INGESTION OF UNKNOWN SUBSTANCE: Primary | ICD-10-CM

## 2022-04-07 DIAGNOSIS — F41.9 ANXIETY: ICD-10-CM

## 2022-04-07 PROCEDURE — 99284 EMERGENCY DEPT VISIT MOD MDM: CPT | Performed by: EMERGENCY MEDICINE

## 2022-04-07 PROCEDURE — 99283 EMERGENCY DEPT VISIT LOW MDM: CPT

## 2022-04-07 RX ORDER — SUCRALFATE 1 G/1
1 TABLET ORAL ONCE
Status: COMPLETED | OUTPATIENT
Start: 2022-04-07 | End: 2022-04-07

## 2022-04-07 RX ORDER — DIAZEPAM 5 MG/1
5 TABLET ORAL ONCE
Status: COMPLETED | OUTPATIENT
Start: 2022-04-07 | End: 2022-04-07

## 2022-04-07 RX ADMIN — DIAZEPAM 5 MG: 5 TABLET ORAL at 05:40

## 2022-04-07 RX ADMIN — SUCRALFATE 1 G: 1 TABLET ORAL at 05:40

## 2022-04-08 ENCOUNTER — HOSPITAL ENCOUNTER (OUTPATIENT)
Dept: RADIOLOGY | Facility: HOSPITAL | Age: 57
Discharge: HOME | End: 2022-04-08
Payer: MEDICARE

## 2022-04-08 ENCOUNTER — TELEPHONE (OUTPATIENT)
Dept: OBGYN CLINIC | Facility: HOSPITAL | Age: 57
End: 2022-04-08

## 2022-04-08 DIAGNOSIS — M50.30 DDD (DEGENERATIVE DISC DISEASE), CERVICAL: ICD-10-CM

## 2022-04-08 PROCEDURE — 72125 CT NECK SPINE W/O DYE: CPT

## 2022-04-08 NOTE — TELEPHONE ENCOUNTER
Patient called about CT of neck, states he is barred from [facility] with [facility]  Offered appt with Dr Eun Roberson which he declined, accepted tranfer to Neuro, but would like a call to discuss CT results
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
## 2022-04-24 NOTE — ED ATTENDING ATTESTATION
4/7/2022  I, Lisandra Sandoval MD, saw and evaluated the patient  I have discussed the patient with the resident/non-physician practitioner and agree with the resident's/non-physician practitioner's findings, Plan of Care, and MDM as documented in the resident's/non-physician practitioner's note, except where noted  All available labs and Radiology studies were reviewed  I was present for key portions of any procedure(s) performed by the resident/non-physician practitioner and I was immediately available to provide assistance  At this point I agree with the current assessment done in the Emergency Department  I have conducted an independent evaluation of this patient a history and physical is as follows:    ED Course     Patient presents for evaluation after ceiling sec when he drink from a bottle water  Patient states that he noticed that the water was blue and tasted like chemicals  Patient does have a history of anxiety and feels like he may have a panic attack  He reports some mild abdominal pain  No additional complaints  Exam: AAOx3, NAD, RRR, CTA, S/NT/ND  A/P:  Anxiety, abdominal discomfort  Will treat with Valium and sucralfate      Critical Care Time  Procedures

## 2022-04-27 ENCOUNTER — TELEPHONE (OUTPATIENT)
Dept: GASTROENTEROLOGY | Facility: AMBULARY SURGERY CENTER | Age: 57
End: 2022-04-27

## 2022-04-28 ENCOUNTER — TELEPHONE (OUTPATIENT)
Dept: GASTROENTEROLOGY | Facility: CLINIC | Age: 57
End: 2022-04-28

## 2022-04-29 NOTE — TELEPHONE ENCOUNTER
Left message for patient to reschedule his colonoscopy  Left call back number for patient to return my call

## 2022-05-03 ENCOUNTER — TELEPHONE (OUTPATIENT)
Dept: OTHER | Facility: OTHER | Age: 57
End: 2022-05-03

## 2022-05-05 ENCOUNTER — OFFICE VISIT (OUTPATIENT)
Dept: NEUROSURGERY | Facility: CLINIC | Age: 57
End: 2022-05-05
Payer: MEDICARE

## 2022-05-05 VITALS
WEIGHT: 254 LBS | TEMPERATURE: 97 F | SYSTOLIC BLOOD PRESSURE: 136 MMHG | HEIGHT: 66 IN | RESPIRATION RATE: 16 BRPM | HEART RATE: 77 BPM | BODY MASS INDEX: 40.82 KG/M2 | DIASTOLIC BLOOD PRESSURE: 90 MMHG

## 2022-05-05 DIAGNOSIS — M54.2 CERVICAL PAIN (NECK): Primary | ICD-10-CM

## 2022-05-05 PROCEDURE — 99203 OFFICE O/P NEW LOW 30 MIN: CPT | Performed by: NURSE PRACTITIONER

## 2022-05-05 NOTE — ASSESSMENT & PLAN NOTE
Presents as self-referral for evaluation of neck and shoulder pain  · Began in February 2022 when a window frame fell onto his head  · Since with ongoing neck pain that is positional, radiating to shoulders  · No radiculopathy or myelopathy  · Recovering addict (opioids)  Takes OTC Tylenol and ibuprofen without relief of symptoms  · Exam non-focal     Imaging:  · CT cervical spine, 4/8/2022: Straightening of the cervical lordosis  Mild degenerative disc disease C6-7  Plan:  · Reviewed results of imaging with patient  · Discussed that would recommend trial of conservative management including PT and pain management for symptoms as there is no evidence on exam or imaging of cord or neuroforaminal compression  · Patient does not have a car and has difficulty with transportation to appointments  · He was previously [facility] from [facility] PM practice for repeated no-shows  He states this was when he was impaired on drugs  · Follow up as needed for any new or worsening symptoms
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
## 2022-05-06 ENCOUNTER — HOSPITAL ENCOUNTER (EMERGENCY)
Facility: HOSPITAL | Age: 57
Discharge: HOME | End: 2022-05-06
Attending: EMERGENCY MEDICINE | Admitting: EMERGENCY MEDICINE
Payer: MEDICARE

## 2022-05-06 VITALS
DIASTOLIC BLOOD PRESSURE: 80 MMHG | OXYGEN SATURATION: 96 % | RESPIRATION RATE: 20 BRPM | SYSTOLIC BLOOD PRESSURE: 165 MMHG | HEART RATE: 88 BPM | TEMPERATURE: 98.1 F

## 2022-05-06 DIAGNOSIS — T50.905A ADVERSE EFFECT OF DRUG, INITIAL ENCOUNTER: Primary | ICD-10-CM

## 2022-05-06 PROCEDURE — 99284 EMERGENCY DEPT VISIT MOD MDM: CPT

## 2022-05-06 PROCEDURE — 99284 EMERGENCY DEPT VISIT MOD MDM: CPT | Performed by: EMERGENCY MEDICINE

## 2022-05-06 RX ORDER — DIAZEPAM 2 MG/1
2 TABLET ORAL ONCE
Status: COMPLETED | OUTPATIENT
Start: 2022-05-06 | End: 2022-05-06

## 2022-05-06 RX ADMIN — DIAZEPAM 2 MG: 2 TABLET ORAL at 04:29

## 2022-05-06 NOTE — DISCHARGE INSTRUCTIONS
You have been seen for a medication side effect  You should return to the ED if you develop thoughts of hurting herself or others or other worsening symptoms  Follow up with your primary care physician

## 2022-05-06 NOTE — ED ATTENDING ATTESTATION
5/6/2022  I, Helena Pisano MD, saw and evaluated the patient  I have discussed the patient with the resident/non-physician practitioner and agree with the resident's/non-physician practitioner's findings, Plan of Care, and MDM as documented in the resident's/non-physician practitioner's note, except where noted  All available labs and Radiology studies were reviewed  I was present for key portions of any procedure(s) performed by the resident/non-physician practitioner and I was immediately available to provide assistance  At this point I agree with the current assessment done in the Emergency Department  I have conducted an independent evaluation of this patient a history and physical is as follows:    ED Course         Critical Care Time  Procedures    48 yo male with restless leg, psych hx, taking risperdal as prescribed, here today for possible side effects of medications  Pt with no si, no hi  Vss, afebrile, lungs cta, rrr, abdomen soft nontender, no neuro deficits  Valium, reassurance
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
## 2022-05-10 NOTE — TELEPHONE ENCOUNTER
Left message for patient to call office to reschedule Colonoscopy  Left back line phone number to reach us directly

## 2022-05-16 ENCOUNTER — OFFICE VISIT (OUTPATIENT)
Dept: INTERNAL MEDICINE CLINIC | Facility: CLINIC | Age: 57
End: 2022-05-16

## 2022-05-16 ENCOUNTER — TELEPHONE (OUTPATIENT)
Dept: INTERNAL MEDICINE CLINIC | Facility: CLINIC | Age: 57
End: 2022-05-16

## 2022-05-16 DIAGNOSIS — J02.9 PHARYNGITIS: Primary | ICD-10-CM

## 2022-05-16 PROCEDURE — 99213 OFFICE O/P EST LOW 20 MIN: CPT | Performed by: HOSPITALIST

## 2022-05-16 RX ORDER — AMOXICILLIN 500 MG/1
500 CAPSULE ORAL EVERY 12 HOURS SCHEDULED
Qty: 14 CAPSULE | Refills: 0 | Status: SHIPPED | OUTPATIENT
Start: 2022-05-16 | End: 2022-05-23
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
## 2022-05-17 ENCOUNTER — TELEPHONE (OUTPATIENT)
Dept: INTERNAL MEDICINE CLINIC | Facility: CLINIC | Age: 57
End: 2022-05-17

## 2022-05-23 NOTE — TELEPHONE ENCOUNTER
I left a message for patient to call back  I was following up to see if patient was feeling better  If patient is we can cancel his 5/25 appointment

## 2022-05-23 NOTE — TELEPHONE ENCOUNTER
Patient called back to let us know we can cancel his 5/25 appointment  He's feeling better  He just still feel tired and sleepy  But if he has any concerns he will call us back  Please let PCP know

## 2022-05-30 ENCOUNTER — APPOINTMENT (EMERGENCY)
Dept: RADIOLOGY | Facility: HOSPITAL | Age: 57
End: 2022-05-30
Payer: MEDICARE

## 2022-05-30 ENCOUNTER — HOSPITAL ENCOUNTER (EMERGENCY)
Facility: HOSPITAL | Age: 57
Discharge: HOME | End: 2022-05-30
Attending: EMERGENCY MEDICINE
Payer: MEDICARE

## 2022-05-30 VITALS
DIASTOLIC BLOOD PRESSURE: 80 MMHG | RESPIRATION RATE: 18 BRPM | SYSTOLIC BLOOD PRESSURE: 152 MMHG | HEART RATE: 81 BPM | TEMPERATURE: 97.4 F | OXYGEN SATURATION: 98 %

## 2022-05-30 DIAGNOSIS — R10.9 ABDOMINAL PAIN: Primary | ICD-10-CM

## 2022-05-30 DIAGNOSIS — K59.00 CONSTIPATION, UNSPECIFIED CONSTIPATION TYPE: ICD-10-CM

## 2022-05-30 LAB
ALBUMIN SERPL BCP-MCNC: 3.1 G/DL (ref 3.5–5)
ALP SERPL-CCNC: 75 U/L (ref 46–116)
ALT SERPL W P-5'-P-CCNC: 98 U/L (ref 12–78)
ANION GAP SERPL CALCULATED.3IONS-SCNC: 3 MMOL/L (ref 4–13)
AST SERPL W P-5'-P-CCNC: 49 U/L (ref 5–45)
BASOPHILS # BLD AUTO: 0.02 THOUSANDS/ΜL (ref 0–0.1)
BASOPHILS NFR BLD AUTO: 0 % (ref 0–1)
BILIRUB SERPL-MCNC: 0.59 MG/DL (ref 0.2–1)
BUN SERPL-MCNC: 13 MG/DL (ref 5–25)
CALCIUM ALBUM COR SERPL-MCNC: 9.2 MG/DL (ref 8.3–10.1)
CALCIUM SERPL-MCNC: 8.5 MG/DL (ref 8.3–10.1)
CHLORIDE SERPL-SCNC: 106 MMOL/L (ref 100–108)
CO2 SERPL-SCNC: 28 MMOL/L (ref 21–32)
CREAT SERPL-MCNC: 0.92 MG/DL (ref 0.6–1.3)
EOSINOPHIL # BLD AUTO: 0.05 THOUSAND/ΜL (ref 0–0.61)
EOSINOPHIL NFR BLD AUTO: 1 % (ref 0–6)
ERYTHROCYTE [DISTWIDTH] IN BLOOD BY AUTOMATED COUNT: 13 % (ref 11.6–15.1)
GFR SERPL CREATININE-BSD FRML MDRD: 93 ML/MIN/1.73SQ M
GLUCOSE SERPL-MCNC: 111 MG/DL (ref 65–140)
HCT VFR BLD AUTO: 38.8 % (ref 36.5–49.3)
HGB BLD-MCNC: 13.2 G/DL (ref 12–17)
IMM GRANULOCYTES # BLD AUTO: 0.02 THOUSAND/UL (ref 0–0.2)
IMM GRANULOCYTES NFR BLD AUTO: 0 % (ref 0–2)
LIPASE SERPL-CCNC: 172 U/L (ref 73–393)
LYMPHOCYTES # BLD AUTO: 2.93 THOUSANDS/ΜL (ref 0.6–4.47)
LYMPHOCYTES NFR BLD AUTO: 51 % (ref 14–44)
MCH RBC QN AUTO: 29.6 PG (ref 26.8–34.3)
MCHC RBC AUTO-ENTMCNC: 34 G/DL (ref 31.4–37.4)
MCV RBC AUTO: 87 FL (ref 82–98)
MONOCYTES # BLD AUTO: 0.39 THOUSAND/ΜL (ref 0.17–1.22)
MONOCYTES NFR BLD AUTO: 7 % (ref 4–12)
NEUTROPHILS # BLD AUTO: 2.37 THOUSANDS/ΜL (ref 1.85–7.62)
NEUTS SEG NFR BLD AUTO: 41 % (ref 43–75)
NRBC BLD AUTO-RTO: 0 /100 WBCS
PLATELET # BLD AUTO: 156 THOUSANDS/UL (ref 149–390)
PMV BLD AUTO: 10.2 FL (ref 8.9–12.7)
POTASSIUM SERPL-SCNC: 4 MMOL/L (ref 3.5–5.3)
PROT SERPL-MCNC: 7.4 G/DL (ref 6.4–8.2)
RBC # BLD AUTO: 4.46 MILLION/UL (ref 3.88–5.62)
SODIUM SERPL-SCNC: 137 MMOL/L (ref 136–145)
WBC # BLD AUTO: 5.78 THOUSAND/UL (ref 4.31–10.16)

## 2022-05-30 PROCEDURE — 74176 CT ABD & PELVIS W/O CONTRAST: CPT

## 2022-05-30 PROCEDURE — 99284 EMERGENCY DEPT VISIT MOD MDM: CPT

## 2022-05-30 PROCEDURE — 99284 EMERGENCY DEPT VISIT MOD MDM: CPT | Performed by: EMERGENCY MEDICINE

## 2022-05-30 PROCEDURE — 36415 COLL VENOUS BLD VENIPUNCTURE: CPT | Performed by: EMERGENCY MEDICINE

## 2022-05-30 PROCEDURE — G1004 CDSM NDSC: HCPCS

## 2022-05-30 PROCEDURE — 80053 COMPREHEN METABOLIC PANEL: CPT | Performed by: EMERGENCY MEDICINE

## 2022-05-30 PROCEDURE — 83690 ASSAY OF LIPASE: CPT | Performed by: EMERGENCY MEDICINE

## 2022-05-30 PROCEDURE — 85025 COMPLETE CBC W/AUTO DIFF WBC: CPT | Performed by: EMERGENCY MEDICINE

## 2022-05-30 RX ORDER — POLYETHYLENE GLYCOL 3350 17 G/17G
17 POWDER, FOR SOLUTION ORAL DAILY
Qty: 119 G | Refills: 0 | Status: SHIPPED | OUTPATIENT
Start: 2022-05-30 | End: 2022-06-06

## 2022-05-30 RX ORDER — DIAZEPAM 5 MG/1
5 TABLET ORAL ONCE
Status: COMPLETED | OUTPATIENT
Start: 2022-05-30 | End: 2022-05-30

## 2022-05-30 RX ORDER — ACETAMINOPHEN 325 MG/1
650 TABLET ORAL ONCE
Status: COMPLETED | OUTPATIENT
Start: 2022-05-30 | End: 2022-05-30

## 2022-05-30 RX ADMIN — MAGNESIUM HYDROXIDE 30 ML: 1200 LIQUID ORAL at 04:47

## 2022-05-30 RX ADMIN — DIAZEPAM 5 MG: 5 TABLET ORAL at 03:24

## 2022-05-30 RX ADMIN — ACETAMINOPHEN 650 MG: 325 TABLET, FILM COATED ORAL at 03:12
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
## 2022-05-30 NOTE — ED NOTES
Pt sleeping on stretcher at this time, will continue to monitor        Florecita Flynn RN  05/30/22 9008

## 2022-05-30 NOTE — DISCHARGE INSTRUCTIONS
Your evaluation in the ED today was negative for acute surgical pathology  Your CT showed "No evidence of bowel obstruction  Fatty infiltration of the liver is suspected  In the setting of abdominal pain and/or elevated liver function tests, consider steatohepatitis  Left renal cyst, postsurgical changes, and other findings as above "    Prescription for miralax was sent to your pharmacy, use this as prescribed for constipation  Increase your fluid and fiber intake as well  Follow up with your primary care physician  Return to the ED with any of the concerning symptoms we discussed

## 2022-05-30 NOTE — ED ATTENDING ATTESTATION
5/30/2022  I, Adebayo Baldwin MD, saw and evaluated the patient  I have discussed the patient with the resident/non-physician practitioner and agree with the resident's/non-physician practitioner's findings, Plan of Care, and MDM as documented in the resident's/non-physician practitioner's note, except where noted  All available labs and Radiology studies were reviewed  I was present for key portions of any procedure(s) performed by the resident/non-physician practitioner and I was immediately available to provide assistance  At this point I agree with the current assessment done in the Emergency Department  I have conducted an independent evaluation of this patient a history and physical is as follows:    ED Course     51-year-old male, history of previous partial colectomy with colostomy and colostomy reversal, presenting to the emergency department for evaluation of a 4 day history of inability to move his bowels associated with left lower quadrant abdominal pain  No nausea or vomiting  No fever  No dysuria or urinary frequency  Ten systems reviewed and negative except as noted  The patient is resting comfortably on a stretcher in no acute respiratory distress  The patient appears nontoxic  HEENT reveals moist mucous membranes  Head is normocephalic and atraumatic  Conjunctiva and sclera are normal  Neck is nontender and supple with full range of motion to flexion, extension, lateral rotation  No meningismus appreciated  No masses are appreciated  Lungs are clear to auscultation bilaterally without any wheezes, rales or rhonchi  Heart is regular rate and rhythm without any murmurs, rubs or gallops  Abdomen is obese, soft, generally tender without any rebound or guarding  Extremities appear grossly normal without any significant arthropathy  Trace bilateral edema at the ankles  Patient is awake, alert, and oriented x3  The patient has normal interaction  Motor is 5 out of 5      Labs Reviewed CBC AND DIFFERENTIAL - Abnormal       Result Value Ref Range Status    WBC 5 78  4 31 - 10 16 Thousand/uL Final    RBC 4 46  3 88 - 5 62 Million/uL Final    Hemoglobin 13 2  12 0 - 17 0 g/dL Final    Hematocrit 38 8  36 5 - 49 3 % Final    MCV 87  82 - 98 fL Final    MCH 29 6  26 8 - 34 3 pg Final    MCHC 34 0  31 4 - 37 4 g/dL Final    RDW 13 0  11 6 - 15 1 % Final    MPV 10 2  8 9 - 12 7 fL Final    Platelets 865  368 - 390 Thousands/uL Final    nRBC 0  /100 WBCs Final    Neutrophils Relative 41 (*) 43 - 75 % Final    Immat GRANS % 0  0 - 2 % Final    Lymphocytes Relative 51 (*) 14 - 44 % Final    Monocytes Relative 7  4 - 12 % Final    Eosinophils Relative 1  0 - 6 % Final    Basophils Relative 0  0 - 1 % Final    Neutrophils Absolute 2 37  1 85 - 7 62 Thousands/µL Final    Immature Grans Absolute 0 02  0 00 - 0 20 Thousand/uL Final    Lymphocytes Absolute 2 93  0 60 - 4 47 Thousands/µL Final    Monocytes Absolute 0 39  0 17 - 1 22 Thousand/µL Final    Eosinophils Absolute 0 05  0 00 - 0 61 Thousand/µL Final    Basophils Absolute 0 02  0 00 - 0 10 Thousands/µL Final   COMPREHENSIVE METABOLIC PANEL - Abnormal    Sodium 137  136 - 145 mmol/L Final    Potassium 4 0  3 5 - 5 3 mmol/L Final    Chloride 106  100 - 108 mmol/L Final    CO2 28  21 - 32 mmol/L Final    ANION GAP 3 (*) 4 - 13 mmol/L Final    BUN 13  5 - 25 mg/dL Final    Creatinine 0 92  0 60 - 1 30 mg/dL Final    Comment: Standardized to IDMS reference method    Glucose 111  65 - 140 mg/dL Final    Comment: If the patient is fasting, the ADA then defines impaired fasting glucose as > 100 mg/dL and diabetes as > or equal to 123 mg/dL  Specimen collection should occur prior to Sulfasalazine administration due to the potential for falsely depressed results  Specimen collection should occur prior to Sulfapyridine administration due to the potential for falsely elevated results      Calcium 8 5  8 3 - 10 1 mg/dL Final    Corrected Calcium 9 2  8 3 - 10 1 mg/dL Final    AST 49 (*) 5 - 45 U/L Final    Comment: Specimen collection should occur prior to Sulfasalazine administration due to the potential for falsely depressed results  ALT 98 (*) 12 - 78 U/L Final    Comment: Specimen collection should occur prior to Sulfasalazine and/or Sulfapyridine administration due to the potential for falsely depressed results  Alkaline Phosphatase 75  46 - 116 U/L Final    Total Protein 7 4  6 4 - 8 2 g/dL Final    Albumin 3 1 (*) 3 5 - 5 0 g/dL Final    Total Bilirubin 0 59  0 20 - 1 00 mg/dL Final    Comment: Use of this assay is not recommended for patients undergoing treatment with eltrombopag due to the potential for falsely elevated results  eGFR 93  ml/min/1 73sq m Final    Narrative:     [place] guidelines for Chronic Kidney Disease (CKD):     Stage 1 with normal or high GFR (GFR > 90 mL/min/1 73 square meters)    Stage 2 Mild CKD (GFR = 60-89 mL/min/1 73 square meters)    Stage 3A Moderate CKD (GFR = 45-59 mL/min/1 73 square meters)    Stage 3B Moderate CKD (GFR = 30-44 mL/min/1 73 square meters)    Stage 4 Severe CKD (GFR = 15-29 mL/min/1 73 square meters)    Stage 5 End Stage CKD (GFR <15 mL/min/1 73 square meters)  Note: GFR calculation is accurate only with a steady state creatinine   LIPASE - Normal    Lipase 172  73 - 393 u/L Final       CT abdomen pelvis wo contrast   Final Result   No evidence of bowel obstruction  Fatty infiltration of the liver is suspected  In the setting of abdominal pain and/or elevated liver function tests, consider steatohepatitis  Left renal cyst, postsurgical changes, and other findings as above           Workstation performed: [id number]                       Critical Care Time  Procedures

## 2022-06-09 ENCOUNTER — TELEPHONE (OUTPATIENT)
Dept: GASTROENTEROLOGY | Facility: CLINIC | Age: 57
End: 2022-06-09

## 2022-06-09 DIAGNOSIS — Z12.11 COLON CANCER SCREENING: Primary | ICD-10-CM

## 2022-06-09 NOTE — TELEPHONE ENCOUNTER
Patients GI provider:  Dr Reymundo Tolbert    Number to return call: ([phone])    Reason for call: Pt calling to reschedule his colonoscopy he cancelled back in April with Dr Reymundo Tolbert  Please call to reschedule  Thank you!     Scheduled procedure/appointment date if applicable: Apt/procedure

## 2022-06-09 NOTE — TELEPHONE ENCOUNTER
Scheduled date of colonoscopy (as of today): 9/29/22  Physician performing colonoscopy: Dr Al  Location of colonoscopy: [facility]  Bowel prep reviewed with patient: Golytely  Instructions reviewed with patient by: Mary/raymond  Clearances: N/A

## 2022-06-18 ENCOUNTER — HOSPITAL ENCOUNTER (EMERGENCY)
Facility: HOSPITAL | Age: 57
Discharge: HOME | End: 2022-06-18
Attending: EMERGENCY MEDICINE
Payer: MEDICARE

## 2022-06-18 VITALS
OXYGEN SATURATION: 97 % | DIASTOLIC BLOOD PRESSURE: 89 MMHG | RESPIRATION RATE: 18 BRPM | TEMPERATURE: 97.3 F | HEART RATE: 89 BPM | SYSTOLIC BLOOD PRESSURE: 133 MMHG

## 2022-06-18 DIAGNOSIS — G25.81 RESTLESS LEGS: Primary | ICD-10-CM

## 2022-06-18 DIAGNOSIS — M25.519 SHOULDER PAIN: ICD-10-CM

## 2022-06-18 PROCEDURE — 96372 THER/PROPH/DIAG INJ SC/IM: CPT

## 2022-06-18 PROCEDURE — 99284 EMERGENCY DEPT VISIT MOD MDM: CPT | Performed by: EMERGENCY MEDICINE

## 2022-06-18 PROCEDURE — 99283 EMERGENCY DEPT VISIT LOW MDM: CPT

## 2022-06-18 RX ORDER — KETOROLAC TROMETHAMINE 30 MG/ML
30 INJECTION, SOLUTION INTRAMUSCULAR; INTRAVENOUS ONCE
Status: COMPLETED | OUTPATIENT
Start: 2022-06-18 | End: 2022-06-18

## 2022-06-18 RX ORDER — DIAZEPAM 5 MG/1
5 TABLET ORAL ONCE
Status: COMPLETED | OUTPATIENT
Start: 2022-06-18 | End: 2022-06-18

## 2022-06-18 RX ADMIN — DIAZEPAM 5 MG: 5 TABLET ORAL at 03:36

## 2022-06-18 RX ADMIN — KETOROLAC TROMETHAMINE 30 MG: 30 INJECTION, SOLUTION INTRAMUSCULAR; INTRAVENOUS at 04:20
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
## 2022-06-18 NOTE — DISCHARGE INSTRUCTIONS
You were seen in the ED for restless legs  You were treated with valium  Please follow-up with your primary care provider  Return to the emergency department for any new or concerning symptoms

## 2022-06-29 ENCOUNTER — OFFICE VISIT (OUTPATIENT)
Dept: OBGYN CLINIC | Facility: HOSPITAL | Age: 57
End: 2022-06-29
Payer: MEDICARE

## 2022-06-29 ENCOUNTER — HOME HEALTH ADMISSION (OUTPATIENT)
Dept: HOME HEALTH SERVICES | Facility: HOME HEALTHCARE | Age: 57
End: 2022-06-29

## 2022-06-29 VITALS
HEIGHT: 66 IN | WEIGHT: 257.2 LBS | DIASTOLIC BLOOD PRESSURE: 89 MMHG | SYSTOLIC BLOOD PRESSURE: 135 MMHG | HEART RATE: 76 BPM | BODY MASS INDEX: 41.33 KG/M2

## 2022-06-29 DIAGNOSIS — M17.0 PRIMARY OSTEOARTHRITIS OF BOTH KNEES: Primary | ICD-10-CM

## 2022-06-29 PROCEDURE — 20610 DRAIN/INJ JOINT/BURSA W/O US: CPT | Performed by: ORTHOPAEDIC SURGERY

## 2022-06-29 PROCEDURE — 99213 OFFICE O/P EST LOW 20 MIN: CPT | Performed by: ORTHOPAEDIC SURGERY

## 2022-06-29 RX ORDER — BETAMETHASONE SODIUM PHOSPHATE AND BETAMETHASONE ACETATE 3; 3 MG/ML; MG/ML
12 INJECTION, SUSPENSION INTRA-ARTICULAR; INTRALESIONAL; INTRAMUSCULAR; SOFT TISSUE
Status: COMPLETED | OUTPATIENT
Start: 2022-06-29 | End: 2022-06-29

## 2022-06-29 RX ORDER — BUPIVACAINE HYDROCHLORIDE 2.5 MG/ML
2 INJECTION, SOLUTION INFILTRATION; PERINEURAL
Status: COMPLETED | OUTPATIENT
Start: 2022-06-29 | End: 2022-06-29

## 2022-06-29 RX ADMIN — BUPIVACAINE HYDROCHLORIDE 2 ML: 2.5 INJECTION, SOLUTION INFILTRATION; PERINEURAL at 11:22

## 2022-06-29 RX ADMIN — BETAMETHASONE SODIUM PHOSPHATE AND BETAMETHASONE ACETATE 12 MG: 3; 3 INJECTION, SUSPENSION INTRA-ARTICULAR; INTRALESIONAL; INTRAMUSCULAR; SOFT TISSUE at 11:22
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
## 2022-07-03 ENCOUNTER — APPOINTMENT (EMERGENCY)
Dept: RADIOLOGY | Facility: HOSPITAL | Age: 57
End: 2022-07-03
Payer: MEDICARE

## 2022-07-03 ENCOUNTER — HOSPITAL ENCOUNTER (EMERGENCY)
Facility: HOSPITAL | Age: 57
Discharge: HOME | End: 2022-07-03
Attending: EMERGENCY MEDICINE
Payer: MEDICARE

## 2022-07-03 VITALS
SYSTOLIC BLOOD PRESSURE: 164 MMHG | BODY MASS INDEX: 41.14 KG/M2 | DIASTOLIC BLOOD PRESSURE: 84 MMHG | RESPIRATION RATE: 20 BRPM | WEIGHT: 256 LBS | OXYGEN SATURATION: 96 % | HEART RATE: 92 BPM | HEIGHT: 66 IN | TEMPERATURE: 98.1 F

## 2022-07-03 DIAGNOSIS — M25.561 RIGHT KNEE PAIN: Primary | ICD-10-CM

## 2022-07-03 DIAGNOSIS — M19.90 OSTEOARTHRITIS: ICD-10-CM

## 2022-07-03 PROCEDURE — 99284 EMERGENCY DEPT VISIT MOD MDM: CPT | Performed by: EMERGENCY MEDICINE

## 2022-07-03 PROCEDURE — 73564 X-RAY EXAM KNEE 4 OR MORE: CPT

## 2022-07-03 PROCEDURE — 99283 EMERGENCY DEPT VISIT LOW MDM: CPT

## 2022-07-03 PROCEDURE — 96372 THER/PROPH/DIAG INJ SC/IM: CPT

## 2022-07-03 RX ORDER — KETOROLAC TROMETHAMINE 30 MG/ML
15 INJECTION, SOLUTION INTRAMUSCULAR; INTRAVENOUS ONCE
Status: COMPLETED | OUTPATIENT
Start: 2022-07-03 | End: 2022-07-03

## 2022-07-03 RX ORDER — POLYETHYLENE GLYCOL 3350 17 G/17G
17 POWDER, FOR SOLUTION ORAL ONCE
Status: COMPLETED | OUTPATIENT
Start: 2022-07-03 | End: 2022-07-03

## 2022-07-03 RX ORDER — IBUPROFEN 600 MG/1
600 TABLET ORAL ONCE
Status: COMPLETED | OUTPATIENT
Start: 2022-07-03 | End: 2022-07-03

## 2022-07-03 RX ORDER — IBUPROFEN 400 MG/1
400 TABLET ORAL ONCE
Status: DISCONTINUED | OUTPATIENT
Start: 2022-07-03 | End: 2022-07-03

## 2022-07-03 RX ADMIN — IBUPROFEN 600 MG: 600 TABLET, FILM COATED ORAL at 19:24

## 2022-07-03 RX ADMIN — KETOROLAC TROMETHAMINE 15 MG: 30 INJECTION, SOLUTION INTRAMUSCULAR; INTRAVENOUS at 20:30

## 2022-07-03 RX ADMIN — POLYETHYLENE GLYCOL 3350 17 G: 17 POWDER, FOR SOLUTION ORAL at 20:31
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
## 2022-07-04 NOTE — ED ATTENDING ATTESTATION
7/3/2022  IStormy MD, saw and evaluated the patient  I have discussed the patient with the resident/non-physician practitioner and agree with the resident's/non-physician practitioner's findings, Plan of Care, and MDM as documented in the resident's/non-physician practitioner's note, except where noted  All available labs and Radiology studies were reviewed  I was present for key portions of any procedure(s) performed by the resident/non-physician practitioner and I was immediately available to provide assistance  At this point I agree with the current assessment done in the Emergency Department  I have conducted an independent evaluation of this patient a history and physical is as follows:    ED Course     51-year-old male presenting to the emergency department for evaluation of right knee pain  Patient denies direct trauma  He states that he had recently got steroid injection in the knee so was celebrating having improved pain, as well as his birthday and was dancing  Patient reports pain at the superior pole of the knee  Patient is ambulatory  Patient has pain with range of motion  On examination stable to anterior and posterior draw as well as varus and valgus stress  Extension at the knee is intact  Tender over the superior pole of the patella  Recent C seen at the inferior pole of the patella is not consistent with the patient's exam of tenderness at the superior pole  Patellar tendon function is intact on exam   Patient will be referred to Orthopedics      Critical Care Time  Procedures

## 2022-07-04 NOTE — DISCHARGE INSTRUCTIONS
You were seen in the ED for right knee pain  Return to the ED for any worsening symptoms or new symptoms  Follow up with your primary care doctor as soon as possible

## 2022-07-05 ENCOUNTER — TELEPHONE (OUTPATIENT)
Dept: OBGYN CLINIC | Facility: HOSPITAL | Age: 57
End: 2022-07-05

## 2022-07-05 NOTE — TELEPHONE ENCOUNTER
Patient called in to advise he was seen in the ED on 7/3/22  He advised his right knee cap is very swollen  Would you like to see him for a follow up or were you referring him to another provider? Patient would like a call at [phone]   Thank you

## 2022-07-06 NOTE — TELEPHONE ENCOUNTER
Patient calling back in looking for an answer  He states he is in a lot of pain and he has been icing his knee and nothing is happening  Please advise the patient  Patient states he does not want to wait a week and a half or two weeks for an answer

## 2022-07-08 ENCOUNTER — TELEPHONE (OUTPATIENT)
Dept: OBGYN CLINIC | Facility: HOSPITAL | Age: 57
End: 2022-07-08

## 2022-07-08 NOTE — TELEPHONE ENCOUNTER
Force on request sent to [facility],  Please advise if the following patient can be forced onto the schedule:    Patient: Blessing Syed    : 1965    MRN: [id number]    Call back #:     Insurance: medicare    Reason for appointment: Patient was suppose to see Dr Navid Stahl on 22 and he states he is starting to feel a little better and he doesn't want to risk walking too much and having the knee swelling up  Requested doctor/location: Evelyn/[facility]      Thank you

## 2022-07-18 ENCOUNTER — GLAUCOMA CONSULT (OUTPATIENT)
Dept: URBAN - METROPOLITAN AREA CLINIC 6 | Facility: CLINIC | Age: 57
End: 2022-07-18

## 2022-07-18 DIAGNOSIS — H40.1121: ICD-10-CM

## 2022-07-18 DIAGNOSIS — H40.1113: ICD-10-CM

## 2022-07-18 PROCEDURE — 92020 GONIOSCOPY: CPT

## 2022-07-18 PROCEDURE — 92004 COMPRE OPH EXAM NEW PT 1/>: CPT

## 2022-07-18 PROCEDURE — 76514 ECHO EXAM OF EYE THICKNESS: CPT

## 2022-07-18 PROCEDURE — 92202 OPSCPY EXTND ON/MAC DRAW: CPT

## 2022-07-18 PROCEDURE — 92133 CPTRZD OPH DX IMG PST SGM ON: CPT

## 2022-07-18 ASSESSMENT — VISUAL ACUITY
OD_SC: 20/25+1
OU_SC: J1
OS_SC: 20/25+2

## 2022-07-18 ASSESSMENT — TONOMETRY
OS_IOP_MMHG: 13
OD_IOP_MMHG: 27

## 2022-07-24 ENCOUNTER — APPOINTMENT (EMERGENCY)
Dept: RADIOLOGY | Facility: HOSPITAL | Age: 57
End: 2022-07-24
Payer: MEDICARE

## 2022-07-24 ENCOUNTER — HOSPITAL ENCOUNTER (EMERGENCY)
Facility: HOSPITAL | Age: 57
Discharge: HOME | End: 2022-07-24
Attending: EMERGENCY MEDICINE
Payer: MEDICARE

## 2022-07-24 VITALS
HEART RATE: 76 BPM | SYSTOLIC BLOOD PRESSURE: 153 MMHG | RESPIRATION RATE: 20 BRPM | DIASTOLIC BLOOD PRESSURE: 83 MMHG | TEMPERATURE: 98.3 F | OXYGEN SATURATION: 98 %

## 2022-07-24 VITALS
TEMPERATURE: 97.6 F | OXYGEN SATURATION: 96 % | DIASTOLIC BLOOD PRESSURE: 89 MMHG | RESPIRATION RATE: 18 BRPM | SYSTOLIC BLOOD PRESSURE: 151 MMHG | HEART RATE: 79 BPM

## 2022-07-24 DIAGNOSIS — R10.9 ABDOMINAL PAIN: Primary | ICD-10-CM

## 2022-07-24 DIAGNOSIS — G25.81 RESTLESS LEG: ICD-10-CM

## 2022-07-24 DIAGNOSIS — F41.9 ANXIETY: Primary | ICD-10-CM

## 2022-07-24 DIAGNOSIS — K59.00 CONSTIPATION: ICD-10-CM

## 2022-07-24 DIAGNOSIS — R06.02 SOB (SHORTNESS OF BREATH): ICD-10-CM

## 2022-07-24 LAB
ALBUMIN SERPL BCP-MCNC: 3.3 G/DL (ref 3.5–5)
ALP SERPL-CCNC: 68 U/L (ref 46–116)
ALT SERPL W P-5'-P-CCNC: 92 U/L (ref 12–78)
ANION GAP SERPL CALCULATED.3IONS-SCNC: 4 MMOL/L (ref 4–13)
AST SERPL W P-5'-P-CCNC: 43 U/L (ref 5–45)
ATRIAL RATE: 90 BPM
BASOPHILS # BLD AUTO: 0.01 THOUSANDS/ΜL (ref 0–0.1)
BASOPHILS NFR BLD AUTO: 0 % (ref 0–1)
BILIRUB SERPL-MCNC: 0.26 MG/DL (ref 0.2–1)
BUN SERPL-MCNC: 14 MG/DL (ref 5–25)
CALCIUM ALBUM COR SERPL-MCNC: 8.9 MG/DL (ref 8.3–10.1)
CALCIUM SERPL-MCNC: 8.3 MG/DL (ref 8.3–10.1)
CARDIAC TROPONIN I PNL SERPL HS: 6 NG/L
CHLORIDE SERPL-SCNC: 109 MMOL/L (ref 96–108)
CO2 SERPL-SCNC: 29 MMOL/L (ref 21–32)
CREAT SERPL-MCNC: 1 MG/DL (ref 0.6–1.3)
EOSINOPHIL # BLD AUTO: 0.08 THOUSAND/ΜL (ref 0–0.61)
EOSINOPHIL NFR BLD AUTO: 2 % (ref 0–6)
ERYTHROCYTE [DISTWIDTH] IN BLOOD BY AUTOMATED COUNT: 13.5 % (ref 11.6–15.1)
GFR SERPL CREATININE-BSD FRML MDRD: 84 ML/MIN/1.73SQ M
GLUCOSE SERPL-MCNC: 131 MG/DL (ref 65–140)
HCT VFR BLD AUTO: 39.2 % (ref 36.5–49.3)
HGB BLD-MCNC: 13 G/DL (ref 12–17)
IMM GRANULOCYTES # BLD AUTO: 0.02 THOUSAND/UL (ref 0–0.2)
IMM GRANULOCYTES NFR BLD AUTO: 0 % (ref 0–2)
LIPASE SERPL-CCNC: 187 U/L (ref 73–393)
LYMPHOCYTES # BLD AUTO: 2.71 THOUSANDS/ΜL (ref 0.6–4.47)
LYMPHOCYTES NFR BLD AUTO: 51 % (ref 14–44)
MCH RBC QN AUTO: 31 PG (ref 26.8–34.3)
MCHC RBC AUTO-ENTMCNC: 33.2 G/DL (ref 31.4–37.4)
MCV RBC AUTO: 93 FL (ref 82–98)
MONOCYTES # BLD AUTO: 0.37 THOUSAND/ΜL (ref 0.17–1.22)
MONOCYTES NFR BLD AUTO: 7 % (ref 4–12)
NEUTROPHILS # BLD AUTO: 2.11 THOUSANDS/ΜL (ref 1.85–7.62)
NEUTS SEG NFR BLD AUTO: 40 % (ref 43–75)
NRBC BLD AUTO-RTO: 0 /100 WBCS
NT-PROBNP SERPL-MCNC: 13 PG/ML
P AXIS: 42 DEGREES
PLATELET # BLD AUTO: 142 THOUSANDS/UL (ref 149–390)
PMV BLD AUTO: 10.8 FL (ref 8.9–12.7)
POTASSIUM SERPL-SCNC: 3.9 MMOL/L (ref 3.5–5.3)
PR INTERVAL: 160 MS
PROT SERPL-MCNC: 6.9 G/DL (ref 6.4–8.4)
QRS AXIS: 27 DEGREES
QRSD INTERVAL: 84 MS
QT INTERVAL: 328 MS
QTC INTERVAL: 401 MS
RBC # BLD AUTO: 4.2 MILLION/UL (ref 3.88–5.62)
SODIUM SERPL-SCNC: 142 MMOL/L (ref 135–147)
T WAVE AXIS: -35 DEGREES
VENTRICULAR RATE: 90 BPM
WBC # BLD AUTO: 5.3 THOUSAND/UL (ref 4.31–10.16)

## 2022-07-24 PROCEDURE — 99285 EMERGENCY DEPT VISIT HI MDM: CPT

## 2022-07-24 PROCEDURE — 71045 X-RAY EXAM CHEST 1 VIEW: CPT

## 2022-07-24 PROCEDURE — 99284 EMERGENCY DEPT VISIT MOD MDM: CPT

## 2022-07-24 PROCEDURE — 84484 ASSAY OF TROPONIN QUANT: CPT | Performed by: STUDENT IN AN ORGANIZED HEALTH CARE EDUCATION/TRAINING PROGRAM

## 2022-07-24 PROCEDURE — 80053 COMPREHEN METABOLIC PANEL: CPT

## 2022-07-24 PROCEDURE — 36415 COLL VENOUS BLD VENIPUNCTURE: CPT

## 2022-07-24 PROCEDURE — 83690 ASSAY OF LIPASE: CPT

## 2022-07-24 PROCEDURE — 83880 ASSAY OF NATRIURETIC PEPTIDE: CPT | Performed by: STUDENT IN AN ORGANIZED HEALTH CARE EDUCATION/TRAINING PROGRAM

## 2022-07-24 PROCEDURE — 93005 ELECTROCARDIOGRAM TRACING: CPT

## 2022-07-24 PROCEDURE — 74176 CT ABD & PELVIS W/O CONTRAST: CPT

## 2022-07-24 PROCEDURE — 85025 COMPLETE CBC W/AUTO DIFF WBC: CPT

## 2022-07-24 PROCEDURE — 99284 EMERGENCY DEPT VISIT MOD MDM: CPT | Performed by: EMERGENCY MEDICINE

## 2022-07-24 PROCEDURE — 96372 THER/PROPH/DIAG INJ SC/IM: CPT

## 2022-07-24 PROCEDURE — 93010 ELECTROCARDIOGRAM REPORT: CPT | Performed by: INTERNAL MEDICINE

## 2022-07-24 PROCEDURE — G1004 CDSM NDSC: HCPCS

## 2022-07-24 RX ORDER — SODIUM CHLORIDE 9 MG/ML
3 INJECTION INTRAVENOUS
Status: DISCONTINUED | OUTPATIENT
Start: 2022-07-24 | End: 2022-07-24 | Stop reason: HOSPADM

## 2022-07-24 RX ORDER — MAGNESIUM CARB/ALUMINUM HYDROX 105-160MG
296 TABLET,CHEWABLE ORAL ONCE
Qty: 296 ML | Refills: 0 | Status: SHIPPED | OUTPATIENT
Start: 2022-07-24 | End: 2022-07-24

## 2022-07-24 RX ORDER — KETOROLAC TROMETHAMINE 30 MG/ML
15 INJECTION, SOLUTION INTRAMUSCULAR; INTRAVENOUS ONCE
Status: DISCONTINUED | OUTPATIENT
Start: 2022-07-24 | End: 2022-07-24

## 2022-07-24 RX ORDER — DOCUSATE SODIUM 100 MG/1
100 CAPSULE, LIQUID FILLED ORAL ONCE
Status: DISCONTINUED | OUTPATIENT
Start: 2022-07-24 | End: 2022-07-24 | Stop reason: HOSPADM

## 2022-07-24 RX ORDER — ACETAMINOPHEN 325 MG/1
975 TABLET ORAL ONCE
Status: COMPLETED | OUTPATIENT
Start: 2022-07-24 | End: 2022-07-24

## 2022-07-24 RX ORDER — DIAZEPAM 5 MG/1
10 TABLET ORAL ONCE
Status: COMPLETED | OUTPATIENT
Start: 2022-07-24 | End: 2022-07-24

## 2022-07-24 RX ORDER — KETOROLAC TROMETHAMINE 30 MG/ML
15 INJECTION, SOLUTION INTRAMUSCULAR; INTRAVENOUS ONCE
Status: COMPLETED | OUTPATIENT
Start: 2022-07-24 | End: 2022-07-24

## 2022-07-24 RX ADMIN — KETOROLAC TROMETHAMINE 15 MG: 30 INJECTION, SOLUTION INTRAMUSCULAR; INTRAVENOUS at 15:53

## 2022-07-24 RX ADMIN — DIAZEPAM 10 MG: 5 TABLET ORAL at 15:15

## 2022-07-24 RX ADMIN — MAGNESIUM HYDROXIDE 30 ML: 1200 LIQUID ORAL at 15:15

## 2022-07-24 RX ADMIN — ACETAMINOPHEN 975 MG: 325 TABLET ORAL at 15:15

## 2022-07-24 NOTE — DISCHARGE INSTRUCTIONS
Your CT scan showed constipation  Prescription for Mag Citrate was sent to your pharmacy  Follow up with your primary care physician, return to the ED if symptoms worsen
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
## 2022-07-24 NOTE — DISCHARGE INSTRUCTIONS
Please call [facility] to establish primary care  Return if you have chest pain, persistent nausea or vomiting

## 2022-07-24 NOTE — ED ATTENDING ATTESTATION
7/24/2022  IJaylyn MD, saw and evaluated the patient  I have discussed the patient with the resident/non-physician practitioner and agree with the resident's/non-physician practitioner's findings, Plan of Care, and MDM as documented in the resident's/non-physician practitioner's note, except where noted  All available labs and Radiology studies were reviewed  I was present for key portions of any procedure(s) performed by the resident/non-physician practitioner and I was immediately available to provide assistance  At this point I agree with the current assessment done in the Emergency Department  I have conducted an independent evaluation of this patient a history and physical is as follows:    ED Course         Critical Care Time  Procedures    57 yo with hx of bipolar, took his psych meds this morning and feeling restless and agitated  Pt seen in er last night for abdominal pain and had negatvie workup and sent home  Pt continues with abdominal pain, feels constipated  Pt feels sob, improves when he sits up  Vss, afebrile, mild distress, lungs cta, rrr, abdomen soft nontender, no neuro deficits  Valium, tylenol, toradol, mom, cxr, ekg, trop

## 2022-07-24 NOTE — ED NOTES
Pt very upset and demanding of this nurse that he must get meds for his restless legs " I believe its called ativan or something, they always give it to me"  When it was explained that it was not ordered and I spoke to the doctors and they would not be giving it to him he started to get loud with this nurse asking " what is your fucking problem, get the meds" when I explained that he could always follow up with his PCP he told me he doesn't have one and that he wants one of our doctors to give him the med hes been here 3 hours and hasnt gotten anything and hasnt had a drink or nothing then stated he wanted his IV out  This nurse removed the IV, explained that I was sorry he was upset and could get him a drink while he waited for his discharge papers  Pt stated " you can fitch me an uber too I dont want your water" I explained that I could not give an uber ( [employer]ft) as he doesn't follow with a [facility] PCP so I could offer him a phone to call family or friend or to set up his own uber  Pt started to yell at this nurse calling me names and asking "what is your problem why wouldn't I just give him want he wants its my job to do so"  Dr John Beckham entered room and he confirmed plan of care  Pt angrily walked out of room and talked poorly of this nurse his whole walk out of the department         Jane Rowan, ZARINA  07/24/22 7905
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
## 2022-07-24 NOTE — ED NOTES
Lyft ride home for patient arranged after explaining that the lyfts are a courtesy provided by the hospital, not for transportation with every visit  Pt states understanding       Dusty Jennings, ZARINA  07/24/22 0355

## 2022-07-29 DIAGNOSIS — I10 ESSENTIAL HYPERTENSION: ICD-10-CM

## 2022-07-29 RX ORDER — AMLODIPINE BESYLATE 5 MG/1
TABLET ORAL
Qty: 90 TABLET | Refills: 1 | Status: SHIPPED | OUTPATIENT
Start: 2022-07-29

## 2022-09-05 ENCOUNTER — APPOINTMENT (EMERGENCY)
Dept: RADIOLOGY | Facility: HOSPITAL | Age: 57
End: 2022-09-05
Payer: MEDICARE

## 2022-09-05 ENCOUNTER — HOSPITAL ENCOUNTER (EMERGENCY)
Facility: HOSPITAL | Age: 57
Discharge: HOME | End: 2022-09-05
Attending: EMERGENCY MEDICINE
Payer: MEDICARE

## 2022-09-05 VITALS
RESPIRATION RATE: 22 BRPM | HEART RATE: 69 BPM | SYSTOLIC BLOOD PRESSURE: 140 MMHG | TEMPERATURE: 97.4 F | OXYGEN SATURATION: 96 % | DIASTOLIC BLOOD PRESSURE: 71 MMHG

## 2022-09-05 DIAGNOSIS — K59.00 CONSTIPATION, UNSPECIFIED CONSTIPATION TYPE: Primary | ICD-10-CM

## 2022-09-05 LAB
ALBUMIN SERPL BCP-MCNC: 3.3 G/DL (ref 3.5–5)
ALP SERPL-CCNC: 64 U/L (ref 46–116)
ALT SERPL W P-5'-P-CCNC: 181 U/L (ref 12–78)
ANION GAP SERPL CALCULATED.3IONS-SCNC: 0 MMOL/L (ref 4–13)
AST SERPL W P-5'-P-CCNC: 117 U/L (ref 5–45)
BASOPHILS # BLD AUTO: 0.02 THOUSANDS/ΜL (ref 0–0.1)
BASOPHILS NFR BLD AUTO: 0 % (ref 0–1)
BILIRUB SERPL-MCNC: 0.59 MG/DL (ref 0.2–1)
BUN SERPL-MCNC: 12 MG/DL (ref 5–25)
CALCIUM ALBUM COR SERPL-MCNC: 9.4 MG/DL (ref 8.3–10.1)
CALCIUM SERPL-MCNC: 8.8 MG/DL (ref 8.3–10.1)
CHLORIDE SERPL-SCNC: 108 MMOL/L (ref 96–108)
CO2 SERPL-SCNC: 27 MMOL/L (ref 21–32)
CREAT SERPL-MCNC: 0.97 MG/DL (ref 0.6–1.3)
EOSINOPHIL # BLD AUTO: 0.08 THOUSAND/ΜL (ref 0–0.61)
EOSINOPHIL NFR BLD AUTO: 2 % (ref 0–6)
ERYTHROCYTE [DISTWIDTH] IN BLOOD BY AUTOMATED COUNT: 13.5 % (ref 11.6–15.1)
GFR SERPL CREATININE-BSD FRML MDRD: 87 ML/MIN/1.73SQ M
GLUCOSE SERPL-MCNC: 111 MG/DL (ref 65–140)
HCT VFR BLD AUTO: 43.3 % (ref 36.5–49.3)
HGB BLD-MCNC: 13.9 G/DL (ref 12–17)
IMM GRANULOCYTES # BLD AUTO: 0.02 THOUSAND/UL (ref 0–0.2)
IMM GRANULOCYTES NFR BLD AUTO: 0 % (ref 0–2)
LIPASE SERPL-CCNC: 108 U/L (ref 73–393)
LYMPHOCYTES # BLD AUTO: 2.22 THOUSANDS/ΜL (ref 0.6–4.47)
LYMPHOCYTES NFR BLD AUTO: 48 % (ref 14–44)
MCH RBC QN AUTO: 30 PG (ref 26.8–34.3)
MCHC RBC AUTO-ENTMCNC: 32.1 G/DL (ref 31.4–37.4)
MCV RBC AUTO: 93 FL (ref 82–98)
MONOCYTES # BLD AUTO: 0.33 THOUSAND/ΜL (ref 0.17–1.22)
MONOCYTES NFR BLD AUTO: 7 % (ref 4–12)
NEUTROPHILS # BLD AUTO: 2.02 THOUSANDS/ΜL (ref 1.85–7.62)
NEUTS SEG NFR BLD AUTO: 43 % (ref 43–75)
NRBC BLD AUTO-RTO: 0 /100 WBCS
PLATELET # BLD AUTO: 113 THOUSANDS/UL (ref 149–390)
PMV BLD AUTO: 11.5 FL (ref 8.9–12.7)
POTASSIUM SERPL-SCNC: 4.8 MMOL/L (ref 3.5–5.3)
PROT SERPL-MCNC: 7.2 G/DL (ref 6.4–8.4)
RBC # BLD AUTO: 4.64 MILLION/UL (ref 3.88–5.62)
SODIUM SERPL-SCNC: 135 MMOL/L (ref 135–147)
WBC # BLD AUTO: 4.69 THOUSAND/UL (ref 4.31–10.16)

## 2022-09-05 PROCEDURE — 96375 TX/PRO/DX INJ NEW DRUG ADDON: CPT

## 2022-09-05 PROCEDURE — 80053 COMPREHEN METABOLIC PANEL: CPT | Performed by: EMERGENCY MEDICINE

## 2022-09-05 PROCEDURE — 99284 EMERGENCY DEPT VISIT MOD MDM: CPT

## 2022-09-05 PROCEDURE — 99284 EMERGENCY DEPT VISIT MOD MDM: CPT | Performed by: EMERGENCY MEDICINE

## 2022-09-05 PROCEDURE — G1004 CDSM NDSC: HCPCS

## 2022-09-05 PROCEDURE — 74177 CT ABD & PELVIS W/CONTRAST: CPT

## 2022-09-05 PROCEDURE — 85025 COMPLETE CBC W/AUTO DIFF WBC: CPT | Performed by: EMERGENCY MEDICINE

## 2022-09-05 PROCEDURE — 96374 THER/PROPH/DIAG INJ IV PUSH: CPT

## 2022-09-05 PROCEDURE — 83690 ASSAY OF LIPASE: CPT | Performed by: EMERGENCY MEDICINE

## 2022-09-05 PROCEDURE — 36415 COLL VENOUS BLD VENIPUNCTURE: CPT | Performed by: EMERGENCY MEDICINE

## 2022-09-05 RX ORDER — ONDANSETRON 2 MG/ML
4 INJECTION INTRAMUSCULAR; INTRAVENOUS ONCE
Status: COMPLETED | OUTPATIENT
Start: 2022-09-05 | End: 2022-09-05

## 2022-09-05 RX ORDER — POLYETHYLENE GLYCOL 3350 17 G/17G
17 POWDER, FOR SOLUTION ORAL ONCE
Status: COMPLETED | OUTPATIENT
Start: 2022-09-05 | End: 2022-09-05

## 2022-09-05 RX ORDER — KETOROLAC TROMETHAMINE 30 MG/ML
15 INJECTION, SOLUTION INTRAMUSCULAR; INTRAVENOUS ONCE
Status: COMPLETED | OUTPATIENT
Start: 2022-09-05 | End: 2022-09-05

## 2022-09-05 RX ORDER — POLYETHYLENE GLYCOL 3350 17 G/17G
17 POWDER, FOR SOLUTION ORAL DAILY
Qty: 20 EACH | Refills: 0 | Status: SHIPPED | OUTPATIENT
Start: 2022-09-05 | End: 2022-09-12

## 2022-09-05 RX ADMIN — IOHEXOL 75 ML: 350 INJECTION, SOLUTION INTRAVENOUS at 09:06

## 2022-09-05 RX ADMIN — ONDANSETRON 4 MG: 2 INJECTION INTRAMUSCULAR; INTRAVENOUS at 08:31

## 2022-09-05 RX ADMIN — KETOROLAC TROMETHAMINE 15 MG: 30 INJECTION, SOLUTION INTRAMUSCULAR at 08:30

## 2022-09-05 RX ADMIN — POLYETHYLENE GLYCOL 3350 17 G: 17 POWDER, FOR SOLUTION ORAL at 09:58

## 2022-09-05 NOTE — ED ATTENDING ATTESTATION
9/5/2022  Bill TORRES DO, saw and evaluated the patient  I have discussed the patient with the resident/non-physician practitioner and agree with the resident's/non-physician practitioner's findings, Plan of Care, and MDM as documented in the resident's/non-physician practitioner's note, except where noted  All available labs and Radiology studies were reviewed  I was present for key portions of any procedure(s) performed by the resident/non-physician practitioner and I was immediately available to provide assistance  At this point I agree with the current assessment done in the Emergency Department  I have conducted an independent evaluation of this patient a history and physical is as follows:    Geeta TORRES DO, saw and evaluated the patient  I have discussed the patient with the resident and agree with the resident's findings, Plan of Care, and MDM as documented in the resident's note, except where noted  All available labs and Radiology studies were reviewed  I was present for key portions of any procedure(s) performed by the resident and I was immediately available to provide assistance  At this point I agree with the current assessment done in the Emergency Department  I have conducted an independent evaluation of this patient a history and physical is as follows:    Nirmala Simms is an 56y o  year old male who presents to the ED today with abdominal pain for 3 days  Abdominal pain is exacerbated by nothing and relieved by nothing  The pain is cramping in quality, does not radiate, and currently rated moderate in severity  Also having nausea  No changes in bowel movements  No other symptoms or pain anywhere else  ROS otherwise negative  General: NAD   HEENT: normocephalic, atraumatic   MMM   CV: RRR   Pulm: normal work of breathing,   GI: abdomen non-distended, mild tenderness LLQ without peritoneal signs  : deferred   MSK: no LE edema, no deformity   Skin: warm and dry   Neuro: awake and alert, moves all extremities with good strength, face symmetric, speech normal   Psych: appropriate mood and affect       MDM  No pain out of proportion to exam or worsening of pain with or shortly after eating  Pain not colicky  No testicular pain  No peritoneal signs on exam   Patient has no pulsatile abdominal mass, known aneurysm, pulse deficit or asymmetry, tearing or ripping pain  No tenderness at McBurney's point and no positive Schmitz sign, or Rovsing sign  No radiation of pain from flank to groin, CVA tenderness, urinary complains, or history of urolithiasis  No history of abdominal trauma or sickle cell disease  Patient does have a hx of abdominal surgeries  Anticipated Disposition:  D/c if workup unremarkable      I have personally reviewed the laboratory studies and imaging studies as ordered by the resident/MORENA  I have personally examined the patient              ED Course         Critical Care Time  Procedures

## 2022-09-05 NOTE — DISCHARGE INSTRUCTIONS
Follow-up with PCP for further care  Contact info provided below if needed  Use over the counter medications as stated on the bottle as needed for pain control  Take your new medications as prescribed  - Miralax daily for 1 week and then as needed  Return to the ED with new or worsening symptoms
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
## 2022-09-29 ENCOUNTER — ANESTHESIA EVENT (OUTPATIENT)
Dept: GASTROENTEROLOGY | Facility: HOSPITAL | Age: 57
End: 2022-09-29

## 2022-09-29 ENCOUNTER — ANESTHESIA (OUTPATIENT)
Dept: GASTROENTEROLOGY | Facility: HOSPITAL | Age: 57
End: 2022-09-29

## 2022-09-29 ENCOUNTER — HOSPITAL ENCOUNTER (OUTPATIENT)
Dept: GASTROENTEROLOGY | Facility: HOSPITAL | Age: 57
Setting detail: OUTPATIENT SURGERY
End: 2022-09-29
Attending: INTERNAL MEDICINE
Payer: MEDICARE

## 2022-09-29 VITALS
RESPIRATION RATE: 18 BRPM | HEART RATE: 82 BPM | SYSTOLIC BLOOD PRESSURE: 140 MMHG | OXYGEN SATURATION: 98 % | TEMPERATURE: 98.5 F | DIASTOLIC BLOOD PRESSURE: 73 MMHG

## 2022-09-29 DIAGNOSIS — Z12.11 SCREEN FOR COLON CANCER: ICD-10-CM

## 2022-09-29 PROCEDURE — 88305 TISSUE EXAM BY PATHOLOGIST: CPT | Performed by: PATHOLOGY

## 2022-09-29 PROCEDURE — 45385 COLONOSCOPY W/LESION REMOVAL: CPT | Performed by: INTERNAL MEDICINE

## 2022-09-29 RX ORDER — SODIUM CHLORIDE, SODIUM LACTATE, POTASSIUM CHLORIDE, CALCIUM CHLORIDE 600; 310; 30; 20 MG/100ML; MG/100ML; MG/100ML; MG/100ML
INJECTION, SOLUTION INTRAVENOUS CONTINUOUS PRN
Status: DISCONTINUED | OUTPATIENT
Start: 2022-09-29 | End: 2022-09-29

## 2022-09-29 RX ORDER — PROPOFOL 10 MG/ML
INJECTION, EMULSION INTRAVENOUS AS NEEDED
Status: DISCONTINUED | OUTPATIENT
Start: 2022-09-29 | End: 2022-09-29

## 2022-09-29 RX ADMIN — LIDOCAINE HYDROCHLORIDE 50 MG: 20 INJECTION INTRAVENOUS at 08:38

## 2022-09-29 RX ADMIN — PROPOFOL 25 MG: 10 INJECTION, EMULSION INTRAVENOUS at 08:57

## 2022-09-29 RX ADMIN — PROPOFOL 150 MG: 10 INJECTION, EMULSION INTRAVENOUS at 08:38

## 2022-09-29 RX ADMIN — PROPOFOL 25 MG: 10 INJECTION, EMULSION INTRAVENOUS at 08:48

## 2022-09-29 RX ADMIN — PROPOFOL 25 MG: 10 INJECTION, EMULSION INTRAVENOUS at 08:54

## 2022-09-29 RX ADMIN — PROPOFOL 50 MG: 10 INJECTION, EMULSION INTRAVENOUS at 08:45

## 2022-09-29 RX ADMIN — PROPOFOL 50 MG: 10 INJECTION, EMULSION INTRAVENOUS at 08:42

## 2022-09-29 RX ADMIN — SODIUM CHLORIDE, SODIUM LACTATE, POTASSIUM CHLORIDE, AND CALCIUM CHLORIDE: .6; .31; .03; .02 INJECTION, SOLUTION INTRAVENOUS at 08:33

## 2022-09-29 RX ADMIN — PROPOFOL 50 MG: 10 INJECTION, EMULSION INTRAVENOUS at 08:59

## 2022-09-29 RX ADMIN — PROPOFOL 25 MG: 10 INJECTION, EMULSION INTRAVENOUS at 09:02

## 2022-09-29 RX ADMIN — PROPOFOL 25 MG: 10 INJECTION, EMULSION INTRAVENOUS at 08:55

## 2022-09-29 RX ADMIN — PROPOFOL 25 MG: 10 INJECTION, EMULSION INTRAVENOUS at 08:51

## 2022-09-29 NOTE — ANESTHESIA PREPROCEDURE EVALUATION
Procedure:  COLONOSCOPY    Relevant Problems   CARDIO   (+) Essential hypertension   (+) Mixed hyperlipidemia      GI/HEPATIC   (+) Hepatic steatosis      MUSCULOSKELETAL   (+) Chronic bilateral low back pain without sciatica   (+) DDD (degenerative disc disease), cervical   (+) DDD (degenerative disc disease), thoracic   (+) Primary osteoarthritis of left knee   (+) Primary osteoarthritis of right knee   (+) Primary osteoarthritis of right shoulder   (+) Sacroiliitis (HCC)      NEURO/PSYCH   (+) Chronic bilateral low back pain without sciatica   (+) History of cocaine abuse (HCC)   (+) History of obstructive sleep apnea   (+) Post-traumatic stress disorder, chronic      PULMONARY   (+) SOB (shortness of breath)      Other   (+) Alcohol abuse   (+) Obesity, morbid ([facility] )      This is a 56 y o  male who presents for COLONOSCOPY  -- VITALS --  There were no vitals taken for this visit  BP Readings from Last 3 Encounters:   09/05/22 140/71   07/24/22 153/83   07/24/22 151/89     -- LABS --  Results from Last 12 Months   Lab Units 09/05/22  0810 07/24/22  0624   SODIUM mmol/L 135 142   POTASSIUM mmol/L 4 8 3 9   CHLORIDE mmol/L 108 109*   CO2 mmol/L 27 29   ANION GAP mmol/L 0* 4   BUN mg/dL 12 14   CREATININE mg/dL 0 97 1 00   CALCIUM mg/dL 8 8 8 3   GLUCOSE RANDOM mg/dL 111 131   AST U/L 117* 43   ALT U/L 181* 92*   ALK PHOS U/L 64 68   TOTAL BILIRUBIN mg/dL 0 59 0 26   ALBUMIN g/dL 3 3* 3 3*     Results from Last 12 Months   Lab Units 09/05/22  0810 07/24/22  0624   WBC Thousand/uL 4 69 5 30   HEMOGLOBIN g/dL 13 9 13 0   HEMATOCRIT % 43 3 39 2   PLATELETS Thousands/uL 113* 142*     No results for input(s): APTT, INR, PTT in the last 8784 hours      -- ANESTHESIA RISK ASSESSMENT / QUESTIONNAIRE --   (1) EKG  Normal sinus rhythm  Nonspecific ST and T wave abnormality  Abnormal ECG  When compared with ECG of 15-NOV-2021 12:27,  Nonspecific T wave abnormality now evident in Inferior leads  Nonspecific T wave abnormality, worse in Lateral leads  Confirmed by Leandro Van ([id number]) on 7/24/2022 6:35:43 PM  No results found for this or any previous visit  (2) Echo/Relevant Imaging  4/19/2020 TTE  SUMMARY     PROCEDURE INFORMATION:  This was a technically difficult study      LEFT VENTRICLE:  Systolic function was vigorous  Ejection fraction was estimated to be 70 %  Although no diagnostic regional wall motion abnormality was identified, this possibility cannot be completely excluded on the basis of this study  There was mild concentric hypertrophy  Left ventricular diastolic function parameters were normal      RIGHT VENTRICLE:  The size was normal   Systolic function was normal      AORTIC VALVE: The valve was trileaflet  Leaflets exhibited normal thickness and normal cuspal separation  DOPPLER: Transaortic velocity was within the normal range  There was no evidence for stenosis  There was no regurgitation  (3) Relevant airway history: MT4, BMI >40     (4) Personal or family history of anesthesia related complications: amlodipine, depakote, sinequan, ropinirole     (5) Patient took the following medications this morning:      (6) Patient meets ASA NPO guidelines: y     (7) Cardiac assessment       (a) Does the patient have severe, modifiable cardiac conditions including Si/Sx of MI (w/i 14 dys of angioplasty, 1 mo after BMS, 2 mo after no intervention, 3-6 mo after OLEG), decompensated CHF, decompensated valvular Dz, unstable arrhythmias, or unstable pulmonary HTN?: n      (b) Calculate Sajan's RCRI (Surgical risk, ICM, CHF, Cr >2, CVA/TIA, IDDM): 0      (c) Quote MACE risk based on RCRI: 0= 0 4%; 1= 0 6%; 2= 7%; 3= 11%      (d) If RCRI >1, is exercise tolerance METs >4?: n/a      (e) If RCRI >1 and METS <4 (or indeterminate), then is stress testing appropriate?: n/a    -- ANESTHESIA SHARED DECISION MAKING --  Benefits discussed (Esther Rubio 81 [id number], PMID [id number]):  Amnesia, Analgesia, Specialized anesthesiology support reduces mortality for major surgery by about 100-fold  Mortality risks associated with anesthesia discussed (PMID [id number]): ASA-PS I 1:250,000; ASA-PS II 1:20,000; ASA-PS III 1:3,500; ASA-PS IV 1:1,800  Risks by organ systems discussed included were but not limited to (PMID [id number]):  (1) Drug reactions / Allergic reactions / Overdose adverse events  (2) Neuro adverse events: IntraOp awareness, Stroke, POCD  Risk of bleeding or infection associated with neuraxial anesthesia if relevant  Risks of somnolence and discussed not driving, operating heavy machinery, or making major life-changing decisions for 24 hrs after anesthesia  (3) Pulmonary / Airway adverse events: Dental injury, Throat pain, Hypoxia, Prolonged intubation  (4) Cardiac and Vascular adverse events: PeriOp MI or other MACE  Risk of bleeding or infection related to relevant vascular access (Peripheral, Central, Arterial, or other line placement)  Risks associated with bypass circuits if relevant  (5) FEN / GI / Vomiting risks: Aspiration, PONV  Physical Exam    Airway    Mallampati score: IV  TM Distance: >3 FB  Neck ROM: full     Dental   No notable dental hx     Cardiovascular      Pulmonary      Other Findings        Anesthesia Plan  ASA Score- 3     Anesthesia Type- IV sedation with anesthesia with ASA Monitors  Additional Monitors:   Airway Plan:     Comment: Airway support for suspected DEMETRIA  Plan Factors-    Chart reviewed  EKG reviewed  Existing labs reviewed  Patient is a current smoker  Patient did not smoke on day of surgery  Induction- intravenous  Postoperative Plan-     Informed Consent- Anesthetic plan and risks discussed with patient  I personally reviewed this patient with the CRNA  Discussed and agreed on the Anesthesia Plan with the CRNA  Natalie Larry
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
## 2022-09-29 NOTE — ANESTHESIA POSTPROCEDURE EVALUATION
Post-Op Assessment Note    CV Status:  Stable    Pain management: adequate     Mental Status:  Agitated   Hydration Status:  Stable   PONV Controlled:  None   Airway Patency:  Patent      Post Op Vitals Reviewed: Yes      Staff: Anesthesiologist         No complications documented      BP   121/78   Temp   96 8   Pulse  77   Resp   14   SpO2   100%

## 2022-10-06 PROCEDURE — 88305 TISSUE EXAM BY PATHOLOGIST: CPT | Performed by: PATHOLOGY

## 2022-10-07 ENCOUNTER — TELEPHONE (OUTPATIENT)
Dept: INTERNAL MEDICINE CLINIC | Facility: CLINIC | Age: 57
End: 2022-10-07

## 2022-10-07 NOTE — TELEPHONE ENCOUNTER
Patient is on the line requesting a referral be placed to Neurology  Patient was seen on 05/05/22 by Neurology  Patient called to schedule a follow up with them but they require a referral by his PCP before he can be scheduled

## 2022-10-10 DIAGNOSIS — M50.30 DDD (DEGENERATIVE DISC DISEASE), CERVICAL: Primary | ICD-10-CM

## 2022-10-10 DIAGNOSIS — M51.34 DDD (DEGENERATIVE DISC DISEASE), THORACIC: ICD-10-CM

## 2022-10-12 ENCOUNTER — OFFICE VISIT (OUTPATIENT)
Dept: OBGYN CLINIC | Facility: HOSPITAL | Age: 57
End: 2022-10-12
Payer: MEDICARE

## 2022-10-12 ENCOUNTER — APPOINTMENT (OUTPATIENT)
Dept: LAB | Facility: CLINIC | Age: 57
End: 2022-10-12
Payer: MEDICARE

## 2022-10-12 VITALS
BODY MASS INDEX: 41.88 KG/M2 | HEART RATE: 85 BPM | SYSTOLIC BLOOD PRESSURE: 138 MMHG | HEIGHT: 66 IN | DIASTOLIC BLOOD PRESSURE: 76 MMHG | WEIGHT: 260.6 LBS

## 2022-10-12 DIAGNOSIS — Z79.899 ENCOUNTER FOR LONG-TERM (CURRENT) USE OF OTHER MEDICATIONS: ICD-10-CM

## 2022-10-12 DIAGNOSIS — M50.30 DDD (DEGENERATIVE DISC DISEASE), CERVICAL: ICD-10-CM

## 2022-10-12 DIAGNOSIS — M17.0 PRIMARY OSTEOARTHRITIS OF BOTH KNEES: Primary | ICD-10-CM

## 2022-10-12 LAB
ALBUMIN SERPL BCP-MCNC: 3.7 G/DL (ref 3.5–5)
ALP SERPL-CCNC: 87 U/L (ref 46–116)
ALT SERPL W P-5'-P-CCNC: 151 U/L (ref 12–78)
ANION GAP SERPL CALCULATED.3IONS-SCNC: 2 MMOL/L (ref 4–13)
AST SERPL W P-5'-P-CCNC: 77 U/L (ref 5–45)
BASOPHILS # BLD AUTO: 0.02 THOUSANDS/ΜL (ref 0–0.1)
BASOPHILS NFR BLD AUTO: 0 % (ref 0–1)
BILIRUB DIRECT SERPL-MCNC: 0.07 MG/DL (ref 0–0.2)
BILIRUB SERPL-MCNC: 0.44 MG/DL (ref 0.2–1)
BUN SERPL-MCNC: 14 MG/DL (ref 5–25)
CALCIUM SERPL-MCNC: 9.1 MG/DL (ref 8.3–10.1)
CHLORIDE SERPL-SCNC: 104 MMOL/L (ref 96–108)
CHOLEST SERPL-MCNC: 204 MG/DL
CO2 SERPL-SCNC: 31 MMOL/L (ref 21–32)
CREAT SERPL-MCNC: 1.16 MG/DL (ref 0.6–1.3)
EOSINOPHIL # BLD AUTO: 0.07 THOUSAND/ΜL (ref 0–0.61)
EOSINOPHIL NFR BLD AUTO: 1 % (ref 0–6)
ERYTHROCYTE [DISTWIDTH] IN BLOOD BY AUTOMATED COUNT: 13.1 % (ref 11.6–15.1)
EST. AVERAGE GLUCOSE BLD GHB EST-MCNC: 134 MG/DL
GFR SERPL CREATININE-BSD FRML MDRD: 70 ML/MIN/1.73SQ M
GLUCOSE P FAST SERPL-MCNC: 116 MG/DL (ref 65–99)
HBA1C MFR BLD: 6.3 %
HCT VFR BLD AUTO: 44.5 % (ref 36.5–49.3)
HDLC SERPL-MCNC: 21 MG/DL
HGB BLD-MCNC: 14.3 G/DL (ref 12–17)
IMM GRANULOCYTES # BLD AUTO: 0.02 THOUSAND/UL (ref 0–0.2)
IMM GRANULOCYTES NFR BLD AUTO: 0 % (ref 0–2)
LDLC SERPL CALC-MCNC: 140 MG/DL (ref 0–100)
LYMPHOCYTES # BLD AUTO: 2.57 THOUSANDS/ΜL (ref 0.6–4.47)
LYMPHOCYTES NFR BLD AUTO: 53 % (ref 14–44)
MCH RBC QN AUTO: 29.7 PG (ref 26.8–34.3)
MCHC RBC AUTO-ENTMCNC: 32.1 G/DL (ref 31.4–37.4)
MCV RBC AUTO: 93 FL (ref 82–98)
MONOCYTES # BLD AUTO: 0.35 THOUSAND/ΜL (ref 0.17–1.22)
MONOCYTES NFR BLD AUTO: 7 % (ref 4–12)
NEUTROPHILS # BLD AUTO: 1.91 THOUSANDS/ΜL (ref 1.85–7.62)
NEUTS SEG NFR BLD AUTO: 39 % (ref 43–75)
NONHDLC SERPL-MCNC: 183 MG/DL
NRBC BLD AUTO-RTO: 0 /100 WBCS
PLATELET # BLD AUTO: 153 THOUSANDS/UL (ref 149–390)
PMV BLD AUTO: 10.9 FL (ref 8.9–12.7)
POTASSIUM SERPL-SCNC: 4.6 MMOL/L (ref 3.5–5.3)
PROT SERPL-MCNC: 7.6 G/DL (ref 6.4–8.4)
RBC # BLD AUTO: 4.81 MILLION/UL (ref 3.88–5.62)
SODIUM SERPL-SCNC: 137 MMOL/L (ref 135–147)
TRIGL SERPL-MCNC: 214 MG/DL
TSH SERPL DL<=0.05 MIU/L-ACNC: 4.64 UIU/ML (ref 0.45–4.5)
VALPROATE SERPL-MCNC: 18 UG/ML (ref 50–100)
WBC # BLD AUTO: 4.94 THOUSAND/UL (ref 4.31–10.16)

## 2022-10-12 PROCEDURE — 99213 OFFICE O/P EST LOW 20 MIN: CPT | Performed by: ORTHOPAEDIC SURGERY

## 2022-10-12 PROCEDURE — 84443 ASSAY THYROID STIM HORMONE: CPT

## 2022-10-12 PROCEDURE — 80053 COMPREHEN METABOLIC PANEL: CPT

## 2022-10-12 PROCEDURE — 85025 COMPLETE CBC W/AUTO DIFF WBC: CPT

## 2022-10-12 PROCEDURE — 82248 BILIRUBIN DIRECT: CPT

## 2022-10-12 PROCEDURE — 83036 HEMOGLOBIN GLYCOSYLATED A1C: CPT

## 2022-10-12 PROCEDURE — 80061 LIPID PANEL: CPT

## 2022-10-12 PROCEDURE — 20610 DRAIN/INJ JOINT/BURSA W/O US: CPT | Performed by: ORTHOPAEDIC SURGERY

## 2022-10-12 PROCEDURE — 80164 ASSAY DIPROPYLACETIC ACD TOT: CPT

## 2022-10-12 PROCEDURE — 36415 COLL VENOUS BLD VENIPUNCTURE: CPT

## 2022-10-12 RX ORDER — BETAMETHASONE SODIUM PHOSPHATE AND BETAMETHASONE ACETATE 3; 3 MG/ML; MG/ML
6 INJECTION, SUSPENSION INTRA-ARTICULAR; INTRALESIONAL; INTRAMUSCULAR; SOFT TISSUE
Status: COMPLETED | OUTPATIENT
Start: 2022-10-12 | End: 2022-10-12

## 2022-10-12 RX ORDER — BUPIVACAINE HYDROCHLORIDE 2.5 MG/ML
2 INJECTION, SOLUTION INFILTRATION; PERINEURAL
Status: COMPLETED | OUTPATIENT
Start: 2022-10-12 | End: 2022-10-12

## 2022-10-12 RX ADMIN — BETAMETHASONE SODIUM PHOSPHATE AND BETAMETHASONE ACETATE 6 MG: 3; 3 INJECTION, SUSPENSION INTRA-ARTICULAR; INTRALESIONAL; INTRAMUSCULAR; SOFT TISSUE at 18:11

## 2022-10-12 RX ADMIN — BUPIVACAINE HYDROCHLORIDE 2 ML: 2.5 INJECTION, SOLUTION INFILTRATION; PERINEURAL at 18:11
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
## 2022-10-13 LAB — OXYCODONE+OXYMORPHONE UR QL SCN: NEGATIVE NG/ML

## 2022-10-20 ENCOUNTER — TELEPHONE (OUTPATIENT)
Dept: NEUROSURGERY | Facility: CLINIC | Age: 57
End: 2022-10-20

## 2022-10-20 ENCOUNTER — TELEPHONE (OUTPATIENT)
Dept: INTERNAL MEDICINE CLINIC | Facility: CLINIC | Age: 57
End: 2022-10-20

## 2022-10-20 NOTE — TELEPHONE ENCOUNTER
SPOKE TO DR RODRÍGUEZ  PLEASE CALL PT TO COMPLETE L SPINE INTAKE   AND SCHEDULE WITH AP  HE IS OK WITH THIS  THANKS

## 2022-10-20 NOTE — TELEPHONE ENCOUNTER
Radha from Neurosurgery called to get Clarification on a script   The Dx on script is for Neck and Thoracic  Patient stated at the Appt today it should be for his Lumbar lower back  Neurosurgery is requesting to see if the Provider would be willing to do  a script for a MRI to be done on his Lumbar area   Please call with any question [phone]

## 2022-11-04 ENCOUNTER — IOP CHECK (OUTPATIENT)
Dept: URBAN - METROPOLITAN AREA CLINIC 6 | Facility: CLINIC | Age: 57
End: 2022-11-04

## 2022-11-04 DIAGNOSIS — H40.1121: ICD-10-CM

## 2022-11-04 DIAGNOSIS — H40.1113: ICD-10-CM

## 2022-11-04 PROCEDURE — 92020 GONIOSCOPY: CPT

## 2022-11-04 PROCEDURE — 92012 INTRM OPH EXAM EST PATIENT: CPT

## 2022-11-04 ASSESSMENT — VISUAL ACUITY
OD_SC: 20/30-1
OU_SC: J5
OS_SC: 20/25-2

## 2022-11-04 ASSESSMENT — TONOMETRY
OD_IOP_MMHG: 35
OS_IOP_MMHG: 22
OD_IOP_MMHG: 36
OS_IOP_MMHG: 21

## 2022-11-10 ENCOUNTER — OFFICE VISIT (OUTPATIENT)
Dept: INTERNAL MEDICINE CLINIC | Facility: CLINIC | Age: 57
End: 2022-11-10

## 2022-11-10 ENCOUNTER — PATIENT OUTREACH (OUTPATIENT)
Dept: INTERNAL MEDICINE CLINIC | Facility: CLINIC | Age: 57
End: 2022-11-10

## 2022-11-10 VITALS
OXYGEN SATURATION: 98 % | BODY MASS INDEX: 43.55 KG/M2 | WEIGHT: 271 LBS | DIASTOLIC BLOOD PRESSURE: 84 MMHG | HEART RATE: 82 BPM | SYSTOLIC BLOOD PRESSURE: 127 MMHG | TEMPERATURE: 98.6 F | HEIGHT: 66 IN

## 2022-11-10 DIAGNOSIS — I10 ESSENTIAL HYPERTENSION: ICD-10-CM

## 2022-11-10 DIAGNOSIS — R79.89 ELEVATED TSH: ICD-10-CM

## 2022-11-10 DIAGNOSIS — F43.12 POST-TRAUMATIC STRESS DISORDER, CHRONIC: ICD-10-CM

## 2022-11-10 DIAGNOSIS — G47.33 OSA (OBSTRUCTIVE SLEEP APNEA): ICD-10-CM

## 2022-11-10 DIAGNOSIS — E78.2 MIXED HYPERLIPIDEMIA: ICD-10-CM

## 2022-11-10 DIAGNOSIS — F10.10 ALCOHOL ABUSE: ICD-10-CM

## 2022-11-10 DIAGNOSIS — R74.8 ELEVATED LIVER ENZYMES: ICD-10-CM

## 2022-11-10 DIAGNOSIS — R73.03 PREDIABETES: ICD-10-CM

## 2022-11-10 DIAGNOSIS — F25.0 SCHIZOAFFECTIVE DISORDER, BIPOLAR TYPE (HCC): Primary | ICD-10-CM

## 2022-11-10 DIAGNOSIS — Z78.9 NEEDS ASSISTANCE WITH COMMUNITY RESOURCES: Primary | ICD-10-CM

## 2022-11-10 DIAGNOSIS — K59.00 CONSTIPATION, UNSPECIFIED CONSTIPATION TYPE: ICD-10-CM

## 2022-11-10 DIAGNOSIS — F14.11 HISTORY OF COCAINE ABUSE (HCC): ICD-10-CM

## 2022-11-10 RX ORDER — POLYETHYLENE GLYCOL 3350 17 G/17G
17 POWDER, FOR SOLUTION ORAL DAILY
Qty: 30 EACH | Refills: 3 | Status: SHIPPED | OUTPATIENT
Start: 2022-11-10 | End: 2022-11-11

## 2022-11-10 NOTE — PROGRESS NOTES
NAI has received request from pt to assist with a [employer] application  Pt is known to NAI from a previous contact  Pt shares he resides in [place]  Pt is a  57 y/o male who is on SSI/SSD due to medical and MH issues  He has shared he has support from his 20 y/o son who visits him regularly  Pt has also previously indicated he has literacy issue  He has been receiving MH [place] through [facility] but since they are closing he is looking for a new provider  Pt has also previovusly shared he is in recovery for substance use  NAI has reviewed some possible providers ie [place] [address] [facility] as well as [facility] [employer] and [facility]  Pt is receptive to referral to same  NAI has reviewed the [employer] application process and has obtained his signatures on the application and  ARYAN  NAI will ask [facility] to assist with the application as see if [facility] may help do a [facility]  SW will f/u in the AM with OP MH referrals  Sw to send an IN BASKET request to [facility] as well  CM Team to f/u and assist as indicated
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
## 2022-11-10 NOTE — PATIENT INSTRUCTIONS
Your next appointments are as follows:      11/11/2022 1:45 PM (Arrive by 1:30 PM) Janki Shankar [facility]   11/21/2022 2:45 PM (Arrive by 2:30 PM) Smith Rivera MD [place] [id number] [facility]   12/13/2022 11:00 AM (Arrive by 10:45 AM) Theresa Simon MD [address]

## 2022-11-11 ENCOUNTER — TELEPHONE (OUTPATIENT)
Dept: PSYCHIATRY | Facility: CLINIC | Age: 57
End: 2022-11-11

## 2022-11-11 ENCOUNTER — TELEPHONE (OUTPATIENT)
Dept: OBGYN CLINIC | Facility: HOSPITAL | Age: 57
End: 2022-11-11

## 2022-11-11 DIAGNOSIS — K59.00 CONSTIPATION, UNSPECIFIED CONSTIPATION TYPE: Primary | ICD-10-CM

## 2022-11-11 RX ORDER — POLYETHYLENE GLYCOL 3350 17 G/17G
17 POWDER, FOR SOLUTION ORAL DAILY
Qty: 10 EACH | Refills: 6 | Status: SHIPPED | OUTPATIENT
Start: 2022-11-11

## 2022-11-11 NOTE — TELEPHONE ENCOUNTER
Caller: Patient    Doctor: n/a    Reason for call: Patient needed to be transferred to [facility]    Call back#: [phone]

## 2022-11-14 NOTE — PROGRESS NOTES
NAI has received request from pt to assist with a [employer] application  Pt is known to NAI from a previous contact  Pt shares he resides in [place]  Pt is a  55 y/o male who is on SSI/SSD due to medical and MH issues  He has shared he has support from his 20 y/o son who visits him regularly  Pt has also previously indicated he has literacy issue  He has been receiving MH [place] through [facility] but since they are closing he is looking for a new provider  Pt has also previovusly shared he is in recovery for substance use  SW has reviewed some possible providers ie [facility] [address], [address] as well as [facility] [employer] and [facility]  Pt is receptive to referral to same  SW has reviewed the [employer] application process and has obtained his signatures on the application and  ARYAN  SW will ask [facility] to assist with the application as see if his current [facility] Provider may help do a [facility] Certification  SW will f/u in the AM with OP MH referrals  Sw to send an IN BASKET request to [facility] as well  CM Team to f/u and assist as indicated  ADDENDUM 11/14/22   NAI notes pt is on [facility] waiting list    Call made to [facility] [phone] and left message   [facility] [phone]  and they were able to offer an [address] Thursday 12/22/22 at   1 PM Pt is receptive  NAI will see if [facility] can help pt get [employer] enrollment to help him get to this appointment  Pt shares he get SSI and has a pension  $1,375  OO plus $ 926 00 ($ 2,301 00 Monthly )  NAI has consulted with [employer] [facility] who confirms pt is over INCOME income for MA  He also shares pt did not complete his renewal [employer] application      Nai has called pt again and left a message for him to f/iu with our Financial Counselor and bring is proof of income to his next appointment to meet with them  CM Team to f/u and assist as able

## 2022-11-17 ENCOUNTER — PATIENT OUTREACH (OUTPATIENT)
Dept: INTERNAL MEDICINE CLINIC | Facility: CLINIC | Age: 57
End: 2022-11-17

## 2022-11-21 ENCOUNTER — HOSPITAL ENCOUNTER (OUTPATIENT)
Dept: RADIOLOGY | Facility: HOSPITAL | Age: 57
Discharge: HOME | End: 2022-11-21
Attending: ORTHOPAEDIC SURGERY

## 2022-11-21 ENCOUNTER — OFFICE VISIT (OUTPATIENT)
Dept: OBGYN CLINIC | Facility: HOSPITAL | Age: 57
End: 2022-11-21

## 2022-11-21 VITALS — BODY MASS INDEX: 43.58 KG/M2 | WEIGHT: 271.17 LBS | HEIGHT: 66 IN

## 2022-11-21 DIAGNOSIS — R52 PAIN: Primary | ICD-10-CM

## 2022-11-21 DIAGNOSIS — R52 PAIN: ICD-10-CM

## 2022-11-21 DIAGNOSIS — M16.12 PRIMARY OSTEOARTHRITIS OF ONE HIP, LEFT: ICD-10-CM
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
## 2022-12-01 ENCOUNTER — IOP CHECK (OUTPATIENT)
Dept: URBAN - METROPOLITAN AREA CLINIC 6 | Facility: CLINIC | Age: 57
End: 2022-12-01

## 2022-12-01 DIAGNOSIS — M25.512 LEFT SHOULDER PAIN, UNSPECIFIED CHRONICITY: Primary | ICD-10-CM

## 2022-12-01 DIAGNOSIS — H40.1121: ICD-10-CM

## 2022-12-01 DIAGNOSIS — H40.1113: ICD-10-CM

## 2022-12-01 PROCEDURE — 92012 INTRM OPH EXAM EST PATIENT: CPT

## 2022-12-01 ASSESSMENT — VISUAL ACUITY
OD_SC: 20/20
OS_SC: 20/20+1

## 2022-12-01 ASSESSMENT — TONOMETRY
OS_IOP_MMHG: 14
OD_IOP_MMHG: 22

## 2022-12-02 ENCOUNTER — TELEPHONE (OUTPATIENT)
Dept: OBGYN CLINIC | Facility: HOSPITAL | Age: 57
End: 2022-12-02

## 2022-12-02 DIAGNOSIS — M16.12 PRIMARY OSTEOARTHRITIS OF ONE HIP, LEFT: Primary | ICD-10-CM

## 2022-12-02 RX ORDER — TRAMADOL HYDROCHLORIDE 50 MG/1
50 TABLET ORAL EVERY 6 HOURS PRN
Qty: 30 TABLET | Refills: 0 | Status: SHIPPED | OUTPATIENT
Start: 2022-12-02 | End: 2023-03-17 | Stop reason: ALTCHOICE

## 2022-12-02 NOTE — TELEPHONE ENCOUNTER
Caller: Patient    Doctor: Dr Roland Sood    Reason for call: Patient called stating ice/heat is not helping his hip pain   Patient is requesting anti inflammatory medication to be called into pharmacy     Patient has appt with DR Jerome Lilliana 01/04 for hip     [id number] [place] - [facility]  [phone]    Call back#: [phone]

## 2022-12-02 NOTE — TELEPHONE ENCOUNTER
Caller: patient    Doctor/Office: Bri Rivers asked to speak to: nurse     Call was transferred to:  Alexandro Acuna

## 2022-12-02 NOTE — TELEPHONE ENCOUNTER
Patient has been trying tylenol 1000mg TID, ibuprofen 600mg every 6 hrs, ice/heat 20 on 20 off, bengay/tiger balm without relief of his hip pain  He is asking if RX for Tramadol can be called to pharmacy  Patient is on schedule to see Dr Jose Aj 1/6  Please advise  Advised patient he may need to call PCP for help with this pain medication  Verbalized understanding but wanted me to ask Dr Jose Diaz

## 2022-12-09 ENCOUNTER — PATIENT OUTREACH (OUTPATIENT)
Dept: INTERNAL MEDICINE CLINIC | Facility: CLINIC | Age: 57
End: 2022-12-09

## 2022-12-09 NOTE — PROGRESS NOTES
Chart review   CMOC completed [employer] application and sent it by e-mail  Patient is looking for a new provider [facility] was close  Mental health form was not fill out at this moment  [facility] will contact Ciro to confirm if they received application on [phone]  Application scanned in chart for futures needs

## 2022-12-11 ENCOUNTER — HOSPITAL ENCOUNTER (EMERGENCY)
Facility: HOSPITAL | Age: 57
Discharge: HOME | End: 2022-12-11
Attending: EMERGENCY MEDICINE

## 2022-12-11 ENCOUNTER — APPOINTMENT (EMERGENCY)
Dept: RADIOLOGY | Facility: HOSPITAL | Age: 57
End: 2022-12-11

## 2022-12-11 VITALS
DIASTOLIC BLOOD PRESSURE: 61 MMHG | OXYGEN SATURATION: 98 % | RESPIRATION RATE: 18 BRPM | HEART RATE: 83 BPM | TEMPERATURE: 98.1 F | SYSTOLIC BLOOD PRESSURE: 135 MMHG

## 2022-12-11 DIAGNOSIS — R06.00 DYSPNEA: Primary | ICD-10-CM

## 2022-12-11 DIAGNOSIS — R07.9 CHEST PAIN: ICD-10-CM

## 2022-12-11 LAB
2HR DELTA HS TROPONIN: 1 NG/L
ALBUMIN SERPL BCP-MCNC: 3.4 G/DL (ref 3.5–5)
ALP SERPL-CCNC: 70 U/L (ref 46–116)
ALT SERPL W P-5'-P-CCNC: 66 U/L (ref 12–78)
ANION GAP SERPL CALCULATED.3IONS-SCNC: 9 MMOL/L (ref 4–13)
AST SERPL W P-5'-P-CCNC: 56 U/L (ref 5–45)
ATRIAL RATE: 74 BPM
BASOPHILS # BLD AUTO: 0.02 THOUSANDS/ÂΜL (ref 0–0.1)
BASOPHILS NFR BLD AUTO: 0 % (ref 0–1)
BILIRUB SERPL-MCNC: 0.47 MG/DL (ref 0.2–1)
BUN SERPL-MCNC: 16 MG/DL (ref 5–25)
CALCIUM ALBUM COR SERPL-MCNC: 9.6 MG/DL (ref 8.3–10.1)
CALCIUM SERPL-MCNC: 9.1 MG/DL (ref 8.3–10.1)
CARDIAC TROPONIN I PNL SERPL HS: 7 NG/L
CARDIAC TROPONIN I PNL SERPL HS: 8 NG/L
CHLORIDE SERPL-SCNC: 104 MMOL/L (ref 96–108)
CO2 SERPL-SCNC: 25 MMOL/L (ref 21–32)
CREAT SERPL-MCNC: 1.32 MG/DL (ref 0.6–1.3)
EOSINOPHIL # BLD AUTO: 0.07 THOUSAND/ÂΜL (ref 0–0.61)
EOSINOPHIL NFR BLD AUTO: 2 % (ref 0–6)
ERYTHROCYTE [DISTWIDTH] IN BLOOD BY AUTOMATED COUNT: 13.1 % (ref 11.6–15.1)
FLUAV RNA RESP QL NAA+PROBE: NEGATIVE
FLUBV RNA RESP QL NAA+PROBE: NEGATIVE
GFR SERPL CREATININE-BSD FRML MDRD: 60 ML/MIN/1.73SQ M
GLUCOSE SERPL-MCNC: 108 MG/DL (ref 65–140)
HCT VFR BLD AUTO: 39 % (ref 36.5–49.3)
HGB BLD-MCNC: 13 G/DL (ref 12–17)
IMM GRANULOCYTES # BLD AUTO: 0.01 THOUSAND/UL (ref 0–0.2)
IMM GRANULOCYTES NFR BLD AUTO: 0 % (ref 0–2)
LYMPHOCYTES # BLD AUTO: 2.31 THOUSANDS/ÂΜL (ref 0.6–4.47)
LYMPHOCYTES NFR BLD AUTO: 48 % (ref 14–44)
MCH RBC QN AUTO: 30.4 PG (ref 26.8–34.3)
MCHC RBC AUTO-ENTMCNC: 33.3 G/DL (ref 31.4–37.4)
MCV RBC AUTO: 91 FL (ref 82–98)
MONOCYTES # BLD AUTO: 0.36 THOUSAND/ÂΜL (ref 0.17–1.22)
MONOCYTES NFR BLD AUTO: 8 % (ref 4–12)
NEUTROPHILS # BLD AUTO: 2.01 THOUSANDS/ÂΜL (ref 1.85–7.62)
NEUTS SEG NFR BLD AUTO: 42 % (ref 43–75)
NRBC BLD AUTO-RTO: 0 /100 WBCS
P AXIS: 75 DEGREES
PLATELET # BLD AUTO: 123 THOUSANDS/UL (ref 149–390)
PMV BLD AUTO: 11.2 FL (ref 8.9–12.7)
POTASSIUM SERPL-SCNC: 4.2 MMOL/L (ref 3.5–5.3)
PR INTERVAL: 168 MS
PROT SERPL-MCNC: 7.7 G/DL (ref 6.4–8.4)
QRS AXIS: 45 DEGREES
QRSD INTERVAL: 86 MS
QT INTERVAL: 370 MS
QTC INTERVAL: 410 MS
RBC # BLD AUTO: 4.27 MILLION/UL (ref 3.88–5.62)
RSV RNA RESP QL NAA+PROBE: NEGATIVE
SARS-COV-2 RNA RESP QL NAA+PROBE: NEGATIVE
SODIUM SERPL-SCNC: 138 MMOL/L (ref 135–147)
T WAVE AXIS: -5 DEGREES
VENTRICULAR RATE: 74 BPM
WBC # BLD AUTO: 4.78 THOUSAND/UL (ref 4.31–10.16)

## 2022-12-11 RX ORDER — KETOROLAC TROMETHAMINE 30 MG/ML
15 INJECTION, SOLUTION INTRAMUSCULAR; INTRAVENOUS ONCE
Status: COMPLETED | OUTPATIENT
Start: 2022-12-11 | End: 2022-12-11

## 2022-12-11 RX ADMIN — KETOROLAC TROMETHAMINE 15 MG: 30 INJECTION, SOLUTION INTRAMUSCULAR; INTRAVENOUS at 06:35
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
## 2022-12-11 NOTE — DISCHARGE INSTRUCTIONS
Follow-up with PCP for further care  Contact info provided below if needed  Call tomorrow to schedule follow-up  Continue home medications as prescribed  Return to the ED with new or worsening symptoms

## 2022-12-12 ENCOUNTER — HOSPITAL ENCOUNTER (EMERGENCY)
Facility: HOSPITAL | Age: 57
Discharge: HOME | End: 2022-12-12
Attending: EMERGENCY MEDICINE

## 2022-12-12 VITALS
OXYGEN SATURATION: 96 % | RESPIRATION RATE: 18 BRPM | TEMPERATURE: 97.4 F | HEART RATE: 78 BPM | SYSTOLIC BLOOD PRESSURE: 126 MMHG | DIASTOLIC BLOOD PRESSURE: 72 MMHG

## 2022-12-12 DIAGNOSIS — F41.9 ANXIETY: Primary | ICD-10-CM

## 2022-12-12 DIAGNOSIS — K59.00 CONSTIPATION: ICD-10-CM

## 2022-12-12 LAB
ATRIAL RATE: 74 BPM
P AXIS: 51 DEGREES
PR INTERVAL: 170 MS
QRS AXIS: 41 DEGREES
QRSD INTERVAL: 90 MS
QT INTERVAL: 362 MS
QTC INTERVAL: 401 MS
T WAVE AXIS: -13 DEGREES
VENTRICULAR RATE: 74 BPM

## 2022-12-12 RX ORDER — POLYETHYLENE GLYCOL 3350 17 G/17G
17 POWDER, FOR SOLUTION ORAL ONCE
Status: COMPLETED | OUTPATIENT
Start: 2022-12-12 | End: 2022-12-12

## 2022-12-12 RX ORDER — KETOROLAC TROMETHAMINE 30 MG/ML
15 INJECTION, SOLUTION INTRAMUSCULAR; INTRAVENOUS ONCE
Status: COMPLETED | OUTPATIENT
Start: 2022-12-12 | End: 2022-12-12

## 2022-12-12 RX ORDER — POLYETHYLENE GLYCOL 3350 17 G/17G
17 POWDER, FOR SOLUTION ORAL DAILY
Qty: 85 G | Refills: 0 | Status: SHIPPED | OUTPATIENT
Start: 2022-12-12 | End: 2022-12-17

## 2022-12-12 RX ADMIN — KETOROLAC TROMETHAMINE 15 MG: 30 INJECTION, SOLUTION INTRAMUSCULAR; INTRAVENOUS at 04:18

## 2022-12-12 RX ADMIN — POLYETHYLENE GLYCOL 3350 17 G: 17 POWDER, FOR SOLUTION ORAL at 01:55

## 2022-12-12 NOTE — ED NOTES
Pt provided Ly[employer] ride back to residence  Instructed the patient the next time he is seen in the ED he will need to arrange own transportation  This is the second Ly[employer] ride provided in two days    Pt verbally understands     Belinda De Paz RN  12/12/22 [address], RN  12/12/22 4672
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
## 2022-12-12 NOTE — DISCHARGE INSTRUCTIONS
Follow-up with the primary care doctor  A laxative was sent to the pharmacy  Return to the emergency department if symptoms worsen or if you have any other concerns
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
## 2022-12-13 ENCOUNTER — TELEPHONE (OUTPATIENT)
Dept: OBGYN CLINIC | Facility: HOSPITAL | Age: 57
End: 2022-12-13

## 2022-12-13 ENCOUNTER — PATIENT OUTREACH (OUTPATIENT)
Dept: INTERNAL MEDICINE CLINIC | Facility: CLINIC | Age: 57
End: 2022-12-13

## 2022-12-13 ENCOUNTER — OFFICE VISIT (OUTPATIENT)
Dept: OBGYN CLINIC | Facility: HOSPITAL | Age: 57
End: 2022-12-13

## 2022-12-13 ENCOUNTER — OFFICE VISIT (OUTPATIENT)
Dept: NEUROSURGERY | Facility: CLINIC | Age: 57
End: 2022-12-13

## 2022-12-13 ENCOUNTER — HOSPITAL ENCOUNTER (OUTPATIENT)
Dept: RADIOLOGY | Facility: HOSPITAL | Age: 57
Discharge: HOME | End: 2022-12-13
Attending: ORTHOPAEDIC SURGERY

## 2022-12-13 VITALS
DIASTOLIC BLOOD PRESSURE: 86 MMHG | SYSTOLIC BLOOD PRESSURE: 137 MMHG | HEIGHT: 66 IN | BODY MASS INDEX: 43.77 KG/M2 | HEART RATE: 81 BPM

## 2022-12-13 VITALS
RESPIRATION RATE: 16 BRPM | BODY MASS INDEX: 41.46 KG/M2 | SYSTOLIC BLOOD PRESSURE: 124 MMHG | TEMPERATURE: 97.4 F | WEIGHT: 258 LBS | DIASTOLIC BLOOD PRESSURE: 82 MMHG | HEART RATE: 99 BPM | HEIGHT: 66 IN

## 2022-12-13 DIAGNOSIS — M51.34 DDD (DEGENERATIVE DISC DISEASE), THORACIC: ICD-10-CM

## 2022-12-13 DIAGNOSIS — M50.30 DDD (DEGENERATIVE DISC DISEASE), CERVICAL: ICD-10-CM

## 2022-12-13 DIAGNOSIS — M16.12 PRIMARY OSTEOARTHRITIS OF ONE HIP, LEFT: Primary | ICD-10-CM

## 2022-12-13 DIAGNOSIS — M54.16 LUMBAR RADICULOPATHY: Primary | ICD-10-CM

## 2022-12-13 DIAGNOSIS — M19.012 PRIMARY OSTEOARTHRITIS OF LEFT SHOULDER: ICD-10-CM

## 2022-12-13 DIAGNOSIS — M25.512 LEFT SHOULDER PAIN, UNSPECIFIED CHRONICITY: ICD-10-CM

## 2022-12-13 RX ORDER — BUPIVACAINE HYDROCHLORIDE 2.5 MG/ML
4 INJECTION, SOLUTION INFILTRATION; PERINEURAL
Status: COMPLETED | OUTPATIENT
Start: 2022-12-13 | End: 2022-12-13

## 2022-12-13 RX ORDER — BRIMONIDINE TARTRATE 2 MG/ML
SOLUTION/ DROPS OPHTHALMIC
COMMUNITY
Start: 2022-11-04

## 2022-12-13 RX ORDER — BIMATOPROST 0.01 %
DROPS OPHTHALMIC (EYE)
COMMUNITY
Start: 2022-10-10

## 2022-12-13 RX ORDER — BUPIVACAINE HYDROCHLORIDE 2.5 MG/ML
2 INJECTION, SOLUTION INFILTRATION; PERINEURAL
Status: COMPLETED | OUTPATIENT
Start: 2022-12-13 | End: 2022-12-13

## 2022-12-13 RX ORDER — TRAZODONE HYDROCHLORIDE 50 MG/1
TABLET ORAL
COMMUNITY
Start: 2022-10-06

## 2022-12-13 RX ORDER — BETAMETHASONE SODIUM PHOSPHATE AND BETAMETHASONE ACETATE 3; 3 MG/ML; MG/ML
12 INJECTION, SUSPENSION INTRA-ARTICULAR; INTRALESIONAL; INTRAMUSCULAR; SOFT TISSUE
Status: COMPLETED | OUTPATIENT
Start: 2022-12-13 | End: 2022-12-13

## 2022-12-13 RX ADMIN — BUPIVACAINE HYDROCHLORIDE 2 ML: 2.5 INJECTION, SOLUTION INFILTRATION; PERINEURAL at 11:21

## 2022-12-13 RX ADMIN — BETAMETHASONE SODIUM PHOSPHATE AND BETAMETHASONE ACETATE 12 MG: 3; 3 INJECTION, SUSPENSION INTRA-ARTICULAR; INTRALESIONAL; INTRAMUSCULAR; SOFT TISSUE at 11:21

## 2022-12-13 RX ADMIN — BUPIVACAINE HYDROCHLORIDE 4 ML: 2.5 INJECTION, SOLUTION INFILTRATION; PERINEURAL at 11:21

## 2022-12-13 NOTE — TELEPHONE ENCOUNTER
Caller: Deanna Acuña    Doctor: Beatrice Luevano    Reason for call: Patient called for clifford blanco, called over to office said it was ok to transfer  Call was disconnected       Call back#: [phone]
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
## 2022-12-13 NOTE — ASSESSMENT & PLAN NOTE
Patient seen for evaluation of low back pain  · Ongoing x years  Worse with long distances and hills  Occasional numbness/tingling anterolateral thighs  No BBI  Reporting pain 10/10, however is in no acute distress in the office, ambulating independently  · Known knee OA and left hip OA, not a surgical candidate d/t weight  · Exam: bilateral HF 4/5 d/t pain, otherwise 5/5 throughout, light touch intact, 2+ DTRs, no clonus    Imaging: none     Plan:   · Discussed symptoms and prior MRI with patient  He has degenerative changes in his spine from 2019, but has not done any conservative measures at this time  · Discussed importance of weight loss, staying active and a healthy diet  He has already started exercising more  · He would like an MRI of his lumbar spine and to see a physician at his next visit  · Recommend formal PT for core and back strengthening  Referral placed  Would also try and establish with another pain management group since he states he cannot see [facility] due to prior issues with no showing  · Reviewed red flag s/s  · Follow up with lumbar spine XR and MRI as snpx with a spine surgeon  · Call with any questions or concerns

## 2022-12-13 NOTE — PROGRESS NOTES
[employer] received called [facility] confirming that they received the application from the patient and but that the patient is inactive for MA  The patient will be temporarily approved for 6 months  [employer] will help the patient with the MA application

## 2022-12-13 NOTE — PROGRESS NOTES
SW has reached out to pt as he was seen yesterday for anxiety  Sw did reach out to remind him of his upcoming INTAKE appointment with [facility] [phone]  for Thursday 12/22/22 at   1 PM    Pt has remembered it  SW has reviewed with pt the he should have [employer] confirmed by then and he will need to have Tickets put on his account to fund same  Pt was appreciative  SW offered to help set up first ride if needed  Pt has Ciro # as well  SW to f/u with pt to assist as indicated  As per [facility] pt's [employer] application has been submitted  When entered we can set up [address] ride

## 2022-12-13 NOTE — TELEPHONE ENCOUNTER
Caller: Patient  Doctor: Jayjay Fields    Reason for call: Patient called for LYFT number   Provided    Call back#: [phone]
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
## 2022-12-14 NOTE — ED ATTENDING ATTESTATION
12/11/2022  IRene DO, saw and evaluated the patient  I have discussed the patient with the resident/non-physician practitioner and agree with the resident's/non-physician practitioner's findings, Plan of Care, and MDM as documented in the resident's/non-physician practitioner's note, except where noted  All available labs and Radiology studies were reviewed  I was present for key portions of any procedure(s) performed by the resident/non-physician practitioner and I was immediately available to provide assistance  At this point I agree with the current assessment done in the Emergency Department  I have conducted an independent evaluation of this patient a history and physical is as follows:     56 yom  Sob  Onset today  Hx similar  Presents all the time for this  Patient has no cp  No other sx  Denies back pain, fever, chills, melena, headache, n/v/d  Vital signs reviewed  Gen  appearance: No acute distress  Alert and oriented x3  Head: Atraumatic, normocephalic  Eyes: EOMI, PERRLA  No icterus  Neck: Supple, no lymphadenopathy, full range of motion  Chest: Regular rate and rhythm  Lungs are clear to auscultation bilaterally  Nontender  Abdomen: Soft nontender nondistended  No signs of ecchymosis  Positive bowel sounds  Extremities: Full range of motion in all extremities  DTRs intact  Neuro: Cranial nerves II through XII intact  Nonfocal exam  GCS 15  Skin: Warm, dry      Plan cardiac workup  Doubt acs      ED Course         Critical Care Time  Procedures

## 2022-12-14 NOTE — NURSING NOTE
Call placed to patient to discuss upcoming appointment at [facility] radiology department and complete consultation with patient  Patient is having left hip CSI utilizing fluoroscopy guidance  Reviewed patient's allergies, current anticoagulant medication of ASA 81 mg present per patient but not required to stop per periprocedural management of coagulation status and hemostasis risk in percutaneous image guided procedure guidelines, also discussed the pre and post procedure expectations  Reminded patient of location and time expected for procedure, Patient expressed understanding by verbalizing and repeating instructions

## 2022-12-19 ENCOUNTER — HOSPITAL ENCOUNTER (OUTPATIENT)
Dept: RADIOLOGY | Facility: HOSPITAL | Age: 57
Discharge: HOME | End: 2022-12-19

## 2022-12-19 ENCOUNTER — HOSPITAL ENCOUNTER (OUTPATIENT)
Dept: RADIOLOGY | Facility: HOSPITAL | Age: 57
Discharge: HOME | End: 2022-12-19
Attending: ORTHOPAEDIC SURGERY

## 2022-12-19 DIAGNOSIS — M16.12 PRIMARY OSTEOARTHRITIS OF ONE HIP, LEFT: ICD-10-CM

## 2022-12-19 DIAGNOSIS — M54.16 LUMBAR RADICULOPATHY: ICD-10-CM

## 2022-12-19 RX ORDER — METHYLPREDNISOLONE ACETATE 80 MG/ML
80 INJECTION, SUSPENSION INTRA-ARTICULAR; INTRALESIONAL; INTRAMUSCULAR; SOFT TISSUE
Status: COMPLETED | OUTPATIENT
Start: 2022-12-19 | End: 2022-12-19

## 2022-12-19 RX ORDER — LIDOCAINE HYDROCHLORIDE 10 MG/ML
5 INJECTION, SOLUTION EPIDURAL; INFILTRATION; INTRACAUDAL; PERINEURAL
Status: COMPLETED | OUTPATIENT
Start: 2022-12-19 | End: 2022-12-19

## 2022-12-19 RX ORDER — BUPIVACAINE HYDROCHLORIDE 2.5 MG/ML
10 INJECTION, SOLUTION EPIDURAL; INFILTRATION; INTRACAUDAL
Status: COMPLETED | OUTPATIENT
Start: 2022-12-19 | End: 2022-12-19

## 2022-12-19 RX ADMIN — BUPIVACAINE HYDROCHLORIDE 5 ML: 2.5 INJECTION, SOLUTION EPIDURAL; INFILTRATION; INTRACAUDAL; PERINEURAL at 12:53

## 2022-12-19 RX ADMIN — IOHEXOL 5 ML: 300 INJECTION, SOLUTION INTRAVENOUS at 12:47

## 2022-12-19 RX ADMIN — METHYLPREDNISOLONE ACETATE 80 MG: 80 INJECTION, SUSPENSION INTRA-ARTICULAR; INTRALESIONAL; INTRAMUSCULAR; SOFT TISSUE at 12:53

## 2022-12-19 RX ADMIN — LIDOCAINE HYDROCHLORIDE 5 ML: 10 INJECTION, SOLUTION EPIDURAL; INFILTRATION; INTRACAUDAL; PERINEURAL at 12:53

## 2022-12-20 ENCOUNTER — PATIENT OUTREACH (OUTPATIENT)
Dept: INTERNAL MEDICINE CLINIC | Facility: CLINIC | Age: 57
End: 2022-12-20

## 2022-12-20 NOTE — PROGRESS NOTES
NAI called pt this AM and has assisted him with scheduling his upcoming  INTAKE appointment with [facility] [phone]  DCF Thursday 12/22/22 at 1 PM with Kelvin Khan  Pt is to call back and set up his [employer] as he does need to buy tickets   Pt is aware it will cost $ 4 40 each way  He needs to use a debit / or credit card to put at least $25 00 on his account  Pt to f/u with NAI if additional help needed

## 2022-12-22 ENCOUNTER — PATIENT OUTREACH (OUTPATIENT)
Dept: INTERNAL MEDICINE CLINIC | Facility: CLINIC | Age: 57
End: 2022-12-22

## 2022-12-22 NOTE — PROGRESS NOTES
RYLEE received a message from Clinical Team that pt call shared pt went to his Intake at [facility] today but after he arrived they said it was not scheduled today  RYLEE called [address] services [phone] and spoke to Intake and they confirm pt is scheduled for !/25/23 @ 9 AM     RYLEE called pt back to apologize for the confusion  Pt understood and is aware the the appointment is 1/25/23 @ 9 AM    He will set up [employer]

## 2022-12-23 ENCOUNTER — PROCEDURE ONLY (OUTPATIENT)
Dept: URBAN - METROPOLITAN AREA CLINIC 6 | Facility: CLINIC | Age: 57
End: 2022-12-23

## 2022-12-23 ENCOUNTER — PATIENT OUTREACH (OUTPATIENT)
Dept: INTERNAL MEDICINE CLINIC | Facility: CLINIC | Age: 57
End: 2022-12-23

## 2022-12-23 DIAGNOSIS — H40.1121: ICD-10-CM

## 2022-12-23 DIAGNOSIS — H40.1113: ICD-10-CM

## 2022-12-23 PROCEDURE — 65855 TRABECULOPLASTY LASER SURG: CPT

## 2022-12-23 PROCEDURE — 92012 INTRM OPH EXAM EST PATIENT: CPT | Mod: 25

## 2022-12-23 ASSESSMENT — TONOMETRY
OD_IOP_MMHG: 31
OS_IOP_MMHG: 14
OD_IOP_MMHG: 31

## 2022-12-23 ASSESSMENT — VISUAL ACUITY
OD_SC: 20/25
OS_SC: 20/25

## 2022-12-23 NOTE — PROGRESS NOTES
Corbin Joaquin - Approved  Patient has been approved for [employer] for the 6 month since patient do not qualify for MA because his is over income  At this time [facility] will be closing the case as the Goals have been satisfied

## 2022-12-28 ENCOUNTER — PATIENT OUTREACH (OUTPATIENT)
Dept: INTERNAL MEDICINE CLINIC | Facility: CLINIC | Age: 57
End: 2022-12-28

## 2022-12-28 NOTE — PROGRESS NOTES
[facility] has confirmed pt approved for [employer]  Pt has OP MH appointment scheduled and will f/u with SW if needed  Please re-consult SW if needed

## 2023-01-10 ENCOUNTER — TELEPHONE (OUTPATIENT)
Dept: INTERNAL MEDICINE CLINIC | Facility: CLINIC | Age: 58
End: 2023-01-10

## 2023-01-10 NOTE — TELEPHONE ENCOUNTER
Patient called stating that after he gets off his exercise bike his left shoulder has some pain/tingling  But then it goes away  This has been going on for a week  He did not want to call the Orthopedic doctor because he states its a nerve problem not a muscle problem  He wants to see us  I attempted to offer an appointment for 1/11  He is unable to take the appointment  I did advise him to call either tomorrow afternoon or Thursday for an appointment as we may have an appointment to offer him then  He is aware to go to ER if he develops chest pain or it gets worse  He was not experiencing any pain at this moment

## 2023-01-11 ENCOUNTER — FOLLOW UP (OUTPATIENT)
Dept: URBAN - METROPOLITAN AREA CLINIC 6 | Facility: CLINIC | Age: 58
End: 2023-01-11

## 2023-01-11 DIAGNOSIS — H40.1121: ICD-10-CM

## 2023-01-11 DIAGNOSIS — H40.1113: ICD-10-CM

## 2023-01-11 PROCEDURE — 92012 INTRM OPH EXAM EST PATIENT: CPT

## 2023-01-11 ASSESSMENT — TONOMETRY
OD_IOP_MMHG: 17
OD_IOP_MMHG: 18
OS_IOP_MMHG: 13
OS_IOP_MMHG: 13

## 2023-01-11 ASSESSMENT — VISUAL ACUITY
OS_SC: 20/25-1
OD_SC: 20/25-2

## 2023-01-11 NOTE — TELEPHONE ENCOUNTER
Patient called again  I spoke with Macario Campbell and Naseem Niño  I schedule the patient for an appointment on 1/12 with Dr Jaclyn Lopez  Patient had no further questions at this time

## 2023-01-16 ENCOUNTER — TELEPHONE (OUTPATIENT)
Dept: OBGYN CLINIC | Facility: HOSPITAL | Age: 58
End: 2023-01-16

## 2023-01-16 NOTE — TELEPHONE ENCOUNTER
Caller: Patient      Doctor/Office: MRI     Call regarding :  Lyft ride      Call was transferred to: Central scheduling

## 2023-01-25 ENCOUNTER — HOSPITAL ENCOUNTER (EMERGENCY)
Facility: HOSPITAL | Age: 58
Discharge: HOME | End: 2023-01-25
Attending: EMERGENCY MEDICINE

## 2023-01-25 ENCOUNTER — APPOINTMENT (EMERGENCY)
Dept: RADIOLOGY | Facility: HOSPITAL | Age: 58
End: 2023-01-25

## 2023-01-25 VITALS
DIASTOLIC BLOOD PRESSURE: 100 MMHG | HEART RATE: 72 BPM | OXYGEN SATURATION: 98 % | WEIGHT: 255 LBS | RESPIRATION RATE: 20 BRPM | SYSTOLIC BLOOD PRESSURE: 196 MMHG | BODY MASS INDEX: 41.16 KG/M2 | TEMPERATURE: 98.8 F

## 2023-01-25 DIAGNOSIS — M25.522 LEFT ELBOW PAIN: ICD-10-CM

## 2023-01-25 DIAGNOSIS — M25.561 RIGHT KNEE PAIN: Primary | ICD-10-CM

## 2023-01-25 RX ORDER — IBUPROFEN 600 MG/1
600 TABLET ORAL ONCE
Status: COMPLETED | OUTPATIENT
Start: 2023-01-25 | End: 2023-01-25

## 2023-01-25 RX ORDER — LIDOCAINE 50 MG/G
1 PATCH TOPICAL ONCE
Status: DISCONTINUED | OUTPATIENT
Start: 2023-01-25 | End: 2023-01-25 | Stop reason: HOSPADM

## 2023-01-25 RX ORDER — METHOCARBAMOL 500 MG/1
1000 TABLET, FILM COATED ORAL ONCE
Status: COMPLETED | OUTPATIENT
Start: 2023-01-25 | End: 2023-01-25

## 2023-01-25 RX ORDER — ACETAMINOPHEN 325 MG/1
975 TABLET ORAL ONCE
Status: COMPLETED | OUTPATIENT
Start: 2023-01-25 | End: 2023-01-25

## 2023-01-25 RX ADMIN — IBUPROFEN 600 MG: 600 TABLET, FILM COATED ORAL at 14:31

## 2023-01-25 RX ADMIN — LIDOCAINE 5% 1 PATCH: 700 PATCH TOPICAL at 16:10

## 2023-01-25 RX ADMIN — METHOCARBAMOL 1000 MG: 500 TABLET ORAL at 16:11

## 2023-01-25 RX ADMIN — ACETAMINOPHEN 975 MG: 325 TABLET ORAL at 14:30
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
## 2023-01-25 NOTE — DISCHARGE INSTRUCTIONS
Follow up with your family doctor  Take Tylenol and ibuprofen for your symptoms  Keep the ACE wrap and sling on until you see your family doctor       If you have any new or worsening symptoms, please return to your nearest ER

## 2023-01-26 ENCOUNTER — OFFICE VISIT (OUTPATIENT)
Dept: INTERNAL MEDICINE CLINIC | Facility: CLINIC | Age: 58
End: 2023-01-26

## 2023-01-26 ENCOUNTER — TELEPHONE (OUTPATIENT)
Dept: OBGYN CLINIC | Facility: HOSPITAL | Age: 58
End: 2023-01-26

## 2023-01-26 VITALS
WEIGHT: 253 LBS | HEART RATE: 77 BPM | DIASTOLIC BLOOD PRESSURE: 86 MMHG | SYSTOLIC BLOOD PRESSURE: 151 MMHG | TEMPERATURE: 97.8 F | BODY MASS INDEX: 40.66 KG/M2 | HEIGHT: 66 IN

## 2023-01-26 DIAGNOSIS — I10 ESSENTIAL HYPERTENSION: ICD-10-CM

## 2023-01-26 DIAGNOSIS — E66.01 OBESITY, MORBID (HCC): ICD-10-CM

## 2023-01-26 DIAGNOSIS — Z59.82 INABILITY TO ACQUIRE TRANSPORTATION: ICD-10-CM

## 2023-01-26 DIAGNOSIS — M25.522 LEFT ELBOW PAIN: ICD-10-CM

## 2023-01-26 DIAGNOSIS — M25.561 ACUTE PAIN OF RIGHT KNEE: ICD-10-CM
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
## 2023-01-26 DIAGNOSIS — E78.2 MIXED HYPERLIPIDEMIA: ICD-10-CM

## 2023-01-26 DIAGNOSIS — K59.00 CONSTIPATION, UNSPECIFIED CONSTIPATION TYPE: ICD-10-CM

## 2023-01-26 DIAGNOSIS — R73.03 PREDIABETES: ICD-10-CM
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
## 2023-01-26 RX ORDER — SENNOSIDES 8.6 MG
650 CAPSULE ORAL EVERY 8 HOURS PRN
Qty: 30 TABLET | Refills: 0 | Status: SHIPPED | OUTPATIENT
Start: 2023-01-26

## 2023-01-26 RX ORDER — AMLODIPINE BESYLATE 5 MG/1
5 TABLET ORAL DAILY
Qty: 90 TABLET | Refills: 1 | Status: SHIPPED | OUTPATIENT
Start: 2023-01-26

## 2023-01-26 SDOH — ECONOMIC STABILITY - TRANSPORTATION SECURITY: TRANSPORTATION INSECURITY: Z59.82

## 2023-01-26 NOTE — ASSESSMENT & PLAN NOTE
Patient presents for ER follow up s/p slip and fall yesterday (1/25/23)  Patient reports persistent left elbow and right knee pain since fall  Left elbow xray in ER showed questionable deformity at radial head; right knee x ray showed no fracture  On exam:  ~Left elbow: ROM intact on exam but is accompanied by increased pain  Sensation intact in LLE on exam, + 2 radial pulse noted and brisk capillary refill   ~Right knee: ROM intact on exam  No swelling or obvious deformity noted  Tenderness with palpation of distal medial aspect of left knee  Sensation intact in RLE  Antalgic gait noted  Recommend re-checking left elbow xray in 10-12 days per ER recommendation--patient would like to check sooner--order placed  Recommend Tylenol 650 mg every 8 hours as needed for pain, ice, rest, and immobilization of left elbow  Offered sling however patient declined stating he believes he has one at home

## 2023-01-26 NOTE — TELEPHONE ENCOUNTER
Caller: Patient     Doctor: ARAM    Reason for call: Asking where to go to get xray that was order by PCP   I transferred to Imaging at [facility] so he can ask about walk in hours and appts    Call back#: n/a
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
## 2023-01-26 NOTE — ASSESSMENT & PLAN NOTE
Lab Results   Component Value Date    HGBA1C 6 3 (H) 10/12/2022     Patient requesting to recheck A1C and other screening labs as he has made significant efforts to lower blood sugar through diet and exercise  Labs ordered

## 2023-01-26 NOTE — ASSESSMENT & PLAN NOTE
Patient requesting to recheck lipid panel as he has made significant efforts to lower cholesterol through diet and exercise  Lipid panel ordered

## 2023-01-27 ENCOUNTER — APPOINTMENT (OUTPATIENT)
Dept: LAB | Facility: CLINIC | Age: 58
End: 2023-01-27

## 2023-01-27 ENCOUNTER — HOSPITAL ENCOUNTER (OUTPATIENT)
Dept: RADIOLOGY | Facility: HOSPITAL | Age: 58
Discharge: HOME | End: 2023-01-27

## 2023-01-27 ENCOUNTER — TELEPHONE (OUTPATIENT)
Dept: OBGYN CLINIC | Facility: HOSPITAL | Age: 58
End: 2023-01-27

## 2023-01-27 ENCOUNTER — TELEPHONE (OUTPATIENT)
Dept: INTERNAL MEDICINE CLINIC | Facility: CLINIC | Age: 58
End: 2023-01-27

## 2023-01-27 DIAGNOSIS — M25.522 LEFT ELBOW PAIN: ICD-10-CM

## 2023-01-27 DIAGNOSIS — E78.2 MIXED HYPERLIPIDEMIA: ICD-10-CM

## 2023-01-27 DIAGNOSIS — I10 ESSENTIAL HYPERTENSION: ICD-10-CM

## 2023-01-27 DIAGNOSIS — R73.03 PREDIABETES: ICD-10-CM

## 2023-01-27 DIAGNOSIS — E66.01 OBESITY, MORBID (HCC): ICD-10-CM
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
## 2023-01-27 LAB
ALBUMIN SERPL BCP-MCNC: 3.8 G/DL (ref 3.5–5)
ALP SERPL-CCNC: 73 U/L (ref 46–116)
ALT SERPL W P-5'-P-CCNC: 47 U/L (ref 12–78)
ANION GAP SERPL CALCULATED.3IONS-SCNC: 5 MMOL/L (ref 4–13)
AST SERPL W P-5'-P-CCNC: 28 U/L (ref 5–45)
BASOPHILS # BLD AUTO: 0.02 THOUSANDS/ÂΜL (ref 0–0.1)
BASOPHILS NFR BLD AUTO: 0 % (ref 0–1)
BILIRUB SERPL-MCNC: 0.6 MG/DL (ref 0.2–1)
BUN SERPL-MCNC: 11 MG/DL (ref 5–25)
CALCIUM SERPL-MCNC: 9.2 MG/DL (ref 8.3–10.1)
CHLORIDE SERPL-SCNC: 105 MMOL/L (ref 96–108)
CHOLEST SERPL-MCNC: 98 MG/DL
CO2 SERPL-SCNC: 26 MMOL/L (ref 21–32)
CREAT SERPL-MCNC: 1 MG/DL (ref 0.6–1.3)
EOSINOPHIL # BLD AUTO: 0.06 THOUSAND/ÂΜL (ref 0–0.61)
EOSINOPHIL NFR BLD AUTO: 1 % (ref 0–6)
ERYTHROCYTE [DISTWIDTH] IN BLOOD BY AUTOMATED COUNT: 12.9 % (ref 11.6–15.1)
EST. AVERAGE GLUCOSE BLD GHB EST-MCNC: 126 MG/DL
GFR SERPL CREATININE-BSD FRML MDRD: 84 ML/MIN/1.73SQ M
GLUCOSE P FAST SERPL-MCNC: 82 MG/DL (ref 65–99)
HBA1C MFR BLD: 6 %
HCT VFR BLD AUTO: 40.4 % (ref 36.5–49.3)
HDLC SERPL-MCNC: 27 MG/DL
HGB BLD-MCNC: 13.9 G/DL (ref 12–17)
IMM GRANULOCYTES # BLD AUTO: 0.01 THOUSAND/UL (ref 0–0.2)
IMM GRANULOCYTES NFR BLD AUTO: 0 % (ref 0–2)
LDLC SERPL CALC-MCNC: 46 MG/DL (ref 0–100)
LYMPHOCYTES # BLD AUTO: 3.03 THOUSANDS/ÂΜL (ref 0.6–4.47)
LYMPHOCYTES NFR BLD AUTO: 54 % (ref 14–44)
MCH RBC QN AUTO: 31 PG (ref 26.8–34.3)
MCHC RBC AUTO-ENTMCNC: 34.4 G/DL (ref 31.4–37.4)
MCV RBC AUTO: 90 FL (ref 82–98)
MONOCYTES # BLD AUTO: 0.39 THOUSAND/ÂΜL (ref 0.17–1.22)
MONOCYTES NFR BLD AUTO: 7 % (ref 4–12)
NEUTROPHILS # BLD AUTO: 2.16 THOUSANDS/ÂΜL (ref 1.85–7.62)
NEUTS SEG NFR BLD AUTO: 38 % (ref 43–75)
NRBC BLD AUTO-RTO: 0 /100 WBCS
PLATELET # BLD AUTO: 139 THOUSANDS/UL (ref 149–390)
PMV BLD AUTO: 10.2 FL (ref 8.9–12.7)
POTASSIUM SERPL-SCNC: 4.2 MMOL/L (ref 3.5–5.3)
PROT SERPL-MCNC: 7.5 G/DL (ref 6.4–8.4)
RBC # BLD AUTO: 4.48 MILLION/UL (ref 3.88–5.62)
SODIUM SERPL-SCNC: 136 MMOL/L (ref 135–147)
TRIGL SERPL-MCNC: 127 MG/DL
TSH SERPL DL<=0.05 MIU/L-ACNC: 3.63 UIU/ML (ref 0.45–4.5)
WBC # BLD AUTO: 5.67 THOUSAND/UL (ref 4.31–10.16)

## 2023-01-27 NOTE — TELEPHONE ENCOUNTER
Patient had an appt yesterday 1/27/23 with Salas Brewer  Patient calling regarding orders placed for XR during visit  Patient called to schedule Xrays and they stated that only XR elbow left was in the system  Patient states that it was discussed to have XR right knee as well  Patient would like clarification

## 2023-01-27 NOTE — TELEPHONE ENCOUNTER
Patient called to state that he's having left knee pain due to fall on 1/25/23 due to a fall on the snow     Patient is requesting an xray of the left knee to be ordered  He's on his way to the hospital    Please check into this and prepare the script so that the patient can have the xray of his left knee done      thanks

## 2023-01-27 NOTE — TELEPHONE ENCOUNTER
TING blanco [phone]  is scheduled for 1/31/23 Dr Nilton Rodriguez @ 8 am     Patient is aware of 7:15 am pickup  Address and phone are confirmed

## 2023-01-27 NOTE — TELEPHONE ENCOUNTER
Repeat x ray of left elbow was recommended due to questionable fracture on x ray in ER on 1/25/22  The right knee xray from ER showed no acute fractures or questionable findings so I do not think repeating this is necessary at this time  As discussed at visit, I think his right knee pain is related to tissue trauma from fall and possibly underlying orthopedic conditions  Please also let patient know that I reviewed the repeat left elbow xray that was done today and everything looked good  There are no signs of fracture or dislocation  I would recommend Tylenol 650 mg every 8 hours as needed for pain, ice, and rest which should help with both elbow and knee pain  Thank you

## 2023-01-30 ENCOUNTER — TELEPHONE (OUTPATIENT)
Dept: NEUROSURGERY | Facility: CLINIC | Age: 58
End: 2023-01-30

## 2023-01-30 ENCOUNTER — HOSPITAL ENCOUNTER (OUTPATIENT)
Dept: RADIOLOGY | Facility: HOSPITAL | Age: 58
Discharge: HOME | End: 2023-01-30

## 2023-01-30 DIAGNOSIS — M54.16 LUMBAR RADICULOPATHY: ICD-10-CM

## 2023-01-30 NOTE — TELEPHONE ENCOUNTER
Received call from [address] asking about LYFT service for pt REGARDING mri today - discusSed with SG no LYFT ARRANGED BY US - BA

## 2023-01-31 ENCOUNTER — TELEPHONE (OUTPATIENT)
Dept: OBGYN CLINIC | Facility: HOSPITAL | Age: 58
End: 2023-01-31

## 2023-01-31 ENCOUNTER — OFFICE VISIT (OUTPATIENT)
Dept: OBGYN CLINIC | Facility: HOSPITAL | Age: 58
End: 2023-01-31

## 2023-01-31 VITALS
HEIGHT: 66 IN | SYSTOLIC BLOOD PRESSURE: 137 MMHG | BODY MASS INDEX: 39.92 KG/M2 | DIASTOLIC BLOOD PRESSURE: 84 MMHG | WEIGHT: 248.4 LBS | HEART RATE: 80 BPM

## 2023-01-31 DIAGNOSIS — M46.1 SACROILIITIS (HCC): ICD-10-CM

## 2023-01-31 DIAGNOSIS — S83.412A COMPLETE TEAR OF MCL OF KNEE, LEFT, INITIAL ENCOUNTER: Primary | ICD-10-CM

## 2023-01-31 NOTE — TELEPHONE ENCOUNTER
"Fax received from [facility] regarding Pt  Pt location at SNF: [facility]  Fax sent by: Esequiel Lilly LPN    Fax regarding concern: Skin issue    Assessment:   Reports Pt c/o itching to right posterior forearm. Rash is president to area - red, non raised, some scabbing  Pt stated ""when it was itchy the other day it bled\""  No warmth to area noted  No rash in other areas noted  PRN ammonia lactate cream 12% applied ti arm  Will continue to monitor and apply PRN cream     Any recommendations or new orders?       " Caller: Patient    Doctor: Jeferson Serrano    Reason for call: Patient called stating he needs a lyft transport for his MRI appt on 2/7, Transferred patient to central scheduling     Call back#: N/A

## 2023-01-31 NOTE — PROGRESS NOTES
Assessment/Plan:    Complete tear of MCL of knee, left, initial encounter  He clearly has a medial collateral ligament injury of the left knee and probably an ACL tear as well  Were going to get an MRI of his knee and we will see him back after that  Diagnoses and all orders for this visit:    Complete tear of MCL of knee, left, initial encounter  -     MRI knee left  wo contrast; Future    Sacroiliitis (HCC)          Subjective:      Patient ID: Marshal Mcnair is a 56 y o  male  This is a 70-year-old fellow who has many different medical issues  He slipped on the snow in his right yard on 25 January 2023 sustaining an injury to his left knee  He actually had pain in both knees and went to the emergency room and they got x-rays of his right knee  Main issue is having now is actually his left knee  He states that he has a lot of pain in the medial side of his knee  This been bothering her a fair amount  He is had some swelling  He has difficulty ambulating  The following portions of the patient's history were reviewed and updated as appropriate: allergies, current medications, past family history, past medical history, past social history, past surgical history, and problem list     Review of Systems      Objective:      /84   Pulse 80   Ht 5' 6" (1 676 m)   Wt 113 kg (248 lb 6 4 oz)   BMI 40 09 kg/m²          Physical Exam      On physical examination he is very tender over the tibial insertion of the medial collateral ligament on the left knee and has a lot of pain with valgus testing  He has some joint line tenderness along the medial side as well  He has a positive Lachman's and a positive anterior drawer 2+  He has good sensation and capillary refill distally  Is a mild effusion  No sign of other problems

## 2023-01-31 NOTE — TELEPHONE ENCOUNTER
Caller: Patient    Doctor: Dr Camille Gleason    Reason for call: Patient called asking to speak to [facility] Scheduling  Provided number and warm transfer to [facility] [employer]        Call back#: [phone]

## 2023-01-31 NOTE — ASSESSMENT & PLAN NOTE
He clearly has a medial collateral ligament injury of the left knee and probably an ACL tear as well  Were going to get an MRI of his knee and we will see him back after that

## 2023-02-01 ENCOUNTER — TELEPHONE (OUTPATIENT)
Dept: NEUROSURGERY | Facility: CLINIC | Age: 58
End: 2023-02-01

## 2023-02-01 ENCOUNTER — OFFICE VISIT (OUTPATIENT)
Dept: CARDIOLOGY CLINIC | Facility: CLINIC | Age: 58
End: 2023-02-01

## 2023-02-01 VITALS
HEART RATE: 79 BPM | WEIGHT: 255 LBS | HEIGHT: 66 IN | DIASTOLIC BLOOD PRESSURE: 86 MMHG | SYSTOLIC BLOOD PRESSURE: 148 MMHG | BODY MASS INDEX: 40.98 KG/M2 | OXYGEN SATURATION: 98 %

## 2023-02-01 DIAGNOSIS — I10 ESSENTIAL HYPERTENSION: ICD-10-CM

## 2023-02-01 DIAGNOSIS — E78.2 MIXED HYPERLIPIDEMIA: ICD-10-CM

## 2023-02-01 DIAGNOSIS — R93.1 ABNORMAL ECHOCARDIOGRAM: ICD-10-CM

## 2023-02-01 DIAGNOSIS — R94.31 ABNORMAL ELECTROCARDIOGRAM (ECG) (EKG): ICD-10-CM

## 2023-02-01 DIAGNOSIS — R06.02 SOB (SHORTNESS OF BREATH): Primary | ICD-10-CM
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
## 2023-02-02 ENCOUNTER — OFFICE VISIT (OUTPATIENT)
Dept: PULMONOLOGY | Facility: CLINIC | Age: 58
End: 2023-02-02

## 2023-02-02 VITALS
OXYGEN SATURATION: 98 % | HEART RATE: 83 BPM | DIASTOLIC BLOOD PRESSURE: 72 MMHG | BODY MASS INDEX: 40.45 KG/M2 | SYSTOLIC BLOOD PRESSURE: 138 MMHG | WEIGHT: 251.7 LBS | HEIGHT: 66 IN

## 2023-02-02 DIAGNOSIS — G25.81 RESTLESS LEG SYNDROME: ICD-10-CM

## 2023-02-02 DIAGNOSIS — G47.33 OSA (OBSTRUCTIVE SLEEP APNEA): Primary | ICD-10-CM
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
## 2023-02-02 NOTE — ASSESSMENT & PLAN NOTE
Currently on Requip nightly  We did not have a chance to discuss his restless leg syndrome today, given his significant psychosocial stressors  If he does obstructive sleep apnea recommend treatment of obstructive sleep apnea first before re-evaluation of his restless leg syndrome

## 2023-02-02 NOTE — ASSESSMENT & PLAN NOTE
Patient is on amlodipine 5 mg/day  Blood pressure was 138/72 in the office today  Control of sleep apnea will be important towards the control of hypertension

## 2023-02-02 NOTE — PATIENT INSTRUCTIONS
1   Please schedule the sleep study as we discussed  2  After the test is read (typically within 1 week) and you are diagnosed with sleep apnea, I will order a PAP machine and/or supplies if indicated by the results  If the test is inconclusive, my office will be in touch to determine the next step (sooner follow-up, further testing etc)  3  I would then want to see you for a followup visit about 1-2 months after you receive your machine at home to see how you are doing with the machine  Please bring your machine to the first visit  4  If you have any difficulties using the machine, please contact the medical supply company for machine related issues (mask fit, leakage, question about settings) or contact me if you have side effects, concern about the air pressure, or symptoms of concern

## 2023-02-02 NOTE — ASSESSMENT & PLAN NOTE
Had a sleep study in [place] 12 years ago, did not want to follow through with the CPAP study at that time  At that time he had significant substance abuse issues  Currently he is in remission for substance abuse and alcohol, has a sponsor  He is more motivated to become healthy and improve his lifestyle modifications  He was under a lot of stress due to psychosocial stressors today  We discussed that due to the high probability of sleep apnea due to elevated BMI, crowded airway on exam, increased neck circumference, gasping/choking during sleep, witnessed apneas, he should undergo a home sleep study to evaluate for the degree of obstructive sleep apnea  After the home sleep study I recommend follow-up with us in the office to discuss treatment options as he was under too much stress to discuss treatment options today

## 2023-02-03 NOTE — TELEPHONE ENCOUNTER
At last OV, patient had reported intermittent tingling/shock-like sensation at 2 separate spots in left upper arm and one spot in lower left arm however stated it only occurs when he exercise  He had also reported a history of roughly 22 orthopedic surgeries in the past including on his left shoulder and knees--we had discussed these symptoms could be related to prior surgeries and/or recent trauma to right knee (had recent fall)  I had recommended follow up with Ortho regarding these concerns--I do see that he saw them this week but unclear if these concerns were discussed  I see that he is scheduled with Neurosurgery on 2/22/23 and they may be best to further evaluate this

## 2023-02-03 NOTE — TELEPHONE ENCOUNTER
Called patient, patient made aware as per note  An had no questions in regards to XR  Patient did state he had mentioned at his last appt 1/26/2023 that he has been experiencing tingling sensation in the left arm that goes down to his hand and fingers and tingling sensation on right leg  Patient stated he noticed his symptoms started ever since he he was told he was in the pre diabetic range and started exercising  Patient is upset because he mentioned this at last visit, and was not told what he can do about this  Patient denied any other symptoms such as chest pain, blurry vision, or shortness of breath  Patient would like to be referred to a neurologist as he feels it might be related to the nerves  Please review and advise

## 2023-02-06 NOTE — TELEPHONE ENCOUNTER
Patient made aware and said he did mention it to Ortho but they didn't say anything  He said he will be going to the Neuro appt and will be sure to discuss it with them

## 2023-02-07 ENCOUNTER — HOSPITAL ENCOUNTER (OUTPATIENT)
Dept: RADIOLOGY | Facility: HOSPITAL | Age: 58
Discharge: HOME | End: 2023-02-07
Attending: ORTHOPAEDIC SURGERY

## 2023-02-07 ENCOUNTER — TELEPHONE (OUTPATIENT)
Dept: OBGYN CLINIC | Facility: HOSPITAL | Age: 58
End: 2023-02-07

## 2023-02-07 DIAGNOSIS — S83.412A COMPLETE TEAR OF MCL OF KNEE, LEFT, INITIAL ENCOUNTER: ICD-10-CM

## 2023-02-08 ENCOUNTER — TELEPHONE (OUTPATIENT)
Dept: OBGYN CLINIC | Facility: HOSPITAL | Age: 58
End: 2023-02-08

## 2023-02-08 NOTE — TELEPHONE ENCOUNTER
Contreras Emery is scheduled for 2/14 Dr Gloria Sutherland 7:45 am appt  Patient is aware of 7 am  time  Patient's address and phone confirmed

## 2023-02-12 DIAGNOSIS — E78.2 MIXED HYPERLIPIDEMIA: ICD-10-CM

## 2023-02-12 DIAGNOSIS — E66.01 OBESITY, MORBID (HCC): ICD-10-CM

## 2023-02-13 RX ORDER — ASPIRIN 81 MG/1
TABLET, COATED ORAL
Qty: 90 TABLET | Refills: 1 | Status: SHIPPED | OUTPATIENT
Start: 2023-02-13

## 2023-02-14 ENCOUNTER — OFFICE VISIT (OUTPATIENT)
Dept: OBGYN CLINIC | Facility: HOSPITAL | Age: 58
End: 2023-02-14

## 2023-02-14 VITALS
DIASTOLIC BLOOD PRESSURE: 76 MMHG | BODY MASS INDEX: 39.79 KG/M2 | WEIGHT: 247.6 LBS | OXYGEN SATURATION: 98 % | SYSTOLIC BLOOD PRESSURE: 123 MMHG | HEIGHT: 66 IN | HEART RATE: 81 BPM

## 2023-02-14 DIAGNOSIS — M25.561 ACUTE PAIN OF RIGHT KNEE: ICD-10-CM

## 2023-02-14 DIAGNOSIS — M25.562 CHRONIC PAIN OF LEFT KNEE: ICD-10-CM

## 2023-02-14 DIAGNOSIS — M23.91 INTERNAL DERANGEMENT OF MULTIPLE SITES OF RIGHT KNEE: ICD-10-CM

## 2023-02-14 DIAGNOSIS — M17.12 PRIMARY OSTEOARTHRITIS OF LEFT KNEE: Primary | ICD-10-CM

## 2023-02-14 DIAGNOSIS — G89.29 CHRONIC PAIN OF LEFT KNEE: ICD-10-CM
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
## 2023-02-16 ENCOUNTER — PATIENT OUTREACH (OUTPATIENT)
Dept: INTERNAL MEDICINE CLINIC | Facility: CLINIC | Age: 58
End: 2023-02-16

## 2023-02-16 ENCOUNTER — OFFICE VISIT (OUTPATIENT)
Dept: INTERNAL MEDICINE CLINIC | Facility: CLINIC | Age: 58
End: 2023-02-16

## 2023-02-16 VITALS
HEART RATE: 103 BPM | HEIGHT: 67 IN | DIASTOLIC BLOOD PRESSURE: 51 MMHG | SYSTOLIC BLOOD PRESSURE: 92 MMHG | BODY MASS INDEX: 39.55 KG/M2 | WEIGHT: 252 LBS | TEMPERATURE: 98.2 F

## 2023-02-16 DIAGNOSIS — M25.562 PAIN IN BOTH KNEES, UNSPECIFIED CHRONICITY: ICD-10-CM

## 2023-02-16 DIAGNOSIS — I10 ESSENTIAL HYPERTENSION: ICD-10-CM

## 2023-02-16 DIAGNOSIS — R20.2 ARM PARESTHESIA, LEFT: Primary | ICD-10-CM

## 2023-02-16 DIAGNOSIS — M25.561 PAIN IN BOTH KNEES, UNSPECIFIED CHRONICITY: ICD-10-CM
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
## 2023-02-17 ENCOUNTER — HOSPITAL ENCOUNTER (OUTPATIENT)
Dept: NON INVASIVE DIAGNOSTICS | Facility: CLINIC | Age: 58
Discharge: HOME | End: 2023-02-17

## 2023-02-17 ENCOUNTER — TELEPHONE (OUTPATIENT)
Dept: CARDIOLOGY CLINIC | Facility: CLINIC | Age: 58
End: 2023-02-17

## 2023-02-17 ENCOUNTER — TELEPHONE (OUTPATIENT)
Dept: INTERNAL MEDICINE CLINIC | Facility: CLINIC | Age: 58
End: 2023-02-17

## 2023-02-17 VITALS
SYSTOLIC BLOOD PRESSURE: 92 MMHG | HEIGHT: 66 IN | WEIGHT: 252 LBS | DIASTOLIC BLOOD PRESSURE: 51 MMHG | HEART RATE: 103 BPM | BODY MASS INDEX: 40.5 KG/M2

## 2023-02-17 VITALS
DIASTOLIC BLOOD PRESSURE: 82 MMHG | OXYGEN SATURATION: 98 % | BODY MASS INDEX: 39.7 KG/M2 | SYSTOLIC BLOOD PRESSURE: 128 MMHG | HEART RATE: 68 BPM | WEIGHT: 247 LBS | HEIGHT: 66 IN

## 2023-02-17 DIAGNOSIS — E78.2 MIXED HYPERLIPIDEMIA: ICD-10-CM

## 2023-02-17 DIAGNOSIS — R93.1 ABNORMAL ECHOCARDIOGRAM: ICD-10-CM

## 2023-02-17 DIAGNOSIS — R06.02 SOB (SHORTNESS OF BREATH): Primary | ICD-10-CM

## 2023-02-17 DIAGNOSIS — R94.31 ABNORMAL ELECTROCARDIOGRAM (ECG) (EKG): ICD-10-CM

## 2023-02-17 DIAGNOSIS — I10 ESSENTIAL HYPERTENSION: ICD-10-CM

## 2023-02-17 LAB
AORTIC ROOT: 3.4 CM
APICAL FOUR CHAMBER EJECTION FRACTION: 67 %
ASCENDING AORTA: 3.9 CM
CHEST PAIN STATEMENT: NORMAL
E WAVE DECELERATION TIME: 245 MS
FRACTIONAL SHORTENING: 41 % (ref 28–44)
INTERVENTRICULAR SEPTUM IN DIASTOLE (PARASTERNAL SHORT AXIS VIEW): 1.7 CM
INTERVENTRICULAR SEPTUM: 1.7 CM (ref 0.6–1.1)
LAAS-AP2: 15 CM2
LAAS-AP4: 17.9 CM2
LEFT ATRIUM SIZE: 4 CM
LEFT INTERNAL DIMENSION IN SYSTOLE: 2.3 CM (ref 2.1–4)
LEFT VENTRICULAR INTERNAL DIMENSION IN DIASTOLE: 3.9 CM (ref 3.5–6)
LEFT VENTRICULAR POSTERIOR WALL IN END DIASTOLE: 1.7 CM
LEFT VENTRICULAR STROKE VOLUME: 48 ML
LVSV (TEICH): 48 ML
MAX DIASTOLIC BP: 82 MMHG
MAX HEART RATE: 122 BPM
MAX HR PERCENT: 73 %
MAX HR: 122 BPM
MAX PREDICTED HEART RATE: 165 BPM
MAX. SYSTOLIC BP: 138 MMHG
MV E'TISSUE VEL-SEP: 12 CM/S
MV PEAK A VEL: 0.7 M/S
MV PEAK E VEL: 91 CM/S
MV STENOSIS PRESSURE HALF TIME: 71 MS
MV VALVE AREA P 1/2 METHOD: 3.1 CM2
PROTOCOL NAME: NORMAL
RATE PRESSURE PRODUCT: NORMAL
REASON FOR TERMINATION: NORMAL
RIGHT ATRIUM AREA SYSTOLE A4C: 15.5 CM2
RIGHT VENTRICLE ID DIMENSION: 3.5 CM
SL CV LEFT ATRIUM LENGTH A2C: 4.9 CM
SL CV LV EF: 60
SL CV PED ECHO LEFT VENTRICLE DIASTOLIC VOLUME (MOD BIPLANE) 2D: 65 ML
SL CV PED ECHO LEFT VENTRICLE SYSTOLIC VOLUME (MOD BIPLANE) 2D: 17 ML
SL CV STRESS RECOVERY BP: NORMAL MMHG
SL CV STRESS RECOVERY HR: 86 BPM
SL CV STRESS RECOVERY O2 SAT: 98 %
SL CV STRESS STAGE REACHED: 2
STRESS ANGINA INDEX: 0
STRESS BASELINE BP: NORMAL MMHG
STRESS BASELINE HR: 68 BPM
STRESS O2 SAT REST: 98 %
STRESS PEAK HR: 140 BPM
STRESS POST ESTIMATED WORKLOAD: 7 METS
STRESS POST EXERCISE DUR MIN: 6 MIN
STRESS POST EXERCISE DUR SEC: 0 SEC
STRESS POST O2 SAT PEAK: 98 %
STRESS POST PEAK BP: 148 MMHG
TARGET HR FORMULA: NORMAL
TEST INDICATION: NORMAL
TIME IN EXERCISE PHASE: NORMAL
TRICUSPID ANNULAR PLANE SYSTOLIC EXCURSION: 2 CM

## 2023-02-17 NOTE — TELEPHONE ENCOUNTER
Release of Info sent to [facility] for [address] [employer]  Received confirmation and scanned into pt 's chart

## 2023-02-17 NOTE — TELEPHONE ENCOUNTER
I discussed results with patient who stated he now has anxiety and very concerned  He couldn't understand why he did not haer from the Dr    I explained no need to be concerned at this time and stated  Dr Rizwan Flores is trying to get a better picture of his heart during exercise  I transferred to , but he might call back to speak with you

## 2023-02-17 NOTE — TELEPHONE ENCOUNTER
----- Message from Bridgette Grayson MD sent at 2/17/2023 10:26 AM EST -----  Please call the patient regarding his abnormal result  Please order exercise stress test nuclear

## 2023-02-18 NOTE — PROGRESS NOTES
SW received update from Clinical Team that pt denied need of additional SW services at this time  A dublicate referral had been received  Please re-consult SW if needed

## 2023-02-19 ENCOUNTER — HOSPITAL ENCOUNTER (EMERGENCY)
Facility: HOSPITAL | Age: 58
Discharge: HOME | End: 2023-02-20
Attending: EMERGENCY MEDICINE | Admitting: EMERGENCY MEDICINE

## 2023-02-19 DIAGNOSIS — R20.2 PARESTHESIA OF UPPER EXTREMITY: Primary | ICD-10-CM

## 2023-02-20 ENCOUNTER — APPOINTMENT (EMERGENCY)
Dept: RADIOLOGY | Facility: HOSPITAL | Age: 58
End: 2023-02-20

## 2023-02-20 VITALS
DIASTOLIC BLOOD PRESSURE: 115 MMHG | OXYGEN SATURATION: 98 % | RESPIRATION RATE: 22 BRPM | TEMPERATURE: 97.6 F | SYSTOLIC BLOOD PRESSURE: 165 MMHG | HEART RATE: 97 BPM

## 2023-02-20 LAB
CHEST PAIN STATEMENT: NORMAL
MAX DIASTOLIC BP: 82 MMHG
MAX HEART RATE: 122 BPM
MAX PREDICTED HEART RATE: 165 BPM
MAX. SYSTOLIC BP: 138 MMHG
PROTOCOL NAME: NORMAL
REASON FOR TERMINATION: NORMAL
TARGET HR FORMULA: NORMAL
TEST INDICATION: NORMAL
TIME IN EXERCISE PHASE: NORMAL

## 2023-02-20 RX ORDER — LIDOCAINE 50 MG/G
1 PATCH TOPICAL ONCE
Status: DISCONTINUED | OUTPATIENT
Start: 2023-02-20 | End: 2023-02-20 | Stop reason: HOSPADM

## 2023-02-20 RX ORDER — ACETAMINOPHEN 325 MG/1
975 TABLET ORAL ONCE
Status: COMPLETED | OUTPATIENT
Start: 2023-02-20 | End: 2023-02-20

## 2023-02-20 RX ORDER — KETOROLAC TROMETHAMINE 30 MG/ML
15 INJECTION, SOLUTION INTRAMUSCULAR; INTRAVENOUS ONCE
Status: COMPLETED | OUTPATIENT
Start: 2023-02-20 | End: 2023-02-20

## 2023-02-20 RX ADMIN — LIDOCAINE 5% 1 PATCH: 700 PATCH TOPICAL at 01:20

## 2023-02-20 RX ADMIN — KETOROLAC TROMETHAMINE 15 MG: 30 INJECTION, SOLUTION INTRAMUSCULAR; INTRAVENOUS at 01:20

## 2023-02-20 RX ADMIN — DICLOFENAC SODIUM TOPICAL GEL, 1%, 2 G: 10 GEL TOPICAL at 02:31

## 2023-02-20 RX ADMIN — ACETAMINOPHEN 975 MG: 325 TABLET ORAL at 01:20
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
## 2023-02-20 NOTE — ED NOTES
[facility] called for patient at this time     Talon Roque, [facility]  02/20/23 7877

## 2023-02-20 NOTE — PROGRESS NOTES
Pastoral Care Progress Note    2023  Patient: Santos Ma : 1965  Admission Date & Time: 2023 2357  MRN: [id number] [facility]: [id number]                        23 0100   Clinical Encounter Type   Visited With Patient   Routine Visit Introduction   Crisis Visit   (Pt engaged with  during ED rounds)   Referral From Patient   Referral To [address] encounter during rounding in ED   listened empathetically to pt spiritual concerns   celebrated pt's stated sobriety and encouraged continual self-care   directed pt to prayer during spiritual struggles  Pt expressed gratitude  No follow up needed as presently expressed
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
## 2023-02-22 ENCOUNTER — TELEPHONE (OUTPATIENT)
Dept: OTHER | Facility: OTHER | Age: 58
End: 2023-02-22

## 2023-02-22 NOTE — TELEPHONE ENCOUNTER
I called pt to reschedule todays apt he last no showed on 2/1/23 and today apt was that reschedule he only wanted afternoon apt only he states he takes medication at night and it doesn't kick in until am - BA

## 2023-02-22 NOTE — TELEPHONE ENCOUNTER
Patient is calling regarding cancelling an appointment      Date/Time:2/22/23 @ 8:45am    Patient was rescheduled: YES [] NO [x]    Patient requesting call back to reschedule: YES [x] NO []

## 2023-02-23 ENCOUNTER — HOSPITAL ENCOUNTER (EMERGENCY)
Facility: HOSPITAL | Age: 58
Discharge: HOME | End: 2023-02-23
Attending: EMERGENCY MEDICINE

## 2023-02-23 VITALS
HEART RATE: 98 BPM | DIASTOLIC BLOOD PRESSURE: 78 MMHG | SYSTOLIC BLOOD PRESSURE: 154 MMHG | TEMPERATURE: 98.9 F | OXYGEN SATURATION: 98 % | RESPIRATION RATE: 20 BRPM

## 2023-02-23 DIAGNOSIS — G25.81 RESTLESS LEGS SYNDROME (RLS): Primary | ICD-10-CM

## 2023-02-23 DIAGNOSIS — G25.81 RESTLESS LEGS: ICD-10-CM

## 2023-02-23 RX ORDER — DIAZEPAM 5 MG/1
10 TABLET ORAL ONCE
Status: COMPLETED | OUTPATIENT
Start: 2023-02-23 | End: 2023-02-23

## 2023-02-23 RX ADMIN — DIAZEPAM 10 MG: 5 TABLET ORAL at 02:50
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
## 2023-03-01 ENCOUNTER — HOSPITAL ENCOUNTER (EMERGENCY)
Facility: HOSPITAL | Age: 58
Discharge: HOME | End: 2023-03-02
Attending: EMERGENCY MEDICINE

## 2023-03-01 ENCOUNTER — APPOINTMENT (EMERGENCY)
Dept: RADIOLOGY | Facility: HOSPITAL | Age: 58
End: 2023-03-01

## 2023-03-01 ENCOUNTER — TELEMEDICINE (OUTPATIENT)
Dept: INTERNAL MEDICINE CLINIC | Facility: CLINIC | Age: 58
End: 2023-03-01

## 2023-03-01 DIAGNOSIS — R06.02 SOB (SHORTNESS OF BREATH): Primary | ICD-10-CM

## 2023-03-01 DIAGNOSIS — J02.9 SORE THROAT: Primary | ICD-10-CM

## 2023-03-01 LAB
ANION GAP SERPL CALCULATED.3IONS-SCNC: 1 MMOL/L (ref 4–13)
BASOPHILS # BLD AUTO: 0.01 THOUSANDS/ÂΜL (ref 0–0.1)
BASOPHILS NFR BLD AUTO: 0 % (ref 0–1)
BUN SERPL-MCNC: 9 MG/DL (ref 5–25)
CALCIUM SERPL-MCNC: 8.8 MG/DL (ref 8.3–10.1)
CARDIAC TROPONIN I PNL SERPL HS: 7 NG/L
CHLORIDE SERPL-SCNC: 101 MMOL/L (ref 96–108)
CO2 SERPL-SCNC: 25 MMOL/L (ref 21–32)
CREAT SERPL-MCNC: 0.91 MG/DL (ref 0.6–1.3)
EOSINOPHIL # BLD AUTO: 0.06 THOUSAND/ÂΜL (ref 0–0.61)
EOSINOPHIL NFR BLD AUTO: 2 % (ref 0–6)
ERYTHROCYTE [DISTWIDTH] IN BLOOD BY AUTOMATED COUNT: 12.8 % (ref 11.6–15.1)
FLUAV RNA RESP QL NAA+PROBE: NEGATIVE
FLUBV RNA RESP QL NAA+PROBE: NEGATIVE
GFR SERPL CREATININE-BSD FRML MDRD: 94 ML/MIN/1.73SQ M
GLUCOSE SERPL-MCNC: 124 MG/DL (ref 65–140)
HCT VFR BLD AUTO: 43.6 % (ref 36.5–49.3)
HGB BLD-MCNC: 14.5 G/DL (ref 12–17)
IMM GRANULOCYTES # BLD AUTO: 0.02 THOUSAND/UL (ref 0–0.2)
IMM GRANULOCYTES NFR BLD AUTO: 1 % (ref 0–2)
LYMPHOCYTES # BLD AUTO: 1.25 THOUSANDS/ÂΜL (ref 0.6–4.47)
LYMPHOCYTES NFR BLD AUTO: 30 % (ref 14–44)
MCH RBC QN AUTO: 31.3 PG (ref 26.8–34.3)
MCHC RBC AUTO-ENTMCNC: 33.3 G/DL (ref 31.4–37.4)
MCV RBC AUTO: 94 FL (ref 82–98)
MONOCYTES # BLD AUTO: 0.3 THOUSAND/ÂΜL (ref 0.17–1.22)
MONOCYTES NFR BLD AUTO: 7 % (ref 4–12)
NEUTROPHILS # BLD AUTO: 2.48 THOUSANDS/ÂΜL (ref 1.85–7.62)
NEUTS SEG NFR BLD AUTO: 60 % (ref 43–75)
NRBC BLD AUTO-RTO: 0 /100 WBCS
PLATELET # BLD AUTO: 118 THOUSANDS/UL (ref 149–390)
PMV BLD AUTO: 11.1 FL (ref 8.9–12.7)
POTASSIUM SERPL-SCNC: 4.6 MMOL/L (ref 3.5–5.3)
RBC # BLD AUTO: 4.64 MILLION/UL (ref 3.88–5.62)
RSV RNA RESP QL NAA+PROBE: NEGATIVE
SARS-COV-2 RNA RESP QL NAA+PROBE: NEGATIVE
SODIUM SERPL-SCNC: 127 MMOL/L (ref 135–147)
WBC # BLD AUTO: 4.12 THOUSAND/UL (ref 4.31–10.16)

## 2023-03-01 RX ORDER — IBUPROFEN 600 MG/1
600 TABLET ORAL ONCE
Status: COMPLETED | OUTPATIENT
Start: 2023-03-01 | End: 2023-03-01

## 2023-03-01 RX ORDER — SODIUM CHLORIDE 9 MG/ML
3 INJECTION INTRAVENOUS
Status: DISCONTINUED | OUTPATIENT
Start: 2023-03-01 | End: 2023-03-02 | Stop reason: HOSPADM

## 2023-03-01 RX ORDER — LIDOCAINE HYDROCHLORIDE 20 MG/ML
15 SOLUTION OROPHARYNGEAL ONCE
Status: DISCONTINUED | OUTPATIENT
Start: 2023-03-01 | End: 2023-03-01

## 2023-03-01 RX ORDER — LIDOCAINE HYDROCHLORIDE 20 MG/ML
15 SOLUTION OROPHARYNGEAL ONCE
Status: COMPLETED | OUTPATIENT
Start: 2023-03-01 | End: 2023-03-01

## 2023-03-01 RX ORDER — ALBUTEROL SULFATE 2.5 MG/3ML
2.5 SOLUTION RESPIRATORY (INHALATION) ONCE
Status: COMPLETED | OUTPATIENT
Start: 2023-03-01 | End: 2023-03-01

## 2023-03-01 RX ORDER — ACETAMINOPHEN 325 MG/1
975 TABLET ORAL ONCE
Status: COMPLETED | OUTPATIENT
Start: 2023-03-01 | End: 2023-03-01

## 2023-03-01 RX ORDER — MAGNESIUM HYDROXIDE/ALUMINUM HYDROXICE/SIMETHICONE 120; 1200; 1200 MG/30ML; MG/30ML; MG/30ML
30 SUSPENSION ORAL ONCE
Status: COMPLETED | OUTPATIENT
Start: 2023-03-01 | End: 2023-03-01

## 2023-03-01 RX ADMIN — LIDOCAINE HYDROCHLORIDE 15 ML: 20 SOLUTION ORAL; TOPICAL at 22:12

## 2023-03-01 RX ADMIN — IBUPROFEN 600 MG: 600 TABLET ORAL at 22:55

## 2023-03-01 RX ADMIN — ALBUTEROL SULFATE 2.5 MG: 2.5 SOLUTION RESPIRATORY (INHALATION) at 21:41

## 2023-03-01 RX ADMIN — ALUMINUM HYDROXIDE, MAGNESIUM HYDROXIDE, AND SIMETHICONE 30 ML: 200; 200; 20 SUSPENSION ORAL at 22:12

## 2023-03-01 RX ADMIN — ACETAMINOPHEN 975 MG: 325 TABLET ORAL at 22:54

## 2023-03-01 NOTE — PROGRESS NOTES
The medical assistant called the patient to start the virtual visit, according to the medical assistant, the patient does not want to answer any question and is insisting that he has tonsillitis  By asking him about his symptoms he did have tonsillitis I know my body, I need antibiotic  I spoke to the patient over the phone who mentioned that he has tonsillitis and cough and he wants amoxicillin or Z-Carols to be sent to his pharmacy  Tried to discuss with the patient that sore throat and cough can be viral in which antibiotic will not have a role and it might lead to more complications and resistance  Try to tell the patient that he needs to come to be examined and to see if he needs an antibiotic or not but otherwise we will not be able to send him an antibiotic without examination  Patient was angry with the plan and started saying if you would not prescribe pain antibiotic, do not waste my time  I asked him if he can come to the clinic, he said that he does not have a car and he will not come to the clinic  I repeated to the patient that he needs to be examined to differentiate between if it is viral versus bacterial infection  Patient hang up the phone

## 2023-03-02 ENCOUNTER — HOSPITAL ENCOUNTER (OUTPATIENT)
Dept: NON INVASIVE DIAGNOSTICS | Facility: CLINIC | Age: 58
Discharge: HOME | End: 2023-03-02

## 2023-03-02 VITALS
HEART RATE: 80 BPM | DIASTOLIC BLOOD PRESSURE: 69 MMHG | RESPIRATION RATE: 20 BRPM | SYSTOLIC BLOOD PRESSURE: 122 MMHG | OXYGEN SATURATION: 96 % | TEMPERATURE: 97.4 F

## 2023-03-02 LAB
2HR DELTA HS TROPONIN: 0 NG/L
ATRIAL RATE: 71 BPM
ATRIAL RATE: 71 BPM
ATRIAL RATE: 75 BPM
CARDIAC TROPONIN I PNL SERPL HS: 7 NG/L
P AXIS: 135 DEGREES
P AXIS: 63 DEGREES
PR INTERVAL: 164 MS
PR INTERVAL: 164 MS
PR INTERVAL: 166 MS
QRS AXIS: 148 DEGREES
QRS AXIS: 149 DEGREES
QRS AXIS: 45 DEGREES
QRSD INTERVAL: 72 MS
QRSD INTERVAL: 76 MS
QRSD INTERVAL: 80 MS
QT INTERVAL: 352 MS
QT INTERVAL: 358 MS
QT INTERVAL: 366 MS
QTC INTERVAL: 389 MS
QTC INTERVAL: 393 MS
QTC INTERVAL: 397 MS
S PYO DNA THROAT QL NAA+PROBE: NOT DETECTED
T WAVE AXIS: -12 DEGREES
T WAVE AXIS: 149 DEGREES
T WAVE AXIS: 155 DEGREES
VENTRICULAR RATE: 71 BPM
VENTRICULAR RATE: 71 BPM
VENTRICULAR RATE: 75 BPM

## 2023-03-02 RX ORDER — LANOLIN ALCOHOL/MO/W.PET/CERES
6 CREAM (GRAM) TOPICAL
Status: DISCONTINUED | OUTPATIENT
Start: 2023-03-02 | End: 2023-03-02 | Stop reason: HOSPADM

## 2023-03-02 RX ADMIN — MELATONIN 6 MG: at 01:06

## 2023-03-02 NOTE — ED NOTES
Contreras blanco called at this time for pt tport home        Stefani Mcburney, ZARINA  03/02/23 8409
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
## 2023-03-08 ENCOUNTER — EVALUATION (OUTPATIENT)
Dept: PHYSICAL THERAPY | Facility: REHABILITATION | Age: 58
End: 2023-03-08

## 2023-03-08 DIAGNOSIS — G89.29 CHRONIC PAIN OF LEFT KNEE: Primary | ICD-10-CM

## 2023-03-08 DIAGNOSIS — M25.561 CHRONIC PAIN OF RIGHT KNEE: ICD-10-CM

## 2023-03-08 DIAGNOSIS — M54.16 LUMBAR RADICULOPATHY: ICD-10-CM

## 2023-03-08 DIAGNOSIS — M17.12 PRIMARY OSTEOARTHRITIS OF LEFT KNEE: ICD-10-CM

## 2023-03-08 DIAGNOSIS — G89.29 CHRONIC PAIN OF RIGHT KNEE: ICD-10-CM

## 2023-03-08 DIAGNOSIS — M25.562 CHRONIC PAIN OF LEFT KNEE: Primary | ICD-10-CM

## 2023-03-08 NOTE — PROGRESS NOTES
PT Evaluation     Today's date: 3/8/2023  Patient name: Maribeth Cheung  : 1965  MRN: [id number]  Referring provider: Sabino Mcallister PA-C  Dx:   Encounter Diagnosis     ICD-10-CM    1  Chronic pain of left knee  M25 562 Ambulatory Referral to Physical Therapy    G89 29       2  Primary osteoarthritis of left knee  M17 12 Ambulatory Referral to Physical Therapy      3  Lumbar radiculopathy  M54 16       4  Chronic pain of right knee  M25 561     G89 29           Start Time: 1030  Stop Time: 1115  Total time in clinic (min): 45 minutes    Assessment  Assessment details: Marbieth Cheung is a 56 y o  male presenting with B knee pain and lumbar radicular pain which was assessed during this initial evaluation  Pt also presents with cervical radicular symptoms which were not assessed today due to not having a referral in the system  Pain started following a fall  Knee pain likely due to OA  L ACL laxity noted  Primary impairments include lumbar and knee pain with functional activities, BLE and paraspinal weakness, lumbar AROM dysfunction, paraspinal and BLE motor control dysfunction  Educated pt on anatomy and physiology of diagnosis  Will benefit from skilled PT interventions for community reintegration, ADL management/independence, return to sport/hobbies  Impairments: abnormal coordination, abnormal muscle firing, abnormal muscle tone, abnormal or restricted ROM, activity intolerance, impaired physical strength, lacks appropriate home exercise program, pain with function, poor posture  and poor body mechanics  Functional limitations: squatting, transfers, ADLs, stair negotiation, prolonged standing/walking  Symptom irritability: highBarriers to therapy: transportation  Understanding of Dx/Px/POC: fair   Prognosis: fair    Goals    Short Term Goals: In 4 weeks, the patient will:  1  Improve B knee extension and flexion strength to 4/5 MMT  2  Improve lumbar extension to at least 40%  3   Supervision with HEP for self-care    Long Term Goals: In 8 weeks, the patient will:  1  Tolerate walking 30+ minutes without aggravating pain symptoms  2  FOTO to greater than predicted value  3   Independent with HEP for self-care      Plan  Patient would benefit from: skilled physical therapy  Planned modality interventions: manual electrical stimulation  Planned therapy interventions: abdominal trunk stabilization, activity modification, balance, balance/weight bearing training, behavior modification, body mechanics training, community reintegration, coordination, fine motor coordination training, flexibility, functional ROM exercises, gait training, graded activity, graded exercise, graded motor, home exercise program, work reintegration, therapeutic training, therapeutic exercise, therapeutic activities, stretching, strengthening, self care, postural training, patient education, neuromuscular re-education, motor coordination training, massage, manual therapy, joint mobilization and ADL training  Frequency: 1x week  Treatment plan discussed with: patient        Subjective     Pain Location: lumbar region with radicular symptoms to RLE, B knees, cervical pain with radicular symptoms to LUE  Pain Intensity: sharp, shooting, dull, achey; up to 10/10 pain  ZORAIDA: slipped on snow/ice  DOI: January 2023  Aggravating Factors: prolonged walking, mopping/sweeping, cervical side bending L  Alleviating Factors: ice packs, heat packs, topical analgesic  Living Situation: lives alone, independent ADLs  Constitutional S/S: cervical and lumbar radicular symptoms   Goals: perform martial arts moves, be able to walk more  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      Objective               Standing Posture & Lower Extremity Alignment:  Structure/Joint         Pelvis (Tilt)  Anterior  Neutral x Posterior   Iliac Crests  Right Elevated x Neutral  Left Elevated   Knee - Frontal   Genuvalgum x Neutral  Genuvarum   Knee - Sagittal Genurecurvatum x Neutral     Rearfoot  Valgus x Neutral  Varus   Forefoot  Abducted x Neutral     Arch x Pes Planus  Neutral  Pes Cavus     Range of Motion: Goniometric revealed the following findings (in degrees)  Joint Motion Right: 3/8/2023 Left: 3/8/2023   Knee Extension 0 0   Knee Flexion 140 140*        Lumbar flexion 50%*    Lumbar extension 25%*    Lumbar sidebending 25%* 25%*   Lumbar rotation 25%* 25%*   * indicates increase in pain    Strength: MMT revealed the following findings  Joint Motion Right: 3/8/2023 Left: 3/8/2023   Hip Flexion 3/5 3/5   Hip Abduction 3/5 3/5   Hip Adduction 3+/5 3+/5   Hip Extension 3-/5 3-/5   Knee Extension 4-/5 4-/5   Knee Flexion 4-/5 4-/5   Ankle Plantarflexion 4+/5 4+/5   Ankle Dorsiflexion 4+/5 4+/5     Additional Assessments:  Palpation: TTP medial B knees at joint line  Observation: incision sites from surgeries throughout abdomen, L knee, among other locations  Gait Pattern: antalgic gait  Balance: impaired narrow VIRAJ/single leg stance  Joint Mobility:  WFL - mild crepitus noted    Special Tests:  Test (Structure evaluated) Date: 3/8/2023  ( P / N )   Shmuel (Iliapsoas/Rectus Fem) + B   90/90 SLR (Hamstring) + B   Anterior (ACL) + L laxity   Posterior Drawer (PCL) - B   Valgus Stress (MCL) - B   Varus Stress (LCL) - B   Kay (Menisci) + L       Outcome Measures:   FOTO:  33               Precautions: prediabetes    Pertinent Findings and Outcome Measures:                                                                                                                                                                         Test / Measure  3/8/2023      FOTO 33      B hip flex MMT 3/5      B knee ext MMT 4-/5      L/s ext AROM 25%          Manuals 3/8                                                                Neuro Re-Ed             Bridges 15x            LTR 5x ea            Mini squats                                                                 Ther Ex HEP review 5'            NuStep             Stand hip 3 way                                                                              Ther Activity                                       Gait Training                                       Modalities

## 2023-03-09 ENCOUNTER — TELEPHONE (OUTPATIENT)
Dept: OBGYN CLINIC | Facility: HOSPITAL | Age: 58
End: 2023-03-09

## 2023-03-09 NOTE — TELEPHONE ENCOUNTER
Caller: [place]/Chandrakant Stallings    Doctor: Georgi Ba    Reason for call: Request confirm of MRI   Advised 3/10/23    Call back#: [phone]

## 2023-03-10 ENCOUNTER — TELEPHONE (OUTPATIENT)
Dept: OTHER | Facility: OTHER | Age: 58
End: 2023-03-10

## 2023-03-10 ENCOUNTER — HOSPITAL ENCOUNTER (OUTPATIENT)
Dept: RADIOLOGY | Facility: HOSPITAL | Age: 58
Discharge: HOME | End: 2023-03-10

## 2023-03-10 DIAGNOSIS — M25.561 ACUTE PAIN OF RIGHT KNEE: ICD-10-CM

## 2023-03-10 DIAGNOSIS — M23.91 INTERNAL DERANGEMENT OF MULTIPLE SITES OF RIGHT KNEE: ICD-10-CM

## 2023-03-10 NOTE — TELEPHONE ENCOUNTER
Patient called stating he has an MRI scheduled for 1500 this afternoon and wanted help on getting [facility] set up to come pick him up for his appointment  He also mentioned he had to switch to a new physiatrist and is out of his medication for divalproex sodium (DEPAKOTE) 500 mg EC tablet and he is not doing well mentally and is trying to see if he can get a refill until his appointment with his new psychiatrist on the 20th  Please call patient back today to discuss

## 2023-03-13 RX ORDER — RISPERIDONE 2 MG/1
TABLET ORAL
COMMUNITY
Start: 2023-02-05 | End: 2023-03-17 | Stop reason: ALTCHOICE

## 2023-03-14 ENCOUNTER — OFFICE VISIT (OUTPATIENT)
Dept: OBGYN CLINIC | Facility: HOSPITAL | Age: 58
End: 2023-03-14

## 2023-03-14 VITALS
SYSTOLIC BLOOD PRESSURE: 133 MMHG | HEIGHT: 66 IN | BODY MASS INDEX: 38.89 KG/M2 | HEART RATE: 80 BPM | DIASTOLIC BLOOD PRESSURE: 78 MMHG | WEIGHT: 242 LBS

## 2023-03-14 DIAGNOSIS — M16.12 ARTHRITIS OF LEFT HIP: ICD-10-CM

## 2023-03-14 DIAGNOSIS — M54.2 NECK PAIN: ICD-10-CM

## 2023-03-14 DIAGNOSIS — M25.552 PAIN IN LEFT HIP: ICD-10-CM

## 2023-03-14 DIAGNOSIS — F32.A DEPRESSION, UNSPECIFIED DEPRESSION TYPE: Primary | ICD-10-CM

## 2023-03-14 DIAGNOSIS — M54.12 CERVICAL NEURITIS: Primary | ICD-10-CM

## 2023-03-14 RX ORDER — DIVALPROEX SODIUM 500 MG/1
1500 TABLET, DELAYED RELEASE ORAL
Qty: 30 TABLET | Refills: 0 | Status: SHIPPED | OUTPATIENT
Start: 2023-03-14 | End: 2023-03-14

## 2023-03-14 NOTE — NURSING NOTE
Call placed to patient to discuss upcoming appointment at [facility] radiology department and complete consultation with patient  Patient is having a  Left hip CSI utilizing fluoroscopy guidance  Reviewed  patient's allergies , current anticoagulant medication of ASA 81 mg present per patient but not required to stop per periprocedural management of coagulation status and hemostasis risk in percutaneous image guided procedure guidelines, patient has had procedure in the past, no questions when asked if any questions or concerns  Reminded patient of location and time expected for procedure, Patient expressed understanding by verbalizing and repeating instructions
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
## 2023-03-14 NOTE — TELEPHONE ENCOUNTER
I spoke with patient, he was seeing a psychiatrist at [facility] and they are now closed  Can we refill his Depakote to bridge him  He currently is taking Depakote 500 EC, three tablets daily in the morning   He uses [facility] on [address]

## 2023-03-15 ENCOUNTER — TELEPHONE (OUTPATIENT)
Dept: INTERNAL MEDICINE CLINIC | Facility: CLINIC | Age: 58
End: 2023-03-15

## 2023-03-15 ENCOUNTER — IOP CHECK (OUTPATIENT)
Dept: URBAN - METROPOLITAN AREA CLINIC 6 | Facility: CLINIC | Age: 58
End: 2023-03-15

## 2023-03-15 DIAGNOSIS — H40.1121: ICD-10-CM

## 2023-03-15 DIAGNOSIS — H40.1113: ICD-10-CM

## 2023-03-15 PROCEDURE — 92012 INTRM OPH EXAM EST PATIENT: CPT

## 2023-03-15 ASSESSMENT — TONOMETRY
OS_IOP_MMHG: 15
OD_IOP_MMHG: 22

## 2023-03-15 ASSESSMENT — VISUAL ACUITY
OD_SC: 20/25
OS_SC: 20/25+2

## 2023-03-15 NOTE — TELEPHONE ENCOUNTER
Spoke to patient and advised him that he should be going to see pain management for the inched nerve  Patient feels that he should be seeing another specialists that can identify why he is having this issue and not just treat the pain  Tried explaining why he should be seen with pain management but he was not understanding  Please reach out to patient and explain if you can

## 2023-03-15 NOTE — TELEPHONE ENCOUNTER
Spoke to patient and he stated that when he was last in the ED for neck pain, they made him aware that he has a pinched nerve in his neck  Patient is requesting a referral to see a specialists to follow up with  Please place referral and advise when patient should also have routine blood work done again

## 2023-03-16 ENCOUNTER — TELEPHONE (OUTPATIENT)
Dept: OBGYN CLINIC | Facility: HOSPITAL | Age: 58
End: 2023-03-16

## 2023-03-16 ENCOUNTER — TELEPHONE (OUTPATIENT)
Dept: OTHER | Facility: HOSPITAL | Age: 58
End: 2023-03-16

## 2023-03-16 ENCOUNTER — OFFICE VISIT (OUTPATIENT)
Dept: PHYSICAL THERAPY | Facility: REHABILITATION | Age: 58
End: 2023-03-16

## 2023-03-16 DIAGNOSIS — M25.561 CHRONIC PAIN OF RIGHT KNEE: ICD-10-CM

## 2023-03-16 DIAGNOSIS — M17.12 PRIMARY OSTEOARTHRITIS OF LEFT KNEE: Primary | ICD-10-CM

## 2023-03-16 DIAGNOSIS — G89.29 CHRONIC PAIN OF RIGHT KNEE: ICD-10-CM

## 2023-03-16 DIAGNOSIS — M54.16 LUMBAR RADICULOPATHY: ICD-10-CM

## 2023-03-16 DIAGNOSIS — G89.29 CHRONIC PAIN OF LEFT KNEE: ICD-10-CM

## 2023-03-16 DIAGNOSIS — M25.562 CHRONIC PAIN OF LEFT KNEE: ICD-10-CM

## 2023-03-16 NOTE — TELEPHONE ENCOUNTER
Caller: Sangeetha Bran with [facility]    Doctor/Office: Multiple docs    CB#: [phone]      What needs to be faxed: MRI report of R knee from 3/10, MRI left knee 2/7, MRI lumbar spine 1/30, and MRI R knee 2021    ATTN to: Sangeetha Bran    Fax#: [phone]

## 2023-03-16 NOTE — PROGRESS NOTES
Daily Note     Today's date: 3/16/2023  Patient name: Tapan Hinson  : 1965  MRN: [id number]  Referring provider: Deedee Leonard PA-C  Dx:   Encounter Diagnosis     ICD-10-CM    1  Primary osteoarthritis of left knee  M17 12       2  Chronic pain of left knee  M25 562     G89 29       3  Lumbar radiculopathy  M54 16       4  Chronic pain of right knee  M25 561     G89 29                      Subjective: Patient presents with significant back and knee pain  Standing from a chair is very painful on his knees and he feel/hears a clicking in his L knee everytime he does this  He reports that his L shoulder and L side of his neck have been really bothering him the past few days  He says that he has an appointment with a physician tomorrow about his neck to get an MRI and determine the best course of treatment  He states that he has not been compliant with HEP  Objective: See treatment diary below      Assessment: Patient tolerates tx well  He is more challenged by weight bearing interventions, noting L>R knee pain  Patient is challenged by exercise volume and fatigued at end of tx  Discussed expected muscular soreness post tx with patient and he verbally demonstrated understanding  Patient would benefit from continued PT  Plan: Continue per plan of care        Precautions: prediabetes    Pertinent Findings and Outcome Measures:                                                                                                                                                                         Test / Measure  3/8/2023      FOTO 33      B hip flex MMT 3/5      B knee ext MMT 4-/5      L/s ext AROM 25%          Manuals 3/8 3/16                                                               Neuro Re-Ed             Bridges 15x 30x           LTR 5x ea 10x ea           Mini squats  2x10; 5s hold           Hooklying alternating clamshell  30x ea; btb           Banded side steps  5 laps; btb Ther Ex             HEP review 5'            NuStep  8 min; lvl 8           Stand hip 3 way             Supine PBall hamstring curl  30x                                                               Ther Activity                                       Gait Training                                       Modalities

## 2023-03-16 NOTE — TELEPHONE ENCOUNTER
Called and spoke to patient regarding neck pain and parastesias  He was recently seen by ortho and referral was sent for him to pain management at that time, however patient does not want to pursue pain management  Patient is still endorsing discomfort  Unfortunately I have only seen this patient once in November and not seen this patient for this specific complaint  Explained that he should schedule a visit with the clinic to be assessed and he is amenable  Patient will call clinic to schedule

## 2023-03-17 ENCOUNTER — OFFICE VISIT (OUTPATIENT)
Dept: INTERNAL MEDICINE CLINIC | Facility: CLINIC | Age: 58
End: 2023-03-17

## 2023-03-17 VITALS — HEART RATE: 69 BPM | SYSTOLIC BLOOD PRESSURE: 126 MMHG | TEMPERATURE: 98.1 F | DIASTOLIC BLOOD PRESSURE: 85 MMHG

## 2023-03-17 DIAGNOSIS — M54.12 CERVICAL RADICULOPATHY: Primary | ICD-10-CM

## 2023-03-17 RX ORDER — NAPROXEN 500 MG/1
500 TABLET ORAL 2 TIMES DAILY WITH MEALS
Qty: 14 TABLET | Refills: 0 | Status: SHIPPED | OUTPATIENT
Start: 2023-03-17

## 2023-03-17 RX ORDER — CYCLOBENZAPRINE HCL 10 MG
10 TABLET ORAL
Qty: 30 TABLET | Refills: 0 | Status: SHIPPED | OUTPATIENT
Start: 2023-03-17

## 2023-03-17 NOTE — PROGRESS NOTES
Name: Lisa Solis      : 1965      MRN: [id number]  Encounter Provider: JACQUI Drew  Encounter Date: 3/17/2023   Encounter department: [address]     1  Cervical radiculopathy  Assessment & Plan:  Patient with reproducible neck pain with ROM and associated numbness/tingling in left upper extremity on exam  Spurling test is negative today  +2 radial pulses noted bilaterally and sensation is intact  Strength is equal in bilateral upper extremities  Recommendations:  ~Check MRI cervical spine  ~PT   ~Referral to Neurosurgery for further evaluation  ~May use naproxen 500 mg twice daily x 1 week and cyclobenzaprine 10 mg nightly as needed for symptomatic relief--scripts sent to pharmacy      Orders:  -     MRI cervical spine wo contrast  -     Ambulatory Referral to Neurosurgery; Future  -     Ambulatory Referral to Physical Therapy; Future  -     naproxen (Naprosyn) 500 mg tablet; Take 1 tablet (500 mg total) by mouth 2 (two) times a day with meals  -     cyclobenzaprine (FLEXERIL) 10 mg tablet; Take 1 tablet (10 mg total) by mouth daily at bedtime as needed for muscle spasms         Subjective      HPI     Patient with past medical history of HTN, HLD, DEMETRIA, DDD-cervical and thoracic spine, OA shoulder & B/L knees, and schizoaffective disorder  presents today for evaluation of neck pain  Patient voices frustration with having to come into the office today  He reports he has already been evaluated for this problem and just wants an MRI and to see specialist    He was seen in office on 23 for left arm paresthesia--cervical spine x ray ordered at that time and showed no acute osseous abnormalities and mild degenerative changes at C4-5 through C6-7    He reports he is also not feeling well today--has nausea and diarrhea--he only provides vague details regarding these symptoms and notes he gets this when he doesn't eat and he has not had anything to eat today  He notes feeling tired and eyes are bloodshot  He also notes being exhausted from all of his chronic health problems  He reports that 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  Neck pain is associated with intermittent numbess/tingling that radiates down left arm  Has worsened since slip and fall on 1/25/23   He had this pain and numbness/tingling prior to fall but cannot recall for how long  He notes that turning head towards the left makes it worse  Also moving head/neck and left shoulder together makes the pain worse  He also reports intermittent muscle spasms and some weakness in left arm  He denies any loss of bowel/bladder  Review of Systems   Constitutional: Negative for chills and fever  HENT: Negative for ear pain and sore throat  Eyes: Negative for pain and visual disturbance  Respiratory: Negative for cough and shortness of breath  Cardiovascular: Negative for chest pain and palpitations  Gastrointestinal: Positive for diarrhea and nausea  Negative for abdominal pain and vomiting  Genitourinary: Negative for dysuria and hematuria  Musculoskeletal: Positive for arthralgias and neck pain  Negative for back pain  Skin: Negative for color change and rash  Neurological: Positive for weakness (left arm) and numbness (left arm, intermittent )  Negative for seizures and syncope  All other systems reviewed and are negative  Current Outpatient Medications on File Prior to Visit   Medication Sig   • amLODIPine (NORVASC) 5 mg tablet Take 1 tablet (5 mg total) by mouth daily   • Aspirin Low Dose 81 MG EC tablet TAKE 1 TABLET BY MOUTH EVERY DAY   • divalproex sodium (DEPAKOTE) 500 mg EC tablet TAKE 3 TABLETS (1,500 MG TOTAL) BY MOUTH EVERY 24 HOURS   • dorzolamide-timolol (COSOPT) 22 3-6 8 MG/ML ophthalmic solution ADMINISTER 1 DROP TO BOTH EYES DAILY     • doxepin (SINEquan) 150 MG capsule Take 150 mg by mouth daily at bedtime   • hydrOXYzine HCL (ATARAX) 50 mg tablet Take 50 mg by mouth daily at bedtime     • latanoprost (XALATAN) 0 005 % ophthalmic solution Administer 1 drop to both eyes daily   • Lumigan 0 01 % ophthalmic drops INSTILL 1 DROP INTO BOTH EYES IN THE EVENING   • metFORMIN (GLUCOPHAGE) 500 mg tablet Take 1 tablet (500 mg total) by mouth in the morning   • rOPINIRole (REQUIP) 4 mg tablet Take 4 mg by mouth daily at bedtime   • rosuvastatin (CRESTOR) 40 MG tablet TAKE 1 TABLET BY MOUTH EVERY DAY   • brimonidine tartrate 0 2 % ophthalmic solution INSTILL 1 DROP INTO BOTH EYES TWICE A DAY   • polyethylene glycol (MIRALAX) 17 g packet Take 17 g by mouth daily (Patient not taking: Reported on 2/16/2023)   • psyllium (METAMUCIL SMOOTH TEXTURE) 28 % packet Take 1 packet by mouth daily (Patient not taking: Reported on 2/2/2023)   • [DISCONTINUED] acetaminophen (TYLENOL) 650 mg CR tablet Take 1 tablet (650 mg total) by mouth every 8 (eight) hours as needed for mild pain (Patient not taking: Reported on 2/16/2023)   • [DISCONTINUED] magnesium citrate (CITROMA) 1 745 g/30 mL oral solution Take 296 mL by mouth once for 1 dose   • [DISCONTINUED] risperiDONE (RisperDAL) 2 mg tablet    • [DISCONTINUED] traMADol (Ultram) 50 mg tablet Take 1 tablet (50 mg total) by mouth every 6 (six) hours as needed for moderate pain (Patient not taking: Reported on 2/16/2023)       Objective     /85 (BP Location: Left arm, Patient Position: Sitting, Cuff Size: Large)   Pulse 69   Temp 98 1 °F (36 7 °C)     Physical Exam  Vitals and nursing note reviewed  Constitutional:       General: He is not in acute distress  Appearance: Normal appearance  HENT:      Head: Normocephalic  Right Ear: External ear normal       Left Ear: External ear normal       Nose: Nose normal       Mouth/Throat:      Mouth: Mucous membranes are moist    Eyes:      Conjunctiva/sclera:      Right eye: Right conjunctiva is injected  Left eye: Left conjunctiva is injected     Neck: Comments: Muscle spasms palpated in cervical paraspinal muscles on the left  Neck ROM decreased secondary to pain  Spurling test negative  Cardiovascular:      Rate and Rhythm: Normal rate and regular rhythm  Pulses: Normal pulses  Heart sounds: Normal heart sounds  Pulmonary:      Effort: Pulmonary effort is normal  No respiratory distress  Breath sounds: Normal breath sounds  Abdominal:      General: Bowel sounds are normal  There is no distension  Palpations: Abdomen is soft  Musculoskeletal:      Cervical back: Muscular tenderness present  Decreased range of motion  Skin:     General: Skin is warm and dry  Capillary Refill: Capillary refill takes less than 2 seconds  Neurological:      General: No focal deficit present  Mental Status: He is alert and oriented to person, place, and time  Cranial Nerves: Cranial nerves 2-12 are intact  Sensory: Sensation is intact  Motor: Motor function is intact  Coordination: Coordination is intact  Gait: Gait is intact     Psychiatric:         Mood and Affect: Mood normal          Behavior: Behavior normal        JACQUI Spring English

## 2023-03-17 NOTE — ASSESSMENT & PLAN NOTE
Patient with reproducible neck pain with ROM and associated numbness/tingling in left upper extremity on exam  Spurling test is negative today  +2 radial pulses noted bilaterally and sensation is intact  Strength is equal in bilateral upper extremities     Recommendations:  ~Check MRI cervical spine  ~PT   ~Referral to Neurosurgery for further evaluation  ~May use naproxen 500 mg twice daily x 1 week and cyclobenzaprine 10 mg nightly as needed for symptomatic relief--scripts sent to pharmacy

## 2023-03-21 ENCOUNTER — PATIENT OUTREACH (OUTPATIENT)
Dept: INTERNAL MEDICINE CLINIC | Facility: CLINIC | Age: 58
End: 2023-03-21

## 2023-03-21 ENCOUNTER — APPOINTMENT (OUTPATIENT)
Dept: PHYSICAL THERAPY | Facility: REHABILITATION | Age: 58
End: 2023-03-21

## 2023-03-21 DIAGNOSIS — Z71.89 COMPLEX CARE COORDINATION: Primary | ICD-10-CM

## 2023-03-21 NOTE — TELEPHONE ENCOUNTER
Caller: Renan Dela Cruz    Doctor: Gilbert Del Real    Reason for call: calling for fax # to [facility] so send a records request  Provided fax    Call back#:

## 2023-03-22 ENCOUNTER — HOSPITAL ENCOUNTER (OUTPATIENT)
Dept: RADIOLOGY | Facility: HOSPITAL | Age: 58
Discharge: HOME | End: 2023-03-22

## 2023-03-22 ENCOUNTER — PATIENT OUTREACH (OUTPATIENT)
Dept: INTERNAL MEDICINE CLINIC | Facility: CLINIC | Age: 58
End: 2023-03-22

## 2023-03-22 DIAGNOSIS — M25.552 PAIN IN LEFT HIP: ICD-10-CM

## 2023-03-22 DIAGNOSIS — M16.12 ARTHRITIS OF LEFT HIP: ICD-10-CM

## 2023-03-22 RX ORDER — LIDOCAINE HYDROCHLORIDE 10 MG/ML
10 INJECTION, SOLUTION EPIDURAL; INFILTRATION; INTRACAUDAL; PERINEURAL
Status: COMPLETED | OUTPATIENT
Start: 2023-03-22 | End: 2023-03-22

## 2023-03-22 RX ORDER — BUPIVACAINE HYDROCHLORIDE 2.5 MG/ML
10 INJECTION, SOLUTION EPIDURAL; INFILTRATION; INTRACAUDAL
Status: COMPLETED | OUTPATIENT
Start: 2023-03-22 | End: 2023-03-22

## 2023-03-22 RX ORDER — METHYLPREDNISOLONE ACETATE 80 MG/ML
80 INJECTION, SUSPENSION INTRA-ARTICULAR; INTRALESIONAL; INTRAMUSCULAR; SOFT TISSUE
Status: COMPLETED | OUTPATIENT
Start: 2023-03-22 | End: 2023-03-22

## 2023-03-22 RX ADMIN — METHYLPREDNISOLONE ACETATE 80 MG: 80 INJECTION, SUSPENSION INTRA-ARTICULAR; INTRALESIONAL; INTRAMUSCULAR; SOFT TISSUE at 15:50

## 2023-03-22 RX ADMIN — LIDOCAINE HYDROCHLORIDE 7 ML: 10 INJECTION, SOLUTION EPIDURAL; INFILTRATION; INTRACAUDAL; PERINEURAL at 15:50

## 2023-03-22 RX ADMIN — IOHEXOL 2 ML: 300 INJECTION, SOLUTION INTRAVENOUS at 15:50

## 2023-03-22 RX ADMIN — BUPIVACAINE HYDROCHLORIDE 1.5 ML: 2.5 INJECTION, SOLUTION EPIDURAL; INFILTRATION; INTRACAUDAL; PERINEURAL at 15:50

## 2023-03-23 ENCOUNTER — TELEPHONE (OUTPATIENT)
Dept: OBGYN CLINIC | Facility: HOSPITAL | Age: 58
End: 2023-03-23

## 2023-03-23 NOTE — TELEPHONE ENCOUNTER
LYFT ride is scheduled for 3/2723 Dr Duval Friend appt  Patient is aware of  3 pm  time      Address and phone confirmed

## 2023-03-23 NOTE — PROGRESS NOTES
3/23/23    Per PCP Dr Ivon Toro,     Unfortunately I'm going to have to defer refilling his psych medications to his psychiatrist  I also unfortunately cannot prescribe him medications like Requip/ropinerol without seeing him first  He will need to call his psychiatrist for this refill  I called and spoke with patient and made him aware we would need to see him in the office first before prescribing this medication as we have not prescribed it in the past  He is declining to come in for an appointment and is waiting on a return call from his mental health office 
Chronic Care Management Program Consent:    Patient informed of availability of Chronic Care Management services  The services will billed monthly by their Primary Care Provider only  Patient is informed there may be a monthly cost sharing associated with the Chronic Care Management services  Patient is aware that financial counseling is available to assist with any co-pay questions or concerns  Chronic Care Management services include:    • 24/7 access to care  • Comprehensive plan of care created by the provider  • Individualized care planning by the care manager(s)  • Transitional care support  The patient is informed that they have the right to stop Chronic Care Management services at anytime  Patient consents to Chronic Care Management services? No  (Yes or No)    Patient informed that consent is needed only once unless the patient switches qualifying providers 
Outpatient Care Management Note:    Patient referred to outpatient nurse care management for chronic care management program (CCM)  Chart reviewed  Patient has history of HTN, DEMETRIA, OA of bilateral knees, DJD of cervical and thoracic spine, and schizoaffective disorder  I called and spoke with patient and explained my role and reason for outreach  Patient reports he is in a lot of pain today and is trying to get some rest  Patient scheduled for left hip injection today  Patient is scheduled to follow up with ortho on 3/27  Patient has scheduled MRI of cervical spine for 4/2/23 and is scheduled to start PT on 3/24  Patient is scheduled with Neurosurgery for 3/30  Patient reports he gets to appointments by Lyft or public bus  Patient reports he recently saw psychiatrist with [facility]  Patient previously going to [facility] but they closed  Encouraged routine follow up  Patient reports he has all of his medication at this time but is requesting a refill on his Requip  He reports his previous mental health provider was prescribing it to him  He is requesting if PCP could prescribe this to him  I did make him aware I would put message out for PCP to address and encouraged to talk further with his current psychiatrist about it  Patient does not have any further questions, concerns, or other needs at this time  Patient is declining a need for further outreach at this time 
none

## 2023-03-24 ENCOUNTER — TELEPHONE (OUTPATIENT)
Dept: OBGYN CLINIC | Facility: HOSPITAL | Age: 58
End: 2023-03-24

## 2023-03-24 ENCOUNTER — APPOINTMENT (OUTPATIENT)
Dept: PHYSICAL THERAPY | Facility: REHABILITATION | Age: 58
End: 2023-03-24

## 2023-03-24 NOTE — TELEPHONE ENCOUNTER
Spoke to slip & fall doi 1/25/23  Dagoberto Greco regarding his claim  Amaury Bassett states this claim has been denied

## 2023-03-27 ENCOUNTER — TELEPHONE (OUTPATIENT)
Dept: PSYCHIATRY | Facility: CLINIC | Age: 58
End: 2023-03-27

## 2023-03-27 ENCOUNTER — OFFICE VISIT (OUTPATIENT)
Dept: OBGYN CLINIC | Facility: HOSPITAL | Age: 58
End: 2023-03-27

## 2023-03-27 VITALS
SYSTOLIC BLOOD PRESSURE: 156 MMHG | HEART RATE: 76 BPM | WEIGHT: 248.2 LBS | DIASTOLIC BLOOD PRESSURE: 91 MMHG | BODY MASS INDEX: 39.89 KG/M2 | HEIGHT: 66 IN

## 2023-03-27 DIAGNOSIS — M17.11 ARTHRITIS OF RIGHT KNEE: Primary | ICD-10-CM

## 2023-03-27 DIAGNOSIS — M94.261 CHONDROMALACIA, RIGHT KNEE: ICD-10-CM
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
## 2023-03-28 DIAGNOSIS — G25.81 RESTLESS LEG SYNDROME: Primary | ICD-10-CM

## 2023-03-29 ENCOUNTER — IOP CHECK (OUTPATIENT)
Dept: URBAN - METROPOLITAN AREA CLINIC 6 | Facility: CLINIC | Age: 58
End: 2023-03-29

## 2023-03-29 DIAGNOSIS — H40.1121: ICD-10-CM

## 2023-03-29 DIAGNOSIS — H40.1113: ICD-10-CM

## 2023-03-29 PROCEDURE — 92012 INTRM OPH EXAM EST PATIENT: CPT

## 2023-03-29 RX ORDER — ROPINIROLE 4 MG/1
4 TABLET, FILM COATED ORAL
Qty: 90 TABLET | Refills: 1 | Status: SHIPPED | OUTPATIENT
Start: 2023-03-29

## 2023-03-31 ENCOUNTER — APPOINTMENT (OUTPATIENT)
Dept: PHYSICAL THERAPY | Facility: REHABILITATION | Age: 58
End: 2023-03-31

## 2023-04-02 ENCOUNTER — HOSPITAL ENCOUNTER (OUTPATIENT)
Dept: RADIOLOGY | Facility: HOSPITAL | Age: 58
Discharge: HOME | End: 2023-04-02

## 2023-04-03 ASSESSMENT — VISUAL ACUITY
OD_SC: 20/25
OS_SC: 20/25

## 2023-04-03 ASSESSMENT — TONOMETRY
OD_IOP_MMHG: 14
OS_IOP_MMHG: 11

## 2023-04-05 ENCOUNTER — TELEPHONE (OUTPATIENT)
Dept: BARIATRICS | Facility: CLINIC | Age: 58
End: 2023-04-05

## 2023-04-05 ENCOUNTER — TELEPHONE (OUTPATIENT)
Dept: NEUROSURGERY | Facility: CLINIC | Age: 58
End: 2023-04-05

## 2023-04-17 ENCOUNTER — HOSPITAL ENCOUNTER (EMERGENCY)
Facility: HOSPITAL | Age: 58
Discharge: HOME | End: 2023-04-17
Attending: EMERGENCY MEDICINE | Admitting: EMERGENCY MEDICINE

## 2023-04-17 VITALS
HEART RATE: 87 BPM | OXYGEN SATURATION: 97 % | TEMPERATURE: 97 F | DIASTOLIC BLOOD PRESSURE: 87 MMHG | RESPIRATION RATE: 18 BRPM | SYSTOLIC BLOOD PRESSURE: 145 MMHG

## 2023-04-17 DIAGNOSIS — F41.9 ANXIETY: Primary | ICD-10-CM

## 2023-04-17 RX ORDER — ACETAMINOPHEN 325 MG/1
650 TABLET ORAL ONCE
Status: COMPLETED | OUTPATIENT
Start: 2023-04-17 | End: 2023-04-17

## 2023-04-17 RX ORDER — DIAZEPAM 5 MG/1
5 TABLET ORAL ONCE
Status: COMPLETED | OUTPATIENT
Start: 2023-04-17 | End: 2023-04-17

## 2023-04-17 RX ADMIN — DIAZEPAM 5 MG: 5 TABLET ORAL at 04:07

## 2023-04-17 RX ADMIN — ACETAMINOPHEN 650 MG: 325 TABLET ORAL at 05:14
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
## 2023-04-25 ENCOUNTER — OFFICE VISIT (OUTPATIENT)
Dept: PHYSICAL THERAPY | Facility: REHABILITATION | Age: 58
End: 2023-04-25

## 2023-04-25 DIAGNOSIS — M17.12 PRIMARY OSTEOARTHRITIS OF LEFT KNEE: ICD-10-CM

## 2023-04-25 DIAGNOSIS — M25.562 CHRONIC PAIN OF LEFT KNEE: ICD-10-CM

## 2023-04-25 DIAGNOSIS — G89.29 CHRONIC PAIN OF LEFT KNEE: ICD-10-CM

## 2023-04-25 DIAGNOSIS — M54.16 LUMBAR RADICULOPATHY: ICD-10-CM

## 2023-04-25 DIAGNOSIS — M54.12 CERVICAL RADICULOPATHY: Primary | ICD-10-CM

## 2023-04-25 DIAGNOSIS — G89.29 CHRONIC PAIN OF RIGHT KNEE: ICD-10-CM

## 2023-04-25 DIAGNOSIS — M25.561 CHRONIC PAIN OF RIGHT KNEE: ICD-10-CM

## 2023-04-25 NOTE — PROGRESS NOTES
"PT Re-Evaluation - Adding cervical radiculopathy     Today's date: 2023  Patient name: Glenn Gomez  : 1965  MRN: [id number]  Referring provider: Gema Ying*  Dx:   Encounter Diagnosis     ICD-10-CM    1  Primary osteoarthritis of left knee  M17 12       2  Chronic pain of left knee  M25 562     G89 29       3  Lumbar radiculopathy  M54 16       4  Chronic pain of right knee  M25 561     G89 29           Start Time: 1110  Stop Time: 1202  Total time in clinic (min): 52 minutes    Subjective: Pt states cervical pain at rest is 3 9/37 and with certain movements it is up to 9/10  Scapular and shoulder resisted interventions aggravated cervical pain  L side lying aggravates cervical pain as well  L cervical radicular symptoms occur with excessive activity and cervical extension  Pain radiates through LUE into L hand  States L Sh is very bothersome today  Pt reports continued lumbar and B knee pain  Has not been able to sleep due to pain        Objective: See treatment diary below         Postural Findings:              Head Position x Protracted  Neutral  Retracted   Scapular Position x Protracted  Neutral  Retracted   Thoracic Spine  Inc Kyphosis x Neutral     Lumbar Spine  Inc Lordosis  Neutral x Dec Lordosis   Pelvis  Anterior Tilt  Neutral x Posterior Tilt   Iliac Crest  L elevated x Neutral  R elevated   Scoliotic Curvature  \"C\" Curve  \"S\" Curve     Lateral Shift  Right  Left x None       Strength and ROM evaluated B from a regional biomechanical perspective and values relevant to this episode recorded in tables below    Range of Motion measurements for the Cervical Spine (in degrees)     Joint Motion Right:  Left:    Flexion 90%    Extension 10%*    Rotation 75% 25%*   Lateral Flexion 75% 25%*   Protraction 75%    Retraction 25%*      UE Myotomes:  Nerve Root Test Action RIGHT  LEFT    C3 Neck side flexion intact intact   C4 Shoulder elevation intact intact   C5 Shoulder " abduction intact intact   C6 Elbow Flexion and wrist extension intact intact   C7 Elbow extension and wrist flexion intact intact   C8 Thumb extension and ulnar deviation intact intact   T1 Hand intrinsics intact intact       Palpation: TTP at L sided cervical region    Directional Preference: repeated cervical retraction appears to partially centralize symptoms to wrist rather than through fingers    Joint Mobility: L SH with significant crepitus with PROM; c/s limited PROM in all directions    Cervical Vascular Screenin-D's   Dizziness   Diploplia   Drop Attacks   Dysphagia   Dysarthria  3-N's   Nausea   Nystagmus    Numbness  The above were all Negative    Upper Cervical Ligament Testing:   Transverse ligament stress test   Alar Ligament stress test   Sharp-Donn   *The above were all Negative    Cervical Radiculopathy Test Item Cluster Casandra Mcleod RS et al  Mansi Harrison, 2003)  Any 3 above + = + LR of 6 1  Any 4 above + = + LR of 30 3  Test / Measure  2023   Upper Limb Tension Test A +   Spurling's A +   Distraction +   C-Rotation < 60 ipsilateral side +                 Assessment: Tolerated treatment well  Patient would benefit from continued PT   1:1 with Khoi Carver DPT entirety of tx  Pt presenting to the clinic with a referral of cervical radiculopathy  Pt presents with signs and symptoms indicative of L sided cervical radiculopathy  Partial centralization of symptoms following manual therapy and repeated cervical retractions  Pt presenting with deep neck flexor motor control dysfunction, scapular motor control dysfunction, cervical pain and ROM deficits  Provided pt with written home exercise program to be completed independently at least 1x per day  From initial evaluation on 3/8/2023 - Assessment details: Arleen Palafox is a 56 y o  male presenting with B knee pain and lumbar radicular pain which was assessed during this initial evaluation    Pt also presents with cervical radicular symptoms which were not assessed today due to not having a referral in the system  Pain started following a fall  Knee pain likely due to OA  L ACL laxity noted  Primary impairments include lumbar and knee pain with functional activities, BLE and paraspinal weakness, lumbar AROM dysfunction, paraspinal and BLE motor control dysfunction  Educated pt on anatomy and physiology of diagnosis  Will benefit from skilled PT interventions for community reintegration, ADL management/independence, return to sport/hobbies  Impairments: abnormal coordination, abnormal muscle firing, abnormal muscle tone, abnormal or restricted ROM, activity intolerance, impaired physical strength, lacks appropriate home exercise program, pain with function, poor posture  and poor body mechanics  Functional limitations: squatting, transfers, ADLs, stair negotiation, prolonged standing/walking  Symptom irritability: highBarriers to therapy: transportation  Understanding of Dx/Px/POC: fair   Prognosis: fair    Goals    Short Term Goals: In 4 weeks, the patient will:  1  Improve B knee extension and flexion strength to 4/5 MMT  2  Improve lumbar extension to at least 40%  3  Supervision with HEP for self-care  4  Partially centralize cervical radicular symptoms to elbow - NEW    Long Term Goals: In 8 weeks, the patient will:  1  Tolerate walking 30+ minutes without aggravating pain symptoms  2  FOTO to greater than predicted value  3  Independent with HEP for self-care  4  Improve L cervical sidebending and rotation to 50+% - NEW    Plan: Progress treatment as tolerated         Precautions: prediabetes    Pertinent Findings and Outcome Measures:                                                                                                                                                                         Test / Measure  3/8/2023      FOTO 33      B hip flex MMT 3/5      B knee ext MMT 4-/5      L/s ext AROM 25%          Manuals 3/8 3/16 4/11 "4/18 4/21 4/25       Paraspinal STM     Theragun, 5'        BL Hip/Knee PROM     MC 3'        C/s STM, PROM, UT S, SOR      MM 8'                    Neuro Re-Ed             Bridges 15x 30x 30x on PB 20x on PB 20x on PB        LTR 5x ea 10x ea 20x ea on PB 15x ea on PB 15x ea on PB        Mini squats  2x10; 5s hold 2x15; 5s hold          Hooklying alternating clamshell  30x ea; btb   30x ea; btb        Banded side steps  5 laps; btb 5 laps mtb          [place] seated good mornings + row   2x10 18#          PPT   10x5\"          QS + SLR   2x10 B 2x10 B 2# 2x10         PB squats    2x10         PB rollouts    15x ea 15x ea        DNF supine      15x       SNAGS - rot      5x5\" B       SNAGS - ext      10x5\"       Scap retr      5x       C/s isometrics             Ther Ex             HEP review 5'            NuStep  8 min; lvl 8 10' L8 10' L8 7' L8 10' L7       Stand hip 3 way             Supine PBall hamstring curl  30x 20x 15x 15x        DL/rack pulls   3x8 15# 9\" step p!          Leg press   unable          STS    Suit case hold 2x8 B 8#         UT/LS S      30\" ea B       Shrugs                          Ther Activity                                       Gait Training                                       Modalities                                            "

## 2023-04-27 ENCOUNTER — APPOINTMENT (OUTPATIENT)
Dept: PHYSICAL THERAPY | Facility: REHABILITATION | Age: 58
End: 2023-04-27

## 2023-05-01 ENCOUNTER — OFFICE VISIT (OUTPATIENT)
Dept: PHYSICAL THERAPY | Facility: REHABILITATION | Age: 58
End: 2023-05-01

## 2023-05-01 DIAGNOSIS — M25.561 CHRONIC PAIN OF RIGHT KNEE: ICD-10-CM

## 2023-05-01 DIAGNOSIS — G89.29 CHRONIC PAIN OF LEFT KNEE: ICD-10-CM

## 2023-05-01 DIAGNOSIS — M17.12 PRIMARY OSTEOARTHRITIS OF LEFT KNEE: Primary | ICD-10-CM

## 2023-05-01 DIAGNOSIS — M54.12 CERVICAL RADICULOPATHY: ICD-10-CM

## 2023-05-01 DIAGNOSIS — G89.29 CHRONIC PAIN OF RIGHT KNEE: ICD-10-CM

## 2023-05-01 DIAGNOSIS — M54.16 LUMBAR RADICULOPATHY: ICD-10-CM

## 2023-05-01 DIAGNOSIS — M25.562 CHRONIC PAIN OF LEFT KNEE: ICD-10-CM

## 2023-05-01 NOTE — PROGRESS NOTES
"Daily Note     Today's date: 2023  Patient name: Cheikh Valdivia  : 1965  MRN: [id number]  Referring provider: Ritika Moon*  Dx:   Encounter Diagnosis     ICD-10-CM    1  Primary osteoarthritis of left knee  M17 12       2  Chronic pain of left knee  M25 562     G89 29       3  Lumbar radiculopathy  M54 16       4  Chronic pain of right knee  M25 561     G89 29       5  Cervical radiculopathy  M54 12           Start Time: 1118  Stop Time: 1220  Total time in clinic (min): 62 minutes    Subjective: Pt reports continued cervical, L Sh, B knee, and L sided lumbar pain  States that he has not been consistent with HEP  Has psychology appt later this afternoon  Objective: See treatment diary below      Assessment: Tolerated treatment well  Patient would benefit from continued PT  Pt tolerated progression of POC this tx session focusing on lumbar, knee, and cervical dysfunctions  Mild DAMON with repetitive and prolonged therapeutic interventions  Cervical radiculopathy continues to occur with physical activity and repetitive cervical targeted interventions  Moderate fatigue post-session  Pt with improvement of symptoms post-session  1:1 with Tulio Bradley DPT entirety of tx  Plan: Progress treatment as tolerated         Precautions: prediabetes    Pertinent Findings and Outcome Measures:                                                                                                                                                                         Test / Measure  3/8/2023      FOTO 33      B hip flex MMT 3/5      B knee ext MMT 4-/5      L/s ext AROM 25%          Manuals 3/8 3/16 4/11 4/18 4/21 4/25 5/1      Paraspinal STM     Theragun, 5'        BL Hip/Knee PROM     MC 3'        C/s STM, PROM, UT S, SOR      MM 8'                    Neuro Re-Ed             Bridges 15x 30x 30x on PB 20x on PB 20x on PB  20x8\" on PB      LTR 5x ea 10x ea 20x ea on PB 15x ea on PB 15x ea on PB  " "15x ea on PB      Mini squats  2x10; 5s hold 2x15; 5s hold          Hooklying alternating clamshell  30x ea; btb   30x ea; btb        Banded side steps  5 laps; btb 5 laps mtb          Deandre seated good mornings + row   2x10 18#    2x15 20#      PPT   10x5\"          QS + SLR   2x10 B 2x10 B 2# 2x10         PB squats    2x10         PB rollouts    15x ea 15x ea        Suitcase carry       3x100' B 15#      DNF supine      15x 15x3\"      SNAGS - rot      5x5\" B       SNAGS - ext      10x5\"       Scap retr      5x       C/s isometrics       10x5\"   4 way      Neck bridges at wall       2x10      Ther Ex             HEP review 5'            NuStep  8 min; lvl 8 10' L8 10' L8 7' L8 10' L7 10' L7      Stand hip 3 way             Supine PBall hamstring curl  30x 20x 15x 15x        DL/rack pulls   3x8 15# 9\" step p!          Leg press   unable          STS    Suit case hold 2x8 B 8#   3x8 15#      UT/LS S      30\" ea B       Shrugs       10#  10x rot  10x lat flex      FSU + FSD       20x B 9\" step      Ther Activity                                       Gait Training                                       Modalities                                            "

## 2023-05-02 ENCOUNTER — TELEPHONE (OUTPATIENT)
Dept: NEUROSURGERY | Facility: CLINIC | Age: 58
End: 2023-05-02

## 2023-05-02 DIAGNOSIS — M54.12 CERVICAL RADICULOPATHY: Primary | ICD-10-CM

## 2023-05-02 DIAGNOSIS — M50.30 DDD (DEGENERATIVE DISC DISEASE), CERVICAL: ICD-10-CM

## 2023-05-02 DIAGNOSIS — M54.2 CERVICAL PAIN (NECK): ICD-10-CM

## 2023-05-02 DIAGNOSIS — M51.34 DDD (DEGENERATIVE DISC DISEASE), THORACIC: ICD-10-CM

## 2023-05-02 DIAGNOSIS — M54.16 LUMBAR RADICULOPATHY: ICD-10-CM

## 2023-05-02 NOTE — TELEPHONE ENCOUNTER
Received a call from [facility] requesting referral, OV notes and imaging report be sent to [facility] so he can scheduled an appt with them  Placed new order as original Rx denied since he was d/c from  [facility]  Faxed records as requested to [phone]  He was appreciative

## 2023-05-05 ENCOUNTER — APPOINTMENT (OUTPATIENT)
Dept: PHYSICAL THERAPY | Facility: REHABILITATION | Age: 58
End: 2023-05-05
Payer: MEDICARE

## 2023-05-09 ENCOUNTER — APPOINTMENT (OUTPATIENT)
Dept: PHYSICAL THERAPY | Facility: REHABILITATION | Age: 58
End: 2023-05-09
Payer: MEDICARE

## 2023-05-11 ENCOUNTER — OFFICE VISIT (OUTPATIENT)
Dept: PHYSICAL THERAPY | Facility: REHABILITATION | Age: 58
End: 2023-05-11

## 2023-05-11 DIAGNOSIS — G89.29 CHRONIC PAIN OF RIGHT KNEE: ICD-10-CM

## 2023-05-11 DIAGNOSIS — M17.12 PRIMARY OSTEOARTHRITIS OF LEFT KNEE: Primary | ICD-10-CM

## 2023-05-11 DIAGNOSIS — M25.561 CHRONIC PAIN OF RIGHT KNEE: ICD-10-CM

## 2023-05-11 DIAGNOSIS — M25.562 CHRONIC PAIN OF LEFT KNEE: ICD-10-CM

## 2023-05-11 DIAGNOSIS — G89.29 CHRONIC PAIN OF LEFT KNEE: ICD-10-CM

## 2023-05-11 DIAGNOSIS — M54.12 CERVICAL RADICULOPATHY: ICD-10-CM

## 2023-05-11 DIAGNOSIS — M54.16 LUMBAR RADICULOPATHY: ICD-10-CM

## 2023-05-11 NOTE — PROGRESS NOTES
"Daily Note     Today's date: 2023  Patient name: Melyssa Porter  : 1965  MRN: [id number]  Referring provider: Margi Abreu*  Dx:   Encounter Diagnosis     ICD-10-CM    1  Primary osteoarthritis of left knee  M17 12       2  Chronic pain of left knee  M25 562     G89 29       3  Lumbar radiculopathy  M54 16       4  Chronic pain of right knee  M25 561     G89 29       5  Cervical radiculopathy  M54 12                      Subjective: Pt reports increased pain today in his neck, back, left shoulder and knees  Pt notes walking in from the corner caused excruciating pain  Objective: See treatment diary below      Assessment: Tolerated treatment well  Pt easily gets off task requiring cues for re-direction of exercise performance  Pt reports his back feels a little better post session  Patient would benefit from continued PT      Plan: Continue per plan of care  Progress treatment as tolerated         Precautions: prediabetes    Pertinent Findings and Outcome Measures:                                                                                                                                                                         Test / Measure  3/8/2023      FOTO 33      B hip flex MMT 3/5      B knee ext MMT 4-/5      L/s ext AROM 25%          Manuals 3/8 3/16 4/11 4/18 4/21 4/25 5/1 5/11     Paraspinal STM     Theragun, 5'        BL Hip/Knee PROM     MC 3'        C/s STM, PROM, UT S, SOR      MM 8'                    Neuro Re-Ed             Bridges 15x 30x 30x on PB 20x on PB 20x on PB  20x8\" on PB 20x on PB     LTR 5x ea 10x ea 20x ea on PB 15x ea on PB 15x ea on PB  15x ea on PB 15x ea on PB     Mini squats  2x10; 5s hold 2x15; 5s hold          Hooklying alternating clamshell  30x ea; btb   30x ea; btb        Banded side steps  5 laps; btb 5 laps mtb          Deandre seated good mornings + row   2x10 18#    2x15 20#      PPT   10x5\"          QS + SLR   2x10 B 2x10 B 2# 2x10      " "   PB squats    2x10         PB rollouts    15x ea 15x ea        Suitcase carry       3x100' B 15#      DNF supine      15x 15x3\"      SNAGS - rot      5x5\" B       SNAGS - ext      10x5\"       Scap retr      5x       C/s isometrics       10x5\"   4 way      Neck bridges at wall       2x10      Ther Ex             HEP review 5'            NuStep  8 min; lvl 8 10' L8 10' L8 7' L8 10' L7 10' L7 10' L7     Stand hip 3 way             Supine PBall hamstring curl  30x 20x 15x 15x        DL/rack pulls   3x8 15# 9\" step p!          Leg press   unable          STS    Suit case hold 2x8 B 8#   3x8 15# 3x8 15#     UT/LS S      30\" ea B       Shrugs       10#  10x rot  10x lat flex 10# 10x rot, 10x lat flex     FSU + FSD       20x B 9\" step 20x B 6\" step     Ther Activity                                       Gait Training                                       Modalities                                            "

## 2023-05-12 ENCOUNTER — OFFICE VISIT (OUTPATIENT)
Dept: PHYSICAL THERAPY | Facility: REHABILITATION | Age: 58
End: 2023-05-12

## 2023-05-12 DIAGNOSIS — M54.12 CERVICAL RADICULOPATHY: ICD-10-CM

## 2023-05-12 DIAGNOSIS — M54.16 LUMBAR RADICULOPATHY: ICD-10-CM

## 2023-05-12 DIAGNOSIS — M25.561 CHRONIC PAIN OF RIGHT KNEE: ICD-10-CM

## 2023-05-12 DIAGNOSIS — M25.562 CHRONIC PAIN OF LEFT KNEE: ICD-10-CM

## 2023-05-12 DIAGNOSIS — G89.29 CHRONIC PAIN OF RIGHT KNEE: ICD-10-CM

## 2023-05-12 DIAGNOSIS — M17.12 PRIMARY OSTEOARTHRITIS OF LEFT KNEE: Primary | ICD-10-CM

## 2023-05-12 DIAGNOSIS — G89.29 CHRONIC PAIN OF LEFT KNEE: ICD-10-CM

## 2023-05-12 NOTE — PROGRESS NOTES
"Daily Note     Today's date: 2023  Patient name: Malika Zarate  : 1965  MRN: [id number]  Referring provider: Amanda Trent*  Dx:   Encounter Diagnosis     ICD-10-CM    1  Primary osteoarthritis of left knee  M17 12       2  Chronic pain of left knee  M25 562     G89 29       3  Lumbar radiculopathy  M54 16       4  Chronic pain of right knee  M25 561     G89 29       5  Cervical radiculopathy  M54 12           Start Time: 2415  Stop Time: 1088  Total time in clinic (min): 42 minutes    Subjective: Pt reports continued pain throughout body  States that L Sh is in severe pain  Had tx session yesterday and added in workout on his own at this house  Pt states that he is very tired today from not sleeping as well  Objective: See treatment diary below      Assessment: Tolerated treatment well  Patient would benefit from continued PT  Able to tolerate variation of POC this tx session to address functional deficits  Pt with R knee pain and L Sh pain throughout tx session  Unable to complete KB rack pulls due to pain and hernia concerns  FOTO scores have improved but remain under predictive value  1:1 with Michell Mireles DPT entirety of tx  Plan: Progress treatment as tolerated         Precautions: prediabetes    Pertinent Findings and Outcome Measures:                                                                                                                                                                         Test / Measure  3/8/2023 2023     FOTO L/s 33 knee 33 L/s 47 knee 54     B hip flex MMT 3/5      B knee ext MMT 4-/5      L/s ext AROM 25%          Manuals 3/8 3/16 4/11 4/18 4/21 4/25 5/1 5/11 5/12    Paraspinal STM     Theragun, 5'        BL Hip/Knee PROM     MC 3'        C/s STM, PROM, UT S, SOR      MM 8'                    Neuro Re-Ed             Bridges 15x 30x 30x on PB 20x on PB 20x on PB  20x8\" on PB 20x on PB     LTR 5x ea 10x ea 20x ea on PB 15x ea on PB " "15x ea on PB  15x ea on PB 15x ea on PB     Mini squats  2x10; 5s hold 2x15; 5s hold          Hooklying alternating clamshell  30x ea; btb   30x ea; btb        Banded side steps  5 laps; btb 5 laps mtb      4 laps mtb    TB monster walks         4 laps mtb    Deandre seated good mornings + row   2x10 18#    2x15 20#      PPT   10x5\"          QS + SLR   2x10 B 2x10 B 2# 2x10         PB squats    2x10         PB rollouts    15x ea 15x ea        Suitcase carry       3x100' B 15#      DNF supine      15x 15x3\"      SNAGS - rot      5x5\" B       SNAGS - ext      10x5\"       Scap retr      5x       C/s isometrics       10x5\"   4 way      Neck bridges at wall       2x10      Wall slides w/ foam roll         15x5\"    Unilat rows + trunk rot         15x B 12#    Deandre punch + trunk rot         20x B 12#    Pallof press         2x15 B 11#    Ther Ex             HEP review 5'            NuStep  8 min; lvl 8 10' L8 10' L8 7' L8 10' L7 10' L7 10' L7 10' L7    Stand hip 3 way             Supine PBall hamstring curl  30x 20x 15x 15x        DL/rack pulls   3x8 15# 9\" step p!     unable    Leg press   unable          STS    Suit case hold 2x8 B 8#   3x8 15# 3x8 15#     UT/LS S      30\" ea B       Shrugs       10#  10x rot  10x lat flex 10# 10x rot, 10x lat flex     FSU + FSD       20x B 9\" step 20x B 6\" step     Ther Activity                                       Gait Training                                       Modalities                                            "

## 2023-05-13 ENCOUNTER — HOSPITAL ENCOUNTER (EMERGENCY)
Facility: HOSPITAL | Age: 58
Discharge: HOME | End: 2023-05-14
Attending: EMERGENCY MEDICINE

## 2023-05-13 DIAGNOSIS — M79.605 LEFT LEG PAIN: Primary | ICD-10-CM

## 2023-05-14 VITALS
SYSTOLIC BLOOD PRESSURE: 119 MMHG | DIASTOLIC BLOOD PRESSURE: 56 MMHG | OXYGEN SATURATION: 95 % | TEMPERATURE: 97.4 F | RESPIRATION RATE: 20 BRPM | HEART RATE: 67 BPM

## 2023-05-14 LAB — D DIMER PPP FEU-MCNC: 0.28 UG/ML FEU

## 2023-05-14 RX ORDER — DIAZEPAM 5 MG/1
5 TABLET ORAL ONCE
Status: COMPLETED | OUTPATIENT
Start: 2023-05-14 | End: 2023-05-14

## 2023-05-14 RX ADMIN — DIAZEPAM 5 MG: 5 TABLET ORAL at 00:52

## 2023-05-14 RX ADMIN — DIAZEPAM 5 MG: 5 TABLET ORAL at 01:54

## 2023-05-14 RX ADMIN — DIAZEPAM 5 MG: 5 TABLET ORAL at 02:35
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
## 2023-05-14 NOTE — DISCHARGE INSTRUCTIONS
You were seen and evaluated in the emergency department for left leg pain  We checked a blood test a D-dimer to assess for the possibility of blood clot  This lab value was normal not elevated, no blood clot in your leg  Your pain is likely musculoskeletal as it is exacerbated by movement  Please follow-up with your primary care physician for evaluation of your leg pain, your A1c, anxiety and any other complaints    If your symptoms worsen or persist please return to the emergency department for further evaluation and management

## 2023-05-16 ENCOUNTER — APPOINTMENT (OUTPATIENT)
Dept: PHYSICAL THERAPY | Facility: REHABILITATION | Age: 58
End: 2023-05-16
Payer: MEDICARE

## 2023-05-16 ENCOUNTER — TELEPHONE (OUTPATIENT)
Dept: INTERNAL MEDICINE CLINIC | Facility: CLINIC | Age: 58
End: 2023-05-16

## 2023-05-16 NOTE — TELEPHONE ENCOUNTER
"Patient called in to schedule an appointment with PCP for an ED f/u  I explained to patient that the next available appointment was not until 7/3  I offered to schedule patient an appointment and place him on the waitlist  Patient expressed frustration and said \"I can die before then  I do not care about a waitlist  How many people you put on the waitlist today? \" Patient disconnected the call before scheduling an appointment  The patient is concerned because of his symptoms he would like to speak with a doctor and he would like labs order     "

## 2023-05-17 NOTE — TELEPHONE ENCOUNTER
Called and lmom for patient to call back  Dr Jose J Sethi has an appointment on 5/18 at 10:30 available and we can put patient there if still available

## 2023-05-19 ENCOUNTER — APPOINTMENT (OUTPATIENT)
Dept: PHYSICAL THERAPY | Facility: REHABILITATION | Age: 58
End: 2023-05-19
Payer: MEDICARE

## 2023-05-23 ENCOUNTER — OFFICE VISIT (OUTPATIENT)
Dept: PHYSICAL THERAPY | Facility: REHABILITATION | Age: 58
End: 2023-05-23

## 2023-05-23 DIAGNOSIS — G89.29 CHRONIC PAIN OF RIGHT KNEE: ICD-10-CM

## 2023-05-23 DIAGNOSIS — M17.12 PRIMARY OSTEOARTHRITIS OF LEFT KNEE: Primary | ICD-10-CM

## 2023-05-23 DIAGNOSIS — M25.561 CHRONIC PAIN OF RIGHT KNEE: ICD-10-CM

## 2023-05-23 DIAGNOSIS — M54.12 CERVICAL RADICULOPATHY: ICD-10-CM

## 2023-05-23 DIAGNOSIS — M25.562 CHRONIC PAIN OF LEFT KNEE: ICD-10-CM

## 2023-05-23 DIAGNOSIS — M54.16 LUMBAR RADICULOPATHY: ICD-10-CM

## 2023-05-23 DIAGNOSIS — G89.29 CHRONIC PAIN OF LEFT KNEE: ICD-10-CM

## 2023-05-23 NOTE — PROGRESS NOTES
"Daily Note     Today's date: 2023  Patient name: Juancarlos Mitchell  : 1965  MRN: [id number]  Referring provider: Sean Payton*  Dx:   Encounter Diagnosis     ICD-10-CM    1  Primary osteoarthritis of left knee  M17 12       2  Chronic pain of left knee  M25 562     G89 29       3  Lumbar radiculopathy  M54 16       4  Chronic pain of right knee  M25 561     G89 29       5  Cervical radiculopathy  M54 12           Start Time: 7776  Stop Time: 1402  Total time in clinic (min): 38 minutes    Subjective: Pt reports that he has been compliant with HEP and is feeling much better over the past several days  Objective: See treatment diary below      Assessment: Tolerated treatment well  Patient would benefit from continued PT  Pt had best tx session today and was able to tolerate all planned therapeutic interventions with progressions included  Challenged with balance control involving LE and lumbar strength and endurance  Mild difficulty using LUE due to L Sh arthritis/pain  1:1 with Jw Tavarez DPT entirety of tx  Plan: Progress treatment as tolerated         Precautions: prediabetes    Pertinent Findings and Outcome Measures:                                                                                                                                                                         Test / Measure  3/8/2023 2023     FOTO L/s 33 knee 33 L/s 47 knee 54     B hip flex MMT 3/5      B knee ext MMT 4-/5      L/s ext AROM 25%          Manuals 3/16 4/11 4/18 4/21 4/25 5/1 5/11 5/12 5/23   Paraspinal STM    Theragun, 5'        BL Hip/Knee PROM    MC 3'        C/s STM, PROM, UT S, SOR     MM 8'                   Neuro Re-Ed            Bridges 30x 30x on PB 20x on PB 20x on PB  20x8\" on PB 20x on PB  3x10 on PB 2\" hold + 3\" ecc   LTR 10x ea 20x ea on PB 15x ea on PB 15x ea on PB  15x ea on PB 15x ea on PB     Mini squats 2x10; 5s hold 2x15; 5s hold          Hooklying alternating " "clamshell 30x ea; btb   30x ea; btb        Banded side steps 5 laps; btb 5 laps mtb      4 laps mtb See below at [address] press   TB monster walks        4 laps mtb    Deandre seated good mornings + row  2x10 18#    2x15 20#      PPT  10x5\"          QS + SLR  2x10 B 2x10 B 2# 2x10         PB squats   2x10         PB rollouts   15x ea 15x ea        Suitcase carry      3x100' B 15#   + march  3x50' B 15#   DNF supine     15x 15x3\"      SNAGS - rot     5x5\" B       SNAGS - ext     10x5\"       Scap retr     5x       C/s isometrics      10x5\"   4 way      Neck bridges at wall      2x10      Wall slides w/ foam roll        15x5\"    Unilat rows + trunk rot        15x B 12#    [place] punch + trunk rot        20x B 12# 2x15 B 15#   [place] press        2x15 B 11# + mtb side step 5 laps ea 7#   SL RDL         2x10 B 10#   Ther Ex            HEP review            NuStep 8 min; lvl 8 10' L8 10' L8 7' L8 10' L7 10' L7 10' L7 10' L7 10' L8   Stand hip 3 way            Supine PBall hamstring curl 30x 20x 15x 15x        DL/rack pulls  3x8 15# 9\" step p!     unable    Leg press  unable          STS   Suit case hold 2x8 B 8#   3x8 15# 3x8 15#  Squat + chest press  3x8 15#   UT/LS S     30\" ea B       Shrugs      10#  10x rot  10x lat flex 10# 10x rot, 10x lat flex     FSU + FSD      20x B 9\" step 20x B 6\" step     Ther Activity                                    Gait Training                                    Modalities                                         "

## 2023-05-26 ENCOUNTER — APPOINTMENT (OUTPATIENT)
Dept: PHYSICAL THERAPY | Facility: REHABILITATION | Age: 58
End: 2023-05-26
Payer: MEDICARE

## 2023-06-01 ENCOUNTER — APPOINTMENT (OUTPATIENT)
Dept: PHYSICAL THERAPY | Facility: REHABILITATION | Age: 58
End: 2023-06-01
Payer: MEDICARE

## 2023-06-02 ENCOUNTER — TELEPHONE (OUTPATIENT)
Dept: INTERNAL MEDICINE CLINIC | Facility: CLINIC | Age: 58
End: 2023-06-02

## 2023-06-02 ENCOUNTER — TELEPHONE (OUTPATIENT)
Dept: NEUROSURGERY | Facility: CLINIC | Age: 58
End: 2023-06-02

## 2023-06-02 NOTE — TELEPHONE ENCOUNTER
ARYAN received from  [facility]  [id number] faxed, confirmed, and scanned into chart

## 2023-06-02 NOTE — TELEPHONE ENCOUNTER
Patient called the office stating he needs the office notes from our office to be sent to his 's office  He provided a Fx# [phone]  Notes were printed, faxed to 's office, and a copy of notes were mailed to patient's last home address:  [employer]  [address] [place] [id number]     __________    Patient also states that he never got his f/u with Dr Maury Pappas  He was last seen on 4/20/23 at which point would recommend trial of conservative management including PT and pain management for symptoms may be mitigated with conservative management  He states that he has been doing PT since his last visit  He is being set up for the shots but they have not called him to do that  I advised him to exhaust all conservative measures as advised at his last visit and if he fails then he can return for office visit to discus further options

## 2023-06-05 ENCOUNTER — OFFICE VISIT (OUTPATIENT)
Dept: OBGYN CLINIC | Facility: HOSPITAL | Age: 58
End: 2023-06-05
Payer: MEDICARE

## 2023-06-05 VITALS
DIASTOLIC BLOOD PRESSURE: 90 MMHG | WEIGHT: 253.6 LBS | HEIGHT: 66 IN | HEART RATE: 76 BPM | BODY MASS INDEX: 40.76 KG/M2 | SYSTOLIC BLOOD PRESSURE: 141 MMHG

## 2023-06-05 DIAGNOSIS — M17.11 PRIMARY OSTEOARTHRITIS OF RIGHT KNEE: Primary | ICD-10-CM

## 2023-06-05 DIAGNOSIS — M17.12 PRIMARY OSTEOARTHRITIS OF LEFT KNEE: ICD-10-CM

## 2023-06-05 PROCEDURE — 99213 OFFICE O/P EST LOW 20 MIN: CPT | Performed by: ORTHOPAEDIC SURGERY
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
## 2023-06-07 ENCOUNTER — APPOINTMENT (OUTPATIENT)
Dept: PHYSICAL THERAPY | Facility: REHABILITATION | Age: 58
End: 2023-06-07
Payer: MEDICARE

## 2023-06-09 ENCOUNTER — APPOINTMENT (OUTPATIENT)
Dept: PHYSICAL THERAPY | Facility: REHABILITATION | Age: 58
End: 2023-06-09
Payer: MEDICARE

## 2023-06-13 ENCOUNTER — OFFICE VISIT (OUTPATIENT)
Dept: PHYSICAL THERAPY | Facility: REHABILITATION | Age: 58
End: 2023-06-13
Payer: MEDICARE

## 2023-06-13 DIAGNOSIS — M25.561 CHRONIC PAIN OF RIGHT KNEE: ICD-10-CM

## 2023-06-13 DIAGNOSIS — M54.16 LUMBAR RADICULOPATHY: ICD-10-CM

## 2023-06-13 DIAGNOSIS — M25.562 CHRONIC PAIN OF LEFT KNEE: ICD-10-CM

## 2023-06-13 DIAGNOSIS — M17.12 PRIMARY OSTEOARTHRITIS OF LEFT KNEE: Primary | ICD-10-CM

## 2023-06-13 DIAGNOSIS — G89.29 CHRONIC PAIN OF LEFT KNEE: ICD-10-CM

## 2023-06-13 DIAGNOSIS — M54.12 CERVICAL RADICULOPATHY: ICD-10-CM

## 2023-06-13 DIAGNOSIS — G89.29 CHRONIC PAIN OF RIGHT KNEE: ICD-10-CM

## 2023-06-13 PROCEDURE — 97112 NEUROMUSCULAR REEDUCATION: CPT

## 2023-06-13 PROCEDURE — 97535 SELF CARE MNGMENT TRAINING: CPT

## 2023-06-13 PROCEDURE — 97110 THERAPEUTIC EXERCISES: CPT

## 2023-06-13 PROCEDURE — 97140 MANUAL THERAPY 1/> REGIONS: CPT

## 2023-06-13 NOTE — PROGRESS NOTES
Daily Note     Today's date: 2023  Patient name: Emile Marroquin  : 1965  MRN: [id number]  Referring provider: Linh Klein*  Dx:   Encounter Diagnosis     ICD-10-CM    1  Primary osteoarthritis of left knee  M17 12       2  Chronic pain of left knee  M25 562     G89 29       3  Lumbar radiculopathy  M54 16       4  Chronic pain of right knee  M25 561     G89 29       5  Cervical radiculopathy  M54 12           Start Time: 8030  Stop Time: 6001  Total time in clinic (min): 78 minutes    Subjective: Pt reports that he has been practicing exercises at home  States that his biggest concern is cervical dysfunction - scheduled for injection tomorrow  Improvements noted at B knees  Objective: See treatment diary below      Assessment: Tolerated treatment well  Patient would benefit from continued PT  Pt able to tolerate cervical centered treatment session  Spent time discussing MRI findings and neurosurgery reports in order to clarify notes with patient  Addressed imaging results with clinical findings  Pt challenged with progression of cervical POC  Moderate L Sh discomfort with various exercises that utilize Sh motor control to complete  Pt able to respond well to VCs, tactile cuing, and demonstration to complete interventions correctly  Difficulty performing neck bridges against the wall and deep neck flexor training due to cervical motor control deficits  Will continue to work towards improving cervical motor control and musculature endurance to improve functionality  Pt reports significant improvement of cervical pain and that he may not need the injection  1:1 with Carloz Arreola DPT entirety of tx  Plan: Progress treatment as tolerated         Precautions: prediabetes    Pertinent Findings and Outcome Measures: "     Test / Measure  3/8/2023 4/25/2023 5/12/2023     FOTO L/s 33 knee 33  L/s 47 knee 54 L/s 94 knee 99    B hip flex MMT 3/5       B knee ext MMT 4-/5       L/s ext AROM 25%       + C/s radic tests  + Spurlings, distraction, ULTT, C/s rot          Manuals 4/11 4/18 4/21 4/25 5/1 5/11 5/12 5/23 6/13   Paraspinal STM   Theragun, 5'         BL Hip/Knee PROM   MC 3'         C/s STM, PROM, UT S, SOR    MM 8'     MM 10'               Neuro Re-Ed            Bridges 30x on PB 20x on PB 20x on PB  20x8\" on PB 20x on PB  3x10 on PB 2\" hold + 3\" ecc    LTR 20x ea on PB 15x ea on PB 15x ea on PB  15x ea on PB 15x ea on PB      Mini squats 2x15; 5s hold           Hooklying alternating clamshell   30x ea; btb         Banded side steps 5 laps mtb      4 laps mtb See below at pallof press    TB monster walks       4 laps mtb     [place] seated good mornings + row 2x10 18#    2x15 20#       PPT 10x5\"           QS + SLR 2x10 B 2x10 B 2# 2x10          PB squats  2x10          PB rollouts  15x ea 15x ea         Suitcase carry     3x100' B 15#   + march  3x50' B 15#    DNF supine    15x 15x3\"    15x5\" 3 way   SNAGS - rot    5x5\" B        SNAGS - ext    10x5\"        Scap retr    5x        C/s isometrics     10x5\"   4 way       Neck bridges at wall     2x10    3x10   Wall slides w/ foam roll       15x5\"     Unilat rows + trunk rot       15x B 12#     [place] punch + trunk rot       20x B 12# 2x15 B 15#    Pallof press       2x15 B 11# + mtb side step 5 laps ea 7#    SL RDL        2x10 B 10#    Ther Ex            HEP review            NuStep 10' L8 10' L8 7' L8 10' L7 10' L7 10' L7 10' L7 10' L8    UBE         3'/3' L1   Stand hip 3 way            Supine PBall hamstring curl 20x 15x 15x         DL/rack pulls 3x8 15# 9\" step p!     unable     Leg press unable           STS  Suit case hold 2x8 B 8#   3x8 15# 3x8 15#  Squat + chest press  3x8 15#    UT/LS S    30\" ea B        Shrugs     10#  10x rot  10x lat flex 10# 10x rot, 10x lat flex   " "3x10 3\" hold 15# DBs   [place] rows         3x10 25#   [place] Sh ext         3x10 20#   [place] facepulls         3x10 18#   FSU + FSD     20x B 9\" step 20x B 6\" step      Ther Activity                                    Gait Training                                    Self-care            MRI/CT scan imaging results, neurosurgery notes - discussion and interpretation         MM 15'                    "

## 2023-06-16 ENCOUNTER — APPOINTMENT (OUTPATIENT)
Dept: PHYSICAL THERAPY | Facility: REHABILITATION | Age: 58
End: 2023-06-16
Payer: MEDICARE

## 2023-06-19 ENCOUNTER — OFFICE VISIT (OUTPATIENT)
Dept: PHYSICAL THERAPY | Facility: REHABILITATION | Age: 58
End: 2023-06-19
Payer: MEDICARE

## 2023-06-19 DIAGNOSIS — M54.12 CERVICAL RADICULOPATHY: ICD-10-CM

## 2023-06-19 DIAGNOSIS — G89.29 CHRONIC PAIN OF LEFT KNEE: ICD-10-CM

## 2023-06-19 DIAGNOSIS — M54.16 LUMBAR RADICULOPATHY: ICD-10-CM

## 2023-06-19 DIAGNOSIS — M25.561 CHRONIC PAIN OF RIGHT KNEE: ICD-10-CM

## 2023-06-19 DIAGNOSIS — M25.562 CHRONIC PAIN OF LEFT KNEE: ICD-10-CM

## 2023-06-19 DIAGNOSIS — M17.12 PRIMARY OSTEOARTHRITIS OF LEFT KNEE: Primary | ICD-10-CM

## 2023-06-19 DIAGNOSIS — G89.29 CHRONIC PAIN OF RIGHT KNEE: ICD-10-CM

## 2023-06-19 PROCEDURE — 97112 NEUROMUSCULAR REEDUCATION: CPT

## 2023-06-19 PROCEDURE — 97110 THERAPEUTIC EXERCISES: CPT

## 2023-06-19 NOTE — PROGRESS NOTES
Daily Note     Today's date: 2023  Patient name: Romaine Marte  : 1965  MRN: [id number]  Referring provider: Luana Degroot*  Dx:   Encounter Diagnosis     ICD-10-CM    1  Primary osteoarthritis of left knee  M17 12       2  Chronic pain of left knee  M25 562     G89 29       3  Lumbar radiculopathy  M54 16       4  Chronic pain of right knee  M25 561     G89 29       5  Cervical radiculopathy  M54 12           Start Time: 1150  Stop Time: 1230  Total time in clinic (min): 40 minutes    Subjective: Pt reports continued improvement with lumbar and knee pain  Most limiting factor is cervical pain and stiffness  Objective: See treatment diary below      Assessment: Tolerated treatment well  Patient would benefit from continued PT  Pt able to tolerate continuation of POC this tx session to further   Moderate fatigue noted post-session  Pt with good tolerance of resisted cervical retractions at this time - performed in sitting due to lumbar fatigue and soreness following several consecutive standing exercises  Cervical motor control is improving - requires less VCs to complete correctly  Challenged to complete wall angels due to chronic shoulder pain and arthritis  1:1 with Martin Scott DPT entirety of tx  Plan: Progress treatment as tolerated         Precautions: prediabetes    Pertinent Findings and Outcome Measures:                                                                                                                                                                         Test / Measure  3/8/2023 2023 2023 2023    FOTO L/s 33 knee 33  L/s 47 knee 54 L/s 94 knee 99    B hip flex MMT 3/5       B knee ext MMT 4-/5       L/s ext AROM 25%       + C/s radic tests  + Spurlings, distraction, ULTT, C/s rot          Manuals    Paraspinal STM  Theragun, 5'          BL Hip/Knee PROM  MC 3'          C/s STM, PROM, UT S, "SOR   MM 8'     MM 10'                Neuro Re-Ed            Bridges 20x on PB 20x on PB  20x8\" on PB 20x on PB  3x10 on PB 2\" hold + 3\" ecc     LTR 15x ea on PB 15x ea on PB  15x ea on PB 15x ea on PB       Mini squats            Hooklying alternating clamshell  30x ea; btb          Banded side steps      4 laps mtb See below at [address] 25 press     TB monster walks      4 laps mtb      [place] seated good mornings + row    2x15 20#        PPT            QS + SLR 2x10 B 2# 2x10           PB squats 2x10           PB rollouts 15x ea 15x ea          Suitcase carry    3x100' B 15#   + march  3x50' B 15#     DNF supine   15x 15x3\"    15x5\" 3 way    Resisted C/s retr         3x10 mtb   SNAGS - rot   5x5\" B         SNAGS - ext   10x5\"         Scap retr   5x         C/s isometrics    10x5\"   4 way        Neck bridges at wall    2x10    3x10 3x10   Wall slides w/ foam roll      15x5\"      Unilat rows + trunk rot      15x B 12#      [place] punch + trunk rot      20x B 12# 2x15 B 15#     Pallof press      2x15 B 11# + mtb side step 5 laps ea 7#     SL RDL       2x10 B 10#     Wall angels         3x10   Ther Ex            HEP review            NuStep 10' L8 7' L8 10' L7 10' L7 10' L7 10' L7 10' L8     UBE        3'/3' L1 3'/3' L6   Stand hip 3 way            Supine PBall hamstring curl 15x 15x          DL/rack pulls p!     unable      Leg press            STS Suit case hold 2x8 B 8#   3x8 15# 3x8 15#  Squat + chest press  3x8 15#     UT/LS S   30\" ea B         Shrugs    10#  10x rot  10x lat flex 10# 10x rot, 10x lat flex   3x10 3\" hold 15# DBs 3x10 3\" hold 15# DBs   [place] rows        3x10 25# 3x10 30#   [place] Sh ext        3x10 20# 3x10 22#   [place] facepulls        3x10 18# 3x10 18#   FSU + FSD    20x B 9\" step 20x B 6\" step       Ther Activity                                    Gait Training                                    Self-care            MRI/CT scan imaging results, neurosurgery notes - discussion and interpretation   " MM 15'

## 2023-06-21 ENCOUNTER — HOSPITAL ENCOUNTER (EMERGENCY)
Facility: HOSPITAL | Age: 58
Discharge: HOME | End: 2023-06-21
Attending: EMERGENCY MEDICINE
Payer: MEDICARE

## 2023-06-21 VITALS
RESPIRATION RATE: 18 BRPM | TEMPERATURE: 98.2 F | DIASTOLIC BLOOD PRESSURE: 92 MMHG | OXYGEN SATURATION: 97 % | HEART RATE: 85 BPM | SYSTOLIC BLOOD PRESSURE: 172 MMHG

## 2023-06-21 DIAGNOSIS — K08.89 DENTALGIA: Primary | ICD-10-CM

## 2023-06-21 PROCEDURE — 99283 EMERGENCY DEPT VISIT LOW MDM: CPT

## 2023-06-21 PROCEDURE — 96372 THER/PROPH/DIAG INJ SC/IM: CPT

## 2023-06-21 RX ORDER — OXYCODONE HYDROCHLORIDE AND ACETAMINOPHEN 5; 325 MG/1; MG/1
1 TABLET ORAL ONCE
Status: COMPLETED | OUTPATIENT
Start: 2023-06-21 | End: 2023-06-21

## 2023-06-21 RX ORDER — KETOROLAC TROMETHAMINE 30 MG/ML
15 INJECTION, SOLUTION INTRAMUSCULAR; INTRAVENOUS ONCE
Status: COMPLETED | OUTPATIENT
Start: 2023-06-21 | End: 2023-06-21

## 2023-06-21 RX ADMIN — OXYCODONE HYDROCHLORIDE AND ACETAMINOPHEN 1 TABLET: 5; 325 TABLET ORAL at 05:10

## 2023-06-21 RX ADMIN — KETOROLAC TROMETHAMINE 15 MG: 30 INJECTION, SOLUTION INTRAMUSCULAR at 05:57
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
## 2023-06-22 ENCOUNTER — APPOINTMENT (OUTPATIENT)
Dept: PHYSICAL THERAPY | Facility: REHABILITATION | Age: 58
End: 2023-06-22
Payer: MEDICARE

## 2023-06-23 ENCOUNTER — TELEPHONE (OUTPATIENT)
Dept: OBGYN CLINIC | Facility: HOSPITAL | Age: 58
End: 2023-06-23

## 2023-06-23 NOTE — TELEPHONE ENCOUNTER
Caller: patient  Doctor: Angélica Brown    Reason for call: needs [employer] for appt 7/7 @ 1:15pm    Call back#: [phone]

## 2023-06-23 NOTE — TELEPHONE ENCOUNTER
Noted on pts appt line that [employer] needs to be scheduled  We are only able to schedule a wk in advance

## 2023-06-23 NOTE — TELEPHONE ENCOUNTER
Caller: patient  Doctor: Megan Henry    Reason for call: needs LY[employer] for appt 8/22 @ 215 pm     Call back#: [phone]

## 2023-06-28 ENCOUNTER — TELEPHONE (OUTPATIENT)
Dept: INTERNAL MEDICINE CLINIC | Facility: CLINIC | Age: 58
End: 2023-06-28

## 2023-06-28 DIAGNOSIS — F32.A DEPRESSION, UNSPECIFIED DEPRESSION TYPE: ICD-10-CM

## 2023-06-28 NOTE — TELEPHONE ENCOUNTER
Patient called stating he needs a refill of his Depakote 5002mg.  Per patient, his psychologist is supposed to refill the medication but the psychologist due to the fact he did not go to his last appointment   Per patient he is out of medication and really needs his medication at least for 30 days until he sees his psychologist

## 2023-06-29 ENCOUNTER — OFFICE VISIT (OUTPATIENT)
Dept: PHYSICAL THERAPY | Facility: REHABILITATION | Age: 58
End: 2023-06-29
Payer: MEDICARE

## 2023-06-29 DIAGNOSIS — M25.561 CHRONIC PAIN OF RIGHT KNEE: ICD-10-CM

## 2023-06-29 DIAGNOSIS — G89.29 CHRONIC PAIN OF RIGHT KNEE: ICD-10-CM

## 2023-06-29 DIAGNOSIS — M54.16 LUMBAR RADICULOPATHY: ICD-10-CM

## 2023-06-29 DIAGNOSIS — M25.562 CHRONIC PAIN OF LEFT KNEE: ICD-10-CM

## 2023-06-29 DIAGNOSIS — M54.12 CERVICAL RADICULOPATHY: ICD-10-CM

## 2023-06-29 DIAGNOSIS — G89.29 CHRONIC PAIN OF LEFT KNEE: ICD-10-CM

## 2023-06-29 DIAGNOSIS — M17.12 PRIMARY OSTEOARTHRITIS OF LEFT KNEE: Primary | ICD-10-CM

## 2023-06-29 PROCEDURE — 97010 HOT OR COLD PACKS THERAPY: CPT

## 2023-06-29 PROCEDURE — 97112 NEUROMUSCULAR REEDUCATION: CPT

## 2023-06-29 PROCEDURE — 97110 THERAPEUTIC EXERCISES: CPT

## 2023-06-29 NOTE — PROGRESS NOTES
Daily Note     Today's date: 2023  Patient name: Ama Castillo  : 1965  MRN: [id number]  Referring provider: Alex Adames*  Dx:   Encounter Diagnosis     ICD-10-CM    1  Primary osteoarthritis of left knee  M17 12       2  Chronic pain of left knee  M25 562     G89 29       3  Lumbar radiculopathy  M54 16       4  Chronic pain of right knee  M25 561     G89 29       5  Cervical radiculopathy  M54 12           Start Time: 518  Stop Time: 1510  Total time in clinic (min): 42 minutes    Subjective: Pt reports being fatigued prior to start of tx session  Completed a dance session yesterday which caused lumbar and knee soreness  Cervical pain persists  Objective: See treatment diary below      Assessment: Tolerated treatment well  Patient would benefit from continued PT  Pt able to tolerate continued progression of POC this tx session to address cervical dysfunction  Cervical motor control and deep neck flexor endurance is gradually improving with progressive overload, but remains limited at this time  R cervical rotation continues to cause onset of pain symptoms  Significantly challenged with wall angels due to L Sh pain due to arthritis  Scapular control is improving as well - pt may benefit from thoracic mobility interventions to address CT jxn interaction  Utilized cold pack for L Sh, lumbar region, and L knee post-session  Mild cervical fatigue post-session  Slight alleviation of symptoms following usage of cold packs  Further discussed potential cause of cervical pain form injury when window came down on his neck  1:1 with Be Yeboah DPT entirety of tx  Plan: Progress treatment as tolerated  Attempt 2x per week if able       Precautions: prediabetes    Pertinent Findings and Outcome Measures: "  Test / Measure  3/8/2023 4/25/2023 5/12/2023 6/13/2023    FOTO L/s 33 knee 33  L/s 47 knee 54 L/s 94 knee 99    B hip flex MMT 3/5       B knee ext MMT 4-/5       L/s ext AROM 25%       + C/s radic tests  + Spurlings, distraction, ULTT, C/s rot          Manuals 4/21 4/25 5/1 5/11 5/12 5/23 6/13 6/19 6/29   Paraspinal STM Theragun, 5'           BL Hip/Knee PROM MC 3'           C/s STM, PROM, UT S, SOR  MM 8'     MM 10'                 Neuro Re-Ed            Bridges 20x on PB  20x8\" on PB 20x on PB  3x10 on PB 2\" hold + 3\" ecc      LTR 15x ea on PB  15x ea on PB 15x ea on PB        Mini squats            Hooklying alternating clamshell 30x ea; btb           Banded side steps     4 laps mtb See below at [place] press      TB monster walks     4 laps mtb       Deandre seated good mornings + row   2x15 20#         PPT            QS + SLR 2x10            PB squats            PB rollouts 15x ea           Suitcase carry   3x100' B 15#   + march  3x50' B 15#      DNF supine  15x 15x3\"    15x5\" 3 way     Resisted C/s retr        3x10 mtb 3x10 mtb   SNAGS - rot  5x5\" B          SNAGS - ext  10x5\"          Scap retr  5x          C/s isometrics   10x5\"   4 way         Neck bridges at wall   2x10    3x10 3x10    Wall slides w/ foam roll     15x5\"       Unilat rows + trunk rot     15x B 12#       [place] punch + trunk rot     20x B 12# 2x15 B 15#      [place]all[place] press     2x15 B 11# + mtb side step 5 laps ea 7#      SL RDL      2x10 B 10#      Wall angels        3x10 2x10   Supine C/s rot AROM head over EOT         30x B   Foam roll series (4 way)         15x ea   Ther Ex            HEP review            NuStep 7' L8 10' L7 10' L7 10' L7 10' L7 10' L8      UBE       3'/3' L1 3'/3' L6 3'/3' L6   Stand hip 3 way            Supine PBall hamstring curl 15x           DL/rack pulls     unable       Leg press            STS   3x8 15# 3x8 15#  Squat + chest press  3x8 15#      UT/LS S  30\" ea B          Shrugs   10#  10x rot  10x lat flex 10# " "10x rot, 10x lat flex   3x10 3\" hold 15# DBs 3x10 3\" hold 15# DBs 3x10 3\" hold 20# DBs   [place] rows       3x10 25# 3x10 30# 3x10 30#   [place] Sh ext       3x10 20# 3x10 22# 3x10 22#   Deandre facepulls       3x10 18# 3x10 18#    FSU + FSD   20x B 9\" step 20x B 6\" step        Ther Activity                                    Modalities            Cold pack         10' L Sh, lumbar, L knee               Self-care            MRI/CT scan imaging results, neurosurgery notes - discussion and interpretation       MM 15'                      "

## 2023-07-03 NOTE — TELEPHONE ENCOUNTER
Patient called for the status  - states it's an emergency and feeling really weird. Please look into this and call the patient to advise whether or not medication was filled.     thanks

## 2023-07-05 ENCOUNTER — TELEPHONE (OUTPATIENT)
Dept: INTERNAL MEDICINE CLINIC | Facility: CLINIC | Age: 58
End: 2023-07-05

## 2023-07-05 ENCOUNTER — APPOINTMENT (OUTPATIENT)
Dept: PHYSICAL THERAPY | Facility: REHABILITATION | Age: 58
End: 2023-07-05
Payer: MEDICARE

## 2023-07-05 DIAGNOSIS — E66.01 OBESITY, MORBID (HCC): ICD-10-CM

## 2023-07-05 DIAGNOSIS — E78.2 MIXED HYPERLIPIDEMIA: ICD-10-CM

## 2023-07-05 RX ORDER — ASPIRIN 81 MG/1
81 TABLET ORAL DAILY
Qty: 90 TABLET | Refills: 3 | Status: SHIPPED | OUTPATIENT
Start: 2023-07-05

## 2023-07-05 RX ORDER — ROSUVASTATIN CALCIUM 40 MG/1
40 TABLET, COATED ORAL DAILY
Qty: 90 TABLET | Refills: 3 | Status: SHIPPED | OUTPATIENT
Start: 2023-07-05

## 2023-07-05 RX ORDER — DIVALPROEX SODIUM 500 MG/1
1500 TABLET, DELAYED RELEASE ORAL
Qty: 270 TABLET | Refills: 0 | Status: SHIPPED | OUTPATIENT
Start: 2023-07-05

## 2023-07-05 NOTE — TELEPHONE ENCOUNTER
Patient called in to refill a few of his medications. Patient did not know the name of 1 of the medications. Patient said the medication is used to treat his high BP. He said he got rid of the bottle once he was done with the medication. Please advise.

## 2023-07-09 ENCOUNTER — HOSPITAL ENCOUNTER (EMERGENCY)
Facility: HOSPITAL | Age: 58
Discharge: HOME | End: 2023-07-09
Attending: EMERGENCY MEDICINE | Admitting: EMERGENCY MEDICINE
Payer: MEDICARE

## 2023-07-09 VITALS
SYSTOLIC BLOOD PRESSURE: 178 MMHG | HEART RATE: 87 BPM | OXYGEN SATURATION: 96 % | RESPIRATION RATE: 18 BRPM | DIASTOLIC BLOOD PRESSURE: 90 MMHG | TEMPERATURE: 97.4 F

## 2023-07-09 DIAGNOSIS — M25.561 CHRONIC PAIN OF RIGHT KNEE: Primary | ICD-10-CM

## 2023-07-09 DIAGNOSIS — M25.552 LEFT HIP PAIN: ICD-10-CM

## 2023-07-09 DIAGNOSIS — G89.29 CHRONIC PAIN OF RIGHT KNEE: Primary | ICD-10-CM

## 2023-07-09 PROCEDURE — 99283 EMERGENCY DEPT VISIT LOW MDM: CPT

## 2023-07-09 PROCEDURE — 96372 THER/PROPH/DIAG INJ SC/IM: CPT

## 2023-07-09 RX ORDER — KETOROLAC TROMETHAMINE 30 MG/ML
15 INJECTION, SOLUTION INTRAMUSCULAR; INTRAVENOUS ONCE
Status: COMPLETED | OUTPATIENT
Start: 2023-07-09 | End: 2023-07-09

## 2023-07-09 RX ORDER — DIAZEPAM 5 MG/1
5 TABLET ORAL ONCE
Status: COMPLETED | OUTPATIENT
Start: 2023-07-09 | End: 2023-07-09

## 2023-07-09 RX ADMIN — DIAZEPAM 5 MG: 5 TABLET ORAL at 02:09

## 2023-07-09 RX ADMIN — KETOROLAC TROMETHAMINE 15 MG: 30 INJECTION, SOLUTION INTRAMUSCULAR; INTRAVENOUS at 02:09

## 2023-07-09 NOTE — DISCHARGE INSTRUCTIONS
Please follow-up with your PCP to be reevaluated. Please return to the emergency department you have any new or concerning symptoms including high fevers, numbness, or weakness.

## 2023-07-09 NOTE — ED ATTENDING ATTESTATION
7/9/2023  I, Lucia Marie MD, saw and evaluated the patient. I have discussed the patient with the resident/non-physician practitioner and agree with the resident's/non-physician practitioner's findings, Plan of Care, and MDM as documented in the resident's/non-physician practitioner's note, except where noted. All available labs and Radiology studies were reviewed. I was present for key portions of any procedure(s) performed by the resident/non-physician practitioner and I was immediately available to provide assistance. At this point I agree with the current assessment done in the Emergency Department. I have conducted an independent evaluation of this patient a history and physical is as follows:    80-year-old male well-known to the emergency department with a history of numerous chronic conditions presents to the emergency department via EMS for evaluation of leg pain. On exam, patient resting comfortably in bed in no acute distress, he is visibly anxious. Head is normocephalic and atraumatic, pupils are equal round reactive to light and accommodation, neck is supple without meningismus signs, heart is regular rate and rhythm with intact distal pulses, no increased work of breathing, respiratory distress, or stridor. Patient has large abdominal scar. He is moving all extremities, no obvious deformity, no rashes or bruising. MDM:  Leg pain: Appears to be a chronic issue, reassuring physical examination, do not believe any imaging is necessary at this time. We will treat symptomatically and reassess, anticipate discharge home with orthopedic and physical therapy follow-up. Symptoms improved following medications, patient able to ambulate with steady gait. He will be discharged home.             ED Course         Critical Care Time  Procedures
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
## 2023-07-11 ENCOUNTER — APPOINTMENT (OUTPATIENT)
Dept: PHYSICAL THERAPY | Facility: REHABILITATION | Age: 58
End: 2023-07-11
Payer: MEDICARE

## 2023-07-12 ENCOUNTER — OFFICE VISIT (OUTPATIENT)
Dept: PHYSICAL THERAPY | Facility: REHABILITATION | Age: 58
End: 2023-07-12
Payer: MEDICARE

## 2023-07-12 DIAGNOSIS — G89.29 CHRONIC PAIN OF LEFT KNEE: ICD-10-CM

## 2023-07-12 DIAGNOSIS — M25.562 CHRONIC PAIN OF LEFT KNEE: ICD-10-CM

## 2023-07-12 DIAGNOSIS — M54.16 LUMBAR RADICULOPATHY: ICD-10-CM

## 2023-07-12 DIAGNOSIS — M54.12 CERVICAL RADICULOPATHY: ICD-10-CM

## 2023-07-12 DIAGNOSIS — M17.12 PRIMARY OSTEOARTHRITIS OF LEFT KNEE: Primary | ICD-10-CM

## 2023-07-12 DIAGNOSIS — M25.561 CHRONIC PAIN OF RIGHT KNEE: ICD-10-CM

## 2023-07-12 DIAGNOSIS — G89.29 CHRONIC PAIN OF RIGHT KNEE: ICD-10-CM

## 2023-07-12 PROCEDURE — 97110 THERAPEUTIC EXERCISES: CPT

## 2023-07-12 PROCEDURE — 97112 NEUROMUSCULAR REEDUCATION: CPT

## 2023-07-12 NOTE — PROGRESS NOTES
Daily Note     Today's date: 2023  Patient name: Ganesh Bates  : 1965  MRN: [id number]  Referring provider: Saeed Witt*  Dx:   Encounter Diagnosis     ICD-10-CM    1. Primary osteoarthritis of left knee  M17.12       2. Chronic pain of left knee  M25.562     G89.29       3. Lumbar radiculopathy  M54.16       4. Chronic pain of right knee  M25.561     G89.29       5. Cervical radiculopathy  M54.12                      Subjective: Pt. reports no change in status upon arrival.  Pt states he has not been compliant with HEP      Objective: See treatment diary below      Assessment: Tolerated treatment well. Patient would benefit from continued PT.  Pt. able to complete all exercises with no increase in pain during or after session. Pt had pain present in L shoulder throughout session, mainly with abduction. Pt also had pain present in L hip, will focus on hip pain next visit. Pt arrived late for session. Plan: Continue per plan of care.       Precautions: prediabetes    Pertinent Findings and Outcome Measures:                                                                                                                                                                         Test / Measure  3/8/2023 2023 2023 2023    FOTO L/s 33 knee 33  L/s 47 knee 54 L/s 94 knee 99    B hip flex MMT 3/5       B knee ext MMT 4-/5       L/s ext AROM 25%       + C/s radic tests  + Spurlings, distraction, ULTT, C/s rot          Manuals    Paraspinal STM Theragun, 5'            BL Hip/Knee PROM MC 3'            C/s STM, PROM, UT S, SOR  MM 8'     MM 10'                   Neuro Re-Ed             Bridges 20x on PB  20x8" on PB 20x on PB  3x10 on PB 2" hold + 3" ecc       LTR 15x ea on PB  15x ea on PB 15x ea on PB         Mini squats             Hooklying alternating clamshell 30x ea; btb            Banded side steps     4 laps mtb See below at [place] press       TB monster walks     4 laps mtb        [place] seated good mornings + row   2x15 20#          PPT             QS + SLR 2x10             PB squats             PB rollouts 15x ea            Suitcase carry   3x100' B 15#   + march  3x50' B 15#       DNF supine  15x 15x3"    15x5" 3 way      Resisted C/s retr        3x10 mtb 3x10 mtb 3x10 mtb   SNAGS - rot  5x5" B           SNAGS - ext  10x5"           Scap retr  5x           C/s isometrics   10x5"   4 way          Neck bridges at wall   2x10    3x10 3x10     Wall slides w/ foam roll     15x5"        Unilat rows + trunk rot     15x B 12#        Deandre punch + trunk rot     20x B 12# 2x15 B 15#       [place]all[place] press     2x15 B 11# + mtb side step 5 laps ea 7#       SL RDL      2x10 B 10#       Wall angels        3x10 2x10 3x10   Supine C/s rot AROM head over EOT         30x B 30x b/l   Foam roll series (4 way)         15x ea 1' ea 5 way   Ther Ex             HEP review             NuStep 7' L8 10' L7 10' L7 10' L7 10' L7 10' L8       UBE       3'/3' L1 3'/3' L6 3'/3' L6 3/3 L6   Stand hip 3 way             Supine PBall hamstring curl 15x            DL/rack pulls     unable        Leg press             STS   3x8 15# 3x8 15#  Squat + chest press  3x8 15#       UT/LS S  30" ea B           Shrugs   10#  10x rot  10x lat flex 10# 10x rot, 10x lat flex   3x10 3" hold 15# DBs 3x10 3" hold 15# DBs 3x10 3" hold 20# DBs 3x10 3" hold 20# db   [place] rows       3x10 25# 3x10 30# 3x10 30# 3x10 30#   [place] Sh ext       3x10 20# 3x10 22# 3x10 22# 3x10 22#   De[place]re facepulls       3x10 18# 3x10 18#     FSU + FSD   20x B 9" step 20x B 6" step         Ther Activity                                       Modalities             Cold pack         10' L Sh, lumbar, L knee                 Self-care             MRI/CT scan imaging results, neurosurgery notes - discussion and interpretation       MM 13'

## 2023-07-13 ENCOUNTER — APPOINTMENT (OUTPATIENT)
Dept: PHYSICAL THERAPY | Facility: REHABILITATION | Age: 58
End: 2023-07-13
Payer: MEDICARE

## 2023-07-14 ENCOUNTER — APPOINTMENT (OUTPATIENT)
Dept: PHYSICAL THERAPY | Facility: REHABILITATION | Age: 58
End: 2023-07-14
Payer: MEDICARE

## 2023-07-14 ENCOUNTER — OFFICE VISIT (OUTPATIENT)
Dept: PHYSICAL THERAPY | Facility: REHABILITATION | Age: 58
End: 2023-07-14
Payer: MEDICARE

## 2023-07-14 DIAGNOSIS — M17.12 PRIMARY OSTEOARTHRITIS OF LEFT KNEE: Primary | ICD-10-CM

## 2023-07-14 DIAGNOSIS — M25.561 CHRONIC PAIN OF RIGHT KNEE: ICD-10-CM

## 2023-07-14 DIAGNOSIS — G89.29 CHRONIC PAIN OF LEFT KNEE: ICD-10-CM

## 2023-07-14 DIAGNOSIS — M54.12 CERVICAL RADICULOPATHY: ICD-10-CM

## 2023-07-14 DIAGNOSIS — G89.29 CHRONIC PAIN OF RIGHT KNEE: ICD-10-CM

## 2023-07-14 DIAGNOSIS — M25.562 CHRONIC PAIN OF LEFT KNEE: ICD-10-CM

## 2023-07-14 DIAGNOSIS — M54.16 LUMBAR RADICULOPATHY: ICD-10-CM

## 2023-07-14 PROCEDURE — 97112 NEUROMUSCULAR REEDUCATION: CPT

## 2023-07-14 PROCEDURE — 97110 THERAPEUTIC EXERCISES: CPT

## 2023-07-14 NOTE — PROGRESS NOTES
Daily Note     Today's date: 2023  Patient name: Rajesh Narvaez  : 1965  MRN: [id number]  Referring provider: oLr Delgado*  Dx:   Encounter Diagnosis     ICD-10-CM    1. Primary osteoarthritis of left knee  M17.12       2. Chronic pain of left knee  M25.562     G89.29       3. Lumbar radiculopathy  M54.16       4. Chronic pain of right knee  M25.561     G89.29       5. Cervical radiculopathy  M54.12           Start Time: 3304  Stop Time: 1107  Total time in clinic (min): 38 minutes    Subjective: Pt reports having an acute exacerbation of L hip pain. Having difficulty getting around due to pain. Pt also notes global pain due to long dance session yesterday. Objective: See treatment diary below      Assessment: Tolerated treatment fair. Patient would benefit from continued PT. Pt with significant regression of POC this tx session. Global pain - L Sh, L hip, lumbar, R ankle, cervical.  Pt unable to tolerate full load of tx session due to pain intensity. Slight improvement of symptoms post-session. Educated pt on utilizing progressive overload principles when completing exercise such as dancing. Advised pt to decrease dance sessions in order to decrease further exacerbations of pain. 1:1 with Clay Waldron DPT entirety of tx. Plan: Progress treatment as tolerated.        Precautions: prediabetes    Pertinent Findings and Outcome Measures:                                                                                                                                                                         Test / Measure  3/8/2023 2023 2023 2023    FOTO L/s 33 knee 33  L/s 47 knee 54 L/s 94 knee 99    B hip flex MMT 3/5       B knee ext MMT 4-/5       L/s ext AROM 25%       + C/s radic tests  + Spurlings, distraction, ULTT, C/s rot          Manuals    Paraspinal STM             BL Hip/Knee PROM             C/s STM, PROM, UT S, SOR MM 8'     MM 10'                    Neuro Re-Ed             Bridges  20x8" on PB 20x on PB  3x10 on PB 2" hold + 3" ecc     3x10   LTR  15x ea on PB 15x ea on PB       20x   Mini squats             Hooklying alternating clamshell             Banded side steps    4 laps mtb See below at pallof press        TB monster walks    4 laps mtb         [place] seated good mornings + row  2x15 20#           PPT             QS + SLR          2x10 B 2#   PB squats             PB rollouts          15x ea 3 way   Suitcase carry  3x100' B 15#   + march  3x50' B 15#        DNF supine 15x 15x3"    15x5" 3 way       Resisted C/s retr       3x10 mtb 3x10 mtb 3x10 mtb    SNAGS - rot 5x5" B            SNAGS - ext 10x5"            Scap retr 5x            C/s isometrics  10x5"   4 way           Neck bridges at wall  2x10    3x10 3x10      Wall slides w/ foam roll    15x5"         Unilat rows + trunk rot    15x B 12#         Deandre punch + trunk rot    20x B 12# 2x15 B 15#        Pallof press    2x15 B 11# + mtb side step 5 laps ea 7#        SL RDL     2x10 B 10#        Wall angels       3x10 2x10 3x10    Supine C/s rot AROM head over EOT        30x B 30x b/l    Foam roll series (4 way)        15x ea 1' ea 5 way    Ther Ex             HEP review             NuStep 10' L7 10' L7 10' L7 10' L7 10' L8     10' L5   UBE      3'/3' L1 3'/3' L6 3'/3' L6 3/3 L6    LAQ          3x10 B 2#   Supine PBall hamstring curl             DL/rack pulls    unable         Leg press             STS  3x8 15# 3x8 15#  Squat + chest press  3x8 15#        UT/LS S 30" ea B            Shrugs  10#  10x rot  10x lat flex 10# 10x rot, 10x lat flex   3x10 3" hold 15# DBs 3x10 3" hold 15# DBs 3x10 3" hold 20# DBs 3x10 3" hold 20# db    [place] rows      3x10 25# 3x10 30# 3x10 30# 3x10 30#    Deandre Sh ext      3x10 20# 3x10 22# 3x10 22# 3x10 22#    [place] facepulls      3x10 18# 3x10 18#      FSU + FSD  20x B 9" step 20x B 6" step          Ther Activity Modalities             Cold pack        10' L Sh, lumbar, L knee                  Self-care             MRI/CT scan imaging results, neurosurgery notes - discussion and interpretation      MM 13'

## 2023-07-17 ENCOUNTER — APPOINTMENT (OUTPATIENT)
Dept: PHYSICAL THERAPY | Facility: REHABILITATION | Age: 58
End: 2023-07-17
Payer: MEDICARE

## 2023-07-17 NOTE — PROGRESS NOTES
Daily Note     Today's date: 2023  Patient name: Deejay Haines  : 1965  MRN: [id number]  Referring provider: Liberty Cassidy*  Dx:   No diagnosis found. Subjective: Patient presents       Objective: See treatment diary below      Assessment: Patient tolerated treatment session *** today with focus on ***. Patient will continue to be appropriate for skilled outpatient physical therapy in order to address ***. 1:1 with Lisa Feliciano, PT, DPT for entirety of treatment session. Plan: Progress treatment as tolerated.        Precautions: prediabetes    Pertinent Findings and Outcome Measures:                                                                                                                                                                         Test / Measure  3/8/2023 2023 2023 2023    FOTO L/s 33 knee 33  L/s 47 knee 54 L/s 94 knee 99    B hip flex MMT 3/5       B knee ext MMT 4-/5       L/s ext AROM 25%       + C/s radic tests  + Spurlings, distraction, ULTT, C/s rot          Manuals    Paraspinal STM              BL Hip/Knee PROM              C/s STM, PROM, UT S, SOR MM 8'     MM 10'                      Neuro Re-Ed              Bridges  20x8" on PB 20x on PB  3x10 on PB 2" hold + 3" ecc     3x10    LTR  15x ea on PB 15x ea on PB       20x    Mini squats              Hooklying alternating clamshell              Banded side steps    4 laps mtb See below at pallof press         TB monster walks    4 laps mtb          Deandre seated good mornings + row  2x15 20#            PPT              QS + SLR          2x10 B 2#    PB squats              PB rollouts          15x ea 3 way    Suitcase carry  3x100' B 15#   + march  3x50' B 15#         DNF supine 15x 15x3"    15x5" 3 way        Resisted C/s retr       3x10 mtb 3x10 mtb 3x10 mtb     SNAGS - rot 5x5" B             SNAGS - ext 10x5"             Scap retr 5x             C/s isometrics  10x5"   4 way            Neck bridges at wall  2x10    3x10 3x10       Wall slides w/ foam roll    15x5"          Unilat rows + trunk rot    15x B 12#          Deandre punch + trunk rot    20x B 12# 2x15 B 15#         Pallof press    2x15 B 11# + mtb side step 5 laps ea 7#         SL RDL     2x10 B 10#         Wall angels       3x10 2x10 3x10     Supine C/s rot AROM head over EOT        30x B 30x b/l     Foam roll series (4 way)        15x ea 1' ea 5 way     Ther Ex              HEP review              NuStep 10' L7 10' L7 10' L7 10' L7 10' L8     10' L5 10' L5   UBE      3'/3' L1 3'/3' L6 3'/3' L6 3/3 L6     LAQ          3x10 B 2#    Supine PBall hamstring curl              DL/rack pulls    unable          Leg press              STS  3x8 15# 3x8 15#  Squat + chest press  3x8 15#         UT/LS S 30" ea B             Shrugs  10#  10x rot  10x lat flex 10# 10x rot, 10x lat flex   3x10 3" hold 15# DBs 3x10 3" hold 15# DBs 3x10 3" hold 20# DBs 3x10 3" hold 20# db     [place] rows      3x10 25# 3x10 30# 3x10 30# 3x10 30#     [place] Sh ext      3x10 20# 3x10 22# 3x10 22# 3x10 22#     [place] facepulls      3x10 18# 3x10 18#       FSU + FSD  20x B 9" step 20x B 6" step           Ther Activity                                          Modalities              Cold pack        10' L Sh, lumbar, L knee                    Self-care              MRI/CT scan imaging results, neurosurgery notes - discussion and interpretation      MM 13'

## 2023-07-18 ENCOUNTER — HOSPITAL ENCOUNTER (EMERGENCY)
Facility: HOSPITAL | Age: 58
Discharge: HOME | End: 2023-07-18
Attending: EMERGENCY MEDICINE
Payer: MEDICARE

## 2023-07-18 VITALS
DIASTOLIC BLOOD PRESSURE: 86 MMHG | TEMPERATURE: 97.9 F | HEART RATE: 100 BPM | RESPIRATION RATE: 18 BRPM | OXYGEN SATURATION: 100 % | SYSTOLIC BLOOD PRESSURE: 183 MMHG

## 2023-07-18 DIAGNOSIS — F32.89 OTHER DEPRESSION: Primary | ICD-10-CM

## 2023-07-18 LAB
AMPHETAMINES SERPL QL SCN: NEGATIVE
BARBITURATES UR QL: NEGATIVE
BENZODIAZ UR QL: NEGATIVE
COCAINE UR QL: NEGATIVE
ETHANOL EXG-MCNC: 0 MG/DL
METHADONE UR QL: NEGATIVE
OPIATES UR QL SCN: NEGATIVE
OXYCODONE+OXYMORPHONE UR QL SCN: NEGATIVE
PCP UR QL: NEGATIVE
THC UR QL: NEGATIVE

## 2023-07-18 PROCEDURE — 80307 DRUG TEST PRSMV CHEM ANLYZR: CPT

## 2023-07-18 PROCEDURE — 82075 ASSAY OF BREATH ETHANOL: CPT

## 2023-07-18 RX ORDER — LANOLIN ALCOHOL/MO/W.PET/CERES
3 CREAM (GRAM) TOPICAL
Status: DISCONTINUED | OUTPATIENT
Start: 2023-07-18 | End: 2023-07-18

## 2023-07-18 RX ORDER — LANOLIN ALCOHOL/MO/W.PET/CERES
6 CREAM (GRAM) TOPICAL
Status: DISCONTINUED | OUTPATIENT
Start: 2023-07-18 | End: 2023-07-18 | Stop reason: HOSPADM

## 2023-07-18 RX ADMIN — MELATONIN 6 MG: at 05:46
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
## 2023-07-19 ENCOUNTER — APPOINTMENT (OUTPATIENT)
Dept: PHYSICAL THERAPY | Facility: REHABILITATION | Age: 58
End: 2023-07-19
Payer: MEDICARE

## 2023-07-20 ENCOUNTER — OFFICE VISIT (OUTPATIENT)
Dept: PHYSICAL THERAPY | Facility: REHABILITATION | Age: 58
End: 2023-07-20
Payer: MEDICARE

## 2023-07-20 DIAGNOSIS — M54.16 LUMBAR RADICULOPATHY: ICD-10-CM

## 2023-07-20 DIAGNOSIS — M25.562 CHRONIC PAIN OF LEFT KNEE: ICD-10-CM

## 2023-07-20 DIAGNOSIS — G89.29 CHRONIC PAIN OF RIGHT KNEE: ICD-10-CM

## 2023-07-20 DIAGNOSIS — M25.561 CHRONIC PAIN OF RIGHT KNEE: ICD-10-CM

## 2023-07-20 DIAGNOSIS — M54.12 CERVICAL RADICULOPATHY: Primary | ICD-10-CM

## 2023-07-20 DIAGNOSIS — G89.29 CHRONIC PAIN OF LEFT KNEE: ICD-10-CM

## 2023-07-20 DIAGNOSIS — M17.12 PRIMARY OSTEOARTHRITIS OF LEFT KNEE: ICD-10-CM

## 2023-07-20 PROCEDURE — 97112 NEUROMUSCULAR REEDUCATION: CPT

## 2023-07-20 PROCEDURE — 97110 THERAPEUTIC EXERCISES: CPT

## 2023-07-20 NOTE — PROGRESS NOTES
Daily Note     Today's date: 2023  Patient name: Parish Morrison  : 1965  MRN: [id number]  Referring provider: Jimi January*  Dx:   Encounter Diagnosis     ICD-10-CM    1. Cervical radiculopathy  M54.12       2. Primary osteoarthritis of left knee  M17.12       3. Chronic pain of left knee  M25.562     G89.29       4. Chronic pain of right knee  M25.561     G89.29       5. Lumbar radiculopathy  M54.16                      Subjective: Pt states that his low back, L hip, and knee are the main problems at the beginning of todays session. His L hip pain has increased greatly over the past few weeks. Pt is going to pain specialist  to receive an injection in c/s. Objective: See treatment diary below      Assessment: Tolerated treatment fair. Pt states that during standing hip ext he felt pain in his L hip. Added hip flexor stretch d/t inc. "pulling" feeling in L hip. Added in supine marching + TA - VC to breathe while performing TA. Pt had inc. Pain in L hip throughout treatment session. Patient would benefit from continued PT      Plan: Continue per plan of care. Progress treatment as tolerated.        Precautions: prediabetes    Pertinent Findings and Outcome Measures:                                                                                                                                                                         Test / Measure  3/8/2023 2023 2023 2023    FOTO L/s 33 knee 33  L/s 47 knee 54 L/s 94 knee 99    B hip flex MMT 3/5       B knee ext MMT 4-/5       L/s ext AROM 25%       + C/s radic tests  + Spurlings, distraction, ULTT, C/s rot          Manuals    Paraspinal STM             BL Hip/Knee PROM             C/s STM, PROM, UT S, SOR     MM 10'        L hip flex S          3'   Neuro Re-Ed             Bridges 20x8" on PB 20x on PB  3x10 on PB 2" hold + 3" ecc     3x10 3x10 3" + TA hold   LTR 15x ea on PB 15x ea on PB       20x 20x   Mini squats             Hooklying alternating clamshell             Banded side steps   4 laps mtb See below at pallof press      3 laps at mirror otb   TB monster walks   4 laps mtb          Deandre seated good mornings + row 2x15 20#            PPT             QS + SLR         2x10 B 2#    PB squats             PB rollouts         15x ea 3 way    Suitcase carry 3x100' B 15#   + march  3x50' B 15#         DNF supine 15x3"    15x5" 3 way        Resisted C/s retr      3x10 mtb 3x10 mtb 3x10 mtb     SNAGS - rot             SNAGS - ext             Scap retr             C/s isometrics 10x5"   4 way            Neck bridges at wall 2x10    3x10 3x10       Wall slides w/ foam roll   15x5"          Unilat rows + trunk rot   15x B 12#          Hammond punch + trunk rot   20x B 12# 2x15 B 15#         Pallof press   2x15 B 11# + mtb side step 5 laps ea 7#         SL RDL    2x10 B 10#         Wall angels      3x10 2x10 3x10     Supine C/s rot AROM head over EOT       30x B 30x b/l     Foam roll series (4 way)       15x ea 1' ea 5 way     Ther Ex             HEP review             NuStep 10' L7 10' L7 10' L7 10' L8     10' L5 10' L5   UBE     3'/3' L1 3'/3' L6 3'/3' L6 3/3 L6     LAQ         3x10 B 2# 3x10 B  2#   Supine PBall hamstring curl             DL/rack pulls   unable          Leg press             STS 3x8 15# 3x8 15#  Squat + chest press  3x8 15#         UT/LS S             Shrugs 10#  10x rot  10x lat flex 10# 10x rot, 10x lat flex   3x10 3" hold 15# DBs 3x10 3" hold 15# DBs 3x10 3" hold 20# DBs 3x10 3" hold 20# db     Deandre rows     3x10 25# 3x10 30# 3x10 30# 3x10 30#     Hammond Sh ext     3x10 20# 3x10 22# 3x10 22# 3x10 22#     Hammond facepulls     3x10 18# 3x10 18#       Standing SLR - ABD/EXT          2x10 ea   Supine marching w/ TA hold          2x10 ea alt   FSU + FSD 20x B 9" step 20x B 6" step           Ther Activity                                       Modalities Cold pack       10' L Sh, lumbar, L knee                   Self-care             MRI/CT scan imaging results, neurosurgery notes - discussion and interpretation     MM 13'

## 2023-07-21 ENCOUNTER — OFFICE VISIT (OUTPATIENT)
Dept: PHYSICAL THERAPY | Facility: REHABILITATION | Age: 58
End: 2023-07-21
Payer: MEDICARE

## 2023-07-21 DIAGNOSIS — M54.12 CERVICAL RADICULOPATHY: Primary | ICD-10-CM

## 2023-07-21 DIAGNOSIS — M17.12 PRIMARY OSTEOARTHRITIS OF LEFT KNEE: ICD-10-CM

## 2023-07-21 DIAGNOSIS — G89.29 CHRONIC PAIN OF RIGHT KNEE: ICD-10-CM

## 2023-07-21 DIAGNOSIS — M25.562 CHRONIC PAIN OF LEFT KNEE: ICD-10-CM

## 2023-07-21 DIAGNOSIS — G89.29 CHRONIC PAIN OF LEFT KNEE: ICD-10-CM

## 2023-07-21 DIAGNOSIS — M54.16 LUMBAR RADICULOPATHY: ICD-10-CM

## 2023-07-21 DIAGNOSIS — M25.561 CHRONIC PAIN OF RIGHT KNEE: ICD-10-CM

## 2023-07-21 PROCEDURE — 97112 NEUROMUSCULAR REEDUCATION: CPT

## 2023-07-21 PROCEDURE — 97110 THERAPEUTIC EXERCISES: CPT

## 2023-07-21 NOTE — PROGRESS NOTES
Daily Note     Today's date: 2023  Patient name: Aristides Nina  : 1965  MRN: [id number]  Referring provider: Escobar Moreno*  Dx:   Encounter Diagnosis     ICD-10-CM    1. Cervical radiculopathy  M54.12       2. Primary osteoarthritis of left knee  M17.12       3. Chronic pain of left knee  M25.562     G89.29       4. Chronic pain of right knee  M25.561     G89.29       5. Lumbar radiculopathy  M54.16           Start Time: 1337  Stop Time: 1415  Total time in clinic (min): 38 minutes    Subjective: Pt with moderate B lumbar pain prior to start of tx session. Completed some exercises following yesterdays PT tx session. Objective: See treatment diary below      Assessment: Tolerated treatment well. Patient would benefit from continued PT. Mild lumbar soreness noted post-session, partial alleviation of lumbar pain noted. Pt able to tolerate progression of POC this tx session working on lumbar motor control and neuromuscular development. L Sh pain inhibits progressing pallof press or trunk rotations due to pain from exercise. VCs provided to complete exercises correctly and safely. 1:1 with Vlad Vogel DPT entirety of tx. Plan: Progress treatment as tolerated.        Precautions: prediabetes    Pertinent Findings and Outcome Measures:                                                                                                                                                                         Test / Measure  3/8/2023 2023 2023 2023    FOTO L/s 33 knee 33  L/s 47 knee 54 L/s 94 knee 99    B hip flex MMT 3/5       B knee ext MMT 4-/5       L/s ext AROM 25%       + C/s radic tests  + Spurlings, distraction, ULTT, C/s rot          Manuals    Paraspinal STM            BL Hip/Knee PROM            C/s STM, PROM, UT S, SOR   MM 10'         L hip flex S        3'    Neuro Re-Ed            Bridges  3x10 on PB 2" hold + 3" ecc     3x10 3x10 3" + TA hold    LTR       20x 20x 20x   Mini squats            Hooklying alternating clamshell            Banded side steps 4 laps mtb See below at pallof press      3 laps at mirror otb    TB monster walks 4 laps mtb           Elwin seated good mornings + row         3x10 20#   PPT            QS + SLR       2x10 B 2#     PB squats            PB rollouts       15x ea 3 way     Suitcase carry  + march  3x50' B 15#       [id number] B 25#   Anterior KB carry         [id number] 15#   DNF supine   15x5" 3 way         Resisted C/s retr    3x10 mtb 3x10 mtb 3x10 mtb      SNAGS - rot            SNAGS - ext            Scap retr            C/s isometrics            Neck bridges at wall   3x10 3x10        Wall slides w/ foam roll 15x5"           Unilat rows + trunk rot 15x B 12#           Elwin punch + trunk rot 20x B 12# 2x15 B 15#          Pallof press 2x15 B 11# + mtb side step 5 laps ea 7#       20x ea dbl mtb   TB trunk rot         15x ea dbl gtb   SL RDL  2x10 B 10#          Wall angels    3x10 2x10 3x10      Supine C/s rot AROM head over EOT     30x B 30x b/l      Foam roll series (4 way)     15x ea 1' ea 5 way      Ther Ex            HEP review            NuStep 10' L7 10' L8     10' L5 10' L5 10' L7   UBE   3'/3' L1 3'/3' L6 3'/3' L6 3/3 L6      LAQ       3x10 B 2# 3x10 B  2#    Supine PBall hamstring curl            DL/rack pulls unable           Leg press            STS  Squat + chest press  3x8 15#          UT/LS S            Shrugs   3x10 3" hold 15# DBs 3x10 3" hold 15# DBs 3x10 3" hold 20# DBs 3x10 3" hold 20# db      Deandre rows   3x10 25# 3x10 30# 3x10 30# 3x10 30#      Deandre Sh ext   3x10 20# 3x10 22# 3x10 22# 3x10 22#      Elwin facepulls   3x10 18# 3x10 18#        Standing SLR - ABD/EXT        2x10 ea    Supine marching w/ TA hold        2x10 ea alt    FSU + FSD            Ther Activity                                    Modalities            Cold pack     10' L Sh, lumbar, L knee Self-care            MRI/CT scan imaging results, neurosurgery notes - discussion and interpretation   MM 15'

## 2023-07-24 ENCOUNTER — TELEPHONE (OUTPATIENT)
Dept: OBGYN CLINIC | Facility: HOSPITAL | Age: 58
End: 2023-07-24

## 2023-07-24 DIAGNOSIS — M16.12 PRIMARY OSTEOARTHRITIS OF ONE HIP, LEFT: Primary | ICD-10-CM

## 2023-07-24 NOTE — TELEPHONE ENCOUNTER
Caller: Crystal Hester     Doctor: Carlos Ovalle / [facility]     Reason for call: Candace Ovalle on 07/28 @11:15 am     Please advise    Call back#: [phone]

## 2023-07-24 NOTE — TELEPHONE ENCOUNTER
Caller: Sandra Olvera     Doctor: Chang Wright     Reason for call: Patient requesting L hip injection. Patient is due to see Dr. Tanisha Reis 8/22 . Patient requesting sooner appointment.          Call back#: [phone]

## 2023-07-24 NOTE — TELEPHONE ENCOUNTER
Caller: Carlito Granado     Doctor: Gonzalo Sandhu // Josee Sloan // [facility]     Reason for call: PATIENT WILL NEED LYFT FOR   OV ON 08/08 10:45 WITH DR Josee Sloan AND ON 08/22 WITH DR. Gonazlo Sandhu    Please advise patient     Call back#: [phone]

## 2023-07-25 ENCOUNTER — RA CDI HCC (OUTPATIENT)
Dept: OTHER | Facility: HOSPITAL | Age: 58
End: 2023-07-25

## 2023-07-25 ENCOUNTER — APPOINTMENT (OUTPATIENT)
Dept: PHYSICAL THERAPY | Facility: REHABILITATION | Age: 58
End: 2023-07-25
Payer: MEDICARE

## 2023-07-26 ENCOUNTER — APPOINTMENT (OUTPATIENT)
Dept: PHYSICAL THERAPY | Facility: REHABILITATION | Age: 58
End: 2023-07-26
Payer: MEDICARE

## 2023-07-27 ENCOUNTER — OFFICE VISIT (OUTPATIENT)
Dept: PHYSICAL THERAPY | Facility: REHABILITATION | Age: 58
End: 2023-07-27
Payer: MEDICARE

## 2023-07-27 DIAGNOSIS — M54.12 CERVICAL RADICULOPATHY: ICD-10-CM

## 2023-07-27 DIAGNOSIS — M25.561 CHRONIC PAIN OF RIGHT KNEE: ICD-10-CM

## 2023-07-27 DIAGNOSIS — G89.29 CHRONIC PAIN OF LEFT KNEE: ICD-10-CM

## 2023-07-27 DIAGNOSIS — M25.562 CHRONIC PAIN OF LEFT KNEE: ICD-10-CM

## 2023-07-27 DIAGNOSIS — M54.16 LUMBAR RADICULOPATHY: Primary | ICD-10-CM

## 2023-07-27 DIAGNOSIS — G89.29 CHRONIC PAIN OF RIGHT KNEE: ICD-10-CM

## 2023-07-27 DIAGNOSIS — M17.12 PRIMARY OSTEOARTHRITIS OF LEFT KNEE: ICD-10-CM

## 2023-07-27 PROCEDURE — 97110 THERAPEUTIC EXERCISES: CPT

## 2023-07-27 PROCEDURE — 97010 HOT OR COLD PACKS THERAPY: CPT

## 2023-07-27 PROCEDURE — 97112 NEUROMUSCULAR REEDUCATION: CPT

## 2023-07-27 NOTE — TELEPHONE ENCOUNTER
Caller: patient    Doctor: Federica Lamar     Reason for call: patient has moved appt time to earlier slot and needs his LYFT changed accordingly    Call back#: [phone]

## 2023-07-27 NOTE — PROGRESS NOTES
Daily Note     Today's date: 2023  Patient name: Amber Kim  : 1965  MRN: [id number]  Referring provider: Ramon Anderson*  Dx:   Encounter Diagnosis     ICD-10-CM    1. Lumbar radiculopathy  M54.16       2. Cervical radiculopathy  M54.12       3. Primary osteoarthritis of left knee  M17.12       4. Chronic pain of left knee  M25.562     G89.29       5. Chronic pain of right knee  M25.561     G89.29           Start Time: 1350  Stop Time: 1428  Total time in clinic (min): 38 minutes    Subjective: Pt had cervical injection yesterday and reports a 60% improvement of symptoms today regarding cervical pain and radicular symptoms. Having significant hip and lumbar pain this morning and this afternoon. Objective: See treatment diary below      Assessment: Tolerated treatment well. Patient would benefit from continued PT. Pt able to tolerate progression of POC this tx session. Improvement of hip pain during and after tx session. Mild lumbar discomfort following therapeutic interventions - utilized cold pack post-session to address pain symptomology. Mild VCs provided to complete exercises safely and correctly. Balance deficit noted when attempting to complete single leg stance and repeated hip flexion, abduction, adduction, and extensions. 1:1 with Katheryn Comer DPT entirety of tx. Plan: Progress treatment as tolerated.        Precautions: prediabetes    Pertinent Findings and Outcome Measures:                                                                                                                                                                         Test / Measure  3/8/2023 2023 2023 2023    FOTO L/s 33 knee 33  L/s 47 knee 54 L/s 94 knee 99    B hip flex MMT 3/5       B knee ext MMT 4-/5       L/s ext AROM 25%       + C/s radic tests  + Spurlings, distraction, ULTT, C/s rot          Manuals    Paraspinal STM             BL Hip/Knee PROM             C/s STM, PROM, UT S, SOR   MM 10'          L hip flex S        3'     Neuro Re-Ed             Bridges  3x10 on PB 2" hold + 3" ecc     3x10 3x10 3" + TA hold     LTR       20x 20x 20x 20x   Mini squats             Hooklying alternating clamshell             Banded side steps 4 laps mtb See below at pallof press      3 laps at mirror otb     TB monster walks 4 laps mtb            Deandre seated good mornings + row         3x10 20#    PPT             QS + SLR       2x10 B 2#      PB squats             PB rollouts       15x ea 3 way      Suitcase carry  + march  3x50' B 15#       [id number] B 25#    Anterior KB carry         [id number] 15#    DNF supine   15x5" 3 way          Resisted C/s retr    3x10 mtb 3x10 mtb 3x10 mtb       SNAGS - rot             SNAGS - ext             Scap retr             C/s isometrics             Neck bridges at wall   3x10 3x10         Wall slides w/ foam roll 15x5"            Unilat rows + trunk rot 15x B 12#            Deandre punch + trunk rot 20x B 12# 2x15 B 15#           Pallof press 2x15 B 11# + mtb side step 5 laps ea 7#       20x ea dbl mtb 15x ea 12#  15x ea 16#   TB trunk rot         15x ea dbl gtb    SL RDL  2x10 B 10#           Wall angels    3x10 2x10 3x10       Supine C/s rot AROM head over EOT     30x B 30x b/l       Foam roll series (4 way)     15x ea 1' ea 5 way       Ther Ex             HEP review             NuStep 10' L7 10' L8     10' L5 10' L5 10' L7 8' L8   UBE   3'/3' L1 3'/3' L6 3'/3' L6 3/3 L6       LAQ       3x10 B 2# 3x10 B  2#     Supine PBall hamstring curl             DL/rack pulls unable            Leg press             STS  Squat + chest press  3x8 15#           UT/LS S             Shrugs   3x10 3" hold 15# DBs 3x10 3" hold 15# DBs 3x10 3" hold 20# DBs 3x10 3" hold 20# db       Deandre rows   3x10 25# 3x10 30# 3x10 30# 3x10 30#       Julian Sh ext   3x10 20# 3x10 22# 3x10 22# 3x10 22#       Julian facepulls   3x10 18# 3x10 18#         Standing SLR - ABD/EXT        2x10 ea  Surprise hip 4 way  20x ea B 6#   Supine marching w/ TA hold        2x10 ea alt     FSU + FSD             Ther Activity                                       Modalities             Cold pack     10' L Sh, lumbar, L knee     10' lumbar                Self-care             MRI/CT scan imaging results, neurosurgery notes - discussion and interpretation   MM 13'

## 2023-07-28 ENCOUNTER — APPOINTMENT (OUTPATIENT)
Dept: RADIOLOGY | Age: 58
End: 2023-07-28
Payer: MEDICARE

## 2023-07-28 ENCOUNTER — APPOINTMENT (OUTPATIENT)
Dept: PHYSICAL THERAPY | Facility: REHABILITATION | Age: 58
End: 2023-07-28
Payer: MEDICARE

## 2023-07-28 ENCOUNTER — OFFICE VISIT (OUTPATIENT)
Dept: OBGYN CLINIC | Facility: CLINIC | Age: 58
End: 2023-07-28
Payer: MEDICARE

## 2023-07-28 VITALS
SYSTOLIC BLOOD PRESSURE: 154 MMHG | HEIGHT: 66 IN | BODY MASS INDEX: 39.98 KG/M2 | HEART RATE: 87 BPM | DIASTOLIC BLOOD PRESSURE: 89 MMHG | WEIGHT: 248.8 LBS

## 2023-07-28 DIAGNOSIS — M19.012 PRIMARY OSTEOARTHRITIS OF LEFT SHOULDER: ICD-10-CM

## 2023-07-28 DIAGNOSIS — M25.512 LEFT SHOULDER PAIN, UNSPECIFIED CHRONICITY: Primary | ICD-10-CM

## 2023-07-28 DIAGNOSIS — M25.512 LEFT SHOULDER PAIN, UNSPECIFIED CHRONICITY: ICD-10-CM

## 2023-07-28 PROCEDURE — 99213 OFFICE O/P EST LOW 20 MIN: CPT | Performed by: ORTHOPAEDIC SURGERY

## 2023-07-28 PROCEDURE — 73030 X-RAY EXAM OF SHOULDER: CPT

## 2023-07-28 RX ORDER — MELOXICAM 15 MG/1
15 TABLET ORAL DAILY
Qty: 30 TABLET | Refills: 1 | Status: SHIPPED | OUTPATIENT
Start: 2023-07-28

## 2023-07-28 NOTE — PROGRESS NOTES
CHIEF COMPLAIN/REASON FOR VISIT  Chief Complaint   Patient presents with   • Left Shoulder - Pain       HISTORY OF PRESENT ILLNESS  Amber Kim is a RHD 66 y.o. male who presents for evaluation of their left shoulder. Patient said he has been dealing with chronic pain in his left shoulder. He reports pain is primarily present when using his arm. He feels his motion is severely limited. Patient reports popping and clicking of the shoulder with movement. Patient does not remember if he ever had any injections into the shoulder. Patient reports prior history of 3 different left shoulder surgeries that he does not know the name of. REVIEW OF SYSTEMS  Review of systems was performed and, woutside that mentioned in the HPI, it was negative for symptomology related to the integumentary, hematologic, immunologic, allergic, neurologic, cardiovascular, respiratory, GI or  systems.      MEDICAL HISTORY  Patient Active Problem List   Diagnosis   • Schizoaffective disorder, bipolar type ([address])   • Post-traumatic stress disorder, chronic   • Alcohol abuse   • Learning disability   • Constipation   • Chronic bilateral low back pain without sciatica   • DDD (degenerative disc disease), cervical   • DDD (degenerative disc disease), thoracic   • Myofascial pain   • Lumbar radiculopathy   • Primary osteoarthritis of right shoulder   • Primary osteoarthritis of left knee   • Acute medial meniscus tear of left knee   • Obesity, morbid ([facility])   • Vitreous hemorrhage of left eye ([facility])   • SOB (shortness of breath)   • History of cocaine abuse ([facility])   • Platelets decreased ([facility])   • History of obstructive sleep apnea   • Erectile dysfunction   • Essential hypertension   • Sacroiliitis ([facility])   • Dizziness   • Prediabetes   • Drug abuse and dependence ([facility])   • Glaucoma   • Restless leg syndrome   • Chronic pain of right knee   • Knee internal derangement, right   • Mixed hyperlipidemia   • Patellofemoral chondrosis of right knee   • Primary osteoarthritis of right knee   • Hepatic steatosis   • Primary osteoarthritis of both knees   • Fall   • Complete tear of MCL of knee, left, initial encounter   • Abnormal electrocardiogram (ECG) (EKG)   • Abnormal echocardiogram   • DEMETRIA (obstructive sleep apnea)   • Cervical radiculopathy       SURGICAL HISTORY  Past Surgical History:   Procedure Laterality Date   • ANKLE SURGERY     • COLONOSCOPY     • COLOSTOMY      and then closure of colostomy   • FL INJECTION LEFT HIP (NON ARTHROGRAM)  12/19/2022   • FL INJECTION LEFT HIP (NON ARTHROGRAM)  3/22/2023   • HERNIA REPAIR     • KNEE SURGERY     • NC ARTHRS KNE SURG W/MENISCECTOMY MED/LAT W/SHVG Left 3/4/2020    Procedure: ARTHROSCOPY with partial medial meniscectomy;  Surgeon: Sheryle Rend, MD;  Location: [facility];  Service: Orthopedics   • SHOULDER SURGERY Bilateral    • UPPER GASTROINTESTINAL ENDOSCOPY     • WRIST SURGERY Right 2012       CURRENT MEDICATIONS    Current Outpatient Medications:   •  amLODIPine (NORVASC) 5 mg tablet, Take 1 tablet (5 mg total) by mouth daily, Disp: 90 tablet, Rfl: 1  •  aspirin (Aspirin Low Dose) 81 mg EC tablet, Take 1 tablet (81 mg total) by mouth daily, Disp: 90 tablet, Rfl: 3  •  brimonidine tartrate 0.2 % ophthalmic solution, INSTILL 1 DROP INTO BOTH EYES TWICE A DAY, Disp: , Rfl:   •  divalproex sodium (DEPAKOTE) 500 mg DR tablet, Take 3 tablets (1,500 mg total) by mouth every 24 hours, Disp: 270 tablet, Rfl: 0  •  dorzolamide-timolol (COSOPT) 22.3-6.8 MG/ML ophthalmic solution, ADMINISTER 1 DROP TO BOTH EYES DAILY. , Disp: 30 mL, Rfl: 3  •  doxepin (SINEquan) 150 MG capsule, Take 150 mg by mouth daily at bedtime, Disp: , Rfl:   •  latanoprost (XALATAN) 0.005 % ophthalmic solution, Administer 1 drop to both eyes daily, Disp: 7.5 mL, Rfl: 3  •  Lumigan 0.01 % ophthalmic drops, INSTILL 1 DROP INTO BOTH EYES IN THE EVENING, Disp: , Rfl:   •  metFORMIN (GLUCOPHAGE) 500 mg tablet, Take 1 tablet (500 mg total) by mouth in the morning, Disp: 90 tablet, Rfl: 3  •  rOPINIRole (REQUIP) 4 mg tablet, Take 1 tablet (4 mg total) by mouth daily at bedtime, Disp: 90 tablet, Rfl: 1  •  rosuvastatin (CRESTOR) 40 MG tablet, Take 1 tablet (40 mg total) by mouth daily, Disp: 90 tablet, Rfl: 3  •  cyclobenzaprine (FLEXERIL) 10 mg tablet, Take 1 tablet (10 mg total) by mouth daily at bedtime as needed for muscle spasms (Patient not taking: Reported on 2023), Disp: 30 tablet, Rfl: 0  •  hydrOXYzine HCL (ATARAX) 50 mg tablet, Take 50 mg by mouth daily at bedtime   (Patient not taking: Reported on 2023), Disp: , Rfl:   •  naproxen (Naprosyn) 500 mg tablet, Take 1 tablet (500 mg total) by mouth 2 (two) times a day with meals (Patient not taking: Reported on 2023), Disp: 14 tablet, Rfl: 0  •  polyethylene glycol (MIRALAX) 17 g packet, Take 17 g by mouth daily (Patient not taking: Reported on 2023), Disp: 10 each, Rfl: 6  •  psyllium (METAMUCIL SMOOTH TEXTURE) 28 % packet, Take 1 packet by mouth daily (Patient not taking: Reported on 2023), Disp: 30 packet, Rfl: 3    SOCIAL HISTORY  Social History     Socioeconomic History   • Marital status:      Spouse name: Not on file   • Number of children: Not on file   • Years of education: Not on file   • Highest education level: Not on file   Occupational History   • Not on file   Tobacco Use   • Smoking status: Former     Types: Cigars     Start date:      Quit date: 2022     Years since quittin.6   • Smokeless tobacco: Never   • Tobacco comments:     Cigars, occasional   Vaping Use   • Vaping Use: Never used   Substance and Sexual Activity   • Alcohol use: Not Currently     Alcohol/week: 18.0 standard drinks of alcohol     Types: 18 Cans of beer per week     Comment: 16mos sober   • Drug use: Not Currently     Types: "Crack" cocaine, PCP, Other, Hashish, Hydrocodone     Comment: 16mos sober   • Sexual activity: Not Currently     Partners: Female   Other Topics Concern • Not on file   Social History Narrative   • Not on file     Social Determinants of Health     Financial Resource Strain: Low Risk  (1/26/2023)    Overall Financial Resource Strain (CARDIA)    • Difficulty of Paying Living Expenses: Not hard at all   Recent Concern: Financial Resource Strain - Medium Risk (11/10/2022)    Overall Financial Resource Strain (CARDIA)    • Difficulty of Paying Living Expenses: Somewhat hard   Food Insecurity: Food Insecurity Present (1/26/2023)    Hunger Vital Sign    • Worried About Running Out of Food in the Last Year: Sometimes true    • Ran Out of Food in the Last Year: Sometimes true   Transportation Needs: Unmet Transportation Needs (1/26/2023)    PRAPARE - Transportation    • Lack of Transportation (Medical): Yes    • Lack of Transportation (Non-Medical): Yes   Physical Activity: Inactive (7/14/2021)    Exercise Vital Sign    • Days of Exercise per Week: 0 days    • Minutes of Exercise per Session: 0 min   Stress: Stress Concern Present (7/14/2021)    [facility]    • Feeling of Stress : Very much   Social Connections: Moderately Integrated (7/14/2021)    Social Connection and Isolation Panel [NHANES]    • Frequency of Communication with Friends and Family: More than three times a week    • Frequency of Social Gatherings with Friends and Family: Twice a week    • Attends Sabianism Services: 1 to 4 times per year    • Active Member of Clubs or Organizations:  Yes    • Attends Club or Organization Meetings: 1 to 4 times per year    • Marital Status:    Intimate Partner Violence: Not At Risk (7/14/2021)    Humiliation, Afraid, Rape, and Kick questionnaire    • Fear of Current or Ex-Partner: No    • Emotionally Abused: No    • Physically Abused: No    • Sexually Abused: No   Housing Stability: Low Risk  (11/10/2022)    Housing Stability Vital Sign    • Unable to Pay for Housing in the Last Year: No    • Number of Places Lived in the Last Year: 1    • Unstable Housing in the Last Year: No       Objective     VITAL SIGNS  /89   Pulse 87   Ht 5' 6" (1.676 m)   Wt 113 kg (248 lb 12.8 oz)   BMI 40.16 kg/m²      PHYSICAL EXAM  General:   Well-appearing  No acute distress  Appears stated age    Left Shoulder  Positive tenderness to palpation over the acromioclavicular joint  Positive tenderness to palpation over the long head of the biceps tendon  Shoulder effusion none present  Shoulder abduction 0 / 110  Shoulder forward flexion 0 / 110  Shoulder external rotation 0 / 20  Shoulder internal rotation back pocket  Supraspinatus/ABD 5/5   Infraspinatus/ER 4/5   Positive Neer  Positive Bobo  Positive O'briens  Skin is intact with no erythema, warmth or drainage  5/5 strength in the deltoid, biceps, triceps, wrist extension, wrist flexion, interossei, OP, EPL  Sensation to light touch is normal in the axillary, radial, ulnar, and median nerve distributions. Radial pulse is 2+  There is no axillary lymphadenopathy    RADIOGRAPHIC EXAMINATION/DIAGNOSTICS:  Left shoulder x-ray shows end stage osteoarthritis with osteophyte formation    ASSESSMENT/PLAN:  1. Left shoulder pain, end stage osteoarthritis    Today, we discussed the non-operative treatment options that include, but are not limited to: rest, ice, activity modification, anti-inflammatory medication, physical therapy, and/or injections. Patient with like to initially proceed with these conservative measures. After discussion of options for formal physical therapy, anti-inflammatories and rest with other conservative treatments, patient opted to trial these initially. Prescription of meloxicam sent today. Order placed for glenohumeral steroid injection today. he will plan on following up for recheck p.r.n., they voiced their understanding of this plan and were in agreement with it. All questions answered today.       If any issues, questions, or concerns arise between now and the next appointment, we have encouraged the patient to contact our team.

## 2023-07-31 ENCOUNTER — EVALUATION (OUTPATIENT)
Dept: PHYSICAL THERAPY | Facility: REHABILITATION | Age: 58
End: 2023-07-31
Payer: MEDICARE

## 2023-07-31 DIAGNOSIS — M25.562 CHRONIC PAIN OF LEFT KNEE: ICD-10-CM

## 2023-07-31 DIAGNOSIS — M17.12 PRIMARY OSTEOARTHRITIS OF LEFT KNEE: ICD-10-CM

## 2023-07-31 DIAGNOSIS — G89.29 CHRONIC PAIN OF RIGHT KNEE: ICD-10-CM

## 2023-07-31 DIAGNOSIS — G89.29 CHRONIC PAIN OF LEFT KNEE: ICD-10-CM

## 2023-07-31 DIAGNOSIS — M54.12 CERVICAL RADICULOPATHY: Primary | ICD-10-CM

## 2023-07-31 DIAGNOSIS — M25.561 CHRONIC PAIN OF RIGHT KNEE: ICD-10-CM

## 2023-07-31 DIAGNOSIS — M54.16 LUMBAR RADICULOPATHY: ICD-10-CM

## 2023-07-31 PROCEDURE — 97110 THERAPEUTIC EXERCISES: CPT

## 2023-07-31 PROCEDURE — 97164 PT RE-EVAL EST PLAN CARE: CPT

## 2023-07-31 NOTE — PROGRESS NOTES
PT Re-Evaluation     Today's date: 2023  Patient name: Trinidad Lamb  : 1965  MRN: [id number]  Referring provider: Don Flaherty*  Dx:   Encounter Diagnosis     ICD-10-CM    1. Cervical radiculopathy  M54.12       2. Primary osteoarthritis of left knee  M17.12       3. Chronic pain of left knee  M25.562     G89.29       4. Chronic pain of right knee  M25.561     G89.29       5. Lumbar radiculopathy  M54.16           Start Time: 902  Stop Time:   Total time in clinic (min): 48 minutes    Subjective: Pt reports significant aggravation of pain symptoms recently. Pt also notes that he is going through a tough time right now due to depressive feelings, bi-polar symptoms, family problems, issues with sobriety. "I'm not happy, I'm sad."  L cervical radicular symptoms are worse over the past several days. States that when he looks to the left he gets shooting pain through to L hand. Pt reports current rehabilitation status is at 40%. "I can't do one push up. I can't do sit up."  "When I'm consistent with therapy I do better, but when I'm not the pain is worse."  Cervical pain / radicular symptoms: 8/10. Pain with ADLs - unable to wash carpets. Lumbar pain: 7-8/10. Medial L knee pain: sharp 5-7/10. "Aches and pains all the time."  Pt states that he has been attempting to lose weight - trying to do aerobic dancing to assist in weight loss process.         Objective: See treatment diary below    Strength and ROM evaluated B from a regional biomechanical perspective and values relevant to this episode recorded in tables below    Range of Motion measurements for the Cervical Spine (in degrees)     Joint Motion Right:  Left:    Flexion 75%*    Extension 10%*    Rotation 50%* 25%*   Lateral Flexion 75% 25%*   Protraction 75%    Retraction 25%*        Cervical Radiculopathy Test Item Cluster Sterling GRIJALVA et al. Hernando Diane, 2003)  Any 3 above + = + LR of 6.1  Any 4 above + = + LR of 30.3  Test / Measure  + / -   Upper Limb Tension Test A +   Spurling's A +   Distraction +   C-Rotation < 60 ipsilateral side +             Range of Motion: Goniometric revealed the following findings (in degrees). Joint Motion Right: Left:   Knee Extension 0 0   Knee Flexion 130 120*        Lumbar flexion 50%*    Lumbar extension 25%*    Lumbar sidebending 25%* 25%*   Lumbar rotation 25%* 25%*   * indicates increase in pain    Strength: MMT revealed the following findings. Joint Motion Right: Left:   Hip Flexion 3+/5 3/5*   Hip Abduction 3+/5 3/5   Hip Adduction 3+/5 3+/5   Hip Extension 3-/5* 3-/5*   Knee Extension 4/5 4-/5*   Knee Flexion 4/5 4-/5*   Ankle Plantarflexion 4+/5 4+/5   Ankle Dorsiflexion 4+/5 4+/5       Assessment: Tolerated treatment well. Patient would benefit from continued PT.  1:1 with Macario Lim DPT entirety of tx. Overall, pt symptoms have worsened over the past several weeks. Pt was making good progress with outpatient PT services, but it appears that there has been a set-back recently. Pt has been unable to be consistent with making appointments at this time which may contribute to lack of progress noted. Jefe's sign present when attempting to return to upright position from full lumbar flexion due to lumbar extensor weakness. Trunk weakness is limiting his ability to complete transfers and bed mobility with ease. Global cervical pain persists, and it appears that radicular symptoms have worsened recently. Cervical radiculopathy through to L hand. Pt would benefit from continued outpatient physical therapy services to address functional deficits and work toward's clinic and pt's goals. Assessment details: Maria Isabel Gupta is a 56 y.o. male presenting with B knee pain and lumbar radicular pain which was assessed during this initial evaluation. Pt also presents with cervical radicular symptoms which were not assessed today due to not having a referral in the system.   Pain started following a fall. Knee pain likely due to OA. L ACL laxity noted. Primary impairments include lumbar and knee pain with functional activities, BLE and paraspinal weakness, lumbar AROM dysfunction, paraspinal and BLE motor control dysfunction. Educated pt on anatomy and physiology of diagnosis. Will benefit from skilled PT interventions for community reintegration, ADL management/independence, return to sport/hobbies. Pt presenting to the clinic with a referral of cervical radiculopathy. Pt presents with signs and symptoms indicative of L sided cervical radiculopathy. Partial centralization of symptoms following manual therapy and repeated cervical retractions. Pt presenting with deep neck flexor motor control dysfunction, scapular motor control dysfunction, cervical pain and ROM deficits. Provided pt with written home exercise program to be completed independently at least 1x per day. Impairments: abnormal coordination, abnormal muscle firing, abnormal muscle tone, abnormal or restricted ROM, activity intolerance, impaired physical strength, lacks appropriate home exercise program, pain with function, poor posture  and poor body mechanics  Functional limitations: squatting, transfers, ADLs, stair negotiation, prolonged standing/walking  Symptom irritability: highBarriers to therapy: transportation  Understanding of Dx/Px/POC: fair   Prognosis: fair    Goals    Short Term Goals:  1. Improve B knee extension and flexion strength to 4/5 MMT - ONGOING  2. Improve lumbar extension to at least 40% - ONGOING  3. Supervision with HEP for self-care - ONGOING  4. Partially centralize cervical radicular symptoms to elbow - ONGOING    Long Term Goals:  1. Tolerate walking 30+ minutes without aggravating pain symptoms - ONGOING  2. FOTO to greater than predicted value - MET  3. Independent with HEP for self-care - ONGOING  4.  Improve L cervical sidebending and rotation to 50+% - ONGOING    Plan: Progress treatment as tolerated.        Patient would benefit from: skilled physical therapy  Planned modality interventions: manual electrical stimulation  Planned therapy interventions: abdominal trunk stabilization, activity modification, balance, balance/weight bearing training, behavior modification, body mechanics training, community reintegration, coordination, fine motor coordination training, flexibility, functional ROM exercises, gait training, graded activity, graded exercise, graded motor, home exercise program, work reintegration, therapeutic training, therapeutic exercise, therapeutic activities, stretching, strengthening, self care, postural training, patient education, neuromuscular re-education, motor coordination training, massage, manual therapy, joint mobilization and ADL training  Frequency: 1-2x week  Duration: 8 weeks  Treatment plan discussed with: patient     Precautions: prediabetes    Pertinent Findings and Outcome Measures:                                                                                                                                                                         Test / Measure  3/8/2023 4/25/2023 5/12/2023 6/13/2023 7/31/2023   FOTO L/s 33 knee 33  L/s 47 knee 54 L/s 94 knee 99    B hip flex MMT 3/5    3/5 to 3+/5   B knee ext MMT 4-/5    4-/5 to 4/5   L/s ext AROM 25%    25%   + C/s radic tests  + Spurlings, distraction, ULTT, C/s rot   + Spurlings, distraction, ULTT, C/s rot       Manuals 5/23 6/13 6/19 6/29 7/12 7/14 7/20 7/21 7/27 7/31   Paraspinal STM             BL Hip/Knee PROM             C/s STM, PROM, UT S, SOR  MM 10'           L hip flex S       3'      Neuro Re-Ed             Bridges 3x10 on PB 2" hold + 3" ecc     3x10 3x10 3" + TA hold      LTR      20x 20x 20x 20x    Mini squats             Hooklying alternating clamshell             Banded side steps See below at pallof press      3 laps at mirror otb      TB monster walks             [place] seated good mornings + row        3x10 20#     PPT             QS + SLR      2x10 B 2#       PB squats             PB rollouts      15x ea 3 way       Suitcase carry + march  3x50' B 15#       [id number] 25#     Anterior KB carry        [id number] 15#     DNF supine  15x5" 3 way           Resisted C/s retr   3x10 mtb 3x10 mtb 3x10 mtb        SNAGS - rot             SNAGS - ext             Scap retr             C/s isometrics             Neck bridges at wall  3x10 3x10          Wall slides w/ foam roll             Unilat rows + trunk rot             [place] punch + trunk rot 2x15 B 15#            Pallof press + mtb side step 5 laps ea 7#       20x ea dbl mtb 15x ea 12#  15x ea 16#    TB trunk rot        15x ea dbl gtb     SL RDL 2x10 B 10#            Wall angels   3x10 2x10 3x10        Supine C/s rot AROM head over EOT    30x B 30x b/l        Foam roll series (4 way)    15x ea 1' ea 5 way        Ther Ex             HEP review             NuStep 10' L8     10' L5 10' L5 10' L7 8' L8    UBE  3'/3' L1 3'/3' L6 3'/3' L6 3/3 L6        LAQ      3x10 B 2# 3x10 B  2#      Supine PBall hamstring curl             DL/rack pulls             Leg press             STS Squat + chest press  3x8 15#         2x10 8#   UT/LS S             Shrugs  3x10 3" hold 15# DBs 3x10 3" hold 15# DBs 3x10 3" hold 20# DBs 3x10 3" hold 20# db     2x10 3" hold 12# DBs   [place] rows  3x10 25# 3x10 30# 3x10 30# 3x10 30#     2x10 20#   Deandre Sh ext  3x10 20# 3x10 22# 3x10 22# 3x10 22#     2x10 15#   [place] facepulls  3x10 18# 3x10 18#          Standing SLR - ABD/EXT       2x10 ea  [place] hip 4 way  20x ea B 6#    Supine marching w/ TA hold       2x10 ea alt      FSU + FSD             Ther Activity                                       Modalities             Cold pack    10' L Sh, lumbar, L knee     10' lumbar                 Self-care             MRI/CT scan imaging results, neurosurgery notes - discussion and interpretation  MM 13'

## 2023-08-01 ENCOUNTER — TELEPHONE (OUTPATIENT)
Dept: INTERNAL MEDICINE CLINIC | Facility: CLINIC | Age: 58
End: 2023-08-01

## 2023-08-01 DIAGNOSIS — R73.03 PREDIABETES: ICD-10-CM

## 2023-08-01 DIAGNOSIS — D69.6 THROMBOCYTOPENIA (HCC): ICD-10-CM

## 2023-08-01 DIAGNOSIS — E87.1 HYPONATREMIA: Primary | ICD-10-CM

## 2023-08-01 NOTE — TELEPHONE ENCOUNTER
Pt had to reschedule his awv for today 8/1 to 9/25 but is requesting lab orders. He wants to check his A1C level. He doesn't want to have to wait to find out because he is concerned.

## 2023-08-03 ENCOUNTER — APPOINTMENT (OUTPATIENT)
Dept: PHYSICAL THERAPY | Facility: REHABILITATION | Age: 58
End: 2023-08-03
Payer: MEDICARE

## 2023-08-03 ENCOUNTER — TELEPHONE (OUTPATIENT)
Dept: NEUROSURGERY | Facility: CLINIC | Age: 58
End: 2023-08-03

## 2023-08-03 ENCOUNTER — TELEPHONE (OUTPATIENT)
Dept: NEUROLOGY | Facility: CLINIC | Age: 58
End: 2023-08-03

## 2023-08-03 NOTE — TELEPHONE ENCOUNTER
Received call on nurseline from patient requesting medical records be sent to his . Provided number to the medical records department. All questions answered at this time, he was appreciative of the call back.

## 2023-08-03 NOTE — TELEPHONE ENCOUNTER
received vm from 10:24am-good morning, eva lexie, telephone number [phone], trying to get someone to help me in medical records to get a copy of my medical records mailed to my . Kerri Hamilton telephone number [phone] date of birth [birth year]. Trying to talk to someone in the medical record department. Thank you.  ------------------------------  Pt is not seen by neurology. States that he was trying to reach neurosurg.   Gave him neurosurg phone number

## 2023-08-04 ENCOUNTER — APPOINTMENT (OUTPATIENT)
Dept: PHYSICAL THERAPY | Facility: REHABILITATION | Age: 58
End: 2023-08-04
Payer: MEDICARE

## 2023-08-08 ENCOUNTER — APPOINTMENT (OUTPATIENT)
Dept: LAB | Facility: CLINIC | Age: 58
End: 2023-08-08
Payer: MEDICARE

## 2023-08-08 ENCOUNTER — OFFICE VISIT (OUTPATIENT)
Dept: OBGYN CLINIC | Facility: HOSPITAL | Age: 58
End: 2023-08-08
Payer: MEDICARE

## 2023-08-08 VITALS
SYSTOLIC BLOOD PRESSURE: 146 MMHG | HEIGHT: 66 IN | BODY MASS INDEX: 40.18 KG/M2 | DIASTOLIC BLOOD PRESSURE: 85 MMHG | WEIGHT: 250 LBS | HEART RATE: 95 BPM

## 2023-08-08 DIAGNOSIS — E87.1 HYPONATREMIA: ICD-10-CM

## 2023-08-08 DIAGNOSIS — M17.12 PRIMARY OSTEOARTHRITIS OF LEFT KNEE: ICD-10-CM

## 2023-08-08 DIAGNOSIS — M17.11 PRIMARY OSTEOARTHRITIS OF RIGHT KNEE: Primary | ICD-10-CM

## 2023-08-08 DIAGNOSIS — D69.6 THROMBOCYTOPENIA (HCC): ICD-10-CM

## 2023-08-08 DIAGNOSIS — S76.311A STRAIN OF RIGHT HAMSTRING, INITIAL ENCOUNTER: ICD-10-CM

## 2023-08-08 DIAGNOSIS — R73.03 PREDIABETES: ICD-10-CM

## 2023-08-08 LAB
ANION GAP SERPL CALCULATED.3IONS-SCNC: 3 MMOL/L
BUN SERPL-MCNC: 19 MG/DL (ref 5–25)
CALCIUM SERPL-MCNC: 8.6 MG/DL (ref 8.3–10.1)
CHLORIDE SERPL-SCNC: 105 MMOL/L (ref 96–108)
CO2 SERPL-SCNC: 28 MMOL/L (ref 21–32)
CREAT SERPL-MCNC: 1.09 MG/DL (ref 0.6–1.3)
ERYTHROCYTE [DISTWIDTH] IN BLOOD BY AUTOMATED COUNT: 13.5 % (ref 11.6–15.1)
EST. AVERAGE GLUCOSE BLD GHB EST-MCNC: 143 MG/DL
GFR SERPL CREATININE-BSD FRML MDRD: 75 ML/MIN/1.73SQ M
GLUCOSE P FAST SERPL-MCNC: 105 MG/DL (ref 65–99)
HBA1C MFR BLD: 6.6 %
HCT VFR BLD AUTO: 42.6 % (ref 36.5–49.3)
HGB BLD-MCNC: 13.4 G/DL (ref 12–17)
MCH RBC QN AUTO: 30 PG (ref 26.8–34.3)
MCHC RBC AUTO-ENTMCNC: 31.5 G/DL (ref 31.4–37.4)
MCV RBC AUTO: 95 FL (ref 82–98)
PLATELET # BLD AUTO: 144 THOUSANDS/UL (ref 149–390)
PMV BLD AUTO: 11.4 FL (ref 8.9–12.7)
POTASSIUM SERPL-SCNC: 4.7 MMOL/L (ref 3.5–5.3)
RBC # BLD AUTO: 4.47 MILLION/UL (ref 3.88–5.62)
SODIUM SERPL-SCNC: 136 MMOL/L (ref 135–147)
WBC # BLD AUTO: 8.03 THOUSAND/UL (ref 4.31–10.16)

## 2023-08-08 PROCEDURE — 83036 HEMOGLOBIN GLYCOSYLATED A1C: CPT

## 2023-08-08 PROCEDURE — 36415 COLL VENOUS BLD VENIPUNCTURE: CPT

## 2023-08-08 PROCEDURE — 80048 BASIC METABOLIC PNL TOTAL CA: CPT

## 2023-08-08 PROCEDURE — 85027 COMPLETE CBC AUTOMATED: CPT

## 2023-08-08 PROCEDURE — 99214 OFFICE O/P EST MOD 30 MIN: CPT | Performed by: ORTHOPAEDIC SURGERY

## 2023-08-08 RX ORDER — IBUPROFEN 800 MG/1
800 TABLET ORAL EVERY 8 HOURS PRN
COMMUNITY
Start: 2023-08-03

## 2023-08-08 RX ORDER — AMOXICILLIN 500 MG/1
CAPSULE ORAL
COMMUNITY
Start: 2023-08-03

## 2023-08-08 NOTE — NURSING NOTE
Call placed to patient to discuss upcoming appointment at [address] [place] [address] radiology department and complete consultation with patient. Patient is having a left hip CSI  utilizing fluoroscopy  guidance. Reviewed  patient's allergies, current anticoagulant medication of ASA 81 mg present per patient but not required to stop per periprocedural management of coagulation status and hemostasis risk in percutaneous image guided procedure guidelines, patient has had procedure in the past, no questions when asked if any questions or concerns. Reminded patient of location and time expected for procedure, Patient expressed understanding by verbalizing and repeating instructions.
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
## 2023-08-10 ENCOUNTER — TELEPHONE (OUTPATIENT)
Dept: INTERNAL MEDICINE CLINIC | Facility: CLINIC | Age: 58
End: 2023-08-10

## 2023-08-10 ENCOUNTER — APPOINTMENT (OUTPATIENT)
Dept: PHYSICAL THERAPY | Facility: REHABILITATION | Age: 58
End: 2023-08-10
Payer: MEDICARE

## 2023-08-10 DIAGNOSIS — E11.9 TYPE 2 DIABETES MELLITUS WITHOUT COMPLICATION, WITHOUT LONG-TERM CURRENT USE OF INSULIN (HCC): ICD-10-CM

## 2023-08-10 NOTE — TELEPHONE ENCOUNTER
Please call patient to discuss labs that were done on 8/8. Patient is concerned about his A1C levels.

## 2023-08-11 ENCOUNTER — HOSPITAL ENCOUNTER (EMERGENCY)
Facility: HOSPITAL | Age: 58
Discharge: HOME | End: 2023-08-11
Attending: EMERGENCY MEDICINE
Payer: MEDICARE

## 2023-08-11 VITALS
HEART RATE: 87 BPM | OXYGEN SATURATION: 99 % | WEIGHT: 254.19 LBS | DIASTOLIC BLOOD PRESSURE: 116 MMHG | BODY MASS INDEX: 41.03 KG/M2 | TEMPERATURE: 97.6 F | SYSTOLIC BLOOD PRESSURE: 231 MMHG | RESPIRATION RATE: 19 BRPM

## 2023-08-11 DIAGNOSIS — F41.9 ANXIETY: Primary | ICD-10-CM

## 2023-08-11 DIAGNOSIS — M79.606 LEG PAIN: ICD-10-CM

## 2023-08-11 PROCEDURE — 99283 EMERGENCY DEPT VISIT LOW MDM: CPT

## 2023-08-11 PROCEDURE — 99284 EMERGENCY DEPT VISIT MOD MDM: CPT | Performed by: EMERGENCY MEDICINE

## 2023-08-11 PROCEDURE — 96372 THER/PROPH/DIAG INJ SC/IM: CPT

## 2023-08-11 RX ORDER — LANOLIN ALCOHOL/MO/W.PET/CERES
6 CREAM (GRAM) TOPICAL
Status: DISCONTINUED | OUTPATIENT
Start: 2023-08-11 | End: 2023-08-11 | Stop reason: HOSPADM

## 2023-08-11 RX ORDER — KETOROLAC TROMETHAMINE 30 MG/ML
15 INJECTION, SOLUTION INTRAMUSCULAR; INTRAVENOUS ONCE
Status: COMPLETED | OUTPATIENT
Start: 2023-08-11 | End: 2023-08-11

## 2023-08-11 RX ADMIN — KETOROLAC TROMETHAMINE 15 MG: 30 INJECTION, SOLUTION INTRAMUSCULAR; INTRAVENOUS at 00:49

## 2023-08-11 RX ADMIN — MELATONIN 6 MG: at 00:49

## 2023-08-11 NOTE — ED ATTENDING ATTESTATION
8/11/2023  IRadha MD, saw and evaluated the patient. I have discussed the patient with the resident/non-physician practitioner and agree with the resident's/non-physician practitioner's findings, Plan of Care, and MDM as documented in the resident's/non-physician practitioner's note, except where noted. All available labs and Radiology studies were reviewed. I was present for key portions of any procedure(s) performed by the resident/non-physician practitioner and I was immediately available to provide assistance. At this point I agree with the current assessment done in the Emergency Department. I have conducted an independent evaluation of this patient a history and physical is as follows:    82-year-old male well-known to the emergency department presents tonight for multiple complaints. Patient reports right posterior leg pain in the area of his hamstring. He was recently evaluated for this at his orthopedic office and has an MRI scheduled for tomorrow night. This is not worsening, just constant. No new injuries or falls. No numbness or tingling. He is able to ambulate without difficulty. No fevers or chills. Patient also endorses increased stress as his mother is currently admitted to a hospital in [place] for MI. He endorses poor sleep and is requesting something to help him sleep. Patient also endorses dental pain. He has poor dentition and multiple cracked teeth. No neck pain, difficulty swallowing, or voice change. No facial swelling. On exam, patient anxious appearing with pressured speech, but in no acute distress, head is normocephalic atraumatic, pupils equal round and reactive to light, heart is regular rate and rhythm without murmurs, lungs clear to auscultation bilaterally, patient has tenderness to palpation in the right hamstring region, no obvious deformity, no overlying skin changes. He is able to ambulate with steady gait. No obvious joint laxity.   No dental abscess. Submental space is soft, neck is supple without meningismus signs. MDM:  All of the patient's issues tonight appear chronic. Plan to treat symptomatically and likely discharge home. Do not believe any further workup is necessary at this time.     ED Course         Critical Care Time  Procedures

## 2023-08-12 ENCOUNTER — HOSPITAL ENCOUNTER (OUTPATIENT)
Dept: RADIOLOGY | Facility: HOSPITAL | Age: 58
Discharge: HOME | End: 2023-08-12
Attending: ORTHOPAEDIC SURGERY
Payer: MEDICARE

## 2023-08-12 DIAGNOSIS — S76.311A STRAIN OF RIGHT HAMSTRING, INITIAL ENCOUNTER: ICD-10-CM

## 2023-08-12 PROCEDURE — 73721 MRI JNT OF LWR EXTRE W/O DYE: CPT

## 2023-08-12 PROCEDURE — G1004 CDSM NDSC: HCPCS

## 2023-08-14 NOTE — TELEPHONE ENCOUNTER
Called and spoke to patient, patient made aware as per note. Patient understood and had no questions at this time.

## 2023-08-15 ENCOUNTER — HOSPITAL ENCOUNTER (OUTPATIENT)
Dept: RADIOLOGY | Facility: HOSPITAL | Age: 58
Discharge: HOME | End: 2023-08-15

## 2023-08-15 ENCOUNTER — APPOINTMENT (EMERGENCY)
Dept: RADIOLOGY | Facility: HOSPITAL | Age: 58
End: 2023-08-15
Payer: MEDICARE

## 2023-08-15 ENCOUNTER — HOSPITAL ENCOUNTER (EMERGENCY)
Facility: HOSPITAL | Age: 58
Discharge: HOME | End: 2023-08-15
Attending: STUDENT IN AN ORGANIZED HEALTH CARE EDUCATION/TRAINING PROGRAM
Payer: MEDICARE

## 2023-08-15 VITALS
TEMPERATURE: 97.6 F | DIASTOLIC BLOOD PRESSURE: 93 MMHG | SYSTOLIC BLOOD PRESSURE: 154 MMHG | RESPIRATION RATE: 19 BRPM | OXYGEN SATURATION: 98 % | HEART RATE: 85 BPM

## 2023-08-15 DIAGNOSIS — R42 DIZZINESS: Primary | ICD-10-CM

## 2023-08-15 DIAGNOSIS — M16.12 PRIMARY OSTEOARTHRITIS OF ONE HIP, LEFT: ICD-10-CM

## 2023-08-15 LAB
2HR DELTA HS TROPONIN: 11 NG/L
4HR DELTA HS TROPONIN: 5 NG/L
ALBUMIN SERPL BCP-MCNC: 3.7 G/DL (ref 3.5–5)
ALP SERPL-CCNC: 61 U/L (ref 46–116)
ALT SERPL W P-5'-P-CCNC: 65 U/L (ref 12–78)
AMMONIA PLAS-SCNC: 20 UMOL/L (ref 11–35)
ANION GAP SERPL CALCULATED.3IONS-SCNC: 5 MMOL/L
APTT PPP: 26 SECONDS (ref 23–37)
AST SERPL W P-5'-P-CCNC: 36 U/L (ref 5–45)
ATRIAL RATE: 82 BPM
BASE EX.OXY STD BLDV CALC-SCNC: 72 % (ref 60–80)
BASE EXCESS BLDV CALC-SCNC: -3.1 MMOL/L
BASOPHILS # BLD AUTO: 0.02 THOUSANDS/ÂΜL (ref 0–0.1)
BASOPHILS NFR BLD AUTO: 0 % (ref 0–1)
BILIRUB SERPL-MCNC: 0.41 MG/DL (ref 0.2–1)
BUN SERPL-MCNC: 21 MG/DL (ref 5–25)
CALCIUM SERPL-MCNC: 9.2 MG/DL (ref 8.3–10.1)
CARDIAC TROPONIN I PNL SERPL HS: 14 NG/L
CARDIAC TROPONIN I PNL SERPL HS: 19 NG/L
CARDIAC TROPONIN I PNL SERPL HS: 25 NG/L
CHLORIDE SERPL-SCNC: 103 MMOL/L (ref 96–108)
CO2 SERPL-SCNC: 25 MMOL/L (ref 21–32)
CREAT SERPL-MCNC: 1.07 MG/DL (ref 0.6–1.3)
EOSINOPHIL # BLD AUTO: 0.14 THOUSAND/ÂΜL (ref 0–0.61)
EOSINOPHIL NFR BLD AUTO: 2 % (ref 0–6)
ERYTHROCYTE [DISTWIDTH] IN BLOOD BY AUTOMATED COUNT: 13.2 % (ref 11.6–15.1)
GFR SERPL CREATININE-BSD FRML MDRD: 77 ML/MIN/1.73SQ M
GLUCOSE SERPL-MCNC: 116 MG/DL (ref 65–140)
GLUCOSE SERPL-MCNC: 97 MG/DL (ref 65–140)
GLUCOSE SERPL-MCNC: 97 MG/DL (ref 65–140)
HCO3 BLDV-SCNC: 22.5 MMOL/L (ref 24–30)
HCT VFR BLD AUTO: 40.3 % (ref 36.5–49.3)
HGB BLD-MCNC: 13.7 G/DL (ref 12–17)
IMM GRANULOCYTES # BLD AUTO: 0.02 THOUSAND/UL (ref 0–0.2)
IMM GRANULOCYTES NFR BLD AUTO: 0 % (ref 0–2)
INR PPP: 0.96 (ref 0.84–1.19)
LYMPHOCYTES # BLD AUTO: 3.45 THOUSANDS/ÂΜL (ref 0.6–4.47)
LYMPHOCYTES NFR BLD AUTO: 50 % (ref 14–44)
MCH RBC QN AUTO: 30.8 PG (ref 26.8–34.3)
MCHC RBC AUTO-ENTMCNC: 34 G/DL (ref 31.4–37.4)
MCV RBC AUTO: 91 FL (ref 82–98)
MONOCYTES # BLD AUTO: 0.48 THOUSAND/ÂΜL (ref 0.17–1.22)
MONOCYTES NFR BLD AUTO: 7 % (ref 4–12)
NEUTROPHILS # BLD AUTO: 2.9 THOUSANDS/ÂΜL (ref 1.85–7.62)
NEUTS SEG NFR BLD AUTO: 41 % (ref 43–75)
NRBC BLD AUTO-RTO: 0 /100 WBCS
O2 CT BLDV-SCNC: 14.8 ML/DL
P AXIS: 35 DEGREES
PCO2 BLDV: 42.2 MM HG (ref 42–50)
PH BLDV: 7.34 [PH] (ref 7.3–7.4)
PLATELET # BLD AUTO: 175 THOUSANDS/UL (ref 149–390)
PMV BLD AUTO: 9.9 FL (ref 8.9–12.7)
PO2 BLDV: 41.4 MM HG (ref 35–45)
POTASSIUM SERPL-SCNC: 4.1 MMOL/L (ref 3.5–5.3)
PR INTERVAL: 140 MS
PROT SERPL-MCNC: 7.9 G/DL (ref 6.4–8.4)
PROTHROMBIN TIME: 12.9 SECONDS (ref 11.6–14.5)
QRS AXIS: 48 DEGREES
QRSD INTERVAL: 76 MS
QT INTERVAL: 350 MS
QTC INTERVAL: 408 MS
RBC # BLD AUTO: 4.45 MILLION/UL (ref 3.88–5.62)
SODIUM SERPL-SCNC: 133 MMOL/L (ref 135–147)
T WAVE AXIS: 34 DEGREES
VENTRICULAR RATE: 82 BPM
WBC # BLD AUTO: 7.01 THOUSAND/UL (ref 4.31–10.16)

## 2023-08-15 PROCEDURE — 96374 THER/PROPH/DIAG INJ IV PUSH: CPT

## 2023-08-15 PROCEDURE — 82805 BLOOD GASES W/O2 SATURATION: CPT | Performed by: STUDENT IN AN ORGANIZED HEALTH CARE EDUCATION/TRAINING PROGRAM

## 2023-08-15 PROCEDURE — 85730 THROMBOPLASTIN TIME PARTIAL: CPT | Performed by: STUDENT IN AN ORGANIZED HEALTH CARE EDUCATION/TRAINING PROGRAM

## 2023-08-15 PROCEDURE — 82140 ASSAY OF AMMONIA: CPT | Performed by: STUDENT IN AN ORGANIZED HEALTH CARE EDUCATION/TRAINING PROGRAM

## 2023-08-15 PROCEDURE — 70496 CT ANGIOGRAPHY HEAD: CPT

## 2023-08-15 PROCEDURE — 93005 ELECTROCARDIOGRAM TRACING: CPT

## 2023-08-15 PROCEDURE — 84484 ASSAY OF TROPONIN QUANT: CPT | Performed by: STUDENT IN AN ORGANIZED HEALTH CARE EDUCATION/TRAINING PROGRAM

## 2023-08-15 PROCEDURE — 70498 CT ANGIOGRAPHY NECK: CPT

## 2023-08-15 PROCEDURE — 36415 COLL VENOUS BLD VENIPUNCTURE: CPT | Performed by: STUDENT IN AN ORGANIZED HEALTH CARE EDUCATION/TRAINING PROGRAM

## 2023-08-15 PROCEDURE — 93010 ELECTROCARDIOGRAM REPORT: CPT | Performed by: INTERNAL MEDICINE

## 2023-08-15 PROCEDURE — 85610 PROTHROMBIN TIME: CPT | Performed by: STUDENT IN AN ORGANIZED HEALTH CARE EDUCATION/TRAINING PROGRAM

## 2023-08-15 PROCEDURE — 96375 TX/PRO/DX INJ NEW DRUG ADDON: CPT

## 2023-08-15 PROCEDURE — 80053 COMPREHEN METABOLIC PANEL: CPT | Performed by: STUDENT IN AN ORGANIZED HEALTH CARE EDUCATION/TRAINING PROGRAM

## 2023-08-15 PROCEDURE — 99285 EMERGENCY DEPT VISIT HI MDM: CPT

## 2023-08-15 PROCEDURE — 82948 REAGENT STRIP/BLOOD GLUCOSE: CPT

## 2023-08-15 PROCEDURE — 99285 EMERGENCY DEPT VISIT HI MDM: CPT | Performed by: STUDENT IN AN ORGANIZED HEALTH CARE EDUCATION/TRAINING PROGRAM

## 2023-08-15 PROCEDURE — 74177 CT ABD & PELVIS W/CONTRAST: CPT

## 2023-08-15 PROCEDURE — 85025 COMPLETE CBC W/AUTO DIFF WBC: CPT | Performed by: STUDENT IN AN ORGANIZED HEALTH CARE EDUCATION/TRAINING PROGRAM

## 2023-08-15 RX ORDER — DROPERIDOL 2.5 MG/ML
0.62 INJECTION, SOLUTION INTRAMUSCULAR; INTRAVENOUS ONCE
Status: COMPLETED | OUTPATIENT
Start: 2023-08-15 | End: 2023-08-15

## 2023-08-15 RX ORDER — ONDANSETRON 2 MG/ML
4 INJECTION INTRAMUSCULAR; INTRAVENOUS ONCE
Status: DISCONTINUED | OUTPATIENT
Start: 2023-08-15 | End: 2023-08-15

## 2023-08-15 RX ORDER — ONDANSETRON 4 MG/1
4 TABLET, ORALLY DISINTEGRATING ORAL ONCE
Status: COMPLETED | OUTPATIENT
Start: 2023-08-15 | End: 2023-08-15

## 2023-08-15 RX ORDER — METOCLOPRAMIDE HYDROCHLORIDE 5 MG/ML
10 INJECTION INTRAMUSCULAR; INTRAVENOUS ONCE
Status: COMPLETED | OUTPATIENT
Start: 2023-08-15 | End: 2023-08-15

## 2023-08-15 RX ORDER — DIPHENHYDRAMINE HYDROCHLORIDE 50 MG/ML
25 INJECTION INTRAMUSCULAR; INTRAVENOUS ONCE
Status: COMPLETED | OUTPATIENT
Start: 2023-08-15 | End: 2023-08-15

## 2023-08-15 RX ORDER — DIAZEPAM 5 MG/1
5 TABLET ORAL ONCE
Status: COMPLETED | OUTPATIENT
Start: 2023-08-15 | End: 2023-08-15

## 2023-08-15 RX ADMIN — IOHEXOL 100 ML: 350 INJECTION, SOLUTION INTRAVENOUS at 15:36

## 2023-08-15 RX ADMIN — METOCLOPRAMIDE HYDROCHLORIDE 10 MG: 5 INJECTION INTRAMUSCULAR; INTRAVENOUS at 13:55

## 2023-08-15 RX ADMIN — DIPHENHYDRAMINE HYDROCHLORIDE 25 MG: 50 INJECTION, SOLUTION INTRAMUSCULAR; INTRAVENOUS at 15:01

## 2023-08-15 RX ADMIN — DIAZEPAM 5 MG: 5 TABLET ORAL at 15:52

## 2023-08-15 RX ADMIN — DROPERIDOL 0.62 MG: 2.5 INJECTION, SOLUTION INTRAMUSCULAR; INTRAVENOUS at 14:34

## 2023-08-15 RX ADMIN — ONDANSETRON 4 MG: 4 TABLET, ORALLY DISINTEGRATING ORAL at 13:46
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
## 2023-08-15 NOTE — ED NOTES
Pt reporting increased dizziness, headache, nausea, and sweatiness. BG 97, Dr Dorothy Fuentes aware.      Edie Hernandez RN  08/15/23 [id number]

## 2023-08-15 NOTE — DISCHARGE INSTRUCTIONS
You have been evaluated in the Emergency Department today for dizziness. Please follow up with your primary care doctor in 2-3 days. Return to the ER immediately for worsening or uncontrolled symptoms, worsening headache, chest pain, shortness of breath, persistent vomiting, vision changes, fainting, or for any other concerning symptoms.

## 2023-08-15 NOTE — Clinical Note
Case was discussed with  and the patient's admission status was agreed to be Admission Status: observation status to the service of Dr. Willams
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
## 2023-08-15 NOTE — ED NOTES
Pt requesting to urinate, pt refuses hospital non-slip socks, RN educated pt on importance of socks for fall prevention. Pt denies socks at this time.      Anisha Sood RN  08/15/23 1070

## 2023-08-18 ENCOUNTER — APPOINTMENT (OUTPATIENT)
Dept: PHYSICAL THERAPY | Facility: REHABILITATION | Age: 58
End: 2023-08-18
Payer: MEDICARE

## 2023-08-22 ENCOUNTER — OFFICE VISIT (OUTPATIENT)
Dept: OBGYN CLINIC | Facility: HOSPITAL | Age: 58
End: 2023-08-22
Payer: MEDICARE

## 2023-08-22 ENCOUNTER — HOSPITAL ENCOUNTER (EMERGENCY)
Facility: HOSPITAL | Age: 58
Discharge: HOME | End: 2023-08-22
Attending: EMERGENCY MEDICINE
Payer: MEDICARE

## 2023-08-22 ENCOUNTER — HOSPITAL ENCOUNTER (EMERGENCY)
Facility: HOSPITAL | Age: 58
Discharge: HOME | End: 2023-08-22
Attending: EMERGENCY MEDICINE | Admitting: EMERGENCY MEDICINE
Payer: MEDICARE

## 2023-08-22 VITALS
DIASTOLIC BLOOD PRESSURE: 86 MMHG | TEMPERATURE: 97.9 F | SYSTOLIC BLOOD PRESSURE: 192 MMHG | HEART RATE: 100 BPM | RESPIRATION RATE: 16 BRPM | OXYGEN SATURATION: 98 %

## 2023-08-22 VITALS
SYSTOLIC BLOOD PRESSURE: 161 MMHG | RESPIRATION RATE: 20 BRPM | TEMPERATURE: 98.8 F | DIASTOLIC BLOOD PRESSURE: 83 MMHG | OXYGEN SATURATION: 95 % | HEART RATE: 80 BPM

## 2023-08-22 VITALS
SYSTOLIC BLOOD PRESSURE: 111 MMHG | DIASTOLIC BLOOD PRESSURE: 71 MMHG | HEIGHT: 66 IN | HEART RATE: 81 BPM | WEIGHT: 248 LBS | BODY MASS INDEX: 39.86 KG/M2

## 2023-08-22 DIAGNOSIS — K59.00 CONSTIPATION, UNSPECIFIED CONSTIPATION TYPE: ICD-10-CM
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
## 2023-08-22 DIAGNOSIS — M16.12 PRIMARY OSTEOARTHRITIS OF ONE HIP, LEFT: Primary | ICD-10-CM

## 2023-08-22 DIAGNOSIS — F41.9 ANXIETY: Primary | ICD-10-CM

## 2023-08-22 DIAGNOSIS — M16.11 PRIMARY OSTEOARTHRITIS OF RIGHT HIP: ICD-10-CM

## 2023-08-22 LAB
ATRIAL RATE: 87 BPM
GLUCOSE SERPL-MCNC: 94 MG/DL (ref 65–140)
P AXIS: 48 DEGREES
PR INTERVAL: 162 MS
QRS AXIS: 44 DEGREES
QRSD INTERVAL: 82 MS
QT INTERVAL: 344 MS
QTC INTERVAL: 413 MS
T WAVE AXIS: 4 DEGREES
VENTRICULAR RATE: 87 BPM

## 2023-08-22 PROCEDURE — 93010 ELECTROCARDIOGRAM REPORT: CPT | Performed by: INTERNAL MEDICINE

## 2023-08-22 PROCEDURE — 99285 EMERGENCY DEPT VISIT HI MDM: CPT | Performed by: EMERGENCY MEDICINE

## 2023-08-22 PROCEDURE — 99213 OFFICE O/P EST LOW 20 MIN: CPT | Performed by: ORTHOPAEDIC SURGERY

## 2023-08-22 PROCEDURE — 93005 ELECTROCARDIOGRAM TRACING: CPT

## 2023-08-22 PROCEDURE — 82948 REAGENT STRIP/BLOOD GLUCOSE: CPT

## 2023-08-22 PROCEDURE — 99283 EMERGENCY DEPT VISIT LOW MDM: CPT

## 2023-08-22 PROCEDURE — 99284 EMERGENCY DEPT VISIT MOD MDM: CPT | Performed by: EMERGENCY MEDICINE

## 2023-08-22 RX ORDER — POLYETHYLENE GLYCOL 3350 17 G/17G
17 POWDER, FOR SOLUTION ORAL DAILY
Qty: 10 EACH | Refills: 0 | Status: SHIPPED | OUTPATIENT
Start: 2023-08-22 | End: 2023-09-05

## 2023-08-22 RX ORDER — ACETAMINOPHEN 325 MG/1
975 TABLET ORAL ONCE
Status: COMPLETED | OUTPATIENT
Start: 2023-08-22 | End: 2023-08-22

## 2023-08-22 RX ORDER — DIAZEPAM 5 MG/1
5 TABLET ORAL ONCE
Status: COMPLETED | OUTPATIENT
Start: 2023-08-22 | End: 2023-08-22

## 2023-08-22 RX ORDER — NABUMETONE 750 MG/1
750 TABLET, FILM COATED ORAL 2 TIMES DAILY
Qty: 60 TABLET | Refills: 0 | Status: SHIPPED | OUTPATIENT
Start: 2023-08-22 | End: 2023-09-21

## 2023-08-22 RX ORDER — ONDANSETRON 4 MG/1
4 TABLET, ORALLY DISINTEGRATING ORAL ONCE
Status: COMPLETED | OUTPATIENT
Start: 2023-08-22 | End: 2023-08-22

## 2023-08-22 RX ORDER — DIPHENHYDRAMINE HCL 25 MG
50 TABLET ORAL ONCE
Status: COMPLETED | OUTPATIENT
Start: 2023-08-22 | End: 2023-08-22

## 2023-08-22 RX ORDER — DIAPER,BRIEF,INFANT-TODD,DISP
EACH MISCELLANEOUS
Qty: 15 G | Refills: 0 | Status: SHIPPED | OUTPATIENT
Start: 2023-08-22

## 2023-08-22 RX ADMIN — DIPHENHYDRAMINE HCL 50 MG: 25 TABLET, COATED ORAL at 07:36

## 2023-08-22 RX ADMIN — ONDANSETRON 4 MG: 4 TABLET, ORALLY DISINTEGRATING ORAL at 02:05

## 2023-08-22 RX ADMIN — DIAZEPAM 5 MG: 5 TABLET ORAL at 02:05

## 2023-08-22 RX ADMIN — ACETAMINOPHEN 975 MG: 325 TABLET, FILM COATED ORAL at 07:36
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
## 2023-08-22 NOTE — DISCHARGE INSTRUCTIONS
You are seen in the emergency department for a rash on your right arm which determined that it appears to be a bug bite. We are giving you Benadryl. We also will send you a prescription for cortisone cream.  Please follow-up with your primary care physician if it does not resolve in the next few days. Return to the emergency department if you have expanding of the red by greater than 2 times the size it currently is.
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
## 2023-08-27 ENCOUNTER — HOSPITAL ENCOUNTER (EMERGENCY)
Facility: HOSPITAL | Age: 58
Discharge: HOME | End: 2023-08-27
Attending: EMERGENCY MEDICINE | Admitting: EMERGENCY MEDICINE
Payer: MEDICARE

## 2023-08-27 VITALS
HEART RATE: 87 BPM | TEMPERATURE: 98.9 F | SYSTOLIC BLOOD PRESSURE: 153 MMHG | RESPIRATION RATE: 18 BRPM | OXYGEN SATURATION: 98 % | DIASTOLIC BLOOD PRESSURE: 95 MMHG

## 2023-08-27 DIAGNOSIS — K08.89 DENTALGIA: Primary | ICD-10-CM

## 2023-08-27 LAB — GLUCOSE SERPL-MCNC: 89 MG/DL (ref 65–140)

## 2023-08-27 PROCEDURE — 99284 EMERGENCY DEPT VISIT MOD MDM: CPT | Performed by: EMERGENCY MEDICINE

## 2023-08-27 PROCEDURE — 96372 THER/PROPH/DIAG INJ SC/IM: CPT

## 2023-08-27 PROCEDURE — 99283 EMERGENCY DEPT VISIT LOW MDM: CPT

## 2023-08-27 PROCEDURE — 82948 REAGENT STRIP/BLOOD GLUCOSE: CPT

## 2023-08-27 PROCEDURE — 64450 NJX AA&/STRD OTHER PN/BRANCH: CPT | Performed by: EMERGENCY MEDICINE

## 2023-08-27 RX ORDER — ROPIVACAINE HYDROCHLORIDE 5 MG/ML
20 INJECTION, SOLUTION EPIDURAL; INFILTRATION; PERINEURAL ONCE
Status: COMPLETED | OUTPATIENT
Start: 2023-08-27 | End: 2023-08-27

## 2023-08-27 RX ORDER — CHLORHEXIDINE GLUCONATE 0.12 MG/ML
15 RINSE ORAL 2 TIMES DAILY
Qty: 120 ML | Refills: 0 | Status: SHIPPED | OUTPATIENT
Start: 2023-08-27

## 2023-08-27 RX ORDER — KETOROLAC TROMETHAMINE 30 MG/ML
15 INJECTION, SOLUTION INTRAMUSCULAR; INTRAVENOUS ONCE
Status: COMPLETED | OUTPATIENT
Start: 2023-08-27 | End: 2023-08-27

## 2023-08-27 RX ORDER — ACETAMINOPHEN 325 MG/1
975 TABLET ORAL ONCE
Status: COMPLETED | OUTPATIENT
Start: 2023-08-27 | End: 2023-08-27

## 2023-08-27 RX ORDER — POLYETHYLENE GLYCOL 3350 17 G/17G
17 POWDER, FOR SOLUTION ORAL DAILY
Qty: 170 G | Refills: 0 | Status: SHIPPED | OUTPATIENT
Start: 2023-08-27 | End: 2023-09-05

## 2023-08-27 RX ORDER — IBUPROFEN 400 MG/1
400 TABLET ORAL EVERY 6 HOURS PRN
Qty: 28 TABLET | Refills: 0 | Status: SHIPPED | OUTPATIENT
Start: 2023-08-27 | End: 2023-09-03

## 2023-08-27 RX ADMIN — KETOROLAC TROMETHAMINE 15 MG: 30 INJECTION, SOLUTION INTRAMUSCULAR at 04:44

## 2023-08-27 RX ADMIN — ROPIVACAINE HYDROCHLORIDE 20 ML: 5 INJECTION, SOLUTION EPIDURAL; INFILTRATION; PERINEURAL at 04:53

## 2023-08-27 RX ADMIN — ACETAMINOPHEN 975 MG: 325 TABLET ORAL at 04:45

## 2023-08-27 NOTE — ED PROCEDURE NOTE
PROCEDURE  Nerve block    Date/Time: 8/27/2023 4:55 AM    Performed by: Sanjay Malave DO  Authorized by: Sanjay Malave DO    Patient location:  ED    Indications:     Indications:  Pain relief  Location:     Body area:  Head    Head nerve blocked: inferior alveolar. Nerve type:  Peripheral    Laterality:  Right  Skin anesthesia (see MAR for exact dosages):     Skin anesthesia method:  None  Procedure details (see MAR for exact dosages): Block needle gauge:  30 G    Anesthetic injected:  Ropivacaine 0.5%    Steroid injected:  None    Additive injected:  None    Injection procedure:  Anatomic landmarks identified, anatomic landmarks palpated, incremental injection, introduced needle and negative aspiration for blood  Post-procedure details:     Dressing:  None    Outcome:  Pain relieved    Patient tolerance of procedure: Tolerated with difficulty  Comments:       Total of 3mL injected         Glen Jovel DO  08/27/23 8096

## 2023-08-27 NOTE — DISCHARGE INSTRUCTIONS
Please use the Peridex mouthwash and follow-up with a dentist.  Please return to the ED if you have new or worsening symptoms-see attached.
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
## 2023-08-27 NOTE — ED ATTENDING ATTESTATION
8/27/2023  Ld TORRES DO, saw and evaluated the patient. I have discussed the patient with the resident/non-physician practitioner and agree with the resident's/non-physician practitioner's findings, Plan of Care, and MDM as documented in the resident's/non-physician practitioner's note, except where noted. All available labs and Radiology studies were reviewed. I was present for key portions of any procedure(s) performed by the resident/non-physician practitioner and I was immediately available to provide assistance. At this point I agree with the current assessment done in the Emergency Department. I have conducted an independent evaluation of this patient a history and physical is as follows:    67 yo male presents for evaluation of dental pain, worse when he breathes with his mouth open or when he touches then. He apparently had amoxicillin rx 8/3/23 which hasn't been helping. Pain started at #25, radiating to the right. No swelling, fever. No trismus  No intraoral abscess  Tolerating secretions  Submandibular space soft  No stridor  Normal phonation  Neck supple      Imp: dental pain due to carries plan: MMA, CHG mouthwash.  Refer to dental clinic      ED Course         Critical Care Time  Procedures

## 2023-08-30 ENCOUNTER — TELEPHONE (OUTPATIENT)
Age: 58
End: 2023-08-30

## 2023-08-30 ENCOUNTER — APPOINTMENT (OUTPATIENT)
Dept: PHYSICAL THERAPY | Facility: REHABILITATION | Age: 58
End: 2023-08-30
Payer: MEDICARE

## 2023-08-30 NOTE — TELEPHONE ENCOUNTER
Caller: Mary Alice Lai  1965  MRN [id number]     Doctor: Maxwell May    Reason for call: Pt has a Fl inj of the hip sched for 23 @ 130 at the [facility] and he is requesting a lyft ride for this procedure    Call back#: [phone]    [facility] email

## 2023-08-31 ENCOUNTER — APPOINTMENT (OUTPATIENT)
Dept: PHYSICAL THERAPY | Facility: REHABILITATION | Age: 58
End: 2023-08-31
Payer: MEDICARE

## 2023-09-01 ENCOUNTER — HOSPITAL ENCOUNTER (EMERGENCY)
Facility: HOSPITAL | Age: 58
Discharge: HOME | End: 2023-09-01
Attending: EMERGENCY MEDICINE
Payer: MEDICARE

## 2023-09-01 ENCOUNTER — OFFICE VISIT (OUTPATIENT)
Dept: PHYSICAL THERAPY | Facility: REHABILITATION | Age: 58
End: 2023-09-01
Payer: MEDICARE

## 2023-09-01 VITALS
HEART RATE: 92 BPM | DIASTOLIC BLOOD PRESSURE: 131 MMHG | TEMPERATURE: 97.6 F | OXYGEN SATURATION: 98 % | RESPIRATION RATE: 18 BRPM | SYSTOLIC BLOOD PRESSURE: 157 MMHG

## 2023-09-01 VITALS
WEIGHT: 240 LBS | BODY MASS INDEX: 39.99 KG/M2 | HEART RATE: 96 BPM | OXYGEN SATURATION: 97 % | HEIGHT: 65 IN | RESPIRATION RATE: 16 BRPM | SYSTOLIC BLOOD PRESSURE: 174 MMHG | TEMPERATURE: 97 F | DIASTOLIC BLOOD PRESSURE: 107 MMHG

## 2023-09-01 DIAGNOSIS — K08.89 PAIN, DENTAL: Primary | ICD-10-CM

## 2023-09-01 DIAGNOSIS — M17.12 PRIMARY OSTEOARTHRITIS OF LEFT KNEE: ICD-10-CM

## 2023-09-01 DIAGNOSIS — M25.562 CHRONIC PAIN OF LEFT KNEE: ICD-10-CM

## 2023-09-01 DIAGNOSIS — M54.12 CERVICAL RADICULOPATHY: Primary | ICD-10-CM

## 2023-09-01 DIAGNOSIS — K08.89 DENTALGIA: Primary | ICD-10-CM

## 2023-09-01 DIAGNOSIS — G89.29 CHRONIC PAIN OF RIGHT KNEE: ICD-10-CM

## 2023-09-01 DIAGNOSIS — M54.16 LUMBAR RADICULOPATHY: ICD-10-CM

## 2023-09-01 DIAGNOSIS — M25.561 CHRONIC PAIN OF RIGHT KNEE: ICD-10-CM

## 2023-09-01 DIAGNOSIS — G89.29 CHRONIC PAIN OF LEFT KNEE: ICD-10-CM

## 2023-09-01 PROCEDURE — 97112 NEUROMUSCULAR REEDUCATION: CPT

## 2023-09-01 PROCEDURE — 96372 THER/PROPH/DIAG INJ SC/IM: CPT

## 2023-09-01 PROCEDURE — 64400 NJX AA&/STRD TRIGEMINAL NRV: CPT | Performed by: EMERGENCY MEDICINE

## 2023-09-01 PROCEDURE — 99283 EMERGENCY DEPT VISIT LOW MDM: CPT

## 2023-09-01 PROCEDURE — 97110 THERAPEUTIC EXERCISES: CPT

## 2023-09-01 PROCEDURE — 99284 EMERGENCY DEPT VISIT MOD MDM: CPT | Performed by: EMERGENCY MEDICINE

## 2023-09-01 RX ORDER — ROPIVACAINE HYDROCHLORIDE 5 MG/ML
15 INJECTION, SOLUTION EPIDURAL; INFILTRATION; PERINEURAL ONCE
Status: COMPLETED | OUTPATIENT
Start: 2023-09-01 | End: 2023-09-01

## 2023-09-01 RX ORDER — AMOXICILLIN 500 MG/1
500 CAPSULE ORAL 3 TIMES DAILY
Qty: 15 CAPSULE | Refills: 0 | Status: SHIPPED | OUTPATIENT
Start: 2023-09-01 | End: 2023-09-01

## 2023-09-01 RX ORDER — AMOXICILLIN 500 MG/1
500 CAPSULE ORAL 3 TIMES DAILY
Qty: 21 CAPSULE | Refills: 0 | Status: SHIPPED | OUTPATIENT
Start: 2023-09-01 | End: 2023-09-08

## 2023-09-01 RX ORDER — KETOROLAC TROMETHAMINE 30 MG/ML
15 INJECTION, SOLUTION INTRAMUSCULAR; INTRAVENOUS ONCE
Status: COMPLETED | OUTPATIENT
Start: 2023-09-01 | End: 2023-09-01

## 2023-09-01 RX ORDER — DIAZEPAM 5 MG/1
5 TABLET ORAL ONCE
Status: COMPLETED | OUTPATIENT
Start: 2023-09-01 | End: 2023-09-01

## 2023-09-01 RX ORDER — AMOXICILLIN 250 MG/1
500 CAPSULE ORAL ONCE
Status: COMPLETED | OUTPATIENT
Start: 2023-09-01 | End: 2023-09-01

## 2023-09-01 RX ORDER — ONDANSETRON 4 MG/1
4 TABLET, ORALLY DISINTEGRATING ORAL ONCE
Status: COMPLETED | OUTPATIENT
Start: 2023-09-01 | End: 2023-09-01

## 2023-09-01 RX ADMIN — ROPIVACAINE HYDROCHLORIDE 15 ML: 5 INJECTION, SOLUTION EPIDURAL; INFILTRATION; PERINEURAL at 21:28

## 2023-09-01 RX ADMIN — ROPIVACAINE HYDROCHLORIDE 15 ML: 5 INJECTION EPIDURAL; INFILTRATION; PERINEURAL at 04:25

## 2023-09-01 RX ADMIN — KETOROLAC TROMETHAMINE 15 MG: 30 INJECTION, SOLUTION INTRAMUSCULAR; INTRAVENOUS at 21:10

## 2023-09-01 RX ADMIN — ONDANSETRON 4 MG: 4 TABLET, ORALLY DISINTEGRATING ORAL at 02:12

## 2023-09-01 RX ADMIN — DIAZEPAM 5 MG: 5 TABLET ORAL at 02:12

## 2023-09-01 RX ADMIN — AMOXICILLIN 500 MG: 250 CAPSULE ORAL at 21:28

## 2023-09-01 RX ADMIN — KETOROLAC TROMETHAMINE 15 MG: 30 INJECTION, SOLUTION INTRAMUSCULAR; INTRAVENOUS at 04:42

## 2023-09-01 NOTE — ED ATTENDING ATTESTATION
9/1/2023  I, Radha Bahena MD, saw and evaluated the patient. I have discussed the patient with the resident/non-physician practitioner and agree with the resident's/non-physician practitioner's findings, Plan of Care, and MDM as documented in the resident's/non-physician practitioner's note, except where noted. All available labs and Radiology studies were reviewed. I was present for key portions of any procedure(s) performed by the resident/non-physician practitioner and I was immediately available to provide assistance. At this point I agree with the current assessment done in the Emergency Department. I have conducted an independent evaluation of this patient a history and physical is as follows:    45-year-old male well-known to the emergency department presents back for evaluation of ongoing dentalgia. Patient reports lower dental pain that has been ongoing for weeks. Has not followed up with dentistry. Denies any difficulty swallowing, voice change, neck pain or stiffness. No fevers or chills. No facial swelling. On exam, patient is anxious appearing, but in no acute distress, head is normocephalic and atraumatic, pupils equal round and reactive to light, neck is supple without meningismus signs. Poor dentition, multiple dental caries, uvula midline, submental space is soft, no tongue elevation. No stridor. No obvious abscess. Heart is regular rate and rhythm with intact distal pulses, no increased work of breathing, respiratory distress, or stridor. MDM:  [address] secondary to dental caries: Plan to treat symptomatically and discharged home with dental follow-up.     ED Course         Critical Care Time  Procedures

## 2023-09-01 NOTE — PROGRESS NOTES
Daily Note     Today's date: 2023  Patient name: Alen Stewart  : 1965   MRN: [id number]  Referring provider: Toni Burger*  Dx:   Encounter Diagnosis     ICD-10-CM    1. Cervical radiculopathy  M54.12       2. Chronic pain of right knee  M25.561     G89.29       3. Primary osteoarthritis of left knee  M17.12       4. Chronic pain of left knee  M25.562     G89.29       5. Lumbar radiculopathy  M54.16                      Subjective: Pt reports jaw and teeth sensitivity and pain, had to go to the emergency room this morning. Asked for medication for pain but was not given medications during that visit. Neck and back pain not as bad today, but left hip pain while walking and sitting. Today has not eaten anything only drinking coffee which caused stomach pain. Objective: See treatment diary below  HR 85  /82  SpO2 98    Assessment: Tolerated treatment fair. Patient experienced tooth pain and dizziness/LH throughout today's session. Not able to eat today which also caused stomach pain and nausea. Treatment limited by pain and other symptoms. Back and upper back exercises tolerated well with minimal muscle fatigue during exercise. Shoulder extensions caused abdominal pain but were able to be completed. Patient required frequent breaks due to fatigue and pain. Patient would benefit from continued PT.   1:1 with DALE Mercado under direct supervision of Ray Greene DPT for entirety of tx. Plan: Progress treatment as tolerated.        Precautions: prediabetes    Pertinent Findings and Outcome Measures:                                                                                                                                                                         Test / Measure  3/8/2023 2023 2023 2023 2023   FOTO L/s 33 knee 33  L/s 47 knee 54 L/s 94 knee 99    B hip flex MMT 3/5    3/5 to 3+/5   B knee ext MMT 4-/5    4-/5 to 4/5   L/s ext AROM 25%    25% + C/s radic tests  + Spurlings, distraction, ULTT, C/s rot   + Spurlings, distraction, ULTT, C/s rot       Manuals 5/23 6/13 6/19 6/29 7/12 7/14 7/20 7/21 7/27 7/31 9/1   Paraspinal STM              BL Hip/Knee PROM              C/s STM, PROM, UT S, SOR  MM 10'            L hip flex S       3'       Neuro Re-Ed              Bridges 3x10 on PB 2" hold + 3" ecc     3x10 3x10 3" + TA hold       LTR      20x 20x 20x 20x     Mini squats           2x10   Hooklying alternating clamshell              Banded side steps See below at pallof press      3 laps at mirror otb       TB monster walks              Deandre seated good mornings + row        3x10 20#      PPT              QS + SLR      2x10 B 2#        PB squats              PB rollouts      15x ea 3 way        Suitcase carry + march  3x50' B 15#       [id number] B 25#      Anterior KB carry        [id number] 15#      DNF supine  15x5" 3 way            Resisted C/s retr   3x10 mtb 3x10 mtb 3x10 mtb         SNAGS - rot              SNAGS - ext              Scap retr              C/s isometrics              Neck bridges at wall  3x10 3x10           Wall slides w/ foam roll              Unilat rows + trunk rot              [place] punch + trunk rot 2x15 B 15#             Pallof press + mtb side step 5 laps ea 7#       20x ea dbl mtb 15x ea 12#  15x ea 16#  x15 ea dbl btb  x10 walkout   TB trunk rot        15x ea dbl gtb      SL RDL 2x10 B 10#             Wall angels   3x10 2x10 3x10         Supine C/s rot AROM head over EOT    30x B 30x b/l         Foam roll series (4 way)    15x ea 1' ea 5 way         Ther Ex              HEP review              NuStep 10' L8     10' L5 10' L5 10' L7 8' L8  6' L5   UBE  3'/3' L1 3'/3' L6 3'/3' L6 3/3 L6         LAQ      3x10 B 2# 3x10 B  2#       Supine PBall hamstring curl              DL/rack pulls              Leg press              STS Squat + chest press  3x8 15#         2x10 8# 2x8 8#   UT/LS S              Shrugs  3x10 3" hold 15# DBs 3x10 3" hold 15# DBs 3x10 3" hold 20# DBs 3x10 3" hold 20# db     2x10 3" hold 12# DBs    [place] rows  3x10 25# 3x10 30# 3x10 30# 3x10 30#     2x10 20# 2x10 25#   Deandre Sh ext  3x10 20# 3x10 22# 3x10 22# 3x10 22#     2x10 15# 2x10 20#   [place] facepulls  3x10 18# 3x10 18#           Standing SLR - ABD/EXT       2x10 ea  Deandre hip 4 way  20x ea B 6#  x15 ea way   Supine marching w/ TA hold       2x10 ea alt       FSU + FSD              Ther Activity                                          Modalities              Cold pack    10' L Sh, lumbar, L knee     10' lumbar                   Self-care              MRI/CT scan imaging results, neurosurgery notes - discussion and interpretation  MM 13'
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
## 2023-09-02 ENCOUNTER — HOSPITAL ENCOUNTER (EMERGENCY)
Facility: HOSPITAL | Age: 58
Discharge: HOME | End: 2023-09-02
Attending: EMERGENCY MEDICINE
Payer: MEDICARE

## 2023-09-02 VITALS
RESPIRATION RATE: 20 BRPM | TEMPERATURE: 97.5 F | DIASTOLIC BLOOD PRESSURE: 106 MMHG | HEART RATE: 92 BPM | SYSTOLIC BLOOD PRESSURE: 164 MMHG | OXYGEN SATURATION: 98 %

## 2023-09-02 DIAGNOSIS — K08.89 DENTALGIA: Primary | ICD-10-CM

## 2023-09-02 PROCEDURE — 99284 EMERGENCY DEPT VISIT MOD MDM: CPT | Performed by: EMERGENCY MEDICINE

## 2023-09-02 PROCEDURE — 99282 EMERGENCY DEPT VISIT SF MDM: CPT

## 2023-09-02 PROCEDURE — 96372 THER/PROPH/DIAG INJ SC/IM: CPT

## 2023-09-02 RX ORDER — CHLORHEXIDINE GLUCONATE ORAL RINSE 1.2 MG/ML
15 SOLUTION DENTAL 2 TIMES DAILY
Qty: 120 ML | Refills: 0 | Status: SHIPPED | OUTPATIENT
Start: 2023-09-02

## 2023-09-02 RX ORDER — ROPIVACAINE HYDROCHLORIDE 5 MG/ML
15 INJECTION, SOLUTION EPIDURAL; INFILTRATION; PERINEURAL ONCE
Status: COMPLETED | OUTPATIENT
Start: 2023-09-02 | End: 2023-09-02

## 2023-09-02 RX ORDER — KETOROLAC TROMETHAMINE 30 MG/ML
15 INJECTION, SOLUTION INTRAMUSCULAR; INTRAVENOUS ONCE
Status: COMPLETED | OUTPATIENT
Start: 2023-09-02 | End: 2023-09-02

## 2023-09-02 RX ORDER — ACETAMINOPHEN 325 MG/1
975 TABLET ORAL ONCE
Status: COMPLETED | OUTPATIENT
Start: 2023-09-02 | End: 2023-09-02

## 2023-09-02 RX ADMIN — KETOROLAC TROMETHAMINE 15 MG: 30 INJECTION, SOLUTION INTRAMUSCULAR; INTRAVENOUS at 04:17

## 2023-09-02 RX ADMIN — ROPIVACAINE HYDROCHLORIDE 15 ML: 5 INJECTION, SOLUTION EPIDURAL; INFILTRATION; PERINEURAL at 04:17

## 2023-09-02 RX ADMIN — ACETAMINOPHEN 975 MG: 325 TABLET, FILM COATED ORAL at 04:17
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
## 2023-09-02 NOTE — ED ATTENDING ATTESTATION
9/2/2023  I, Arthur Li MD, saw and evaluated the patient. I have discussed the patient with the resident/non-physician practitioner and agree with the resident's/non-physician practitioner's findings, Plan of Care, and MDM as documented in the resident's/non-physician practitioner's note, except where noted. All available labs and Radiology studies were reviewed. I was present for key portions of any procedure(s) performed by the resident/non-physician practitioner and I was immediately available to provide assistance. At this point I agree with the current assessment done in the Emergency Department. I have conducted an independent evaluation of this patient a history and physical is as follows:    ED Course     61-year-old male presenting to the emergency department for evaluation of pain and swelling in the right lower central teeth as well as swelling in the right chin. Patient had been seen in the emergency department on 2 occasions within the past 24 hours for dental pain. Patient had been administered antibiotics and prescribed antibiotics but he states he has not gone to the pharmacy to pick those up yet. Patient states that he awoke at 3:00 in the morning to use the restroom, and because he was having more severe pain he presented to the emergency department. On examination the patient has intact teeth without dental caries. There is no evidence of gingival abscess. The patient has mild erythema and fullness noted to the right chin area. The submental space is soft. The sublingual space is soft. No trismus. Normal phonation. The patient is noted to have some mild right-sided facial droop, however ED providers who are present in the emergency department state that that occurred after previous inferior alveolar nerve block which was performed several hours ago. Dental infection. Plan continued antibiotics and follow-up.     Critical Care Time  Procedures

## 2023-09-02 NOTE — ED ATTENDING ATTESTATION
9/1/2023  I, Montez Osler, MD, saw and evaluated the patient. I have discussed the patient with the resident/non-physician practitioner and agree with the resident's/non-physician practitioner's findings, Plan of Care, and MDM as documented in the resident's/non-physician practitioner's note, except where noted. All available labs and Radiology studies were reviewed. I was present for key portions of any procedure(s) performed by the resident/non-physician practitioner and I was immediately available to provide assistance. At this point I agree with the current assessment done in the Emergency Department. I have conducted an independent evaluation of this patient a history and physical is as follows:    OA: 67 y/o m with h/o bipoalr disorder, cocaine abuse, glaucoma, PTSD who presents with dental pain. Pt has been in the ED multiple times over the past week including this morning for pain. At those times, he received a dental block. Pain is located over teeth 24 and 25 and has spread to tooth 26 and 27 since onset. Pain with eating. No difficulty opening mouth. No fevers/chills. No difficulty swallowing. No change in phonation. No facial swelling. Pt specifically requesting dilaudid, tramadol or valium for pain. Discussed that I will not be giving these medications. PE, NAD, VSS, able to full open and close his mouth, no signs of trismus, normal phonation. + mild ttp over teeth 24-26, gums without swelling, erythema, no visible or palpable abscess, no pooling of secretions, soft submental region, no crepitus, overall poor dentition, no facial swelling, no palpable LN, neck supple/FROM, RR, lungs CTAb, - rash, intact pulses, AAO. A/p dental pain, seen previously for same. Discussed non-narcotic pain control, will give abx, dental f/u. Pt expresess understanding.      ED Course         Critical Care Time  Procedures

## 2023-09-02 NOTE — ED NOTES
Pt discharged home by provider. Ambulatory to waiting room with steady gait. LYFT set up for patient.      Hunter Sánchez RN  09/01/23 [id number]
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
## 2023-09-02 NOTE — DISCHARGE INSTRUCTIONS
Please continue to treat your pain with Tylenol/ibuprofen. Please take antibiotics as prescribed. Please follow-up with dental clinic. If you have new or worsening symptoms-see attached.

## 2023-09-05 ENCOUNTER — OFFICE VISIT (OUTPATIENT)
Dept: INTERNAL MEDICINE CLINIC | Facility: CLINIC | Age: 58
End: 2023-09-05

## 2023-09-05 VITALS
TEMPERATURE: 98.1 F | WEIGHT: 245 LBS | SYSTOLIC BLOOD PRESSURE: 117 MMHG | HEIGHT: 65 IN | HEART RATE: 86 BPM | DIASTOLIC BLOOD PRESSURE: 75 MMHG | BODY MASS INDEX: 40.82 KG/M2

## 2023-09-05 DIAGNOSIS — F25.0 SCHIZOAFFECTIVE DISORDER, BIPOLAR TYPE (HCC): Chronic | ICD-10-CM

## 2023-09-05 DIAGNOSIS — H81.10 BENIGN PAROXYSMAL POSITIONAL VERTIGO, UNSPECIFIED LATERALITY: ICD-10-CM

## 2023-09-05 DIAGNOSIS — E66.01 OBESITY, MORBID (HCC): ICD-10-CM

## 2023-09-05 DIAGNOSIS — F32.A DEPRESSION, UNSPECIFIED DEPRESSION TYPE: ICD-10-CM

## 2023-09-05 DIAGNOSIS — R42 VERTIGO: ICD-10-CM

## 2023-09-05 DIAGNOSIS — K59.00 CONSTIPATION, UNSPECIFIED CONSTIPATION TYPE: Primary | ICD-10-CM

## 2023-09-05 PROCEDURE — 99213 OFFICE O/P EST LOW 20 MIN: CPT | Performed by: STUDENT IN AN ORGANIZED HEALTH CARE EDUCATION/TRAINING PROGRAM

## 2023-09-05 RX ORDER — SENNA AND DOCUSATE SODIUM 50; 8.6 MG/1; MG/1
1 TABLET, FILM COATED ORAL DAILY
Qty: 60 TABLET | Refills: 0 | Status: SHIPPED | OUTPATIENT
Start: 2023-09-05

## 2023-09-05 RX ORDER — DOXEPIN HYDROCHLORIDE 150 MG/1
150 CAPSULE ORAL
Qty: 30 CAPSULE | Refills: 0 | Status: SHIPPED | OUTPATIENT
Start: 2023-09-05

## 2023-09-05 NOTE — NURSING NOTE
Call placed to patient to discuss upcoming appointment at [facility] radiology department and complete consultation with patient. Patient is having a bilateral hip CSI utilizing fluoroscopy  guidance. Reviewed  patient's allergies, no current anticoagulant medication present , patient has had procedure in the past, no questions when asked if any questions or concerns. Reminded patient of location and time expected for procedure, Patient expressed understanding by verbalizing and repeating instructions. Patient questioned lyft being available, informed him that the ordering MD office schedules and provides the lyft information. Patient unable to complete
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
## 2023-09-07 ENCOUNTER — TELEPHONE (OUTPATIENT)
Dept: INTERNAL MEDICINE CLINIC | Facility: CLINIC | Age: 58
End: 2023-09-07

## 2023-09-07 NOTE — TELEPHONE ENCOUNTER
Based on my discussion with the patient during our last visit, he was to call his housing administration to see if his PCP would be able to fill out letter or if it needs to be completed by psychiatrist in addition to what needs to be included in it. If patient can confirm it would accepted from a PCP, we can provide a letter.

## 2023-09-07 NOTE — TELEPHONE ENCOUNTER
Pt left vm getting back to us about letter information. Returned pts call, pt states he spoke with provider on 9/5 appt about a letter he needs for his . Pt requesting a letter be written stating he needs his dog as his emotional support animal due to his anxiety and depression. Pt states to call him with any questions.

## 2023-09-08 ENCOUNTER — OFFICE VISIT (OUTPATIENT)
Dept: PHYSICAL THERAPY | Facility: REHABILITATION | Age: 58
End: 2023-09-08
Payer: MEDICARE

## 2023-09-08 DIAGNOSIS — G89.29 CHRONIC PAIN OF RIGHT KNEE: ICD-10-CM

## 2023-09-08 DIAGNOSIS — G89.29 CHRONIC PAIN OF LEFT KNEE: ICD-10-CM

## 2023-09-08 DIAGNOSIS — M54.12 CERVICAL RADICULOPATHY: Primary | ICD-10-CM

## 2023-09-08 DIAGNOSIS — M25.561 CHRONIC PAIN OF RIGHT KNEE: ICD-10-CM

## 2023-09-08 DIAGNOSIS — M25.562 CHRONIC PAIN OF LEFT KNEE: ICD-10-CM

## 2023-09-08 DIAGNOSIS — M54.16 LUMBAR RADICULOPATHY: ICD-10-CM

## 2023-09-08 DIAGNOSIS — M17.12 PRIMARY OSTEOARTHRITIS OF LEFT KNEE: ICD-10-CM

## 2023-09-08 PROCEDURE — 97112 NEUROMUSCULAR REEDUCATION: CPT

## 2023-09-08 PROCEDURE — 97110 THERAPEUTIC EXERCISES: CPT

## 2023-09-08 NOTE — PROGRESS NOTES
Daily Note     Today's date: 2023  Patient name: Wang Acosta  : 1965  MRN: [id number]  Referring provider: Johnathon Queen*  Dx:   Encounter Diagnosis     ICD-10-CM    1. Cervical radiculopathy  M54.12       2. Chronic pain of right knee  M25.561     G89.29       3. Primary osteoarthritis of left knee  M17.12       4. Chronic pain of left knee  M25.562     G89.29       5. Lumbar radiculopathy  M54.16           Start Time: 1030  Stop Time: 1110  Total time in clinic (min): 40 minutes    Subjective: Pt reports improvement of dental pain over the past week but pain persists. Lumbar discomfort and fatigue prior to start of tx session. Cervical pain is limiting and painful when attempting to reach end range cervical mobility. R knee has been buckling recently. L knee is stiff in the morning. Objective: See treatment diary below      Assessment: Tolerated treatment well. Patient would benefit from continued PT. Pt much better able to tolerate physical activity this tx session compared to last visit. Tx session focused on lumbar and cervical dysfunction and quadriceps motor control / neuromuscular development. No further aggravation of pain symptomology during tx session. Mild fatigue post-session. Challenged with introduction of lateral flexion cervical isometrics using the medicine ball. 1:1 with Cheli Weston DPT entirety of tx. Plan: Progress treatment as tolerated. Discussed increasing frequency to 2x per week.      Precautions: prediabetes    Pertinent Findings and Outcome Measures:                                                                                                                                                                         Test / Measure  3/8/2023 2023 2023 2023 2023   FOTO L/s 33 knee 33  L/s 47 knee 54 L/s 94 knee 99    B hip flex MMT 3/5    3/5 to 3+/5   B knee ext MMT 4-/5    4-/5 to 4/5   L/s ext AROM 25%    25%   + C/s radic tests  + Spurlings, distraction, ULTT, C/s rot   + Spurlings, distraction, ULTT, C/s rot       Manuals 6/19 6/29 7/12 7/14 7/20 7/21 7/27 7/31 9/1 9/8   Paraspinal STM             BL Hip/Knee PROM             C/s STM, PROM, UT S, SOR             L hip flex S     3'        Neuro Re-Ed             Bridges    3x10 3x10 3" + TA hold        LTR    20x 20x 20x 20x      Mini squats         2x10    Deandre TKE          2x15 B 20#   Banded side steps     3 laps at mirror otb        TB monster walks             [place] seated good mornings + row      3x10 20#    3x10 23#   QS + SLR    2x10 B 2#         PB squats             PB rollouts    15x ea 3 way         Suitcase carry      [id number] B 25#       Anterior KB carry      [id number] 15#       DNF supine             Resisted C/s retr 3x10 mtb 3x10 mtb 3x10 mtb          SNAGS - rot             SNAGS - ext             Scap retr             C/s isometrics          Lat flex 15x B 2kg med ball   Neck bridges at wall 3x10         3x10   Wall slides w/ foam roll             Unilat rows + trunk rot             Deandre punch + trunk rot          2x10 B 15#   Pallof press      20x ea dbl mtb 15x ea 12#  15x ea 16#  x15 ea dbl btb  x10 walkout    TB trunk rot      15x ea dbl gtb       SL RDL             Wall angels 3x10 2x10 3x10          Supine C/s rot AROM head over EOT  30x B 30x b/l          Foam roll series (4 way)  15x ea 1' ea 5 way          Ther Ex             HEP review             NuStep    10' L5 10' L5 10' L7 8' L8  6' L5 8' L8   UBE 3'/3' L6 3'/3' L6 3/3 L6          LAQ    3x10 B 2# 3x10 B  2#        Supine PBall hamstring curl             DL/rack pulls             Leg press             STS        2x10 8# 2x8 8#    UT/LS S             Shrugs 3x10 3" hold 15# DBs 3x10 3" hold 20# DBs 3x10 3" hold 20# db     2x10 3" hold 12# DBs  2x10 12# DBs + cerv rot   [place] rows 3x10 30# 3x10 30# 3x10 30#     2x10 20# 2x10 25#    [place] Sh ext 3x10 22# 3x10 22# 3x10 22#     2x10 15# 2x10 20#    [place] facepulls 3x10 18#            Standing SLR - ABD/EXT     2x10 ea  Deandre hip 4 way  20x ea B 6#  x15 ea way    Supine marching w/ TA hold     2x10 ea alt        FSU + FSD             Ther Activity                                       Modalities             Cold pack  10' L Sh, lumbar, L knee     10' lumbar                   Self-care             MRI/CT scan imaging results, neurosurgery notes - discussion and interpretation

## 2023-09-13 ENCOUNTER — APPOINTMENT (OUTPATIENT)
Dept: PHYSICAL THERAPY | Facility: REHABILITATION | Age: 58
End: 2023-09-13
Payer: MEDICARE

## 2023-09-14 ENCOUNTER — APPOINTMENT (OUTPATIENT)
Dept: PHYSICAL THERAPY | Facility: REHABILITATION | Age: 58
End: 2023-09-14
Payer: MEDICARE

## 2023-09-14 ENCOUNTER — OFFICE VISIT (OUTPATIENT)
Dept: PHYSICAL THERAPY | Facility: REHABILITATION | Age: 58
End: 2023-09-14
Payer: MEDICARE

## 2023-09-14 DIAGNOSIS — M25.562 CHRONIC PAIN OF LEFT KNEE: ICD-10-CM

## 2023-09-14 DIAGNOSIS — M17.12 PRIMARY OSTEOARTHRITIS OF LEFT KNEE: ICD-10-CM

## 2023-09-14 DIAGNOSIS — M25.561 CHRONIC PAIN OF RIGHT KNEE: ICD-10-CM

## 2023-09-14 DIAGNOSIS — G89.29 CHRONIC PAIN OF RIGHT KNEE: ICD-10-CM

## 2023-09-14 DIAGNOSIS — G89.29 CHRONIC PAIN OF LEFT KNEE: ICD-10-CM

## 2023-09-14 DIAGNOSIS — M54.12 CERVICAL RADICULOPATHY: Primary | ICD-10-CM

## 2023-09-14 DIAGNOSIS — M54.16 LUMBAR RADICULOPATHY: ICD-10-CM

## 2023-09-14 PROCEDURE — 97112 NEUROMUSCULAR REEDUCATION: CPT

## 2023-09-14 PROCEDURE — 97110 THERAPEUTIC EXERCISES: CPT

## 2023-09-14 NOTE — PROGRESS NOTES
Daily Note     Today's date: 2023  Patient name: Randy Oliva  : 1965  MRN: [id number]  Referring provider: Janet Danielle*  Dx:   Encounter Diagnosis     ICD-10-CM    1. Cervical radiculopathy  M54.12       2. Chronic pain of right knee  M25.561     G89.29       3. Primary osteoarthritis of left knee  M17.12       4. Chronic pain of left knee  M25.562     G89.29       5. Lumbar radiculopathy  M54.16           Start Time: 1400  Stop Time: 1445  Total time in clinic (min): 45 minutes    Subjective: Pt notes that he has been dealing with soreness in his legs and feet due to dancing too much. He mentioned that he has also been having trouble sleeping. He mentioned he would like to focus on his neck and upper body today. Objective: See treatment diary below      Assessment: Tolerated treatment well. Patient would benefit from continued PT. Treatment was focused further on upper body and c-spine this visit due to soreness of the lower body. Pt was able to tolerate re-introduction to face pulls to improve upper back strength and responded well without excessive increase in pain. He continues to note increased pain at the L shoulder at times with overhead movements and responds well to VCs for scapular stabilization and posture during movements. He was introduced to standing long bar rotation to improve tolerance to rotational movements and responded well. Continue to progress as tolerated, 1:1 with Marycruz Doing, DPT entirety of tx. Plan: Continue per plan of care.       Precautions: prediabetes    Pertinent Findings and Outcome Measures:                                                                                                                                                                         Test / Measure  3/8/2023 2023 2023 2023 2023   FOTO L/s 33 knee 33  L/s 47 knee 54 L/s 94 knee 99    B hip flex MMT 3/5    3/5 to 3+/5   B knee ext MMT 4-/5    4-/5 to 4/5   L/s ext AROM 25%    25%   + C/s radic tests  + Spurlings, distraction, ULTT, C/s rot   + Spurlings, distraction, ULTT, C/s rot       Manuals 7/14 7/20 7/21 7/27 7/31 9/1 9/8 9/14   Paraspinal STM           BL Hip/Knee PROM           C/s STM, PROM, UT S, SOR           L hip flex S  3'         Neuro Re-Ed           Bridges 3x10 3x10 3" + TA hold         LTR 20x 20x 20x 20x       Mini squats      2x10  STS 3x5 5# ea shoulder press   [place] TKE       2x15 B 20#    Banded side steps  3 laps at mirror otb         TB monster walks           Deandre seated good mornings + row   3x10 20#    3x10 23#    QS + SLR 2x10 B 2#          PB squats           PB rollouts 15x ea 3 way          Suitcase carry   [id number] B 25#        Anterior KB carry   [id number] 15#        DNF supine           Resisted C/s retr           SNAGS - rot           SNAGS - ext           Scap retr           C/s isometrics       Lat flex 15x B 2kg med ball Lat flex 15x B 2kg med ball   Neck bridges at wall       3x10 3x10   Wall slides w/ foam roll           Unilat rows + trunk rot           [place] punch + trunk rot       2x10 B 15# Long Bar 2x10 B 8#   Pallof press   20x ea dbl mtb 15x ea 12#  15x ea 16#  x15 ea dbl btb  x10 walkout     TB trunk rot   15x ea dbl gtb        SL RDL           Wall angels           Supine C/s rot AROM head over EOT           Foam roll series (4 way)           Ther Ex           HEP review           NuStep 10' L5 10' L5 10' L7 8' L8  6' L5 8' L8 8' L8   UBE           LAQ 3x10 B 2# 3x10 B  2#         Supine PBall hamstring curl           DL/rack pulls           Leg press           STS     2x10 8# 2x8 8#     UT/LS S           Shrugs     2x10 3" hold 12# DBs  2x10 12# DBs + cerv rot 2x10 12# DBs + cerv rot   [place] rows     2x10 20# 2x10 25#  2x10 25#   [place] Sh ext     2x10 15# 2x10 20#  2x10 20#   Deand[place] facepulls        3x10 18#   Standing SLR - ABD/EXT  2x10 ea  Deandre hip 4 way  20x ea B 6#  x15 ea way     Supine marching w/ TA hold  2x10 ea alt         FSU + FSD           Ther Activity                                 Modalities           Cold pack    10' lumbar                  Self-care           MRI/CT scan imaging results, neurosurgery notes - discussion and interpretation

## 2023-09-15 ENCOUNTER — APPOINTMENT (OUTPATIENT)
Dept: PHYSICAL THERAPY | Facility: REHABILITATION | Age: 58
End: 2023-09-15
Payer: MEDICARE

## 2023-09-15 NOTE — PROGRESS NOTES
Daily Note     Today's date: 9/15/2023  Patient name: Tabatha Cameron  : 1965  MRN: [id number]  Referring provider: Shaka Coles*  Dx: No diagnosis found. Subjective: ***      Objective: See treatment diary below      Assessment: Tolerated treatment {Tolerated treatment :}.  Patient {assessment:[id number]}      Plan: {PLAN:}     Precautions: prediabetes    Pertinent Findings and Outcome Measures:                                                                                                                                                                         Test / Measure  3/8/2023 2023 2023 2023 2023   FOTO L/s 33 knee 33  L/s 47 knee 54 L/s 94 knee 99    B hip flex MMT 3/5    3/5 to 3+/5   B knee ext MMT 4-/5    4-/5 to 4/5   L/s ext AROM 25%    25%   + C/s radic tests  + Spurlings, distraction, ULTT, C/s rot   + Spurlings, distraction, ULTT, C/s rot       Manuals 7/14 7/20 7/21 7/27 7/31 9/1 9/8 9/14 9/15   Paraspinal STM            BL Hip/Knee PROM            C/s STM, PROM, UT S, SOR            L hip flex S  3'          Neuro Re-Ed            Bridges 3x10 3x10 3" + TA hold          LTR 20x 20x 20x 20x        Mini squats      2x10  STS 3x5 5# ea shoulder press    [place] TKE       2x15 B 20#     Banded side steps  3 laps at mirror otb          TB monster walks            Deandre seated good mornings + row   3x10 20#    3x10 23#     QS + SLR 2x10 B 2#           PB squats         2x8   PB rollouts 15x ea 3 way           Suitcase carry   [id number] 25#         Anterior KB carry   [id number] 15#         DNF supine            Resisted C/s retr            SNAGS - rot            SNAGS - ext            Scap retr            C/s isometrics       Lat flex 15x B 2kg med ball Lat flex 15x B 2kg med ball    Neck bridges at wall       3x10 3x10    Wall slides w/ foam roll            Unilat rows + trunk rot            Deandre punch + trunk rot       2x10 B 15# Long Bar 2x10 B 8#    [place] press   20x ea dbl mtb 15x ea 12#  15x ea 16#  x15 ea dbl btb  x10 walkout      TB trunk rot   15x ea dbl gtb         SL RDL            Wall angels            Supine C/s rot AROM head over EOT            Foam roll series (4 way)            Ther Ex            HEP review            NuStep 10' L5 10' L5 10' L7 8' L8  6' L5 8' L8 8' L8 8' L8   UBE            LAQ 3x10 B 2# 3x10 B  2#          Supine PBall hamstring curl            DL/rack pulls            Leg press            STS     2x10 8# 2x8 8#      UT/LS S            Shrugs     2x10 3" hold 12# DBs  2x10 12# DBs + cerv rot 2x10 12# DBs + cerv rot    [place] rows     2x10 20# 2x10 25#  2x10 25#    Deandre Sh ext     2x10 15# 2x10 20#  2x10 20#    Deandre facepulls        3x10 18#    Standing SLR - ABD/EXT  2x10 ea  Deandre hip 4 way  20x ea B 6#  x15 ea way      Supine marching w/ TA hold  2x10 ea alt          FSU + FSD            Ther Activity                                    Modalities            Cold pack    10' lumbar                    Self-care            MRI/CT scan imaging results, neurosurgery notes - discussion and interpretation

## 2023-09-18 ENCOUNTER — RA CDI HCC (OUTPATIENT)
Dept: OTHER | Facility: HOSPITAL | Age: 58
End: 2023-09-18

## 2023-09-18 ENCOUNTER — HOSPITAL ENCOUNTER (EMERGENCY)
Facility: HOSPITAL | Age: 58
Discharge: HOME | End: 2023-09-18
Attending: EMERGENCY MEDICINE
Payer: MEDICARE

## 2023-09-18 VITALS
DIASTOLIC BLOOD PRESSURE: 103 MMHG | HEART RATE: 76 BPM | SYSTOLIC BLOOD PRESSURE: 169 MMHG | TEMPERATURE: 97.2 F | RESPIRATION RATE: 18 BRPM | OXYGEN SATURATION: 100 %
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
## 2023-09-18 DIAGNOSIS — S50.861A INSECT BITE OF RIGHT FOREARM, INITIAL ENCOUNTER: Primary | ICD-10-CM

## 2023-09-18 PROCEDURE — 99284 EMERGENCY DEPT VISIT MOD MDM: CPT | Performed by: EMERGENCY MEDICINE

## 2023-09-18 PROCEDURE — 99283 EMERGENCY DEPT VISIT LOW MDM: CPT

## 2023-09-19 NOTE — PROGRESS NOTES
Pt to be discharged from skilled outpatient physical therapy services at this time. He was making good progress regarding functional status and pain intensity when he was able to make appointments consistently. He has had significant cancellations and no-shows throughout this course of outpatient physical therapy. There have been many extrinsic factors affecting his attendance which have negatively impacted his progress. Overall, the pt has a much improved functional status compared to the initial evaluation. Goals    Short Term Goals:  1. Improve B knee extension and flexion strength to 4/5 MMT - PARTIAL  2. Improve lumbar extension to at least 40% - ONGOING  3. Supervision with HEP for self-care - MET  4. Partially centralize cervical radicular symptoms to elbow - MET    Long Term Goals:  1. Tolerate walking 30+ minutes without aggravating pain symptoms - PARTIAL  2. FOTO to greater than predicted value - MET  3. Independent with HEP for self-care - MET  4.  Improve L cervical sidebending and rotation to 50+% - NOT MET

## 2023-09-19 NOTE — ED ATTENDING ATTESTATION
9/18/2023  I, Garret Grady MD, saw and evaluated the patient. I have discussed the patient with the resident/non-physician practitioner and agree with the resident's/non-physician practitioner's findings, Plan of Care, and MDM as documented in the resident's/non-physician practitioner's note, except where noted. All available labs and Radiology studies were reviewed. I was present for key portions of any procedure(s) performed by the resident/non-physician practitioner and I was immediately available to provide assistance. At this point I agree with the current assessment done in the Emergency Department.   I have conducted an independent evaluation of this patient a history and physical is as follows:  Patient presents for evaluation of right arm redness and itchiness for several days he thinks he was bitten by something but he is not sure what  The area is itchy very mild pain on palpation no fever no chills  Patient had a few loose stools but had no vomiting  Patient has a history of chronic hip pain history of psychiatric issues recovered narcotic misuse sober for 2 years  Exam well-appearing no acute distress lungs clear heart regular abdomen soft nontender remedies there is a small area of induration of the right lower arm on the ulnar aspect there is a small area that is mildly red and indurated with central umbilication there is no drainage noted no surrounding cellulitis no abscess or fluctuance  Appears to be an insect bite with local reaction  Reassurance treat symptomatically  ED Course         Critical Care Time  Procedures
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
## 2023-09-19 NOTE — DISCHARGE INSTRUCTIONS
You were evaluated in the Emergency Department today for insect bite. Your evaluation did not show evidence of medical conditions requiring emergent intervention at this time, and we feel safe discharging you. Please schedule an appointment with your primary care physician within the next 2-3 days. Return to the Emergency Department if you experience worsening or uncontrolled pain, fevers 100.4°F or greater, recurrent vomiting, inability to tolerate food or fluids by mouth, bloody stools or vomit, black or tarry stools, or any other concerning symptoms. Thank you for choosing us for your care.

## 2023-09-21 ENCOUNTER — APPOINTMENT (OUTPATIENT)
Dept: PHYSICAL THERAPY | Facility: REHABILITATION | Age: 58
End: 2023-09-21
Payer: MEDICARE

## 2023-09-22 ENCOUNTER — HOSPITAL ENCOUNTER (OUTPATIENT)
Dept: RADIOLOGY | Facility: HOSPITAL | Age: 58
Discharge: HOME | End: 2023-09-22
Payer: MEDICARE

## 2023-09-22 DIAGNOSIS — M16.12 PRIMARY OSTEOARTHRITIS OF ONE HIP, LEFT: ICD-10-CM

## 2023-09-22 DIAGNOSIS — M16.11 PRIMARY OSTEOARTHRITIS OF ONE HIP, RIGHT: ICD-10-CM

## 2023-09-22 PROCEDURE — 20610 DRAIN/INJ JOINT/BURSA W/O US: CPT

## 2023-09-22 PROCEDURE — 77002 NEEDLE LOCALIZATION BY XRAY: CPT

## 2023-09-22 RX ORDER — LIDOCAINE HYDROCHLORIDE 10 MG/ML
20 INJECTION, SOLUTION EPIDURAL; INFILTRATION; INTRACAUDAL; PERINEURAL
Status: DISCONTINUED | OUTPATIENT
Start: 2023-09-22 | End: 2023-09-23 | Stop reason: HOSPADM

## 2023-09-22 RX ORDER — BUPIVACAINE HYDROCHLORIDE 2.5 MG/ML
20 INJECTION, SOLUTION EPIDURAL; INFILTRATION; INTRACAUDAL
Status: DISCONTINUED | OUTPATIENT
Start: 2023-09-22 | End: 2023-09-23 | Stop reason: HOSPADM

## 2023-09-22 RX ORDER — METHYLPREDNISOLONE ACETATE 80 MG/ML
80 INJECTION, SUSPENSION INTRA-ARTICULAR; INTRALESIONAL; INTRAMUSCULAR; SOFT TISSUE
Status: DISCONTINUED | OUTPATIENT
Start: 2023-09-22 | End: 2023-09-23 | Stop reason: HOSPADM

## 2023-09-22 RX ADMIN — IOHEXOL 6 ML: 300 INJECTION, SOLUTION INTRAVENOUS at 15:03

## 2023-09-25 ENCOUNTER — APPOINTMENT (OUTPATIENT)
Dept: PHYSICAL THERAPY | Facility: REHABILITATION | Age: 58
End: 2023-09-25
Payer: MEDICARE

## 2023-09-25 ENCOUNTER — OFFICE VISIT (OUTPATIENT)
Dept: INTERNAL MEDICINE CLINIC | Facility: CLINIC | Age: 58
End: 2023-09-25

## 2023-09-25 VITALS — OXYGEN SATURATION: 98 % | WEIGHT: 243.6 LBS | BODY MASS INDEX: 40.54 KG/M2 | TEMPERATURE: 98 F

## 2023-09-25 DIAGNOSIS — E78.2 MIXED HYPERLIPIDEMIA: ICD-10-CM

## 2023-09-25 DIAGNOSIS — F25.0 SCHIZOAFFECTIVE DISORDER, BIPOLAR TYPE (HCC): Chronic | ICD-10-CM

## 2023-09-25 DIAGNOSIS — E11.9 TYPE 2 DIABETES MELLITUS WITHOUT COMPLICATION, WITHOUT LONG-TERM CURRENT USE OF INSULIN (HCC): ICD-10-CM

## 2023-09-25 DIAGNOSIS — H81.10 BENIGN PAROXYSMAL POSITIONAL VERTIGO, UNSPECIFIED LATERALITY: ICD-10-CM

## 2023-09-25 DIAGNOSIS — E66.01 OBESITY, CLASS III, BMI 40-49.9 (MORBID OBESITY) (HCC): ICD-10-CM

## 2023-09-25 DIAGNOSIS — H40.9 GLAUCOMA OF BOTH EYES, UNSPECIFIED GLAUCOMA TYPE: ICD-10-CM

## 2023-09-25 DIAGNOSIS — Z00.00 MEDICARE ANNUAL WELLNESS VISIT, SUBSEQUENT: Primary | ICD-10-CM

## 2023-09-25 PROCEDURE — G0439 PPPS, SUBSEQ VISIT: HCPCS | Performed by: INTERNAL MEDICINE

## 2023-09-25 RX ORDER — ROSUVASTATIN CALCIUM 40 MG/1
40 TABLET, COATED ORAL DAILY
Qty: 90 TABLET | Refills: 3 | Status: SHIPPED | OUTPATIENT
Start: 2023-09-25

## 2023-09-25 RX ORDER — DORZOLAMIDE HYDROCHLORIDE AND TIMOLOL MALEATE 20; 5 MG/ML; MG/ML
1 SOLUTION/ DROPS OPHTHALMIC DAILY
Qty: 10 ML | Refills: 0 | Status: SHIPPED | OUTPATIENT
Start: 2023-09-25

## 2023-09-25 NOTE — PATIENT INSTRUCTIONS
Medicare Preventive Visit Patient Instructions  Thank you for completing your Welcome to Medicare Visit or Medicare Annual Wellness Visit today. Your next wellness visit will be due in one year (9/25/2024). The screening/preventive services that you may require over the next 5-10 years are detailed below. Some tests may not apply to you based off risk factors and/or age. Screening tests ordered at today's visit but not completed yet may show as past due. Also, please note that scanned in results may not display below. Preventive Screenings:  Service Recommendations Previous Testing/Comments   Colorectal Cancer Screening  · Colonoscopy    · Fecal Occult Blood Test (FOBT)/Fecal Immunochemical Test (FIT)  · Fecal DNA/Cologuard Test  · Flexible Sigmoidoscopy Age: 43-73 years old   Colonoscopy: every 10 years (May be performed more frequently if at higher risk)  OR  FOBT/FIT: every 1 year  OR  Cologuard: every 3 years  OR  Sigmoidoscopy: every 5 years  Screening may be recommended earlier than age 39 if at higher risk for colorectal cancer. Also, an individualized decision between you and your healthcare provider will decide whether screening between the ages of 77-80 would be appropriate.  Colonoscopy: 09/29/2022  FOBT/FIT: Not on file  Cologuard: Not on file  Sigmoidoscopy: Not on file    Screening Current     Prostate Cancer Screening Individualized decision between patient and health care provider in men between ages of 53-66   Medicare will cover every 12 months beginning on the day after your 50th birthday PSA: 0.2 ng/mL           Hepatitis C Screening Once for adults born between 1945 and 1965  More frequently in patients at high risk for Hepatitis C Hep C Antibody: 05/26/2021    Screening Current   Diabetes Screening 1-2 times per year if you're at risk for diabetes or have pre-diabetes Fasting glucose: 105 mg/dL (8/8/2023)  A1C: 6.6 % (8/8/2023)  Screening Not Indicated  History Diabetes   Cholesterol Screening Once every 5 years if you don't have a lipid disorder. May order more often based on risk factors. Lipid panel: 01/27/2023  Screening Not Indicated  History Lipid Disorder      Other Preventive Screenings Covered by Medicare:  1. Abdominal Aortic Aneurysm (AAA) Screening: covered once if your at risk. You're considered to be at risk if you have a family history of AAA or a male between the age of 70-76 who smoking at least 100 cigarettes in your lifetime. 2. Lung Cancer Screening: covers low dose CT scan once per year if you meet all of the following conditions: (1) Age 48-67; (2) No signs or symptoms of lung cancer; (3) Current smoker or have quit smoking within the last 15 years; (4) You have a tobacco smoking history of at least 20 pack years (packs per day x number of years you smoked); (5) You get a written order from a healthcare provider. 3. Glaucoma Screening: covered annually if you're considered high risk: (1) You have diabetes OR (2) Family history of glaucoma OR (3)  aged 48 and older OR (3)  American aged 72 and older  3. Osteoporosis Screening: covered every 2 years if you meet one of the following conditions: (1) Have a vertebral abnormality; (2) On glucocorticoid therapy for more than 3 months; (3) Have primary hyperparathyroidism; (4) On osteoporosis medications and need to assess response to drug therapy. 5. HIV Screening: covered annually if you're between the age of 14-79. Also covered annually if you are younger than 13 and older than 72 with risk factors for HIV infection. For pregnant patients, it is covered up to 3 times per pregnancy.     Immunizations:  Immunization Recommendations   Influenza Vaccine Annual influenza vaccination during flu season is recommended for all persons aged >= 6 months who do not have contraindications   Pneumococcal Vaccine   * Pneumococcal conjugate vaccine = PCV13 (Prevnar 13), PCV15 (Vaxneuvance), PCV20 (Prevnar 20)  * Pneumococcal polysaccharide vaccine = PPSV23 (Pneumovax) Adults 2364 years old: 1-3 doses may be recommended based on certain risk factors  Adults 72 years old: 1-2 doses may be recommended based off what pneumonia vaccine you previously received   Hepatitis B Vaccine 3 dose series if at intermediate or high risk (ex: diabetes, end stage renal disease, liver disease)   Tetanus (Td) Vaccine - COST NOT COVERED BY MEDICARE PART B Following completion of primary series, a booster dose should be given every 10 years to maintain immunity against tetanus. Td may also be given as tetanus wound prophylaxis. Tdap Vaccine - COST NOT COVERED BY MEDICARE PART B Recommended at least once for all adults. For pregnant patients, recommended with each pregnancy. Shingles Vaccine (Shingrix) - COST NOT COVERED BY MEDICARE PART B  2 shot series recommended in those aged 48 and above     Health Maintenance Due:      Topic Date Due   • Colorectal Cancer Screening  09/27/2029   • HIV Screening  Completed   • Hepatitis C Screening  Completed     Immunizations Due:      Topic Date Due   • COVID-19 Vaccine (1) Never done   • Pneumococcal Vaccine: Pediatrics (0 to 5 Years) and At-Risk Patients (6 to 59 Years) (1 - PCV) Never done   • Influenza Vaccine (1) 09/01/2023     Advance Directives   What are advance directives? Advance directives are legal documents that state your wishes and plans for medical care. These plans are made ahead of time in case you lose your ability to make decisions for yourself. Advance directives can apply to any medical decision, such as the treatments you want, and if you want to donate organs. What are the types of advance directives? There are many types of advance directives, and each state has rules about how to use them. You may choose a combination of any of the following:  · Living will: This is a written record of the treatment you want.  You can also choose which treatments you do not want, which to limit, and which to stop at a certain time. This includes surgery, medicine, IV fluid, and tube feedings. · Durable power of  for [facility]): This is a written record that states who you want to make healthcare choices for you when you are unable to make them for yourself. This person, called a proxy, is usually a family member or a friend. You may choose more than 1 proxy. · Do not resuscitate (DNR) order:  A DNR order is used in case your heart stops beating or you stop breathing. It is a request not to have certain forms of treatment, such as CPR. A DNR order may be included in other types of advance directives. · Medical directive: This covers the care that you want if you are in a coma, near death, or unable to make decisions for yourself. You can list the treatments you want for each condition. Treatment may include pain medicine, surgery, blood transfusions, dialysis, IV or tube feedings, and a ventilator (breathing machine). · Values history: This document has questions about your views, beliefs, and how you feel and think about life. This information can help others choose the care that you would choose. Why are advance directives important? An advance directive helps you control your care. Although spoken wishes may be used, it is better to have your wishes written down. Spoken wishes can be misunderstood, or not followed. Treatments may be given even if you do not want them. An advance directive may make it easier for your family to make difficult choices about your care. Weight Management   Why it is important to manage your weight:  Being overweight increases your risk of health conditions such as heart disease, high blood pressure, type 2 diabetes, and certain types of cancer. It can also increase your risk for osteoarthritis, sleep apnea, and other respiratory problems. Aim for a slow, steady weight loss.  Even a small amount of weight loss can lower your risk of health problems. How to lose weight safely:  A safe and healthy way to lose weight is to eat fewer calories and get regular exercise. You can lose up about 1 pound a week by decreasing the number of calories you eat by 500 calories each day. Healthy meal plan for weight management:  A healthy meal plan includes a variety of foods, contains fewer calories, and helps you stay healthy. A healthy meal plan includes the following:  · Eat whole-grain foods more often. A healthy meal plan should contain fiber. Fiber is the part of grains, fruits, and vegetables that is not broken down by your body. Whole-grain foods are healthy and provide extra fiber in your diet. Some examples of whole-grain foods are whole-wheat breads and pastas, oatmeal, brown rice, and bulgur. · Eat a variety of vegetables every day. Include dark, leafy greens such as spinach, kale, bello greens, and mustard greens. Eat yellow and orange vegetables such as carrots, sweet potatoes, and winter squash. · Eat a variety of fruits every day. Choose fresh or canned fruit (canned in its own juice or light syrup) instead of juice. Fruit juice has very little or no fiber. · Eat low-fat dairy foods. Drink fat-free (skim) milk or 1% milk. Eat fat-free yogurt and low-fat cottage cheese. Try low-fat cheeses such as mozzarella and other reduced-fat cheeses. · Choose meat and other protein foods that are low in fat. Choose beans or other legumes such as split peas or lentils. Choose fish, skinless poultry (chicken or turkey), or lean cuts of red meat (beef or pork). Before you cook meat or poultry, cut off any visible fat. · Use less fat and oil. Try baking foods instead of frying them. Add less fat, such as margarine, sour cream, regular salad dressing and mayonnaise to foods. Eat fewer high-fat foods. Some examples of high-fat foods include french fries, doughnuts, ice cream, and cakes. · Eat fewer sweets.   Limit foods and drinks that are high in sugar. This includes candy, cookies, regular soda, and sweetened drinks. Exercise:  Exercise at least 30 minutes per day on most days of the week. Some examples of exercise include walking, biking, dancing, and swimming. You can also fit in more physical activity by taking the stairs instead of the elevator or parking farther away from stores. Ask your healthcare provider about the best exercise plan for you. © Copyright [employer] 2018 Information is for End User's use only and may not be sold, redistributed or otherwise used for commercial purposes.  All illustrations and images included in CareNotes® are the copyrighted property of [employer] or  [address]
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
## 2023-09-27 ENCOUNTER — TELEPHONE (OUTPATIENT)
Dept: OTHER | Facility: OTHER | Age: 58
End: 2023-09-27

## 2023-09-27 NOTE — TELEPHONE ENCOUNTER
Pt  Called, requesting a call back from office at best convenience to schedule np appt.  Pt was referred by PCP

## 2023-09-28 ENCOUNTER — APPOINTMENT (OUTPATIENT)
Dept: PHYSICAL THERAPY | Facility: REHABILITATION | Age: 58
End: 2023-09-28
Payer: MEDICARE

## 2023-09-30 DIAGNOSIS — G25.81 RESTLESS LEG SYNDROME: ICD-10-CM

## 2023-10-01 DIAGNOSIS — F32.A DEPRESSION, UNSPECIFIED DEPRESSION TYPE: ICD-10-CM

## 2023-10-02 RX ORDER — ROPINIROLE 4 MG/1
4 TABLET, FILM COATED ORAL
Qty: 90 TABLET | Refills: 1 | Status: SHIPPED | OUTPATIENT
Start: 2023-10-02

## 2023-10-02 RX ORDER — DIVALPROEX SODIUM 500 MG/1
1500 TABLET, DELAYED RELEASE ORAL
Qty: 270 TABLET | Refills: 0 | Status: SHIPPED | OUTPATIENT
Start: 2023-10-02

## 2023-10-09 DIAGNOSIS — M19.012 PRIMARY OSTEOARTHRITIS OF LEFT SHOULDER: ICD-10-CM

## 2023-10-09 RX ORDER — MELOXICAM 15 MG/1
15 TABLET ORAL DAILY
Qty: 30 TABLET | Refills: 1 | Status: SHIPPED | OUTPATIENT
Start: 2023-10-09

## 2023-10-19 ENCOUNTER — TELEPHONE (OUTPATIENT)
Dept: INTERNAL MEDICINE CLINIC | Facility: CLINIC | Age: 58
End: 2023-10-19

## 2023-10-25 ENCOUNTER — TELEPHONE (OUTPATIENT)
Age: 58
End: 2023-10-25

## 2023-10-25 NOTE — TELEPHONE ENCOUNTER
Caller: Patient    Doctor: Debi Hood    Reason for call: Patient requires help w/transportation for appt 10/26    Call back#: [phone]

## 2023-10-26 ENCOUNTER — OFFICE VISIT (OUTPATIENT)
Dept: OBGYN CLINIC | Facility: CLINIC | Age: 58
End: 2023-10-26
Payer: MEDICARE

## 2023-10-26 ENCOUNTER — APPOINTMENT (OUTPATIENT)
Dept: RADIOLOGY | Age: 58
End: 2023-10-26
Payer: MEDICARE

## 2023-10-26 VITALS
BODY MASS INDEX: 41.15 KG/M2 | SYSTOLIC BLOOD PRESSURE: 125 MMHG | DIASTOLIC BLOOD PRESSURE: 84 MMHG | WEIGHT: 247 LBS | HEIGHT: 65 IN | HEART RATE: 71 BPM

## 2023-10-26 DIAGNOSIS — M17.11 PRIMARY OSTEOARTHRITIS OF RIGHT KNEE: Primary | ICD-10-CM

## 2023-10-26 DIAGNOSIS — M17.11 PRIMARY OSTEOARTHRITIS OF RIGHT KNEE: ICD-10-CM

## 2023-10-26 PROCEDURE — 20610 DRAIN/INJ JOINT/BURSA W/O US: CPT | Performed by: STUDENT IN AN ORGANIZED HEALTH CARE EDUCATION/TRAINING PROGRAM

## 2023-10-26 PROCEDURE — 73562 X-RAY EXAM OF KNEE 3: CPT

## 2023-10-26 PROCEDURE — 99213 OFFICE O/P EST LOW 20 MIN: CPT | Performed by: STUDENT IN AN ORGANIZED HEALTH CARE EDUCATION/TRAINING PROGRAM

## 2023-10-26 PROCEDURE — 73564 X-RAY EXAM KNEE 4 OR MORE: CPT

## 2023-10-26 RX ORDER — BUPIVACAINE HYDROCHLORIDE 2.5 MG/ML
1 INJECTION, SOLUTION INFILTRATION; PERINEURAL
Status: COMPLETED | OUTPATIENT
Start: 2023-10-26 | End: 2023-10-26

## 2023-10-26 RX ORDER — LIDOCAINE HYDROCHLORIDE 10 MG/ML
1 INJECTION, SOLUTION EPIDURAL; INFILTRATION; INTRACAUDAL; PERINEURAL
Status: COMPLETED | OUTPATIENT
Start: 2023-10-26 | End: 2023-10-26

## 2023-10-26 RX ORDER — BETAMETHASONE SODIUM PHOSPHATE AND BETAMETHASONE ACETATE 3; 3 MG/ML; MG/ML
12 INJECTION, SUSPENSION INTRA-ARTICULAR; INTRALESIONAL; INTRAMUSCULAR; SOFT TISSUE
Status: COMPLETED | OUTPATIENT
Start: 2023-10-26 | End: 2023-10-26

## 2023-10-26 RX ADMIN — BUPIVACAINE HYDROCHLORIDE 1 ML: 2.5 INJECTION, SOLUTION INFILTRATION; PERINEURAL at 15:45

## 2023-10-26 RX ADMIN — LIDOCAINE HYDROCHLORIDE 1 ML: 10 INJECTION, SOLUTION EPIDURAL; INFILTRATION; INTRACAUDAL; PERINEURAL at 15:45

## 2023-10-26 RX ADMIN — BETAMETHASONE SODIUM PHOSPHATE AND BETAMETHASONE ACETATE 12 MG: 3; 3 INJECTION, SUSPENSION INTRA-ARTICULAR; INTRALESIONAL; INTRAMUSCULAR; SOFT TISSUE at 15:45
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
## 2023-10-26 NOTE — ASSESSMENT & PLAN NOTE
-WBAT  -Activity modification to limit strain or impact on the joint  -ibuprofen (Motrin) as needed  -Tylenol as needed. Do not exceed 3000mg daily  -Supervised physical therapy. Script provided   -Home exercise program directed by PT  -Weight loss discussed   -Knee sleeve or brace for comfort  -Cane or walker recommended to offload joint  -Corticosteroid injection was offered, accepted, and administered. Risk benefits and alternatives were discussed prior to injection.   Patient tolerated the procedure well.  -Patient may follow up prn for further evaluation and treatment

## 2023-10-27 DIAGNOSIS — F25.0 SCHIZOAFFECTIVE DISORDER, BIPOLAR TYPE (HCC): Chronic | ICD-10-CM

## 2023-10-31 ENCOUNTER — TELEPHONE (OUTPATIENT)
Age: 58
End: 2023-10-31

## 2023-10-31 RX ORDER — DOXEPIN HYDROCHLORIDE 150 MG/1
150 CAPSULE ORAL
Qty: 30 CAPSULE | Refills: 0 | Status: SHIPPED | OUTPATIENT
Start: 2023-10-31

## 2023-10-31 NOTE — TELEPHONE ENCOUNTER
Caller: Patient     Doctor: Josue Bosworth    Reason for call: Patient requested lyft for appointment on  11/6/23       [address] [place] [id number]     Best number [phone] [facility])     Email was sent

## 2023-10-31 NOTE — TELEPHONE ENCOUNTER
Patient following with Psychiatry. Will order one time refill and patient will need to follow psychiatry for further refills.

## 2023-11-06 ENCOUNTER — OFFICE VISIT (OUTPATIENT)
Dept: OBGYN CLINIC | Facility: CLINIC | Age: 58
End: 2023-11-06
Payer: MEDICARE

## 2023-11-06 VITALS
DIASTOLIC BLOOD PRESSURE: 80 MMHG | SYSTOLIC BLOOD PRESSURE: 140 MMHG | BODY MASS INDEX: 40.15 KG/M2 | HEIGHT: 65 IN | HEART RATE: 80 BPM | WEIGHT: 241 LBS

## 2023-11-06 DIAGNOSIS — G89.29 CHRONIC PAIN OF LEFT KNEE: ICD-10-CM

## 2023-11-06 DIAGNOSIS — M25.562 CHRONIC PAIN OF LEFT KNEE: ICD-10-CM

## 2023-11-06 DIAGNOSIS — M17.11 PRIMARY OSTEOARTHRITIS OF RIGHT KNEE: Primary | ICD-10-CM

## 2023-11-06 DIAGNOSIS — M23.91 INTERNAL DERANGEMENT OF RIGHT KNEE: ICD-10-CM

## 2023-11-06 PROCEDURE — 20610 DRAIN/INJ JOINT/BURSA W/O US: CPT | Performed by: STUDENT IN AN ORGANIZED HEALTH CARE EDUCATION/TRAINING PROGRAM

## 2023-11-06 PROCEDURE — 99214 OFFICE O/P EST MOD 30 MIN: CPT | Performed by: STUDENT IN AN ORGANIZED HEALTH CARE EDUCATION/TRAINING PROGRAM

## 2023-11-06 RX ORDER — BETAMETHASONE SODIUM PHOSPHATE AND BETAMETHASONE ACETATE 3; 3 MG/ML; MG/ML
12 INJECTION, SUSPENSION INTRA-ARTICULAR; INTRALESIONAL; INTRAMUSCULAR; SOFT TISSUE
Status: COMPLETED | OUTPATIENT
Start: 2023-11-06 | End: 2023-11-06

## 2023-11-06 RX ORDER — BUPIVACAINE HYDROCHLORIDE 2.5 MG/ML
1 INJECTION, SOLUTION INFILTRATION; PERINEURAL
Status: COMPLETED | OUTPATIENT
Start: 2023-11-06 | End: 2023-11-06

## 2023-11-06 RX ORDER — LIDOCAINE HYDROCHLORIDE 10 MG/ML
1 INJECTION, SOLUTION INFILTRATION; PERINEURAL
Status: COMPLETED | OUTPATIENT
Start: 2023-11-06 | End: 2023-11-06

## 2023-11-06 RX ADMIN — BUPIVACAINE HYDROCHLORIDE 1 ML: 2.5 INJECTION, SOLUTION INFILTRATION; PERINEURAL at 12:45

## 2023-11-06 RX ADMIN — BETAMETHASONE SODIUM PHOSPHATE AND BETAMETHASONE ACETATE 12 MG: 3; 3 INJECTION, SUSPENSION INTRA-ARTICULAR; INTRALESIONAL; INTRAMUSCULAR; SOFT TISSUE at 12:45

## 2023-11-06 RX ADMIN — LIDOCAINE HYDROCHLORIDE 1 ML: 10 INJECTION, SOLUTION INFILTRATION; PERINEURAL at 12:45

## 2023-11-06 NOTE — PATIENT INSTRUCTIONS
Recommended to try Tylenol extra strength 500mg 2 tablets 3x/daily can be taken with the Meloxicam 15mg that you currently have.
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
## 2023-11-16 ENCOUNTER — OFFICE VISIT (OUTPATIENT)
Dept: DENTISTRY | Facility: CLINIC | Age: 58
End: 2023-11-16

## 2023-11-16 DIAGNOSIS — K08.89 PAIN, DENTAL: Primary | ICD-10-CM

## 2023-11-16 PROCEDURE — D0220 INTRAORAL - PERIAPICAL FIRST RADIOGRAPHIC IMAGE: HCPCS

## 2023-11-16 PROCEDURE — D0140 LIMITED ORAL EVALUATION - PROBLEM FOCUSED: HCPCS

## 2023-11-16 RX ORDER — AMOXICILLIN 500 MG/1
500 CAPSULE ORAL EVERY 8 HOURS SCHEDULED
Qty: 21 CAPSULE | Refills: 0 | Status: SHIPPED | OUTPATIENT
Start: 2023-11-16 | End: 2023-11-23

## 2023-11-16 RX ORDER — ACETAMINOPHEN 500 MG
500 TABLET ORAL EVERY 6 HOURS PRN
Qty: 7 TABLET | Refills: 0 | Status: SHIPPED | OUTPATIENT
Start: 2023-11-16

## 2023-11-19 NOTE — DENTAL PROCEDURE DETAILS
Emergency Tooth #16  PMH Reviewed. ASA III. Patient initially had a couple of concerns, but it was explained that this is a limited exam. Patient has a lot more pain the UL. Focus on this exam would be on the upper left. Patient will come back for comprehensive exam after CC is dealt with and patient is no longer in pain. S: CC: "I have pain in the left." I feel a throbbing pain that has been on and off for the past three months that recently got worse. Pain is 10/10. O:      O/E:  EO : No swelling  TMJ and mandibular range of motion WNL,   IO:  Oral hygiene poor ,  salivary glands, floor of the mouth (FOM) and tongue to be mucosa, palate, pharynx no significant findings. #16: (+) Percussion, (Ab) Cold, (+) Palpation, tooth is non restorable     PA taken of #16     A: #16: Necrotic tooth with symptomatic apical periodontitis. Tooth is non restorable. P: Informed Pt that #16 is not savable with RCT and crown. Discussed if patient would like extraction done here or at [facility]. Patient wishes to go to specialist due to more immediate availibilty. Referral, PA, and referral list given to patient. Patient understands signs and symptoms to look out for until [facility] appointment. Amoxicillin and tylenol prescribed.       Attending: Dr Yifan Garcia     NV: Comp Exam after [facility] referral

## 2023-11-20 ENCOUNTER — TELEPHONE (OUTPATIENT)
Dept: INTERNAL MEDICINE CLINIC | Facility: CLINIC | Age: 58
End: 2023-11-20

## 2023-11-21 ENCOUNTER — HOSPITAL ENCOUNTER (OUTPATIENT)
Dept: RADIOLOGY | Age: 58
Discharge: HOME | End: 2023-11-21
Payer: MEDICARE

## 2023-11-21 ENCOUNTER — TELEPHONE (OUTPATIENT)
Dept: OBGYN CLINIC | Facility: HOSPITAL | Age: 58
End: 2023-11-21

## 2023-11-21 DIAGNOSIS — M23.91 INTERNAL DERANGEMENT OF RIGHT KNEE: ICD-10-CM

## 2023-11-21 PROCEDURE — G1004 CDSM NDSC: HCPCS

## 2023-11-21 PROCEDURE — 73721 MRI JNT OF LWR EXTRE W/O DYE: CPT

## 2023-11-21 NOTE — TELEPHONE ENCOUNTER
Caller: Patient    Doctor: Grayson Chicas    Reason for call: Patient has an MRI today at 4:45 and wanted to know when his Lyft is coming. I advised him that Lyft is normally just for his ortho visits but he said the office had arranged this for him the last few times he had an MRI. Please call patient regarding this Ly[employer] today.     Call back#: [phone]

## 2023-11-22 ENCOUNTER — TELEPHONE (OUTPATIENT)
Dept: DENTISTRY | Facility: CLINIC | Age: 58
End: 2023-11-22

## 2023-11-22 ENCOUNTER — RA CDI HCC (OUTPATIENT)
Dept: OTHER | Facility: HOSPITAL | Age: 58
End: 2023-11-22

## 2023-12-01 ENCOUNTER — OFFICE VISIT (OUTPATIENT)
Dept: INTERNAL MEDICINE CLINIC | Facility: CLINIC | Age: 58
End: 2023-12-01

## 2023-12-01 VITALS
HEART RATE: 72 BPM | WEIGHT: 242 LBS | DIASTOLIC BLOOD PRESSURE: 89 MMHG | BODY MASS INDEX: 40.27 KG/M2 | SYSTOLIC BLOOD PRESSURE: 143 MMHG | TEMPERATURE: 97.3 F

## 2023-12-01 DIAGNOSIS — E11.9 TYPE 2 DIABETES MELLITUS WITHOUT COMPLICATION, WITHOUT LONG-TERM CURRENT USE OF INSULIN (HCC): Primary | ICD-10-CM

## 2023-12-01 DIAGNOSIS — K76.0 HEPATIC STEATOSIS: ICD-10-CM

## 2023-12-01 DIAGNOSIS — I10 ESSENTIAL HYPERTENSION: ICD-10-CM

## 2023-12-01 DIAGNOSIS — E78.2 MIXED HYPERLIPIDEMIA: ICD-10-CM

## 2023-12-01 DIAGNOSIS — E66.01 OBESITY, CLASS III, BMI 40-49.9 (MORBID OBESITY) (HCC): ICD-10-CM

## 2023-12-01 LAB — SL AMB POCT HEMOGLOBIN AIC: 5.7 (ref ?–6.5)

## 2023-12-01 PROCEDURE — 83036 HEMOGLOBIN GLYCOSYLATED A1C: CPT | Performed by: INTERNAL MEDICINE

## 2023-12-01 PROCEDURE — 99213 OFFICE O/P EST LOW 20 MIN: CPT | Performed by: INTERNAL MEDICINE

## 2023-12-01 RX ORDER — ROSUVASTATIN CALCIUM 40 MG/1
40 TABLET, COATED ORAL DAILY
Qty: 90 TABLET | Refills: 3 | Status: SHIPPED | OUTPATIENT
Start: 2023-12-01
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
## 2023-12-06 ENCOUNTER — TELEPHONE (OUTPATIENT)
Dept: INTERNAL MEDICINE CLINIC | Facility: CLINIC | Age: 58
End: 2023-12-06

## 2023-12-06 NOTE — TELEPHONE ENCOUNTER
Patient left voicemail to refill Crestor, ASA and Amlodipine. Crestor already sent on 12/1/23. Others I will send now.

## 2023-12-07 ENCOUNTER — OFFICE VISIT (OUTPATIENT)
Dept: OBGYN CLINIC | Facility: CLINIC | Age: 58
End: 2023-12-07
Payer: MEDICARE

## 2023-12-07 VITALS
SYSTOLIC BLOOD PRESSURE: 147 MMHG | BODY MASS INDEX: 40.15 KG/M2 | HEART RATE: 66 BPM | WEIGHT: 241 LBS | HEIGHT: 65 IN | DIASTOLIC BLOOD PRESSURE: 94 MMHG

## 2023-12-07 DIAGNOSIS — S83.241A OTHER TEAR OF MEDIAL MENISCUS, CURRENT INJURY, RIGHT KNEE, INITIAL ENCOUNTER: Primary | ICD-10-CM

## 2023-12-07 PROCEDURE — 99213 OFFICE O/P EST LOW 20 MIN: CPT | Performed by: STUDENT IN AN ORGANIZED HEALTH CARE EDUCATION/TRAINING PROGRAM

## 2023-12-07 NOTE — ASSESSMENT & PLAN NOTE
Patient has a history, physical examination and radiographic evaluation consistent with a tear of the medial meniscus in the right knee    -Given the patient's lack of response to nonsurgical modalities, I have referred him to Dr. Zev Riddle for further evaluation and treatment.  -Patient should continue physical therapy exercises  -Continue over-the-counter Tylenol and NSAIDs as needed for pain  -Follow-up as needed
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
## 2023-12-15 ENCOUNTER — OFFICE VISIT (OUTPATIENT)
Dept: OBGYN CLINIC | Facility: CLINIC | Age: 58
End: 2023-12-15
Payer: MEDICARE

## 2023-12-15 VITALS
DIASTOLIC BLOOD PRESSURE: 86 MMHG | BODY MASS INDEX: 38.73 KG/M2 | HEIGHT: 66 IN | HEART RATE: 84 BPM | SYSTOLIC BLOOD PRESSURE: 131 MMHG | WEIGHT: 241 LBS

## 2023-12-15 DIAGNOSIS — M25.561 CHRONIC PAIN OF RIGHT KNEE: ICD-10-CM

## 2023-12-15 DIAGNOSIS — S83.241A OTHER TEAR OF MEDIAL MENISCUS, CURRENT INJURY, RIGHT KNEE, INITIAL ENCOUNTER: ICD-10-CM

## 2023-12-15 DIAGNOSIS — G89.29 CHRONIC PAIN OF RIGHT KNEE: ICD-10-CM

## 2023-12-15 PROCEDURE — 99214 OFFICE O/P EST MOD 30 MIN: CPT | Performed by: ORTHOPAEDIC SURGERY

## 2023-12-15 RX ORDER — QUETIAPINE FUMARATE 50 MG/1
TABLET, FILM COATED ORAL
COMMUNITY
Start: 2023-11-27 | End: 2023-12-19 | Stop reason: ALTCHOICE

## 2023-12-15 RX ORDER — QUETIAPINE FUMARATE 25 MG/1
TABLET, FILM COATED ORAL
COMMUNITY
Start: 2023-11-08 | End: 2023-12-19 | Stop reason: ALTCHOICE

## 2023-12-15 NOTE — H&P (VIEW-ONLY)
CHIEF COMPLAINT/REASON FOR VISIT  Right knee pain    HISTORY OF PRESENT ILLNESS  Haile Downing is a 58 y.o. male who presents for evaluation of his right knee.  Patient has been following with Dr. Cortez for evaluation of right knee pain.  He has been following with Dr. Cortez and has trialed numerous conservative measures without any relief.  He had an MRI ordered which showed a medial meniscus tear.  He was then advised to follow-up with Dr. Arenas.  Today, patient comes in for evaluation. Patient said he initially injured the right knee in January 2023 when slipped and twisted his right knee. Patient said he has experiences episodes of buckling, clicking, and catching. Patient has tried physical therapy, corticosteroid injections, anti-inflammatories, rest, ice, heat, and activity modification without any significant relief. Overall, patient feels his symptoms are debilitating and affecting his activities of daily living.     REVIEW OF SYSTEMS  Review of systems was performed and, outside that mentioned in the HPI, it was negative for symptomology related to the integumentary, hematologic, immunologic, allergic, neurologic, cardiovascular, respiratory, GI or  systems.    MEDICAL HISTORY  Patient Active Problem List   Diagnosis    Schizoaffective disorder, bipolar type (HCC)    Post-traumatic stress disorder, chronic    Alcohol abuse    Learning disability    DDD (degenerative disc disease), cervical    DDD (degenerative disc disease), thoracic    Lumbar radiculopathy    Primary osteoarthritis of right shoulder    Obesity, morbid (HCC)    Vitreous hemorrhage of left eye (HCC)    History of cocaine abuse (HCC)    Erectile dysfunction    Essential hypertension    Sacroiliitis (HCC)    Prediabetes    Drug abuse and dependence (HCC)    Glaucoma    Restless leg syndrome    Knee internal derangement, right    Mixed hyperlipidemia    Patellofemoral chondrosis of right knee    Hepatic steatosis    Primary  osteoarthritis of both knees    Complete tear of MCL of knee, left, initial encounter    Abnormal electrocardiogram (ECG) (EKG)    Abnormal echocardiogram    DEMETRIA (obstructive sleep apnea)    Cervical radiculopathy    Other tear of medial meniscus, current injury, right knee, initial encounter       SURGICAL HISTORY  Past Surgical History:   Procedure Laterality Date    ANKLE SURGERY      COLONOSCOPY      COLOSTOMY      and then closure of colostomy    FL INJECTION LEFT HIP (NON ARTHROGRAM)  12/19/2022    FL INJECTION LEFT HIP (NON ARTHROGRAM)  3/22/2023    FL INJECTION LEFT HIP (NON ARTHROGRAM)  9/22/2023    FL INJECTION RIGHT HIP (NON ARTHROGRAM)  9/22/2023    HERNIA REPAIR      KNEE SURGERY      [place] AR[place] KNE SURG W/MENISCECTOMY MED/LAT W/SHVG Left 3/4/2020    Procedure: ARTHROSCOPY with partial medial meniscectomy;  Surgeon: Olman Schulte MD;  Location: [facility];  Service: Orthopedics    SHOULDER SURGERY Bilateral     UPPER GASTROINTESTINAL ENDOSCOPY      WRIST SURGERY Right 2012       CURRENT MEDICATIONS    Current Outpatient Medications:     acetaminophen (TYLENOL) 500 mg tablet, Take 1 tablet (500 mg total) by mouth every 6 (six) hours as needed for mild pain, Disp: 7 tablet, Rfl: 0    amLODIPine (NORVASC) 5 mg tablet, Take 1 tablet (5 mg total) by mouth daily, Disp: 90 tablet, Rfl: 1    aspirin (Aspirin Low Dose) 81 mg EC tablet, Take 1 tablet (81 mg total) by mouth daily (Patient not taking: Reported on 9/5/2023), Disp: 90 tablet, Rfl: 3    brimonidine tartrate 0.2 % ophthalmic solution, INSTILL 1 DROP INTO BOTH EYES TWICE A DAY, Disp: , Rfl:     divalproex sodium (DEPAKOTE) 500 mg DR tablet, TAKE 3 TABLETS (1,500 MG TOTAL) BY MOUTH EVERY 24 HOURS, Disp: 270 tablet, Rfl: 0    dorzolamide-timolol (COSOPT) 22.3-6.8 MG/ML ophthalmic solution, Administer 1 drop to both eyes daily, Disp: 10 mL, Rfl: 0    doxepin (SINEquan) 150 MG capsule, TAKE 1 CAPSULE BY MOUTH DAILY AT BEDTIME, Disp: 30 capsule, Rfl: 0     hydrocortisone 1 % cream, Apply to affected area 2 times daily, Disp: 15 g, Rfl: 0    hydrOXYzine HCL (ATARAX) 50 mg tablet, Take 50 mg by mouth daily at bedtime   (Patient not taking: Reported on 2023), Disp: , Rfl:     ibuprofen (MOTRIN) 800 mg tablet, Take 800 mg by mouth every 8 (eight) hours as needed, Disp: , Rfl:     latanoprost (XALATAN) 0.005 % ophthalmic solution, Administer 1 drop to both eyes daily, Disp: 7.5 mL, Rfl: 3    Lumigan 0.01 % ophthalmic drops, INSTILL 1 DROP INTO BOTH EYES IN THE EVENING, Disp: , Rfl:     meloxicam (MOBIC) 15 mg tablet, TAKE 1 TABLET (15 MG TOTAL) BY MOUTH DAILY., Disp: 30 tablet, Rfl: 1    metFORMIN (GLUCOPHAGE) 1000 MG tablet, Please take 1 tablet (1000mg) once a day with food for 7 days. If tolerated, then after the initial 7 days begin taking 1 tablet (1000mg) twice a day with meals., Disp: 180 tablet, Rfl: 3    nabumetone (RELAFEN) 750 mg tablet, Take 1 tablet (750 mg total) by mouth 2 (two) times a day, Disp: 60 tablet, Rfl: 0    rOPINIRole (REQUIP) 4 mg tablet, TAKE 1 TABLET BY MOUTH DAILY AT BEDTIME, Disp: 90 tablet, Rfl: 1    rosuvastatin (CRESTOR) 40 MG tablet, Take 1 tablet (40 mg total) by mouth daily, Disp: 90 tablet, Rfl: 3    senna-docusate sodium (SENOKOT-S) 8.6-50 mg per tablet, Take 1 tablet by mouth daily, Disp: 60 tablet, Rfl: 0    traZODone (DESYREL) 50 mg tablet, , Disp: , Rfl:     SOCIAL HISTORY  Social History     Socioeconomic History    Marital status:      Spouse name: Not on file    Number of children: Not on file    Years of education: Not on file    Highest education level: Not on file   Occupational History    Not on file   Tobacco Use    Smoking status: Former     Types: Cigars     Start date:      Quit date: 2022     Years since quittin.0    Smokeless tobacco: Never    Tobacco comments:     Cigars, occasional   Vaping Use    Vaping status: Never Used   Substance and Sexual Activity    Alcohol use: Not Currently      "Alcohol/week: 18.0 standard drinks of alcohol     Types: 18 Cans of beer per week     Comment: 16mos sober    Drug use: Not Currently     Types: \"Crack\" cocaine, PCP, Other, Hashish, Hydrocodone     Comment: 16mos sober    Sexual activity: Not Currently     Partners: Female   Other Topics Concern    Not on file   Social History Narrative    Not on file     Social Determinants of Health     Financial Resource Strain: Low Risk  (9/25/2023)    Overall Financial Resource Strain (CARDIA)     Difficulty of Paying Living Expenses: Not hard at all   Food Insecurity: Food Insecurity Present (1/26/2023)    Hunger Vital Sign     Worried About Running Out of Food in the Last Year: Sometimes true     Ran Out of Food in the Last Year: Sometimes true   Transportation Needs: No Transportation Needs (9/25/2023)    PRAPARE - Transportation     Lack of Transportation (Medical): No     Lack of Transportation (Non-Medical): No   Physical Activity: Inactive (7/14/2021)    Exercise Vital Sign     Days of Exercise per Week: 0 days     Minutes of Exercise per Session: 0 min   Stress: Stress Concern Present (7/14/2021)    [place] [employer] - Occupational Stress Questionnaire     Feeling of Stress : Very much   Social Connections: Moderately Integrated (7/14/2021)    Social Connection and Isolation Panel [NHANES]     Frequency of Communication with Friends and Family: More than three times a week     Frequency of Social Gatherings with Friends and Family: Twice a week     Attends Orthodox Services: 1 to 4 times per year     Active Member of Clubs or Organizations: Yes     Attends Club or Organization Meetings: 1 to 4 times per year     Marital Status:    Intimate Partner Violence: Not At Risk (7/14/2021)    Humiliation, Afraid, Rape, and Kick questionnaire     Fear of Current or Ex-Partner: No     Emotionally Abused: No     Physically Abused: No     Sexually Abused: No   Housing Stability: Low Risk  " (11/10/2022)    Housing Stability Vital Sign     Unable to Pay for Housing in the Last Year: No     Number of Places Lived in the Last Year: 1     Unstable Housing in the Last Year: No       Objective     VITAL SIGNS  There were no vitals taken for this visit.       PHYSICAL EXAMINATION    Musculoskeletal: Right Knee Examination:  General: The patient is alert, oriented, and pleasant to interact with.  Patient ambulates with Normal gait pattern  Assistive Device: No  Alignment: normal  Skin is warm and dry to touch with no signs of erythema, ecchymosis, or infection   Effusion: minimal  ROM: 0° - 100°  verus 0° - 120° on contralateral side  MMT: deferred  TTP: Medial joint line  Flexor and extensor mechanisms are intact   Knee is stable to varus and valgus stress  Kay's Test Positive Medial    Lachman's Test - 1A  2+ DP and PT pulses with brisk capillary refill to the toes  Sural, saphenous, tibial, superficial, and deep peroneal motor and sensory distributions intact  Sensation light touch intact distally      RADIOGRAPHIC EXAMINATION/DIAGNOSTICS:  Procedure: MRI knee right  wo contrast    Result Date: 11/27/2023  Narrative: MRI RIGHT KNEE INDICATION:   M23.91: Unspecified internal derangement of right knee. COMPARISON: Prior MRI study dated 3/10/2023. TECHNIQUE: Multiplanar/multisequence MR of the right knee was performed. Imaging performed on 3.0T MRI FINDINGS: SUBCUTANEOUS TISSUES: Minimal anterior subcutaneous edema. JOINT EFFUSION: Small to moderate joint effusion. BAKER'S CYST: Tiny Alvarez's cyst MENISCI: There is a complex tear of the posterior horn of the medial meniscus now seen with a radial component and a horizontal component. The lateral meniscus is intact. CRUCIATE LIGAMENTS: Intact. EXTENSOR APPARATUS: There is enthesopathy in the region of the proximal patella tendon as before. The extensor tendons are intact. COLLATERAL LIGAMENTS: Intact. ARTICULAR SURFACES: Moderate to severe patellar  cartilage loss. BONES: Subchondral cystic changes along the medial facet of the patella. No fracture or contusion. MUSCULATURE:  Intact.     Impression: Complex tear posterior horn of the medial meniscus with a radial and horizontal component. Moderate to severe patellar cartilage loss with underlying subchondral cystic changes in the medial facet. Stable enthesopathy in the proximal patellar tendon. Small to moderate joint effusion. Workstation performed: [id number]     Procedure: XR knee 3 vw left non injury    Result Date: 11/5/2023  Narrative: LEFT KNEE INDICATION:   M17.11: Unilateral primary osteoarthritis, right knee. COMPARISON: Left knee x-ray 10/6/2021 VIEWS:  XR KNEE 3 VW LEFT NON INJURY Images: 3 FINDINGS: There is no acute fracture or dislocation. Joint effusion cannot be reliably evaluated without lateral view. Mild osteoarthritis with narrowing of the medial tibiofemoral joint. No lytic or blastic osseous lesion. Soft tissues are unremarkable.     Impression: No acute osseous abnormality. Degenerative changes as described. Workstation performed: [id number]     Procedure: XR knee 4+ vw right injury    Result Date: 11/5/2023  Narrative: RIGHT KNEE INDICATION:   M17.11: Unilateral primary osteoarthritis, right knee. COMPARISON: Right knee x-ray 1/25/2023 VIEWS:  XR KNEE 4+ VW RIGHT INJURY Images: 4 FINDINGS: There is no acute fracture or dislocation. There is no joint effusion. No significant degenerative changes. No lytic or blastic osseous lesion. Soft tissues are unremarkable.     Impression: No acute osseous abnormality. Workstation performed: [id number]      ASSESSMENT/PLAN:  Right knee pain; complex medial meniscus tear     right knee arthroscopy, medial meniscus repair versus partial meniscectomy, chondroplasty as needed, and all other indicated procedures.    We discussed the importance of injury to the meniscus. We also discussed the role of the menisci as a shock absorber, but also as a  secondary stabilizer of the knee in terms of stability. We also described that there can be associated wear and degeneration of the articular cartilage which may also be playing a role in his symptoms. Sometimes this can be difficult to distinguish from meniscus symptoms.    Options for treatment of the meniscus include partial meniscectomy versus repair. Options for treatment of the meniscus include partial meniscectomy versus repair in this setting. If repair is considered, this is more preserving for the meniscus tissue. However, there is also a chance a repair may not heal.    We discussed the typical rehabilitation following meniscus partial debridement. There is certainly a risk of re injury returning to twisting and pivoting activities. We discussed risks of surgery, which include knee stiffness, infection, risk of reinjury and future arthritis.    He will call to schedule knee arthroscopy with meniscus debridement as indicated. He will need a preoperative listing appointment and physical therapy.       We had an extensive discussion regarding the diagnosis and its natural history. We also discussed both non operative and operative treatment strategies. He has continued to experience significant symptoms and dysfunction and is ready to proceed with surgery. I certainly understand and am in agreement.  Accordingly, we will plan to proceed with right knee arthroscopy, medial meniscus repair vs debridement.     We discussed the anticipated pre-, intra-, and postoperative course in great detail. Specifically, we discussed the recovery and rehabilitation protocol and time lines.    INFORMED CONSENT:  We discussed the goals and potential benefits of both operative and non-operative treatment. At this time, he wished to proceed with surgical intervention. We discussed the potential risks of surgery which include but are not limited to: medical problems, infection, bleeding, nerve injury, blood clots, persistent  stiffness or instability, skin healing complications, and/or ongoing pain. We discussed the risks, benefits, and alternatives of the procedure and the necessity of other members of the healthcare team to participate in the procedure.  Surgical consent was obtained. We discussed the timing of surgery and the necessary pre-operative eating/drinking restrictions. The patient was provided with chlorhexidine antibiotic scrub to be used on the skin the evening and morning prior to surgery. He knows to call the evening prior to surgery to obtain a report time for the following day. He will contact our service should any questions or concerns arise in the meantime. All ofhis questions were answered, and hevoiced understanding of the plan.

## 2023-12-15 NOTE — PROGRESS NOTES
CHIEF COMPLAINT/REASON FOR VISIT  Right knee pain    HISTORY OF PRESENT ILLNESS  Matthew Mendoza is a 66 y.o. male who presents for evaluation of his right knee. Patient has been following with Dr. Bozena Kwon for evaluation of right knee pain. He has been following with Dr. Bozena Kwon and has trialed numerous conservative measures without any relief. He had an MRI ordered which showed a medial meniscus tear. He was then advised to follow-up with Dr. Park Leonard. Today, patient comes in for evaluation. Patient said he initially injured the right knee in January 2023 when slipped and twisted his right knee. Patient said he has experiences episodes of buckling, clicking, and catching. Patient has tried physical therapy, corticosteroid injections, anti-inflammatories, rest, ice, heat, and activity modification without any significant relief. Overall, patient feels his symptoms are debilitating and affecting his activities of daily living. REVIEW OF SYSTEMS  Review of systems was performed and, outside that mentioned in the HPI, it was negative for symptomology related to the integumentary, hematologic, immunologic, allergic, neurologic, cardiovascular, respiratory, GI or  systems.     MEDICAL HISTORY  Patient Active Problem List   Diagnosis    Schizoaffective disorder, bipolar type ([address])    Post-traumatic stress disorder, chronic    Alcohol abuse    Learning disability    DDD (degenerative disc disease), cervical    DDD (degenerative disc disease), thoracic    Lumbar radiculopathy    Primary osteoarthritis of right shoulder    Obesity, morbid ([address])    Vitreous hemorrhage of left eye ([address])    History of cocaine abuse ([address])    Erectile dysfunction    Essential hypertension    Sacroiliitis (HCC)    Prediabetes    Drug abuse and dependence ([address])    Glaucoma    Restless leg syndrome    Knee internal derangement, right    Mixed hyperlipidemia    Patellofemoral chondrosis of right knee    Hepatic steatosis    Primary osteoarthritis of both knees    Complete tear of MCL of knee, left, initial encounter    Abnormal electrocardiogram (ECG) (EKG)    Abnormal echocardiogram    DEMETRIA (obstructive sleep apnea)    Cervical radiculopathy    Other tear of medial meniscus, current injury, right knee, initial encounter       SURGICAL HISTORY  Past Surgical History:   Procedure Laterality Date    ANKLE SURGERY      COLONOSCOPY      COLOSTOMY      and then closure of colostomy    FL INJECTION LEFT HIP (NON ARTHROGRAM)  12/19/2022    FL INJECTION LEFT HIP (NON ARTHROGRAM)  3/22/2023    FL INJECTION LEFT HIP (NON ARTHROGRAM)  9/22/2023    FL INJECTION RIGHT HIP (NON ARTHROGRAM)  9/22/2023    HERNIA REPAIR      KNEE SURGERY      CA ARTHRS KNE SURG W/MENISCECTOMY MED/LAT W/SHVG Left 3/4/2020    Procedure: ARTHROSCOPY with partial medial meniscectomy;  Surgeon: Nilsa Ramsey MD;  Location: [facility];  Service: Orthopedics    SHOULDER SURGERY Bilateral     UPPER GASTROINTESTINAL ENDOSCOPY      WRIST SURGERY Right 2012       CURRENT MEDICATIONS    Current Outpatient Medications:     acetaminophen (TYLENOL) 500 mg tablet, Take 1 tablet (500 mg total) by mouth every 6 (six) hours as needed for mild pain, Disp: 7 tablet, Rfl: 0    amLODIPine (NORVASC) 5 mg tablet, Take 1 tablet (5 mg total) by mouth daily, Disp: 90 tablet, Rfl: 1    aspirin (Aspirin Low Dose) 81 mg EC tablet, Take 1 tablet (81 mg total) by mouth daily (Patient not taking: Reported on 9/5/2023), Disp: 90 tablet, Rfl: 3    brimonidine tartrate 0.2 % ophthalmic solution, INSTILL 1 DROP INTO BOTH EYES TWICE A DAY, Disp: , Rfl:     divalproex sodium (DEPAKOTE) 500 mg DR tablet, TAKE 3 TABLETS (1,500 MG TOTAL) BY MOUTH EVERY 24 HOURS, Disp: 270 tablet, Rfl: 0    dorzolamide-timolol (COSOPT) 22.3-6.8 MG/ML ophthalmic solution, Administer 1 drop to both eyes daily, Disp: 10 mL, Rfl: 0    doxepin (SINEquan) 150 MG capsule, TAKE 1 CAPSULE BY MOUTH DAILY AT BEDTIME, Disp: 30 capsule, Rfl: 0 hydrocortisone 1 % cream, Apply to affected area 2 times daily, Disp: 15 g, Rfl: 0    hydrOXYzine HCL (ATARAX) 50 mg tablet, Take 50 mg by mouth daily at bedtime   (Patient not taking: Reported on 2023), Disp: , Rfl:     ibuprofen (MOTRIN) 800 mg tablet, Take 800 mg by mouth every 8 (eight) hours as needed, Disp: , Rfl:     latanoprost (XALATAN) 0.005 % ophthalmic solution, Administer 1 drop to both eyes daily, Disp: 7.5 mL, Rfl: 3    Lumigan 0.01 % ophthalmic drops, INSTILL 1 DROP INTO BOTH EYES IN THE EVENING, Disp: , Rfl:     meloxicam (MOBIC) 15 mg tablet, TAKE 1 TABLET (15 MG TOTAL) BY MOUTH DAILY. , Disp: 30 tablet, Rfl: 1    metFORMIN (GLUCOPHAGE) 1000 MG tablet, Please take 1 tablet (1000mg) once a day with food for 7 days. If tolerated, then after the initial 7 days begin taking 1 tablet (1000mg) twice a day with meals. , Disp: 180 tablet, Rfl: 3    nabumetone (RELAFEN) 750 mg tablet, Take 1 tablet (750 mg total) by mouth 2 (two) times a day, Disp: 60 tablet, Rfl: 0    rOPINIRole (REQUIP) 4 mg tablet, TAKE 1 TABLET BY MOUTH DAILY AT BEDTIME, Disp: 90 tablet, Rfl: 1    rosuvastatin (CRESTOR) 40 MG tablet, Take 1 tablet (40 mg total) by mouth daily, Disp: 90 tablet, Rfl: 3    senna-docusate sodium (SENOKOT-S) 8.6-50 mg per tablet, Take 1 tablet by mouth daily, Disp: 60 tablet, Rfl: 0    traZODone (DESYREL) 50 mg tablet, , Disp: , Rfl:     SOCIAL HISTORY  Social History     Socioeconomic History    Marital status:      Spouse name: Not on file    Number of children: Not on file    Years of education: Not on file    Highest education level: Not on file   Occupational History    Not on file   Tobacco Use    Smoking status: Former     Types: Cigars     Start date:      Quit date: 2022     Years since quittin.0    Smokeless tobacco: Never    Tobacco comments:     Cigars, occasional   Vaping Use    Vaping status: Never Used   Substance and Sexual Activity    Alcohol use: Not Currently Alcohol/week: 18.0 standard drinks of alcohol     Types: 18 Cans of beer per week     Comment: 16mos sober    Drug use: Not Currently     Types: "Crack" cocaine, PCP, Other, Hashish, Hydrocodone     Comment: 16mos sober    Sexual activity: Not Currently     Partners: Female   Other Topics Concern    Not on file   Social History Narrative    Not on file     Social Determinants of Health     Financial Resource Strain: Low Risk  (9/25/2023)    Overall Financial Resource Strain (CARDIA)     Difficulty of Paying Living Expenses: Not hard at all   Food Insecurity: [address] Present (1/26/2023)    Hunger Vital Sign     Worried About [place] in the Last Year: Sometimes true     Ran Out of Food in the Last Year: Sometimes true   Transportation Needs: No Transportation Needs (9/25/2023)    PRAPARE - Transportation     Lack of Transportation (Medical): No     Lack of Transportation (Non-Medical): No   Physical Activity: Inactive (7/14/2021)    Exercise Vital Sign     Days of Exercise per Week: 0 days     Minutes of Exercise per Session: 0 min   Stress: Stress Concern Present (7/14/2021)    [facility]     Feeling of Stress : Very much   Social Connections:  Moderately Integrated (7/14/2021)    Social Connection and Isolation Panel [NHANES]     Frequency of Communication with Friends and Family: More than three times a week     Frequency of Social Gatherings with Friends and Family: Twice a week     Attends Sabianist Services: 1 to 4 times per year     Active Member of [employer] or Organizations: Yes     Attends Club or Organization Meetings: 1 to 4 times per year     Marital Status:    Intimate Partner Violence: Not At Risk (7/14/2021)    Humiliation, Afraid, Rape, and Kick questionnaire     Fear of Current or Ex-Partner: No     Emotionally Abused: No     Physically Abused: No     Sexually Abused: No   Housing Stability: Low Risk (11/10/2022)    Housing Stability Vital Sign     Unable to Pay for Housing in the Last Year: No     Number of Places Lived in the Last Year: 1     Unstable Housing in the Last Year: No       Objective     VITAL SIGNS  There were no vitals taken for this visit. PHYSICAL EXAMINATION    Musculoskeletal: Right Knee Examination:  General: The patient is alert, oriented, and pleasant to interact with. Patient ambulates with Normal gait pattern  Assistive Device: No  Alignment: normal  Skin is warm and dry to touch with no signs of erythema, ecchymosis, or infection   Effusion: minimal  ROM: 0° - 100°  verus 0° - 120° on contralateral side  MMT: deferred  TTP: Medial joint line  Flexor and extensor mechanisms are intact   Knee is stable to varus and valgus stress  Kay's Test Positive Medial    Lachman's Test - 1A  2+ DP and PT pulses with brisk capillary refill to the toes  Sural, saphenous, tibial, superficial, and deep peroneal motor and sensory distributions intact  Sensation light touch intact distally      RADIOGRAPHIC EXAMINATION/DIAGNOSTICS:  Procedure: MRI knee right  wo contrast    Result Date: 11/27/2023  Narrative: MRI RIGHT KNEE INDICATION:   M23.91: Unspecified internal derangement of right knee. COMPARISON: Prior MRI study dated 3/10/2023. TECHNIQUE: Multiplanar/multisequence MR of the right knee was performed. Imaging performed on 3.0T MRI FINDINGS: SUBCUTANEOUS TISSUES: Minimal anterior subcutaneous edema. JOINT EFFUSION: Small to moderate joint effusion. BAKER'S CYST: Tiny Alvarez's cyst MENISCI: There is a complex tear of the posterior horn of the medial meniscus now seen with a radial component and a horizontal component. The lateral meniscus is intact. CRUCIATE LIGAMENTS: Intact. EXTENSOR APPARATUS: There is enthesopathy in the region of the proximal patella tendon as before. The extensor tendons are intact. COLLATERAL LIGAMENTS: Intact.  ARTICULAR SURFACES: Moderate to severe patellar cartilage loss. BONES: Subchondral cystic changes along the medial facet of the patella. No fracture or contusion. MUSCULATURE:  Intact. Impression: Complex tear posterior horn of the medial meniscus with a radial and horizontal component. Moderate to severe patellar cartilage loss with underlying subchondral cystic changes in the medial facet. Stable enthesopathy in the proximal patellar tendon. Small to moderate joint effusion. Workstation performed: [id number]     Procedure: XR knee 3 vw left non injury    Result Date: 11/5/2023  Narrative: LEFT KNEE INDICATION:   M17.11: Unilateral primary osteoarthritis, right knee. COMPARISON: Left knee x-ray 10/6/2021 VIEWS:  XR KNEE 3 VW LEFT NON INJURY Images: 3 FINDINGS: There is no acute fracture or dislocation. Joint effusion cannot be reliably evaluated without lateral view. Mild osteoarthritis with narrowing of the medial tibiofemoral joint. No lytic or blastic osseous lesion. Soft tissues are unremarkable. Impression: No acute osseous abnormality. Degenerative changes as described. Workstation performed: [id number]     Procedure: XR knee 4+ vw right injury    Result Date: 11/5/2023  Narrative: RIGHT KNEE INDICATION:   M17.11: Unilateral primary osteoarthritis, right knee. COMPARISON: Right knee x-ray 1/25/2023 VIEWS:  XR KNEE 4+ VW RIGHT INJURY Images: 4 FINDINGS: There is no acute fracture or dislocation. There is no joint effusion. No significant degenerative changes. No lytic or blastic osseous lesion. Soft tissues are unremarkable. Impression: No acute osseous abnormality. Workstation performed: [id number]      ASSESSMENT/PLAN:  Right knee pain; complex medial meniscus tear     right knee arthroscopy, medial meniscus repair versus partial meniscectomy, chondroplasty as needed, and all other indicated procedures. We discussed the importance of injury to the meniscus.  We also discussed the role of the menisci as a shock absorber, but also as a secondary stabilizer of the knee in terms of stability. We also described that there can be associated wear and degeneration of the articular cartilage which may also be playing a role in his symptoms. Sometimes this can be difficult to distinguish from meniscus symptoms. Options for treatment of the meniscus include partial meniscectomy versus repair. Options for treatment of the meniscus include partial meniscectomy versus repair in this setting. If repair is considered, this is more preserving for the meniscus tissue. However, there is also a chance a repair may not heal.    We discussed the typical rehabilitation following meniscus partial debridement. There is certainly a risk of re injury returning to twisting and pivoting activities. We discussed risks of surgery, which include knee stiffness, infection, risk of reinjury and future arthritis. He will call to schedule knee arthroscopy with meniscus debridement as indicated. He will need a preoperative listing appointment and physical therapy. We had an extensive discussion regarding the diagnosis and its natural history. We also discussed both non operative and operative treatment strategies. He has continued to experience significant symptoms and dysfunction and is ready to proceed with surgery. I certainly understand and am in agreement. Accordingly, we will plan to proceed with right knee arthroscopy, medial meniscus repair vs debridement. We discussed the anticipated pre-, intra-, and postoperative course in great detail. Specifically, we discussed the recovery and rehabilitation protocol and time lines. INFORMED CONSENT:  We discussed the goals and potential benefits of both operative and non-operative treatment. At this time, he wished to proceed with surgical intervention.  We discussed the potential risks of surgery which include but are not limited to: medical problems, infection, bleeding, nerve injury, blood clots, persistent stiffness or instability, skin healing complications, and/or ongoing pain. We discussed the risks, benefits, and alternatives of the procedure and the necessity of other members of the healthcare team to participate in the procedure. Surgical consent was obtained. We discussed the timing of surgery and the necessary pre-operative eating/drinking restrictions. The patient was provided with chlorhexidine antibiotic scrub to be used on the skin the evening and morning prior to surgery. He knows to call the evening prior to surgery to obtain a report time for the following day. He will contact our service should any questions or concerns arise in the meantime. All ofhis questions were answered, and hevoiced understanding of the plan.

## 2023-12-18 ENCOUNTER — APPOINTMENT (OUTPATIENT)
Dept: LAB | Facility: CLINIC | Age: 58
End: 2023-12-18
Payer: MEDICARE

## 2023-12-18 ENCOUNTER — TELEPHONE (OUTPATIENT)
Age: 58
End: 2023-12-18

## 2023-12-18 DIAGNOSIS — F31.81 BIPOLAR II DISORDER (HCC): ICD-10-CM

## 2023-12-18 LAB
ALBUMIN SERPL BCP-MCNC: 4.2 G/DL (ref 3.5–5)
ALP SERPL-CCNC: 53 U/L (ref 34–104)
ALT SERPL W P-5'-P-CCNC: 29 U/L (ref 7–52)
ANION GAP SERPL CALCULATED.3IONS-SCNC: 6 MMOL/L
AST SERPL W P-5'-P-CCNC: 25 U/L (ref 13–39)
BASOPHILS # BLD AUTO: 0.02 THOUSANDS/ÂΜL (ref 0–0.1)
BASOPHILS NFR BLD AUTO: 0 % (ref 0–1)
BILIRUB SERPL-MCNC: 0.35 MG/DL (ref 0.2–1)
BUN SERPL-MCNC: 12 MG/DL (ref 5–25)
CALCIUM SERPL-MCNC: 9 MG/DL (ref 8.4–10.2)
CHLORIDE SERPL-SCNC: 104 MMOL/L (ref 96–108)
CO2 SERPL-SCNC: 28 MMOL/L (ref 21–32)
CREAT SERPL-MCNC: 1.09 MG/DL (ref 0.6–1.3)
EOSINOPHIL # BLD AUTO: 0.07 THOUSAND/ÂΜL (ref 0–0.61)
EOSINOPHIL NFR BLD AUTO: 1 % (ref 0–6)
ERYTHROCYTE [DISTWIDTH] IN BLOOD BY AUTOMATED COUNT: 12.9 % (ref 11.6–15.1)
EST. AVERAGE GLUCOSE BLD GHB EST-MCNC: 128 MG/DL
GFR SERPL CREATININE-BSD FRML MDRD: 75 ML/MIN/1.73SQ M
GLUCOSE SERPL-MCNC: 103 MG/DL (ref 65–140)
HBA1C MFR BLD: 6.1 %
HCT VFR BLD AUTO: 43.2 % (ref 36.5–49.3)
HGB BLD-MCNC: 13.8 G/DL (ref 12–17)
IMM GRANULOCYTES # BLD AUTO: 0.05 THOUSAND/UL (ref 0–0.2)
IMM GRANULOCYTES NFR BLD AUTO: 1 % (ref 0–2)
LYMPHOCYTES # BLD AUTO: 3.19 THOUSANDS/ÂΜL (ref 0.6–4.47)
LYMPHOCYTES NFR BLD AUTO: 58 % (ref 14–44)
MCH RBC QN AUTO: 30.1 PG (ref 26.8–34.3)
MCHC RBC AUTO-ENTMCNC: 31.9 G/DL (ref 31.4–37.4)
MCV RBC AUTO: 94 FL (ref 82–98)
MONOCYTES # BLD AUTO: 0.38 THOUSAND/ÂΜL (ref 0.17–1.22)
MONOCYTES NFR BLD AUTO: 7 % (ref 4–12)
NEUTROPHILS # BLD AUTO: 1.86 THOUSANDS/ÂΜL (ref 1.85–7.62)
NEUTS SEG NFR BLD AUTO: 33 % (ref 43–75)
NRBC BLD AUTO-RTO: 0 /100 WBCS
PLATELET # BLD AUTO: 166 THOUSANDS/UL (ref 149–390)
PMV BLD AUTO: 11 FL (ref 8.9–12.7)
POTASSIUM SERPL-SCNC: 5 MMOL/L (ref 3.5–5.3)
PROT SERPL-MCNC: 7 G/DL (ref 6.4–8.4)
RBC # BLD AUTO: 4.59 MILLION/UL (ref 3.88–5.62)
SODIUM SERPL-SCNC: 138 MMOL/L (ref 135–147)
TSH SERPL DL<=0.05 MIU/L-ACNC: 3.54 UIU/ML (ref 0.45–4.5)
VALPROATE SERPL-MCNC: 76 UG/ML (ref 50–100)
WBC # BLD AUTO: 5.57 THOUSAND/UL (ref 4.31–10.16)

## 2023-12-18 PROCEDURE — 36415 COLL VENOUS BLD VENIPUNCTURE: CPT

## 2023-12-18 PROCEDURE — 80164 ASSAY DIPROPYLACETIC ACD TOT: CPT

## 2023-12-18 PROCEDURE — 83036 HEMOGLOBIN GLYCOSYLATED A1C: CPT

## 2023-12-18 PROCEDURE — 84443 ASSAY THYROID STIM HORMONE: CPT

## 2023-12-18 PROCEDURE — 85025 COMPLETE CBC W/AUTO DIFF WBC: CPT

## 2023-12-18 PROCEDURE — 80053 COMPREHEN METABOLIC PANEL: CPT

## 2023-12-18 NOTE — TELEPHONE ENCOUNTER
Caller: Patient    Doctor: Dr. Arenas    Reason for call: Patient calling asking to speak to surgery coordinator in regard to getting surgery scheduled.      Call back#: [phone]

## 2023-12-18 NOTE — TELEPHONE ENCOUNTER
Caller: Patient     Doctor: Deepa    Reason for call: Calling back to schedule sx.    Call back#: [phone]

## 2023-12-19 ENCOUNTER — CONSULT (OUTPATIENT)
Dept: INTERNAL MEDICINE CLINIC | Facility: CLINIC | Age: 58
End: 2023-12-19

## 2023-12-19 VITALS
BODY MASS INDEX: 40 KG/M2 | DIASTOLIC BLOOD PRESSURE: 92 MMHG | SYSTOLIC BLOOD PRESSURE: 144 MMHG | OXYGEN SATURATION: 98 % | HEART RATE: 80 BPM | TEMPERATURE: 98.6 F | WEIGHT: 247.8 LBS

## 2023-12-19 DIAGNOSIS — Z01.818 PRE-OP EVALUATION: Primary | ICD-10-CM

## 2023-12-19 DIAGNOSIS — E11.9 TYPE 2 DIABETES MELLITUS WITHOUT COMPLICATION, WITHOUT LONG-TERM CURRENT USE OF INSULIN (HCC): ICD-10-CM

## 2023-12-19 PROBLEM — N18.2 CKD (CHRONIC KIDNEY DISEASE) STAGE 2, GFR 60-89 ML/MIN: Status: ACTIVE | Noted: 2023-12-19

## 2023-12-19 PROCEDURE — 99213 OFFICE O/P EST LOW 20 MIN: CPT | Performed by: STUDENT IN AN ORGANIZED HEALTH CARE EDUCATION/TRAINING PROGRAM

## 2023-12-19 PROCEDURE — 93000 ELECTROCARDIOGRAM COMPLETE: CPT | Performed by: STUDENT IN AN ORGANIZED HEALTH CARE EDUCATION/TRAINING PROGRAM
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
## 2023-12-20 NOTE — PRE-PROCEDURE INSTRUCTIONS
Pre-Surgery Instructions:   Medication Instructions    acetaminophen (TYLENOL) 500 mg tablet Uses PRN- OK to take day of surgery    amLODIPine (NORVASC) 5 mg tablet Take day of surgery.    brimonidine tartrate 0.2 % ophthalmic solution Take day of surgery.    divalproex sodium (DEPAKOTE) 500 mg DR tablet Take day of surgery.    dorzolamide-timolol (COSOPT) 22.3-6.8 MG/ML ophthalmic solution Take day of surgery.    doxepin (SINEquan) 150 MG capsule Take night before surgery    hydrocortisone 1 % cream Hold day of surgery.    ibuprofen (MOTRIN) 800 mg tablet Stop taking 7 days prior to surgery.    latanoprost (XALATAN) 0.005 % ophthalmic solution Stop taking 5 days prior to surgery.    Lumigan 0.01 % ophthalmic drops Take day of surgery.    meloxicam (MOBIC) 15 mg tablet Stop taking 5 days prior to surgery.    metFORMIN (GLUCOPHAGE) 1000 MG tablet Hold day of surgery.    QUEtiapine Fumarate (SEROQUEL PO) Take night before surgery    rOPINIRole (REQUIP) 4 mg tablet Take night before surgery    rosuvastatin (CRESTOR) 40 MG tablet Take night before surgery    Medication instructions for day surgery reviewed. Please use only a sip of water to take your instructed medications. Avoid all over the counter vitamins, supplements and NSAIDS for one week prior to surgery per anesthesia guidelines. Tylenol is ok to take as needed.     You will receive a call one business day prior to surgery with an arrival time and hospital directions. If your surgery is scheduled on a Monday, the hospital will be calling you on the Friday prior to your surgery. If you have not heard from anyone by 8pm, please call the hospital supervisor through the hospital  at [phone]. ([place] [phone]).    Do not eat or drink anything after midnight the night before your surgery, including candy, mints, lifesavers, or chewing gum. Do not drink alcohol 24hrs before your surgery. Try not to smoke at least 24hrs before your surgery.        Follow the pre surgery showering instructions as listed in the “My Surgical Experience Booklet” or otherwise provided by your surgeon's office. Do not use a blade to shave the surgical area 1 week before surgery. It is okay to use a clean electric clippers up to 24 hours before surgery. Do not apply any lotions, creams, including makeup, cologne, deodorant, or perfumes after showering on the day of your surgery. Do not use dry shampoo, hair spray, hair gel, or any type of hair products.     No contact lenses, eye make-up, or artificial eyelashes. Remove nail polish, including gel polish, and any artificial, gel, or acrylic nails if possible. Remove all jewelry including rings and body piercing jewelry.     Wear causal clothing that is easy to take on and off. Consider your type of surgery.    Keep any valuables, jewelry, piercings at home. Please bring any specially ordered equipment (sling, braces) if indicated.    Arrange for a responsible person to drive you to and from the hospital on the day of your surgery. Visitor Guidelines discussed.     Call the surgeon's office with any new illnesses, exposures, or additional questions prior to surgery.    Please reference your “My Surgical Experience Booklet” for additional information to prepare for your upcoming surgery.

## 2023-12-21 ENCOUNTER — TELEPHONE (OUTPATIENT)
Dept: INTERNAL MEDICINE CLINIC | Facility: CLINIC | Age: 58
End: 2023-12-21

## 2023-12-21 NOTE — TELEPHONE ENCOUNTER
Patient called back. He said he is desperate for some sort of pain medication and antibiotic to treat his tooth infection. I explained to patient that pcp will advise when he receives the message.     Patient said the pain is messing with his mental status. Patient would like for any provider to prescribe medication

## 2023-12-21 NOTE — TELEPHONE ENCOUNTER
Called and spoke with patient. He was asking about the papers that I mailed him Monday (surgery packet). I said he should be receiving it this week. I then asked patient if he had questions about transportation (per prior phone message) and he said No, that is taken care of.  Patient was appreciative of the call back. No further action.r

## 2023-12-21 NOTE — TELEPHONE ENCOUNTER
Patient called - was seen 12/19/23 by Dr Smith and states he's in severe pain bottom teeth.   Patient was told that PCP would prescribe antibiotics and pain medication.    Please check into this, and call the patient when script is sent to the pharmacy.    thanks

## 2023-12-21 NOTE — TELEPHONE ENCOUNTER
Px called asking about medication for infection. Px is in severe pain and is waiting for the medication

## 2023-12-22 ENCOUNTER — TELEPHONE (OUTPATIENT)
Age: 58
End: 2023-12-22

## 2023-12-22 DIAGNOSIS — F25.0 SCHIZOAFFECTIVE DISORDER, BIPOLAR TYPE (HCC): Chronic | ICD-10-CM

## 2023-12-22 RX ORDER — DOXEPIN HYDROCHLORIDE 150 MG/1
150 CAPSULE ORAL
Qty: 30 CAPSULE | Refills: 2 | Status: SHIPPED | OUTPATIENT
Start: 2023-12-22

## 2023-12-22 NOTE — TELEPHONE ENCOUNTER
Caller: Haile    Doctor: Deepa    Reason for call: Started taking an antibiotic today for a slight tooth infection, wanted to let you know due to upcoming sx on 12/27    Call back#: [phone]

## 2023-12-22 NOTE — TELEPHONE ENCOUNTER
Haile called again to get the status on his  pain medication for hs tooth infection. Sue spoke to clinical and Carie asked me to transfer to her.

## 2023-12-22 NOTE — TELEPHONE ENCOUNTER
Per my verbal conversation with Dr. Waters I advised patient is having an arthroscopic meniscus repair for a tear and has no allergies to medication. Dr. Waters stated he will call in a script for Augmenting to [facility] on [address]. I made patient aware. I also advised him to reach out to the Ortho office to let them know he is going to be on the medication prior to the surgery.

## 2023-12-25 ENCOUNTER — ANESTHESIA EVENT (OUTPATIENT)
Dept: PERIOP | Facility: HOSPITAL | Age: 58
End: 2023-12-25
Payer: MEDICARE

## 2023-12-26 ENCOUNTER — TELEPHONE (OUTPATIENT)
Age: 58
End: 2023-12-26
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
## 2023-12-26 NOTE — TELEPHONE ENCOUNTER
Caller: Patient    Doctor: Deepa    Reason for call: Patient questioned what time he would get a call stating his arrival time for his surgery. Advised he should receive a call between 3-7pm. In addition, patient stated that papers were being mailed to him. He doesn't know what the papers were for and he hasn't received anything to date. Please call to discuss w/patient    Call back#: [phone]

## 2023-12-26 NOTE — TELEPHONE ENCOUNTER
Caller: Patient     Doctor: Deepa    Reason for call: Patient is scheduled for sx 12/27 and is requesting a LYFT ride to arrive at the hospital by 5 AM.    Please confirm and advise patient.    Call back#: [phone]

## 2023-12-27 ENCOUNTER — HOSPITAL ENCOUNTER (OUTPATIENT)
Facility: HOSPITAL | Age: 58
Setting detail: OUTPATIENT SURGERY
Discharge: HOME | End: 2023-12-27
Attending: ORTHOPAEDIC SURGERY | Admitting: ORTHOPAEDIC SURGERY
Payer: MEDICARE

## 2023-12-27 ENCOUNTER — ANESTHESIA (OUTPATIENT)
Dept: PERIOP | Facility: HOSPITAL | Age: 58
End: 2023-12-27
Payer: MEDICARE

## 2023-12-27 VITALS
WEIGHT: 246.03 LBS | RESPIRATION RATE: 18 BRPM | TEMPERATURE: 97 F | OXYGEN SATURATION: 96 % | DIASTOLIC BLOOD PRESSURE: 83 MMHG | HEIGHT: 66 IN | BODY MASS INDEX: 39.54 KG/M2 | HEART RATE: 80 BPM | SYSTOLIC BLOOD PRESSURE: 169 MMHG

## 2023-12-27 DIAGNOSIS — Z98.890 STATUS POST ARTHROSCOPIC PARTIAL MEDIAL MENISCECTOMY OF RIGHT KNEE: Primary | ICD-10-CM

## 2023-12-27 DIAGNOSIS — Z87.828 STATUS POST ARTHROSCOPIC PARTIAL MEDIAL MENISCECTOMY OF RIGHT KNEE: Primary | ICD-10-CM

## 2023-12-27 DIAGNOSIS — Z87.828 STATUS POST ARTHROSCOPIC PARTIAL LATERAL MENISCECTOMY OF RIGHT KNEE: ICD-10-CM

## 2023-12-27 DIAGNOSIS — Z98.890 STATUS POST ARTHROSCOPIC PARTIAL LATERAL MENISCECTOMY OF RIGHT KNEE: ICD-10-CM

## 2023-12-27 LAB
AMPHETAMINES SERPL QL SCN: NEGATIVE
BARBITURATES UR QL: NEGATIVE
BENZODIAZ UR QL: NEGATIVE
COCAINE UR QL: NEGATIVE
GLUCOSE SERPL-MCNC: 97 MG/DL (ref 65–140)
OPIATES UR QL SCN: NEGATIVE
OXYCODONE+OXYMORPHONE UR QL SCN: NEGATIVE
PCP UR QL: NEGATIVE
THC UR QL: NEGATIVE

## 2023-12-27 PROCEDURE — 29880 ARTHRS KNE SRG MNISECTMY M&L: CPT | Performed by: ORTHOPAEDIC SURGERY

## 2023-12-27 PROCEDURE — 80307 DRUG TEST PRSMV CHEM ANLYZR: CPT | Performed by: ANESTHESIOLOGY

## 2023-12-27 PROCEDURE — 82948 REAGENT STRIP/BLOOD GLUCOSE: CPT

## 2023-12-27 PROCEDURE — 29880 ARTHRS KNE SRG MNISECTMY M&L: CPT

## 2023-12-27 RX ORDER — ONDANSETRON 2 MG/ML
INJECTION INTRAMUSCULAR; INTRAVENOUS AS NEEDED
Status: DISCONTINUED | OUTPATIENT
Start: 2023-12-27 | End: 2023-12-27

## 2023-12-27 RX ORDER — OXYCODONE HYDROCHLORIDE 5 MG/1
5 TABLET ORAL EVERY 4 HOURS PRN
Qty: 15 TABLET | Refills: 0 | Status: SHIPPED | OUTPATIENT
Start: 2023-12-27

## 2023-12-27 RX ORDER — BUPIVACAINE HYDROCHLORIDE 5 MG/ML
INJECTION, SOLUTION EPIDURAL; INTRACAUDAL AS NEEDED
Status: DISCONTINUED | OUTPATIENT
Start: 2023-12-27 | End: 2023-12-27 | Stop reason: HOSPADM

## 2023-12-27 RX ORDER — CEFAZOLIN SODIUM 2 G/50ML
SOLUTION INTRAVENOUS AS NEEDED
Status: DISCONTINUED | OUTPATIENT
Start: 2023-12-27 | End: 2023-12-27

## 2023-12-27 RX ORDER — SUCCINYLCHOLINE/SOD CL,ISO/PF 100 MG/5ML
SYRINGE (ML) INTRAVENOUS AS NEEDED
Status: DISCONTINUED | OUTPATIENT
Start: 2023-12-27 | End: 2023-12-27

## 2023-12-27 RX ORDER — MIDAZOLAM HYDROCHLORIDE 2 MG/2ML
INJECTION, SOLUTION INTRAMUSCULAR; INTRAVENOUS AS NEEDED
Status: DISCONTINUED | OUTPATIENT
Start: 2023-12-27 | End: 2023-12-27

## 2023-12-27 RX ORDER — FENTANYL CITRATE 50 UG/ML
INJECTION, SOLUTION INTRAMUSCULAR; INTRAVENOUS AS NEEDED
Status: DISCONTINUED | OUTPATIENT
Start: 2023-12-27 | End: 2023-12-27

## 2023-12-27 RX ORDER — PROPOFOL 10 MG/ML
INJECTION, EMULSION INTRAVENOUS AS NEEDED
Status: DISCONTINUED | OUTPATIENT
Start: 2023-12-27 | End: 2023-12-27

## 2023-12-27 RX ORDER — LIDOCAINE HYDROCHLORIDE 10 MG/ML
INJECTION, SOLUTION EPIDURAL; INFILTRATION; INTRACAUDAL; PERINEURAL AS NEEDED
Status: DISCONTINUED | OUTPATIENT
Start: 2023-12-27 | End: 2023-12-27

## 2023-12-27 RX ORDER — HYDROMORPHONE HCL/PF 1 MG/ML
0.5 SYRINGE (ML) INJECTION
Status: DISCONTINUED | OUTPATIENT
Start: 2023-12-27 | End: 2023-12-27 | Stop reason: HOSPADM

## 2023-12-27 RX ORDER — ONDANSETRON 2 MG/ML
4 INJECTION INTRAMUSCULAR; INTRAVENOUS ONCE AS NEEDED
Status: DISCONTINUED | OUTPATIENT
Start: 2023-12-27 | End: 2023-12-27 | Stop reason: HOSPADM

## 2023-12-27 RX ORDER — EPHEDRINE SULFATE 50 MG/ML
INJECTION INTRAVENOUS AS NEEDED
Status: DISCONTINUED | OUTPATIENT
Start: 2023-12-27 | End: 2023-12-27

## 2023-12-27 RX ORDER — NAPROXEN 500 MG/1
500 TABLET ORAL 2 TIMES DAILY WITH MEALS
Qty: 60 TABLET | Refills: 0 | Status: SHIPPED | OUTPATIENT
Start: 2023-12-27

## 2023-12-27 RX ORDER — OXYCODONE HYDROCHLORIDE 5 MG/1
5 TABLET ORAL EVERY 4 HOURS PRN
Status: DISCONTINUED | OUTPATIENT
Start: 2023-12-27 | End: 2023-12-27 | Stop reason: HOSPADM

## 2023-12-27 RX ORDER — AMOXICILLIN 500 MG/1
500 CAPSULE ORAL EVERY 12 HOURS SCHEDULED
COMMUNITY

## 2023-12-27 RX ORDER — SODIUM CHLORIDE 9 MG/ML
INJECTION, SOLUTION INTRAVENOUS CONTINUOUS PRN
Status: DISCONTINUED | OUTPATIENT
Start: 2023-12-27 | End: 2023-12-27

## 2023-12-27 RX ORDER — MAGNESIUM HYDROXIDE 1200 MG/15ML
LIQUID ORAL AS NEEDED
Status: DISCONTINUED | OUTPATIENT
Start: 2023-12-27 | End: 2023-12-27 | Stop reason: HOSPADM

## 2023-12-27 RX ORDER — ALBUTEROL SULFATE 90 UG/1
AEROSOL, METERED RESPIRATORY (INHALATION) AS NEEDED
Status: DISCONTINUED | OUTPATIENT
Start: 2023-12-27 | End: 2023-12-27

## 2023-12-27 RX ORDER — GLYCOPYRROLATE 0.2 MG/ML
INJECTION INTRAMUSCULAR; INTRAVENOUS AS NEEDED
Status: DISCONTINUED | OUTPATIENT
Start: 2023-12-27 | End: 2023-12-27

## 2023-12-27 RX ORDER — CEFAZOLIN SODIUM 2 G/50ML
2000 SOLUTION INTRAVENOUS ONCE
Status: DISCONTINUED | OUTPATIENT
Start: 2023-12-27 | End: 2023-12-27 | Stop reason: HOSPADM

## 2023-12-27 RX ORDER — FENTANYL CITRATE/PF 50 MCG/ML
25 SYRINGE (ML) INJECTION
Status: DISCONTINUED | OUTPATIENT
Start: 2023-12-27 | End: 2023-12-27 | Stop reason: HOSPADM

## 2023-12-27 RX ORDER — DEXAMETHASONE SODIUM PHOSPHATE 10 MG/ML
INJECTION, SOLUTION INTRAMUSCULAR; INTRAVENOUS AS NEEDED
Status: DISCONTINUED | OUTPATIENT
Start: 2023-12-27 | End: 2023-12-27

## 2023-12-27 RX ORDER — KETAMINE HCL IN NACL, ISO-OSM 100MG/10ML
SYRINGE (ML) INJECTION AS NEEDED
Status: DISCONTINUED | OUTPATIENT
Start: 2023-12-27 | End: 2023-12-27

## 2023-12-27 RX ORDER — SODIUM CHLORIDE, SODIUM LACTATE, POTASSIUM CHLORIDE, CALCIUM CHLORIDE 600; 310; 30; 20 MG/100ML; MG/100ML; MG/100ML; MG/100ML
125 INJECTION, SOLUTION INTRAVENOUS CONTINUOUS
Status: DISCONTINUED | OUTPATIENT
Start: 2023-12-27 | End: 2023-12-27 | Stop reason: HOSPADM

## 2023-12-27 RX ORDER — ACETAMINOPHEN 10 MG/ML
1000 INJECTION, SOLUTION INTRAVENOUS ONCE
Qty: 100 ML | Refills: 0 | Status: COMPLETED | OUTPATIENT
Start: 2023-12-27 | End: 2023-12-27

## 2023-12-27 RX ORDER — IPRATROPIUM BROMIDE AND ALBUTEROL SULFATE 2.5; .5 MG/3ML; MG/3ML
3 SOLUTION RESPIRATORY (INHALATION) ONCE AS NEEDED
Status: COMPLETED | OUTPATIENT
Start: 2023-12-27 | End: 2023-12-27

## 2023-12-27 RX ORDER — MEPERIDINE HYDROCHLORIDE 25 MG/ML
12.5 INJECTION INTRAMUSCULAR; INTRAVENOUS; SUBCUTANEOUS
Status: DISCONTINUED | OUTPATIENT
Start: 2023-12-27 | End: 2023-12-27 | Stop reason: HOSPADM

## 2023-12-27 RX ADMIN — FENTANYL CITRATE 50 MCG: 50 INJECTION INTRAMUSCULAR; INTRAVENOUS at 08:13

## 2023-12-27 RX ADMIN — HYDROMORPHONE HYDROCHLORIDE 0.5 MG: 1 INJECTION, SOLUTION INTRAMUSCULAR; INTRAVENOUS; SUBCUTANEOUS at 09:51

## 2023-12-27 RX ADMIN — LIDOCAINE HYDROCHLORIDE 100 MG: 10 INJECTION, SOLUTION EPIDURAL; INFILTRATION; INTRACAUDAL; PERINEURAL at 07:36

## 2023-12-27 RX ADMIN — EPHEDRINE SULFATE 10 MG: 50 INJECTION, SOLUTION INTRAVENOUS at 07:58

## 2023-12-27 RX ADMIN — FENTANYL CITRATE 50 MCG: 50 INJECTION INTRAMUSCULAR; INTRAVENOUS at 07:38

## 2023-12-27 RX ADMIN — OXYCODONE HYDROCHLORIDE 5 MG: 5 TABLET ORAL at 10:42

## 2023-12-27 RX ADMIN — ALBUTEROL SULFATE 6 PUFF: 90 AEROSOL, METERED RESPIRATORY (INHALATION) at 08:16

## 2023-12-27 RX ADMIN — SODIUM CHLORIDE, SODIUM LACTATE, POTASSIUM CHLORIDE, AND CALCIUM CHLORIDE 125 ML/HR: .6; .31; .03; .02 INJECTION, SOLUTION INTRAVENOUS at 06:40

## 2023-12-27 RX ADMIN — ONDANSETRON 4 MG: 2 INJECTION INTRAMUSCULAR; INTRAVENOUS at 07:46

## 2023-12-27 RX ADMIN — ALBUTEROL SULFATE 6 PUFF: 90 AEROSOL, METERED RESPIRATORY (INHALATION) at 07:43

## 2023-12-27 RX ADMIN — PROPOFOL 100 MG: 10 INJECTION, EMULSION INTRAVENOUS at 07:41

## 2023-12-27 RX ADMIN — IPRATROPIUM BROMIDE AND ALBUTEROL SULFATE 3 ML: 2.5; .5 SOLUTION RESPIRATORY (INHALATION) at 09:40

## 2023-12-27 RX ADMIN — PROPOFOL 200 MG: 10 INJECTION, EMULSION INTRAVENOUS at 07:36

## 2023-12-27 RX ADMIN — ACETAMINOPHEN 1000 MG: 10 INJECTION INTRAVENOUS at 07:28

## 2023-12-27 RX ADMIN — Medication 100 MG: at 07:41

## 2023-12-27 RX ADMIN — GLYCOPYRROLATE 0.2 MG: 0.2 INJECTION INTRAMUSCULAR; INTRAVENOUS at 07:28

## 2023-12-27 RX ADMIN — SODIUM CHLORIDE, SODIUM LACTATE, POTASSIUM CHLORIDE, AND CALCIUM CHLORIDE 125 ML/HR: .6; .31; .03; .02 INJECTION, SOLUTION INTRAVENOUS at 09:46

## 2023-12-27 RX ADMIN — FENTANYL CITRATE 50 MCG: 50 INJECTION INTRAMUSCULAR; INTRAVENOUS at 08:09

## 2023-12-27 RX ADMIN — MIDAZOLAM 2 MG: 1 INJECTION INTRAMUSCULAR; INTRAVENOUS at 07:28

## 2023-12-27 RX ADMIN — FENTANYL CITRATE 50 MCG: 50 INJECTION INTRAMUSCULAR; INTRAVENOUS at 07:36

## 2023-12-27 RX ADMIN — DEXAMETHASONE SODIUM PHOSPHATE 10 MG: 10 INJECTION INTRAMUSCULAR; INTRAVENOUS at 07:46

## 2023-12-27 RX ADMIN — HYDROMORPHONE HYDROCHLORIDE 0.5 MG: 1 INJECTION, SOLUTION INTRAMUSCULAR; INTRAVENOUS; SUBCUTANEOUS at 09:30

## 2023-12-27 RX ADMIN — Medication 50 MG: at 07:36

## 2023-12-27 RX ADMIN — CEFAZOLIN SODIUM 2000 MG: 2 SOLUTION INTRAVENOUS at 07:46

## 2023-12-27 RX ADMIN — SODIUM CHLORIDE: 0.9 INJECTION, SOLUTION INTRAVENOUS at 08:02

## 2023-12-27 RX ADMIN — HYDROMORPHONE HYDROCHLORIDE 0.5 MG: 1 INJECTION, SOLUTION INTRAMUSCULAR; INTRAVENOUS; SUBCUTANEOUS at 09:18

## 2023-12-27 NOTE — ANESTHESIA POSTPROCEDURE EVALUATION
Post-Op Assessment Note    CV Status:  Stable    Pain management: adequate       Mental Status:  Awake   Hydration Status:  Stable   PONV Controlled:  Controlled   Airway Patency:  Patent     Post Op Vitals Reviewed: Yes      Staff: Anesthesiologist               /91 (12/27/23 0957)    Temp 98 °F (36.7 °C) (12/27/23 0957)    Pulse 78 (12/27/23 0957)   Resp 22 (12/27/23 0957)    SpO2 93 % (12/27/23 1000)

## 2023-12-27 NOTE — DISCHARGE INSTR - AVS FIRST PAGE
POSTOPERATIVE INSTRUCTIONS following KNEE MENISECTOMY    MEDICATIONS:  Resume all home medications unless otherwise instructed by your surgeon.  Pain Medication:  Oxycodone 5 mg, 1 tablets every 4 hours as needed  If you were given a regional anesthetic (nerve block), please begin taking the pain medication as soon as you get home, even if you have minimal or no pain.  DO NOT WAIT FOR THE NERVE BLOCK TO WEAR OFF.  Possible side effects include nausea, constipation, and urinary retention.  If you experience these side effects, please call our office for assistance.  Pain med refills are authorized only during office hours (8am-4pm, Mon-Fri).  Anti-Inflammatory:  Naproxen 500 mg, 1 tablet every 12 hours for 4 weeks and Tylenol 325 mg, 1-2 tablets every 6 hours for 4 weeks  Take with food.  Stop if you experience nausea, reflux, or stomach pain.  Blood Clot Prevention:  Not Necessary  Pump your foot up and down 20 times per hour while you are less mobile.  If you were previously on an anticoagulation medication (blood thinner) you may begin this the following morning    WOUND CARE:  Keep the dressing clean and dry.  Light drainage may occur the first 2 days postop.  You may remove the dressings and get the incision wet in the shower 72 hours after surgery.  Do not remove steri-strips or sutures.  Do not immerse the incision under water.  Carefully pat the incision dry.  If there is wound drainage, re-apply a fresh dry gauze dressing.  Please call our office ([phone]) if you experience either of the following:  Sudden increase in swelling, redness, or warmth at the surgical site  Excessive incisional drainage that persists beyond the 3rd day after surgery  Oral temperature greater than 101 degrees, not relieved with Tylenol  Shortness of breath, chest pain, nausea, or any other concerning symptoms    SWELLING CONTROL:  Cold Therapy:  The cold therapy device may be used either continuously or only as needed,  "according to your preference.  Do not let the pad directly touch your skin.  Alternatively, apply ice (20 min on, 20 min off) as often as you feel is necessary.  Elevation:  Elevate the entire leg above heart level.  Place pillows under your ankle to keep your knee straight.  Compression:  Apply ACE wraps or a thigh-length compression stocking as needed.    RANGE OF MOTION:  You are allowed full range of motion as tolerated.    IMMOBILIZATION:  None.  You are allowed full range of motion as tolerated.    ACTIVITY:   Bear full weight as tolerated on the operative leg. Use crutches to assist only as needed.  Using Crutches on Stairs:  Going up, lead with your \"good\" (nonoperative) leg.  Going down, lead with your \"bad\" (operative) leg.  Use a hand rail when available.  Knee Extension:  Place a rolled towel or pillow under your ankle for 20-30 minutes 3-5 times per day.  This will help to maintain full knee extension.  Quad Sets:  Sit or lie with your knee straight.  Tighten your quadriceps (front thigh) muscle.  Hold for 3 seconds, then relax.  Repeat 20 times per hour while awake.    PHYSICAL THERAPY:  Begin therapy 5 to 7 days after surgery. We have placed a referral for physical therapy after the procedure to begin knee range of motion and strengthening. Please call to get set-up with physical therapy.    FOLLOW-UP APPOINTMENT:  10-14 days after surgery with:    Dr. Thomas Arenas MD  [facility]  [address] [place], [address] ([facility])  [address] [place], [address] [facility] access hospital)  [phone]   "

## 2023-12-27 NOTE — OP NOTE
OPERATIVE REPORT  PATIENT NAME: Haile Downing    :  1965  MRN: [id number]  Pt Location: [facility] OR ROOM 04    SURGERY DATE: 2023    Surgeons and Role:     * Thomas Arenas MD - Primary     * Carl Schmidt PA-C - Assisting    Preop Diagnosis:  Other tear of medial meniscus, current injury, right knee, initial encounter [S83.241A]    Post-Op Diagnosis Codes:     * Other tear of medial meniscus, current injury, right knee, initial encounter [S83.241A]    Procedure(s):  Right - ARTHROSCOPY KNEE for medial and lateral meniscus debridement    Specimen(s):  * No specimens in log *    Estimated Blood Loss:   Minimal    Drains:  * No LDAs found *    Anesthesia Type:   General    Operative Indications:  Other tear of medial meniscus, current injury, right knee, initial encounter [S83.241A]      Operative Findings:  As expected    Complications:   None    Procedure and Technique:    SURGEON(S) AND ROLE  Thomas Arenas      PROCEDURE(S)   right KNEE:  1. Right knee arthroscopy, medial meniscus debridement  2. Lateral meniscus debridement     WOUND TYPE: 1     DRAINS  None.     COMPLICATIONS  None.     FINDINGS:  See below      ANESTHESIA TYPE  Regional + LMA     IMPLANTS:  Nothing implanted    ESTIMATED BLOOD LOSS: < 25 ml    SPECIMENS: none     INDICATION:  Haile Downing is a 58 y.o. male who was diagnosed with a meniscus tear based on history, examination, and advanced imaging. Accordingly, after a lengthy discussion of the risks and benefits and alternatives to both operative and non-operative treatment, he elected to proceed with operative intervention, and I am in agreement. Please see my clinical documentation for additional details on the history, exam, and discussion regarding surgical decision making.     PRE-PROCEDURE PROTOCOL:  The patient was identified in the preoperative holding area where informed consent and surgical site marking were confirmed. The patient was then transferred to the  operating room where anesthesia was administered by our Anesthesia colleagues in accordance with our preoperative plan. The operative extremity was prepped and draped in the usual sterile fashion. A procedural pause was performed to verify correct patient identification, procedure to be performed, laterality, agreement of all team members, availability of all equipment, and administration of preoperative antibiotics. Afterwards, the procedure commenced.     DESCRIPTION OF PROCEDURE  Examination of the knee under anesthesia was performed. This revealed 1A lachman with 0-135 degrees of motion.     Diagnostic arthroscopy was performed by inserting the camera through an inferolateral portal. An inferomedial portal was created under needle localization. Findings were as follows:     Medial Compartment: Complex tear of the posterior horn of the medial meniscus. This was debrided with a combination of a meniscal biter and an arthroscopic shaver to a stable margin.  Approximately 15% of the posterior horn was excised.  The remaining meniscus was intact and within normal limits. The cartilage in the tibial plateau was grade 2 chondromalacia, and grade 2 chondromalacia diffusely on the tibial plateau.       Lateral compartment: Cartilage intact and within normal limits.  The lateral meniscus was frayed on the posterior horn, this was excises with an arthroscopic shaver.  Approximately less than 5% of the meniscus was excised.        Trochlea: Cartilage intact and within normal limits.      Patella: Cartilage intact and within normal limits     There was no evidence of loose bodies or other pathology in the suprapatellar pouch, medial gutter, or lateral gutter.     The arthroscopic equipment was removed and fluid drained from the knee. The wound was closed in a standard fashion. A sterile dressing was applied. The patient was transferred to the PACU where she recovered without complication.     POSTOPERATIVE PLAN  DVT  PROPHYLAXIS: Early mobilization and WBAT.      POSTOPERATIVE REHABILITATION: WBAT, advancement with PT as tolerated.       FOLLOW-UP: We will plan on seeing him back in approximately 1-2 weeks for wound check, sutures out and advancement of physical therapy as indicated.     A physician assistant was required during the procedure for retraction, tissue handling, dissection and suturing. There was no qualified resident available to assist       Patient Disposition:  PACU     I was present for the entire procedure.    Patient Disposition:  PACU         SIGNATURE: Thomas Arenas MD  DATE: December 27, 2023  TIME: 8:48 AM

## 2023-12-28 ENCOUNTER — TELEPHONE (OUTPATIENT)
Dept: INTERNAL MEDICINE CLINIC | Facility: CLINIC | Age: 58
End: 2023-12-28

## 2023-12-28 NOTE — TELEPHONE ENCOUNTER
Patient called in because he said that he was informed yesterday, before his surgery, that he had kidney disease. Patient was not aware of this diagnosis. Because of this, patient said that he was not able to sleep last night. This even caused PTSD. He said he  is disturbed because he had blood work done several times and no one over mentioned kidney disease. Patient feel like his care team at [facility] showed negligence and he is saddened by this.     Because the patient was frustrated and wanted answers regarding dx, I transferred call to clinical.

## 2023-12-28 NOTE — TELEPHONE ENCOUNTER
Spoke with patient personally to go over CKD diagnosis. Reviewed pathology and various stages of CKD. Patient quite upset he was never told CKD dx back in 2018. Patient has been managed on metformin for pre-DM, antihypertensives and most recent creatinine 1.09, improved from 1.32, indicating healing and possible reversal. Stressed to patient the need to continue being compliant with medication and healthy diet and lifestyle modifications. Encouraged patient to stay hydrated and be careful to rotate between tylenol and Advil/naprosyn after knee surgery for pain control and keep kidneys protected. Patient went on for approximately 20 + minutes of life story, which this provider patiently listened to and encouraged continued proactive healthcare and sobriety.     Answered all questions to satisfaction and patient would like to pursue negligence case with his legal team. Explained to patient that case is not negligent since has been treated for CKD via medications controlling DM and HTN. Patient states he would still like to pursue and told patient he is able to do legally whatever he sees fit.

## 2023-12-28 NOTE — TELEPHONE ENCOUNTER
I spoke to patient regarding this matter. I did advise patient that there could have been different scenarios regarding CKD. Patient mainly concerned because he had knee surgery and is in a lot of pain and is afraid to take his pain meds and diabetes meds because he does not want to make the kidneys worse. Patient would like to know what level ckd he has. Patient stated that the nurse yesterday prior to his knee surgery was going over his problem list and this was mentioned. The doctor there informed him to not stress out about it but patient stated he has not been able to sleep, eat, or relax since finding this out. Patient states he feels like we are negligence to his care. I did advise patient I would inform one of the doctors so they can review and advise and we would be in contact with him shortly. Per patient, he stated that he does not think this is going to happen because he had a previous issue with not getting a return call from out office, I did assure patient myself or one of the docs will reach out to him. Patient verbalized understanding and call was disconnected.

## 2023-12-29 ENCOUNTER — TELEPHONE (OUTPATIENT)
Dept: INTERNAL MEDICINE CLINIC | Facility: CLINIC | Age: 58
End: 2023-12-29

## 2023-12-29 DIAGNOSIS — I10 ESSENTIAL HYPERTENSION: ICD-10-CM

## 2023-12-29 RX ORDER — AMLODIPINE BESYLATE 5 MG/1
5 TABLET ORAL DAILY
Qty: 90 TABLET | Refills: 3 | Status: SHIPPED | OUTPATIENT
Start: 2023-12-29

## 2023-12-29 NOTE — TELEPHONE ENCOUNTER
Patient called to request a referral to Nephrology and requesting to have either Dr. Ramos or Provider to call and him so he can get Clarification on this Kidney disease.    Patient states that if no one calls him back before noon time he will be giving us a call back.    I did let him know that Providers are seeing patients and soon as there able to someone will reach out to you.

## 2024-01-02 NOTE — TELEPHONE ENCOUNTER
Late entry from 12/29/23.    Spoke to patient, he is still concerned that he has kidney disease and it wasn't discussed and he said now he doesn't know what to believe. I told him that the doctor he spoke with (Dr. Barajas) gave him the appropriate information and would not give him info that is incorrect. He said he understands that but is still worried about what he should be eating and drinking now because he wants to be healthy and not make the kidney disease worse. I advised him that he needs to come in for an appointment so he can appropriately discuss this with a provider. He is also concerned that every time he comes in he sees a different doctor. I explained the reason for that and made him aware that Dr. Ramos will be graduating also in June. I advised since he spoke with Dr. Barajas and was happy with her information then it may be best for him to follow with her since she will be here permanently and sees her own patients two days a week. Patient verbalized that he would prefer that so I scheduled him an appointment with her for 1/11/24. In the meantime I advised patient as far as food I can not advise him what is good or not to eat and Dr. Barajas will go over all of that with him as well. He said that is fine and he will eat salads, fruits and vegetables in the meantime.

## 2024-01-03 ENCOUNTER — TELEPHONE (OUTPATIENT)
Dept: OBGYN CLINIC | Facility: HOSPITAL | Age: 59
End: 2024-01-03

## 2024-01-03 NOTE — TELEPHONE ENCOUNTER
Caller: Patient    Doctor: Deepa    Reason for call: Patient was ordered to have a FL Steroid Injection of his shoulder back in July, but he said no one called him to schedule this. Can this be re-ordered for him, if someone can assist him in scheduling this appointment.  Wants to try this instead of replacement.    Call back#: [phone]

## 2024-01-04 DIAGNOSIS — M19.012 PRIMARY OSTEOARTHRITIS OF LEFT SHOULDER: Primary | ICD-10-CM

## 2024-01-04 NOTE — TELEPHONE ENCOUNTER
Called patient and informed FL injection was scheduled for 1/16/24 at 3:30 in [place]. Patient was appreciative.

## 2024-01-05 NOTE — NURSING NOTE
Call placed to patient to discuss upcoming appointment at [facility] radiology department and complete consultation with patient. Patient is having a left shoulder CSI utilizing fluoroscopy guidance. Reviewed  patient's allergies, current anticoagulant medication of ASA 81 mg present per patient but not required to stop per periprocedural management of coagulation status and hemostasis risk in percutaneous image guided procedure guidelines, patient has had similar procedure in the past to his hips, no questions when asked if any questions or concerns.  Reminded patient of location and time expected for procedure, Patient expressed understanding by verbalizing and repeating instructions.

## 2024-01-05 NOTE — TELEPHONE ENCOUNTER
Caller: Patient    Doctor: Deepa    Reason for call: Patient is calling to request if an order for an injection on his right shoulder can be ordered as well. He would like them both to get done on 1/16. Please advise    Call back#: [phone]

## 2024-01-06 DIAGNOSIS — F32.A DEPRESSION, UNSPECIFIED DEPRESSION TYPE: ICD-10-CM

## 2024-01-08 ENCOUNTER — TELEPHONE (OUTPATIENT)
Dept: OBGYN CLINIC | Facility: HOSPITAL | Age: 59
End: 2024-01-08

## 2024-01-08 RX ORDER — DIVALPROEX SODIUM 500 MG/1
1500 TABLET, DELAYED RELEASE ORAL
Qty: 270 TABLET | Refills: 0 | Status: SHIPPED | OUTPATIENT
Start: 2024-01-08

## 2024-01-08 NOTE — TELEPHONE ENCOUNTER
Caller: Patient    Doctor: Deepa    Reason for call: Patient requesting a LYFT for tomorrow's appt 1/9/24 @ 1:45pm.  Email sent to Practice Admin    Call back#: [phone]

## 2024-01-09 ENCOUNTER — OFFICE VISIT (OUTPATIENT)
Dept: OBGYN CLINIC | Facility: CLINIC | Age: 59
End: 2024-01-09

## 2024-01-09 VITALS
SYSTOLIC BLOOD PRESSURE: 162 MMHG | WEIGHT: 241 LBS | HEART RATE: 109 BPM | HEIGHT: 66 IN | DIASTOLIC BLOOD PRESSURE: 93 MMHG | BODY MASS INDEX: 38.73 KG/M2

## 2024-01-09 DIAGNOSIS — Z87.828 S/P ARTHROSCOPIC PARTIAL LATERAL MENISCECTOMY OF RIGHT KNEE: ICD-10-CM

## 2024-01-09 DIAGNOSIS — Z98.890 STATUS POST MEDIAL MENISCECTOMY OF RIGHT KNEE: Primary | ICD-10-CM

## 2024-01-09 DIAGNOSIS — Z98.890 S/P ARTHROSCOPIC PARTIAL LATERAL MENISCECTOMY OF RIGHT KNEE: ICD-10-CM

## 2024-01-09 PROCEDURE — 99024 POSTOP FOLLOW-UP VISIT: CPT | Performed by: ORTHOPAEDIC SURGERY

## 2024-01-09 NOTE — PROGRESS NOTES
Subjective   CHIEF COMPLAINT/REASON FOR VISIT  Haile Downing is a 58 y.o. male who presents for 2 week post op follow-up after ARTHROSCOPY KNEE for medial and lateral meniscus debridement - Right on 12/27/2023     HISTORY OF PRESENT ILLNESS  Overall, he feels things are going well and progressing appropriately. Pain has been well controlled. Currently, he is doing well without any specific orthopedic concerns.     Objective   PHYSICAL EXAMINATION  Right Knee  Surgical incisions are healed.   ROM 0-90  Sensation intact  Skin warm and perfused      Assessment/Plan   1. Status Post ARTHROSCOPY KNEE for medial and lateral meniscus debridement - Right    He is doing well after surgery and making appropriate progress. Sutures removed. Encouraged patient to work with PT. PT order placed. Patient is understanding and agreeable to this plan. We will plan to see him back as needed. If any issues, questions, or concerns arise, we have encouraged the patient contact our team.        Scribe Attestation      I,:  Carl Schmidt PA-C am acting as a scribe while in the presence of the attending physician.:       I,:  Thomas Arenas MD personally performed the services described in this documentation    as scribed in my presence.:

## 2024-01-11 ENCOUNTER — OFFICE VISIT (OUTPATIENT)
Dept: INTERNAL MEDICINE CLINIC | Facility: CLINIC | Age: 59
End: 2024-01-11

## 2024-01-11 VITALS
TEMPERATURE: 97.9 F | BODY MASS INDEX: 38.41 KG/M2 | WEIGHT: 238 LBS | DIASTOLIC BLOOD PRESSURE: 82 MMHG | HEART RATE: 80 BPM | SYSTOLIC BLOOD PRESSURE: 152 MMHG

## 2024-01-11 DIAGNOSIS — F25.0 SCHIZOAFFECTIVE DISORDER, BIPOLAR TYPE (HCC): ICD-10-CM

## 2024-01-11 DIAGNOSIS — N18.2 CKD (CHRONIC KIDNEY DISEASE) STAGE 2, GFR 60-89 ML/MIN: Primary | ICD-10-CM

## 2024-01-11 DIAGNOSIS — E66.01 OBESITY, CLASS III, BMI 40-49.9 (MORBID OBESITY) (HCC): ICD-10-CM

## 2024-01-11 PROBLEM — E66.813 OBESITY, CLASS III, BMI 40-49.9 (MORBID OBESITY): Status: ACTIVE | Noted: 2021-02-17

## 2024-01-11 PROCEDURE — 99213 OFFICE O/P EST LOW 20 MIN: CPT | Performed by: STUDENT IN AN ORGANIZED HEALTH CARE EDUCATION/TRAINING PROGRAM
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
## 2024-01-11 NOTE — ASSESSMENT & PLAN NOTE
Lab Results   Component Value Date    EGFR 75 12/18/2023    EGFR 77 08/15/2023    EGFR 75 08/08/2023    CREATININE 1.09 12/18/2023    CREATININE 1.07 08/15/2023    CREATININE 1.09 08/08/2023       CKD stage 2    Likely 2/2 polysubstance abuse hx, HTN and or DM    Avoid contrast dye and nephrotoxic agents when possible such as NSAIDS  At increased risk contrast associated PIERRE as DM and CKD  Referral to nephrology per patient request  Currently on Ace-i/arb, continue  continue BP control  avoid NSAIDs (Ibuprofen, Motrin, Aleve, Advil, Naproxen, Celebrex, etc.) or other nephrotoxic agents  goal hgb 10

## 2024-01-11 NOTE — ASSESSMENT & PLAN NOTE
- pt with BMI of Body mass index is 38.41 kg/m².  - pt counseled on risks associated with obesity and it's contribution to other health issues  - advise portion control, healthy food choices, drinking water instead of juice/soda  - advise beginning light exercise program (i.e. Walking 30min 4-5x/wk) or consider gym membership  - advise keeping food diary to help identify problem foods/times/stressors  -consider nutrition referral  - pt advised that weight loss of ~ 1lb/wk is a good goal and not to become frustrated if loss is slower  -as BMI >35, does qualify for bariatric surgery at this time, though defers at this time   If agreeable will refer to bariatric sx

## 2024-01-11 NOTE — ASSESSMENT & PLAN NOTE
Stable  Does see psych  Clearly Taoist preoccupations  Tangential but redirectable  No manic symptoms noted

## 2024-01-16 ENCOUNTER — HOSPITAL ENCOUNTER (OUTPATIENT)
Dept: RADIOLOGY | Facility: HOSPITAL | Age: 59
Discharge: HOME | End: 2024-01-16
Admitting: RADIOLOGY
Payer: MEDICARE

## 2024-01-16 DIAGNOSIS — M19.012 PRIMARY OSTEOARTHRITIS OF LEFT SHOULDER: ICD-10-CM

## 2024-01-16 PROCEDURE — 77002 NEEDLE LOCALIZATION BY XRAY: CPT

## 2024-01-16 PROCEDURE — 20610 DRAIN/INJ JOINT/BURSA W/O US: CPT

## 2024-01-16 RX ORDER — METHYLPREDNISOLONE ACETATE 80 MG/ML
80 INJECTION, SUSPENSION INTRA-ARTICULAR; INTRALESIONAL; INTRAMUSCULAR; SOFT TISSUE
Status: COMPLETED | OUTPATIENT
Start: 2024-01-16 | End: 2024-01-16

## 2024-01-16 RX ORDER — BUPIVACAINE HYDROCHLORIDE 2.5 MG/ML
30 INJECTION, SOLUTION EPIDURAL; INFILTRATION; INTRACAUDAL
Status: COMPLETED | OUTPATIENT
Start: 2024-01-16 | End: 2024-01-16

## 2024-01-16 RX ORDER — LIDOCAINE HYDROCHLORIDE 10 MG/ML
5 INJECTION, SOLUTION EPIDURAL; INFILTRATION; INTRACAUDAL; PERINEURAL
Status: COMPLETED | OUTPATIENT
Start: 2024-01-16 | End: 2024-01-16

## 2024-01-16 RX ADMIN — IOHEXOL 3 ML: 300 INJECTION, SOLUTION INTRAVENOUS at 16:10

## 2024-01-16 RX ADMIN — LIDOCAINE HYDROCHLORIDE 5 ML: 10 INJECTION, SOLUTION EPIDURAL; INFILTRATION; INTRACAUDAL; PERINEURAL at 16:10

## 2024-01-16 RX ADMIN — METHYLPREDNISOLONE ACETATE 80 MG: 80 INJECTION, SUSPENSION INTRA-ARTICULAR; INTRALESIONAL; INTRAMUSCULAR; SOFT TISSUE at 16:10

## 2024-01-16 RX ADMIN — BUPIVACAINE HYDROCHLORIDE 3 ML: 2.5 INJECTION, SOLUTION EPIDURAL; INFILTRATION; INTRACAUDAL; PERINEURAL at 16:10

## 2024-01-17 ENCOUNTER — TELEPHONE (OUTPATIENT)
Dept: NEPHROLOGY | Facility: CLINIC | Age: 59
End: 2024-01-17

## 2024-01-17 NOTE — TELEPHONE ENCOUNTER
New Patient Intake Form   Patient Details   Haile Downing     1965     [id number]     Insurance Information   Name of Insurance Company Medicare    Does the patient need an insurance referral? no   If patient has VA insurance, please ask if they will be using their VA insurance.   Appointment Information   Who is calling to schedule?  If not patient, what is callers name? Patient   Referring Provider  PCP   Reason for Appt (Diagnosis) N18.2 (ICD-10-CM) - CKD (chronic kidney disease) stage 2, GFR 60-89 ml/min    Does Patient have labs/urine done at [facility]?  If not, where do they go?  List the date of last lab / urine  *Please try to get labs 2 years back if not at  yes   Has patient been hospitalized recently?  If yes, list name and location of hospital they were in no   Has patient been seen by a Nephrologist before?  If yes, list name, location and phone number no   Has the patient had renal imaging done?  If so, list the most recent date and type of imaging no    Does patient have a history of Kidney Stones? no   Appointment Details   Is there a referral on file? yes    Appointment Date 2/12/24    Location  [facility] and will follow in [facility]  Pt will need Lyariste[place]

## 2024-01-18 ENCOUNTER — EVALUATION (OUTPATIENT)
Dept: PHYSICAL THERAPY | Facility: REHABILITATION | Age: 59
End: 2024-01-18
Payer: MEDICARE

## 2024-01-18 DIAGNOSIS — Z98.890 S/P ARTHROSCOPIC PARTIAL LATERAL MENISCECTOMY OF RIGHT KNEE: ICD-10-CM

## 2024-01-18 DIAGNOSIS — Z87.828 S/P ARTHROSCOPIC PARTIAL LATERAL MENISCECTOMY OF RIGHT KNEE: ICD-10-CM

## 2024-01-18 DIAGNOSIS — M25.561 CHRONIC PAIN OF RIGHT KNEE: Primary | ICD-10-CM

## 2024-01-18 DIAGNOSIS — G89.29 CHRONIC PAIN OF RIGHT KNEE: Primary | ICD-10-CM

## 2024-01-18 DIAGNOSIS — Z98.890 STATUS POST MEDIAL MENISCECTOMY OF RIGHT KNEE: ICD-10-CM

## 2024-01-18 PROCEDURE — 97161 PT EVAL LOW COMPLEX 20 MIN: CPT

## 2024-01-18 NOTE — PROGRESS NOTES
PT Evaluation     Today's date: 2024  Patient name: Haile Downing  : 1965  MRN: [id number]  Referring provider: Carl Schmidt PA*  Dx:   Encounter Diagnosis     ICD-10-CM    1. Chronic pain of right knee  M25.561     G89.29       2. Status post medial meniscectomy of right knee  Z98.890 Ambulatory Referral to Physical Therapy      3. S/P arthroscopic partial lateral meniscectomy of right knee  Z98.890 Ambulatory Referral to Physical Therapy    Z87.828           Start Time: 1405  Stop Time: 1445  Total time in clinic (min): 40 minutes    Assessment  Assessment details: Haile Downing is a 58 y.o. male presenting with R knee pain and stiffness s/p Right - ARTHROSCOPY KNEE for medial and lateral meniscus debridement/meniscectomy on 2023.  Primary impairments include R knee pain with functional activities, R quadriceps and global RLE weakness, R knee flexion AROM dysfunction, quadriceps motor control dysfunction.  Educated pt on anatomy and physiology of diagnosis.  Will benefit from skilled PT interventions for community reintegration, ADL management/independence, return to work/sport/hobbies.  Provided pt with written home exercise program to be completed daily.    Impairments: abnormal coordination, abnormal muscle firing, abnormal muscle tone, abnormal or restricted ROM, activity intolerance, impaired balance, impaired physical strength, lacks appropriate home exercise program, pain with function, poor posture  and poor body mechanics  Functional limitations: squatting, prolonged walking, prolonged standing, dancing, stair negotiation  Symptom irritability: moderateUnderstanding of Dx/Px/POC: good   Prognosis: good    Goals    Short Term Goals:  In 4 weeks, the patient will:  1. Improve R knee flexion ROM to at least 125 degrees  2. Improve R knee extension strength to at least 4/5 MMT  3. Supervision with HEP for self-care  4. Improve R hip flexion strength to at least 4/5 MMT  5.  "Tolerate walking for 1.0 mile without aggravating pain symptomology    Long Term Goals:  In 8 weeks, the patient will:  1. Improve R knee extension strength to at least 4+/5 MMT  2. FOTO to greater than predicted value  3. Independent with HEP for self-care  4. Improve R hip flexion strength to at least 4+/5 MMT  5. Dance for at least 15 minutes without aggravating pain symptomology    Plan  Plan details: 1-2x per week  Patient would benefit from: skilled physical therapy  Planned modality interventions: manual electrical stimulation  Planned therapy interventions: abdominal trunk stabilization, activity modification, balance, balance/weight bearing training, behavior modification, body mechanics training, community reintegration, coordination, fine motor coordination training, flexibility, functional ROM exercises, gait training, graded activity, graded exercise, graded motor, home exercise program, work reintegration, therapeutic training, therapeutic exercise, therapeutic activities, stretching, strengthening, self care, postural training, patient education, neuromuscular re-education, motor coordination training, massage, manual therapy, joint mobilization and ADL training  Plan of Care beginning date: 1/18/2024  Plan of Care expiration date: 3/14/2024  Treatment plan discussed with: patient      Subjective    Pain Location: R lateral knee  Pain Intensity: intermittent, occasional pains; 4/10 intensity  ZORAIDA: Right - ARTHROSCOPY KNEE for medial and lateral meniscus debridement/meniscectomy   DOI: 12/27/2023  Aggravating Factors: prolonged standing, exercises  Alleviating Factors: better able to walk without pain aggravation, topical analgesics, ice/heat  Living Situation: lives alone, independent ADLs but occasionally painful  Constitutional S/S: denies paresthesias   Goals: \"go back to dancing\"  PLOF: recently diagnosed with diabetes and kidney disease      Objective            BP = 158/78    Standing Posture & " Lower Extremity Alignment:  Structure/Joint         Pelvis (Tilt)  Anterior  Neutral x Posterior   Iliac Crests  Right Elevated x Neutral  Left Elevated   Knee - Frontal   Genuvalgum x Neutral  Genuvarum   Knee - Sagittal  Genurecurvatum x Neutral     Rearfoot  Valgus x Neutral  Varus   Forefoot  Abducted x Neutral     Arch  Pes Planus x Neutral  Pes Cavus     Range of Motion: Goniometric revealed the following findings (in degrees).  Joint Motion Right: 1/18/2024 Left: 1/18/2024   Knee Extension 0 0   Knee Flexion 117 133     Strength: MMT revealed the following findings.  Joint Motion Right: 1/18/2024 Left: 1/18/2024   Hip Flexion 4-/5 3+/5   Hip Abduction 4-/5 4-/5   Hip Adduction 4/5 4/5   Hip Extension 4-/5 4-/5   Knee Extension 3+/5 5/5   Knee Flexion 3+/5 5/5   Ankle Plantarflexion 4+/5 4+/5   Ankle Dorsiflexion 4+/5 4+/5     Additional Assessments:  Palpation: TTP proximal tibia, mild tenderness at incision sites  Observation: incision sites healing well, minimal redness  Gait Pattern: WFL  Balance: impaired SLS / narrow VIRAJ  Joint Mobility: WFL    Special Tests:  Test (Structure evaluated) Date: 1/18/2024  ( P / N )   Shmuel (Iliapsoas/Rectus Fem) +   90/90 SLR (Hamstring) +       Outcome Measures:  FOTO: 33           Precautions: HTN, T2DM, kidney disease    POC expires Unit limit Auth Expiration date PT/OT + Visit Limit?   3/14/24 6 N/A N/A     Visit/Unit Tracking  AUTH Status:  Date 1/18        RE at 10 visits Used 1         Remaining  9           Pertinent Findings:                                                                                            Test / Measure  1/18/24   FOTO (Predicted 52) 33   R knee flexion  deg   R knee extension MMT 3+/5         Manuals 1/18                                                                Neuro Re-Ed             QS + SLR 2x10            Mini squats             SLS             Bridges                                                    Ther Ex              HEP review 5'            Rec bike 5' L1            Stand hip 3 way             SHC             LAQ             STS             FSU             LSU             SL leg press             Leg press                                                    Ther Activity                                       Gait Training                                       Modalities

## 2024-01-21 ENCOUNTER — HOSPITAL ENCOUNTER (EMERGENCY)
Facility: HOSPITAL | Age: 59
Discharge: HOME | End: 2024-01-21
Attending: EMERGENCY MEDICINE
Payer: MEDICARE

## 2024-01-21 VITALS
OXYGEN SATURATION: 97 % | RESPIRATION RATE: 18 BRPM | TEMPERATURE: 97.9 F | DIASTOLIC BLOOD PRESSURE: 97 MMHG | SYSTOLIC BLOOD PRESSURE: 201 MMHG | HEART RATE: 95 BPM

## 2024-01-21 DIAGNOSIS — K08.89 PAIN, DENTAL: Primary | ICD-10-CM

## 2024-01-21 PROCEDURE — 99282 EMERGENCY DEPT VISIT SF MDM: CPT

## 2024-01-21 PROCEDURE — 96372 THER/PROPH/DIAG INJ SC/IM: CPT

## 2024-01-21 PROCEDURE — 99284 EMERGENCY DEPT VISIT MOD MDM: CPT | Performed by: EMERGENCY MEDICINE

## 2024-01-21 RX ORDER — ACETAMINOPHEN 325 MG/1
650 TABLET ORAL ONCE
Status: COMPLETED | OUTPATIENT
Start: 2024-01-21 | End: 2024-01-21

## 2024-01-21 RX ORDER — AMOXICILLIN 250 MG/1
500 CAPSULE ORAL ONCE
Status: COMPLETED | OUTPATIENT
Start: 2024-01-21 | End: 2024-01-21

## 2024-01-21 RX ORDER — AMOXICILLIN 400 MG/5ML
400 POWDER, FOR SUSPENSION ORAL 3 TIMES DAILY
Qty: 100 ML | Refills: 0 | Status: SHIPPED | OUTPATIENT
Start: 2024-01-21 | End: 2024-01-24

## 2024-01-21 RX ORDER — KETOROLAC TROMETHAMINE 30 MG/ML
15 INJECTION, SOLUTION INTRAMUSCULAR; INTRAVENOUS ONCE
Status: COMPLETED | OUTPATIENT
Start: 2024-01-21 | End: 2024-01-21

## 2024-01-21 RX ADMIN — ACETAMINOPHEN 650 MG: 325 TABLET, FILM COATED ORAL at 08:55

## 2024-01-21 RX ADMIN — KETOROLAC TROMETHAMINE 15 MG: 30 INJECTION, SOLUTION INTRAMUSCULAR at 08:55

## 2024-01-21 RX ADMIN — AMOXICILLIN 500 MG: 250 CAPSULE ORAL at 08:55

## 2024-01-21 NOTE — ED ATTENDING ATTESTATION
1/21/2024  I, Ze Santoro MD, saw and evaluated the patient. I have discussed the patient with the resident/non-physician practitioner and agree with the resident's/non-physician practitioner's findings, Plan of Care, and MDM as documented in the resident's/non-physician practitioner's note, except where noted. All available labs and Radiology studies were reviewed.  I was present for key portions of any procedure(s) performed by the resident/non-physician practitioner and I was immediately available to provide assistance.       At this point I agree with the current assessment done in the Emergency Department.  I have conducted an independent evaluation of this patient a history and physical is as follows:    ED Course     Patient with history of dental caries presenting with dental pain started approximate 5 days prior to arrival.  Patient has not follow-up with dentist recently.  Has pain spreading to the front tooth.    No facial swelling noted.  Tolerating secretions.  Patient with multiple dental caries and cracked dentition.  No palpable abscess uvula midline no tongue elevation full range of motion of neck.    Impression: Dental pain  Differential diagnosis: Dental caries, periodontal disease, periapical abscess, doubt deep space infection head or neck.    Plan to treat patient with amoxicillin multimodal analgesia follow-up with dental as outpatient.        Critical Care Time  Procedures
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
## 2024-01-24 ENCOUNTER — OFFICE VISIT (OUTPATIENT)
Dept: DENTISTRY | Facility: CLINIC | Age: 59
End: 2024-01-24

## 2024-01-24 DIAGNOSIS — K05.219 PERIODONTAL ABSCESS: Primary | ICD-10-CM

## 2024-01-24 PROCEDURE — D0230 INTRAORAL - PERIAPICAL EACH ADDITIONAL RADIOGRAPHIC IMAGE: HCPCS

## 2024-01-24 PROCEDURE — D0140 LIMITED ORAL EVALUATION - PROBLEM FOCUSED: HCPCS

## 2024-01-24 RX ORDER — AMOXICILLIN 500 MG/1
500 CAPSULE ORAL EVERY 8 HOURS SCHEDULED
Qty: 21 CAPSULE | Refills: 0 | Status: SHIPPED | OUTPATIENT
Start: 2024-01-24 | End: 2024-01-31

## 2024-01-24 NOTE — DENTAL PROCEDURE DETAILS
Haile Downing presents to [facility] for limited emergency dental visit.    CC - I'm having pain from my gums    PMH reviewed, no changes, ASA II.    Patient was extremely talkative. Appeared disoriented and was not walking straight as he entered the clinic and came to the chair. Patient was speaking with a raised voice and used profane language, but calmed down after being asked to. Patient insisted I not do percussion testing on his teeth for the exam. Patient asked for pain medication at the end of the appointment.    Subjective history  Onset has been for a very long time  Says the pain is now in the front left (points to #23), but over time the pain has migrated from tooth to tooth.    Findings  Periodontal abscess on buccal in relation to #23. No draining fistula but red 4-5 mm swelling is present. Lesion is tender to palpation.   PA radiograph #23 periodontal radiolucency present around #23. No evidence of caries on #23.  Mandibular anterior teeth have severe attrition so much that most of the pulps are visibly exposed. 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  Generalized recession and periodontal disease is present.  #16 has caries and nonrestorable, previously referred for EXT but patient never went to OS appointment    Informed patient of findings.  Informed patient that his pain is from an abscess and treatment options include committing to periodontal therapy and returning for a comp exam and then 3 month perio management plan. Otherwise, tooth #23 is indicated for extraction.  Informed patient about the etiology of periodontitis and that his condition is generalized. Informed patient that he has pulp exposures from attrition on many of his lower teeth and this in addition to his generalized periodontitis is probably why his experience of dental pain seems to migrate from tooth to tooth.  Patient inquired about dentures and asked if getting all his teeth  pulled would get rid of his gum disease. Confirmed that this is the case and informed patient briefly about what complete dentures are, the advantages and disadvantages versus trying to control his perio and keep the teeth he has, and about conventional dentures vs implant overdentures. Patient said he is waiting for a settlement check before he gets any treatment done.  Patient asked to be numbed so he can be comfortable for a few hours.    Administered 1 carpule of 3% Bupivicaine 1:100K epi via local infiltration in relation to #23.  Rx: Amoxicillin    Patient left satisfied.    NV: EXT #23 or comp exam depending on patient preference.

## 2024-01-25 ENCOUNTER — APPOINTMENT (OUTPATIENT)
Dept: PHYSICAL THERAPY | Facility: REHABILITATION | Age: 59
End: 2024-01-25
Payer: MEDICARE

## 2024-02-01 DIAGNOSIS — G25.81 RESTLESS LEG SYNDROME: ICD-10-CM

## 2024-02-02 RX ORDER — ROPINIROLE 4 MG/1
4 TABLET, FILM COATED ORAL
Qty: 90 TABLET | Refills: 1 | Status: SHIPPED | OUTPATIENT
Start: 2024-02-02

## 2024-02-05 ENCOUNTER — TELEPHONE (OUTPATIENT)
Dept: GASTROENTEROLOGY | Facility: CLINIC | Age: 59
End: 2024-02-05

## 2024-02-05 NOTE — TELEPHONE ENCOUNTER
Pt calling to speak to Weight management about where their office is located. Transferred pt to weight management.

## 2024-02-06 ENCOUNTER — TELEPHONE (OUTPATIENT)
Age: 59
End: 2024-02-06

## 2024-02-06 DIAGNOSIS — M16.11 PRIMARY OSTEOARTHRITIS OF RIGHT HIP: ICD-10-CM

## 2024-02-06 DIAGNOSIS — G89.29 CHRONIC RIGHT HIP PAIN: ICD-10-CM

## 2024-02-06 DIAGNOSIS — M25.551 CHRONIC RIGHT HIP PAIN: ICD-10-CM

## 2024-02-06 DIAGNOSIS — M16.12 PRIMARY OSTEOARTHRITIS OF LEFT HIP: ICD-10-CM

## 2024-02-06 DIAGNOSIS — G89.29 CHRONIC LEFT HIP PAIN: Primary | ICD-10-CM

## 2024-02-06 DIAGNOSIS — M25.552 CHRONIC LEFT HIP PAIN: Primary | ICD-10-CM

## 2024-02-06 NOTE — TELEPHONE ENCOUNTER
Caller: Patient     Doctor: Deepa     Reason for call: Requested [employer] for appointment, I confirmed the address and best call number @ [phone]  email was sent

## 2024-02-06 NOTE — TELEPHONE ENCOUNTER
Caller: Patient    Doctor: Franca     Reason for call: Patient asked to get in injection for his left hip, he has to get a FL injection, please call patient once order     Call back#: [phone]

## 2024-02-07 ENCOUNTER — HOSPITAL ENCOUNTER (EMERGENCY)
Facility: HOSPITAL | Age: 59
Discharge: HOME | End: 2024-02-07
Attending: EMERGENCY MEDICINE | Admitting: EMERGENCY MEDICINE
Payer: MEDICARE

## 2024-02-07 VITALS
HEART RATE: 101 BPM | OXYGEN SATURATION: 96 % | DIASTOLIC BLOOD PRESSURE: 101 MMHG | RESPIRATION RATE: 18 BRPM | SYSTOLIC BLOOD PRESSURE: 165 MMHG | TEMPERATURE: 97.7 F

## 2024-02-07 DIAGNOSIS — K59.00 CONSTIPATION: ICD-10-CM

## 2024-02-07 DIAGNOSIS — M54.9 MUSCULOSKELETAL BACK PAIN: Primary | ICD-10-CM

## 2024-02-07 LAB
ANION GAP SERPL CALCULATED.3IONS-SCNC: 9 MMOL/L
BUN SERPL-MCNC: 13 MG/DL (ref 5–25)
CALCIUM SERPL-MCNC: 9.5 MG/DL (ref 8.4–10.2)
CHLORIDE SERPL-SCNC: 98 MMOL/L (ref 96–108)
CO2 SERPL-SCNC: 25 MMOL/L (ref 21–32)
CREAT SERPL-MCNC: 0.89 MG/DL (ref 0.6–1.3)
GFR SERPL CREATININE-BSD FRML MDRD: 95 ML/MIN/1.73SQ M
GLUCOSE SERPL-MCNC: 93 MG/DL (ref 65–140)
POTASSIUM SERPL-SCNC: 3.6 MMOL/L (ref 3.5–5.3)
SODIUM SERPL-SCNC: 132 MMOL/L (ref 135–147)

## 2024-02-07 PROCEDURE — 99284 EMERGENCY DEPT VISIT MOD MDM: CPT

## 2024-02-07 PROCEDURE — 80048 BASIC METABOLIC PNL TOTAL CA: CPT

## 2024-02-07 PROCEDURE — 36415 COLL VENOUS BLD VENIPUNCTURE: CPT

## 2024-02-07 PROCEDURE — 96372 THER/PROPH/DIAG INJ SC/IM: CPT

## 2024-02-07 PROCEDURE — 99284 EMERGENCY DEPT VISIT MOD MDM: CPT | Performed by: EMERGENCY MEDICINE

## 2024-02-07 RX ORDER — KETOROLAC TROMETHAMINE 30 MG/ML
15 INJECTION, SOLUTION INTRAMUSCULAR; INTRAVENOUS ONCE
Status: COMPLETED | OUTPATIENT
Start: 2024-02-07 | End: 2024-02-07

## 2024-02-07 RX ORDER — LIDOCAINE 50 MG/G
2 PATCH TOPICAL ONCE
Status: DISCONTINUED | OUTPATIENT
Start: 2024-02-07 | End: 2024-02-07 | Stop reason: HOSPADM

## 2024-02-07 RX ORDER — ACETAMINOPHEN 325 MG/1
975 TABLET ORAL ONCE
Status: COMPLETED | OUTPATIENT
Start: 2024-02-07 | End: 2024-02-07

## 2024-02-07 RX ORDER — POLYETHYLENE GLYCOL 3350 17 G/17G
17 POWDER, FOR SOLUTION ORAL ONCE
Status: COMPLETED | OUTPATIENT
Start: 2024-02-07 | End: 2024-02-07

## 2024-02-07 RX ADMIN — POLYETHYLENE GLYCOL 3350 17 G: 17 POWDER, FOR SOLUTION ORAL at 04:50

## 2024-02-07 RX ADMIN — LIDOCAINE 5% 2 PATCH: 700 PATCH TOPICAL at 04:50

## 2024-02-07 RX ADMIN — KETOROLAC TROMETHAMINE 15 MG: 30 INJECTION, SOLUTION INTRAMUSCULAR; INTRAVENOUS at 04:50

## 2024-02-07 RX ADMIN — ACETAMINOPHEN 975 MG: 325 TABLET, FILM COATED ORAL at 04:50

## 2024-02-07 RX ADMIN — MAGNESIUM HYDROXIDE 30 ML: 1200 LIQUID ORAL at 05:11

## 2024-02-07 NOTE — DISCHARGE INSTRUCTIONS
Please follow up with PCP.    Return to the ED with any new/concerning issues.     Take medications as prescribed.

## 2024-02-07 NOTE — ED ATTENDING ATTESTATION
2/7/2024  I, Armand Garcia MD, saw and evaluated the patient. I have discussed the patient with the resident/non-physician practitioner and agree with the resident's/non-physician practitioner's findings, Plan of Care, and MDM as documented in the resident's/non-physician practitioner's note, except where noted. All available labs and Radiology studies were reviewed.  I was present for key portions of any procedure(s) performed by the resident/non-physician practitioner and I was immediately available to provide assistance.       At this point I agree with the current assessment done in the Emergency Department.  I have conducted an independent evaluation of this patient a history and physical is as follows:    58-year-old male presents to the emergency department for evaluation of multiple complaints.  Patient's primary complaint is sharp right lower back pain that has been ongoing for the past few days.  No trauma or falls.  Has had history of similar pain in the past.  No fevers or chills.  No bowel or bladder incontinence.  No weakness.  Able to ambulate with steady gait.  Patient is also requesting testing of his kidney function as he is concerned for possible CKD.  No urinary complaints.    On exam, patient anxious appearing, in no acute distress, head is normocephalic atraumatic, pupils equal round reactive to light, neck is supple without meningismus signs, no midline neck or back tenderness, no step-offs or deformities.  He does have mild paraspinal tenderness.  Heart is regular rate and rhythm with intact distal pulses, no increased work of breathing, respiratory distress, or stridor.  Abdomen is soft, nontender nondistended without rebound or guarding.    Suspect symptoms likely musculoskeletal in nature.  Plan to treat symptomatically.  Will get BMP per patient's request.  Anticipate discharge home.    ED Course         Critical Care Time  Procedures
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
## 2024-02-08 ENCOUNTER — TELEPHONE (OUTPATIENT)
Dept: PHYSICAL THERAPY | Facility: OTHER | Age: 59
End: 2024-02-08

## 2024-02-08 NOTE — NURSING NOTE
Call placed to patient to discuss upcoming appointment at [facility] radiology department and complete consultation with patient. Patient is having bilateral hip CSI utilizing fluoroscopy  guidance. Reviewed  patient's allergies, current anticoagulant medication of ASA 81 mg present per patient but not required to stop per periprocedural management of coagulation status and hemostasis risk in percutaneous image guided procedure guidelines, patient has had procedure in the past, no questions when asked if any questions or concerns. Reminded patient of location and time expected for procedure, Patient expressed understanding by verbalizing and repeating instructions.

## 2024-02-08 NOTE — TELEPHONE ENCOUNTER
Call placed to the patient per [facility] referral.    Patient declined for now. Patient states he can't financially afford to go back to PT right now. He changed his mattress and he is doing some exercises at home that its helping with the back pain.    Csp phone number and hours of business provided in case he needs our services in the future.    Will close referral per protocol.

## 2024-02-12 ENCOUNTER — TELEPHONE (OUTPATIENT)
Dept: NEPHROLOGY | Facility: CLINIC | Age: 59
End: 2024-02-12

## 2024-02-12 NOTE — TELEPHONE ENCOUNTER
Pt called to cancel consult appt today 2/12 with Dr Quiros in the [facility]. He stated he is sick. Appt was rescheduled on 4/16/24 in the AO with Dr Quiros. Contreras was notified of the cancellation.

## 2024-02-20 ENCOUNTER — TELEPHONE (OUTPATIENT)
Dept: NEUROLOGY | Facility: CLINIC | Age: 59
End: 2024-02-20

## 2024-02-20 ENCOUNTER — HOSPITAL ENCOUNTER (OUTPATIENT)
Dept: RADIOLOGY | Facility: HOSPITAL | Age: 59
Discharge: HOME | End: 2024-02-20
Attending: ORTHOPAEDIC SURGERY
Payer: MEDICARE

## 2024-02-20 DIAGNOSIS — M16.12 PRIMARY OSTEOARTHRITIS OF LEFT HIP: ICD-10-CM

## 2024-02-20 DIAGNOSIS — M16.11 PRIMARY OSTEOARTHRITIS OF RIGHT HIP: ICD-10-CM

## 2024-02-20 DIAGNOSIS — M25.551 CHRONIC RIGHT HIP PAIN: ICD-10-CM

## 2024-02-20 DIAGNOSIS — M25.552 CHRONIC LEFT HIP PAIN: ICD-10-CM

## 2024-02-20 DIAGNOSIS — G89.29 CHRONIC LEFT HIP PAIN: ICD-10-CM

## 2024-02-20 DIAGNOSIS — G89.29 CHRONIC RIGHT HIP PAIN: ICD-10-CM

## 2024-02-20 PROCEDURE — 77002 NEEDLE LOCALIZATION BY XRAY: CPT

## 2024-02-20 PROCEDURE — 20610 DRAIN/INJ JOINT/BURSA W/O US: CPT

## 2024-02-20 RX ORDER — LIDOCAINE HYDROCHLORIDE 10 MG/ML
5 INJECTION, SOLUTION EPIDURAL; INFILTRATION; INTRACAUDAL; PERINEURAL
Status: COMPLETED | OUTPATIENT
Start: 2024-02-20 | End: 2024-02-20

## 2024-02-20 RX ORDER — METHYLPREDNISOLONE ACETATE 80 MG/ML
80 INJECTION, SUSPENSION INTRA-ARTICULAR; INTRALESIONAL; INTRAMUSCULAR; SOFT TISSUE
Status: COMPLETED | OUTPATIENT
Start: 2024-02-20 | End: 2024-02-20

## 2024-02-20 RX ORDER — BUPIVACAINE HYDROCHLORIDE 2.5 MG/ML
30 INJECTION, SOLUTION EPIDURAL; INFILTRATION; INTRACAUDAL
Status: COMPLETED | OUTPATIENT
Start: 2024-02-20 | End: 2024-02-20

## 2024-02-20 RX ADMIN — METHYLPREDNISOLONE ACETATE 80 MG: 80 INJECTION, SUSPENSION INTRA-ARTICULAR; INTRALESIONAL; INTRAMUSCULAR; SOFT TISSUE at 15:15

## 2024-02-20 RX ADMIN — BUPIVACAINE HYDROCHLORIDE 15 ML: 2.5 INJECTION, SOLUTION EPIDURAL; INFILTRATION; INTRACAUDAL; PERINEURAL at 15:15

## 2024-02-20 RX ADMIN — IOHEXOL 2 ML: 300 INJECTION, SOLUTION INTRAVENOUS at 15:30

## 2024-02-20 RX ADMIN — LIDOCAINE HYDROCHLORIDE 5 ML: 10 INJECTION, SOLUTION EPIDURAL; INFILTRATION; INTRACAUDAL; PERINEURAL at 15:05

## 2024-02-20 RX ADMIN — METHYLPREDNISOLONE ACETATE 80 MG: 80 INJECTION, SUSPENSION INTRA-ARTICULAR; INTRALESIONAL; INTRAMUSCULAR; SOFT TISSUE at 15:35

## 2024-02-20 RX ADMIN — IOHEXOL 10 ML: 300 INJECTION, SOLUTION INTRAVENOUS at 15:10

## 2024-02-20 RX ADMIN — LIDOCAINE HYDROCHLORIDE 5 ML: 10 INJECTION, SOLUTION EPIDURAL; INFILTRATION; INTRACAUDAL; PERINEURAL at 15:25

## 2024-02-20 RX ADMIN — BUPIVACAINE HYDROCHLORIDE 15 ML: 2.5 INJECTION, SOLUTION EPIDURAL; INFILTRATION; INTRACAUDAL; PERINEURAL at 15:35

## 2024-02-20 NOTE — TELEPHONE ENCOUNTER
Patient called and was asking for information on appointments on his appointment desk.  I provided information and Weight management's phone number.

## 2024-03-06 ENCOUNTER — TELEPHONE (OUTPATIENT)
Dept: BARIATRICS | Facility: CLINIC | Age: 59
End: 2024-03-06

## 2024-03-13 ENCOUNTER — TELEPHONE (OUTPATIENT)
Age: 59
End: 2024-03-13

## 2024-03-20 RX ORDER — CHLORHEXIDINE GLUCONATE ORAL RINSE 1.2 MG/ML
15 SOLUTION DENTAL 2 TIMES DAILY
Qty: 120 ML | Refills: 0 | OUTPATIENT
Start: 2024-03-20

## 2024-03-20 RX ORDER — AMOXICILLIN 500 MG/1
500 CAPSULE ORAL EVERY 8 HOURS SCHEDULED
Qty: 21 CAPSULE | Refills: 0 | OUTPATIENT
Start: 2024-03-20 | End: 2024-03-27

## 2024-03-21 DIAGNOSIS — E11.9 TYPE 2 DIABETES MELLITUS WITHOUT COMPLICATION, WITHOUT LONG-TERM CURRENT USE OF INSULIN (HCC): ICD-10-CM

## 2024-03-25 ENCOUNTER — TELEPHONE (OUTPATIENT)
Dept: OTHER | Facility: OTHER | Age: 59
End: 2024-03-25

## 2024-03-25 NOTE — TELEPHONE ENCOUNTER
Pt called to confirm Nephrology appointment.  Advised pt of appointment on 4/16/24 at 3:00.  Pt states he will need transportation to get to that appointment so he will call office when it is open to request transportation arrangements.

## 2024-04-08 ENCOUNTER — TELEPHONE (OUTPATIENT)
Dept: INTERNAL MEDICINE CLINIC | Facility: CLINIC | Age: 59
End: 2024-04-08

## 2024-04-08 NOTE — TELEPHONE ENCOUNTER
Patient called to have a doctor or Clinical team to call him to discuss his most recent labs and when will his next blood work will be due.

## 2024-04-10 ENCOUNTER — TELEPHONE (OUTPATIENT)
Dept: NEPHROLOGY | Facility: CLINIC | Age: 59
End: 2024-04-10

## 2024-04-16 ENCOUNTER — CONSULT (OUTPATIENT)
Dept: NEPHROLOGY | Facility: CLINIC | Age: 59
End: 2024-04-16
Payer: MEDICARE

## 2024-04-16 ENCOUNTER — TELEPHONE (OUTPATIENT)
Dept: OTHER | Facility: OTHER | Age: 59
End: 2024-04-16

## 2024-04-16 VITALS
SYSTOLIC BLOOD PRESSURE: 134 MMHG | DIASTOLIC BLOOD PRESSURE: 76 MMHG | WEIGHT: 239 LBS | HEIGHT: 66 IN | BODY MASS INDEX: 38.41 KG/M2

## 2024-04-16 DIAGNOSIS — I10 ESSENTIAL HYPERTENSION: ICD-10-CM

## 2024-04-16 DIAGNOSIS — E78.2 MIXED HYPERLIPIDEMIA: ICD-10-CM

## 2024-04-16 DIAGNOSIS — E66.01 OBESITY, CLASS III, BMI 40-49.9 (MORBID OBESITY) (HCC): ICD-10-CM

## 2024-04-16 DIAGNOSIS — F25.0 SCHIZOAFFECTIVE DISORDER, BIPOLAR TYPE (HCC): Chronic | ICD-10-CM

## 2024-04-16 DIAGNOSIS — R73.03 PREDIABETES: ICD-10-CM

## 2024-04-16 DIAGNOSIS — N18.2 CKD (CHRONIC KIDNEY DISEASE) STAGE 2, GFR 60-89 ML/MIN: Primary | ICD-10-CM

## 2024-04-16 PROCEDURE — 99204 OFFICE O/P NEW MOD 45 MIN: CPT | Performed by: INTERNAL MEDICINE
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
## 2024-04-16 NOTE — PATIENT INSTRUCTIONS
As we discussed in the office visit and after reviewing your most recent blood test you have mild chronic kidney disease stage I/II.  We discussed about importance to keep watching your diet and to decrease carbohydrates and sugar intake as well as sodas, ice cream, etc.  Keep working on losing weight.  Avoid NSAIDs.  Continue follow-up with your primary care doctor and I will more than happy to see you back in the future if needed.  Need to be important to have good blood pressure as well as good diabetes control  Information regarding chronic kidney disease was provided today

## 2024-04-16 NOTE — TELEPHONE ENCOUNTER
Patient needs to cancel 0815 appointment due to illness; please follow up with patient to reschedule.

## 2024-04-23 ENCOUNTER — TELEPHONE (OUTPATIENT)
Dept: OTHER | Facility: OTHER | Age: 59
End: 2024-04-23

## 2024-04-23 NOTE — TELEPHONE ENCOUNTER
Patient is calling regarding cancelling an appointment.    Date/Time:  4/23/24 10:30 AM    Patient was rescheduled: YES [] NO [x]    Patient requesting call back to reschedule: YES [x] NO []    Patient reports that he also needs his Lyft ride canceled.

## 2024-04-29 ENCOUNTER — HOSPITAL ENCOUNTER (EMERGENCY)
Facility: HOSPITAL | Age: 59
Discharge: HOME | End: 2024-04-29
Attending: EMERGENCY MEDICINE
Payer: MEDICARE

## 2024-04-29 VITALS
OXYGEN SATURATION: 97 % | DIASTOLIC BLOOD PRESSURE: 119 MMHG | SYSTOLIC BLOOD PRESSURE: 165 MMHG | TEMPERATURE: 97.9 F | HEART RATE: 74 BPM | RESPIRATION RATE: 20 BRPM

## 2024-04-29 DIAGNOSIS — H40.9 GLAUCOMA: Primary | ICD-10-CM

## 2024-04-29 PROCEDURE — 96372 THER/PROPH/DIAG INJ SC/IM: CPT

## 2024-04-29 PROCEDURE — 99284 EMERGENCY DEPT VISIT MOD MDM: CPT | Performed by: EMERGENCY MEDICINE

## 2024-04-29 PROCEDURE — 99282 EMERGENCY DEPT VISIT SF MDM: CPT

## 2024-04-29 RX ORDER — BRIMONIDINE TARTRATE 2 MG/ML
1 SOLUTION/ DROPS OPHTHALMIC EVERY 8 HOURS SCHEDULED
Status: DISCONTINUED | OUTPATIENT
Start: 2024-04-29 | End: 2024-04-29 | Stop reason: HOSPADM

## 2024-04-29 RX ORDER — DIAZEPAM 5 MG/1
5 TABLET ORAL ONCE
Status: COMPLETED | OUTPATIENT
Start: 2024-04-29 | End: 2024-04-29

## 2024-04-29 RX ORDER — KETOROLAC TROMETHAMINE 30 MG/ML
15 INJECTION, SOLUTION INTRAMUSCULAR; INTRAVENOUS ONCE
Status: COMPLETED | OUTPATIENT
Start: 2024-04-29 | End: 2024-04-29

## 2024-04-29 RX ADMIN — BRIMONIDINE TARTRATE 1 DROP: 2 SOLUTION OPHTHALMIC at 02:27

## 2024-04-29 RX ADMIN — KETOROLAC TROMETHAMINE 15 MG: 30 INJECTION, SOLUTION INTRAMUSCULAR; INTRAVENOUS at 02:28

## 2024-04-29 RX ADMIN — DIAZEPAM 5 MG: 5 TABLET ORAL at 03:41

## 2024-04-29 NOTE — DISCHARGE INSTRUCTIONS
You were seen in the emergency department today for eye pain.    Your testing showed the pressure in your eyes was elevated.    Follow up with Dr. Souza (ophthalmology).   Call his office at 9 this morning to schedule an appointment.     Return to the emergency department for any new or concerning symptoms.     Thank you for choosing  [facility] for your care today.

## 2024-04-29 NOTE — ED ATTENDING ATTESTATION
4/29/2024  I, Shaun Rodriguez MD, saw and evaluated the patient. I have discussed the patient with the resident/non-physician practitioner and agree with the resident's/non-physician practitioner's findings, Plan of Care, and MDM as documented in the resident's/non-physician practitioner's note, except where noted. All available labs and Radiology studies were reviewed.  I was present for key portions of any procedure(s) performed by the resident/non-physician practitioner and I was immediately available to provide assistance.       At this point I agree with the current assessment done in the Emergency Department.  I have conducted an independent evaluation of this patient a history and physical is as follows:    ED Course     58-year-old male, history of anxiety, multiple chronic painful medical conditions for which she often presents to the emergency department, history of glaucoma, treated with brimonidine drops, Cosopt drops, and latanoprost drops, presenting to the emergency department for evaluation of right eye pain.  Patient states that he ran out of his brimonidine drops approximately 2 days ago and has not been using them.  Patient reports decreased visual acuity through the right eye.    On examination the patient is anxious.  Head is normocephalic and atraumatic.  Eyelids and lashes are normal.  No significant conjunctival injection or erythema bilaterally.  Bilateral pupils are irregular, 4 mm, and reactive bilaterally.  Intraocular pressure OD 55 mmHg, intraocular pressure OS 27 mmHg.  Extraocular movements intact.    Acute intraocular hypertension in the setting of chronic glaucoma.  Patient will be administered brimonidine to replace what he has not been taking and case will be discussed with ophthalmology.    Case was discussed with Dr. Mars Souza who suggested that he would see the patient in the office.  Patient will be discharged with instructions to call the office at 9:00  AM.    Critical Care Time  Procedures
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
## 2024-05-07 ENCOUNTER — OFFICE VISIT (OUTPATIENT)
Dept: OBGYN CLINIC | Facility: HOSPITAL | Age: 59
End: 2024-05-07
Payer: MEDICARE

## 2024-05-07 VITALS
BODY MASS INDEX: 38.38 KG/M2 | SYSTOLIC BLOOD PRESSURE: 132 MMHG | HEART RATE: 78 BPM | WEIGHT: 238.8 LBS | DIASTOLIC BLOOD PRESSURE: 80 MMHG | HEIGHT: 66 IN

## 2024-05-07 DIAGNOSIS — M17.11 PRIMARY OSTEOARTHRITIS OF RIGHT KNEE: Primary | ICD-10-CM

## 2024-05-07 DIAGNOSIS — M17.12 PRIMARY OSTEOARTHRITIS OF LEFT KNEE: ICD-10-CM

## 2024-05-07 PROCEDURE — 99213 OFFICE O/P EST LOW 20 MIN: CPT | Performed by: ORTHOPAEDIC SURGERY
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
## 2024-05-20 ENCOUNTER — TELEPHONE (OUTPATIENT)
Age: 59
End: 2024-05-20

## 2024-05-20 DIAGNOSIS — M17.11 PRIMARY OSTEOARTHRITIS OF RIGHT KNEE: Primary | ICD-10-CM

## 2024-05-20 DIAGNOSIS — M17.12 PRIMARY OSTEOARTHRITIS OF LEFT KNEE: ICD-10-CM

## 2024-05-20 NOTE — TELEPHONE ENCOUNTER
Caller: Patient    Doctor: Dr. Schulte    Reason for call: Patient calling asking if orders can be placed for FL US guided injections for B/L hips can be placed.      Call back#: [phone]

## 2024-05-28 ENCOUNTER — APPOINTMENT (OUTPATIENT)
Dept: LAB | Facility: CLINIC | Age: 59
End: 2024-05-28
Payer: MEDICARE

## 2024-05-28 ENCOUNTER — OFFICE VISIT (OUTPATIENT)
Dept: INTERNAL MEDICINE CLINIC | Facility: CLINIC | Age: 59
End: 2024-05-28

## 2024-05-28 VITALS
BODY MASS INDEX: 37.93 KG/M2 | DIASTOLIC BLOOD PRESSURE: 81 MMHG | TEMPERATURE: 98.1 F | WEIGHT: 236 LBS | HEART RATE: 79 BPM | SYSTOLIC BLOOD PRESSURE: 117 MMHG | HEIGHT: 66 IN

## 2024-05-28 DIAGNOSIS — Z93.9 HISTORY OF CREATION OF OSTOMY (HCC): ICD-10-CM

## 2024-05-28 DIAGNOSIS — R73.03 PREDIABETES: ICD-10-CM

## 2024-05-28 DIAGNOSIS — N18.2 CKD (CHRONIC KIDNEY DISEASE) STAGE 2, GFR 60-89 ML/MIN: ICD-10-CM

## 2024-05-28 DIAGNOSIS — E78.2 MIXED HYPERLIPIDEMIA: Primary | ICD-10-CM

## 2024-05-28 DIAGNOSIS — E78.2 MIXED HYPERLIPIDEMIA: ICD-10-CM

## 2024-05-28 LAB
ANION GAP SERPL CALCULATED.3IONS-SCNC: 9 MMOL/L (ref 4–13)
BUN SERPL-MCNC: 11 MG/DL (ref 5–25)
CALCIUM SERPL-MCNC: 10 MG/DL (ref 8.4–10.2)
CHLORIDE SERPL-SCNC: 102 MMOL/L (ref 96–108)
CHOLEST SERPL-MCNC: 96 MG/DL
CO2 SERPL-SCNC: 28 MMOL/L (ref 21–32)
CREAT SERPL-MCNC: 0.88 MG/DL (ref 0.6–1.3)
EST. AVERAGE GLUCOSE BLD GHB EST-MCNC: 120 MG/DL
GFR SERPL CREATININE-BSD FRML MDRD: 96 ML/MIN/1.73SQ M
GLUCOSE P FAST SERPL-MCNC: 103 MG/DL (ref 65–99)
HBA1C MFR BLD: 5.8 %
HDLC SERPL-MCNC: 24 MG/DL
LDLC SERPL CALC-MCNC: 46 MG/DL (ref 0–100)
POTASSIUM SERPL-SCNC: 4.9 MMOL/L (ref 3.5–5.3)
SODIUM SERPL-SCNC: 139 MMOL/L (ref 135–147)
TRIGL SERPL-MCNC: 128 MG/DL

## 2024-05-28 PROCEDURE — 80048 BASIC METABOLIC PNL TOTAL CA: CPT

## 2024-05-28 PROCEDURE — 80061 LIPID PANEL: CPT

## 2024-05-28 PROCEDURE — 36415 COLL VENOUS BLD VENIPUNCTURE: CPT

## 2024-05-28 PROCEDURE — G2211 COMPLEX E/M VISIT ADD ON: HCPCS | Performed by: INTERNAL MEDICINE

## 2024-05-28 PROCEDURE — 99213 OFFICE O/P EST LOW 20 MIN: CPT | Performed by: INTERNAL MEDICINE

## 2024-05-28 PROCEDURE — 83036 HEMOGLOBIN GLYCOSYLATED A1C: CPT
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
## 2024-06-05 DIAGNOSIS — G25.81 RESTLESS LEG SYNDROME: ICD-10-CM

## 2024-06-05 DIAGNOSIS — E11.9 TYPE 2 DIABETES MELLITUS WITHOUT COMPLICATION, WITHOUT LONG-TERM CURRENT USE OF INSULIN (HCC): ICD-10-CM

## 2024-06-05 DIAGNOSIS — I10 ESSENTIAL HYPERTENSION: ICD-10-CM

## 2024-06-05 RX ORDER — AMLODIPINE BESYLATE 5 MG/1
5 TABLET ORAL DAILY
Qty: 90 TABLET | Refills: 0 | Status: SHIPPED | OUTPATIENT
Start: 2024-06-05

## 2024-06-05 RX ORDER — ROPINIROLE 4 MG/1
4 TABLET, FILM COATED ORAL
Qty: 90 TABLET | Refills: 0 | Status: SHIPPED | OUTPATIENT
Start: 2024-06-05

## 2024-06-06 ENCOUNTER — TELEPHONE (OUTPATIENT)
Age: 59
End: 2024-06-06

## 2024-06-06 NOTE — NURSING NOTE
Patient return call to discuss upcoming appointment at [facility] radiology department and complete consultation with patient. Patient is having a bilateral hip CSI utilizing  fluoroscopy guidance. Reviewed patient's allergies, current anticoagulant medication of ASA 81 mg present per patient but not required to stop per periprocedural management of coagulation status and hemostasis risk in percutaneous image guided procedure guidelines,patient has had procedure multiple times in the past no question asked. Reminded patient of location and time expected for procedure, Patient expressed understanding by verbalizing and repeating instructions, he was given the number for the radiology  to call to see if they can have a Lyft scheduled.

## 2024-06-06 NOTE — TELEPHONE ENCOUNTER
Patient has an appointment on 6/12/24 at 10:15 AM with Dr. Clemons. He will need a LYFT scheduled for his appt. Please call patient at [phone] to confirm that its scheduled.

## 2024-06-10 ENCOUNTER — TELEPHONE (OUTPATIENT)
Age: 59
End: 2024-06-10

## 2024-06-10 NOTE — TELEPHONE ENCOUNTER
Caller: Patient    Doctor: Stefani    Reason for call: Patient had appointment today (6/10) at 3:15 that he cancelled; had LYFT arranged, would like to cancel that as well if possible.    Call back#: [phone]

## 2024-06-11 ENCOUNTER — TELEPHONE (OUTPATIENT)
Dept: OTHER | Facility: OTHER | Age: 59
End: 2024-06-11

## 2024-06-12 NOTE — TELEPHONE ENCOUNTER
Pt called in asking for appointment details for tomorrow. Information provided, pt verbalized understanding. Encouraged to call office in the AM to confirm Lyft . Pt agreeable.

## 2024-06-17 RX ORDER — TRAVOPROST OPHTHALMIC SOLUTION 0.04 MG/ML
SOLUTION OPHTHALMIC
COMMUNITY
Start: 2024-05-28

## 2024-06-18 ENCOUNTER — OFFICE VISIT (OUTPATIENT)
Dept: OBGYN CLINIC | Facility: HOSPITAL | Age: 59
End: 2024-06-18
Payer: MEDICARE

## 2024-06-18 VITALS
DIASTOLIC BLOOD PRESSURE: 80 MMHG | HEIGHT: 66 IN | SYSTOLIC BLOOD PRESSURE: 121 MMHG | HEART RATE: 76 BPM | BODY MASS INDEX: 38.09 KG/M2 | WEIGHT: 237 LBS

## 2024-06-18 DIAGNOSIS — M17.11 PRIMARY OSTEOARTHRITIS OF RIGHT KNEE: Primary | ICD-10-CM

## 2024-06-18 PROCEDURE — 20610 DRAIN/INJ JOINT/BURSA W/O US: CPT | Performed by: ORTHOPAEDIC SURGERY

## 2024-06-18 PROCEDURE — 99213 OFFICE O/P EST LOW 20 MIN: CPT | Performed by: ORTHOPAEDIC SURGERY

## 2024-06-18 RX ORDER — LIDOCAINE HYDROCHLORIDE 10 MG/ML
2 INJECTION, SOLUTION INFILTRATION; PERINEURAL
Status: COMPLETED | OUTPATIENT
Start: 2024-06-18 | End: 2024-06-18

## 2024-06-18 RX ORDER — BUPIVACAINE HYDROCHLORIDE 2.5 MG/ML
2 INJECTION, SOLUTION INFILTRATION; PERINEURAL
Status: COMPLETED | OUTPATIENT
Start: 2024-06-18 | End: 2024-06-18

## 2024-06-18 RX ORDER — BETAMETHASONE SODIUM PHOSPHATE AND BETAMETHASONE ACETATE 3; 3 MG/ML; MG/ML
12 INJECTION, SUSPENSION INTRA-ARTICULAR; INTRALESIONAL; INTRAMUSCULAR; SOFT TISSUE
Status: COMPLETED | OUTPATIENT
Start: 2024-06-18 | End: 2024-06-18

## 2024-06-18 RX ADMIN — BUPIVACAINE HYDROCHLORIDE 2 ML: 2.5 INJECTION, SOLUTION INFILTRATION; PERINEURAL at 13:15

## 2024-06-18 RX ADMIN — LIDOCAINE HYDROCHLORIDE 2 ML: 10 INJECTION, SOLUTION INFILTRATION; PERINEURAL at 13:15

## 2024-06-18 RX ADMIN — BETAMETHASONE SODIUM PHOSPHATE AND BETAMETHASONE ACETATE 12 MG: 3; 3 INJECTION, SUSPENSION INTRA-ARTICULAR; INTRALESIONAL; INTRAMUSCULAR; SOFT TISSUE at 13:15
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
## 2024-06-21 ENCOUNTER — HOSPITAL ENCOUNTER (OUTPATIENT)
Dept: RADIOLOGY | Facility: HOSPITAL | Age: 59
Discharge: HOME | End: 2024-06-21
Payer: MEDICARE

## 2024-06-21 DIAGNOSIS — M17.12 PRIMARY OSTEOARTHRITIS OF LEFT KNEE: ICD-10-CM

## 2024-06-21 DIAGNOSIS — M17.11 PRIMARY OSTEOARTHRITIS OF RIGHT KNEE: ICD-10-CM

## 2024-06-21 PROCEDURE — 20610 DRAIN/INJ JOINT/BURSA W/O US: CPT

## 2024-06-21 PROCEDURE — 77002 NEEDLE LOCALIZATION BY XRAY: CPT

## 2024-06-21 RX ORDER — LIDOCAINE HYDROCHLORIDE 10 MG/ML
5 INJECTION, SOLUTION EPIDURAL; INFILTRATION; INTRACAUDAL; PERINEURAL
Status: COMPLETED | OUTPATIENT
Start: 2024-06-21 | End: 2024-06-21

## 2024-06-21 RX ORDER — METHYLPREDNISOLONE ACETATE 80 MG/ML
80 INJECTION, SUSPENSION INTRA-ARTICULAR; INTRALESIONAL; INTRAMUSCULAR; SOFT TISSUE
Status: COMPLETED | OUTPATIENT
Start: 2024-06-21 | End: 2024-06-21

## 2024-06-21 RX ORDER — ROPIVACAINE HYDROCHLORIDE 2 MG/ML
20 INJECTION, SOLUTION EPIDURAL; INFILTRATION; PERINEURAL
Status: DISCONTINUED | OUTPATIENT
Start: 2024-06-21 | End: 2024-06-22 | Stop reason: HOSPADM

## 2024-06-21 RX ADMIN — LIDOCAINE HYDROCHLORIDE 4 ML: 10 INJECTION, SOLUTION EPIDURAL; INFILTRATION; INTRACAUDAL; PERINEURAL at 14:15

## 2024-06-21 RX ADMIN — LIDOCAINE HYDROCHLORIDE 4 ML: 10 INJECTION, SOLUTION EPIDURAL; INFILTRATION; INTRACAUDAL; PERINEURAL at 14:30

## 2024-06-21 RX ADMIN — ROPIVACAINE HYDROCHLORIDE 2 ML: 2 INJECTION, SOLUTION EPIDURAL; INFILTRATION; PERINEURAL at 14:15

## 2024-06-21 RX ADMIN — IOHEXOL 1 ML: 300 INJECTION, SOLUTION INTRAVENOUS at 14:30

## 2024-06-21 RX ADMIN — IOHEXOL 1 ML: 300 INJECTION, SOLUTION INTRAVENOUS at 14:15

## 2024-06-21 RX ADMIN — METHYLPREDNISOLONE ACETATE 1 MG: 80 INJECTION, SUSPENSION INTRA-ARTICULAR; INTRALESIONAL; INTRAMUSCULAR; SOFT TISSUE at 14:15

## 2024-06-21 RX ADMIN — METHYLPREDNISOLONE ACETATE 1 MG: 80 INJECTION, SUSPENSION INTRA-ARTICULAR; INTRALESIONAL; INTRAMUSCULAR; SOFT TISSUE at 14:30

## 2024-06-21 RX ADMIN — ROPIVACAINE HYDROCHLORIDE 2 ML: 2 INJECTION, SOLUTION EPIDURAL; INFILTRATION; PERINEURAL at 14:30

## 2024-06-25 ENCOUNTER — CONSULT (OUTPATIENT)
Dept: SURGERY | Facility: CLINIC | Age: 59
End: 2024-06-25
Payer: MEDICARE

## 2024-06-25 ENCOUNTER — TELEPHONE (OUTPATIENT)
Age: 59
End: 2024-06-25

## 2024-06-25 VITALS
DIASTOLIC BLOOD PRESSURE: 96 MMHG | BODY MASS INDEX: 38.06 KG/M2 | HEART RATE: 77 BPM | TEMPERATURE: 97.8 F | HEIGHT: 66 IN | WEIGHT: 236.8 LBS | RESPIRATION RATE: 16 BRPM | OXYGEN SATURATION: 96 % | SYSTOLIC BLOOD PRESSURE: 141 MMHG

## 2024-06-25 DIAGNOSIS — Z93.9 HISTORY OF CREATION OF OSTOMY (HCC): ICD-10-CM

## 2024-06-25 DIAGNOSIS — K43.9 VENTRAL HERNIA WITHOUT OBSTRUCTION OR GANGRENE: Primary | ICD-10-CM

## 2024-06-25 PROCEDURE — 99203 OFFICE O/P NEW LOW 30 MIN: CPT | Performed by: SURGERY

## 2024-06-25 NOTE — TELEPHONE ENCOUNTER
Pt called to confirm Lyft ride. I confirmed Lyft has been arranged. He is calling them to check time of

## 2024-06-25 NOTE — PROGRESS NOTES
"Ambulatory Visit  Name: Haile Downing      : 1965      MRN: [id number]  Encounter Provider: Christiano Clemons MD  Encounter Date: 2024   Encounter department: [facility] GENERAL SURGERY [place]    Assessment & Plan   1. Ventral hernia without obstruction or gangrene  Assessment & Plan:  Is a dip at the lower midline which I think is mostly scarred.  He reportedly had a fistula at that site.  I told him I would do nothing with that.  He complains of hernia lateral to his ostomy site but I am not convinced I feel anything.  Will order CT scan to evaluate things more fully.  Orders:  -     CT abdomen pelvis wo contrast; Future; Expected date: 2024  2. History of creation of ostomy (HCC)  -     Ambulatory Referral to General Surgery      History of Present Illness     Haile Downing is a 58 y.o. male who presents for abdominal evaluation.  He has had multiple operations in the past with colostomy then colostomy reversal hernia repairs etc.  He also tells me he had a fistula at 1.2 the lower abdomen.  He has an indentation in the lower midline of the leg fixed if possible.  He also complains of a hernia on the left side lateral to previous ostomy site.  He does have history of constipation.    Review of Systems    Objective     /96   Pulse 77   Temp 97.8 °F (36.6 °C) (Temporal)   Resp 16   Ht 5' 6\" (1.676 m)   Wt 107 kg (236 lb 12.8 oz)   SpO2 96%   BMI 38.22 kg/m²     Physical Exam  Abdomen: Fairly protuberant, long midline incision well-healed the lower end there is a indentation in the eschar.  There is a left-sided transverse incision well-healed there is a small incision with indentation on the right side, firm, no rebound, no guarding, no masses appreciated, no hernias appreciated  Administrative Statements           " Can you see if patient would like to get a colonoscopy done?  Or if she would like to do the Cologuard test?

## 2024-06-25 NOTE — ASSESSMENT & PLAN NOTE
Is a dip at the lower midline which I think is mostly scarred.  He reportedly had a fistula at that site.  I told him I would do nothing with that.  He complains of hernia lateral to his ostomy site but I am not convinced I feel anything.  Will order CT scan to evaluate things more fully.

## 2024-07-08 DIAGNOSIS — E11.9 TYPE 2 DIABETES MELLITUS WITHOUT COMPLICATION, WITHOUT LONG-TERM CURRENT USE OF INSULIN (HCC): ICD-10-CM

## 2024-07-11 ENCOUNTER — HOSPITAL ENCOUNTER (EMERGENCY)
Facility: HOSPITAL | Age: 59
Discharge: HOME | End: 2024-07-12
Attending: EMERGENCY MEDICINE
Payer: MEDICARE

## 2024-07-11 VITALS
OXYGEN SATURATION: 96 % | RESPIRATION RATE: 20 BRPM | TEMPERATURE: 97.6 F | SYSTOLIC BLOOD PRESSURE: 165 MMHG | HEART RATE: 62 BPM | DIASTOLIC BLOOD PRESSURE: 90 MMHG

## 2024-07-11 DIAGNOSIS — R07.9 CHEST PAIN: ICD-10-CM

## 2024-07-11 DIAGNOSIS — F41.9 ANXIETY: Primary | ICD-10-CM

## 2024-07-11 PROCEDURE — 99285 EMERGENCY DEPT VISIT HI MDM: CPT | Performed by: EMERGENCY MEDICINE

## 2024-07-11 PROCEDURE — 99284 EMERGENCY DEPT VISIT MOD MDM: CPT

## 2024-07-12 LAB
2HR DELTA HS TROPONIN: 0 NG/L
ANION GAP SERPL CALCULATED.3IONS-SCNC: 8 MMOL/L (ref 4–13)
ATRIAL RATE: 60 BPM
BASOPHILS # BLD AUTO: 0.02 THOUSANDS/ÂΜL (ref 0–0.1)
BASOPHILS NFR BLD AUTO: 0 % (ref 0–1)
BUN SERPL-MCNC: 18 MG/DL (ref 5–25)
CALCIUM SERPL-MCNC: 9.1 MG/DL (ref 8.4–10.2)
CARDIAC TROPONIN I PNL SERPL HS: 11 NG/L
CARDIAC TROPONIN I PNL SERPL HS: 11 NG/L
CHLORIDE SERPL-SCNC: 102 MMOL/L (ref 96–108)
CO2 SERPL-SCNC: 27 MMOL/L (ref 21–32)
CREAT SERPL-MCNC: 1.06 MG/DL (ref 0.6–1.3)
EOSINOPHIL # BLD AUTO: 0.04 THOUSAND/ÂΜL (ref 0–0.61)
EOSINOPHIL NFR BLD AUTO: 1 % (ref 0–6)
ERYTHROCYTE [DISTWIDTH] IN BLOOD BY AUTOMATED COUNT: 13 % (ref 11.6–15.1)
GFR SERPL CREATININE-BSD FRML MDRD: 77 ML/MIN/1.73SQ M
GLUCOSE SERPL-MCNC: 95 MG/DL (ref 65–140)
HCT VFR BLD AUTO: 40.3 % (ref 36.5–49.3)
HGB BLD-MCNC: 13.4 G/DL (ref 12–17)
IMM GRANULOCYTES # BLD AUTO: 0.03 THOUSAND/UL (ref 0–0.2)
IMM GRANULOCYTES NFR BLD AUTO: 0 % (ref 0–2)
LYMPHOCYTES # BLD AUTO: 3.98 THOUSANDS/ÂΜL (ref 0.6–4.47)
LYMPHOCYTES NFR BLD AUTO: 48 % (ref 14–44)
MCH RBC QN AUTO: 29.8 PG (ref 26.8–34.3)
MCHC RBC AUTO-ENTMCNC: 33.3 G/DL (ref 31.4–37.4)
MCV RBC AUTO: 90 FL (ref 82–98)
MONOCYTES # BLD AUTO: 0.52 THOUSAND/ÂΜL (ref 0.17–1.22)
MONOCYTES NFR BLD AUTO: 6 % (ref 4–12)
NEUTROPHILS # BLD AUTO: 3.73 THOUSANDS/ÂΜL (ref 1.85–7.62)
NEUTS SEG NFR BLD AUTO: 45 % (ref 43–75)
NRBC BLD AUTO-RTO: 0 /100 WBCS
P AXIS: 57 DEGREES
PLATELET # BLD AUTO: 151 THOUSANDS/UL (ref 149–390)
PMV BLD AUTO: 9.8 FL (ref 8.9–12.7)
POTASSIUM SERPL-SCNC: 3.8 MMOL/L (ref 3.5–5.3)
PR INTERVAL: 166 MS
QRS AXIS: 58 DEGREES
QRSD INTERVAL: 82 MS
QT INTERVAL: 410 MS
QTC INTERVAL: 410 MS
RBC # BLD AUTO: 4.49 MILLION/UL (ref 3.88–5.62)
SODIUM SERPL-SCNC: 137 MMOL/L (ref 135–147)
T WAVE AXIS: 31 DEGREES
VENTRICULAR RATE: 60 BPM
WBC # BLD AUTO: 8.32 THOUSAND/UL (ref 4.31–10.16)

## 2024-07-12 PROCEDURE — 85025 COMPLETE CBC W/AUTO DIFF WBC: CPT

## 2024-07-12 PROCEDURE — 93005 ELECTROCARDIOGRAM TRACING: CPT

## 2024-07-12 PROCEDURE — 93010 ELECTROCARDIOGRAM REPORT: CPT | Performed by: INTERNAL MEDICINE

## 2024-07-12 PROCEDURE — 36415 COLL VENOUS BLD VENIPUNCTURE: CPT

## 2024-07-12 PROCEDURE — 84484 ASSAY OF TROPONIN QUANT: CPT

## 2024-07-12 PROCEDURE — 80048 BASIC METABOLIC PNL TOTAL CA: CPT

## 2024-07-12 RX ORDER — DIAZEPAM 5 MG/1
5 TABLET ORAL ONCE
Status: COMPLETED | OUTPATIENT
Start: 2024-07-12 | End: 2024-07-12

## 2024-07-12 RX ADMIN — DIAZEPAM 5 MG: 5 TABLET ORAL at 00:58

## 2024-07-12 RX ADMIN — DIAZEPAM 5 MG: 5 TABLET ORAL at 02:14

## 2024-07-12 NOTE — DISCHARGE INSTRUCTIONS
You are seen in the emergency department for chest pain and anxiety.  Your workup was nonconcerning for anything dangerous this time.    Please follow-up with psychiatry outpatient for further management of your anxiety.    Please return to ED if you have worsening symptoms.
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
## 2024-07-17 DIAGNOSIS — I10 ESSENTIAL HYPERTENSION: ICD-10-CM

## 2024-07-17 DIAGNOSIS — G25.81 RESTLESS LEG SYNDROME: ICD-10-CM

## 2024-07-17 RX ORDER — AMLODIPINE BESYLATE 5 MG/1
5 TABLET ORAL DAILY
Qty: 90 TABLET | Refills: 0 | Status: SHIPPED | OUTPATIENT
Start: 2024-07-17

## 2024-07-17 RX ORDER — ROPINIROLE 4 MG/1
4 TABLET, FILM COATED ORAL
Qty: 90 TABLET | Refills: 0 | Status: SHIPPED | OUTPATIENT
Start: 2024-07-17

## 2024-07-19 ENCOUNTER — TELEPHONE (OUTPATIENT)
Dept: INTERNAL MEDICINE CLINIC | Facility: CLINIC | Age: 59
End: 2024-07-19

## 2024-07-21 ENCOUNTER — RA CDI HCC (OUTPATIENT)
Dept: OTHER | Facility: HOSPITAL | Age: 59
End: 2024-07-21

## 2024-07-23 ENCOUNTER — TELEPHONE (OUTPATIENT)
Dept: INTERNAL MEDICINE CLINIC | Facility: CLINIC | Age: 59
End: 2024-07-23

## 2024-07-23 NOTE — TELEPHONE ENCOUNTER
Received call from patient. Introduced self and role. Patient updated he does have refills available for his metformin. Patient aware nurse contacted [facility] this morning for refill. Patient stated he was able to  script couple of days ago.     Patient requesting a letter from physician for his  Vikram Moore. Patient stating he has been completing aerobic exercises at home to help improve his cholesterol, prediabetes and kidney disease. Patient stating he went from 285 lbs to 230 lbs. Patient requesting letter stating the following that could be mailed to him once completed.     Please review and advise.

## 2024-07-24 NOTE — TELEPHONE ENCOUNTER
Attempted to call patient, patient's number not in service will send out letter to patient's address on file

## 2024-07-26 ENCOUNTER — TELEPHONE (OUTPATIENT)
Dept: INTERNAL MEDICINE CLINIC | Facility: CLINIC | Age: 59
End: 2024-07-26

## 2024-07-26 NOTE — TELEPHONE ENCOUNTER
I called the patient regarding some confusion with transport. Pt confirmed he is in fact with the [employer]. I explained to him he can't be a part of both programs. If he is approved for [employer] he has to use that service. He stated he didn't he couldn't use both. He also stated that he didn't have money to be able to use [employer]. He expressed he has a lot going on regarding money, being to buy food, etc. I told him when he comes to his appt Monday he should speak about this with the provider and see if Magdalene is available as well. He is aware as of right now he is not to receive anymore lyft rides until things are cleared up and the right forms are signed, etc. I explained that our community workers have a lot of info that can help him.

## 2024-07-28 ENCOUNTER — HOSPITAL ENCOUNTER (EMERGENCY)
Facility: HOSPITAL | Age: 59
Discharge: HOME | End: 2024-07-28
Attending: EMERGENCY MEDICINE
Payer: MEDICARE

## 2024-07-28 VITALS
SYSTOLIC BLOOD PRESSURE: 159 MMHG | DIASTOLIC BLOOD PRESSURE: 99 MMHG | OXYGEN SATURATION: 98 % | RESPIRATION RATE: 18 BRPM | HEART RATE: 82 BPM | TEMPERATURE: 98.1 F

## 2024-07-28 DIAGNOSIS — Z23 NEED FOR TDAP VACCINATION: Primary | ICD-10-CM

## 2024-07-28 PROCEDURE — 99282 EMERGENCY DEPT VISIT SF MDM: CPT

## 2024-07-28 PROCEDURE — 90715 TDAP VACCINE 7 YRS/> IM: CPT

## 2024-07-28 PROCEDURE — 99284 EMERGENCY DEPT VISIT MOD MDM: CPT | Performed by: EMERGENCY MEDICINE

## 2024-07-28 PROCEDURE — 90471 IMMUNIZATION ADMIN: CPT

## 2024-07-28 RX ADMIN — TETANUS TOXOID, REDUCED DIPHTHERIA TOXOID AND ACELLULAR PERTUSSIS VACCINE, ADSORBED 0.5 ML: 5; 2.5; 8; 8; 2.5 SUSPENSION INTRAMUSCULAR at 22:22

## 2024-07-29 ENCOUNTER — PATIENT OUTREACH (OUTPATIENT)
Dept: INTERNAL MEDICINE CLINIC | Facility: CLINIC | Age: 59
End: 2024-07-29

## 2024-07-29 NOTE — PROGRESS NOTES
Pt has been assisted with a LYFT ride to get to his PCP offer this date as he had no other way here.  SW has reached out to pt re [employer] as he had been registered but needed to buy ticket.  SW had to leave a message.  SW to f/u with pt re same.   SW did speak to Pee at [employer] who confirms ot is still registered but does need to purchase tickets.  Sw received call from pt who did share he did not realize that if registered he could not use [employer] as well.   SW reviewed that if [employer] Registered he is to use [employer] as long as he can gethe tichjets or to lket us know if he needs assistsance withsame.  Pt does share that his finances are very tight  and that using [employer] has caused increased anxiety as they got him to her OP MH very appointment late.  SW did review that we can assist him with Tickets if needed and he can see if other Offices can still assist him with LYFT rides.  SW also offered to assist pt wth OP MH referral a the [employer] Program he had used is now closed.    He indicated he has attempted to get anew Provider but needed to f/u wit same  SW has suggested he may want to try [facility]  [phone] as they are VIRTUAL.   PT shew she attend Virtual NA meetings.  Sw reviewed he would need to down load the Purs[employer] application and  give them his email address.  This is difficult for him and SW offered to assist as able .  The line was disconnected.    NAI did call back and encouraged pt to come to his scheduled appointment.  NAI reviewed situation with the Manager who shares pt had called to cancel his appointment.    NAI called another time and left message again encouraging pt to come.   If no return call by 12:00 PM Office to cancel appointment and ride.   SW to remain available to assist as indicated.     NAI received an update from the Clerical Team that pt has not called back and pt's appointment and ride have been canceled at this time.     Patient has my contact # and PCP office  # if needed.  Social Work to remain available to assist as indicated.    Please re-consult Social Work if needed.

## 2024-07-29 NOTE — DISCHARGE INSTRUCTIONS
You were seen today for Tdap vaccination. Thank you for allowing us to take care of you. Return to the ER with any new/worsening symptoms.
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
## 2024-07-31 ENCOUNTER — PATIENT OUTREACH (OUTPATIENT)
Dept: INTERNAL MEDICINE CLINIC | Facility: CLINIC | Age: 59
End: 2024-07-31

## 2024-07-31 ENCOUNTER — TELEPHONE (OUTPATIENT)
Age: 59
End: 2024-07-31

## 2024-07-31 NOTE — PROGRESS NOTES
SW has returned call to Domo @ the [facility] Cat Scan S[facility]pt [place] [address] [address] [facility].  Pt had called and had no ride to get his Cat Scan today.  SW spoke with our Office Manger and did get approval to assist with Lyft ride as he did not have the Funding or time   ( needs to be at least one day in advance ) to get a [employer]anta [employer] ride). Pt does have a Waiver Release on file.  SW called pt on his new # [phone] and he shares that he will not be able to go today due to his use of Milk of Magnesia for constipation and now fearing  he will not be able to get through the Cat Scan due to diarrhea.    SW has advices pt to call Cat Scan back [phone] / [phone] to inform them he can not keep this appointment and to re-schedule.    SW suggested he re-schedule when he get get [employer] if possible.  SW suggested he speak to them re his medical concern re the diarrhea and getting through the [facility].  SW will also pass on this info to PCP and Clinical Team.    NAI has asked pt is he would like further assistance with finding a new OP MH Provided.    Pt shares he is clean now but sometimes feels frustrated as he still has so many medical issues. Support offered.    NAI has also called Cat Scan back with this update and spoke to Linda.    Pt to f/u with SW should he want to pursue other OP MH or has other needs.  NAI has suggested [facility] [phone] as they offer VIRTUAL appointments.    They also offer [place].    SW to remain available to assist as indicated.

## 2024-08-01 ENCOUNTER — TELEPHONE (OUTPATIENT)
Age: 59
End: 2024-08-01

## 2024-08-01 NOTE — TELEPHONE ENCOUNTER
Caller: Patient    Doctor: Jon    Reason for call:     Patient had an FL INJ LEFT HIP (NONARTHROGRAM) on 6/21/24 in left hip, he is asking when he can schedule another   Injection for his left hip?  Please advise the patient.  Thanks    Call back#: [phone]

## 2024-08-13 ENCOUNTER — PATIENT OUTREACH (OUTPATIENT)
Dept: INTERNAL MEDICINE CLINIC | Facility: CLINIC | Age: 59
End: 2024-08-13

## 2024-08-13 ENCOUNTER — TELEPHONE (OUTPATIENT)
Dept: INTERNAL MEDICINE CLINIC | Facility: CLINIC | Age: 59
End: 2024-08-13

## 2024-08-13 DIAGNOSIS — Z78.9 NEEDS ASSISTANCE WITH COMMUNITY RESOURCES: Primary | ICD-10-CM

## 2024-08-13 NOTE — TELEPHONE ENCOUNTER
"Pt left a voicemail: \"Haile Johnson [phone]. I have this copy that I gotta get rid of because I can't control him and it's affecting me mentally, emotionally, physically and spiritually. I feel like I'm gonna have a nervous breakdown. They told me to call my doctor, my social social work or something to try to help me find a place to take this dog. I've been calling all [employer]. I feel like I'm gonna, I'm gonna snap. I really feel like I'm gonna, I'm gonna sorry. I really feel like I'm gonna lose you. I really feel like I'm gonna just go out of my mind, which is dog. I don't want to hurt him and I don't want to hurt me. I don't want to relapse and go back to drugs and but everywhere else. Go try and find hope. Hope the doors are closing and I've been praying hard but there ain't no answer. I'm gonna lose it if I don't get this dog out of my house. I can't deal no more. I cannot deal with it. Thank you Please call me I need help.\" I messaged clinical staff to call him ASAP with a snip bit of the voicemail and forwarded them the voicemail as well. Jordyn contacted pt. Also forwarding this to Magdalene so she can fu with Haile.  "

## 2024-08-13 NOTE — TELEPHONE ENCOUNTER
Called and spoke to patient and per patient he has a 6 month old Pitbull puppy who is full of energy, jumping on people when he sees them, pees everywhere and is very strong. Patient states he doesn't know how to cope as he is overwhelm with his dog. Patient states he getting to overwhelm and at times feels like he is going to relapse due to the stress from the dog and not being about to keep him under control. Per patient he called around to shelters to see if he can take the dog there and was denied as they were to full. Patient also stated he asked family members, friends and even strangers at [facility] but no one was about to help him. Per patient he feels like all the doors are closing on him and he feels like he's losing his mind and no longer would like the dog. I verbalized with Magdalene TRIMBLE as he is her patient and made her aware as per note and Magdalene TRIMBLE ok'd transfer and took over the call.

## 2024-08-13 NOTE — PROGRESS NOTES
"SW took an Urgent call from Clinical team from pt who was sharing that he has a dog that he has to get rid of,   \"I gotta get rid of because I can't control him and it's affecting me mentally, emotionally, physically and spiritually. I feel like I'm gonna have a nervous breakdown. They told me to call my doctor, my social social work or something to try to help me find a place to take this dog. I've been calling all [employer]. I feel like I'm gonna, I'm gonna snap ... I don't want to hurt him and I don't want to hurt me. I don't want to relapse and go back to drugs and but everywhere else. Go try and find hope. Hope the doors are closing and I've been praying hard but there ain't no answer. I'm gonna lose it if I don't get this dog out of my house. I can't deal no more\".   Nai spoke to pt at length re above and he had made numerous calls to various agencies and has not been able to find any group to relinquish the animal to.  He is a American Pit Bull Mix/ Bully.  He is 6 months old and about 45 lbs. Pt shares he obtained the dog through the [employer] and has f/u several time with Nikita who sold him the dog.    He has not been able to return the dog despite not expecting a refund.  SW has asked pt is he has any SI/HI and he denies same. Pt shares he is speaking with his NA Sponsor re above.    NAI had previously offered to help pt get re-connected with OP  but he declined feeling the NA is more helpful.    Pt has voiced concern re relapse and using drugs again despite being over 3 years sober.  NAI strongly encouraged pt to f/u with his NA sponsor.  NAI also assisted pt with making multiple calls to see if anyone can help by taking this dog.    NAI assisted with a call to Animal Control/ [facility]  [phone] and we had to leave a message for Magdalene Lu to return call to pt.    We also called [facility] [phone] as spoke to Pascale .  She shares that they have " over a 8 week wait to surrender an animal and applications need to be done on line.  Pt shares he can not wait that long.    NAI assisted with a call to [facility] at [facility]  [phone] but we had to leave a mesage for them to return call to pt.    In addition, SW assisted with a cll to the NON EMERGENCY [place] [employer]  [phone] and they shared that they can not  any animals to be surrendered.    Officer encouraged us to call Lexie Mccain - Animal Control Office [phone] but informed us she will not be back until Monday.   SW assisted with a call to her as well and did leave a message for her to return call to pt ASAP.  SW also asked she update SW as to if they can assist.     Pt again denied any SI/HI. Sw did provided him the Crisis # [phone].  SW offered to help him call them as well but he declined.    Support provided.    NAI has also reviewed case with Alma VILLANUEVA who will also f/u with pt and attempt to assist with applications required on line for surrendering his dog.     CM Team to f/u and assist as indicated.     ADDENDUM:  SW received return calls from [phone] Magdalene Lu [facility]  who shares that they can not assist.  They have no where to go with stray dogs as well. She suggested the [employer] which pt had already called.  NAI also received return call from Edith from the [employer]  at  [facility]  [phone] and they do not cover [place] or [facility], They can not help.  They suggested the [place] [facility] and the Police.    We have called both of these Offices as well.

## 2024-08-15 ENCOUNTER — PATIENT OUTREACH (OUTPATIENT)
Dept: INTERNAL MEDICINE CLINIC | Facility: CLINIC | Age: 59
End: 2024-08-15

## 2024-08-15 NOTE — PROGRESS NOTES
Outgoing call   08/15/2024    CMOC received referral from NAI to assist patient with on- line applications for patient to surrender his American Pit bull dog Mix/ Bully.      CMOC spoke to patient today and pt is unable to speak to me at this time. Pt stated he is safe and his dog is safe. Pt did not want to wake up the dog as dog was sleeping.  Pt will call me later today.     Next outreach is scheduled for 08/16/2024.

## 2024-08-16 ENCOUNTER — PATIENT OUTREACH (OUTPATIENT)
Dept: INTERNAL MEDICINE CLINIC | Facility: CLINIC | Age: 59
End: 2024-08-16

## 2024-08-16 NOTE — PROGRESS NOTES
Outgoing call   08/16/2024    CMOC spoke to patient - pt states he needs to surrender his male dog. Pt states the reason he wants to surrender is because he is too expensive and the dog has a lot of energy. Dog's name is Christiano is he is a pitbull mix who is 7 months old. Christiano (dog) is not aggressive. He is a lovable sweet dog.     Patient has been in communication with [facility] and they will call him as soon as a kennel becomes available.     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 Pt has daily struggles and every day is a different day. CMOC provided supportive listening.     [facility] completed an on-line application for [facility] [email or web address]  today. CMOC updated Christiano's picture.     [facility] at [phone] and left a message.     CMOC call [facility] [phone]- All shelters are full. Per Diana Burrell Pitbulls dogs are not being adopted. She asked me to send her a picture of the dog.     CM[facility] - [phone].     [facility] left message for [facility] at [phone]. Phone states they are not accepting any pets at this time.     Spoke to [facility] at [phone] and they state they do not have space for dogs but they can assist by putting a picture of the dog on the website. CMOC send them a picture of patient dog today.     Next outreach is scheduled for 08/23/2024.

## 2024-08-20 ENCOUNTER — TELEPHONE (OUTPATIENT)
Age: 59
End: 2024-08-20

## 2024-08-20 NOTE — TELEPHONE ENCOUNTER
Pt called and he needs a LYFT ride for this Friday, 08/23/24 at 3:00,  for a CT scan that Dr. Reyes ordered. He is also going to need a LYFT ride for his appt with Dr. Reyes on 09/04/24 at 2:30. Please set these up for the patient

## 2024-08-21 ENCOUNTER — PATIENT OUTREACH (OUTPATIENT)
Dept: INTERNAL MEDICINE CLINIC | Facility: CLINIC | Age: 59
End: 2024-08-21

## 2024-08-21 NOTE — PROGRESS NOTES
SW received an Urgent call from pt who shares that he has received a call back from [employer] and he can joe his dog to them tomorrow at 12:00 PM however he has no way to get the dog there.  He has called some NA contacts and no one is [place].  He is asking if [facility]'s Lyft Program can help but [facility]'s has receive TEXT message from Chelle RAMESH/Transport and they can not accommodate same.  They can not transport animals.  SW has shared same with pt and has suggested he set up his own Lyft/ Urber ride.   Pt shares he does not know how to set up this application.  SW has suggested asking for help at his [place] building.  Pt not pleased re same and disconnected the line.  SW did reach out to [employer] to see if they can  animals [phone] but had to leave a message.    SW has asked if Alma CM can reach out and help him set up Lyft application.  CM to assist as able.

## 2024-08-21 NOTE — PROGRESS NOTES
Outgoing call   08/21/2024    CMOC discussed case with SW today. [facility] is able to take the dog in.   Pt needs to surrender dog at 12 noon on Thurs 08/22. Pt did not have a ride to bring his dog to [place]. Pt was asking NAI Del Castillo for assistance. Please see NAI notes.     CMOC spoke to patient today. Pt states he was able to find a ride to bring dog tomorrow. Pt has a friend who will drive him to [facility]. Pt has to pay friend for the transportation ride.     Pt will call me tomorrow to confirm the dog has been surrendered.

## 2024-08-23 ENCOUNTER — HOSPITAL ENCOUNTER (OUTPATIENT)
Dept: RADIOLOGY | Facility: HOSPITAL | Age: 59
Discharge: HOME | End: 2024-08-23
Attending: SURGERY
Payer: MEDICARE

## 2024-08-23 DIAGNOSIS — K43.9 VENTRAL HERNIA WITHOUT OBSTRUCTION OR GANGRENE: ICD-10-CM

## 2024-08-23 PROCEDURE — 74176 CT ABD & PELVIS W/O CONTRAST: CPT

## 2024-08-29 ENCOUNTER — PATIENT OUTREACH (OUTPATIENT)
Dept: INTERNAL MEDICINE CLINIC | Facility: CLINIC | Age: 59
End: 2024-08-29

## 2024-08-29 NOTE — PROGRESS NOTES
SW has reached out to pt this date and he does confirm he has been able to joe his dog to [facility].Support offered.    Pt also shares he has reached out to a MH Agency and is expecting there paperwork in the mail.  If he needs help with same he will reach out again.     Patient does not have any further questions, concerns, or other needs at this time.  Patient has my contact # and PCP office # if needed.  Social Work to remain available to assist as indicated.    Please re-consult Social Work if needed.

## 2024-08-29 NOTE — PROGRESS NOTES
Note:   08/29/2024    CMOC texted patient today. CMOC informed patient I will be closing the case at this time. Pt can call me or contact NAI Akbar for future resources.     Pt was able to surrender his dog successfully.     CMOC will close case.

## 2024-09-03 ENCOUNTER — NURSE TRIAGE (OUTPATIENT)
Age: 59
End: 2024-09-03

## 2024-09-03 ENCOUNTER — TELEPHONE (OUTPATIENT)
Dept: INTERNAL MEDICINE CLINIC | Facility: CLINIC | Age: 59
End: 2024-09-03

## 2024-09-03 NOTE — TELEPHONE ENCOUNTER
"Pt called in reporting need for Lyft for tomorrow's appt with Dr. Clemons. He has not heard from Lyft yet with pick-up time.     Reason for Disposition   Nursing judgment    Answer Assessment - Initial Assessment Questions  1. REASON FOR CALL or QUESTION: \"What is your reason for calling today?\" or \"How can I best help you?\" or \"What question do you have that I can help answer?\"      Lyft service    Protocols used: Information Only Call - No Triage-ADULT-[place]    "

## 2024-09-04 ENCOUNTER — OFFICE VISIT (OUTPATIENT)
Dept: SURGERY | Facility: CLINIC | Age: 59
End: 2024-09-04
Payer: MEDICARE

## 2024-09-04 VITALS
DIASTOLIC BLOOD PRESSURE: 84 MMHG | RESPIRATION RATE: 14 BRPM | SYSTOLIC BLOOD PRESSURE: 124 MMHG | TEMPERATURE: 97.3 F | WEIGHT: 231.1 LBS | OXYGEN SATURATION: 96 % | BODY MASS INDEX: 37.14 KG/M2 | HEIGHT: 66 IN | HEART RATE: 69 BPM

## 2024-09-04 DIAGNOSIS — K43.9 VENTRAL HERNIA WITHOUT OBSTRUCTION OR GANGRENE: Primary | ICD-10-CM

## 2024-09-04 PROCEDURE — 99213 OFFICE O/P EST LOW 20 MIN: CPT | Performed by: SURGERY

## 2024-09-04 NOTE — ASSESSMENT & PLAN NOTE
I reviewed the CT images with him.  There is a small anterior ventral hernia reported on CT scan but clinically not significant.  He has flank hernias and I told him this is most likely from his abdominal wall reconstruction.  Nothing should really be done with that.  He wears a binder as needed.  He will see us back here if needed.

## 2024-09-04 NOTE — PROGRESS NOTES
"Ambulatory Visit  Name: Haile Downing      : 1965      MRN: [id number]  Encounter Provider: Christiano Clemons MD  Encounter Date: 2024   Encounter department: [facility] GENERAL SURGERY [place]    Assessment & Plan   1. Ventral hernia without obstruction or gangrene  Assessment & Plan:  I reviewed the CT images with him.  There is a small anterior ventral hernia reported on CT scan but clinically not significant.  He has flank hernias and I told him this is most likely from his abdominal wall reconstruction.  Nothing should really be done with that.  He wears a binder as needed.  He will see us back here if needed.      History of Present Illness     Haile Downing is a 59 y.o. male who presents for follow-up after CT scanning.  He was seen in  and ordered a CT scan to evaluate hernias.  He has been trying to lose weight and has no new complaints or problems.    Review of Systems    Objective     /84   Pulse 69   Temp (!) 97.3 °F (36.3 °C) (Temporal)   Resp 14   Ht 5' 6\" (1.676 m)   Wt 105 kg (231 lb 1.6 oz)   SpO2 96%   BMI 37.30 kg/m²     Physical Exam  Abdomen: Soft  Administrative Statements           "

## 2024-09-05 ENCOUNTER — HOSPITAL ENCOUNTER (EMERGENCY)
Facility: HOSPITAL | Age: 59
Discharge: HOME | End: 2024-09-05
Attending: EMERGENCY MEDICINE
Payer: MEDICARE

## 2024-09-05 VITALS
OXYGEN SATURATION: 99 % | RESPIRATION RATE: 18 BRPM | DIASTOLIC BLOOD PRESSURE: 72 MMHG | HEART RATE: 80 BPM | TEMPERATURE: 98.7 F | SYSTOLIC BLOOD PRESSURE: 124 MMHG

## 2024-09-05 DIAGNOSIS — R21 RASH: ICD-10-CM

## 2024-09-05 DIAGNOSIS — G25.81 RESTLESS LEGS: Primary | ICD-10-CM

## 2024-09-05 PROCEDURE — 99283 EMERGENCY DEPT VISIT LOW MDM: CPT

## 2024-09-05 PROCEDURE — 99284 EMERGENCY DEPT VISIT MOD MDM: CPT | Performed by: EMERGENCY MEDICINE

## 2024-09-05 RX ORDER — DIAZEPAM 5 MG
5 TABLET ORAL ONCE
Status: COMPLETED | OUTPATIENT
Start: 2024-09-05 | End: 2024-09-05

## 2024-09-05 RX ORDER — TRIAMCINOLONE ACETONIDE 1 MG/G
CREAM TOPICAL 2 TIMES DAILY
Qty: 30 G | Refills: 0 | Status: SHIPPED | OUTPATIENT
Start: 2024-09-05

## 2024-09-05 RX ADMIN — DIAZEPAM 5 MG: 5 TABLET ORAL at 02:19
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
## 2024-09-05 NOTE — DISCHARGE INSTRUCTIONS
You were seen in the Emergency Department today for restless legs.    Please follow up with your primary care doctor in 1 week.  Please return to the Emergency Department if you experience worsening of your current symptoms or any other concerning symptoms.

## 2024-09-05 NOTE — ED ATTENDING ATTESTATION
9/5/2024  I, Magno Okeefe DO, saw and evaluated the patient. I have discussed the patient with the resident/non-physician practitioner and agree with the resident's/non-physician practitioner's findings, Plan of Care, and MDM as documented in the resident's/non-physician practitioner's note, except where noted. All available labs and Radiology studies were reviewed.  I was present for key portions of any procedure(s) performed by the resident/non-physician practitioner and I was immediately available to provide assistance.       At this point I agree with the current assessment done in the Emergency Department.  I have conducted an independent evaluation of this patient a history and physical is as follows:    Patient is a 59-year-old male with a history of schizoaffective disorder, PTSD, CKD, brought in by EMS.  The patient says that he took his nighttime medications, then 810 tangerines, feels like his legs are restless.  No headache, no fever, no chills, no visual changes.  No chest pain, no palpitations.  He is concerned however that his eye pressure may be elevated because he does have a history of glaucoma.  Says he also got several bug bites on his arms and legs after being in the woods, those are itchy.  EMS indicates patient remained stable during transport    General:  Patient is well-appearing  Head:  Atraumatic  Eyes:  Conjunctiva pink, PERRL, EOMI, neither is cloudy or steamy, there is no hyphema or opiate on either side.  OS IOP 7, OD IOP 6  ENT:  Mucous membranes are moist  Neck:  Supple  Cardiac:  S1-S2, without murmurs  Lungs:  Clear to auscultation bilaterally  Abdomen:  Soft, nontender, normal bowel sounds, no CVA tenderness, no tympany, no rigidity, no guarding  Extremities:  Normal range of motion, patient with multiple excoriated bug bites on his arms and legs.  No purulent drainage or discharge, no cellulitis  Neurologic:  Awake, fluent speech, normal comprehension. AAOx3.  Patient able  to ambulate without difficulty.  No deficit on finger to nose testing, no pronator drift, cranial nerves II through XII are intact, no facial droop, no slurred speech, normal sensation, strength 5/5 in b/l upper & lower extremities.  Skin:  Pink warm and dry  Psychiatric:  Alert, tangential at times, denies SI, denies HI, no AVH          ED Course     Patient has multiple bug bites but no sign of cellulitis.  No sign of acute angle-closure glaucoma.  While patient is somewhat tangential, patient is not suicidal or homicidal, not an obvious danger to himself or others.    DIAGNOSIS:  Acute extremity bug bites, history of schizoaffective disorder    MEDICAL DECISION MAKING CODING    COLLECTION AND INTERPRETATION OF DATA  I reviewed prior external notes, including May 20, 2024 internal medicine office visit          Critical Care Time  Procedures

## 2024-09-09 DIAGNOSIS — I10 ESSENTIAL HYPERTENSION: ICD-10-CM

## 2024-09-10 RX ORDER — AMLODIPINE BESYLATE 5 MG/1
5 TABLET ORAL DAILY
Qty: 90 TABLET | Refills: 0 | Status: SHIPPED | OUTPATIENT
Start: 2024-09-10

## 2024-09-17 ENCOUNTER — TELEPHONE (OUTPATIENT)
Dept: INTERNAL MEDICINE CLINIC | Facility: CLINIC | Age: 59
End: 2024-09-17

## 2024-09-17 ENCOUNTER — TELEPHONE (OUTPATIENT)
Age: 59
End: 2024-09-17

## 2024-09-17 ENCOUNTER — OFFICE VISIT (OUTPATIENT)
Dept: INTERNAL MEDICINE CLINIC | Facility: CLINIC | Age: 59
End: 2024-09-17

## 2024-09-17 ENCOUNTER — PATIENT OUTREACH (OUTPATIENT)
Dept: INTERNAL MEDICINE CLINIC | Facility: CLINIC | Age: 59
End: 2024-09-17

## 2024-09-17 VITALS
WEIGHT: 232 LBS | HEIGHT: 66 IN | HEART RATE: 82 BPM | BODY MASS INDEX: 37.28 KG/M2 | DIASTOLIC BLOOD PRESSURE: 70 MMHG | TEMPERATURE: 98.6 F | SYSTOLIC BLOOD PRESSURE: 109 MMHG

## 2024-09-17 DIAGNOSIS — F19.11 DRUG ABUSE IN REMISSION (HCC): ICD-10-CM

## 2024-09-17 DIAGNOSIS — N18.2 CKD (CHRONIC KIDNEY DISEASE) STAGE 2, GFR 60-89 ML/MIN: ICD-10-CM

## 2024-09-17 DIAGNOSIS — E78.2 MIXED HYPERLIPIDEMIA: Primary | ICD-10-CM

## 2024-09-17 DIAGNOSIS — E11.9 TYPE 2 DIABETES MELLITUS WITHOUT COMPLICATION, WITHOUT LONG-TERM CURRENT USE OF INSULIN (HCC): ICD-10-CM

## 2024-09-17 DIAGNOSIS — F25.0 SCHIZOAFFECTIVE DISORDER, BIPOLAR TYPE (HCC): ICD-10-CM

## 2024-09-17 DIAGNOSIS — F32.A DEPRESSION, UNSPECIFIED DEPRESSION TYPE: ICD-10-CM

## 2024-09-17 PROCEDURE — G2211 COMPLEX E/M VISIT ADD ON: HCPCS | Performed by: STUDENT IN AN ORGANIZED HEALTH CARE EDUCATION/TRAINING PROGRAM

## 2024-09-17 PROCEDURE — 99213 OFFICE O/P EST LOW 20 MIN: CPT | Performed by: STUDENT IN AN ORGANIZED HEALTH CARE EDUCATION/TRAINING PROGRAM

## 2024-09-17 NOTE — TELEPHONE ENCOUNTER
Patient left a message for us to call him back due to his appointment today. Patient is unable to pay for his [employer] ticket. Per Magdalene and Sue we offered the patient a one time lyft ride but he would need to talk to Magdalene to educate him on utilize using the [employer] van and to give him some ride tickets. He said he didn't want to talk to her because he doesn't want a lecture from her, doesn't know what she could possibly say that she hasn't said the past three times and that he will be here on his swollen ankle because he wants to come in and come out with no problems.      Patient declined Lyft ride and would get here on his own
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
## 2024-09-17 NOTE — PROGRESS NOTES
SW has offered to speak with pt this date re transportation and OP MH resources.  Pt has DECLINED to speak with SW.    Clerical team that pt did not have enough money on his [employer] PA[employer] account with [employer] to get her today.   The Office did offer an additional one time free ride but he declined.    Staff did assist him with a L[employer] ride home.  Pt is aware the Office can not give ongoing [employer] rides since he is eligible for [employer].  He is aware he needs to budget for same or to call the office with enough advanced notice to see if we can help him with [employer] Tickets.    SW has called [employer] and he did only have $7.80 on his balance which is not enough for a ride here.  SW has provided him 2 FREE  FARES in [employer] Tickets to gt him to his next appointment here( $8.80 in tickets).  Clinical team ( Gali ) has given same to him.  She also reports pt does NOT need any additional assistance with OP MH.  He has the needed paperwork that he will submit himself.    Patient does not have any further questions, concerns, or other needs at this time.  Patient has my contact # and PCP office # if needed.  Social Work to remain available to assist as indicated.    Please re-consult Social Work if needed.

## 2024-09-17 NOTE — ASSESSMENT & PLAN NOTE
Lab Results   Component Value Date    EGFR 77 07/12/2024    EGFR 96 05/28/2024    EGFR 95 02/07/2024    CREATININE 1.06 07/12/2024    CREATININE 0.88 05/28/2024    CREATININE 0.89 02/07/2024       Orders:    Comprehensive metabolic panel; Future    CBC and differential; Future

## 2024-09-17 NOTE — ASSESSMENT & PLAN NOTE
Lab Results   Component Value Date    HGBA1C 5.8 (H) 05/28/2024       Orders:    Hemoglobin A1C; Future    Albumin / creatinine urine ratio; Future

## 2024-09-18 ENCOUNTER — APPOINTMENT (OUTPATIENT)
Dept: LAB | Facility: CLINIC | Age: 59
End: 2024-09-18
Payer: MEDICARE

## 2024-09-18 ENCOUNTER — TELEPHONE (OUTPATIENT)
Age: 59
End: 2024-09-18

## 2024-09-18 ENCOUNTER — OFFICE VISIT (OUTPATIENT)
Dept: OBGYN CLINIC | Facility: HOSPITAL | Age: 59
End: 2024-09-18
Payer: MEDICARE

## 2024-09-18 VITALS
SYSTOLIC BLOOD PRESSURE: 121 MMHG | WEIGHT: 231 LBS | DIASTOLIC BLOOD PRESSURE: 79 MMHG | HEART RATE: 73 BPM | BODY MASS INDEX: 37.12 KG/M2 | HEIGHT: 66 IN

## 2024-09-18 DIAGNOSIS — E78.2 MIXED HYPERLIPIDEMIA: ICD-10-CM

## 2024-09-18 DIAGNOSIS — M16.11 PRIMARY OSTEOARTHRITIS OF RIGHT HIP: ICD-10-CM

## 2024-09-18 DIAGNOSIS — M16.12 PRIMARY OSTEOARTHRITIS OF LEFT HIP: ICD-10-CM

## 2024-09-18 DIAGNOSIS — M25.562 CHRONIC PAIN OF LEFT KNEE: ICD-10-CM

## 2024-09-18 DIAGNOSIS — M17.12 PRIMARY OSTEOARTHRITIS OF LEFT KNEE: ICD-10-CM

## 2024-09-18 DIAGNOSIS — G89.29 CHRONIC PAIN OF RIGHT KNEE: ICD-10-CM

## 2024-09-18 DIAGNOSIS — M25.561 CHRONIC PAIN OF RIGHT KNEE: ICD-10-CM

## 2024-09-18 DIAGNOSIS — G89.29 CHRONIC PAIN OF LEFT KNEE: ICD-10-CM

## 2024-09-18 DIAGNOSIS — N18.2 CKD (CHRONIC KIDNEY DISEASE) STAGE 2, GFR 60-89 ML/MIN: ICD-10-CM

## 2024-09-18 DIAGNOSIS — M46.1 SACROILIITIS (HCC): ICD-10-CM

## 2024-09-18 DIAGNOSIS — E11.9 TYPE 2 DIABETES MELLITUS WITHOUT COMPLICATION, WITHOUT LONG-TERM CURRENT USE OF INSULIN (HCC): ICD-10-CM

## 2024-09-18 DIAGNOSIS — M17.11 PRIMARY OSTEOARTHRITIS OF RIGHT KNEE: Primary | ICD-10-CM

## 2024-09-18 LAB
CHOLEST SERPL-MCNC: 132 MG/DL
CREAT UR-MCNC: 50.9 MG/DL
EST. AVERAGE GLUCOSE BLD GHB EST-MCNC: 117 MG/DL
HBA1C MFR BLD: 5.7 %
HDLC SERPL-MCNC: 27 MG/DL
LDLC SERPL CALC-MCNC: 74 MG/DL (ref 0–100)
MICROALBUMIN UR-MCNC: <7 MG/L
NONHDLC SERPL-MCNC: 105 MG/DL
TRIGL SERPL-MCNC: 157 MG/DL

## 2024-09-18 PROCEDURE — 82043 UR ALBUMIN QUANTITATIVE: CPT

## 2024-09-18 PROCEDURE — 80061 LIPID PANEL: CPT

## 2024-09-18 PROCEDURE — 83036 HEMOGLOBIN GLYCOSYLATED A1C: CPT

## 2024-09-18 PROCEDURE — 99213 OFFICE O/P EST LOW 20 MIN: CPT | Performed by: ORTHOPAEDIC SURGERY

## 2024-09-18 PROCEDURE — 20610 DRAIN/INJ JOINT/BURSA W/O US: CPT

## 2024-09-18 PROCEDURE — 82570 ASSAY OF URINE CREATININE: CPT

## 2024-09-18 RX ORDER — BUPIVACAINE HYDROCHLORIDE 2.5 MG/ML
2 INJECTION, SOLUTION INFILTRATION; PERINEURAL
Status: COMPLETED | OUTPATIENT
Start: 2024-09-18 | End: 2024-09-18

## 2024-09-18 RX ORDER — BETAMETHASONE SODIUM PHOSPHATE AND BETAMETHASONE ACETATE 3; 3 MG/ML; MG/ML
12 INJECTION, SUSPENSION INTRA-ARTICULAR; INTRALESIONAL; INTRAMUSCULAR; SOFT TISSUE
Status: COMPLETED | OUTPATIENT
Start: 2024-09-18 | End: 2024-09-18

## 2024-09-18 RX ORDER — LIDOCAINE HYDROCHLORIDE 10 MG/ML
2 INJECTION, SOLUTION INFILTRATION; PERINEURAL
Status: COMPLETED | OUTPATIENT
Start: 2024-09-18 | End: 2024-09-18

## 2024-09-18 RX ADMIN — LIDOCAINE HYDROCHLORIDE 2 ML: 10 INJECTION, SOLUTION INFILTRATION; PERINEURAL at 15:15

## 2024-09-18 RX ADMIN — BETAMETHASONE SODIUM PHOSPHATE AND BETAMETHASONE ACETATE 12 MG: 3; 3 INJECTION, SUSPENSION INTRA-ARTICULAR; INTRALESIONAL; INTRAMUSCULAR; SOFT TISSUE at 15:15

## 2024-09-18 RX ADMIN — BUPIVACAINE HYDROCHLORIDE 2 ML: 2.5 INJECTION, SOLUTION INFILTRATION; PERINEURAL at 15:15
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
## 2024-09-18 NOTE — TELEPHONE ENCOUNTER
Caller: Patient     Doctor: Franca    Reason for call: Patient called to confirm is [facility] for his appt. I asked him to please hold on so that I could contact the office and the call was disconnected. I spoke with the office and was informed they will contact the patient.     Call back#: [phone]

## 2024-09-19 ENCOUNTER — TELEPHONE (OUTPATIENT)
Age: 59
End: 2024-09-19

## 2024-09-19 NOTE — TELEPHONE ENCOUNTER
Caller: Patient    Doctor: Franca    Reason for call: Patient asked for a [employer] for his FL injection appointment tomorrow     Call back#: [phone]

## 2024-09-20 ENCOUNTER — HOSPITAL ENCOUNTER (OUTPATIENT)
Dept: RADIOLOGY | Facility: HOSPITAL | Age: 59
Discharge: HOME | End: 2024-09-20
Payer: MEDICARE

## 2024-09-20 DIAGNOSIS — M17.11 PRIMARY OSTEOARTHRITIS OF RIGHT KNEE: ICD-10-CM

## 2024-09-20 DIAGNOSIS — M25.562 CHRONIC PAIN OF LEFT KNEE: ICD-10-CM

## 2024-09-20 DIAGNOSIS — G89.29 CHRONIC PAIN OF LEFT KNEE: ICD-10-CM

## 2024-09-20 DIAGNOSIS — M25.561 CHRONIC PAIN OF RIGHT KNEE: ICD-10-CM

## 2024-09-20 DIAGNOSIS — G89.29 CHRONIC PAIN OF RIGHT KNEE: ICD-10-CM

## 2024-09-20 DIAGNOSIS — M17.12 PRIMARY OSTEOARTHRITIS OF LEFT KNEE: ICD-10-CM

## 2024-09-20 PROCEDURE — 77002 NEEDLE LOCALIZATION BY XRAY: CPT

## 2024-09-20 PROCEDURE — 20610 DRAIN/INJ JOINT/BURSA W/O US: CPT

## 2024-09-20 RX ORDER — LIDOCAINE HYDROCHLORIDE 10 MG/ML
5 INJECTION, SOLUTION EPIDURAL; INFILTRATION; INTRACAUDAL; PERINEURAL ONCE
Status: COMPLETED | OUTPATIENT
Start: 2024-09-20 | End: 2024-09-20

## 2024-09-20 RX ORDER — ROPIVACAINE HYDROCHLORIDE 2 MG/ML
5 INJECTION, SOLUTION EPIDURAL; INFILTRATION; PERINEURAL ONCE
Status: COMPLETED | OUTPATIENT
Start: 2024-09-20 | End: 2024-09-20

## 2024-09-20 RX ADMIN — IOHEXOL 1 ML: 300 INJECTION, SOLUTION INTRAVENOUS at 15:19

## 2024-09-20 RX ADMIN — LIDOCAINE HYDROCHLORIDE 3 ML: 10 INJECTION, SOLUTION EPIDURAL; INFILTRATION; INTRACAUDAL; PERINEURAL at 15:14

## 2024-09-20 RX ADMIN — IOHEXOL 1 ML: 300 INJECTION, SOLUTION INTRAVENOUS at 15:13

## 2024-09-20 RX ADMIN — ROPIVACAINE HYDROCHLORIDE 2 ML: 2 INJECTION EPIDURAL; INFILTRATION; PERINEURAL at 15:20

## 2024-09-20 RX ADMIN — METHYLPREDNISOLONE ACETATE 1 MG: 20 INJECTION, SUSPENSION INTRALESIONAL; INTRAMUSCULAR; INTRASYNOVIAL; SOFT TISSUE at 15:20

## 2024-09-20 RX ADMIN — LIDOCAINE HYDROCHLORIDE 3 ML: 10 INJECTION, SOLUTION EPIDURAL; INFILTRATION; INTRACAUDAL; PERINEURAL at 15:20

## 2024-09-20 RX ADMIN — ROPIVACAINE HYDROCHLORIDE 2 ML: 2 INJECTION EPIDURAL; INFILTRATION; PERINEURAL at 15:14

## 2024-09-20 RX ADMIN — METHYLPREDNISOLONE ACETATE 1 MG: 20 INJECTION, SUSPENSION INTRALESIONAL; INTRAMUSCULAR; INTRASYNOVIAL; SOFT TISSUE at 15:14

## 2024-09-21 ENCOUNTER — APPOINTMENT (EMERGENCY)
Dept: RADIOLOGY | Facility: HOSPITAL | Age: 59
End: 2024-09-21
Payer: MEDICARE

## 2024-09-21 ENCOUNTER — HOSPITAL ENCOUNTER (EMERGENCY)
Facility: HOSPITAL | Age: 59
Discharge: HOME | End: 2024-09-21
Attending: EMERGENCY MEDICINE
Payer: MEDICARE

## 2024-09-21 VITALS
HEART RATE: 68 BPM | OXYGEN SATURATION: 98 % | SYSTOLIC BLOOD PRESSURE: 156 MMHG | RESPIRATION RATE: 16 BRPM | DIASTOLIC BLOOD PRESSURE: 70 MMHG | TEMPERATURE: 98.6 F

## 2024-09-21 DIAGNOSIS — M25.511 RIGHT SHOULDER PAIN: ICD-10-CM

## 2024-09-21 DIAGNOSIS — Z86.69 HISTORY OF GLAUCOMA: Primary | ICD-10-CM

## 2024-09-21 PROCEDURE — 99283 EMERGENCY DEPT VISIT LOW MDM: CPT

## 2024-09-21 PROCEDURE — 73030 X-RAY EXAM OF SHOULDER: CPT

## 2024-09-21 PROCEDURE — 99284 EMERGENCY DEPT VISIT MOD MDM: CPT | Performed by: EMERGENCY MEDICINE

## 2024-09-21 PROCEDURE — 96372 THER/PROPH/DIAG INJ SC/IM: CPT

## 2024-09-21 RX ORDER — LIDOCAINE 50 MG/G
1 PATCH TOPICAL ONCE
Status: DISCONTINUED | OUTPATIENT
Start: 2024-09-21 | End: 2024-09-21 | Stop reason: HOSPADM

## 2024-09-21 RX ORDER — BRIMONIDINE TARTRATE 2 MG/ML
1 SOLUTION/ DROPS OPHTHALMIC ONCE
Status: COMPLETED | OUTPATIENT
Start: 2024-09-21 | End: 2024-09-21

## 2024-09-21 RX ORDER — KETOROLAC TROMETHAMINE 30 MG/ML
15 INJECTION, SOLUTION INTRAMUSCULAR; INTRAVENOUS ONCE
Status: COMPLETED | OUTPATIENT
Start: 2024-09-21 | End: 2024-09-21

## 2024-09-21 RX ORDER — DORZOLAMIDE HYDROCHLORIDE AND TIMOLOL MALEATE 20; 5 MG/ML; MG/ML
1 SOLUTION/ DROPS OPHTHALMIC ONCE
Status: COMPLETED | OUTPATIENT
Start: 2024-09-21 | End: 2024-09-21

## 2024-09-21 RX ORDER — LATANOPROST 50 UG/ML
1 SOLUTION/ DROPS OPHTHALMIC
Status: DISCONTINUED | OUTPATIENT
Start: 2024-09-21 | End: 2024-09-21

## 2024-09-21 RX ORDER — GABAPENTIN 100 MG/1
100 CAPSULE ORAL ONCE
Status: COMPLETED | OUTPATIENT
Start: 2024-09-21 | End: 2024-09-21

## 2024-09-21 RX ORDER — LATANOPROST 50 UG/ML
1 SOLUTION/ DROPS OPHTHALMIC ONCE
Status: COMPLETED | OUTPATIENT
Start: 2024-09-21 | End: 2024-09-21

## 2024-09-21 RX ORDER — ACETAMINOPHEN 325 MG/1
975 TABLET ORAL ONCE
Status: COMPLETED | OUTPATIENT
Start: 2024-09-21 | End: 2024-09-21

## 2024-09-21 RX ADMIN — GABAPENTIN 100 MG: 100 CAPSULE ORAL at 11:56

## 2024-09-21 RX ADMIN — ACETAMINOPHEN 975 MG: 325 TABLET, FILM COATED ORAL at 13:04

## 2024-09-21 RX ADMIN — LATANOPROST 1 DROP: 50 SOLUTION OPHTHALMIC at 15:04

## 2024-09-21 RX ADMIN — DORZOLAMIDE HYDROCHLORIDE AND TIMOLOL MALEATE 1 DROP: 20; 5 SOLUTION/ DROPS OPHTHALMIC at 13:26

## 2024-09-21 RX ADMIN — BRIMONIDINE TARTRATE 1 DROP: 2 SOLUTION/ DROPS OPHTHALMIC at 13:34

## 2024-09-21 RX ADMIN — LIDOCAINE 1 PATCH: 50 PATCH CUTANEOUS at 11:56

## 2024-09-21 RX ADMIN — KETOROLAC TROMETHAMINE 15 MG: 30 INJECTION, SOLUTION INTRAMUSCULAR; INTRAVENOUS at 11:56

## 2024-09-21 NOTE — ED PROVIDER NOTES
1. History of glaucoma    2. Right shoulder pain      ED Disposition       ED Disposition   Discharge    Condition   Stable    Date/Time   Sat Sep 21, 2024  2:10 PM    Comment   Haile Downing discharge to home/self care.                   Assessment & Plan       Medical Decision Making  Patient is a 59-year-old male presenting with right shoulder pain.    Differential includes but not limited to tendinitis, nerve impingement, rotator cuff injury, doubt fracture, doubt dislocation.  Patient has history of glaucoma and cannot afford his drops as an outpatient-will provide here.  X-rays obtained without any acute concerns.  Patient provided a sling for comfort and orthopedic follow-up.    Patient cleared for discharge with return precautions.    Amount and/or Complexity of Data Reviewed  Radiology: ordered.    Risk  OTC drugs.  Prescription drug management.                     Medications   ketorolac (TORADOL) injection 15 mg (15 mg Intramuscular Given 9/21/24 1156)   gabapentin (NEURONTIN) capsule 100 mg (100 mg Oral Given 9/21/24 1156)   acetaminophen (TYLENOL) tablet 975 mg (975 mg Oral Given 9/21/24 1304)   dorzolamide-timolol (COSOPT) 2-0.5 % ophthalmic solution 1 drop (1 drop Both Eyes Given 9/21/24 1326)   brimonidine tartrate 0.2 % ophthalmic solution 1 drop (1 drop Both Eyes Given 9/21/24 1334)   latanoprost (XALATAN) 0.005 % ophthalmic solution 1 drop (1 drop Both Eyes Given 9/21/24 1504)       History of Present Illness       Patient is a 59-year-old male with past medical history of schizoaffective disorder, PTSD, glaucoma presenting for right shoulder pain.  Patient has been collecting railroad tracks and hurt his shoulder 2 days ago.  He feels a constant sharp pain in the right shoulder diffusely.  He states that he has full sensation and strength distal but significant movement difficulties with the shoulder.  Patient also complains that he ran out of his glaucoma drops and is concerned that he is  going to go blind.  He states that he cannot afford his drops at this time.  Patient took naproxen 2 days ago for his symptoms.  Patient requesting Tylenol with codeine.  He denies chest pain, shortness of breath, abdominal pain, nausea, vomiting.        Review of Systems        Objective     ED Triage Vitals [09/21/24 1050]   Temperature Pulse Blood Pressure Respirations SpO2 Patient Position - Orthostatic VS   98.6 °F (37 °C) 78 (!) 145/103 18 100 % Sitting      Temp Source Heart Rate Source BP Location FiO2 (%) Pain Score    Temporal Monitor Left arm -- 10 - Worst Possible Pain        Physical Exam  Constitutional:       General: He is not in acute distress.     Appearance: He is obese. He is not ill-appearing, toxic-appearing or diaphoretic.   HENT:      Head: Normocephalic and atraumatic.   Cardiovascular:      Rate and Rhythm: Normal rate and regular rhythm.      Heart sounds: Normal heart sounds.   Pulmonary:      Effort: Pulmonary effort is normal. No respiratory distress.      Breath sounds: Normal breath sounds.   Abdominal:      Palpations: Abdomen is soft.      Tenderness: There is no abdominal tenderness.   Musculoskeletal:         General: Tenderness (Diffuse over right shoulder) present. No swelling or deformity.      Cervical back: Normal range of motion and neck supple.      Comments: Patient resistant to moving right shoulder but was able to with encouragement.  Range of motion intact elsewhere   Skin:     General: Skin is warm and dry.   Neurological:      Mental Status: He is alert and oriented to person, place, and time.      Sensory: No sensory deficit.      Motor: No weakness.         Labs Reviewed - No data to display  XR shoulder 2+ views RIGHT   Final Interpretation by Genaro Weinstein MD (09/21 2069)      No acute osseous abnormality.         Computerized Assisted Algorithm (CAA) may have been used to analyze all applicable images.         Workstation performed: [id number]              Procedures    ED Medication and Procedure Management   Prior to Admission Medications   Prescriptions Last Dose Informant Patient Reported? Taking?   Lumigan 0.01 % ophthalmic drops  Self Yes No   Sig: INSTILL 1 DROP INTO BOTH EYES IN THE EVENING   QUEtiapine Fumarate (SEROQUEL PO)   Yes No   Sig: Take by mouth daily at bedtime   Patient not taking: Reported on 1/9/2024   acetaminophen (TYLENOL) 500 mg tablet  Self No No   Sig: Take 1 tablet (500 mg total) by mouth every 6 (six) hours as needed for mild pain   amLODIPine (NORVASC) 5 mg tablet   No No   Sig: Take 1 tablet (5 mg total) by mouth daily   brimonidine tartrate 0.2 % ophthalmic solution  Self Yes No   Sig: INSTILL 1 DROP INTO BOTH EYES TWICE A DAY   divalproex sodium (DEPAKOTE) 500 mg DR tablet   No No   Sig: TAKE 3 TABLETS (1,500 MG TOTAL) BY MOUTH EVERY 24 HOURS   dorzolamide-timolol (COSOPT) 22.3-6.8 MG/ML ophthalmic solution  Self No No   Sig: Administer 1 drop to both eyes daily   doxepin (SINEquan) 150 MG capsule   No No   Sig: TAKE 1 CAPSULE BY MOUTH EVERYDAY AT BEDTIME   hydrocortisone 1 % cream  Self No No   Sig: Apply to affected area 2 times daily   latanoprost (XALATAN) 0.005 % ophthalmic solution  Self No No   Sig: Administer 1 drop to both eyes daily   metFORMIN (GLUCOPHAGE) 1000 MG tablet   No No   Sig: TAKE 1 TABLET BY MOUTH TWICE A DAY WITH MEALS   nabumetone (RELAFEN) 750 mg tablet   No No   Sig: Take 1 tablet (750 mg total) by mouth 2 (two) times a day   Patient not taking: Reported on 4/16/2024   naproxen (Naprosyn) 500 mg tablet   No No   Sig: Take 1 tablet (500 mg total) by mouth 2 (two) times a day with meals Do not take at the same time as Nabumetone   Patient not taking: Reported on 4/16/2024   oxyCODONE (Roxicodone) 5 immediate release tablet   No No   Sig: Take 1 tablet (5 mg total) by mouth every 4 (four) hours as needed for moderate pain for up to 15 doses Max Daily Amount: 30 mg   Patient not taking: Reported on  4/16/2024   rOPINIRole (REQUIP) 4 mg tablet   No No   Sig: TAKE 1 TABLET BY MOUTH DAILY AT BEDTIME   rosuvastatin (CRESTOR) 40 MG tablet  Self No No   Sig: Take 1 tablet (40 mg total) by mouth daily   Patient not taking: Reported on 5/7/2024   senna-docusate sodium (SENOKOT-S) 8.6-50 mg per tablet  Self No No   Sig: Take 1 tablet by mouth daily   Patient not taking: Reported on 4/16/2024   travoprost (TRAVATAN-Z) 0.004 % ophthalmic solution   Yes No   triamcinolone (KENALOG) 0.1 % cream   No No   Sig: Apply topically 2 (two) times a day      Facility-Administered Medications: None     Discharge Medication List as of 9/21/2024  2:11 PM        CONTINUE these medications which have NOT CHANGED    Details   acetaminophen (TYLENOL) 500 mg tablet Take 1 tablet (500 mg total) by mouth every 6 (six) hours as needed for mild pain, Starting Thu 11/16/2023, Normal      amLODIPine (NORVASC) 5 mg tablet Take 1 tablet (5 mg total) by mouth daily, Starting Tue 9/10/2024, Normal      brimonidine tartrate 0.2 % ophthalmic solution INSTILL 1 DROP INTO BOTH EYES TWICE A DAY, Historical Med      divalproex sodium (DEPAKOTE) 500 mg DR tablet TAKE 3 TABLETS (1,500 MG TOTAL) BY MOUTH EVERY 24 HOURS, Starting Mon 1/8/2024, Normal      dorzolamide-timolol (COSOPT) 22.3-6.8 MG/ML ophthalmic solution Administer 1 drop to both eyes daily, Starting Mon 9/25/2023, Normal      doxepin (SINEquan) 150 MG capsule TAKE 1 CAPSULE BY MOUTH EVERYDAY AT BEDTIME, Starting Fri 12/22/2023, Normal      hydrocortisone 1 % cream Apply to affected area 2 times daily, Normal      latanoprost (XALATAN) 0.005 % ophthalmic solution Administer 1 drop to both eyes daily, Starting Thu 12/23/2021, Normal      Lumigan 0.01 % ophthalmic drops INSTILL 1 DROP INTO BOTH EYES IN THE EVENING, Historical Med      metFORMIN (GLUCOPHAGE) 1000 MG tablet TAKE 1 TABLET BY MOUTH TWICE A DAY WITH MEALS, Starting Tue 7/9/2024, Normal      nabumetone (RELAFEN) 750 mg tablet Take 1  tablet (750 mg total) by mouth 2 (two) times a day, Starting Tue 8/22/2023, Until Wed 12/27/2023, Normal      naproxen (Naprosyn) 500 mg tablet Take 1 tablet (500 mg total) by mouth 2 (two) times a day with meals Do not take at the same time as Nabumetone, Starting Wed 12/27/2023, Normal      oxyCODONE (Roxicodone) 5 immediate release tablet Take 1 tablet (5 mg total) by mouth every 4 (four) hours as needed for moderate pain for up to 15 doses Max Daily Amount: 30 mg, Starting Wed 12/27/2023, Normal      QUEtiapine Fumarate (SEROQUEL PO) Take by mouth daily at bedtime, Historical Med      rOPINIRole (REQUIP) 4 mg tablet TAKE 1 TABLET BY MOUTH DAILY AT BEDTIME, Starting Wed 7/17/2024, Normal      rosuvastatin (CRESTOR) 40 MG tablet Take 1 tablet (40 mg total) by mouth daily, Starting Fri 12/1/2023, Normal      senna-docusate sodium (SENOKOT-S) 8.6-50 mg per tablet Take 1 tablet by mouth daily, Starting Tue 9/5/2023, Normal      travoprost (TRAVATAN-Z) 0.004 % ophthalmic solution Historical Med      triamcinolone (KENALOG) 0.1 % cream Apply topically 2 (two) times a day, Starting Thu 9/5/2024, Normal                Kamaljit Ronquillo MD  09/25/24 1056

## 2024-09-21 NOTE — DISCHARGE INSTRUCTIONS
Please follow-up with orthopedics-a referral was made for you.  Please return to the ED with new or worsening symptoms-see attached.

## 2024-09-21 NOTE — ED ATTENDING ATTESTATION
"I, Olman Leyva MD, saw and evaluated the patient. I have discussed the patient with the resident and agree with the resident's findings, Plan of Care, and MDM as documented in the resident's note, except where noted. All available labs and Radiology studies were reviewed.  I was present for key portions of any procedure(s) performed by the resident and I was immediately available to provide assistance.    At this point I agree with the current assessment done in the Emergency Department.  I have conducted an independent evaluation of this patient a history and physical is as follows:    58 yo morbidly obese male with a history of polysubstance abuse, glaucoma, CKD, bipolar disorder, DEMETRIA, restless leg syndrome, DM, HTN, and multiple chronic pain disorders presents to the ED complaining of right shoulder pain x 2 days. The patient says he was collecting discarded pieces of railway track in a shopping cart yesterday and injured himself while picking up a \"200 lb piece of steel\". He states that he was trying to gather the track pieces quickly before the \"coyotes and foxes got me\". He was able to haul the shopping cart home but then noticed some pain in the right shoulder. ROM is somewhat limited due to the discomfort. No falls or direct trauma to the area. He denies numbness and weakness. No chest pain, back pain, or shortness of breath. He is adamantly requesting Tylenol with codeine even though he claims to be in recovery. The patient is also requesting that we administer his \"glaucoma drops\" because he ran out this morning. No other specific complaints.    ROS: per resident physician note    Gen: NAD, AA&Ox3  HEENT: PERRL, EOMI  Neck: supple  CV: RRR  Lungs: CTA B/L  Abdomen: soft, NT/ND  Right Shoulder: no swelling or deformity, (+) diffusely tender to palpation, ROM limited by pain, distal sensation and radial pulse intact, normal  strength  Neuro: 5/5 strength all extremities, sensation grossly " intact  Skin: no rash    ED Course  The patient is well appearing with stable vital signs and a benign shoulder examination. Sprain vs occult fracture vs dislocation vs rotator cuff injury? X-rays pending. Toradol, APAP, and Neurontin administered. Medical record reviewed --> prescribed ophthalmic medications ordered. Will continue to monitor in the ED. Disposition per imaging and reassessment.      Critical Care Time  Procedures

## 2024-09-25 NOTE — TELEPHONE ENCOUNTER
Caller: Patient    Doctor: Deepa    Reason for call: Patient states he doesn't have transportation. Questioned how he should get his pain medication post surgery?  Please call patient to advise    Call back#: [phone]   What Type Of Note Output Would You Prefer (Optional)?: Standard Output Hpi Title: Evaluation of Skin Lesions

## 2024-09-30 ENCOUNTER — TELEPHONE (OUTPATIENT)
Dept: OBGYN CLINIC | Facility: HOSPITAL | Age: 59
End: 2024-09-30

## 2024-09-30 NOTE — TELEPHONE ENCOUNTER
Called and spoke with pt and relayed Dr. Newman message, gave pt number for IR. Pt asked what he can do for the knee pain he is having also? Please advise.

## 2024-09-30 NOTE — TELEPHONE ENCOUNTER
Caller: Patient    Doctor: Franca    Reason for call: Patient is having excruciating right hip pain (10/10) after his injection on 9/20. He is unable to put on his pants and is concerned as he's never had pain like this after an injection. Please advise.    Call back#: [phone]

## 2024-09-30 NOTE — TELEPHONE ENCOUNTER
Called and spoke with pt. He states he had both hips injected 9/20/24 and both knees 9/18/24. For 2 days they were fine, no pain. Now the right hip is pain 10/10, it is worse than before the injection. He was unable to assess if any redness or swelling at the injection site. He also states his knees are very painful now too. He is currently taking some Naproxen that he had left over which is giving him some relief but not much. He has also tried ice and heat. He states he never had an injection feel like this he is concerned with how bad the pain is. I advised that sometimes it takes some time for the pain to be relived by injections but he states this is not like anything he has had before. Please review and advise.

## 2024-10-03 DIAGNOSIS — M17.0 PRIMARY OSTEOARTHRITIS OF BOTH KNEES: Primary | ICD-10-CM

## 2024-10-03 NOTE — TELEPHONE ENCOUNTER
Called and spoke with pt, he states IR has still not called back and he is still having a lot of pain. Informed him that an anti inflammatory was sent to his pharmacy, told him he shouldn't take any NSAID's with this med. He stated understanding and was appreciative

## 2024-10-04 NOTE — PROGRESS NOTES
Airway  Date/Time: 10/4/2024 7:59 AM  Urgency: elective    Airway not difficult    General Information and Staff    Patient location during procedure: OR  Anesthesiologist: Ron Daniel MD  Resident/CRNA: Francisco J Lazo APRN - CRNA  Performed: resident/CRNA   Performed by: Francisco J Lazo APRN - CRNA  Authorized by: Ron Daniel MD      Indications and Patient Condition  Indications for airway management: anesthesia and airway protection  Spontaneous Ventilation: absent  Sedation level: deep  Preoxygenated: yes  Patient position: sniffing  MILS not maintained throughout  Mask difficulty assessment: vent by bag mask + OA or adjuvant +/- NMBA    Final Airway Details  Final airway type: endotracheal airway      Successful airway: ETT  Cuffed: yes   Successful intubation technique: video laryngoscopy  Facilitating devices/methods: intubating stylet  Endotracheal tube insertion site: oral  Blade size: #3  ETT size (mm): 8.0  Cormack-Lehane Classification: grade I - full view of glottis  Placement verified by: chest auscultation and capnometry   Measured from: teeth  ETT to teeth (cm): 22  Number of attempts at approach: 1  Ventilation between attempts: bag mask  Number of other approaches attempted: 0    no       Pt denies all psychiatric s/s, however he appears to be depressed and possibly has some anxiety related to being in the hospital still  Pt is preoccupied with pain, which he states is in his right shoulder, but refused medication for same  He has been seclusive to his room  He is hopeful for discharge soon

## 2024-10-11 NOTE — TELEPHONE ENCOUNTER
Caller: Patient     Doctor: Franca    Reason for call: Patient continues to have a lot of pain in left hip. I asked him if he was in contact with IR since they performed the injection and he stated they are not calling him back.     He also stated he is having a lot of rt knee pain and the anti inflammatory did not work.      Call back#: [phone]

## 2024-10-14 ENCOUNTER — APPOINTMENT (EMERGENCY)
Dept: RADIOLOGY | Facility: HOSPITAL | Age: 59
End: 2024-10-14
Payer: MEDICARE

## 2024-10-14 ENCOUNTER — HOSPITAL ENCOUNTER (EMERGENCY)
Facility: HOSPITAL | Age: 59
Discharge: HOME | End: 2024-10-14
Attending: EMERGENCY MEDICINE
Payer: MEDICARE

## 2024-10-14 VITALS
HEART RATE: 70 BPM | SYSTOLIC BLOOD PRESSURE: 140 MMHG | RESPIRATION RATE: 17 BRPM | OXYGEN SATURATION: 99 % | DIASTOLIC BLOOD PRESSURE: 77 MMHG | TEMPERATURE: 97.8 F

## 2024-10-14 DIAGNOSIS — R07.9 CHEST PAIN: Primary | ICD-10-CM

## 2024-10-14 LAB
2HR DELTA HS TROPONIN: -1 NG/L
ANION GAP SERPL CALCULATED.3IONS-SCNC: 6 MMOL/L (ref 4–13)
BASOPHILS # BLD AUTO: 0.01 THOUSANDS/ΜL (ref 0–0.1)
BASOPHILS NFR BLD AUTO: 0 % (ref 0–1)
BUN SERPL-MCNC: 12 MG/DL (ref 5–25)
CALCIUM SERPL-MCNC: 9.1 MG/DL (ref 8.4–10.2)
CARDIAC TROPONIN I PNL SERPL HS: 7 NG/L
CARDIAC TROPONIN I PNL SERPL HS: 8 NG/L
CHLORIDE SERPL-SCNC: 101 MMOL/L (ref 96–108)
CO2 SERPL-SCNC: 28 MMOL/L (ref 21–32)
CREAT SERPL-MCNC: 0.84 MG/DL (ref 0.6–1.3)
EOSINOPHIL # BLD AUTO: 0.05 THOUSAND/ΜL (ref 0–0.61)
EOSINOPHIL NFR BLD AUTO: 1 % (ref 0–6)
ERYTHROCYTE [DISTWIDTH] IN BLOOD BY AUTOMATED COUNT: 13.2 % (ref 11.6–15.1)
GFR SERPL CREATININE-BSD FRML MDRD: 97 ML/MIN/1.73SQ M
GLUCOSE SERPL-MCNC: 104 MG/DL (ref 65–140)
HCT VFR BLD AUTO: 39.7 % (ref 36.5–49.3)
HGB BLD-MCNC: 13.3 G/DL (ref 12–17)
IMM GRANULOCYTES # BLD AUTO: 0.03 THOUSAND/UL (ref 0–0.2)
IMM GRANULOCYTES NFR BLD AUTO: 1 % (ref 0–2)
LYMPHOCYTES # BLD AUTO: 2.72 THOUSANDS/ΜL (ref 0.6–4.47)
LYMPHOCYTES NFR BLD AUTO: 46 % (ref 14–44)
MCH RBC QN AUTO: 31 PG (ref 26.8–34.3)
MCHC RBC AUTO-ENTMCNC: 33.5 G/DL (ref 31.4–37.4)
MCV RBC AUTO: 93 FL (ref 82–98)
MONOCYTES # BLD AUTO: 0.33 THOUSAND/ΜL (ref 0.17–1.22)
MONOCYTES NFR BLD AUTO: 6 % (ref 4–12)
NEUTROPHILS # BLD AUTO: 2.78 THOUSANDS/ΜL (ref 1.85–7.62)
NEUTS SEG NFR BLD AUTO: 46 % (ref 43–75)
NRBC BLD AUTO-RTO: 0 /100 WBCS
PLATELET # BLD AUTO: 162 THOUSANDS/UL (ref 149–390)
PMV BLD AUTO: 10 FL (ref 8.9–12.7)
POTASSIUM SERPL-SCNC: 4.5 MMOL/L (ref 3.5–5.3)
RBC # BLD AUTO: 4.29 MILLION/UL (ref 3.88–5.62)
SODIUM SERPL-SCNC: 135 MMOL/L (ref 135–147)
WBC # BLD AUTO: 5.92 THOUSAND/UL (ref 4.31–10.16)

## 2024-10-14 PROCEDURE — 96375 TX/PRO/DX INJ NEW DRUG ADDON: CPT

## 2024-10-14 PROCEDURE — 96376 TX/PRO/DX INJ SAME DRUG ADON: CPT

## 2024-10-14 PROCEDURE — 99285 EMERGENCY DEPT VISIT HI MDM: CPT | Performed by: EMERGENCY MEDICINE

## 2024-10-14 PROCEDURE — 71045 X-RAY EXAM CHEST 1 VIEW: CPT

## 2024-10-14 PROCEDURE — 36415 COLL VENOUS BLD VENIPUNCTURE: CPT

## 2024-10-14 PROCEDURE — 84484 ASSAY OF TROPONIN QUANT: CPT

## 2024-10-14 PROCEDURE — 85025 COMPLETE CBC W/AUTO DIFF WBC: CPT

## 2024-10-14 PROCEDURE — 99285 EMERGENCY DEPT VISIT HI MDM: CPT

## 2024-10-14 PROCEDURE — 96374 THER/PROPH/DIAG INJ IV PUSH: CPT

## 2024-10-14 PROCEDURE — 80048 BASIC METABOLIC PNL TOTAL CA: CPT

## 2024-10-14 PROCEDURE — 93005 ELECTROCARDIOGRAM TRACING: CPT

## 2024-10-14 PROCEDURE — 96361 HYDRATE IV INFUSION ADD-ON: CPT

## 2024-10-14 RX ORDER — ASPIRIN 81 MG/1
4 TABLET, CHEWABLE ORAL ONCE
Status: COMPLETED | OUTPATIENT
Start: 2024-10-14 | End: 2024-10-14

## 2024-10-14 RX ORDER — DIAZEPAM 10 MG/2ML
5 INJECTION, SOLUTION INTRAMUSCULAR; INTRAVENOUS ONCE
Status: COMPLETED | OUTPATIENT
Start: 2024-10-14 | End: 2024-10-14

## 2024-10-14 RX ORDER — ACETAMINOPHEN 325 MG/1
975 TABLET ORAL ONCE
Status: COMPLETED | OUTPATIENT
Start: 2024-10-14 | End: 2024-10-14

## 2024-10-14 RX ORDER — SODIUM CHLORIDE 9 MG/ML
3 INJECTION INTRAVENOUS
Status: DISCONTINUED | OUTPATIENT
Start: 2024-10-14 | End: 2024-10-15 | Stop reason: HOSPADM

## 2024-10-14 RX ORDER — KETOROLAC TROMETHAMINE 30 MG/ML
15 INJECTION, SOLUTION INTRAMUSCULAR; INTRAVENOUS ONCE
Status: COMPLETED | OUTPATIENT
Start: 2024-10-14 | End: 2024-10-14

## 2024-10-14 RX ADMIN — DIAZEPAM 5 MG: 10 INJECTION, SOLUTION INTRAMUSCULAR; INTRAVENOUS at 21:17

## 2024-10-14 RX ADMIN — KETOROLAC TROMETHAMINE 15 MG: 30 INJECTION, SOLUTION INTRAMUSCULAR; INTRAVENOUS at 18:51

## 2024-10-14 RX ADMIN — ACETAMINOPHEN 975 MG: 325 TABLET ORAL at 18:51

## 2024-10-14 RX ADMIN — DIAZEPAM 5 MG: 10 INJECTION, SOLUTION INTRAMUSCULAR; INTRAVENOUS at 18:51

## 2024-10-14 RX ADMIN — SODIUM CHLORIDE 1000 ML: 0.9 INJECTION, SOLUTION INTRAVENOUS at 20:15

## 2024-10-14 NOTE — ED ATTENDING ATTESTATION
10/14/2024  I, Sudhakar Flood MD, saw and evaluated the patient. I have discussed the patient with the resident/non-physician practitioner and agree with the resident's/non-physician practitioner's findings, Plan of Care, and MDM as documented in the resident's/non-physician practitioner's note, except where noted. All available labs and Radiology studies were reviewed.  I was present for key portions of any procedure(s) performed by the resident/non-physician practitioner and I was immediately available to provide assistance.       At this point I agree with the current assessment done in the Emergency Department.  I have conducted an independent evaluation of this patient a history and physical is as follows:  Lives in group home  bipolar disorder   C/O chest pain   stressful situation   Anxious  no  radiation  no n/v/d/f/c  no cough   Crf  aodm  htn   no family history  non smoker     No leg pain or swelling   EXAM:   Const:   well appearing   NAD     HEENT:  NCAT    sclera anicteric conjunctiva pink   throat clear, MMM    Neck:   supple  no meningismus  no jvd   no bruits  no  midline tenderness   Lungs:   clear  CW non-tender   No creiptation  Heart:   RRR no m/g/r  Normal pulses  Abd:   soft nt nd pos bs   Ext:    normal nontender  No edema  Neruo:   CN 2 -12 intact  motor intact 5/5 sensory intact cerebellar intact       Gait normal    IMPRESSION:  chest Pain  PLAN: cardiac labs EKG      ED Course         Critical Care Time  Procedures
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
## 2024-10-15 ENCOUNTER — TELEPHONE (OUTPATIENT)
Dept: INTERNAL MEDICINE CLINIC | Facility: CLINIC | Age: 59
End: 2024-10-15

## 2024-10-15 LAB
ATRIAL RATE: 77 BPM
ATRIAL RATE: 80 BPM
P AXIS: 78 DEGREES
PR INTERVAL: 166 MS
QRS AXIS: 52 DEGREES
QRS AXIS: 58 DEGREES
QRSD INTERVAL: 76 MS
QRSD INTERVAL: 94 MS
QT INTERVAL: 356 MS
QT INTERVAL: 390 MS
QTC INTERVAL: 402 MS
QTC INTERVAL: 447 MS
T WAVE AXIS: 217 DEGREES
T WAVE AXIS: 33 DEGREES
VENTRICULAR RATE: 77 BPM
VENTRICULAR RATE: 79 BPM

## 2024-10-15 PROCEDURE — 93010 ELECTROCARDIOGRAM REPORT: CPT | Performed by: STUDENT IN AN ORGANIZED HEALTH CARE EDUCATION/TRAINING PROGRAM

## 2024-10-15 NOTE — DISCHARGE INSTRUCTIONS
Your labs and tests did not show anything concerning.  Your chest pain was likely secondary to stress.  Please follow-up with your primary care doctor for persistent symptoms.  You should return to the emergency room if you have severe chest pain that does not go away, if you cannot catch your breath even at rest, or if you feel like you are going to pass out.

## 2024-10-15 NOTE — TELEPHONE ENCOUNTER
Patient called stating he was in the ER yesterday because he had a panic attack. He needs a call back to discuss this. He put a call into his psychiatrist and is expecting a call at noon tomorrow. He then wants to explain to someone what's going on and wants to know if we can call medication in for him.     We offered him two appointments but he doesn't have any money for [employer].     Please advise
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
## 2024-10-28 ENCOUNTER — TELEPHONE (OUTPATIENT)
Dept: OBGYN CLINIC | Facility: MEDICAL CENTER | Age: 59
End: 2024-10-28

## 2024-10-28 NOTE — TELEPHONE ENCOUNTER
Called and spoke w/pt and relayed Dr Schulte's msg.  And pt states that the injection is not working and the Nabumetone is not working.  Pt states that he knows that narcotics are not prescribed any more for pt's in pain.  He feels that calling PCP will be a big run around. Pt has FL injections in Left and Right hip  on 9/20/24.  Nabumetone 750mg 1 twice a day for both knees.  Can take OTC tylenol per label  instruction not exceed 3000mg in 24 hours for pain as well if not contraindicated. Ice/heat to area. Rest. Can offload w/cane/walker. Cannot have injections for 3 months. Pt is concerned that he doesn't get the same PCP when he goes. Pt is having a lot of stress/anxiety/bipolar is flaring up and he is not going to call his PCP.  This is not good for his BP.  Pt asked if he could call back, please. And hung up.

## 2024-10-28 NOTE — TELEPHONE ENCOUNTER
Caller: Haile     Doctor: Dr Schulte    Reason for call: nabumetone (RELAFEN) 750 mg tablet  Was called in for the patient but it is not working.  He is calling due to left hip pain.   He states his hip pain is a 10/ He has been using ice on and off still with pain     Call back#: [phone]

## 2024-10-31 NOTE — TELEPHONE ENCOUNTER
Caller: Haile    Doctor: Dr. Schulte    Reason for call: Patient is calling and asking if he can have an emergency shot. I advised steroid you can only get  every 3 months, he asked why that was. I said I could have someone else explain it to him, I explained that part of it is you can't have steroids that close together.    He said he is looking for another injection and or something for pain. The medication given is not helping and asking what else he can do. He is aware he spoke to someone earlier.     I advised I would leave the message but could not promise the physician would do this early.     Call back#: [phone]

## 2024-10-31 NOTE — TELEPHONE ENCOUNTER
Called and spoke with pt, advised that previously he was advised to contact PCP. He states the PCP advised him to take Tylenol which isnt helping at all. He is upset and tired of being in pain 10/10 in his hips and knees. His mom is sick and he needs to go to [place] and take care of her. He is again just requesting help with his pain. He was asking if Toradol would be an option, please advise.Thanks!

## 2024-11-05 NOTE — TELEPHONE ENCOUNTER
Caller: Haile    Doctor: Franca    Reason for call: Patient is calling back again. Stated he is having hip pain and can barely walk. Stated he did call pain management and no showed for 3 scheduled appointments and was informed he can not schedule again. He also called his PCP and stated they were not helpful. He is asking if medication can be sent to his pharmacy to help with his pain.     [place] [id number]  [phone]    Call back#: [phone]

## 2024-11-05 NOTE — TELEPHONE ENCOUNTER
"Called and spoke w/pt and relayed Dr Franca kimball. He states he's called his PCP.  He \"no showed\" 3 times for pain management and they will not see him any longer. Pt is currently in [place] at this time and cannot prescribe narcotics and they do not do paper prescriptions for narcotics either. If pt is having severe pain, he should go to the ED.  His mom has given him an ice pack and that helped. Can use a cane to  offload. Continue anti-inflammatories as prescribed and Tylenol not more than 3000mg in 24hour period.  Take meds w/food to help w/any GI upset.  Next appt 12/18/24.   "

## 2024-11-18 ENCOUNTER — PATIENT OUTREACH (OUTPATIENT)
Dept: INTERNAL MEDICINE CLINIC | Facility: CLINIC | Age: 59
End: 2024-11-18

## 2024-11-18 DIAGNOSIS — Z78.9 NEEDS ASSISTANCE WITH COMMUNITY RESOURCES: Primary | ICD-10-CM

## 2024-11-18 NOTE — PROGRESS NOTES
SW has received call from pt who shares that he has had a lot of things going on with family and hehas been short on funds.  Pt is adsking for assistance with funding for [employer] Tickets for his eye intake appointment with [facility]  [address] [place] [address].  [phone].  We has called them to confirm their address and appointment time and location.  Pt later found this info and Rylee assisted wti a call to [employer] [phone].    Pt booked his ride for Tuesday 11/26/24 3 pm.   ( Pt to arrive for 2:30 pm and be picked up at 4 PM).    Pt will need $ 4.40 each way .  SW will assist with funding these tickets for this appointment only.  Pt will then budget next month for his f/u eye and  Medical appointments.    Sw to ask [facility] to get these [employer] tickets to his APT this week ,. Pt resides at [facility] [address] [place] [address].  He shares the the   [place] is Ms. [place]  [phone])   [facility] to get tickets to pt or the  or at his apt for same.     Pt shares he continues to be drug free.   Praise provided.  Pt is also active with monthly MH virtual appointments.  Pt does not remember the name of who they are ( ? [facility] as Sw gave him there contact # previously )   Pt shares he is doing well with them.  No other issues indicated at present.   CM team to assist with same.

## 2024-11-20 ENCOUNTER — TELEPHONE (OUTPATIENT)
Dept: INTERNAL MEDICINE CLINIC | Facility: CLINIC | Age: 59
End: 2024-11-20

## 2024-11-20 NOTE — TELEPHONE ENCOUNTER
Received voicemail - patient is requesting a return call from Bri regarding getting some [employer] bus tickets so that he could go to his eye doctor appointment on Tuesday

## 2024-11-21 ENCOUNTER — PATIENT OUTREACH (OUTPATIENT)
Dept: INTERNAL MEDICINE CLINIC | Facility: CLINIC | Age: 59
End: 2024-11-21

## 2024-11-21 ENCOUNTER — APPOINTMENT (EMERGENCY)
Dept: RADIOLOGY | Facility: HOSPITAL | Age: 59
End: 2024-11-21
Payer: MEDICARE

## 2024-11-21 ENCOUNTER — HOSPITAL ENCOUNTER (EMERGENCY)
Facility: HOSPITAL | Age: 59
Discharge: HOME | End: 2024-11-21
Attending: EMERGENCY MEDICINE
Payer: MEDICARE

## 2024-11-21 VITALS
TEMPERATURE: 97.8 F | OXYGEN SATURATION: 99 % | RESPIRATION RATE: 20 BRPM | DIASTOLIC BLOOD PRESSURE: 101 MMHG | SYSTOLIC BLOOD PRESSURE: 163 MMHG | HEART RATE: 81 BPM

## 2024-11-21 DIAGNOSIS — M79.645 FINGER PAIN, LEFT: ICD-10-CM

## 2024-11-21 DIAGNOSIS — H40.9 GLAUCOMA: Primary | ICD-10-CM

## 2024-11-21 PROCEDURE — 73140 X-RAY EXAM OF FINGER(S): CPT

## 2024-11-21 PROCEDURE — 99284 EMERGENCY DEPT VISIT MOD MDM: CPT | Performed by: EMERGENCY MEDICINE

## 2024-11-21 PROCEDURE — 99284 EMERGENCY DEPT VISIT MOD MDM: CPT

## 2024-11-21 RX ORDER — ACETAMINOPHEN 325 MG/1
975 TABLET ORAL ONCE
Status: COMPLETED | OUTPATIENT
Start: 2024-11-21 | End: 2024-11-21

## 2024-11-21 RX ORDER — BIMATOPROST 0.3 MG/ML
1 SOLUTION/ DROPS OPHTHALMIC ONCE
Status: COMPLETED | OUTPATIENT
Start: 2024-11-21 | End: 2024-11-21

## 2024-11-21 RX ORDER — IBUPROFEN 400 MG/1
400 TABLET, FILM COATED ORAL ONCE
Status: COMPLETED | OUTPATIENT
Start: 2024-11-21 | End: 2024-11-21

## 2024-11-21 RX ORDER — TRAVOPROST OPHTHALMIC SOLUTION 0.04 MG/ML
1 SOLUTION OPHTHALMIC ONCE
Status: COMPLETED | OUTPATIENT
Start: 2024-11-21 | End: 2024-11-21

## 2024-11-21 RX ORDER — LATANOPROST 50 UG/ML
1 SOLUTION/ DROPS OPHTHALMIC ONCE
Status: COMPLETED | OUTPATIENT
Start: 2024-11-21 | End: 2024-11-21

## 2024-11-21 RX ADMIN — LATANOPROST 1 DROP: 50 SOLUTION OPHTHALMIC at 06:45

## 2024-11-21 RX ADMIN — TRAVOPROST 1 DROP: 0.04 SOLUTION OPHTHALMIC at 06:45

## 2024-11-21 RX ADMIN — IBUPROFEN 400 MG: 400 TABLET, FILM COATED ORAL at 06:27

## 2024-11-21 RX ADMIN — ACETAMINOPHEN 975 MG: 325 TABLET, FILM COATED ORAL at 06:27

## 2024-11-21 RX ADMIN — BIMATOPROST 1 DROP: 0.3 SOLUTION/ DROPS OPHTHALMIC at 06:45
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
## 2024-11-21 NOTE — PROGRESS NOTES
Outgoing call   11/21/2024    [facility] received referral from NAI Akbar to drop [employer] tickets to patient.     [facility]OC along with NAI Akbar spoke to patient on this date. Pt states [facility] can drop [employer] tickets today.     Pt had ER visit today in [facility]. CMOC spoke to patient again to make sure he would be home and not in hospital. Pt states he is very tired as he came back from hospital at 6:am. Pt wants me to drop tickets tomorrow Friday 11/22/2024 at 11:30 am.     Next outreach is schedule for Friday 11/22/2024.

## 2024-11-22 ENCOUNTER — PATIENT OUTREACH (OUTPATIENT)
Dept: INTERNAL MEDICINE CLINIC | Facility: CLINIC | Age: 59
End: 2024-11-22
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
## 2024-11-22 NOTE — PROGRESS NOTES
Outgoing call  11/22/2024    [facility] met with patient at his home today. CMOC gave patient [employer] tickets send by [employer].     Pt is very grateful and sends his regards to [employer] Akbar.     Pt states he will call [employer] on 12/01 and add money to [employer].     At this time all goals have been completed. CMOC will close the case.

## 2024-11-26 ENCOUNTER — PATIENT OUTREACH (OUTPATIENT)
Dept: INTERNAL MEDICINE CLINIC | Facility: CLINIC | Age: 59
End: 2024-11-26

## 2024-11-26 NOTE — PROGRESS NOTES
NAI hs received an IN BASKET update from [facility] that pt has received his donated [employer] Tickets and he will be adding money to his [employer] account for future trips.    Patient does not have any further questions, concerns, or other needs at this time.  Patient has my contact # and PCP office # if needed.  Social Work to remain available to assist as indicated.    Please re-consult Social Work if needed.

## 2024-12-05 ENCOUNTER — CONSULT (OUTPATIENT)
Dept: INTERNAL MEDICINE CLINIC | Facility: CLINIC | Age: 59
End: 2024-12-05

## 2024-12-05 ENCOUNTER — TELEPHONE (OUTPATIENT)
Dept: INTERNAL MEDICINE CLINIC | Facility: CLINIC | Age: 59
End: 2024-12-05

## 2024-12-05 VITALS
SYSTOLIC BLOOD PRESSURE: 142 MMHG | WEIGHT: 231 LBS | HEART RATE: 70 BPM | TEMPERATURE: 98.1 F | BODY MASS INDEX: 37.28 KG/M2 | DIASTOLIC BLOOD PRESSURE: 90 MMHG

## 2024-12-05 DIAGNOSIS — Z01.818 PREOPERATIVE EXAMINATION: Primary | ICD-10-CM

## 2024-12-05 DIAGNOSIS — I10 ESSENTIAL HYPERTENSION: ICD-10-CM

## 2024-12-05 DIAGNOSIS — E78.2 MIXED HYPERLIPIDEMIA: ICD-10-CM

## 2024-12-05 RX ORDER — ROSUVASTATIN CALCIUM 40 MG/1
40 TABLET, COATED ORAL DAILY
Qty: 90 TABLET | Refills: 3 | Status: SHIPPED | OUTPATIENT
Start: 2024-12-05

## 2024-12-05 RX ORDER — AMLODIPINE BESYLATE 5 MG/1
10 TABLET ORAL DAILY
Qty: 360 TABLET | Refills: 0 | Status: SHIPPED | OUTPATIENT
Start: 2024-12-05 | End: 2025-06-03
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
## 2024-12-05 NOTE — TELEPHONE ENCOUNTER
Folder Color-Red    Name of Form-[facility]    Form to be filled out by-Dr.S Beltre    Form to be given to patient    Patient made aware of 10 business day policy.

## 2024-12-06 NOTE — TELEPHONE ENCOUNTER
Form filled out and placed in RED clerical folder. (Patient took originals of the other pages that went with this form)

## 2024-12-16 RX ORDER — ACETAZOLAMIDE 250 MG/1
TABLET ORAL
COMMUNITY
Start: 2024-11-26

## 2024-12-16 RX ORDER — HYDROXYZINE PAMOATE 25 MG/1
CAPSULE ORAL
COMMUNITY
Start: 2024-11-13

## 2024-12-16 RX ORDER — OFLOXACIN 3 MG/ML
SOLUTION/ DROPS OPHTHALMIC
COMMUNITY
Start: 2024-11-27

## 2024-12-18 ENCOUNTER — OFFICE VISIT (OUTPATIENT)
Dept: OBGYN CLINIC | Facility: HOSPITAL | Age: 59
End: 2024-12-18
Payer: MEDICARE

## 2024-12-18 VITALS — HEIGHT: 66 IN | WEIGHT: 225 LBS | BODY MASS INDEX: 36.16 KG/M2

## 2024-12-18 DIAGNOSIS — M16.12 PRIMARY OSTEOARTHRITIS OF LEFT HIP: ICD-10-CM

## 2024-12-18 DIAGNOSIS — M16.11 PRIMARY OSTEOARTHRITIS OF RIGHT HIP: ICD-10-CM

## 2024-12-18 DIAGNOSIS — M17.11 PRIMARY OSTEOARTHRITIS OF RIGHT KNEE: Primary | ICD-10-CM

## 2024-12-18 DIAGNOSIS — M25.562 CHRONIC PAIN OF LEFT KNEE: ICD-10-CM

## 2024-12-18 DIAGNOSIS — G89.29 CHRONIC PAIN OF RIGHT KNEE: ICD-10-CM

## 2024-12-18 DIAGNOSIS — M17.12 PRIMARY OSTEOARTHRITIS OF LEFT KNEE: ICD-10-CM

## 2024-12-18 DIAGNOSIS — G89.29 CHRONIC PAIN OF LEFT KNEE: ICD-10-CM

## 2024-12-18 DIAGNOSIS — M25.561 CHRONIC PAIN OF RIGHT KNEE: ICD-10-CM

## 2024-12-18 PROCEDURE — 20610 DRAIN/INJ JOINT/BURSA W/O US: CPT

## 2024-12-18 PROCEDURE — 99213 OFFICE O/P EST LOW 20 MIN: CPT | Performed by: ORTHOPAEDIC SURGERY

## 2024-12-18 RX ORDER — PREDNISOLONE ACETATE 10 MG/ML
SUSPENSION/ DROPS OPHTHALMIC
COMMUNITY
Start: 2024-12-09

## 2024-12-18 RX ORDER — BETAMETHASONE SODIUM PHOSPHATE AND BETAMETHASONE ACETATE 3; 3 MG/ML; MG/ML
12 INJECTION, SUSPENSION INTRA-ARTICULAR; INTRALESIONAL; INTRAMUSCULAR; SOFT TISSUE
Status: COMPLETED | OUTPATIENT
Start: 2024-12-18 | End: 2024-12-18

## 2024-12-18 RX ORDER — LIDOCAINE HYDROCHLORIDE 10 MG/ML
4 INJECTION, SOLUTION INFILTRATION; PERINEURAL
Status: COMPLETED | OUTPATIENT
Start: 2024-12-18 | End: 2024-12-18

## 2024-12-18 RX ADMIN — BETAMETHASONE SODIUM PHOSPHATE AND BETAMETHASONE ACETATE 12 MG: 3; 3 INJECTION, SUSPENSION INTRA-ARTICULAR; INTRALESIONAL; INTRAMUSCULAR; SOFT TISSUE at 14:45

## 2024-12-18 RX ADMIN — LIDOCAINE HYDROCHLORIDE 4 ML: 10 INJECTION, SOLUTION INFILTRATION; PERINEURAL at 14:45
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
## 2024-12-19 NOTE — NURSING NOTE
Call placed to patient to discuss upcoming appointment at [facility] radiology department and complete consultation with patient. Patient is having a bilateral hip CSI utilizing fluoroscopy  guidance. Reviewed  patient's allergies, no current anticoagulant medication present per patient, patient has had procedure in the past, no questions when asked if any questions or concerns. Patient made aware of need for  post procedure if anti anxiety medication is taken, per patient he will not be taking any medications to prevent him from driving.Reminded patient of location and time expected for procedure, Patient expressed understanding by verbalizing and repeating instructions.

## 2024-12-25 DIAGNOSIS — E11.9 TYPE 2 DIABETES MELLITUS WITHOUT COMPLICATION, WITHOUT LONG-TERM CURRENT USE OF INSULIN (HCC): ICD-10-CM

## 2024-12-27 ENCOUNTER — TELEPHONE (OUTPATIENT)
Age: 59
End: 2024-12-27

## 2024-12-30 ENCOUNTER — HOSPITAL ENCOUNTER (OUTPATIENT)
Dept: RADIOLOGY | Facility: HOSPITAL | Age: 59
Discharge: HOME | End: 2024-12-30
Payer: MEDICARE

## 2024-12-30 ENCOUNTER — TELEPHONE (OUTPATIENT)
Dept: OBGYN CLINIC | Facility: HOSPITAL | Age: 59
End: 2024-12-30

## 2024-12-30 DIAGNOSIS — M16.11 PRIMARY OSTEOARTHRITIS OF RIGHT HIP: ICD-10-CM

## 2024-12-30 DIAGNOSIS — M16.12 PRIMARY OSTEOARTHRITIS OF LEFT HIP: ICD-10-CM

## 2024-12-30 PROCEDURE — 20610 DRAIN/INJ JOINT/BURSA W/O US: CPT

## 2024-12-30 PROCEDURE — 77002 NEEDLE LOCALIZATION BY XRAY: CPT

## 2024-12-30 RX ORDER — LIDOCAINE HYDROCHLORIDE 10 MG/ML
5 INJECTION, SOLUTION EPIDURAL; INFILTRATION; INTRACAUDAL; PERINEURAL
Status: COMPLETED | OUTPATIENT
Start: 2024-12-30 | End: 2024-12-30

## 2024-12-30 RX ORDER — METHYLPREDNISOLONE ACETATE 80 MG/ML
80 INJECTION, SUSPENSION INTRA-ARTICULAR; INTRALESIONAL; INTRAMUSCULAR; SOFT TISSUE
Status: COMPLETED | OUTPATIENT
Start: 2024-12-30 | End: 2024-12-30

## 2024-12-30 RX ORDER — ROPIVACAINE HYDROCHLORIDE 2 MG/ML
10 INJECTION, SOLUTION EPIDURAL; INFILTRATION; PERINEURAL ONCE
Status: COMPLETED | OUTPATIENT
Start: 2024-12-30 | End: 2024-12-30

## 2024-12-30 RX ADMIN — METHYLPREDNISOLONE ACETATE 80 MG: 80 INJECTION, SUSPENSION INTRA-ARTICULAR; INTRALESIONAL; INTRAMUSCULAR; SOFT TISSUE at 16:05

## 2024-12-30 RX ADMIN — LIDOCAINE HYDROCHLORIDE 5 ML: 10 INJECTION, SOLUTION EPIDURAL; INFILTRATION; INTRACAUDAL; PERINEURAL at 16:05

## 2024-12-30 RX ADMIN — ROPIVACAINE HYDROCHLORIDE 2 ML/HR: 2 INJECTION, SOLUTION EPIDURAL; INFILTRATION at 16:05

## 2024-12-30 RX ADMIN — LIDOCAINE HYDROCHLORIDE 5 ML: 10 INJECTION, SOLUTION EPIDURAL; INFILTRATION; INTRACAUDAL; PERINEURAL at 16:07

## 2024-12-30 RX ADMIN — IOHEXOL 1 ML: 300 INJECTION, SOLUTION INTRAVENOUS at 16:03

## 2024-12-30 RX ADMIN — ROPIVACAINE HYDROCHLORIDE 2 ML/HR: 2 INJECTION, SOLUTION EPIDURAL; INFILTRATION at 16:08

## 2024-12-30 RX ADMIN — METHYLPREDNISOLONE ACETATE 80 MG: 80 INJECTION, SUSPENSION INTRA-ARTICULAR; INTRALESIONAL; INTRAMUSCULAR; SOFT TISSUE at 16:07

## 2024-12-30 NOTE — TELEPHONE ENCOUNTER
Caller: Patient    Doctor: Franca    Reason for call: Patient calling to check on his Ly[employer] ride for his procedure today for nonarthrogram hip injection. I called Ortho office and spoke w/Nat and they said they can schedule but patient needs to let them know prior. Advised there was a message in from 12/27/24 but it was routed to [facility] instead of ortho. She is going to try and call to get patient a ride for today.    Call back#: [phone]

## 2024-12-30 NOTE — TELEPHONE ENCOUNTER
This patient was discharged from our practice back in 2021. He has an appt at the hospital. Please advise

## 2024-12-31 ENCOUNTER — TELEPHONE (OUTPATIENT)
Dept: OBGYN CLINIC | Facility: MEDICAL CENTER | Age: 59
End: 2024-12-31

## 2024-12-31 NOTE — TELEPHONE ENCOUNTER
Please see pt message regarding hip injections and review and advise pt. Thank you.     Pt's CB # [phone]

## 2024-12-31 NOTE — TELEPHONE ENCOUNTER
Caller: Haile     Doctor: Dr Schulte     Reason for call:  FL US guided injections to bilateral hips  Done 12/30 .    Patient had injections done yesterday in [place] hips. His right hip is very painful when he gets up. He believes the tech hit a nerve.   He has not been able to sleep. He states he has had this done but it never felt like it did yesterday.      Please call to advise. He would like call call back please.      Call back#: [phone]

## 2025-01-10 ENCOUNTER — OFFICE VISIT (OUTPATIENT)
Dept: INTERNAL MEDICINE CLINIC | Facility: CLINIC | Age: 60
End: 2025-01-10

## 2025-01-10 VITALS
OXYGEN SATURATION: 98 % | TEMPERATURE: 98 F | SYSTOLIC BLOOD PRESSURE: 153 MMHG | BODY MASS INDEX: 37.61 KG/M2 | WEIGHT: 233 LBS | HEART RATE: 72 BPM | DIASTOLIC BLOOD PRESSURE: 92 MMHG

## 2025-01-10 DIAGNOSIS — E11.9 TYPE 2 DIABETES MELLITUS WITHOUT COMPLICATION, WITHOUT LONG-TERM CURRENT USE OF INSULIN (HCC): ICD-10-CM
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
## 2025-01-10 DIAGNOSIS — M17.0 PRIMARY OSTEOARTHRITIS OF BOTH KNEES: ICD-10-CM

## 2025-01-10 LAB — SL AMB POCT GLUCOSE BLD: 107

## 2025-01-10 NOTE — ASSESSMENT & PLAN NOTE
Lab Results   Component Value Date    HGBA1C 5.7 (H) 09/18/2024       Orders:    POCT blood glucose
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
## 2025-01-17 ENCOUNTER — EVALUATION (OUTPATIENT)
Dept: PHYSICAL THERAPY | Facility: REHABILITATION | Age: 60
End: 2025-01-17
Payer: MEDICARE

## 2025-01-17 DIAGNOSIS — G89.29 CHRONIC LEFT SHOULDER PAIN: ICD-10-CM

## 2025-01-17 DIAGNOSIS — M25.512 CHRONIC LEFT SHOULDER PAIN: ICD-10-CM
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
## 2025-01-17 DIAGNOSIS — M17.0 PRIMARY OSTEOARTHRITIS OF BOTH KNEES: ICD-10-CM

## 2025-01-17 PROCEDURE — 97161 PT EVAL LOW COMPLEX 20 MIN: CPT

## 2025-01-17 PROCEDURE — 97112 NEUROMUSCULAR REEDUCATION: CPT
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
## 2025-01-23 ENCOUNTER — APPOINTMENT (OUTPATIENT)
Dept: PHYSICAL THERAPY | Facility: REHABILITATION | Age: 60
End: 2025-01-23
Payer: MEDICARE

## 2025-01-25 ENCOUNTER — HOSPITAL ENCOUNTER (EMERGENCY)
Facility: HOSPITAL | Age: 60
Discharge: HOME | End: 2025-01-25
Attending: EMERGENCY MEDICINE | Admitting: EMERGENCY MEDICINE
Payer: MEDICARE

## 2025-01-25 VITALS
TEMPERATURE: 98.3 F | OXYGEN SATURATION: 97 % | HEART RATE: 92 BPM | RESPIRATION RATE: 18 BRPM | DIASTOLIC BLOOD PRESSURE: 98 MMHG | SYSTOLIC BLOOD PRESSURE: 148 MMHG | BODY MASS INDEX: 37.08 KG/M2 | WEIGHT: 229.72 LBS

## 2025-01-25 DIAGNOSIS — K05.30 PERIODONTITIS: Primary | ICD-10-CM

## 2025-01-25 PROCEDURE — 99284 EMERGENCY DEPT VISIT MOD MDM: CPT | Performed by: EMERGENCY MEDICINE

## 2025-01-25 PROCEDURE — 64400 NJX AA&/STRD TRIGEMINAL NRV: CPT | Performed by: EMERGENCY MEDICINE

## 2025-01-25 PROCEDURE — 96372 THER/PROPH/DIAG INJ SC/IM: CPT

## 2025-01-25 PROCEDURE — 99282 EMERGENCY DEPT VISIT SF MDM: CPT

## 2025-01-25 RX ORDER — BUPIVACAINE HYDROCHLORIDE 5 MG/ML
10 INJECTION, SOLUTION EPIDURAL; INTRACAUDAL ONCE
Status: COMPLETED | OUTPATIENT
Start: 2025-01-25 | End: 2025-01-25

## 2025-01-25 RX ORDER — KETOROLAC TROMETHAMINE 30 MG/ML
15 INJECTION, SOLUTION INTRAMUSCULAR; INTRAVENOUS ONCE
Status: COMPLETED | OUTPATIENT
Start: 2025-01-25 | End: 2025-01-25

## 2025-01-25 RX ORDER — ACETAMINOPHEN 325 MG/1
650 TABLET ORAL ONCE
Status: COMPLETED | OUTPATIENT
Start: 2025-01-25 | End: 2025-01-25

## 2025-01-25 RX ADMIN — BUPIVACAINE HYDROCHLORIDE 10 ML: 5 INJECTION, SOLUTION EPIDURAL; INTRACAUDAL; PERINEURAL at 12:12

## 2025-01-25 RX ADMIN — ACETAMINOPHEN 650 MG: 325 TABLET, FILM COATED ORAL at 10:17

## 2025-01-25 RX ADMIN — KETOROLAC TROMETHAMINE 15 MG: 30 INJECTION, SOLUTION INTRAMUSCULAR; INTRAVENOUS at 11:49

## 2025-01-25 NOTE — Clinical Note
Haile Downing was seen and treated in our emergency department on 1/25/2025.        Other - See Comments        Diagnosis:     Haile  may return to work on return date.    He may return on this date: 01/27/2025         If you have any questions or concerns, please don't hesitate to call.      Magno Marte, DO    ______________________________           _______________          _______________  Hospital Representative                              Date                                Time

## 2025-01-25 NOTE — ED ATTENDING ATTESTATION
1/25/2025  I, Sudhakar Flood MD, saw and evaluated the patient. I have discussed the patient with the resident/non-physician practitioner and agree with the resident's/non-physician practitioner's findings, Plan of Care, and MDM as documented in the resident's/non-physician practitioner's note, except where noted. All available labs and Radiology studies were reviewed.  I was present for key portions of any procedure(s) performed by the resident/non-physician practitioner and I was immediately available to provide assistance.       At this point I agree with the current assessment done in the Emergency Department.  I have conducted an independent evaluation of this patient a history and physical is as follows:  Tooth 11 painful   no facial swelling   no fever   No abscess noted  Dental block penicillin chlorhexidine  ED Course         Critical Care Time  Procedures

## 2025-01-25 NOTE — DISCHARGE INSTRUCTIONS
You were evaluated in the emergency department for tooth pain.  Based on your physical exam, you most likely have periodontal disease of the 11th tooth, and have been provided with oral antibiotics to prevent a potential worse infection.  You should schedule an appointment with a dentist for evaluation, and have been provided with a referral to dentistry to facilitate this.  Please return to the emergency department if you should experience fevers, intractable pain, intractable nausea and vomiting, difficulty breathing or swallowing, chest pain, or any other concerning signs or symptoms.
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
## 2025-02-03 ENCOUNTER — TELEPHONE (OUTPATIENT)
Dept: INTERNAL MEDICINE CLINIC | Facility: CLINIC | Age: 60
End: 2025-02-03

## 2025-02-17 ENCOUNTER — TELEPHONE (OUTPATIENT)
Dept: INTERNAL MEDICINE CLINIC | Facility: CLINIC | Age: 60
End: 2025-02-17

## 2025-02-17 NOTE — TELEPHONE ENCOUNTER
Pt is requesting all routine lab work. He said that his insurance allows him to have labs done twice a year and he is due in March.

## 2025-02-19 DIAGNOSIS — N18.2 CKD (CHRONIC KIDNEY DISEASE) STAGE 2, GFR 60-89 ML/MIN: ICD-10-CM

## 2025-02-19 DIAGNOSIS — I10 ESSENTIAL HYPERTENSION: Primary | ICD-10-CM

## 2025-02-19 DIAGNOSIS — E11.9 TYPE 2 DIABETES MELLITUS WITHOUT COMPLICATION, WITHOUT LONG-TERM CURRENT USE OF INSULIN (HCC): ICD-10-CM

## 2025-02-19 NOTE — TELEPHONE ENCOUNTER
Called and spoke to patient. Patient made aware as per note and understood. Patient had further questions at this time.

## 2025-02-25 DIAGNOSIS — E11.9 TYPE 2 DIABETES MELLITUS WITHOUT COMPLICATION, WITHOUT LONG-TERM CURRENT USE OF INSULIN (HCC): ICD-10-CM

## 2025-02-25 DIAGNOSIS — E78.2 MIXED HYPERLIPIDEMIA: ICD-10-CM

## 2025-02-25 RX ORDER — ACETAZOLAMIDE 250 MG/1
250 TABLET ORAL DAILY
Qty: 90 TABLET | Refills: 1 | Status: CANCELLED | OUTPATIENT
Start: 2025-02-25

## 2025-02-25 RX ORDER — ROSUVASTATIN CALCIUM 40 MG/1
40 TABLET, COATED ORAL DAILY
Qty: 90 TABLET | Refills: 3 | Status: CANCELLED | OUTPATIENT
Start: 2025-02-25

## 2025-02-25 NOTE — TELEPHONE ENCOUNTER
I explained to patient that he should have refills at pharmacy. He said he does not. Patient is also asking for a refill for the medication that treat kidney disease. He was not aware of the name. He said its a little pill.

## 2025-02-25 NOTE — TELEPHONE ENCOUNTER
Contacted patient's [facility] pharmacy at . Patient's Metformin and Crestor both have refills available. Medications will be ready for  within one hour.     Attempted to reach patient at . Left detailed voice message with patient that his Metformin and Crestor will be ready for  today. Patient aware will reach out to physician regarding Diamox script.     Please review script for refill.

## 2025-03-03 ENCOUNTER — TELEPHONE (OUTPATIENT)
Dept: INTERNAL MEDICINE CLINIC | Facility: CLINIC | Age: 60
End: 2025-03-03

## 2025-03-06 ENCOUNTER — APPOINTMENT (OUTPATIENT)
Dept: LAB | Facility: CLINIC | Age: 60
End: 2025-03-06
Payer: MEDICARE

## 2025-03-06 DIAGNOSIS — E11.9 TYPE 2 DIABETES MELLITUS WITHOUT COMPLICATION, WITHOUT LONG-TERM CURRENT USE OF INSULIN (HCC): ICD-10-CM

## 2025-03-06 DIAGNOSIS — N18.2 CKD (CHRONIC KIDNEY DISEASE) STAGE 2, GFR 60-89 ML/MIN: ICD-10-CM

## 2025-03-06 DIAGNOSIS — I10 ESSENTIAL HYPERTENSION: ICD-10-CM

## 2025-03-06 LAB
ALBUMIN SERPL BCG-MCNC: 4.7 G/DL (ref 3.5–5)
ALP SERPL-CCNC: 83 U/L (ref 34–104)
ALT SERPL W P-5'-P-CCNC: 39 U/L (ref 7–52)
ANION GAP SERPL CALCULATED.3IONS-SCNC: 11 MMOL/L (ref 4–13)
AST SERPL W P-5'-P-CCNC: 29 U/L (ref 13–39)
BASOPHILS # BLD AUTO: 0.02 THOUSANDS/ÂΜL (ref 0–0.1)
BASOPHILS NFR BLD AUTO: 0 % (ref 0–1)
BILIRUB SERPL-MCNC: 0.71 MG/DL (ref 0.2–1)
BUN SERPL-MCNC: 13 MG/DL (ref 5–25)
CALCIUM SERPL-MCNC: 10.3 MG/DL (ref 8.4–10.2)
CHLORIDE SERPL-SCNC: 103 MMOL/L (ref 96–108)
CHOLEST SERPL-MCNC: 119 MG/DL (ref ?–200)
CO2 SERPL-SCNC: 25 MMOL/L (ref 21–32)
CREAT SERPL-MCNC: 1.17 MG/DL (ref 0.6–1.3)
EOSINOPHIL # BLD AUTO: 0.06 THOUSAND/ÂΜL (ref 0–0.61)
EOSINOPHIL NFR BLD AUTO: 1 % (ref 0–6)
ERYTHROCYTE [DISTWIDTH] IN BLOOD BY AUTOMATED COUNT: 13.2 % (ref 11.6–15.1)
EST. AVERAGE GLUCOSE BLD GHB EST-MCNC: 117 MG/DL
GFR SERPL CREATININE-BSD FRML MDRD: 68 ML/MIN/1.73SQ M
GLUCOSE SERPL-MCNC: 98 MG/DL (ref 65–140)
HBA1C MFR BLD: 5.7 %
HCT VFR BLD AUTO: 47.6 % (ref 36.5–49.3)
HDLC SERPL-MCNC: 28 MG/DL
HGB BLD-MCNC: 15.2 G/DL (ref 12–17)
IMM GRANULOCYTES # BLD AUTO: 0.04 THOUSAND/UL (ref 0–0.2)
IMM GRANULOCYTES NFR BLD AUTO: 1 % (ref 0–2)
LDLC SERPL CALC-MCNC: 55 MG/DL (ref 0–100)
LYMPHOCYTES # BLD AUTO: 2.91 THOUSANDS/ÂΜL (ref 0.6–4.47)
LYMPHOCYTES NFR BLD AUTO: 48 % (ref 14–44)
MCH RBC QN AUTO: 30.3 PG (ref 26.8–34.3)
MCHC RBC AUTO-ENTMCNC: 31.9 G/DL (ref 31.4–37.4)
MCV RBC AUTO: 95 FL (ref 82–98)
MONOCYTES # BLD AUTO: 0.33 THOUSAND/ÂΜL (ref 0.17–1.22)
MONOCYTES NFR BLD AUTO: 6 % (ref 4–12)
NEUTROPHILS # BLD AUTO: 2.62 THOUSANDS/ÂΜL (ref 1.85–7.62)
NEUTS SEG NFR BLD AUTO: 44 % (ref 43–75)
NRBC BLD AUTO-RTO: 0 /100 WBCS
PLATELET # BLD AUTO: 204 THOUSANDS/UL (ref 149–390)
PMV BLD AUTO: 11 FL (ref 8.9–12.7)
POTASSIUM SERPL-SCNC: 4.4 MMOL/L (ref 3.5–5.3)
PROT SERPL-MCNC: 8 G/DL (ref 6.4–8.4)
RBC # BLD AUTO: 5.02 MILLION/UL (ref 3.88–5.62)
SODIUM SERPL-SCNC: 139 MMOL/L (ref 135–147)
TRIGL SERPL-MCNC: 179 MG/DL (ref ?–150)
WBC # BLD AUTO: 5.98 THOUSAND/UL (ref 4.31–10.16)

## 2025-03-06 PROCEDURE — 85025 COMPLETE CBC W/AUTO DIFF WBC: CPT

## 2025-03-06 PROCEDURE — 83036 HEMOGLOBIN GLYCOSYLATED A1C: CPT

## 2025-03-06 PROCEDURE — 80061 LIPID PANEL: CPT

## 2025-03-06 PROCEDURE — 80053 COMPREHEN METABOLIC PANEL: CPT

## 2025-03-06 PROCEDURE — 36415 COLL VENOUS BLD VENIPUNCTURE: CPT

## 2025-03-10 ENCOUNTER — HOSPITAL ENCOUNTER (EMERGENCY)
Facility: HOSPITAL | Age: 60
Discharge: HOME | End: 2025-03-10
Attending: EMERGENCY MEDICINE | Admitting: EMERGENCY MEDICINE
Payer: MEDICARE

## 2025-03-10 ENCOUNTER — TELEPHONE (OUTPATIENT)
Dept: INTERNAL MEDICINE CLINIC | Facility: CLINIC | Age: 60
End: 2025-03-10

## 2025-03-10 VITALS
RESPIRATION RATE: 16 BRPM | DIASTOLIC BLOOD PRESSURE: 88 MMHG | SYSTOLIC BLOOD PRESSURE: 149 MMHG | HEART RATE: 81 BPM | TEMPERATURE: 98.2 F | OXYGEN SATURATION: 96 %

## 2025-03-10 DIAGNOSIS — K08.89 PAIN, DENTAL: Primary | ICD-10-CM

## 2025-03-10 PROCEDURE — 96372 THER/PROPH/DIAG INJ SC/IM: CPT

## 2025-03-10 PROCEDURE — 99282 EMERGENCY DEPT VISIT SF MDM: CPT

## 2025-03-10 PROCEDURE — 99284 EMERGENCY DEPT VISIT MOD MDM: CPT | Performed by: EMERGENCY MEDICINE

## 2025-03-10 RX ORDER — KETOROLAC TROMETHAMINE 30 MG/ML
15 INJECTION, SOLUTION INTRAMUSCULAR; INTRAVENOUS ONCE
Status: COMPLETED | OUTPATIENT
Start: 2025-03-10 | End: 2025-03-10

## 2025-03-10 RX ORDER — KETOROLAC TROMETHAMINE 30 MG/ML
15 INJECTION, SOLUTION INTRAMUSCULAR; INTRAVENOUS ONCE
Status: DISCONTINUED | OUTPATIENT
Start: 2025-03-10 | End: 2025-03-10

## 2025-03-10 RX ORDER — AMOXICILLIN 500 MG/1
500 CAPSULE ORAL EVERY 12 HOURS SCHEDULED
Qty: 10 CAPSULE | Refills: 0 | Status: SHIPPED | OUTPATIENT
Start: 2025-03-10 | End: 2025-03-15

## 2025-03-10 RX ORDER — AMOXICILLIN 250 MG/1
500 CAPSULE ORAL ONCE
Status: COMPLETED | OUTPATIENT
Start: 2025-03-10 | End: 2025-03-10

## 2025-03-10 RX ORDER — ACETAMINOPHEN 325 MG/1
650 TABLET ORAL ONCE
Status: COMPLETED | OUTPATIENT
Start: 2025-03-10 | End: 2025-03-10

## 2025-03-10 RX ADMIN — AMOXICILLIN 500 MG: 250 CAPSULE ORAL at 02:36

## 2025-03-10 RX ADMIN — ACETAMINOPHEN 650 MG: 325 TABLET, FILM COATED ORAL at 02:37

## 2025-03-10 RX ADMIN — KETOROLAC TROMETHAMINE 15 MG: 30 INJECTION, SOLUTION INTRAMUSCULAR; INTRAVENOUS at 02:40

## 2025-03-10 NOTE — DISCHARGE INSTRUCTIONS
Please follow up with the dental clinic as soon as possible. You have been prescribed Amoxicillin, take this as prescribed. Do not skip doses, complete the full course.    Please return to the Emergency Department if you experience worsening of your current symptoms or any new/other concerning symptoms.

## 2025-03-10 NOTE — TELEPHONE ENCOUNTER
Patient is concerned about his recent lab work. His kidney disease  labs are elevated and he wants to know if he goes back to eating healthy will it go back down  Please advise
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
## 2025-03-11 ENCOUNTER — TELEPHONE (OUTPATIENT)
Dept: INTERNAL MEDICINE CLINIC | Facility: CLINIC | Age: 60
End: 2025-03-11

## 2025-03-11 NOTE — TELEPHONE ENCOUNTER
Patient needs a letter for housing. Its needs to state his medical conditions. He said he needs to park closer to the building because he has a lot of knee pain. Stated he has had 24 operations. Please call when letter is ready , he will be picking up

## 2025-03-18 ENCOUNTER — OFFICE VISIT (OUTPATIENT)
Dept: OBGYN CLINIC | Facility: HOSPITAL | Age: 60
End: 2025-03-18
Payer: MEDICARE

## 2025-03-18 VITALS — WEIGHT: 235 LBS | HEIGHT: 66 IN | BODY MASS INDEX: 37.77 KG/M2

## 2025-03-18 DIAGNOSIS — M17.12 PRIMARY OSTEOARTHRITIS OF LEFT KNEE: ICD-10-CM

## 2025-03-18 DIAGNOSIS — M16.12 PRIMARY OSTEOARTHRITIS OF LEFT HIP: ICD-10-CM

## 2025-03-18 DIAGNOSIS — M16.11 PRIMARY OSTEOARTHRITIS OF RIGHT HIP: ICD-10-CM

## 2025-03-18 DIAGNOSIS — M17.11 PRIMARY OSTEOARTHRITIS OF RIGHT KNEE: ICD-10-CM

## 2025-03-18 DIAGNOSIS — G89.29 CHRONIC PAIN OF RIGHT KNEE: Primary | ICD-10-CM

## 2025-03-18 DIAGNOSIS — M25.561 CHRONIC PAIN OF RIGHT KNEE: Primary | ICD-10-CM

## 2025-03-18 DIAGNOSIS — M25.562 CHRONIC PAIN OF LEFT KNEE: ICD-10-CM

## 2025-03-18 DIAGNOSIS — G89.29 CHRONIC PAIN OF LEFT KNEE: ICD-10-CM

## 2025-03-18 PROCEDURE — 20610 DRAIN/INJ JOINT/BURSA W/O US: CPT | Performed by: ORTHOPAEDIC SURGERY

## 2025-03-18 PROCEDURE — 99213 OFFICE O/P EST LOW 20 MIN: CPT | Performed by: ORTHOPAEDIC SURGERY

## 2025-03-18 RX ORDER — BETAMETHASONE SODIUM PHOSPHATE AND BETAMETHASONE ACETATE 3; 3 MG/ML; MG/ML
12 INJECTION, SUSPENSION INTRA-ARTICULAR; INTRALESIONAL; INTRAMUSCULAR; SOFT TISSUE
Status: COMPLETED | OUTPATIENT
Start: 2025-03-18 | End: 2025-03-18

## 2025-03-18 RX ORDER — LIDOCAINE HYDROCHLORIDE 10 MG/ML
2 INJECTION, SOLUTION INFILTRATION; PERINEURAL
Status: COMPLETED | OUTPATIENT
Start: 2025-03-18 | End: 2025-03-18

## 2025-03-18 RX ORDER — BUPIVACAINE HYDROCHLORIDE 2.5 MG/ML
2 INJECTION, SOLUTION INFILTRATION; PERINEURAL
Status: COMPLETED | OUTPATIENT
Start: 2025-03-18 | End: 2025-03-18

## 2025-03-18 RX ADMIN — BETAMETHASONE SODIUM PHOSPHATE AND BETAMETHASONE ACETATE 12 MG: 3; 3 INJECTION, SUSPENSION INTRA-ARTICULAR; INTRALESIONAL; INTRAMUSCULAR; SOFT TISSUE at 14:15

## 2025-03-18 RX ADMIN — BUPIVACAINE HYDROCHLORIDE 2 ML: 2.5 INJECTION, SOLUTION INFILTRATION; PERINEURAL at 14:15

## 2025-03-18 RX ADMIN — LIDOCAINE HYDROCHLORIDE 2 ML: 10 INJECTION, SOLUTION INFILTRATION; PERINEURAL at 14:15
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
## 2025-03-19 NOTE — NURSING NOTE
Call placed to patient to discuss upcoming appointment at [facility] radiology department and complete consultation with patient. Patient is having a bilateral hip CSI  utilizing fluoroscopy guidance. Reviewed  patient's allergies, no current anticoagulant medication present per patient, no questions when asked if any questions or concerns. Reminded patient of location and time expected for procedure, Patient expressed understanding by verbalizing and repeating instructions.

## 2025-03-26 ENCOUNTER — HOSPITAL ENCOUNTER (OUTPATIENT)
Dept: RADIOLOGY | Facility: HOSPITAL | Age: 60
Discharge: HOME | End: 2025-03-26
Attending: ORTHOPAEDIC SURGERY
Payer: MEDICARE

## 2025-03-26 ENCOUNTER — TELEPHONE (OUTPATIENT)
Age: 60
End: 2025-03-26

## 2025-03-26 DIAGNOSIS — M16.11 PRIMARY OSTEOARTHRITIS OF RIGHT HIP: ICD-10-CM

## 2025-03-26 DIAGNOSIS — M16.12 PRIMARY OSTEOARTHRITIS OF LEFT HIP: ICD-10-CM

## 2025-03-26 PROCEDURE — 20610 DRAIN/INJ JOINT/BURSA W/O US: CPT

## 2025-03-26 PROCEDURE — 77002 NEEDLE LOCALIZATION BY XRAY: CPT

## 2025-03-26 RX ORDER — LIDOCAINE HYDROCHLORIDE 10 MG/ML
5 INJECTION, SOLUTION EPIDURAL; INFILTRATION; INTRACAUDAL; PERINEURAL
Status: COMPLETED | OUTPATIENT
Start: 2025-03-26 | End: 2025-03-26

## 2025-03-26 RX ORDER — ROPIVACAINE HYDROCHLORIDE 2 MG/ML
10 INJECTION, SOLUTION EPIDURAL; INFILTRATION; PERINEURAL ONCE
Status: COMPLETED | OUTPATIENT
Start: 2025-03-26 | End: 2025-03-26

## 2025-03-26 RX ORDER — ROPIVACAINE HYDROCHLORIDE 2 MG/ML
5 INJECTION, SOLUTION EPIDURAL; INFILTRATION; PERINEURAL ONCE
Status: COMPLETED | OUTPATIENT
Start: 2025-03-26 | End: 2025-03-26

## 2025-03-26 RX ORDER — METHYLPREDNISOLONE ACETATE 80 MG/ML
80 INJECTION, SUSPENSION INTRA-ARTICULAR; INTRALESIONAL; INTRAMUSCULAR; SOFT TISSUE
Status: COMPLETED | OUTPATIENT
Start: 2025-03-26 | End: 2025-03-26

## 2025-03-26 RX ADMIN — ROPIVACAINE HYDROCHLORIDE 5 ML: 2 INJECTION EPIDURAL; INFILTRATION; PERINEURAL at 13:22

## 2025-03-26 RX ADMIN — ROPIVACAINE HYDROCHLORIDE 2 ML/HR: 2 INJECTION EPIDURAL; INFILTRATION; PERINEURAL at 15:25

## 2025-03-26 RX ADMIN — IOHEXOL 3 ML: 300 INJECTION, SOLUTION INTRAVENOUS at 15:25

## 2025-03-26 RX ADMIN — LIDOCAINE HYDROCHLORIDE 5 ML: 10 INJECTION, SOLUTION EPIDURAL; INFILTRATION; INTRACAUDAL; PERINEURAL at 13:22

## 2025-03-26 RX ADMIN — METHYLPREDNISOLONE ACETATE 80 MG: 80 INJECTION, SUSPENSION INTRA-ARTICULAR; INTRALESIONAL; INTRAMUSCULAR; SOFT TISSUE at 15:25

## 2025-03-26 RX ADMIN — METHYLPREDNISOLONE ACETATE 80 MG: 80 INJECTION, SUSPENSION INTRA-ARTICULAR; INTRALESIONAL; INTRAMUSCULAR; SOFT TISSUE at 15:30

## 2025-03-26 RX ADMIN — IOHEXOL 3 ML: 300 INJECTION, SOLUTION INTRAVENOUS at 13:22

## 2025-03-26 RX ADMIN — LIDOCAINE HYDROCHLORIDE 5 ML: 10 INJECTION, SOLUTION EPIDURAL; INFILTRATION; INTRACAUDAL; PERINEURAL at 15:25

## 2025-03-26 NOTE — TELEPHONE ENCOUNTER
Patient called to request a LYFT ride to appointment today at 3pm. Called over to [facility] to request.

## 2025-03-30 ENCOUNTER — HOSPITAL ENCOUNTER (EMERGENCY)
Facility: HOSPITAL | Age: 60
Discharge: HOME | End: 2025-03-31
Attending: EMERGENCY MEDICINE
Payer: MEDICARE

## 2025-03-30 VITALS
DIASTOLIC BLOOD PRESSURE: 124 MMHG | OXYGEN SATURATION: 96 % | BODY MASS INDEX: 38.29 KG/M2 | HEART RATE: 78 BPM | SYSTOLIC BLOOD PRESSURE: 152 MMHG | TEMPERATURE: 97.6 F | WEIGHT: 237.22 LBS | RESPIRATION RATE: 19 BRPM

## 2025-03-30 DIAGNOSIS — K08.89 PAIN, DENTAL: Primary | ICD-10-CM

## 2025-03-30 PROCEDURE — 99282 EMERGENCY DEPT VISIT SF MDM: CPT

## 2025-03-30 RX ORDER — LIDOCAINE HYDROCHLORIDE 20 MG/ML
15 SOLUTION OROPHARYNGEAL ONCE
Status: COMPLETED | OUTPATIENT
Start: 2025-03-31 | End: 2025-03-31

## 2025-03-30 RX ORDER — ACETAMINOPHEN 325 MG/1
975 TABLET ORAL ONCE
Status: COMPLETED | OUTPATIENT
Start: 2025-03-31 | End: 2025-03-31

## 2025-03-30 RX ORDER — KETOROLAC TROMETHAMINE 30 MG/ML
15 INJECTION, SOLUTION INTRAMUSCULAR; INTRAVENOUS ONCE
Status: COMPLETED | OUTPATIENT
Start: 2025-03-31 | End: 2025-03-31

## 2025-03-30 NOTE — Clinical Note
Haile Downing was seen and treated in our emergency department on 3/30/2025.                Diagnosis:     Haile  may return to work on return date.    He may return on this date: 04/01/2025         If you have any questions or concerns, please don't hesitate to call.      Magno Marte, DO    ______________________________           _______________          _______________  Hospital Representative                              Date                                Time

## 2025-03-31 LAB
ANION GAP SERPL CALCULATED.3IONS-SCNC: 9 MMOL/L (ref 4–13)
BUN SERPL-MCNC: 21 MG/DL (ref 5–25)
CALCIUM SERPL-MCNC: 8.9 MG/DL (ref 8.4–10.2)
CHLORIDE SERPL-SCNC: 101 MMOL/L (ref 96–108)
CO2 SERPL-SCNC: 26 MMOL/L (ref 21–32)
CREAT SERPL-MCNC: 0.89 MG/DL (ref 0.6–1.3)
GFR SERPL CREATININE-BSD FRML MDRD: 94 ML/MIN/1.73SQ M
GLUCOSE SERPL-MCNC: 102 MG/DL (ref 65–140)
POTASSIUM SERPL-SCNC: 4.3 MMOL/L (ref 3.5–5.3)
SODIUM SERPL-SCNC: 136 MMOL/L (ref 135–147)

## 2025-03-31 PROCEDURE — 99284 EMERGENCY DEPT VISIT MOD MDM: CPT | Performed by: EMERGENCY MEDICINE

## 2025-03-31 PROCEDURE — 96372 THER/PROPH/DIAG INJ SC/IM: CPT

## 2025-03-31 PROCEDURE — 36415 COLL VENOUS BLD VENIPUNCTURE: CPT | Performed by: EMERGENCY MEDICINE

## 2025-03-31 PROCEDURE — 80048 BASIC METABOLIC PNL TOTAL CA: CPT | Performed by: EMERGENCY MEDICINE

## 2025-03-31 RX ORDER — LIDOCAINE HYDROCHLORIDE 20 MG/ML
15 SOLUTION OROPHARYNGEAL 4 TIMES DAILY PRN
Qty: 100 ML | Refills: 0 | Status: SHIPPED | OUTPATIENT
Start: 2025-03-31

## 2025-03-31 RX ADMIN — ACETAMINOPHEN 975 MG: 325 TABLET, FILM COATED ORAL at 00:20

## 2025-03-31 RX ADMIN — AMOXICILLIN AND CLAVULANATE POTASSIUM 1 TABLET: 875; 125 TABLET, COATED ORAL at 00:20

## 2025-03-31 RX ADMIN — LIDOCAINE HYDROCHLORIDE 15 ML: 20 SOLUTION ORAL at 00:20

## 2025-03-31 RX ADMIN — KETOROLAC TROMETHAMINE 15 MG: 30 INJECTION, SOLUTION INTRAMUSCULAR; INTRAVENOUS at 00:21

## 2025-03-31 NOTE — DISCHARGE INSTRUCTIONS
You have been evaluated in the Emergency Department for dental pain. At the time of discharge, you are medically stable, and your evaluation did not reveal any immediate concern requiring hospitalization. You likely have periodontitis, and inflammation or infection of the gumline surrounding one of your teeth.  You have been provided with a prescription for oral Augmentin, a powerful antibiotic to help alleviate your symptoms.  You also have a prescription for viscous lidocaine, a numbing medication which may help alleviate your symptoms.  Follow-up with your scheduled appointment with dentistry.        You may return to the Emergency Department to be re-evaluated at any time. Please return to us if you should experience any sudden change or worsening of your condition, or if you should experience any severe chest pain, shortness of breath or difficulty breathing, worsening fever, productive cough, severe abdominal pain, sudden onset headache, intractable nausea or vomiting, inability to perform essential activities of daily living, or for any other concerning symptoms.
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
## 2025-04-01 ENCOUNTER — HOSPITAL ENCOUNTER (EMERGENCY)
Facility: HOSPITAL | Age: 60
Discharge: HOME | End: 2025-04-01
Attending: EMERGENCY MEDICINE
Payer: MEDICARE

## 2025-04-01 VITALS
TEMPERATURE: 97 F | OXYGEN SATURATION: 97 % | RESPIRATION RATE: 18 BRPM | SYSTOLIC BLOOD PRESSURE: 157 MMHG | DIASTOLIC BLOOD PRESSURE: 91 MMHG | HEART RATE: 79 BPM

## 2025-04-01 DIAGNOSIS — K08.89 DENTALGIA: Primary | ICD-10-CM

## 2025-04-01 LAB — GLUCOSE SERPL-MCNC: 111 MG/DL (ref 65–140)

## 2025-04-01 PROCEDURE — 99284 EMERGENCY DEPT VISIT MOD MDM: CPT | Performed by: EMERGENCY MEDICINE

## 2025-04-01 PROCEDURE — 99283 EMERGENCY DEPT VISIT LOW MDM: CPT

## 2025-04-01 PROCEDURE — 96372 THER/PROPH/DIAG INJ SC/IM: CPT

## 2025-04-01 PROCEDURE — 82948 REAGENT STRIP/BLOOD GLUCOSE: CPT

## 2025-04-01 RX ORDER — KETOROLAC TROMETHAMINE 30 MG/ML
15 INJECTION, SOLUTION INTRAMUSCULAR; INTRAVENOUS ONCE
Status: COMPLETED | OUTPATIENT
Start: 2025-04-01 | End: 2025-04-01

## 2025-04-01 RX ORDER — LIDOCAINE HYDROCHLORIDE 20 MG/ML
15 SOLUTION OROPHARYNGEAL ONCE
Status: DISCONTINUED | OUTPATIENT
Start: 2025-04-01 | End: 2025-04-01 | Stop reason: HOSPADM

## 2025-04-01 RX ADMIN — KETOROLAC TROMETHAMINE 15 MG: 30 INJECTION, SOLUTION INTRAMUSCULAR; INTRAVENOUS at 05:52
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
## 2025-04-01 NOTE — DISCHARGE INSTRUCTIONS
Please follow-up with dental clinic.  Continue taking antibiotics as prescribed during your visit yesterday.  Please return to the ED with new or worsening symptoms-see attached.

## 2025-04-02 ENCOUNTER — TELEPHONE (OUTPATIENT)
Age: 60
End: 2025-04-02

## 2025-04-02 NOTE — TELEPHONE ENCOUNTER
Caller: Patient     Doctor: Dr. Ko    Reason for call: Patient called requested [facility] services for appointment scheduled tomorrow 4/03, unable to contact [facility], sent POM email. Please advise.    Call back#: [phone]

## 2025-04-03 NOTE — TELEPHONE ENCOUNTER
Ongoing SW/CM Assessment/Plan of Care Note     See SW/CM flowsheets for goals and other objective data.    Patient/Family discharge goal (s):  Goal #1: Communication facilitated          PT Recommendation:  Recommendation for Discharge: PT WI: Home (possible home PT)       OT Recommendation:  Recommendations for Discharge: OT WI: Home, Home therapy       SLP Recommendation:  Recommendations for Discharge: SLP: pending progress in therapy    Disposition:  Planned Discharge Destination: Home/apartment with Spouse/SO    Progress note:   Pt receives dialysis at [facility]   Address: [address] [place] [address], [place], [address]  Telephone: [phone]  Fax: [phone]  Chair time: T/Th/S at 10:30  [facility] requests discharge summary and avs to be faxed to them at discharge.      Pt is active with [facility] (Ph: [phone] / F: [phone]). Per [facility] policy, if pt remains at hospital for over 24 hours, MD will need to enter resumption of care orders regardless if pt is observation/inpatient status.      MD informed CM and writer that plan is for pt to discharge today. MD entered resumption of care orders for [facility]. Writer spoke with Tabatha at [facility] and Flora at [facility] and informed them of pt's discharge. Paperwork faxed to [facility] and [facility].          AUTUMN for patient [place] scheduled to pick pt up at home for 1 pm today. Also informed pt in message lyft policy. Lyft documented on pt chart. Patient has received Lanta passes from us before advised to reach out to see if we can provide any help.
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
## 2025-04-17 ENCOUNTER — APPOINTMENT (EMERGENCY)
Dept: RADIOLOGY | Facility: HOSPITAL | Age: 60
End: 2025-04-17
Payer: MEDICARE

## 2025-04-17 ENCOUNTER — HOSPITAL ENCOUNTER (EMERGENCY)
Facility: HOSPITAL | Age: 60
Discharge: HOME | End: 2025-04-18
Attending: EMERGENCY MEDICINE
Payer: MEDICARE

## 2025-04-17 VITALS
TEMPERATURE: 97.1 F | DIASTOLIC BLOOD PRESSURE: 125 MMHG | OXYGEN SATURATION: 97 % | HEART RATE: 95 BPM | RESPIRATION RATE: 20 BRPM | SYSTOLIC BLOOD PRESSURE: 158 MMHG

## 2025-04-17 DIAGNOSIS — L03.039 PARONYCHIA OF GREAT TOE: ICD-10-CM

## 2025-04-17 DIAGNOSIS — H57.10 EYE PAIN: Primary | ICD-10-CM

## 2025-04-17 LAB
ANION GAP SERPL CALCULATED.3IONS-SCNC: 10 MMOL/L (ref 4–13)
BASOPHILS # BLD AUTO: 0.02 THOUSANDS/ÂΜL (ref 0–0.1)
BASOPHILS NFR BLD AUTO: 0 % (ref 0–1)
BUN SERPL-MCNC: 11 MG/DL (ref 5–25)
CALCIUM SERPL-MCNC: 9.7 MG/DL (ref 8.4–10.2)
CHLORIDE SERPL-SCNC: 102 MMOL/L (ref 96–108)
CO2 SERPL-SCNC: 27 MMOL/L (ref 21–32)
CREAT SERPL-MCNC: 0.93 MG/DL (ref 0.6–1.3)
EOSINOPHIL # BLD AUTO: 0.05 THOUSAND/ÂΜL (ref 0–0.61)
EOSINOPHIL NFR BLD AUTO: 1 % (ref 0–6)
ERYTHROCYTE [DISTWIDTH] IN BLOOD BY AUTOMATED COUNT: 12.8 % (ref 11.6–15.1)
GFR SERPL CREATININE-BSD FRML MDRD: 90 ML/MIN/1.73SQ M
GLUCOSE SERPL-MCNC: 109 MG/DL (ref 65–140)
HCT VFR BLD AUTO: 41.2 % (ref 36.5–49.3)
HGB BLD-MCNC: 13.9 G/DL (ref 12–17)
IMM GRANULOCYTES # BLD AUTO: 0.02 THOUSAND/UL (ref 0–0.2)
IMM GRANULOCYTES NFR BLD AUTO: 0 % (ref 0–2)
LYMPHOCYTES # BLD AUTO: 2.62 THOUSANDS/ÂΜL (ref 0.6–4.47)
LYMPHOCYTES NFR BLD AUTO: 45 % (ref 14–44)
MCH RBC QN AUTO: 30.8 PG (ref 26.8–34.3)
MCHC RBC AUTO-ENTMCNC: 33.7 G/DL (ref 31.4–37.4)
MCV RBC AUTO: 91 FL (ref 82–98)
MONOCYTES # BLD AUTO: 0.35 THOUSAND/ÂΜL (ref 0.17–1.22)
MONOCYTES NFR BLD AUTO: 6 % (ref 4–12)
NEUTROPHILS # BLD AUTO: 2.8 THOUSANDS/ÂΜL (ref 1.85–7.62)
NEUTS SEG NFR BLD AUTO: 48 % (ref 43–75)
NRBC BLD AUTO-RTO: 0 /100 WBCS
PLATELET # BLD AUTO: 195 THOUSANDS/UL (ref 149–390)
PMV BLD AUTO: 9.9 FL (ref 8.9–12.7)
POTASSIUM SERPL-SCNC: 3.9 MMOL/L (ref 3.5–5.3)
RBC # BLD AUTO: 4.51 MILLION/UL (ref 3.88–5.62)
SODIUM SERPL-SCNC: 139 MMOL/L (ref 135–147)
WBC # BLD AUTO: 5.86 THOUSAND/UL (ref 4.31–10.16)

## 2025-04-17 PROCEDURE — 36415 COLL VENOUS BLD VENIPUNCTURE: CPT

## 2025-04-17 PROCEDURE — 85025 COMPLETE CBC W/AUTO DIFF WBC: CPT

## 2025-04-17 PROCEDURE — 73660 X-RAY EXAM OF TOE(S): CPT

## 2025-04-17 PROCEDURE — 80048 BASIC METABOLIC PNL TOTAL CA: CPT

## 2025-04-17 PROCEDURE — 99284 EMERGENCY DEPT VISIT MOD MDM: CPT

## 2025-04-17 PROCEDURE — 99285 EMERGENCY DEPT VISIT HI MDM: CPT | Performed by: EMERGENCY MEDICINE

## 2025-04-17 RX ORDER — CEPHALEXIN 500 MG/1
500 CAPSULE ORAL ONCE
Status: COMPLETED | OUTPATIENT
Start: 2025-04-17 | End: 2025-04-17

## 2025-04-17 RX ADMIN — CEPHALEXIN 500 MG: 500 CAPSULE ORAL at 22:29

## 2025-04-18 PROCEDURE — 96372 THER/PROPH/DIAG INJ SC/IM: CPT

## 2025-04-18 RX ORDER — BRIMONIDINE TARTRATE 2 MG/ML
1 SOLUTION/ DROPS OPHTHALMIC EVERY 8 HOURS SCHEDULED
Status: DISCONTINUED | OUTPATIENT
Start: 2025-04-18 | End: 2025-04-18

## 2025-04-18 RX ORDER — TIMOLOL MALEATE 5 MG/ML
1 SOLUTION/ DROPS OPHTHALMIC ONCE
Status: DISCONTINUED | OUTPATIENT
Start: 2025-04-18 | End: 2025-04-18

## 2025-04-18 RX ORDER — PREDNISOLONE ACETATE 10 MG/ML
1 SUSPENSION/ DROPS OPHTHALMIC ONCE
Status: COMPLETED | OUTPATIENT
Start: 2025-04-18 | End: 2025-04-18

## 2025-04-18 RX ORDER — ACETAZOLAMIDE 250 MG/1
500 TABLET ORAL ONCE
Status: DISCONTINUED | OUTPATIENT
Start: 2025-04-18 | End: 2025-04-18

## 2025-04-18 RX ORDER — BRIMONIDINE TARTRATE 2 MG/ML
1 SOLUTION/ DROPS OPHTHALMIC ONCE
Status: COMPLETED | OUTPATIENT
Start: 2025-04-18 | End: 2025-04-18

## 2025-04-18 RX ORDER — PILOCARPINE HYDROCHLORIDE 10 MG/ML
1 SOLUTION/ DROPS OPHTHALMIC ONCE
Status: DISCONTINUED | OUTPATIENT
Start: 2025-04-18 | End: 2025-04-18

## 2025-04-18 RX ORDER — CEFADROXIL 500 MG/1
500 CAPSULE ORAL EVERY 12 HOURS SCHEDULED
Qty: 14 CAPSULE | Refills: 0 | Status: SHIPPED | OUTPATIENT
Start: 2025-04-18 | End: 2025-04-23 | Stop reason: ALTCHOICE

## 2025-04-18 RX ORDER — KETOROLAC TROMETHAMINE 30 MG/ML
15 INJECTION, SOLUTION INTRAMUSCULAR; INTRAVENOUS ONCE
Status: COMPLETED | OUTPATIENT
Start: 2025-04-18 | End: 2025-04-18

## 2025-04-18 RX ADMIN — PREDNISOLONE ACETATE 1 DROP: 10 SUSPENSION/ DROPS OPHTHALMIC at 01:06

## 2025-04-18 RX ADMIN — BRIMONIDINE TARTRATE 1 DROP: 2 SOLUTION/ DROPS OPHTHALMIC at 01:06

## 2025-04-18 RX ADMIN — KETOROLAC TROMETHAMINE 15 MG: 30 INJECTION, SOLUTION INTRAMUSCULAR; INTRAVENOUS at 01:06

## 2025-04-18 RX ADMIN — CIPROFLOXACIN HYDROCHLORIDE: 3 OINTMENT OPHTHALMIC at 01:06

## 2025-04-18 NOTE — DISCHARGE INSTRUCTIONS
Please take the antibiotic as prescribed for your toe.  Please use the antibiotic ointment that I gave you instead of the beige top eyedrops.  You should use that antibiotic ointment 3 times per day.  Please continue to use all your other eyedrops including your prednisolone (pink top) as well as the Alphagan (purple top) in both eyes.  Please call the ophthalmology office tomorrow morning as soon as they open to schedule an appointment.  You should return to the emergency room if you have severe uncontrolled pain, if you develop fevers, or if you have persistent nausea and vomiting.
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
## 2025-04-21 ENCOUNTER — TELEPHONE (OUTPATIENT)
Age: 60
End: 2025-04-21

## 2025-04-21 NOTE — TELEPHONE ENCOUNTER
Patient called in , he is in need of a LYFT ride for todays visit with Dr Ko.    Sending devon priority, emails sent to admin as well    C/b # [phone]

## 2025-04-22 ENCOUNTER — TELEPHONE (OUTPATIENT)
Dept: INTERNAL MEDICINE CLINIC | Facility: CLINIC | Age: 60
End: 2025-04-22

## 2025-04-22 NOTE — TELEPHONE ENCOUNTER
Contacted patient he stated he mixed up the dates for his appointments.     Wanted to reschedule -appt made for 6/19.

## 2025-04-23 ENCOUNTER — HOSPITAL ENCOUNTER (EMERGENCY)
Facility: HOSPITAL | Age: 60
Discharge: HOME | End: 2025-04-23
Attending: EMERGENCY MEDICINE
Payer: MEDICARE

## 2025-04-23 VITALS
HEART RATE: 91 BPM | DIASTOLIC BLOOD PRESSURE: 77 MMHG | OXYGEN SATURATION: 97 % | RESPIRATION RATE: 18 BRPM | SYSTOLIC BLOOD PRESSURE: 122 MMHG | TEMPERATURE: 97.2 F

## 2025-04-23 DIAGNOSIS — K08.89 PAIN, DENTAL: Primary | ICD-10-CM

## 2025-04-23 PROCEDURE — 99284 EMERGENCY DEPT VISIT MOD MDM: CPT | Performed by: EMERGENCY MEDICINE

## 2025-04-23 PROCEDURE — 96372 THER/PROPH/DIAG INJ SC/IM: CPT

## 2025-04-23 PROCEDURE — 99282 EMERGENCY DEPT VISIT SF MDM: CPT

## 2025-04-23 RX ORDER — KETOROLAC TROMETHAMINE 30 MG/ML
15 INJECTION, SOLUTION INTRAMUSCULAR; INTRAVENOUS ONCE
Status: COMPLETED | OUTPATIENT
Start: 2025-04-23 | End: 2025-04-23

## 2025-04-23 RX ORDER — ACETAMINOPHEN 325 MG/1
650 TABLET ORAL ONCE
Status: COMPLETED | OUTPATIENT
Start: 2025-04-23 | End: 2025-04-23

## 2025-04-23 RX ADMIN — KETOROLAC TROMETHAMINE 15 MG: 30 INJECTION, SOLUTION INTRAMUSCULAR; INTRAVENOUS at 03:51

## 2025-04-23 RX ADMIN — ACETAMINOPHEN 650 MG: 325 TABLET, FILM COATED ORAL at 03:51

## 2025-04-23 RX ADMIN — AMOXICILLIN AND CLAVULANATE POTASSIUM 1 TABLET: 875; 125 TABLET, COATED ORAL at 03:51

## 2025-04-23 NOTE — DISCHARGE INSTRUCTIONS
You have been seen in the emergency department for evaluation of dental pain.  Please follow-up with your dentist.  Please take antibiotics until completion.
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
## 2025-05-25 DIAGNOSIS — I10 ESSENTIAL HYPERTENSION: ICD-10-CM

## 2025-05-27 RX ORDER — AMLODIPINE BESYLATE 5 MG/1
10 TABLET ORAL DAILY
Qty: 180 TABLET | Refills: 1 | Status: SHIPPED | OUTPATIENT
Start: 2025-05-27

## 2025-06-11 ENCOUNTER — PATIENT OUTREACH (OUTPATIENT)
Dept: OBGYN CLINIC | Facility: HOSPITAL | Age: 60
End: 2025-06-11

## 2025-06-11 NOTE — PROGRESS NOTES
LISETTE received a new referral in regard to pt with transportation concerns. LISETTE contacted pt and introduced self and role. Per pt he was advised of new policy that for specialty offices [facility] now only provides five rides per year. Per pt he has the [employer] van but he must pay out of pocket and he is unable to afford same. His insurance does not cover transportation cost. Pt shared he is going through a lot of medical issues this year and has bad eyesight. Pt continued to say, he will find a way to his appts, not to worry about it. LISETTE messaged pt PCP NAI to inquire if she has access to any programs pt can apply for with help of [employer]. LISETTE will await response and remain available.

## 2025-06-12 ENCOUNTER — RA CDI HCC (OUTPATIENT)
Dept: OTHER | Facility: HOSPITAL | Age: 60
End: 2025-06-12

## 2025-06-16 ENCOUNTER — TELEPHONE (OUTPATIENT)
Dept: INTERNAL MEDICINE CLINIC | Facility: CLINIC | Age: 60
End: 2025-06-16

## 2025-06-16 RX ORDER — ERYTHROMYCIN 5 MG/G
OINTMENT OPHTHALMIC
COMMUNITY
Start: 2025-06-10

## 2025-06-16 NOTE — TELEPHONE ENCOUNTER
Folder Color- Blue     Name of Form- [employer] Verfication of Chronic Condition     Form to be filled out by- Dr. Campos    Form to be Faxed [phone]    Patient made aware of 10 business day policy.

## 2025-06-17 ENCOUNTER — PATIENT OUTREACH (OUTPATIENT)
Dept: INTERNAL MEDICINE CLINIC | Facility: CLINIC | Age: 60
End: 2025-06-17

## 2025-06-17 ENCOUNTER — OFFICE VISIT (OUTPATIENT)
Dept: OBGYN CLINIC | Facility: HOSPITAL | Age: 60
End: 2025-06-17
Payer: COMMERCIAL

## 2025-06-17 VITALS — BODY MASS INDEX: 36.32 KG/M2 | HEIGHT: 66 IN | WEIGHT: 226 LBS

## 2025-06-17 DIAGNOSIS — M25.562 CHRONIC PAIN OF LEFT KNEE: ICD-10-CM

## 2025-06-17 DIAGNOSIS — M17.12 PRIMARY OSTEOARTHRITIS OF LEFT KNEE: ICD-10-CM

## 2025-06-17 DIAGNOSIS — Z78.9 NEEDS ASSISTANCE WITH COMMUNITY RESOURCES: ICD-10-CM

## 2025-06-17 DIAGNOSIS — M16.11 PRIMARY OSTEOARTHRITIS OF RIGHT HIP: ICD-10-CM

## 2025-06-17 DIAGNOSIS — G89.29 CHRONIC PAIN OF RIGHT KNEE: Primary | ICD-10-CM

## 2025-06-17 DIAGNOSIS — G89.29 CHRONIC PAIN OF LEFT KNEE: ICD-10-CM

## 2025-06-17 DIAGNOSIS — M25.561 CHRONIC PAIN OF RIGHT KNEE: Primary | ICD-10-CM

## 2025-06-17 DIAGNOSIS — N18.2 CKD (CHRONIC KIDNEY DISEASE) STAGE 2, GFR 60-89 ML/MIN: ICD-10-CM

## 2025-06-17 DIAGNOSIS — F32.A DEPRESSION, UNSPECIFIED DEPRESSION TYPE: ICD-10-CM

## 2025-06-17 DIAGNOSIS — M16.12 PRIMARY OSTEOARTHRITIS OF LEFT HIP: ICD-10-CM

## 2025-06-17 DIAGNOSIS — M17.11 PRIMARY OSTEOARTHRITIS OF RIGHT KNEE: ICD-10-CM

## 2025-06-17 DIAGNOSIS — E11.9 TYPE 2 DIABETES MELLITUS WITHOUT COMPLICATION, WITHOUT LONG-TERM CURRENT USE OF INSULIN (HCC): ICD-10-CM

## 2025-06-17 DIAGNOSIS — F25.0 SCHIZOAFFECTIVE DISORDER, BIPOLAR TYPE (HCC): Primary | ICD-10-CM

## 2025-06-17 PROCEDURE — 20610 DRAIN/INJ JOINT/BURSA W/O US: CPT | Performed by: ORTHOPAEDIC SURGERY

## 2025-06-17 PROCEDURE — 99214 OFFICE O/P EST MOD 30 MIN: CPT | Performed by: ORTHOPAEDIC SURGERY

## 2025-06-17 RX ORDER — LIDOCAINE HYDROCHLORIDE 10 MG/ML
4 INJECTION, SOLUTION INFILTRATION; PERINEURAL
Status: COMPLETED | OUTPATIENT
Start: 2025-06-17 | End: 2025-06-17

## 2025-06-17 RX ORDER — BETAMETHASONE SODIUM PHOSPHATE AND BETAMETHASONE ACETATE 3; 3 MG/ML; MG/ML
12 INJECTION, SUSPENSION INTRA-ARTICULAR; INTRALESIONAL; INTRAMUSCULAR; SOFT TISSUE
Status: COMPLETED | OUTPATIENT
Start: 2025-06-17 | End: 2025-06-17

## 2025-06-17 RX ADMIN — LIDOCAINE HYDROCHLORIDE 4 ML: 10 INJECTION, SOLUTION INFILTRATION; PERINEURAL at 13:00

## 2025-06-17 RX ADMIN — BETAMETHASONE SODIUM PHOSPHATE AND BETAMETHASONE ACETATE 12 MG: 3; 3 INJECTION, SUSPENSION INTRA-ARTICULAR; INTRALESIONAL; INTRAMUSCULAR; SOFT TISSUE at 13:00

## 2025-06-17 NOTE — PROGRESS NOTES
SW attempted to return call to pt who asked which OP MH program is prescribing his psychiatric medications as he does not know their #.  SW has left a message for pt to return SW call.    SW had previously attempted to assist pt with OP MH referral .  Pt had been active with [facility] for Sub[facility] use in the past as well as referrals in the past to [facility] , [facility],  as well as two programs that have now closed ie: [facility] and [facility].    SW will encourage pt to speak to PCP re ordering his needed medications.     SW to reach out to Amara Wilde MSW / Orthopedic who is currently working with pt as well.     Pt returned SWer call .    SW reintroduced self and role and pt agreeable to work with SW.    Pt confirms he had been active with [facility] [phone] but he did not know they went out of business.  SW attempted to call them but had mar [address] a message.  Pt shares the he is out of his Depakote  /Divalproex sodium that he was taking 500 MG # tablets every 24 hours.    He was receiving them from [address] .  He is now out of them for 2 WEEKS!  SW to ask PCP / Doc of the Day to assist with filling same ASAP.    NAI hs also sent an IN Basket Message to [facility] to see where he is on their waiting list as well as calling Zoe Pantoja [phone] to see where he maybe on their waiting list.    Pt shares he is still chemical free for 3 years and 8 months now.  Praise provided.  He also shares his sister  has been supportive especially since his recent eye surgery,    SW to f/u with provider re his OP MH Medication request.
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
## 2025-06-18 ENCOUNTER — PATIENT OUTREACH (OUTPATIENT)
Dept: OBGYN CLINIC | Facility: HOSPITAL | Age: 60
End: 2025-06-18

## 2025-06-18 ENCOUNTER — PATIENT OUTREACH (OUTPATIENT)
Dept: INTERNAL MEDICINE CLINIC | Facility: CLINIC | Age: 60
End: 2025-06-18

## 2025-06-18 ENCOUNTER — TELEPHONE (OUTPATIENT)
Dept: INTERNAL MEDICINE CLINIC | Facility: CLINIC | Age: 60
End: 2025-06-18

## 2025-06-18 DIAGNOSIS — F32.A DEPRESSION, UNSPECIFIED DEPRESSION TYPE: ICD-10-CM

## 2025-06-18 RX ORDER — DIVALPROEX SODIUM 500 MG/1
1500 TABLET, DELAYED RELEASE ORAL
Qty: 270 TABLET | Refills: 0 | Status: SHIPPED | OUTPATIENT
Start: 2025-06-18

## 2025-06-18 NOTE — PROGRESS NOTES
SW has reached out to pt to make sure he new his Depokote was ordered and he should pick it up from his pharmacy.    NAI also asked if he received a return call from [facility] to see if he is on their waiting list.    SW had to leave a message re same.   NAI also reminded him of his appointment with his PCP tomorrow @ 10 AM .  NAI did attempt pt again and was able to reach pt and informed him his medication was called into his Pharmacy.    Pt was unaware and will call them and try to pick it up today.  NAI also [phone] to see if he is on their waitn list.  In addition NAI did suggest we see if he can go to [facility] [phone].  At pt's request and with his permission call made to them and we spoke to Jailyn who will check out pt  insurance .    She will check and get back to pt/SW re same.     NAI received a return Email that unfortunately [facility] does not take his Insurance.    NAI will f/u and confirm he is on [facility] waiting list.

## 2025-06-18 NOTE — PROGRESS NOTES
"Outpatient Care Management Note:    Re:  chronic care management    Patient identified for chronic care management program. Patient was referred by . Chart reviewed. Patient has a history of hypertension, hyperlipidemia, obesity, osteoarthritis of both knees and right shoulder, cervical radiculopathy, CKD stage 2, hepatic steatosis, glaucoma, PTSD, schizoaffective disorder, and learning disability.     Patient has [employer] for transportation.      assisting with mental health services.     Patient last seen by PCP office 1/10/25 and scheduled to follow up 6/19/25 for AWV.     I called and spoke with patient and explained my role and reason for outreach. He is declining further outreach at this time for RN CM. Patient agreeable for further outreach from NAI HANCOCK. Patient aware of his medication conditions and shares he would like to follow up with a dietician regarding his diet. We did discuss about monitoring sodium intake with his hypertension and CKD. Patient shares that pizza, sauces, and chinese food is something he likes to eat and realizes these foods are high in sodium. Encouraged food label reading and keeping sodium intake to 2,000 mg a day.     Patient reports he is working with  to connect with a mental health provider. Patient shares he is \"manic\" but he has it \"under control right now.\" Patient shares he has been busy cleaning his apartment building. Patient talking fast on the phone. Denies any thoughts of hurting himself. Patient plans on attending PCP appointment tomorrow at 10 am. Reviewed when to go to emergency room to seek further help.    Patient shares that he has [employer] insurance and is on a plan that now has dental coverage and coverage for hearing aids if needed. Shares he gets a monthly allowance that he can use towards utilities, transportation, or over the counter products.     Patient does not have any further questions or concerns for nurse care " manager at this time. Patient has PCP office number [phone] if needed. Declining further outreach at this time. Please re-consult as needed.     Will update careteam.

## 2025-06-18 NOTE — TELEPHONE ENCOUNTER
I received a message from Magdalene,  that patient needs a refill of Depakote 500 mg, 3 tablets every 24 hours.She asked if I could ask doc of the day to fill it.   After looking in her chart I see that Dr. Campos had already sent in the script. Nothing else needed at this time and patient has an appt here tomorrow 6/19/25.

## 2025-06-18 NOTE — PROGRESS NOTES
LISETTE was referred to assist pt with transportation concerns. Per conversation with pt 06/11/2025 pt does have [employer] services and he is registered with same. However, per pt he is unable to afford the services. Ortho LISETTE did consult with PCP LISETTE and she has worked with pt in regard to same and pt was educated he needs to budget to afford transportation. PCP has also provided pt with free bus passes. Options for transportation have been exhausted and pt will have to budget for [employer] rides. Ortho LISETTE will close referral and PCP LISETTE is working with pt in regard to MH services.

## 2025-06-19 ENCOUNTER — TELEPHONE (OUTPATIENT)
Age: 60
End: 2025-06-19

## 2025-06-19 ENCOUNTER — TELEPHONE (OUTPATIENT)
Dept: INTERNAL MEDICINE CLINIC | Facility: CLINIC | Age: 60
End: 2025-06-19

## 2025-06-19 DIAGNOSIS — E11.9 TYPE 2 DIABETES MELLITUS WITHOUT COMPLICATION, WITHOUT LONG-TERM CURRENT USE OF INSULIN (HCC): Primary | ICD-10-CM

## 2025-06-19 DIAGNOSIS — E66.813 OBESITY, CLASS III, BMI 40-49.9 (MORBID OBESITY): ICD-10-CM

## 2025-06-19 NOTE — LETTER
"    [facility]  [address]  [address]  [place] [address]  Phone#  [phone]  Fax#  [phone]    June 19, 2025    Haile Downing  1965  [address]  [facility] [address]  [phone]    Dear Haile Downing,     You had 1 no-show appointments at [facility]. After each no-show, the office will send a letter warning you that excessive no-shows may result in your dismissal as a patient from the [facility]  .     An appointment is considered a \"no-show\" when a patient does not arrive for their scheduled appointment and has not called the practice at least two (2) hours before their scheduled appointment to either reschedule or cancel the appointment. A \"no-show\" can also occur when an appointment is canceled due to the patient arriving fifteen (15) minutes or more past the scheduled time of their appointment.     In accordance with the [facility]'s \"No-Show and Patient Dismissal Policy\", a patient may be dismissed from any [facility] practice, at the discretion of the Practice Administrator and Medical Leadership, after a patient accumulates three (3) consecutive no-show appointments.    In the future, we request that you call the office at least two (2) hours before your scheduled appointment to reschedule or cancel the appointment and avoid another no-show. If you need assistance with keeping scheduled appointments for any reason, speak to a member of your care team. [facility] has dedicated personnel that can connect you with resources.     In order to assure quality and comprehensive care in a timely manner for all patients, to meet the needs of our patient population, and assist in any possible appropriate scheduling and receipt of care, the [facility] practices encourage all patients to arrive fifteen (15) minutes prior to their scheduled " appointment.    Respectfully,     Practice Administrator

## 2025-06-19 NOTE — NURSING NOTE
Call placed to patient to discuss upcoming appointment at [facility] radiology department and complete consultation with patient. Patient is having a bilateral hip CSI utilizing fluoroscopy guidance. Reviewed  patient's allergies, no current anticoagulant medication present per patient , patient has had procedure in the past, no questions when asked if any questions or concerns. Reminded patient of location and time expected for procedure, Patient expressed understanding by verbalizing and repeating instructions.

## 2025-06-19 NOTE — TELEPHONE ENCOUNTER
Can you confirm pt is on your Medication and Talk Therapy list?  Do you need a new order?  Received: Today    [facility] Intake  Thanks,  Magdalene

## 2025-06-19 NOTE — TELEPHONE ENCOUNTER
Writer responded to SW confirming pt is currently on the wait list for therapy only. Writer will add pt to wait list for MM (back dated to 9/20/24; date pt added to [facility] for TT).    Patient has been added to the Medication Management wait list with a referral.    Insurance: [employer] Medicare Replacement  Insurance Type:    Commercial []   Medicaid []   [facility] (if applicable)   Medicare [x]  Were outside resources sent: Yes [] No [x]

## 2025-06-20 ENCOUNTER — PATIENT OUTREACH (OUTPATIENT)
Dept: INTERNAL MEDICINE CLINIC | Facility: CLINIC | Age: 60
End: 2025-06-20

## 2025-06-20 NOTE — PROGRESS NOTES
SW has attempted to f/u with pt today to see if he obtained his MH medication but had to leave a message.  SW has also updated him the he is on [facility] OP /MH waiting list # [phone] which means a couple more months to wait as well as [facility] list.  SW also encouraged pt to reschedule his PCP visit.    SW requested a return call.    SW has received a return call from pt who shared he has obtained his medications and is grateful to  doctor's for their help.  SW has reviewed he is on the [facility] and [facility] list.  Pt given their address and contact #s for him to call to see when he can get an appointment .  Pt shares that he will.   Pt to also f/u and re-schedule his PCP appointment.    Pt has shares his sadness and frustration re his loss of eyesight in his one eye.  Support provided.  Pt is making efforts to manage his diabetes as to lose with .  He will f/u with his eye care needs as well.  Supportive counseling provided.  He shares despite the lose of sight in one eye he remains independent in his ADLs.    SW has reviewed this need to budget for his [employer] rides as  he is not eligible for  MA which would fund trips.  Pt voices understanding of same.    He also reports his sister is supportive as well as his AA contacts.    Patient does not have any further questions, concerns, or other needs at this time.  Patient has my contact # and PCP office # if needed.  Social Work to remain available to assist as indicated.    Please re-consult Social Work if needed.

## 2025-06-23 ENCOUNTER — HOSPITAL ENCOUNTER (OUTPATIENT)
Dept: RADIOLOGY | Facility: HOSPITAL | Age: 60
Discharge: HOME | End: 2025-06-23
Attending: ORTHOPAEDIC SURGERY

## 2025-06-24 ENCOUNTER — TELEPHONE (OUTPATIENT)
Dept: INTERNAL MEDICINE CLINIC | Facility: CLINIC | Age: 60
End: 2025-06-24

## 2025-07-01 DIAGNOSIS — E11.9 TYPE 2 DIABETES MELLITUS WITHOUT COMPLICATION, WITHOUT LONG-TERM CURRENT USE OF INSULIN (HCC): ICD-10-CM

## 2025-07-02 ENCOUNTER — TELEPHONE (OUTPATIENT)
Age: 60
End: 2025-07-02

## 2025-07-03 DIAGNOSIS — M17.0 PRIMARY OSTEOARTHRITIS OF BOTH KNEES: Primary | ICD-10-CM

## 2025-07-07 ENCOUNTER — OFFICE VISIT (OUTPATIENT)
Dept: INTERNAL MEDICINE CLINIC | Facility: CLINIC | Age: 60
End: 2025-07-07

## 2025-07-07 VITALS
OXYGEN SATURATION: 97 % | DIASTOLIC BLOOD PRESSURE: 80 MMHG | WEIGHT: 229 LBS | SYSTOLIC BLOOD PRESSURE: 118 MMHG | HEIGHT: 66 IN | TEMPERATURE: 98 F | BODY MASS INDEX: 36.8 KG/M2 | RESPIRATION RATE: 16 BRPM | HEART RATE: 74 BPM

## 2025-07-07 DIAGNOSIS — G47.33 OSA (OBSTRUCTIVE SLEEP APNEA): ICD-10-CM

## 2025-07-07 DIAGNOSIS — F25.0 SCHIZOAFFECTIVE DISORDER, BIPOLAR TYPE (HCC): Chronic | ICD-10-CM

## 2025-07-07 DIAGNOSIS — E78.2 MIXED HYPERLIPIDEMIA: ICD-10-CM

## 2025-07-07 DIAGNOSIS — I10 ESSENTIAL HYPERTENSION: ICD-10-CM

## 2025-07-07 DIAGNOSIS — E11.9 TYPE 2 DIABETES MELLITUS WITHOUT COMPLICATION, WITHOUT LONG-TERM CURRENT USE OF INSULIN (HCC): Primary | ICD-10-CM

## 2025-07-07 DIAGNOSIS — S83.241A OTHER TEAR OF MEDIAL MENISCUS, CURRENT INJURY, RIGHT KNEE, INITIAL ENCOUNTER: ICD-10-CM

## 2025-07-07 PROBLEM — Z87.898 HISTORY OF ALCOHOL USE DISORDER: Status: ACTIVE | Noted: 2018-12-21

## 2025-07-07 LAB — SL AMB POCT HEMOGLOBIN AIC: 6 (ref ?–6.5)

## 2025-07-07 PROCEDURE — 83036 HEMOGLOBIN GLYCOSYLATED A1C: CPT | Performed by: HOSPITALIST

## 2025-07-07 PROCEDURE — 99213 OFFICE O/P EST LOW 20 MIN: CPT | Performed by: HOSPITALIST

## 2025-07-07 NOTE — ASSESSMENT & PLAN NOTE
Symptoms well controlled on current regimen. He follows with an out of network psychiatrist. Continue Depakote and Vistaril.         crying

## 2025-07-07 NOTE — ASSESSMENT & PLAN NOTE
Lab Results   Component Value Date    HGBA1C 6.0 07/07/2025       A1c of 6.0% in the office today, up from 5.7% four months ago. Patient reports compliance with metformin 1000 mg twice daily, but admits that he could be more strict with his diet. He is willing to meet with nutrition services to help establish healthy eating habits. He is due for a diabetic eye exam. Order placed. He will be due for an albumin/creatinine ratio prior to his next visit. Order placed.     Orders:    POCT hemoglobin A1c    Diabetic foot exam; Future    Ambulatory Referral to Ophthalmology; Future    Albumin / creatinine urine ratio; Future    Ambulatory Referral to Nutrition Services; Future

## 2025-07-07 NOTE — PATIENT INSTRUCTIONS
I have placed referrals to nutrition services and ophthalmology for your diabetes. Please schedule at your earliest convenience.   Please complete blood work prior to your next visit.

## 2025-07-07 NOTE — ASSESSMENT & PLAN NOTE
Most recent lipid panel showing an LDL of 55. Continue Crestor 40 mg daily with routine screening.

## 2025-07-07 NOTE — PROGRESS NOTES
Name: Haile Downing      : 1965      MRN: [id number]  Encounter Provider: Sienna Couch DO  Encounter Date: 2025   Encounter department: [facility] [place]  :  Assessment & Plan  Type 2 diabetes mellitus without complication, without long-term current use of insulin (HCC)    Lab Results   Component Value Date    HGBA1C 6.0 2025       A1c of 6.0% in the office today, up from 5.7% four months ago. Patient reports compliance with metformin 1000 mg twice daily, but admits that he could be more strict with his diet. He is willing to meet with nutrition services to help establish healthy eating habits. He is due for a diabetic eye exam. Order placed. He will be due for an albumin/creatinine ratio prior to his next visit. Order placed.     Orders:    POCT hemoglobin A1c    Diabetic foot exam; Future    Ambulatory Referral to Ophthalmology; Future    Albumin / creatinine urine ratio; Future    Ambulatory Referral to Nutrition Services; Future    Essential hypertension    Blood pressure well controlled with Norvasc 5 mg daily. Continue current regimen       Schizoaffective disorder, bipolar type (HCC)    Symptoms well controlled on current regimen. He follows with an out of network psychiatrist. Continue Depakote and Vistaril.        Mixed hyperlipidemia    Most recent lipid panel showing an LDL of 55. Continue Crestor 40 mg daily with routine screening.               History of Present Illness   Patient is a 60 year old male with a PMHx of schizoaffective disorder, PTSD, HTN, HLD, diabetes, previous polysubstance use (alcohol, marijuana, cocaine; quit in ), hepatic steatosis presenting for follow up. He is doing well today. His right knee and left shoulder have been bothering him lately, but symptoms are tolerable with his current pain regimen. He feels stressed lately but manages it with exercise. He takes all medications as prescribed. He has no other acute concerns  "today.       Review of Systems   Constitutional:  Negative for chills and fever.   HENT:  Negative for ear pain and sore throat.    Eyes:  Negative for pain and visual disturbance.   Respiratory:  Negative for cough and shortness of breath.    Cardiovascular:  Negative for chest pain and palpitations.   Gastrointestinal:  Negative for abdominal pain and vomiting.   Genitourinary:  Negative for dysuria and hematuria.   Musculoskeletal:  Negative for arthralgias and back pain.   Skin:  Negative for color change and rash.   Neurological:  Negative for dizziness, syncope and headaches.   All other systems reviewed and are negative.      Objective   /80 (BP Location: Right arm, Patient Position: Sitting, Cuff Size: Large)   Pulse 74   Temp 98 °F (36.7 °C) (Temporal)   Resp 16   Ht 5' 6\" (1.676 m)   Wt 104 kg (229 lb)   SpO2 97%   BMI 36.96 kg/m²      Physical Exam  Vitals and nursing note reviewed.   Constitutional:       General: He is not in acute distress.     Appearance: He is well-developed.   HENT:      Head: Normocephalic and atraumatic.     Eyes:      Conjunctiva/sclera: Conjunctivae normal.       Cardiovascular:      Rate and Rhythm: Normal rate and regular rhythm.      Pulses: Normal pulses. no weak pulses.           Dorsalis pedis pulses are 2+ on the right side and 2+ on the left side.        Posterior tibial pulses are 2+ on the right side and 2+ on the left side.      Heart sounds: Normal heart sounds. No murmur heard.  Pulmonary:      Effort: Pulmonary effort is normal. No respiratory distress.      Breath sounds: Normal breath sounds. No wheezing, rhonchi or rales.   Abdominal:      Palpations: Abdomen is soft.      Tenderness: There is no abdominal tenderness.     Musculoskeletal:         General: No swelling.      Cervical back: Neck supple.      Right knee: No swelling or effusion. Normal range of motion. Tenderness present over the medial joint line.      Left knee: No swelling or " effusion. Normal range of motion.      Comments: Decreased range of motion of left shoulder secondary to pain. Right knee tender to palpation along the medial joint line.    Feet:      Right foot:      Skin integrity: No ulcer, skin breakdown, erythema, warmth, callus or dry skin.      Left foot:      Skin integrity: No ulcer, skin breakdown, erythema, warmth, callus or dry skin.     Skin:     General: Skin is warm and dry.      Capillary Refill: Capillary refill takes less than 2 seconds.     Neurological:      Mental Status: He is alert.     Psychiatric:         Mood and Affect: Mood normal.     Diabetic Foot Exam    Patient's shoes and socks removed.    Right Foot/Ankle   Right Foot Inspection  Skin Exam: skin normal and skin intact. No dry skin, no warmth, no callus, no erythema, no maceration, no abnormal color, no pre-ulcer, no ulcer and no callus.     Toe Exam: ROM and strength within normal limits. No swelling and erythema    Sensory   Vibration: intact  Proprioception: intact  Monofilament testing: intact    Vascular  Capillary refills: < 3 seconds  The right DP pulse is 2+. The right PT pulse is 2+.     Left Foot/Ankle  Left Foot Inspection  Skin Exam: skin normal and skin intact. No dry skin, no warmth, no erythema, no maceration, normal color, no pre-ulcer, no ulcer and no callus.     Toe Exam: ROM and strength within normal limits. No swelling and no erythema.     Sensory   Vibration: intact  Proprioception: intact  Monofilament testing: intact    Vascular  Capillary refills: < 3 seconds  The left DP pulse is 2+. The left PT pulse is 2+.     Assign Risk Category  No deformity present  No loss of protective sensation  No weak pulses  Risk: 0

## 2025-07-08 ENCOUNTER — HOSPITAL ENCOUNTER (OUTPATIENT)
Dept: RADIOLOGY | Facility: HOSPITAL | Age: 60
Discharge: HOME | End: 2025-07-08
Attending: ORTHOPAEDIC SURGERY
Payer: COMMERCIAL

## 2025-07-08 DIAGNOSIS — M16.11 PRIMARY OSTEOARTHRITIS OF RIGHT HIP: ICD-10-CM

## 2025-07-08 DIAGNOSIS — M16.12 PRIMARY OSTEOARTHRITIS OF LEFT HIP: ICD-10-CM

## 2025-07-08 PROCEDURE — 77002 NEEDLE LOCALIZATION BY XRAY: CPT

## 2025-07-08 PROCEDURE — 20610 DRAIN/INJ JOINT/BURSA W/O US: CPT

## 2025-07-08 RX ORDER — LIDOCAINE HYDROCHLORIDE 10 MG/ML
5 INJECTION, SOLUTION EPIDURAL; INFILTRATION; INTRACAUDAL; PERINEURAL
Status: COMPLETED | OUTPATIENT
Start: 2025-07-08 | End: 2025-07-08

## 2025-07-08 RX ORDER — METHYLPREDNISOLONE ACETATE 80 MG/ML
80 INJECTION, SUSPENSION INTRA-ARTICULAR; INTRALESIONAL; INTRAMUSCULAR; SOFT TISSUE
Status: COMPLETED | OUTPATIENT
Start: 2025-07-08 | End: 2025-07-08

## 2025-07-08 RX ORDER — ROPIVACAINE HYDROCHLORIDE 2 MG/ML
10 INJECTION, SOLUTION EPIDURAL; INFILTRATION; PERINEURAL ONCE
Status: COMPLETED | OUTPATIENT
Start: 2025-07-08 | End: 2025-07-08

## 2025-07-08 RX ADMIN — METHYLPREDNISOLONE ACETATE 80 MG: 80 INJECTION, SUSPENSION INTRA-ARTICULAR; INTRALESIONAL; INTRAMUSCULAR; SOFT TISSUE at 13:08

## 2025-07-08 RX ADMIN — METHYLPREDNISOLONE ACETATE 80 MG: 80 INJECTION, SUSPENSION INTRA-ARTICULAR; INTRALESIONAL; INTRAMUSCULAR; SOFT TISSUE at 13:14

## 2025-07-08 RX ADMIN — LIDOCAINE HYDROCHLORIDE 1 ML: 10 INJECTION, SOLUTION EPIDURAL; INFILTRATION; INTRACAUDAL; PERINEURAL at 13:09

## 2025-07-08 RX ADMIN — ROPIVACAINE HYDROCHLORIDE 2 ML: 2 INJECTION, SOLUTION EPIDURAL; INFILTRATION at 13:08

## 2025-07-08 RX ADMIN — LIDOCAINE HYDROCHLORIDE 1 ML: 10 INJECTION, SOLUTION EPIDURAL; INFILTRATION; INTRACAUDAL; PERINEURAL at 13:14

## 2025-07-08 RX ADMIN — IOHEXOL 1 ML: 300 INJECTION, SOLUTION INTRAVENOUS at 13:12

## 2025-07-08 RX ADMIN — ROPIVACAINE HYDROCHLORIDE 2 ML: 2 INJECTION, SOLUTION EPIDURAL; INFILTRATION at 13:13

## 2025-07-08 RX ADMIN — IOHEXOL 1 ML: 300 INJECTION, SOLUTION INTRAVENOUS at 13:17

## 2025-07-09 ENCOUNTER — HOSPITAL ENCOUNTER (EMERGENCY)
Facility: HOSPITAL | Age: 60
Discharge: HOME | End: 2025-07-09
Attending: EMERGENCY MEDICINE
Payer: COMMERCIAL

## 2025-07-09 VITALS
TEMPERATURE: 97.4 F | OXYGEN SATURATION: 99 % | SYSTOLIC BLOOD PRESSURE: 148 MMHG | WEIGHT: 234.35 LBS | HEART RATE: 88 BPM | RESPIRATION RATE: 18 BRPM | BODY MASS INDEX: 37.82 KG/M2 | DIASTOLIC BLOOD PRESSURE: 94 MMHG

## 2025-07-09 DIAGNOSIS — M25.551 RIGHT HIP PAIN: Primary | ICD-10-CM

## 2025-07-09 PROCEDURE — 99284 EMERGENCY DEPT VISIT MOD MDM: CPT | Performed by: EMERGENCY MEDICINE

## 2025-07-09 PROCEDURE — 99283 EMERGENCY DEPT VISIT LOW MDM: CPT

## 2025-07-09 PROCEDURE — 96372 THER/PROPH/DIAG INJ SC/IM: CPT

## 2025-07-09 RX ORDER — DIAZEPAM 5 MG/1
10 TABLET ORAL ONCE
Status: COMPLETED | OUTPATIENT
Start: 2025-07-09 | End: 2025-07-09

## 2025-07-09 RX ORDER — KETOROLAC TROMETHAMINE 30 MG/ML
15 INJECTION, SOLUTION INTRAMUSCULAR; INTRAVENOUS ONCE
Status: COMPLETED | OUTPATIENT
Start: 2025-07-09 | End: 2025-07-09

## 2025-07-09 RX ORDER — ACETAMINOPHEN 325 MG/1
650 TABLET ORAL ONCE
Status: COMPLETED | OUTPATIENT
Start: 2025-07-09 | End: 2025-07-09

## 2025-07-09 RX ADMIN — DIAZEPAM 10 MG: 5 TABLET ORAL at 02:58

## 2025-07-09 RX ADMIN — ACETAMINOPHEN 650 MG: 325 TABLET ORAL at 02:58

## 2025-07-09 RX ADMIN — KETOROLAC TROMETHAMINE 15 MG: 30 INJECTION, SOLUTION INTRAMUSCULAR; INTRAVENOUS at 02:58

## 2025-07-09 NOTE — ED ATTENDING ATTESTATION
7/9/2025  I, Mani Rice DO, saw and evaluated the patient. I have discussed the patient with the resident/non-physician practitioner and agree with the resident's/non-physician practitioner's findings, Plan of Care, and MDM as documented in the resident's/non-physician practitioner's note, except where noted. All available labs and Radiology studies were reviewed.  I was present for key portions of any procedure(s) performed by the resident/non-physician practitioner and I was immediately available to provide assistance.       At this point I agree with the current assessment done in the Emergency Department.  I have conducted an independent evaluation of this patient a history and physical is as follows:    59 yo man presents for worsening R hip pain s/p fluoro guided R hip joint injection with 1 mL 80 mg/mL depomedrol and 2mL 0.2% ropivacaine by IR yesterday. I reviewed the fluoro images demonstrating contrast in the joint capsule prior to depomedrol and ropivacaine injection. No fever, focal weakness/numbness/tingling. No other c/o at this time.    Imp: pain s/p R fluoro guided hip injection. plan: APAP, ketorolac 15mg IM x 1.       ED Course         Critical Care Time  Procedures

## 2025-07-09 NOTE — DISCHARGE INSTRUCTIONS
You were evaluated for right hip pain. There were no signs of infection or bleeding. You were given Toradol and Tylenol for pain, and valium for sleep. Please continue to monitor your injection site for signs of infection, such as swelling, redness, and worsening pain. If you experience any of these symptoms return to the emergency department for evaluation. Please schedule an appointment with your PCP in 3-5 days for follow up. You can continue to take motrin and tylenol for pain at home.

## 2025-07-10 ENCOUNTER — TELEPHONE (OUTPATIENT)
Dept: OBGYN CLINIC | Facility: CLINIC | Age: 60
End: 2025-07-10

## 2025-07-10 ENCOUNTER — OFFICE VISIT (OUTPATIENT)
Dept: OBGYN CLINIC | Facility: OTHER | Age: 60
End: 2025-07-10
Payer: COMMERCIAL

## 2025-07-10 ENCOUNTER — TELEPHONE (OUTPATIENT)
Age: 60
End: 2025-07-10

## 2025-07-10 ENCOUNTER — APPOINTMENT (OUTPATIENT)
Dept: RADIOLOGY | Facility: OTHER | Age: 60
End: 2025-07-10
Attending: ORTHOPAEDIC SURGERY
Payer: COMMERCIAL

## 2025-07-10 DIAGNOSIS — M19.012 PRIMARY OSTEOARTHRITIS OF LEFT SHOULDER: Primary | ICD-10-CM

## 2025-07-10 DIAGNOSIS — M25.512 LEFT SHOULDER PAIN, UNSPECIFIED CHRONICITY: ICD-10-CM

## 2025-07-10 PROCEDURE — 99213 OFFICE O/P EST LOW 20 MIN: CPT | Performed by: ORTHOPAEDIC SURGERY

## 2025-07-10 PROCEDURE — 73030 X-RAY EXAM OF SHOULDER: CPT

## 2025-07-10 PROCEDURE — 20610 DRAIN/INJ JOINT/BURSA W/O US: CPT | Performed by: ORTHOPAEDIC SURGERY

## 2025-07-10 RX ORDER — BETAMETHASONE SODIUM PHOSPHATE AND BETAMETHASONE ACETATE 3; 3 MG/ML; MG/ML
6 INJECTION, SUSPENSION INTRA-ARTICULAR; INTRALESIONAL; INTRAMUSCULAR; SOFT TISSUE
Status: COMPLETED | OUTPATIENT
Start: 2025-07-10 | End: 2025-07-10

## 2025-07-10 RX ORDER — BUPIVACAINE HYDROCHLORIDE 2.5 MG/ML
2 INJECTION, SOLUTION INFILTRATION; PERINEURAL
Status: COMPLETED | OUTPATIENT
Start: 2025-07-10 | End: 2025-07-10

## 2025-07-10 RX ADMIN — BUPIVACAINE HYDROCHLORIDE 2 ML: 2.5 INJECTION, SOLUTION INFILTRATION; PERINEURAL at 14:15

## 2025-07-10 RX ADMIN — BETAMETHASONE SODIUM PHOSPHATE AND BETAMETHASONE ACETATE 6 MG: 3; 3 INJECTION, SUSPENSION INTRA-ARTICULAR; INTRALESIONAL; INTRAMUSCULAR; SOFT TISSUE at 14:15

## 2025-07-10 NOTE — ASSESSMENT & PLAN NOTE
Explained to the patient that his x rays, examination and symptoms are consistent with severe glenohumeral joint osteoarthritis of his left shoulder. The pathoanatomy and natural history of this diagnosis was explained to the patient in detail today in the office.   He was provided with a cortisone injection today into his left glenohumeral joint.   May got to formal PT for gentle ROM and stretching exercises as tolerated. Referral was placed today.   Follow up on an as needed basis

## 2025-07-10 NOTE — PROGRESS NOTES
Assessment & Plan  Primary osteoarthritis of left shoulder  Explained to the patient that his x rays, examination and symptoms are consistent with severe glenohumeral joint osteoarthritis of his left shoulder. The pathoanatomy and natural history of this diagnosis was explained to the patient in detail today in the office.   He was provided with a cortisone injection today into his left glenohumeral joint.   May got to formal PT for gentle ROM and stretching exercises as tolerated. Referral was placed today.   Follow up on an as needed basis    <<    Subjective:   Patient ID: Haile Downing is a 60 y.o. male presents with a chief complaint of left shoulder pain.  The pain began a few year(s) ago and is not associated with an acute injury.  Although, patient reports that he slipped and fell about 6 months ago, exacerbating his chronic left shoulder pain. The patient describes the pain as aching and dull in intensity,  intermittent in timing, and localizes the pain to the  left glenohumeral joint, deltoid.  The pain is worse with movement and overuse and relieved by rest, ice, avoiding the painful activities.  The pain is not associated with numbness and tingling.  The pain is not associated with constitutional symptoms. The patient is awoken at night by the pain.    The patient has had prior treatment in the form multiple noted surgeries about both shoulders.       The following portions of the patient's history were reviewed and updated as appropriate: allergies, current medications, past family history, past medical history, past social history, past surgical history and problem list.    Objective:  There were no vitals taken for this visit.      Left Shoulder Exam     Range of Motion   Left shoulder forward flexion: AROM: 110. Full PROM with pain and crepitation.     Muscle Strength   Abduction: 4/5   External rotation: 5/5     Other   Erythema: absent  Sensation: normal  Pulse: present             Physical  "Exam  Constitutional:       General: He is not in acute distress.     Appearance: He is well-developed.     Eyes:      Conjunctiva/sclera: Conjunctivae normal.      Pupils: Pupils are equal, round, and reactive to light.       Cardiovascular:      Rate and Rhythm: Normal rate and regular rhythm.   Pulmonary:      Effort: Pulmonary effort is normal.      Breath sounds: Normal breath sounds.   Abdominal:      General: Bowel sounds are normal.      Palpations: Abdomen is soft.     Musculoskeletal:      Cervical back: Normal range of motion and neck supple.     Skin:     General: Skin is warm and dry.      Findings: No erythema or rash.     Neurological:      Mental Status: He is alert and oriented to person, place, and time.      Deep Tendon Reflexes: Reflexes are normal and symmetric.     Psychiatric:         Behavior: Behavior normal.       Large joint arthrocentesis: L glenohumeral    Performed by: Kevan Ko MD  Authorized by: Kevan Ko MD    [place] Protocol:  procedure performed by consultantConsent: Verbal consent obtained  Risks and benefits: risks, benefits and alternatives were discussed  Consent given by: patient  Time out: Immediately prior to procedure a \"time out\" was called to verify the correct patient, procedure, equipment, support staff and site/side marked as required.  Site marked: the operative site was marked  Radiology Images displayed and confirmed. If images not available, report reviewed: imaging studies available  Patient identity confirmed: verbally with patient  Supporting Documentation  Indications: pain and diagnostic evaluation     Is this a Visco injection? NoProcedure Details  Location: shoulder - L glenohumeral  Preparation: Patient was prepped and draped in the usual sterile fashion  Needle size: 22 G  Ultrasound guidance: no  Approach: posterior  Medications administered: 2 mL bupivacaine 0.25 %; 6 mg betamethasone acetate-betamethasone sodium " phosphate 6 (3-3) mg/mL    Patient tolerance: patient tolerated the procedure well with no immediate complications  Dressing:  Sterile dressing applied        I have personally reviewed pertinent films in PACS and my interpretation is as follows.    X Ray Left Shoulder 7/10/25: Severe glenohumeral osteoarthritis. No other acute osseous abnormalities.     Scribe Attestation      I,:  Tyler Quiroga am acting as a scribe while in the presence of the attending physician.:       I,:  Kevan Ko MD personally performed the services described in this documentation    as scribed in my presence.:

## 2025-07-15 ENCOUNTER — OFFICE VISIT (OUTPATIENT)
Dept: OBGYN CLINIC | Facility: HOSPITAL | Age: 60
End: 2025-07-15
Payer: COMMERCIAL

## 2025-07-15 VITALS — WEIGHT: 226 LBS | HEIGHT: 66 IN | BODY MASS INDEX: 36.32 KG/M2

## 2025-07-15 DIAGNOSIS — M17.12 PRIMARY OSTEOARTHRITIS OF LEFT KNEE: ICD-10-CM

## 2025-07-15 DIAGNOSIS — M17.11 PRIMARY OSTEOARTHRITIS OF RIGHT KNEE: ICD-10-CM

## 2025-07-15 DIAGNOSIS — G89.29 CHRONIC PAIN OF RIGHT KNEE: ICD-10-CM

## 2025-07-15 DIAGNOSIS — G89.29 CHRONIC PAIN OF LEFT KNEE: ICD-10-CM

## 2025-07-15 DIAGNOSIS — S83.241A OTHER TEAR OF MEDIAL MENISCUS, CURRENT INJURY, RIGHT KNEE, INITIAL ENCOUNTER: ICD-10-CM

## 2025-07-15 DIAGNOSIS — M25.561 CHRONIC PAIN OF RIGHT KNEE: ICD-10-CM

## 2025-07-15 DIAGNOSIS — M25.562 CHRONIC PAIN OF LEFT KNEE: ICD-10-CM

## 2025-07-15 PROCEDURE — 20610 DRAIN/INJ JOINT/BURSA W/O US: CPT | Performed by: ORTHOPAEDIC SURGERY

## 2025-07-15 PROCEDURE — 99213 OFFICE O/P EST LOW 20 MIN: CPT | Performed by: ORTHOPAEDIC SURGERY

## 2025-07-15 RX ORDER — BUPIVACAINE HYDROCHLORIDE 2.5 MG/ML
2 INJECTION, SOLUTION INFILTRATION; PERINEURAL
Status: COMPLETED | OUTPATIENT
Start: 2025-07-15 | End: 2025-07-15

## 2025-07-15 RX ORDER — KETOROLAC TROMETHAMINE 30 MG/ML
60 INJECTION, SOLUTION INTRAMUSCULAR; INTRAVENOUS
Status: COMPLETED | OUTPATIENT
Start: 2025-07-15 | End: 2025-07-15

## 2025-07-15 RX ORDER — LIDOCAINE HYDROCHLORIDE 10 MG/ML
2 INJECTION, SOLUTION INFILTRATION; PERINEURAL
Status: COMPLETED | OUTPATIENT
Start: 2025-07-15 | End: 2025-07-15

## 2025-07-15 RX ADMIN — KETOROLAC TROMETHAMINE 60 MG: 30 INJECTION, SOLUTION INTRAMUSCULAR; INTRAVENOUS at 13:30

## 2025-07-15 RX ADMIN — BUPIVACAINE HYDROCHLORIDE 2 ML: 2.5 INJECTION, SOLUTION INFILTRATION; PERINEURAL at 13:30

## 2025-07-15 RX ADMIN — LIDOCAINE HYDROCHLORIDE 2 ML: 10 INJECTION, SOLUTION INFILTRATION; PERINEURAL at 13:30

## 2025-07-16 ENCOUNTER — EVALUATION (OUTPATIENT)
Dept: PHYSICAL THERAPY | Facility: REHABILITATION | Age: 60
End: 2025-07-16
Attending: ORTHOPAEDIC SURGERY
Payer: COMMERCIAL

## 2025-07-16 DIAGNOSIS — M19.012 PRIMARY OSTEOARTHRITIS OF LEFT SHOULDER: Primary | ICD-10-CM

## 2025-07-16 PROCEDURE — 97112 NEUROMUSCULAR REEDUCATION: CPT

## 2025-07-16 PROCEDURE — 97161 PT EVAL LOW COMPLEX 20 MIN: CPT
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
## 2025-07-17 ENCOUNTER — PATIENT OUTREACH (OUTPATIENT)
Dept: INTERNAL MEDICINE CLINIC | Facility: CLINIC | Age: 60
End: 2025-07-17

## 2025-07-17 DIAGNOSIS — F25.0 SCHIZOAFFECTIVE DISORDER, BIPOLAR TYPE (HCC): Primary | ICD-10-CM

## 2025-07-17 LAB
LEFT EYE DIABETIC RETINOPATHY: NORMAL
RIGHT EYE DIABETIC RETINOPATHY: NORMAL

## 2025-07-17 NOTE — PROGRESS NOTES
SW has received a call from Bebe of [facility] [phone] who shares that pt's name has come up on their list .    We have made a 3 way call to pt.  SW reintroduced self and role and pt was receptive to out reach.  NAI reviewed [facility] ws offering an OP MH Intake appointment and pt was who very receptive to same.    Bebe has offered pt an INTAKE appointment for Thursday 7/31/25 @ 10 AM.    Pt is aware it is an hour and a half appointment.    Pt to take his insurance cards and medical records.  Pt to set up his own ride and Bebe has Texted him their Address and contact info.     Pt was very please with same.     Patient does not have any further questions, concerns, or other needs at this time.  Patient has my contact # and PCP office # if needed.  Social Work to remain available to assist as indicated.    Please re-consult Social Work if needed.

## 2025-07-21 ENCOUNTER — TELEPHONE (OUTPATIENT)
Dept: INTERNAL MEDICINE CLINIC | Facility: CLINIC | Age: 60
End: 2025-07-21

## 2025-07-21 NOTE — TELEPHONE ENCOUNTER
Patient wanted to know if he can get help apply for supplemental insurance because he will be having eye surgery. Stated he is not sure if he made to much to qualify for medical assistance. Patient was transferred to [facility] and was told they couldn't help him with applying. He asked if Magdalene TRIMBLE can assist with this.

## 2025-07-22 ENCOUNTER — PATIENT OUTREACH (OUTPATIENT)
Dept: INTERNAL MEDICINE CLINIC | Facility: CLINIC | Age: 60
End: 2025-07-22

## 2025-07-22 NOTE — PROGRESS NOTES
NAI has reached out to pt as per his request to assist with Info re Medicare and Medicare replacement plans. NAI has informed him,h e should reach out  to [facility] who offer free non biased Medicare Counseling and helps pt compare plans  and benefits.  Pt shared has has already changed to [facility] for next Month.NAI requested he let the Office know and get a copy of the plan when it becomes effective.    NAI has strongly encouraged pt to make sure all of his provides and hospital are in network (Including ) . He indicated he will.   NAI has reviewed with pt the [place] Guidelines and he is over income for [place] and the Medicare PART B buy in.    Pt is concerned re the out of pocket cost for upcoming eye surgery.  NAI has encouraged him to call his surgeon's Office to review his insurance and ask what his out of pocket will be and ask for a payment plans if needed.    NAI also reviewed that he should f/u with the Hospital Financial Counselors  if need for a [facility] Assistance/[place] care if needed for any hospital and Pt indicated he would ,.  NAI offered to give him the [place] MEDIA # but he declined at this moment.   He will look it up if needed.     Patient does not have any further questions, concerns, or other needs at this time.  Patient has my contact # and PCP office # if needed.  Social Work to remain available to assist as indicated.    Please re-consult Social Work if needed.

## 2025-07-23 ENCOUNTER — OFFICE VISIT (OUTPATIENT)
Dept: PHYSICAL THERAPY | Facility: REHABILITATION | Age: 60
End: 2025-07-23
Attending: ORTHOPAEDIC SURGERY
Payer: COMMERCIAL

## 2025-07-23 DIAGNOSIS — M19.012 PRIMARY OSTEOARTHRITIS OF LEFT SHOULDER: Primary | ICD-10-CM

## 2025-07-23 PROCEDURE — 97140 MANUAL THERAPY 1/> REGIONS: CPT

## 2025-07-23 PROCEDURE — 97110 THERAPEUTIC EXERCISES: CPT
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
## 2025-07-25 ENCOUNTER — APPOINTMENT (EMERGENCY)
Dept: RADIOLOGY | Facility: HOSPITAL | Age: 60
End: 2025-07-25
Payer: COMMERCIAL

## 2025-07-25 ENCOUNTER — HOSPITAL ENCOUNTER (EMERGENCY)
Facility: HOSPITAL | Age: 60
Discharge: HOME | End: 2025-07-25
Attending: EMERGENCY MEDICINE
Payer: COMMERCIAL

## 2025-07-25 VITALS
SYSTOLIC BLOOD PRESSURE: 150 MMHG | TEMPERATURE: 98.4 F | DIASTOLIC BLOOD PRESSURE: 72 MMHG | RESPIRATION RATE: 19 BRPM | HEART RATE: 84 BPM | OXYGEN SATURATION: 98 %

## 2025-07-25 DIAGNOSIS — R10.9 ABDOMINAL PAIN: Primary | ICD-10-CM

## 2025-07-25 DIAGNOSIS — K08.89 DENTALGIA: ICD-10-CM

## 2025-07-25 LAB
ALBUMIN SERPL BCG-MCNC: 4.4 G/DL (ref 3.5–5)
ALP SERPL-CCNC: 60 U/L (ref 34–104)
ALT SERPL W P-5'-P-CCNC: 39 U/L (ref 7–52)
ANION GAP SERPL CALCULATED.3IONS-SCNC: 10 MMOL/L (ref 4–13)
AST SERPL W P-5'-P-CCNC: 41 U/L (ref 13–39)
BASOPHILS # BLD AUTO: 0.03 THOUSANDS/ÂΜL (ref 0–0.1)
BASOPHILS NFR BLD AUTO: 0 % (ref 0–1)
BILIRUB SERPL-MCNC: 0.5 MG/DL (ref 0.2–1)
BUN SERPL-MCNC: 12 MG/DL (ref 5–25)
CALCIUM SERPL-MCNC: 9.5 MG/DL (ref 8.4–10.2)
CHLORIDE SERPL-SCNC: 103 MMOL/L (ref 96–108)
CO2 SERPL-SCNC: 26 MMOL/L (ref 21–32)
CREAT SERPL-MCNC: 0.98 MG/DL (ref 0.6–1.3)
EOSINOPHIL # BLD AUTO: 0.05 THOUSAND/ÂΜL (ref 0–0.61)
EOSINOPHIL NFR BLD AUTO: 1 % (ref 0–6)
ERYTHROCYTE [DISTWIDTH] IN BLOOD BY AUTOMATED COUNT: 13.5 % (ref 11.6–15.1)
GFR SERPL CREATININE-BSD FRML MDRD: 84 ML/MIN/1.73SQ M
GLUCOSE SERPL-MCNC: 94 MG/DL (ref 65–140)
HCT VFR BLD AUTO: 43 % (ref 36.5–49.3)
HGB BLD-MCNC: 13.9 G/DL (ref 12–17)
IMM GRANULOCYTES # BLD AUTO: 0.03 THOUSAND/UL (ref 0–0.2)
IMM GRANULOCYTES NFR BLD AUTO: 0 % (ref 0–2)
LIPASE SERPL-CCNC: 51 U/L (ref 11–82)
LYMPHOCYTES # BLD AUTO: 3.41 THOUSANDS/ÂΜL (ref 0.6–4.47)
LYMPHOCYTES NFR BLD AUTO: 40 % (ref 14–44)
MCH RBC QN AUTO: 30.8 PG (ref 26.8–34.3)
MCHC RBC AUTO-ENTMCNC: 32.3 G/DL (ref 31.4–37.4)
MCV RBC AUTO: 95 FL (ref 82–98)
MONOCYTES # BLD AUTO: 0.51 THOUSAND/ÂΜL (ref 0.17–1.22)
MONOCYTES NFR BLD AUTO: 6 % (ref 4–12)
NEUTROPHILS # BLD AUTO: 4.42 THOUSANDS/ÂΜL (ref 1.85–7.62)
NEUTS SEG NFR BLD AUTO: 53 % (ref 43–75)
NRBC BLD AUTO-RTO: 0 /100 WBCS
PLATELET # BLD AUTO: 173 THOUSANDS/UL (ref 149–390)
PMV BLD AUTO: 10.4 FL (ref 8.9–12.7)
POTASSIUM SERPL-SCNC: 5 MMOL/L (ref 3.5–5.3)
PROT SERPL-MCNC: 7.5 G/DL (ref 6.4–8.4)
RBC # BLD AUTO: 4.51 MILLION/UL (ref 3.88–5.62)
SODIUM SERPL-SCNC: 139 MMOL/L (ref 135–147)
WBC # BLD AUTO: 8.45 THOUSAND/UL (ref 4.31–10.16)

## 2025-07-25 PROCEDURE — 74177 CT ABD & PELVIS W/CONTRAST: CPT

## 2025-07-25 PROCEDURE — 36415 COLL VENOUS BLD VENIPUNCTURE: CPT

## 2025-07-25 PROCEDURE — 99284 EMERGENCY DEPT VISIT MOD MDM: CPT

## 2025-07-25 PROCEDURE — 99285 EMERGENCY DEPT VISIT HI MDM: CPT | Performed by: EMERGENCY MEDICINE

## 2025-07-25 PROCEDURE — 85025 COMPLETE CBC W/AUTO DIFF WBC: CPT

## 2025-07-25 PROCEDURE — 83690 ASSAY OF LIPASE: CPT

## 2025-07-25 PROCEDURE — 80053 COMPREHEN METABOLIC PANEL: CPT

## 2025-07-25 PROCEDURE — 96374 THER/PROPH/DIAG INJ IV PUSH: CPT

## 2025-07-25 RX ORDER — AMOXICILLIN 500 MG/1
500 CAPSULE ORAL 3 TIMES DAILY
Qty: 15 CAPSULE | Refills: 0 | Status: SHIPPED | OUTPATIENT
Start: 2025-07-25 | End: 2025-07-30

## 2025-07-25 RX ORDER — ACETAMINOPHEN 325 MG/1
650 TABLET ORAL ONCE
Status: COMPLETED | OUTPATIENT
Start: 2025-07-25 | End: 2025-07-25

## 2025-07-25 RX ORDER — KETOROLAC TROMETHAMINE 30 MG/ML
15 INJECTION, SOLUTION INTRAMUSCULAR; INTRAVENOUS ONCE
Status: COMPLETED | OUTPATIENT
Start: 2025-07-25 | End: 2025-07-25

## 2025-07-25 RX ADMIN — ACETAMINOPHEN 650 MG: 325 TABLET ORAL at 02:43

## 2025-07-25 RX ADMIN — KETOROLAC TROMETHAMINE 15 MG: 30 INJECTION, SOLUTION INTRAMUSCULAR; INTRAVENOUS at 02:43

## 2025-07-25 RX ADMIN — IOHEXOL 85 ML: 350 INJECTION, SOLUTION INTRAVENOUS at 03:51

## 2025-07-25 NOTE — Clinical Note
Haile Downing was seen and treated in our emergency department on 7/25/2025.                Diagnosis:     Haile  is off the rest of the shift today, may return to work on return date.    He may return on this date: 07/26/2025         If you have any questions or concerns, please don't hesitate to call.      Christian Santana, DO    ______________________________           _______________          _______________  Hospital Representative                              Date                                Time
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
## 2025-07-25 NOTE — DISCHARGE INSTRUCTIONS
You were seen in the emergency department for abdominal pain.  We performed lab work and imaging and feel that you are safe for discharge at this time.  Please follow-up with your primary care for continued management of your chronic conditions.  Please return the emergency permit should you experience any severe worsening of your symptoms, inability to tolerate oral intake, or any other concerning symptoms that you like evaluated.  Otherwise please follow-up with your primary care.
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
## 2025-07-29 ENCOUNTER — HOSPITAL ENCOUNTER (EMERGENCY)
Facility: HOSPITAL | Age: 60
Discharge: HOME | End: 2025-07-29
Attending: EMERGENCY MEDICINE
Payer: COMMERCIAL

## 2025-07-29 VITALS
HEART RATE: 66 BPM | OXYGEN SATURATION: 99 % | RESPIRATION RATE: 18 BRPM | DIASTOLIC BLOOD PRESSURE: 93 MMHG | TEMPERATURE: 97.8 F | SYSTOLIC BLOOD PRESSURE: 138 MMHG

## 2025-07-29 VITALS
SYSTOLIC BLOOD PRESSURE: 147 MMHG | RESPIRATION RATE: 18 BRPM | TEMPERATURE: 97.3 F | OXYGEN SATURATION: 100 % | HEART RATE: 63 BPM | DIASTOLIC BLOOD PRESSURE: 89 MMHG

## 2025-07-29 DIAGNOSIS — H40.9 GLAUCOMA OF BOTH EYES, UNSPECIFIED GLAUCOMA TYPE: ICD-10-CM

## 2025-07-29 DIAGNOSIS — K08.89 PAIN, DENTAL: ICD-10-CM

## 2025-07-29 DIAGNOSIS — Z76.0 ENCOUNTER FOR MEDICATION REFILL: ICD-10-CM

## 2025-07-29 DIAGNOSIS — T78.40XA ALLERGIC REACTION, INITIAL ENCOUNTER: Primary | ICD-10-CM

## 2025-07-29 DIAGNOSIS — G25.81 RESTLESS LEG SYNDROME: Primary | ICD-10-CM

## 2025-07-29 PROCEDURE — 99284 EMERGENCY DEPT VISIT MOD MDM: CPT | Performed by: EMERGENCY MEDICINE

## 2025-07-29 PROCEDURE — 99283 EMERGENCY DEPT VISIT LOW MDM: CPT

## 2025-07-29 PROCEDURE — 96372 THER/PROPH/DIAG INJ SC/IM: CPT

## 2025-07-29 RX ORDER — DROPERIDOL 2.5 MG/ML
2.5 INJECTION, SOLUTION INTRAMUSCULAR; INTRAVENOUS ONCE
Status: COMPLETED | OUTPATIENT
Start: 2025-07-29 | End: 2025-07-29

## 2025-07-29 RX ORDER — BIMATOPROST 0.1 MG/ML
1 SOLUTION/ DROPS OPHTHALMIC
Qty: 5 ML | Refills: 0 | Status: CANCELLED | OUTPATIENT
Start: 2025-07-29

## 2025-07-29 RX ORDER — DIAZEPAM 5 MG/1
5 TABLET ORAL 2 TIMES DAILY
Qty: 5 TABLET | Refills: 0 | Status: SHIPPED | OUTPATIENT
Start: 2025-07-29 | End: 2025-08-01

## 2025-07-29 RX ORDER — KETOROLAC TROMETHAMINE 30 MG/ML
15 INJECTION, SOLUTION INTRAMUSCULAR; INTRAVENOUS ONCE
Status: COMPLETED | OUTPATIENT
Start: 2025-07-29 | End: 2025-07-29

## 2025-07-29 RX ORDER — DIPHENHYDRAMINE HCL 25 MG
25 TABLET ORAL ONCE
Status: COMPLETED | OUTPATIENT
Start: 2025-07-29 | End: 2025-07-29

## 2025-07-29 RX ORDER — BRIMONIDINE TARTRATE 2 MG/ML
1 SOLUTION/ DROPS OPHTHALMIC ONCE
Status: COMPLETED | OUTPATIENT
Start: 2025-07-29 | End: 2025-07-29

## 2025-07-29 RX ORDER — DORZOLAMIDE HYDROCHLORIDE AND TIMOLOL MALEATE 20; 5 MG/ML; MG/ML
1 SOLUTION/ DROPS OPHTHALMIC DAILY
Qty: 10 ML | Refills: 0 | Status: CANCELLED | OUTPATIENT
Start: 2025-07-29

## 2025-07-29 RX ORDER — DIAZEPAM 5 MG/1
5 TABLET ORAL ONCE
Status: COMPLETED | OUTPATIENT
Start: 2025-07-29 | End: 2025-07-29

## 2025-07-29 RX ADMIN — DIPHENHYDRAMINE HCL 25 MG: 25 TABLET ORAL at 05:30

## 2025-07-29 RX ADMIN — AMOXICILLIN AND CLAVULANATE POTASSIUM 1 TABLET: 875; 125 TABLET, COATED ORAL at 05:31

## 2025-07-29 RX ADMIN — BRIMONIDINE TARTRATE 1 DROP: 2 SOLUTION/ DROPS OPHTHALMIC at 06:04

## 2025-07-29 RX ADMIN — DIAZEPAM 5 MG: 5 TABLET ORAL at 09:37

## 2025-07-29 RX ADMIN — KETOROLAC TROMETHAMINE 15 MG: 30 INJECTION, SOLUTION INTRAMUSCULAR; INTRAVENOUS at 05:32

## 2025-07-29 RX ADMIN — DROPERIDOL 2.5 MG: 2.5 INJECTION, SOLUTION INTRAMUSCULAR; INTRAVENOUS at 06:04

## 2025-07-30 ENCOUNTER — APPOINTMENT (OUTPATIENT)
Dept: PHYSICAL THERAPY | Facility: REHABILITATION | Age: 60
End: 2025-07-30
Attending: ORTHOPAEDIC SURGERY
Payer: COMMERCIAL

## 2025-08-05 ENCOUNTER — OFFICE VISIT (OUTPATIENT)
Dept: PHYSICAL THERAPY | Facility: REHABILITATION | Age: 60
End: 2025-08-05
Attending: ORTHOPAEDIC SURGERY
Payer: COMMERCIAL

## 2025-08-05 DIAGNOSIS — M19.012 PRIMARY OSTEOARTHRITIS OF LEFT SHOULDER: Primary | ICD-10-CM

## 2025-08-05 PROCEDURE — 97110 THERAPEUTIC EXERCISES: CPT

## 2025-08-05 PROCEDURE — 97112 NEUROMUSCULAR REEDUCATION: CPT

## 2025-08-07 ENCOUNTER — OFFICE VISIT (OUTPATIENT)
Dept: PHYSICAL THERAPY | Facility: REHABILITATION | Age: 60
End: 2025-08-07
Attending: ORTHOPAEDIC SURGERY
Payer: COMMERCIAL

## 2025-08-07 ENCOUNTER — HOSPITAL ENCOUNTER (EMERGENCY)
Facility: HOSPITAL | Age: 60
Discharge: HOME | End: 2025-08-07
Attending: EMERGENCY MEDICINE
Payer: COMMERCIAL

## 2025-08-07 DIAGNOSIS — M19.012 PRIMARY OSTEOARTHRITIS OF LEFT SHOULDER: Primary | ICD-10-CM

## 2025-08-07 PROCEDURE — 97112 NEUROMUSCULAR REEDUCATION: CPT

## 2025-08-07 PROCEDURE — 97110 THERAPEUTIC EXERCISES: CPT

## 2025-08-08 ENCOUNTER — HOSPITAL ENCOUNTER (EMERGENCY)
Facility: HOSPITAL | Age: 60
Discharge: HOME | End: 2025-08-08
Attending: EMERGENCY MEDICINE
Payer: COMMERCIAL

## 2025-08-11 ENCOUNTER — OFFICE VISIT (OUTPATIENT)
Dept: DENTISTRY | Facility: CLINIC | Age: 60
End: 2025-08-11

## 2025-08-13 ENCOUNTER — OFFICE VISIT (OUTPATIENT)
Dept: PHYSICAL THERAPY | Facility: REHABILITATION | Age: 60
End: 2025-08-13
Attending: ORTHOPAEDIC SURGERY
Payer: COMMERCIAL

## (undated) DEVICE — TUBING ARTHROSCOPIC WAVE  MAIN PUMP

## (undated) DEVICE — VAPR COOLPULSE 90 ELECTRODE 90 DEGREES SUCTION WITH INTEGRATED HANDPIECE: Brand: VAPR COOLPULSE

## (undated) DEVICE — 4-PORT MANIFOLD: Brand: NEPTUNE 2

## (undated) DEVICE — IMPERVIOUS STOCKINETTE: Brand: DEROYAL

## (undated) DEVICE — ARTHROSCOPY FLOOR MAT

## (undated) DEVICE — DISPOSABLE OR TOWEL: Brand: CARDINAL HEALTH

## (undated) DEVICE — TUBING SUCTION 5MM X 12 FT

## (undated) DEVICE — CHLORAPREP HI-LITE 26ML ORANGE

## (undated) DEVICE — LIGHT GLOVE GREEN

## (undated) DEVICE — BLADE SHAVER DISSECTOR 3.5MM 13CM COOLCUT

## (undated) DEVICE — SKIN MARKER DUAL TIP WITH RULER CAP, FLEXIBLE RULER AND LABELS: Brand: DEVON

## (undated) DEVICE — ACE WRAP 6 IN UNSTERILE

## (undated) DEVICE — ABDOMINAL PAD: Brand: DERMACEA

## (undated) DEVICE — BETHLEHEM UNIVERSAL  ARTHRO PK: Brand: CARDINAL HEALTH

## (undated) DEVICE — SUT ETHILON 3-0 FSLX 30 IN 1673H

## (undated) DEVICE — 3M™ STERI-DRAPE™ U-DRAPE 1015: Brand: STERI-DRAPE™

## (undated) DEVICE — GLOVE SRG BIOGEL 8

## (undated) DEVICE — NEEDLE 25GA X 1 IN SAFETY GLIDE

## (undated) DEVICE — TIBURON EXTREMITY SHEET: Brand: CONVERTORS

## (undated) DEVICE — SURGICAL GOWN, XL SMARTSLEEVE: Brand: CONVERTORS

## (undated) DEVICE — DRAPE SHEET THREE QUARTER

## (undated) DEVICE — OCCLUSIVE GAUZE STRIP,3% BISMUTH TRIBROMOPHENATE IN PETROLATUM BLEND: Brand: XEROFORM

## (undated) DEVICE — GLOVE INDICATOR PI UNDERGLOVE SZ 8.5 BLUE

## (undated) DEVICE — GLOVE SRG BIOGEL 7.5

## (undated) DEVICE — GAUZE SPONGES,16 PLY: Brand: CURITY

## (undated) DEVICE — STOCKINETTE REGULAR

## (undated) DEVICE — COBAN 6 IN STERILE

## (undated) DEVICE — SCD SEQUENTIAL COMPRESSION COMFORT SLEEVE MEDIUM KNEE LENGTH: Brand: KENDALL SCD

## (undated) DEVICE — CURITY NON-ADHERENT STRIPS: Brand: CURITY

## (undated) DEVICE — PADDING CAST 6IN COTTON STRL

## (undated) DEVICE — INTENDED FOR TISSUE SEPARATION, AND OTHER PROCEDURES THAT REQUIRE A SHARP SURGICAL BLADE TO PUNCTURE OR CUT.: Brand: BARD-PARKER ® CARBON RIB-BACK BLADES

## (undated) DEVICE — GLOVE INDICATOR PI UNDERGLOVE SZ 8 BLUE

## (undated) DEVICE — PADDING CAST 4 IN  COTTON STRL

## (undated) DEVICE — WEBRIL 6 IN UNSTERILE

## (undated) DEVICE — BLADE SHAVER DISSECTOR 4MM 13CM COOLCUT

## (undated) DEVICE — NEEDLE 25G X 1 1/2

## (undated) DEVICE — SUT ETHILON 3-0 PS-1 18 IN 1663G

## (undated) DEVICE — U-DRAPE: Brand: CONVERTORS

## (undated) DEVICE — SPONGE PVP SCRUB WING STERILE

## (undated) DEVICE — BLADE SHAVER TORPEDO CRV 4MM 13MM COOLCUT

## (undated) RX ORDER — BIMATOPROST 0.1 MG/ML
1 SOLUTION/ DROPS OPHTHALMIC EVERY EVENING
Start: 2022-07-18

## (undated) RX ORDER — BRIMONIDINE TARTRATE 2 MG/MG: 1 SOLUTION/ DROPS OPHTHALMIC TWICE A DAY